# Patient Record
Sex: FEMALE | Race: BLACK OR AFRICAN AMERICAN | Employment: OTHER | URBAN - METROPOLITAN AREA
[De-identification: names, ages, dates, MRNs, and addresses within clinical notes are randomized per-mention and may not be internally consistent; named-entity substitution may affect disease eponyms.]

---

## 2016-05-20 LAB
CREATININE, EXTERNAL: 1.1
HBA1C MFR BLD HPLC: 6.3 %
LDL-C, EXTERNAL: 79

## 2016-09-26 LAB
CREATININE, EXTERNAL: 1
HBA1C MFR BLD HPLC: 6.4 %
LDL-C, EXTERNAL: 77

## 2017-01-26 LAB
CREATININE, EXTERNAL: 1
HBA1C MFR BLD HPLC: 6.5 %
LDL-C, EXTERNAL: 87
MICROALBUMIN UR TEST STR-MCNC: 10 MG/DL

## 2017-03-07 ENCOUNTER — HOSPITAL ENCOUNTER (EMERGENCY)
Age: 72
Discharge: HOME | End: 2017-03-07
Attending: EMERGENCY MEDICINE | Admitting: EMERGENCY MEDICINE
Payer: MEDICARE

## 2017-03-07 VITALS
SYSTOLIC BLOOD PRESSURE: 143 MMHG | BODY MASS INDEX: 45.35 KG/M2 | TEMPERATURE: 98.2 F | OXYGEN SATURATION: 97 % | RESPIRATION RATE: 20 BRPM | DIASTOLIC BLOOD PRESSURE: 75 MMHG | WEIGHT: 256 LBS | HEART RATE: 64 BPM

## 2017-03-07 DIAGNOSIS — I10 ASYMPTOMATIC HYPERTENSION: Primary | ICD-10-CM

## 2017-03-07 PROCEDURE — 99282 EMERGENCY DEPT VISIT SF MDM: CPT

## 2017-03-07 RX ORDER — TORSEMIDE 20 MG/1
10 TABLET ORAL DAILY
COMMUNITY
End: 2017-06-07 | Stop reason: ALTCHOICE

## 2017-03-07 NOTE — ED NOTES
Pt instructed to follow up with her PCP for BP recheck, and to return for worsening HTN or other concerns.

## 2017-03-07 NOTE — DISCHARGE INSTRUCTIONS

## 2017-03-07 NOTE — ED TRIAGE NOTES
Pt reports her bp has been elevated; has had some medication changes (takes cozaar and torsemide). /105 pta.   Denies chest pain/dizziness/chest pain

## 2017-03-07 NOTE — ED PROVIDER NOTES
HPI Comments: Took BP at home this morning and it was high, no symptoms, was concerend and came to ED. Asymptomatic now, recently had her meds changed by PCP. The history is provided by the patient. Past Medical History:   Diagnosis Date    Atrophic vaginitis     Cardiac cath 05/30/2012    Patent coronary arteries. LVEDP 20.  RA 11.  RV 42/10. PA 42/21. W 18.  CO 6.4 (TD), 6.4 (Abby Savant). PVR 1.7 Wood units. False-positive nuclear study.  Cardiac echocardiogram 05/11/2011    EF 65%. RVSP at least 35 mmHg. Mild IVCE. No significant valvular heart disease.  Cardiac nuclear imaging test 05/18/2012    Tech difficult. Apical ischemia. No infarction. EF 76%. No reg'l WMA. No TID.  Cardiovascular LLE venous duplex 06/17/2013    Left leg:  No DVT.     Diabetes     diet controlled, hypoglycemia from metformin previously     Dyslipidemia     Fatty liver     Fibrocystic breast disease     Fluid retention     GERD     H/O colonoscopy 4/12/13    polyp    Hypertension     Ill-defined condition     gout    Macular degeneration     Morbid obesity ([address]     Obstructive sleep apnea     Peripheral neuropathy ([address]     Rectocele        Past Surgical History:   Procedure Laterality Date    [facility] ARTERIAL ARTERIOTOMY 3M  5/25/2012    HX COLONOSCOPY  4/12/2013    HX HEART CATHETERIZATION  5/25/2012    HX HERNIA REPAIR      umbilical    HX HYSTERECTOMY      HX MOHS PROCEDURES      right         Family History:   Problem Relation Age of Onset   Rhoda Alfredo Cancer Mother      colon cancer    Colon Cancer Mother     Hypertension Mother     Diabetes Mother     Heart Disease Mother     Coronary Artery Disease Mother     Hypertension Father     Diabetes Father     Heart Disease Father     Coronary Artery Disease Father     Hypertension Sister     Diabetes Sister     Heart Disease Sister     Coronary Artery Disease Sister     Hypertension Brother     Diabetes Brother     Heart Disease Brother     Coronary Artery Disease Brother        Social History     Social History    Marital status:      Spouse name: N/A    Number of children: N/A    Years of education: N/A     Occupational History    Not on file. Social History Main Topics    Smoking status: Former Smoker    Smokeless tobacco: Never Used    Alcohol use No    Drug use: No    Sexual activity: No     Other Topics Concern    Not on file     Social History Narrative         ALLERGIES: Latex; Nexium [esomeprazole magnesium]; Crestor [rosuvastatin]; Lisinopril; Niaspan [niacin]; Norvasc [amlodipine]; Pcn [penicillins]; Pravachol [pravastatin]; Sulfa (sulfonamide antibiotics); and Zocor [simvastatin]    Review of Systems   Constitutional: Negative. HENT: Negative. Respiratory: Negative. Cardiovascular: Negative. Gastrointestinal: Negative. Neurological: Negative. Hematological: Negative. Vitals:    03/07/17 0512 03/07/17 0515   BP: 136/86 131/89   Pulse:  64   Resp:  20   Temp:  98.2 °F (36.8 °C)   SpO2:  98%   Weight:  116.1 kg (256 lb)            Physical Exam   Constitutional: She is oriented to person, place, and time. She appears well-developed. HENT:   Head: Normocephalic and atraumatic. Mouth/Throat: Oropharynx is clear and moist.   Eyes: Conjunctivae and EOM are normal. Pupils are equal, round, and reactive to light. Neck: Normal range of motion. Neck supple. Cardiovascular: Normal rate and regular rhythm. Pulmonary/Chest: Effort normal and breath sounds normal.   Abdominal: Soft. Musculoskeletal: Normal range of motion. Neurological: She is alert and oriented to person, place, and time. Skin: Skin is warm and dry. Psychiatric: She has a normal mood and affect. Nursing note and vitals reviewed. MDM  Number of Diagnoses or Management Options  Diagnosis management comments: BP results reviewed with pt, she will F/U with PCP today for re-check.   Douglas Clark MD  5:28 AM      ED Course       Procedures

## 2017-03-31 ENCOUNTER — OFFICE VISIT (OUTPATIENT)
Dept: CARDIOLOGY CLINIC | Age: 72
End: 2017-03-31

## 2017-03-31 VITALS
HEART RATE: 65 BPM | HEIGHT: 63 IN | DIASTOLIC BLOOD PRESSURE: 86 MMHG | OXYGEN SATURATION: 98 % | WEIGHT: 253 LBS | BODY MASS INDEX: 44.83 KG/M2 | SYSTOLIC BLOOD PRESSURE: 132 MMHG

## 2017-03-31 DIAGNOSIS — I11.9 BENIGN HYPERTENSIVE HEART DISEASE WITHOUT HEART FAILURE: ICD-10-CM

## 2017-03-31 DIAGNOSIS — G47.30 SLEEP APNEA, UNSPECIFIED TYPE: ICD-10-CM

## 2017-03-31 DIAGNOSIS — R00.2 PALPITATIONS: Primary | ICD-10-CM

## 2017-03-31 DIAGNOSIS — E78.5 DYSLIPIDEMIA: ICD-10-CM

## 2017-03-31 RX ORDER — LOSARTAN POTASSIUM 100 MG/1
TABLET ORAL
Refills: 0 | COMMUNITY
Start: 2017-03-07 | End: 2017-06-07 | Stop reason: DRUGHIGH

## 2017-03-31 NOTE — PROGRESS NOTES
HPI: I saw Myrna Rodriguez in my office today in cardiovascular evaluation regarding her problems with palpitations recently. Ms. Clinton Rodriguez is a pleasant, morbidly obese, 74year old  female with history of hypertension, dyslipidemia, type 2 diabetes mellitus, gastroesophageal reflux disease, obstructive sleep apnea, and nonobstructive coronary artery disease documented on cardiac catheterization by my former associate Dr. Anderson Dire back in May of 2012. She also had a right heart catheterization at that time, which demonstrated mild elevated pulmonary pressures and she did have some mild increase in her left ventricular end diastolic pressure at 20 mmHg at that time. She comes in today relates that she is doing reasonably well. She had some problem with her blood pressure which turned out to be according to her related to sulfur and her medications and all of her blood pressure medications were changed and she now seems to be doing reasonably well on a combination of Cozaar 100 mg daily and amlodipine 2.5 mg which was just started about a week ago. She denies any problems with chest pain, shortness of breath, or any other cardiovascular complaints. Encounter Diagnoses   Name Primary?  Palpitations Yes    Hypertension     Dyslipidemia     Sleep apnea, unspecified type        Discussion: This lady seems to be doing quite well at this juncture and I have no recommendations for change at this time. She is not having any symptoms to suggest the development of obstructive coronary disease or any heart failure issues. I am going to check with Dr. Padmini Mares get a copy of her latest lipid profile. She is quite concerned about developing obstructive coronary artery disease issues over time because her brother just had bypass surgery. She is currently on Lipitor 20 mg daily for treatment of this problem.     Her blood pressure today is reasonably well-controlled and her EKG is stable so I am simply going to plan to see her again in a year or as needed if any new cardiovascular symptoms should develop in the interim. PCP: Charis Ga MD      Past Medical History:   Diagnosis Date    Atrophic vaginitis     Cardiac cath 05/30/2012    Patent coronary arteries. LVEDP 20.  RA 11.  RV 42/10. PA 42/21. W 18.  CO 6.4 (TD), 6.4 (Terald Breann). PVR 1.7 Wood units. False-positive nuclear study.  Cardiac echocardiogram 05/11/2011    EF 65%. RVSP at least 35 mmHg. Mild IVCE. No significant valvular heart disease.  Cardiac nuclear imaging test 05/18/2012    Tech difficult. Apical ischemia. No infarction. EF 76%. No reg'l WMA. No TID.  Cardiovascular LLE venous duplex 06/17/2013    Left leg:  No DVT.  Diabetes     diet controlled, hypoglycemia from metformin previously     Dyslipidemia     Fatty liver     Fibrocystic breast disease     Fluid retention     GERD     H/O colonoscopy 4/12/13    polyp    Hypertension     Ill-defined condition     gout    Macular degeneration     Morbid obesity ([address]     Obstructive sleep apnea     Peripheral neuropathy (HCC)     Rectocele        Past Surgical History:   Procedure Laterality Date    [facility] ARTERIAL ARTERIOTOMY 3M  5/25/2012    HX COLONOSCOPY  4/12/2013    HX HEART CATHETERIZATION  5/25/2012    HX HERNIA REPAIR      umbilical    HX HYSTERECTOMY      HX MOHS PROCEDURES      right       Current Outpatient Rx   Name  Route  Sig  Dispense  Refill    allopurinol (ZYLOPRIM) 100 mg tablet    Oral    Take 100 mg by mouth daily. 0      losartan (COZAAR) 25 mg tablet    Oral    Take 25 mg by mouth daily.  CYCLOSPORINE (RESTASIS OP)    Ophthalmic    Apply 1 Drop to eye two (2) times a day.  atorvastatin (LIPITOR) 20 mg tablet    Oral    Take 1 tablet by mouth daily. 90 tablet    0      triamterene-hydrochlorothiazide (MAXZIDE) 75-50 mg per tablet    Oral    Take 1 tablet by mouth daily.     90 tablet    1      potassium chloride (KLOR-CON M10) 10 mEq tablet    Oral    Take 2 tablets by mouth two (2) times a day. 360 tablet    1      cholecalciferol (VITAMIN D3) 1,000 unit tablet    Oral    Take 1,000 Units by mouth daily.  Dexlansoprazole (DEXILANT) 60 mg CpDM    Oral    Take 60 mg by mouth daily as needed.  cpap machine kit    Inhalation    Take  by inhalation every evening. Used when sleeping              omega-3 fatty acids-vitamin e (FISH OIL) 1,000 mg Cap    Oral    Take 1 Cap by mouth daily.  aspirin 81 mg Tab    Oral    Take 81 mg by mouth daily.  MULTIVITAMINS (MULTI-VITAMIN PO)    Oral    Take 1 Tab by mouth daily.  amLODIPine (NORVASC) 10 mg tablet    Oral    Take 10 mg by mouth daily. 0         Allergies   Allergen Reactions    Latex Rash    Nexium [Esomeprazole Magnesium] Swelling and Other (comments)     Lips Swollen, and facial rash and swelling    Crestor [Rosuvastatin] Other (comments)     Upper respiratory illness    Lisinopril Unknown (comments)     Patient can't recall    Niaspan [Niacin] Other (comments)     Scratchy throat, \"asthma\" like symptoms    Norvasc [Amlodipine] Other (comments)     Complained of very dry skin and dark patches on her face    Pcn [Penicillins] Hives    Pravachol [Pravastatin] Myalgia    Sulfa (Sulfonamide Antibiotics) Rash    Zocor [Simvastatin] Myalgia and Other (comments)     Per patient chart \"(? Rash)\"       Social History:   Social History   Substance Use Topics    Smoking status: Former Smoker    Smokeless tobacco: Never Used    Alcohol use No         Family history: family history includes Cancer in her mother; Colon Cancer in her mother; Coronary Artery Disease in her brother, father, mother, and sister; Diabetes in her brother, father, mother, and sister; Heart Disease in her brother, father, mother, and sister; Hypertension in her brother, father, mother, and sister.       Review of Systems:   Constitutional: Negative. Respiratory: Negative. Cardiovascular: Negative. Gastrointestinal: Negative. Musculoskeletal: Negative. Physical Exam:   The patient is an alert, oriented, well developed, well nourished 74 y.o.  female who was in no acute distress at the time of my examination. Visit Vitals    Ht 5' 3\" (1.6 m)      BP Readings from Last 3 Encounters:   03/07/17 143/75   03/15/16 131/83   10/23/15 129/88      Wt Readings from Last 3 Encounters:   03/07/17 116.1 kg (256 lb)   03/15/16 119 kg (262 lb 5.6 oz)   02/19/16 122.5 kg (270 lb)     HEENT: Conjuctiva white, mucosa moist, no pallor or cyanosis. NECK: Supple without masses, tenderness or thyromegaly. There was no jugular venous distention. Carotid are full bilaterally without bruits. CHEST: Symmetrical with good excursion. LUNGS: Clear to auscultation in all fields. HEART: Regular rate and rhythm. The apex is not displaced. There were no lifts, thrills or heaves. There is a normal S1 and S2 without appreciable murmurs, rubs, clicks, or gallops auscultated. ABDOMEN: Soft without masses, tenderness or organomegaly. EXTREMITIES: Full peripheral pulses without peripheral edema. Review of Data: See PMH and Cardiology and Imaging sections for cardiac testing  Lab Results   Component Value Date/Time    Cholesterol, total 153 10/14/2014 12:00 AM    HDL Cholesterol 70 10/14/2014 12:00 AM    LDL, calculated 74 10/14/2014 12:00 AM    Triglyceride 45 10/14/2014 12:00 AM    CHOL/HDL Ratio 3.8 11/01/2010 12:38 PM       Results for orders placed or performed in visit on 02/19/16   AMB POC EKG ROUTINE W/ 12 LEADS, INTER & REP     Status: None    Narrative    Normal sinus rhythm rate 60. Low voltage in the precordial leads  with mild T wave inversions in V1 through V3 which is a normal  variant. This EKG is similar to the EKG of January 28, 2015. Vandana Pillai D.O., F.A.C.C.   [facility]  [facility] [place]  [address] [address]    PLEASE NOTE:  This document has been produced using voice recognition software. Unrecognized errors in transcription may be present.

## 2017-03-31 NOTE — PROGRESS NOTES
1. Have you been to the ER, urgent care clinic since your last visit? Hospitalized since your last visit? ER 3/7/17 elevated BP  2. Have you seen or consulted any other health care providers outside of the [address] [place] [address] since your last visit? Include any pap smears or colon screening.   no

## 2017-03-31 NOTE — MR AVS SNAPSHOT
Visit Information Date & Time Provider Department Dept. Phone Encounter #  
 3/31/2017  1:20 PM Vandana Pillai DO [facility] [id number] Upcoming Health Maintenance Date Due DTaP/Tdap/Td series (1 - Tdap) 2/10/1964 ZOSTER VACCINE AGE 60> 2/10/2003 GLAUCOMA SCREENING Q2Y 2/10/2008 MEDICARE YEARLY EXAM 2/10/2008 Pneumococcal 65+ Low/Medium Risk (2 of 2 - PPSV23) 1/1/2013 FOOT EXAM Q1 10/30/2014 HEMOGLOBIN A1C Q6M 4/14/2015 EYE EXAM RETINAL OR DILATED Q1 9/21/2015 MICROALBUMIN Q1 10/14/2015 LIPID PANEL Q1 10/14/2015 INFLUENZA AGE 9 TO ADULT 8/1/2016 COLONOSCOPY 4/12/2018 Allergies as of 3/31/2017  Review Complete On: 3/31/2017 By: Vandana Pillai DO Severity Noted Reaction Type Reactions Latex    Rash Nexium [Esomeprazole Magnesium] High 12/13/2010    Swelling, Other (comments) Lips Swollen, and facial rash and swelling Crestor [Rosuvastatin]    Other (comments) Upper respiratory illness Lisinopril  05/17/2010   Side Effect Unknown (comments) Patient can't recall Niaspan [Niacin]  09/19/2011    Other (comments) Scratchy throat, \"asthma\" like symptoms Norvasc [Amlodipine]  12/13/2010   Intolerance Other (comments) Complained of very dry skin and dark patches on her face Pcn [Penicillins]  12/18/2009    Hives Pravachol [Pravastatin]    Myalgia Sulfa (Sulfonamide Antibiotics)  12/18/2009    Rash Zocor [Simvastatin]    Myalgia, Other (comments) Per patient chart \"(? Rash)\" Current Immunizations  Reviewed on 10/21/2014 Name Date Influenza Vaccine 10/21/2014, 10/30/2013 Pneumococcal Vaccine (Unspecified Type) 1/1/2008 Not reviewed this visit You Were Diagnosed With   
  
 Codes Comments Palpitations    -  Primary ICD-10-CM: R00.2 ICD-9-CM: 785.1 Benign hypertensive heart disease without heart failure     ICD-10-CM: I11.9 ICD-9-CM: 402.10 Dyslipidemia     ICD-10-CM: E78.5 ICD-9-CM: 272.4 Sleep apnea, unspecified type     ICD-10-CM: G47.30 ICD-9-CM: 780.57 Vitals BP Pulse Height(growth percentile) Weight(growth percentile) SpO2 BMI  
 132/86 (BP 1 Location: Right arm, BP Patient Position: Sitting) 65 5' 3\" (1.6 m) 253 lb (114.8 kg) 98% 44.82 kg/m2 OB Status Smoking Status Hysterectomy Former Smoker Vitals History BMI and BSA Data Body Mass Index Body Surface Area 44.82 kg/m 2 2.26 m 2 Preferred Pharmacy Pharmacy Name [facility] [address] [phone] Your Updated Medication List  
  
   
This list is accurate as of: 3/31/17  2:29 PM.  Always use your most recent med list.  
  
  
  
  
 aspirin 81 mg tablet Take 81 mg by mouth daily. atorvastatin 20 mg tablet Commonly known as:  LIPITOR Take 1 tablet by mouth daily. cpap machine kit Take  by inhalation every evening. Used when sleeping DEXILANT 60 mg Cpdb Generic drug:  Dexlansoprazole Take 60 mg by mouth daily as needed. losartan 100 mg tablet Commonly known as:  COZAAR  
take 1 tablet by mouth once daily MULTI-VITAMIN PO Take 1 Tab by mouth daily. potassium chloride 10 mEq tablet Commonly known as:  KLOR-CON M10 Take 2 tablets by mouth two (2) times a day. torsemide 20 mg tablet Commonly known as:  DEMADEX Take 10 mg by mouth daily. VITAMIN D3 1,000 unit tablet Generic drug:  cholecalciferol Take 1,000 Units by mouth daily. We Performed the Following AMB POC EKG ROUTINE W/ 12 LEADS, INTER & REP [65360 CPT(R)] Introducing [facility]! Dear Imelda Pearce: Thank you for requesting a [employer] account. Our records indicate that you already have an active [employer] account. You can access your account anytime at [email or web address] Did you know that you can access your hospital and ER discharge instructions at any time in [employer]? You can also review all of your test results from your hospital stay or ER visit. Additional Information If you have questions, please visit the Frequently Asked Questions section of the [employer] website at [email or web address] Remember, [employer] is NOT to be used for urgent needs. For medical emergencies, dial 911. Now available from your iPhone and Android! Please provide this summary of care documentation to your next provider. Your primary care clinician is listed as Indu Harvey. If you have any questions after today's visit, please call [phone].

## 2017-04-13 LAB — LDL-C, EXTERNAL: 77

## 2017-04-19 LAB — CREATININE, EXTERNAL: 0.9

## 2017-06-07 ENCOUNTER — OFFICE VISIT (OUTPATIENT)
Dept: INTERNAL MEDICINE CLINIC | Age: 72
End: 2017-06-07

## 2017-06-07 VITALS
TEMPERATURE: 96.3 F | WEIGHT: 246.8 LBS | DIASTOLIC BLOOD PRESSURE: 70 MMHG | BODY MASS INDEX: 43.73 KG/M2 | OXYGEN SATURATION: 99 % | RESPIRATION RATE: 16 BRPM | SYSTOLIC BLOOD PRESSURE: 128 MMHG | HEIGHT: 63 IN | HEART RATE: 61 BPM

## 2017-06-07 DIAGNOSIS — I11.9 BENIGN HYPERTENSIVE HEART DISEASE WITHOUT HEART FAILURE: Primary | ICD-10-CM

## 2017-06-07 PROBLEM — Z90.710 ACQUIRED ABSENCE OF BOTH CERVIX AND UTERUS: Status: ACTIVE | Noted: 2017-06-07

## 2017-06-07 RX ORDER — SPIRONOLACTONE 25 MG/1
25 TABLET ORAL DAILY
Refills: 0 | COMMUNITY
Start: 2017-06-05 | End: 2017-07-14 | Stop reason: SDUPTHER

## 2017-06-07 RX ORDER — HYDRALAZINE HYDROCHLORIDE 25 MG/1
25 TABLET, FILM COATED ORAL 3 TIMES DAILY
Refills: 0 | COMMUNITY
Start: 2017-06-05 | End: 2017-06-23 | Stop reason: SINTOL

## 2017-06-07 RX ORDER — LOSARTAN POTASSIUM 50 MG/1
50 TABLET ORAL DAILY
Refills: 0 | COMMUNITY
Start: 2017-05-22 | End: 2017-06-14 | Stop reason: SDUPTHER

## 2017-06-07 NOTE — PATIENT INSTRUCTIONS

## 2017-06-07 NOTE — PROGRESS NOTES
HISTORY OF PRESENT ILLNESS  Dusty Pappas is a 74 y.o. female. HPI  Patient is new to me today, c/o HTN uncontrolled. Home 's over 90's she has brought in her machine to prove it. Today she took an extra dose of losartan, this she thinks has normalized her BP in the office today    She is anxious and frustrated about her elevated BP, she says she has been scared to eat so she has been eating fruits and veggies lately. This subsequently mad her dizzy and her previous PCP cut losartan dose to 50 mg. This frustrated her more and she said made BP elevate. This is why she says she has been took losartan 100 mg today. She denies any symptoms related to elevated BP    She had an elevated read at 1 am this am (she says she stays up late). She says her last \"real meal\" was Sunday and that made her BP high. She has hx of gout (has not taken her uloric out of fear of side effects), DM, dyslipidemia and sleep apnea n CPAP. Allergies   Allergen Reactions    Latex Rash    Nexium [Esomeprazole Magnesium] Swelling and Other (comments)     Lips Swollen, and facial rash and swelling    Crestor [Rosuvastatin] Other (comments)     Upper respiratory illness    Lisinopril Unknown (comments)     Patient can't recall    Niaspan [Niacin] Other (comments)     Scratchy throat, \"asthma\" like symptoms    Norvasc [Amlodipine] Other (comments)     Complained of very dry skin and dark patches on her face    Pcn [Penicillins] Hives    Pravachol [Pravastatin] Myalgia    Sulfa (Sulfonamide Antibiotics) Rash    Zocor [Simvastatin] Myalgia and Other (comments)     Per patient chart \"(? Rash)\"       Past Medical History:   Diagnosis Date    Atrophic vaginitis     Cardiac cath 05/30/2012    Patent coronary arteries. LVEDP 20.  RA 11.  RV 42/10. PA 42/21. W 18.  CO 6.4 (TD), 6.4 (Caretha Dilling). PVR 1.7 Wood units. False-positive nuclear study.  Cardiac echocardiogram 05/11/2011    EF 65%. RVSP at least 35 mmHg.   Mild IVCE.  No significant valvular heart disease.  Cardiac nuclear imaging test 05/18/2012    Tech difficult. Apical ischemia. No infarction. EF 76%. No reg'l WMA. No TID.  Cardiovascular LLE venous duplex 06/17/2013    Left leg:  No DVT.  Diabetes     diet controlled, hypoglycemia from metformin previously     Dyslipidemia     Fatty liver     Fibrocystic breast disease     Fluid retention     GERD     H/O colonoscopy 4/12/13    polyp    Hypertension     Ill-defined condition     gout    Macular degeneration     Morbid obesity ([address]     Obstructive sleep apnea     Peripheral neuropathy ([address]     Rectocele        Family History   Problem Relation Age of Onset    Cancer Mother      colon cancer    Colon Cancer Mother     Hypertension Mother     Diabetes Mother     Heart Disease Mother     Coronary Artery Disease Mother     Hypertension Father     Diabetes Father     Heart Disease Father     Coronary Artery Disease Father     Hypertension Sister     Diabetes Sister     Heart Disease Sister     Coronary Artery Disease Sister     Hypertension Brother     Diabetes Brother     Heart Disease Brother     Coronary Artery Disease Brother        Social History   Substance Use Topics    Smoking status: Former Smoker    Smokeless tobacco: Never Used    Alcohol use Yes      Comment: occassionally        Current Outpatient Prescriptions   Medication Sig    losartan (COZAAR) 50 mg tablet Take 50 mg by mouth daily.  spironolactone (ALDACTONE) 25 mg tablet Take 25 mg by mouth daily.  hydrALAZINE (APRESOLINE) 25 mg tablet Take 25 mg by mouth three (3) times daily.  atorvastatin (LIPITOR) 20 mg tablet Take 1 tablet by mouth daily.  potassium chloride (KLOR-CON M10) 10 mEq tablet Take 2 tablets by mouth two (2) times a day.  cholecalciferol (VITAMIN D3) 1,000 unit tablet Take 2,000 Units by mouth daily.     Dexlansoprazole (DEXILANT) 60 mg CpDM Take 60 mg by mouth daily as needed.  cpap machine kit Take  by inhalation every evening. Used when sleeping    aspirin 81 mg Tab Take 81 mg by mouth daily.  MULTIVITAMINS (MULTI-VITAMIN PO) Take 1 Tab by mouth daily. No current facility-administered medications for this visit. Past Surgical History:   Procedure Laterality Date    [facility]. CNNLA ARTERIAL ARTERIOTOMY 3M  5/25/2012    HX COLONOSCOPY  4/12/2013    HX HEART CATHETERIZATION  5/25/2012    HX HERNIA REPAIR      umbilical    HX HYSTERECTOMY      HX MOHS PROCEDURES      right     ROS  See HPI    Visit Vitals    /70 (BP 1 Location: Left arm)    Pulse 61    Temp 96.3 °F (35.7 °C) (Oral)    Resp 16    Ht 5' 3\" (1.6 m)    Wt 246 lb 12.8 oz (111.9 kg)    SpO2 99%    BMI 43.72 kg/m2     Physical Exam  Constitutional: Obese. Patient is oriented to person, place, and time and well-developed, well-nourished, and in no distress. Head: Normocephalic and atraumatic. Right Ear: ear normal.   Left Ear: ear normal.   Eyes: Pupils are equal, round, and reactive to light. Neck/thyroid: no thyromegaly or masses. Normal range of motion. Cardiovascular: Normal rate, regular rhythm, normal heart sounds and intact distal pulses. No murmur heard. Pulmonary/Chest: Effort normal and breath sounds normal. No respiratory distress. Musculoskeletal: Normal range of motion. No edema. Neurological: Reflexes intact and symetrical.  he is alert and oriented to person, place, and time. Gait normal.   Skin: Skin is warm and dry. Psychiatric: anxious affect. Mood, memory and judgment normal.     ASSESSMENT and PLAN    ICD-10-CM ICD-9-CM    1. Hypertension I11.9 402.10 AMB SUPPLY ORDER     -Script for new cuff given  -Proper amb BP technique reviewed today  -Reassurance provided.   Patient to focus on low salt diet, stress reduction.  -She is to check her BP once daily, she may take an extra dose of losartan if SBP >140 or DBP >90  -She has provided her recent HM and labs today  -RTC 1 week for BP follow up    Additional Instructions: The patient understands that they should contact the office at any time if any questions or concerns develop. They are also aware that they can call our main office number at [phone] at any time if they would like to address any concerns with the physician. They also understand that they should dial 911 if any acute emergency arises. The patient understands that they should give us a minimum of 48 hours to complete prescription refills once they are requested. The patient has also been instructed to contact us by calling the main office number if they have not received feedback within 2 weeks of having any tests completed. The patient is a aware that they should read all package insert information when picking up the medications and that they should consult the pharmacist of a physician if they have any questions or concerns regarding the prescribed medications. Discussed with the patient new medications given and patient instructed to read pharmacy literature regarding side effects and drug interactions. Instructions for taking the medications were provided to the patient and the consequences of not taking it. Follow-up Disposition:   Return if symptoms worsen or fail to improve. Risk and benefits of new medication discussed in detail when indicated, patient was given the opportunity to ask questions   AVS provided  reviewed diet, exercise and weight control when indicated  Alarm signals discussed. ER precautions reviewed when indicated  Plan of care reviewed with patient. Understanding verbalized and they are in agreement with plan of care.      Toñito Morales DO

## 2017-06-07 NOTE — MR AVS SNAPSHOT
Visit Information Date & Time Provider Department Dept. Phone Encounter #  
 6/7/2017  2:30 PM Carlos Molina DO [facility] [phone] Follow-up Instructions Return in about 1 week (around 6/14/2017) for follow up. Your Appointments 3/27/2018  1:20 PM  
Follow Up with Sara Pandya DO [facility] ([facility] Appt Note: 1 year follow up with an EKG  
 [place] [id number] [address] [phone] [facility] [place] [address] Upcoming Health Maintenance Date Due DTaP/Tdap/Td series (1 - Tdap) 2/10/1964 ZOSTER VACCINE AGE 60> 2/10/2003 GLAUCOMA SCREENING Q2Y 2/10/2008 MEDICARE YEARLY EXAM 2/10/2008 Pneumococcal 65+ Low/Medium Risk (2 of 2 - PPSV23) 1/1/2013 FOOT EXAM Q1 10/30/2014 HEMOGLOBIN A1C Q6M 4/14/2015 EYE EXAM RETINAL OR DILATED Q1 9/21/2015 INFLUENZA AGE 9 TO ADULT 8/1/2017 MICROALBUMIN Q1 1/26/2018 LIPID PANEL Q1 1/26/2018 COLONOSCOPY 4/12/2018 Allergies as of 6/7/2017  Review Complete On: 6/7/2017 By: Crista Aviles LPN Severity Noted Reaction Type Reactions Latex    Rash Nexium [Esomeprazole Magnesium] High 12/13/2010    Swelling, Other (comments) Lips Swollen, and facial rash and swelling Crestor [Rosuvastatin]    Other (comments) Upper respiratory illness Lisinopril  05/17/2010   Side Effect Unknown (comments) Patient can't recall Niaspan [Niacin]  09/19/2011    Other (comments) Scratchy throat, \"asthma\" like symptoms Norvasc [Amlodipine]  12/13/2010   Intolerance Other (comments) Complained of very dry skin and dark patches on her face Pcn [Penicillins]  12/18/2009    Hives Pravachol [Pravastatin]    Myalgia Sulfa (Sulfonamide Antibiotics)  12/18/2009    Rash Zocor [Simvastatin]    Myalgia, Other (comments) Per patient chart \"(? Rash)\" Current Immunizations  Reviewed on 10/21/2014 Name Date Influenza Vaccine 10/21/2014, 10/30/2013 Pneumococcal Vaccine (Unspecified Type) 1/1/2008 Not reviewed this visit You Were Diagnosed With   
  
 Codes Comments Benign hypertensive heart disease without heart failure    -  Primary ICD-10-CM: I11.9 ICD-9-CM: 402.10 Vitals BP Pulse Temp Resp Height(growth percentile) Weight(growth percentile) 128/70 (BP 1 Location: Left arm) 61 96.3 °F (35.7 °C) (Oral) 16 5' 3\" (1.6 m) 246 lb 12.8 oz (111.9 kg) SpO2 BMI OB Status Smoking Status 99% 43.72 kg/m2 Hysterectomy Former Smoker Vitals History BMI and BSA Data Body Mass Index Body Surface Area 43.72 kg/m 2 2.23 m 2 Preferred Pharmacy Pharmacy Name Phone [place] [id number] [phone] Your Updated Medication List  
  
   
This list is accurate as of: 6/7/17  3:30 PM.  Always use your most recent med list.  
  
  
  
  
 aspirin 81 mg tablet Take 81 mg by mouth daily. atorvastatin 20 mg tablet Commonly known as:  LIPITOR Take 1 tablet by mouth daily. cpap machine kit Take  by inhalation every evening. Used when sleeping DEXILANT 60 mg Cpdb Generic drug:  Dexlansoprazole Take 60 mg by mouth daily as needed. hydrALAZINE 25 mg tablet Commonly known as:  APRESOLINE Take 25 mg by mouth three (3) times daily. losartan 50 mg tablet Commonly known as:  COZAAR Take 50 mg by mouth daily. MULTI-VITAMIN PO Take 1 Tab by mouth daily. potassium chloride 10 mEq tablet Commonly known as:  KLOR-CON M10 Take 2 tablets by mouth two (2) times a day. spironolactone 25 mg tablet Commonly known as:  ALDACTONE Take 25 mg by mouth daily. VITAMIN D3 1,000 unit tablet Generic drug:  cholecalciferol Take 2,000 Units by mouth daily. We Performed the Following AMB SUPPLY ORDER [[id number] Custom] Comments:  
 Adult large blood pressure cuff #1, ICD 10 :I11.9 Follow-up Instructions Return in about 1 week (around 6/14/2017) for follow up. Patient Instructions A Healthy Lifestyle: Care Instructions Your Care Instructions A healthy lifestyle can help you feel good, stay at a healthy weight, and have plenty of energy for both work and play. A healthy lifestyle is something you can share with your whole family. A healthy lifestyle also can lower your risk for serious health problems, such as high blood pressure, heart disease, and diabetes. You can follow a few steps listed below to improve your health and the health of your family. Follow-up care is a key part of your treatment and safety. Be sure to make and go to all appointments, and call your doctor if you are having problems. Its also a good idea to know your test results and keep a list of the medicines you take. How can you care for yourself at home? · Do not eat too much sugar, fat, or fast foods. You can still have dessert and treats now and then. The goal is moderation. · Start small to improve your eating habits. Pay attention to portion sizes, drink less juice and soda pop, and eat more fruits and vegetables. ¨ Eat a healthy amount of food. A 3-ounce serving of meat, for example, is about the size of a deck of cards. Fill the rest of your plate with vegetables and whole grains. ¨ Limit the amount of soda and sports drinks you have every day. Drink more water when you are thirsty. ¨ Eat at least 5 servings of fruits and vegetables every day. It may seem like a lot, but it is not hard to reach this goal. A serving or helping is 1 piece of fruit, 1 cup of vegetables, or 2 cups of leafy, raw vegetables. Have an apple or some carrot sticks as an afternoon snack instead of a candy bar.  Try to have fruits and/or vegetables at every meal. 
 · Make exercise part of your daily routine. You may want to start with simple activities, such as walking, bicycling, or slow swimming. Try to be active 30 to 60 minutes every day. You do not need to do all 30 to 60 minutes all at once. For example, you can exercise 3 times a day for 10 or 20 minutes. Moderate exercise is safe for most people, but it is always a good idea to talk to your doctor before starting an exercise program. 
· Keep moving. Riley Crooked the lawn, work in the garden, or [employer]. Take the stairs instead of the elevator at work. · If you smoke, quit. People who smoke have an increased risk for heart attack, stroke, cancer, and other lung illnesses. Quitting is hard, but there are ways to boost your chance of quitting tobacco for good. ¨ Use nicotine gum, patches, or lozenges. ¨ Ask your doctor about stop-smoking programs and medicines. ¨ Keep trying. In addition to reducing your risk of diseases in the future, you will notice some benefits soon after you stop using tobacco. If you have shortness of breath or asthma symptoms, they will likely get better within a few weeks after you quit. · Limit how much alcohol you drink. Moderate amounts of alcohol (up to 2 drinks a day for men, 1 drink a day for women) are okay. But drinking too much can lead to liver problems, high blood pressure, and other health problems. Family health If you have a family, there are many things you can do together to improve your health. · Eat meals together as a family as often as possible. · Eat healthy foods. This includes fruits, vegetables, lean meats and dairy, and whole grains. · Include your family in your fitness plan. Most people think of activities such as jogging or tennis as the way to fitness, but there are many ways you and your family can be more active. Anything that makes you breathe hard and gets your heart pumping is exercise. Here are some tips: ¨ Walk to do errands or to take your child to school or the bus. ¨ Go for a family bike ride after dinner instead of watching TV. Where can you learn more? Go to http://sonia-moira.info/. Enter I732 in the search box to learn more about \"A Healthy Lifestyle: Care Instructions. \" Current as of: July 26, 2016 Content Version: 11.2 © 1853-0434 [employer]. Care instructions adapted under license by [employer] (which disclaims liability or warranty for this information). If you have questions about a medical condition or this instruction, always ask your healthcare professional. [address] any warranty or liability for your use of this information. You may take an extra dose of losartan if SBP >140 or DBP <90 Introducing \[facility] [place] [facility]\"" & [facility] SERVICES! Dear Elsa Goltz: Thank you for requesting a [employer] account. Our records indicate that you already have an active [employer] account. You can access your account anytime at [email or web address] Did you know that you can access your hospital and ER discharge instructions at any time in [employer]? You can also review all of your test results from your hospital stay or ER visit. Additional Information If you have questions, please visit the Frequently Asked Questions section of the [employer] website at [email or web address] Remember, [employer] is NOT to be used for urgent needs. For medical emergencies, dial 911. Now available from your iPhone and Android! Please provide this summary of care documentation to your next provider. Your primary care clinician is listed as Theta Spindle. If you have any questions after today's visit, please call [phone].

## 2017-06-07 NOTE — PROGRESS NOTES
Pt is here to establish care. Pt states she felt like her PCP was not listening to her. States her BP has been elevated. States she saw her PCP's wife lasr week & she changed some of her meds. 1. Have you been to the ER, urgent care clinic since your last visit? Hospitalized since your last visit? Yes [facility] ED 3/17 HTN    2. Have you seen or consulted any other health care providers outside of the [address] [place] [address] since your last visit? Include any pap smears or colon screening.  No

## 2017-06-14 ENCOUNTER — OFFICE VISIT (OUTPATIENT)
Dept: INTERNAL MEDICINE CLINIC | Age: 72
End: 2017-06-14

## 2017-06-14 VITALS
DIASTOLIC BLOOD PRESSURE: 66 MMHG | RESPIRATION RATE: 17 BRPM | HEIGHT: 63 IN | TEMPERATURE: 97.8 F | WEIGHT: 243 LBS | HEART RATE: 68 BPM | SYSTOLIC BLOOD PRESSURE: 123 MMHG | BODY MASS INDEX: 43.05 KG/M2

## 2017-06-14 DIAGNOSIS — I11.9 BENIGN HYPERTENSIVE HEART DISEASE WITHOUT HEART FAILURE: Primary | ICD-10-CM

## 2017-06-14 DIAGNOSIS — E87.6 HYPOKALEMIA: ICD-10-CM

## 2017-06-14 DIAGNOSIS — E66.01 MORBID OBESITY WITH BMI OF 40.0-44.9, ADULT (HCC): ICD-10-CM

## 2017-06-14 DIAGNOSIS — E78.5 DYSLIPIDEMIA: ICD-10-CM

## 2017-06-14 RX ORDER — POTASSIUM CHLORIDE 750 MG/1
20 TABLET, EXTENDED RELEASE ORAL 2 TIMES DAILY
Qty: 180 TAB | Refills: 1 | Status: SHIPPED | OUTPATIENT
Start: 2017-06-14 | End: 2017-07-14 | Stop reason: SDUPTHER

## 2017-06-14 RX ORDER — ATORVASTATIN CALCIUM 20 MG/1
20 TABLET, FILM COATED ORAL DAILY
Qty: 90 TAB | Refills: 1 | Status: SHIPPED | OUTPATIENT
Start: 2017-06-14 | End: 2017-07-14 | Stop reason: SDUPTHER

## 2017-06-14 RX ORDER — LOSARTAN POTASSIUM 50 MG/1
50 TABLET ORAL DAILY
Qty: 30 TAB | Refills: 2 | Status: SHIPPED | OUTPATIENT
Start: 2017-06-14 | End: 2017-07-12 | Stop reason: ALTCHOICE

## 2017-06-14 NOTE — MR AVS SNAPSHOT
Visit Information Date & Time Provider Department Dept. Phone Encounter #  
 6/14/2017  2:00 PM Kandy Mayfield DO [facility] [employer] [phone] Follow-up Instructions Return for 3 month follow up. Follow-up and Disposition History Your Appointments 3/27/2018  1:20 PM  
Follow Up with Joanne Miranda DO [facility] ([address]) Appt Note: 1 year follow up with an EKG  
 [place] [id number] [phone] [address] [place] [address] [address] Upcoming Health Maintenance Date Due DTaP/Tdap/Td series (1 - Tdap) 2/10/1964 ZOSTER VACCINE AGE 60> 2/10/2003 GLAUCOMA SCREENING Q2Y 2/10/2008 MEDICARE YEARLY EXAM 2/10/2008 Pneumococcal 65+ Low/Medium Risk (2 of 2 - PPSV23) 1/1/2013 FOOT EXAM Q1 10/30/2014 HEMOGLOBIN A1C Q6M 4/14/2015 EYE EXAM RETINAL OR DILATED Q1 9/21/2015 INFLUENZA AGE 9 TO ADULT 8/1/2017 MICROALBUMIN Q1 1/26/2018 LIPID PANEL Q1 1/26/2018 COLONOSCOPY 4/12/2018 Allergies as of 6/14/2017  Review Complete On: 6/14/2017 By: Kandy Mayfield DO Severity Noted Reaction Type Reactions Latex    Rash Nexium [Esomeprazole Magnesium] High 12/13/2010    Swelling, Other (comments) Lips Swollen, and facial rash and swelling Crestor [Rosuvastatin]    Other (comments) Upper respiratory illness Lisinopril  05/17/2010   Side Effect Unknown (comments) Patient can't recall Niaspan [Niacin]  09/19/2011    Other (comments) Scratchy throat, \"asthma\" like symptoms Norvasc [Amlodipine]  12/13/2010   Intolerance Other (comments) Complained of very dry skin and dark patches on her face Pcn [Penicillins]  12/18/2009    Hives Pravachol [Pravastatin]    Myalgia Sulfa (Sulfonamide Antibiotics)  12/18/2009    Rash Zocor [Simvastatin]    Myalgia, Other (comments) Per patient chart \"(? Rash)\" Current Immunizations  Reviewed on 10/21/2014 Name Date Influenza Vaccine 10/21/2014, 10/30/2013 Pneumococcal Vaccine (Unspecified Type) 1/1/2008 Not reviewed this visit You Were Diagnosed With   
  
 Codes Comments Benign hypertensive heart disease without heart failure    -  Primary ICD-10-CM: I11.9 ICD-9-CM: 402.10 Hypokalemia     ICD-10-CM: E87.6 ICD-9-CM: 276.8 Dyslipidemia     ICD-10-CM: E78.5 ICD-9-CM: 272.4 Vitals BP Pulse Temp Resp Height(growth percentile) Weight(growth percentile) 123/66 (BP 1 Location: Left arm, BP Patient Position: Sitting) 68 97.8 °F (36.6 °C) (Oral) 17 5' 3\" (1.6 m) 243 lb (110.2 kg) BMI OB Status Smoking Status 43.05 kg/m2 Hysterectomy Former Smoker Vitals History BMI and BSA Data Body Mass Index Body Surface Area 43.05 kg/m 2 2.21 m 2 Preferred Pharmacy Pharmacy Name Phone [address] [facility] [address] [place] [address] [phone] Your Updated Medication List  
  
   
This list is accurate as of: 6/14/17  2:58 PM.  Always use your most recent med list.  
  
  
  
  
 aspirin 81 mg tablet Take 81 mg by mouth daily. atorvastatin 20 mg tablet Commonly known as:  LIPITOR Take 1 Tab by mouth daily. cpap machine kit Take  by inhalation every evening. Used when sleeping DEXILANT 60 mg Cpdb Generic drug:  Dexlansoprazole Take 60 mg by mouth daily as needed. hydrALAZINE 25 mg tablet Commonly known as:  APRESOLINE Take 25 mg by mouth three (3) times daily. losartan 50 mg tablet Commonly known as:  COZAAR Take 1 Tab by mouth daily. MULTI-VITAMIN PO Take 1 Tab by mouth daily. potassium chloride 10 mEq tablet Commonly known as:  KLOR-CON M10 Take 2 Tabs by mouth two (2) times a day. spironolactone 25 mg tablet Commonly known as:  ALDACTONE  
 Take 25 mg by mouth daily. VITAMIN D3 1,000 unit tablet Generic drug:  cholecalciferol Take 2,000 Units by mouth daily. Prescriptions Sent to Pharmacy Refills  
 atorvastatin (LIPITOR) 20 mg tablet 1 Sig: Take 1 Tab by mouth daily. Class: Normal  
 Pharmacy: [address] [place] [address] Ph #: [phone] Route: Oral  
 potassium chloride (KLOR-CON M10) 10 mEq tablet 1 Sig: Take 2 Tabs by mouth two (2) times a day. Class: Normal  
 Pharmacy: [address] [place] [address] Ph #: [phone] Route: Oral  
 losartan (COZAAR) 50 mg tablet 2 Sig: Take 1 Tab by mouth daily. Class: Normal  
 Pharmacy: [address] [place] [address] Ph #: [phone] Route: Oral  
  
Follow-up Instructions Return for 3 month follow up. Introducing [facility] [place] & [facility] SERVICES! Dear Sven Aviles: Thank you for requesting a [employer] account. Our records indicate that you already have an active [employer] account. You can access your account anytime at [email or web address] Did you know that you can access your hospital and ER discharge instructions at any time in [employer]? You can also review all of your test results from your hospital stay or ER visit. Additional Information If you have questions, please visit the Frequently Asked Questions section of the [employer] website at [email or web address] Remember, [employer] is NOT to be used for urgent needs. For medical emergencies, dial 911. Now available from your iPhone and Android! Please provide this summary of care documentation to your next provider. Your primary care clinician is listed as Og Camarillo. If you have any questions after today's visit, please call [phone].

## 2017-06-14 NOTE — PROGRESS NOTES
Chief Complaint   Patient presents with    Hypertension    Headache     occipital    Medication Evaluation     hydralazine       1. Have you been to the ER, urgent care clinic since your last visit? Hospitalized since your last visit? No    2. Have you seen or consulted any other health care providers outside of the [address] [place] [address] since your last visit? Include any pap smears or colon screening.  No

## 2017-06-14 NOTE — PROGRESS NOTES
HISTORY OF PRESENT ILLNESS  Rusty Manzanares is a 74 y.o. female. HPI    Hypertension  Patient is here for follow-up of hypertension. She indicates that she is feeling well and denies any symptoms referable to her hypertension. She is not exercising and is adherent to low salt diet. Blood pressure is well controlled at home. Use of agents associated with hypertension: none. She does not want to take hydralazine TID as it causes her headaches. Recap from initial visit:  Home 's over 90's she has brought in her machine to prove it. Today she took an extra dose of losartan, this she thinks has normalized her BP in the office today    She is anxious and frustrated about her elevated BP, she says she has been scared to eat so she has been eating fruits and veggies lately. This subsequently mad her dizzy and her previous PCP cut losartan dose to 50 mg. This frustrated her more and she said made BP elevate. This is why she says she has been took losartan 100 mg today. She denies any symptoms related to elevated BP    She had an elevated read at 1 am this am (she says she stays up late). She says her last \"real meal\" was Sunday and that made her BP high. She has hx of gout (has not taken her uloric out of fear of side effects), DM, dyslipidemia and sleep apnea n CPAP.     Allergies   Allergen Reactions    Latex Rash    Nexium [Esomeprazole Magnesium] Swelling and Other (comments)     Lips Swollen, and facial rash and swelling    Crestor [Rosuvastatin] Other (comments)     Upper respiratory illness    Lisinopril Unknown (comments)     Patient can't recall    Niaspan [Niacin] Other (comments)     Scratchy throat, \"asthma\" like symptoms    Norvasc [Amlodipine] Other (comments)     Complained of very dry skin and dark patches on her face    Pcn [Penicillins] Hives    Pravachol [Pravastatin] Myalgia    Sulfa (Sulfonamide Antibiotics) Rash    Zocor [Simvastatin] Myalgia and Other (comments)     Per patient chart \"(? Rash)\"       Past Medical History:   Diagnosis Date    Atrophic vaginitis     Cardiac cath 05/30/2012    Patent coronary arteries. LVEDP 20.  RA 11.  RV 42/10. PA 42/21. W 18.  CO 6.4 (TD), 6.4 ([facility]). PVR 1.7 Wood units. False-positive nuclear study.  Cardiac echocardiogram 05/11/2011    EF 65%. RVSP at least 35 mmHg. Mild IVCE. No significant valvular heart disease.  Cardiac nuclear imaging test 05/18/2012    Tech difficult. Apical ischemia. No infarction. EF 76%. No reg'l WMA. No TID.  Cardiovascular LLE venous duplex 06/17/2013    Left leg:  No DVT.  Diabetes     diet controlled, hypoglycemia from metformin previously     Dyslipidemia     Fatty liver     Fibrocystic breast disease     Fluid retention     GERD     H/O colonoscopy 4/12/13    polyp    Hypertension     Ill-defined condition     gout    Macular degeneration     Morbid obesity ([address]     Obstructive sleep apnea     Peripheral neuropathy ([address]     Rectocele        Family History   Problem Relation Age of Onset    Cancer Mother      colon cancer    Colon Cancer Mother     Hypertension Mother     Diabetes Mother     Heart Disease Mother     Coronary Artery Disease Mother     Hypertension Father     Diabetes Father     Heart Disease Father     Coronary Artery Disease Father     Hypertension Sister     Diabetes Sister     Heart Disease Sister     Coronary Artery Disease Sister     Hypertension Brother     Diabetes Brother     Heart Disease Brother     Coronary Artery Disease Brother        Social History   Substance Use Topics    Smoking status: Former Smoker    Smokeless tobacco: Never Used    Alcohol use Yes      Comment: occassionally        Current Outpatient Prescriptions   Medication Sig    losartan (COZAAR) 50 mg tablet Take 50 mg by mouth daily.  spironolactone (ALDACTONE) 25 mg tablet Take 25 mg by mouth daily.     hydrALAZINE (APRESOLINE) 25 mg tablet Take 25 mg by mouth three (3) times daily.  atorvastatin (LIPITOR) 20 mg tablet Take 1 tablet by mouth daily.  potassium chloride (KLOR-CON M10) 10 mEq tablet Take 2 tablets by mouth two (2) times a day.  cholecalciferol (VITAMIN D3) 1,000 unit tablet Take 2,000 Units by mouth daily.  Dexlansoprazole (DEXILANT) 60 mg CpDM Take 60 mg by mouth daily as needed.  cpap machine kit Take  by inhalation every evening. Used when sleeping    aspirin 81 mg Tab Take 81 mg by mouth daily.  MULTIVITAMINS (MULTI-VITAMIN PO) Take 1 Tab by mouth daily. No current facility-administered medications for this visit. Past Surgical History:   Procedure Laterality Date    [facility] ARTERIAL ARTERIOTOMY 3M  5/25/2012    HX COLONOSCOPY  4/12/2013    HX HEART CATHETERIZATION  5/25/2012    HX HERNIA REPAIR      umbilical    HX HYSTERECTOMY      HX MOHS PROCEDURES      right     Review of Systems   See HPI    Visit Vitals    /66 (BP 1 Location: Left arm, BP Patient Position: Sitting)    Pulse 68    Temp 97.8 °F (36.6 °C) (Oral)    Resp 17    Ht 5' 3\" (1.6 m)    Wt 243 lb (110.2 kg)    BMI 43.05 kg/m2     Physical Exam    Constitutional: Obese. Patient is oriented to person, place, and time and well-developed, well-nourished, and in no distress. Cardiovascular: Normal rate, regular rhythm, normal heart sounds and intact distal pulses. No murmur heard. Pulmonary/Chest: Effort normal and breath sounds normal. No respiratory distress. Musculoskeletal: Normal range of motion. No edema. Neurological: Reflexes intact and symetrical.  he is alert and oriented to person, place, and time. Gait normal.   Skin: Skin is warm and dry. Psychiatric: anxious affect. Mood, memory and judgment normal.     ASSESSMENT and PLAN    ICD-10-CM ICD-9-CM    1. Hypertension I11.9 402.10 losartan (COZAAR) 50 mg tablet      CBC WITH AUTOMATED DIFF      METABOLIC PANEL, COMPREHENSIVE      URINALYSIS W/MICROSCOPIC   2. Hypokalemia E87.6 276.8 potassium chloride (KLOR-CON M10) 10 mEq tablet   3. Dyslipidemia E78.5 272.4 atorvastatin (LIPITOR) 20 mg tablet      LIPID PANEL   4. Morbid obesity with BMI of 40.0-44.9, adult ([facility]) E66.01 278.01 URINALYSIS W/MICROSCOPIC    Z68.41 V85.41      - Patient to focus on low salt diet, stress reduction.  -She is to check her BP once daily, she may take an extra dose of losartan if SBP >140 or DBP >90  -She has provided her recent HM and labs today  -RTC 3 months follow up    Additional Instructions: The patient understands that they should contact the office at any time if any questions or concerns develop. They are also aware that they can call our main office number at [phone] at any time if they would like to address any concerns with the physician. They also understand that they should dial 911 if any acute emergency arises. The patient understands that they should give us a minimum of 48 hours to complete prescription refills once they are requested. The patient has also been instructed to contact us by calling the main office number if they have not received feedback within 2 weeks of having any tests completed. The patient is a aware that they should read all package insert information when picking up the medications and that they should consult the pharmacist of a physician if they have any questions or concerns regarding the prescribed medications. Discussed with the patient new medications given and patient instructed to read pharmacy literature regarding side effects and drug interactions. Instructions for taking the medications were provided to the patient and the consequences of not taking it. Follow-up Disposition:   Return if symptoms worsen or fail to improve.    Risk and benefits of new medication discussed in detail when indicated, patient was given the opportunity to ask questions   AVS provided  reviewed diet, exercise and weight control when indicated  Alarm signals discussed. ER precautions reviewed when indicated  Plan of care reviewed with patient. Understanding verbalized and they are in agreement with plan of care.      Charles Godoy, DO

## 2017-06-16 ENCOUNTER — LAB ONLY (OUTPATIENT)
Dept: INTERNAL MEDICINE CLINIC | Age: 72
End: 2017-06-16

## 2017-06-16 ENCOUNTER — HOSPITAL ENCOUNTER (OUTPATIENT)
Dept: LAB | Age: 72
Discharge: HOME | End: 2017-06-16
Payer: MEDICARE

## 2017-06-16 DIAGNOSIS — I11.9 BENIGN HYPERTENSIVE HEART DISEASE WITHOUT HEART FAILURE: ICD-10-CM

## 2017-06-16 DIAGNOSIS — E78.5 DYSLIPIDEMIA: ICD-10-CM

## 2017-06-16 LAB
ALBUMIN SERPL BCP-MCNC: 3.8 G/DL (ref 3.4–5)
ALBUMIN/GLOB SERPL: 1.2 {RATIO} (ref 0.8–1.7)
ALP SERPL-CCNC: 80 U/L (ref 45–117)
ALT SERPL-CCNC: 15 U/L (ref 13–56)
ANION GAP BLD CALC-SCNC: 8 MMOL/L (ref 3–18)
AST SERPL W P-5'-P-CCNC: 12 U/L (ref 15–37)
BASOPHILS # BLD AUTO: 0 K/UL (ref 0–0.06)
BASOPHILS # BLD: 0 % (ref 0–2)
BILIRUB SERPL-MCNC: 0.5 MG/DL (ref 0.2–1)
BUN SERPL-MCNC: 15 MG/DL (ref 7–18)
BUN/CREAT SERPL: 17 (ref 12–20)
CALCIUM SERPL-MCNC: 8.9 MG/DL (ref 8.5–10.1)
CHLORIDE SERPL-SCNC: 105 MMOL/L (ref 100–108)
CHOLEST SERPL-MCNC: 160 MG/DL
CO2 SERPL-SCNC: 28 MMOL/L (ref 21–32)
CREAT SERPL-MCNC: 0.88 MG/DL (ref 0.6–1.3)
DIFFERENTIAL METHOD BLD: ABNORMAL
EOSINOPHIL # BLD: 0.3 K/UL (ref 0–0.4)
EOSINOPHIL NFR BLD: 5 % (ref 0–5)
ERYTHROCYTE [DISTWIDTH] IN BLOOD BY AUTOMATED COUNT: 15.3 % (ref 11.6–14.5)
GLOBULIN SER CALC-MCNC: 3.2 G/DL (ref 2–4)
GLUCOSE SERPL-MCNC: 98 MG/DL (ref 74–99)
HCT VFR BLD AUTO: 40.2 % (ref 35–45)
HDLC SERPL-MCNC: 70 MG/DL (ref 40–60)
HDLC SERPL: 2.3 {RATIO} (ref 0–5)
HGB BLD-MCNC: 12.5 G/DL (ref 12–16)
LDLC SERPL CALC-MCNC: 78.6 MG/DL (ref 0–100)
LIPID PROFILE,FLP: ABNORMAL
LYMPHOCYTES # BLD AUTO: 39 % (ref 21–52)
LYMPHOCYTES # BLD: 2.7 K/UL (ref 0.9–3.6)
MCH RBC QN AUTO: 28.9 PG (ref 24–34)
MCHC RBC AUTO-ENTMCNC: 31.1 G/DL (ref 31–37)
MCV RBC AUTO: 93.1 FL (ref 74–97)
MONOCYTES # BLD: 0.5 K/UL (ref 0.05–1.2)
MONOCYTES NFR BLD AUTO: 6 % (ref 3–10)
NEUTS SEG # BLD: 3.5 K/UL (ref 1.8–8)
NEUTS SEG NFR BLD AUTO: 50 % (ref 40–73)
PLATELET # BLD AUTO: 211 K/UL (ref 135–420)
PMV BLD AUTO: 11.8 FL (ref 9.2–11.8)
POTASSIUM SERPL-SCNC: 4 MMOL/L (ref 3.5–5.5)
PROT SERPL-MCNC: 7 G/DL (ref 6.4–8.2)
RBC # BLD AUTO: 4.32 M/UL (ref 4.2–5.3)
SODIUM SERPL-SCNC: 141 MMOL/L (ref 136–145)
TRIGL SERPL-MCNC: 57 MG/DL (ref ?–150)
VLDLC SERPL CALC-MCNC: 11.4 MG/DL
WBC # BLD AUTO: 7 K/UL (ref 4.6–13.2)

## 2017-06-16 PROCEDURE — 80061 LIPID PANEL: CPT | Performed by: FAMILY MEDICINE

## 2017-06-16 PROCEDURE — 36415 COLL VENOUS BLD VENIPUNCTURE: CPT | Performed by: FAMILY MEDICINE

## 2017-06-16 PROCEDURE — 80053 COMPREHEN METABOLIC PANEL: CPT | Performed by: FAMILY MEDICINE

## 2017-06-16 PROCEDURE — 85025 COMPLETE CBC W/AUTO DIFF WBC: CPT | Performed by: FAMILY MEDICINE

## 2017-06-23 ENCOUNTER — OFFICE VISIT (OUTPATIENT)
Dept: INTERNAL MEDICINE CLINIC | Age: 72
End: 2017-06-23

## 2017-06-23 VITALS
TEMPERATURE: 95.6 F | OXYGEN SATURATION: 97 % | BODY MASS INDEX: 43.05 KG/M2 | SYSTOLIC BLOOD PRESSURE: 120 MMHG | HEIGHT: 63 IN | HEART RATE: 62 BPM | DIASTOLIC BLOOD PRESSURE: 77 MMHG | WEIGHT: 243 LBS | RESPIRATION RATE: 16 BRPM

## 2017-06-23 DIAGNOSIS — I11.9 BENIGN HYPERTENSIVE HEART DISEASE WITHOUT HEART FAILURE: Primary | ICD-10-CM

## 2017-06-23 DIAGNOSIS — R42 DIZZINESS: ICD-10-CM

## 2017-06-23 DIAGNOSIS — E11.9 TYPE 2 DIABETES MELLITUS WITHOUT COMPLICATION, WITHOUT LONG-TERM CURRENT USE OF INSULIN (HCC): ICD-10-CM

## 2017-06-23 LAB — HBA1C MFR BLD HPLC: 6.1 %

## 2017-06-23 RX ORDER — AMLODIPINE BESYLATE 10 MG/1
10 TABLET ORAL DAILY
Qty: 30 TAB | Refills: 2 | Status: SHIPPED | OUTPATIENT
Start: 2017-06-23 | End: 2017-07-12 | Stop reason: SINTOL

## 2017-06-23 NOTE — MR AVS SNAPSHOT
Visit Information Date & Time Provider Department Dept. Phone Encounter #  
 6/23/2017 10:45 AM Andria Moya DO [facility] [id number] Follow-up Instructions Return in about 2 weeks (around 7/7/2017) for follow up HTN. Your Appointments 9/13/2017  2:00 PM  
ROUTINE CARE with Andria Moya DO [facility] (--) Appt Note: 3 mos fu per [address] [facility] [address] [address] [phone]  
  
   
 [address] [address]  
  
    
 3/27/2018  1:20 PM  
Follow Up with Vick Spann DO [facility] ([address]) Appt Note: 1 year follow up with an EKG  
 [place] [id number] [phone] [address] Upcoming Health Maintenance Date Due DTaP/Tdap/Td series (1 - Tdap) 2/10/1964 ZOSTER VACCINE AGE 60> 2/10/2003 GLAUCOMA SCREENING Q2Y 2/10/2008 MEDICARE YEARLY EXAM 2/10/2008 Pneumococcal 65+ Low/Medium Risk (2 of 2 - PPSV23) 1/1/2013 FOOT EXAM Q1 10/30/2014 EYE EXAM RETINAL OR DILATED Q1 9/21/2015 HEMOGLOBIN A1C Q6M 7/26/2017 INFLUENZA AGE 9 TO ADULT 8/1/2017 MICROALBUMIN Q1 1/26/2018 COLONOSCOPY 4/12/2018 LIPID PANEL Q1 6/16/2018 Allergies as of 6/23/2017  Review Complete On: 6/23/2017 By: Andria Moya DO Severity Noted Reaction Type Reactions Latex    Rash Nexium [Esomeprazole Magnesium] High 12/13/2010    Swelling, Other (comments) Lips Swollen, and facial rash and swelling Crestor [Rosuvastatin]    Other (comments) Upper respiratory illness Lisinopril  05/17/2010   Side Effect Unknown (comments) Patient can't recall Niaspan [Niacin]  09/19/2011    Other (comments) Scratchy throat, \"asthma\" like symptoms Pcn [Penicillins]  12/18/2009    Hives Pravachol [Pravastatin]    Myalgia Sulfa (Sulfonamide Antibiotics)  12/18/2009    Rash Zocor [Simvastatin]    Myalgia, Other (comments) Per patient chart \"(? Rash)\" Current Immunizations  Reviewed on 10/21/2014 Name Date Influenza Vaccine 10/21/2014, 10/30/2013 Pneumococcal Vaccine (Unspecified Type) 1/1/2008 Not reviewed this visit You Were Diagnosed With   
  
 Codes Comments Benign hypertensive heart disease without heart failure    -  Primary ICD-10-CM: I11.9 ICD-9-CM: 402.10 Dizziness     ICD-10-CM: E62 ICD-9-CM: 780.4 Type 2 diabetes mellitus without complication, without long-term current use of insulin (HCC)     ICD-10-CM: E11.9 ICD-9-CM: 250.00 Vitals BP Pulse Temp Resp Height(growth percentile) Weight(growth percentile) 120/77 62 95.6 °F (35.3 °C) (Oral) 16 5' 3\" (1.6 m) 243 lb (110.2 kg) SpO2 BMI OB Status Smoking Status 97% 43.05 kg/m2 Hysterectomy Former Smoker Vitals History BMI and BSA Data Body Mass Index Body Surface Area 43.05 kg/m 2 2.21 m 2 Preferred Pharmacy Pharmacy Name Phone [address] [place] [address] [phone] Your Updated Medication List  
  
   
This list is accurate as of: 6/23/17 11:34 AM.  Always use your most recent med list. amLODIPine 10 mg tablet Commonly known as:  Cyndi Million Take 1 Tab by mouth daily. aspirin 81 mg tablet Take 81 mg by mouth daily. atorvastatin 20 mg tablet Commonly known as:  LIPITOR Take 1 Tab by mouth daily. cpap machine kit Take  by inhalation every evening. Used when sleeping DEXILANT 60 mg Cpdb Generic drug:  Dexlansoprazole Take 60 mg by mouth daily as needed. losartan 50 mg tablet Commonly known as:  COZAAR Take 1 Tab by mouth daily. MULTI-VITAMIN PO Take 1 Tab by mouth daily. potassium chloride 10 mEq tablet Commonly known as:  KLOR-CON M10 Take 2 Tabs by mouth two (2) times a day. spironolactone 25 mg tablet Commonly known as:  ALDACTONE Take 25 mg by mouth daily. Pt states she takes the OOD. VITAMIN D3 1,000 unit tablet Generic drug:  cholecalciferol Take 2,000 Units by mouth daily. Prescriptions Sent to Pharmacy Refills  
 amLODIPine (NORVASC) 10 mg tablet 2 Sig: Take 1 Tab by mouth daily. Class: Normal  
 Pharmacy: [address] [place] [address], [address] [place] [address] #: [phone] Route: Oral  
  
We Performed the Following AMB POC HEMOGLOBIN A1C [01689 CPT(R)] Follow-up Instructions Return in about 2 weeks (around 7/7/2017) for follow up HTN. Patient Instructions Learning About Diuretics for High Blood Pressure Introduction Diuretics help to lower blood pressure. This reduces your risk of a heart attack and stroke. It also reduces your risk of kidney disease. Diuretics cause your kidneys to remove sodium and water. They also relax the blood vessel walls. These help lower your blood pressure. Examples · Chlorthalidone · Hydrochlorothiazide Possible side effects There are some common side effects. They are: · Too little potassium. · Feeling dizzy. · Rash. · Urinating a lot. · High blood sugar. (But this is not common.) You may have other side effects. Check the information that comes with your medicine. What to know about taking this medicine · You may take other medicines for blood pressure. Diuretics can help those work better. They can also prevent extra fluid in your body. · You may need to take potassium pills. Or you may have to watch how much potassium is in your food. Ask your doctor about this. · You may need blood tests to check your kidneys and your potassium level. · Take your medicines exactly as prescribed. Call your doctor if you think you are having a problem with your medicine. · Check with your doctor or pharmacist before you use any other medicines. This includes over-the-counter medicines. Make sure your doctor knows all of the medicines, vitamins, herbal products, and supplements you take. Taking some medicines together can cause problems. Where can you learn more? Go to http://sonia-moira.info/. Enter I760 in the search box to learn more about \"Learning About Diuretics for High Blood Pressure. \" Current as of: April 3, 2017 Content Version: 11.3 © 7322-4107 [employer]. Care instructions adapted under license by [employer] (which disclaims liability or warranty for this information). If you have questions about a medical condition or this instruction, always ask your healthcare professional. [address] any warranty or liability for your use of this information. Introducing [facility]! Dear Renee Alvarez: Thank you for requesting a [employer] account. Our records indicate that you already have an active [employer] account. You can access your account anytime at [email or web address] Did you know that you can access your hospital and ER discharge instructions at any time in [employer]? You can also review all of your test results from your hospital stay or ER visit. Additional Information If you have questions, please visit the Frequently Asked Questions section of the [employer] website at [email or web address] Remember, [employer] is NOT to be used for urgent needs. For medical emergencies, dial 911. Now available from your iPhone and Android! Please provide this summary of care documentation to your next provider. Your primary care clinician is listed as Percy Yun. If you have any questions after today's visit, please call [phone].

## 2017-06-23 NOTE — PROGRESS NOTES
Pt is here for feeling \"woozy\" intermittently x several weeks. Pt states she is unable to take all her morning medications at one time. States she feels her gait is off when trying to walk. Pt  Would like to have her medications adjusted. States her lood pressure is still running high. 1. Have you been to the ER, urgent care clinic since your last visit? Hospitalized since your last visit? No    2. Have you seen or consulted any other health care providers outside of the [address] [place] [address] since your last visit? Include any pap smears or colon screening.  No

## 2017-06-23 NOTE — PATIENT INSTRUCTIONS
Learning About Diuretics for High Blood Pressure  Introduction  Diuretics help to lower blood pressure. This reduces your risk of a heart attack and stroke. It also reduces your risk of kidney disease. Diuretics cause your kidneys to remove sodium and water. They also relax the blood vessel walls. These help lower your blood pressure. Examples  · Chlorthalidone  · Hydrochlorothiazide  Possible side effects  There are some common side effects. They are:  · Too little potassium. · Feeling dizzy. · Rash. · Urinating a lot. · High blood sugar. (But this is not common.)  You may have other side effects. Check the information that comes with your medicine. What to know about taking this medicine  · You may take other medicines for blood pressure. Diuretics can help those work better. They can also prevent extra fluid in your body. · You may need to take potassium pills. Or you may have to watch how much potassium is in your food. Ask your doctor about this. · You may need blood tests to check your kidneys and your potassium level. · Take your medicines exactly as prescribed. Call your doctor if you think you are having a problem with your medicine. · Check with your doctor or pharmacist before you use any other medicines. This includes over-the-counter medicines. Make sure your doctor knows all of the medicines, vitamins, herbal products, and supplements you take. Taking some medicines together can cause problems. Where can you learn more? Go to http://sonia-moira.info/. Enter H138 in the search box to learn more about \"Learning About Diuretics for High Blood Pressure. \"  Current as of: April 3, 2017  Content Version: 11.3  © 6443-7989 [employer]. Care instructions adapted under license by [employer] (which disclaims liability or warranty for this information).  If you have questions about a medical condition or this instruction, always ask your healthcare professional. [address] any warranty or liability for your use of this information.

## 2017-06-23 NOTE — PROGRESS NOTES
HISTORY OF PRESENT ILLNESS  Estefany Ibarra is a 74 y.o. female. HPI    Hypertension  Patient is here for follow-up of hypertension, she would like her meds revised as she says the hydralazine is making her \"woozy\". She also c/o of HA with the dosing. She says her symptoms make it difficult for her to do her ADL's. She would like to resume amlodipine which was previously d/c by another provider,  It was placed on her allergy list for causing skin changes and leg swellingper her request .  She also wants to adjust dosing on losartan and spironolactone but is unsure how to do so,. She is not exercising and is adherent to low salt diet. Blood pressure is  controlled at home occasionally per her. Use of agents associated with hypertension: none. She says she has had 1 pneumococcal vaccination last year. She had her eye exam done at [facility] eye send for records  Foot exam due and she is requesting referral to podiatry    Recap from initial visit:  Home 's over 90's she has brought in her machine to prove it. Today she took an extra dose of losartan, this she thinks has normalized her BP in the office today    She is anxious and frustrated about her elevated BP, she says she has been scared to eat so she has been eating fruits and veggies lately. This subsequently mad her dizzy and her previous PCP cut losartan dose to 50 mg. This frustrated her more and she said made BP elevate. This is why she says she has been took losartan 100 mg today. She denies any symptoms related to elevated BP    She had an elevated read at 1 am this am (she says she stays up late). She says her last \"real meal\" was Sunday and that made her BP high. She has hx of gout (has not taken her uloric out of fear of side effects), DM, dyslipidemia and sleep apnea n CPAP.     Allergies   Allergen Reactions    Latex Rash    Nexium [Esomeprazole Magnesium] Swelling and Other (comments)     Lips Swollen, and facial rash and swelling    Crestor [Rosuvastatin] Other (comments)     Upper respiratory illness    Lisinopril Unknown (comments)     Patient can't recall    Niaspan [Niacin] Other (comments)     Scratchy throat, \"asthma\" like symptoms    Pcn [Penicillins] Hives    Pravachol [Pravastatin] Myalgia    Sulfa (Sulfonamide Antibiotics) Rash    Zocor [Simvastatin] Myalgia and Other (comments)     Per patient chart \"(? Rash)\"       Past Medical History:   Diagnosis Date    Atrophic vaginitis     Cardiac cath 05/30/2012    Patent coronary arteries. LVEDP 20.  RA 11.  RV 42/10. PA 42/21. W 18.  CO 6.4 (TD), 6.4 (Rosalio Dickzoni). PVR 1.7 Wood units. False-positive nuclear study.  Cardiac echocardiogram 05/11/2011    EF 65%. RVSP at least 35 mmHg. Mild IVCE. No significant valvular heart disease.  Cardiac nuclear imaging test 05/18/2012    Tech difficult. Apical ischemia. No infarction. EF 76%. No reg'l WMA. No TID.  Cardiovascular LLE venous duplex 06/17/2013    Left leg:  No DVT.     Diabetes     diet controlled, hypoglycemia from metformin previously     Dyslipidemia     Fatty liver     Fibrocystic breast disease     Fluid retention     GERD     H/O colonoscopy 4/12/13    polyp    Hypertension     Ill-defined condition     gout    Macular degeneration     Morbid obesity ([address]     Obstructive sleep apnea     Peripheral neuropathy ([address]     Rectocele        Family History   Problem Relation Age of Onset    Cancer Mother      colon cancer    Colon Cancer Mother     Hypertension Mother     Diabetes Mother     Heart Disease Mother     Coronary Artery Disease Mother     Hypertension Father     Diabetes Father     Heart Disease Father     Coronary Artery Disease Father     Hypertension Sister     Diabetes Sister     Heart Disease Sister     Coronary Artery Disease Sister     Hypertension Brother     Diabetes Brother     Heart Disease Brother     Coronary Artery Disease Brother        Social History   Substance Use Topics    Smoking status: Former Smoker    Smokeless tobacco: Never Used    Alcohol use Yes      Comment: occassionally        Current Outpatient Prescriptions   Medication Sig    amLODIPine (NORVASC) 10 mg tablet Take 1 Tab by mouth daily.  atorvastatin (LIPITOR) 20 mg tablet Take 1 Tab by mouth daily.  potassium chloride (KLOR-CON M10) 10 mEq tablet Take 2 Tabs by mouth two (2) times a day.  losartan (COZAAR) 50 mg tablet Take 1 Tab by mouth daily.  cholecalciferol (VITAMIN D3) 1,000 unit tablet Take 2,000 Units by mouth daily.  Dexlansoprazole (DEXILANT) 60 mg CpDM Take 60 mg by mouth daily as needed.  cpap machine kit Take  by inhalation every evening. Used when sleeping    aspirin 81 mg Tab Take 81 mg by mouth daily.  MULTIVITAMINS (MULTI-VITAMIN PO) Take 1 Tab by mouth daily.  spironolactone (ALDACTONE) 25 mg tablet Take 25 mg by mouth daily. Pt states she takes the OOD. No current facility-administered medications for this visit. Past Surgical History:   Procedure Laterality Date    [facility]. CNNLA ARTERIAL ARTERIOTOMY 3M  5/25/2012    HX COLONOSCOPY  4/12/2013    HX HEART CATHETERIZATION  5/25/2012    HX HERNIA REPAIR      umbilical    HX HYSTERECTOMY      HX MOHS PROCEDURES      right     ROS   See HPI    Visit Vitals    /77    Pulse 62    Temp 95.6 °F (35.3 °C) (Oral)    Resp 16    Ht 5' 3\" (1.6 m)    Wt 243 lb (110.2 kg)    SpO2 97%    BMI 43.05 kg/m2     Physical Exam  Constitutional: Obese. Patient is oriented to person, place, and time and well-developed, well-nourished, and in no distress. Cardiovascular: Normal rate, regular rhythm, normal heart sounds and intact distal pulses. No murmur heard. Pulmonary/Chest: Effort normal and breath sounds normal. No respiratory distress. Musculoskeletal: Normal range of motion. No edema.    Neurological: Reflexes intact and symetrical.  he is alert and oriented to person, place, and time. Gait normal.   Skin: Skin is warm and dry. Psychiatric: anxious affect. Mood, memory and judgment normal.     ASSESSMENT and PLAN    ICD-10-CM ICD-9-CM    1. Hypertension I11.9 402.10 amLODIPine (NORVASC) 10 mg tablet   2. Dizziness R42 780.4    3. Type 2 diabetes mellitus without complication, without long-term current use of insulin (HCC) E11.9 250.00 AMB POC HEMOGLOBIN A1C     -Asked patient to d/c hydralazine, hold spironolactone. Ctn Losartan and norvasc added. - Patient to focus on low salt diet, stress reduction.  -She is to check her BP once daily, she may take an extra dose of losartan if SBP >140 or DBP >90  -She has provided her recent HM and labs today  -RTC 2 weeks follow up HTN    Additional Instructions: The patient understands that they should contact the office at any time if any questions or concerns develop. They are also aware that they can call our main office number at [phone] at any time if they would like to address any concerns with the physician. They also understand that they should dial 911 if any acute emergency arises. The patient understands that they should give us a minimum of 48 hours to complete prescription refills once they are requested. The patient has also been instructed to contact us by calling the main office number if they have not received feedback within 2 weeks of having any tests completed. The patient is a aware that they should read all package insert information when picking up the medications and that they should consult the pharmacist of a physician if they have any questions or concerns regarding the prescribed medications. Discussed with the patient new medications given and patient instructed to read pharmacy literature regarding side effects and drug interactions. Instructions for taking the medications were provided to the patient and the consequences of not taking it.     Follow-up Disposition:   Return if symptoms worsen or fail to improve. Risk and benefits of new medication discussed in detail when indicated, patient was given the opportunity to ask questions   AVS provided  reviewed diet, exercise and weight control when indicated  Alarm signals discussed. ER precautions reviewed when indicated  Plan of care reviewed with patient. Understanding verbalized and they are in agreement with plan of care.      Magalis Mccallum DO

## 2017-07-06 ENCOUNTER — TELEPHONE (OUTPATIENT)
Dept: INTERNAL MEDICINE CLINIC | Age: 72
End: 2017-07-06

## 2017-07-06 ENCOUNTER — OFFICE VISIT (OUTPATIENT)
Dept: INTERNAL MEDICINE CLINIC | Age: 72
End: 2017-07-06

## 2017-07-06 VITALS
DIASTOLIC BLOOD PRESSURE: 71 MMHG | BODY MASS INDEX: 43.27 KG/M2 | TEMPERATURE: 97.7 F | HEART RATE: 67 BPM | HEIGHT: 63 IN | RESPIRATION RATE: 16 BRPM | WEIGHT: 244.2 LBS | SYSTOLIC BLOOD PRESSURE: 116 MMHG | OXYGEN SATURATION: 97 %

## 2017-07-06 DIAGNOSIS — I11.9 BENIGN HYPERTENSIVE HEART DISEASE WITHOUT HEART FAILURE: Primary | ICD-10-CM

## 2017-07-06 DIAGNOSIS — E11.9 TYPE 2 DIABETES MELLITUS WITHOUT COMPLICATION, WITHOUT LONG-TERM CURRENT USE OF INSULIN (HCC): ICD-10-CM

## 2017-07-06 NOTE — PROGRESS NOTES
Pt is here for 2 week follow up HTN       1. Have you been to the ER, urgent care clinic since your last visit? Hospitalized since your last visit? No    2. Have you seen or consulted any other health care providers outside of the [address] [place] [address] since your last visit? Include any pap smears or colon screening. No       Pt states she had diabetic eye exam 1 month ago @ [facility].

## 2017-07-06 NOTE — PATIENT INSTRUCTIONS

## 2017-07-06 NOTE — MR AVS SNAPSHOT
Visit Information Date & Time Provider Department Dept. Phone Encounter #  
 7/6/2017 10:45 AM Jaydon Partida DO [facility] 8 [id number] Follow-up Instructions Return for 3 month follow up w/ labs. Your Appointments 9/13/2017  2:00 PM  
ROUTINE CARE with Jaydon Partida DO [facility] (--) Appt Note: 3 mos fu per [address] [facility] [address] [id number] [phone]  
  
   
 [address] [id number]  
  
    
 3/27/2018  1:20 PM  
Follow Up with Ivana Nathan DO [facility] ([address]) Appt Note: 1 year follow up with an EKG  
 [facility] [address] [address] [address] [phone] [address] [place] [address] [address] Upcoming Health Maintenance Date Due DTaP/Tdap/Td series (1 - Tdap) 2/10/1964 ZOSTER VACCINE AGE 60> 2/10/2003 GLAUCOMA SCREENING Q2Y 2/10/2008 MEDICARE YEARLY EXAM 2/10/2008 Pneumococcal 65+ Low/Medium Risk (2 of 2 - PPSV23) 1/1/2013 FOOT EXAM Q1 10/30/2014 EYE EXAM RETINAL OR DILATED Q1 9/21/2015 INFLUENZA AGE 9 TO ADULT 8/1/2017 HEMOGLOBIN A1C Q6M 12/23/2017 MICROALBUMIN Q1 1/26/2018 COLONOSCOPY 4/12/2018 LIPID PANEL Q1 6/16/2018 Allergies as of 7/6/2017  Review Complete On: 7/6/2017 By: Stef Levy LPN Severity Noted Reaction Type Reactions Latex    Rash Nexium [Esomeprazole Magnesium] High 12/13/2010    Swelling, Other (comments) Lips Swollen, and facial rash and swelling Crestor [Rosuvastatin]    Other (comments) Upper respiratory illness Lisinopril  05/17/2010   Side Effect Unknown (comments) Patient can't recall Niaspan [Niacin]  09/19/2011    Other (comments) Scratchy throat, \"asthma\" like symptoms Pcn [Penicillins]  12/18/2009    Hives Pravachol [Pravastatin]    Myalgia Sulfa (Sulfonamide Antibiotics)  12/18/2009    Rash Zocor [Simvastatin]    Myalgia, Other (comments) Per patient chart \"(? Rash)\" Current Immunizations  Reviewed on 10/21/2014 Name Date Influenza Vaccine 10/21/2014, 10/30/2013 Pneumococcal Vaccine (Unspecified Type) 1/1/2008 Not reviewed this visit You Were Diagnosed With   
  
 Codes Comments Benign hypertensive heart disease without heart failure    -  Primary ICD-10-CM: I11.9 ICD-9-CM: 402.10 Type 2 diabetes mellitus without complication, without long-term current use of insulin (HCC)     ICD-10-CM: E11.9 ICD-9-CM: 250.00 Vitals BP Pulse Temp Resp Height(growth percentile) Weight(growth percentile) 116/71 67 97.7 °F (36.5 °C) (Oral) 16 5' 3\" (1.6 m) 244 lb 3.2 oz (110.8 kg) SpO2 BMI OB Status Smoking Status 97% 43.26 kg/m2 Hysterectomy Former Smoker Vitals History BMI and BSA Data Body Mass Index Body Surface Area  
 43.26 kg/m 2 2.22 m 2 Preferred Pharmacy Pharmacy Name Phone [address] [phone] Your Updated Medication List  
  
   
This list is accurate as of: 7/6/17 11:35 AM.  Always use your most recent med list. amLODIPine 10 mg tablet Commonly known as:  Skip Newcomer Take 1 Tab by mouth daily. aspirin 81 mg tablet Take 81 mg by mouth daily. atorvastatin 20 mg tablet Commonly known as:  LIPITOR Take 1 Tab by mouth daily. cpap machine kit Take  by inhalation every evening. Used when sleeping DEXILANT 60 mg Cpdb Generic drug:  Dexlansoprazole Take 60 mg by mouth daily as needed. losartan 50 mg tablet Commonly known as:  COZAAR Take 1 Tab by mouth daily. MULTI-VITAMIN PO Take 1 Tab by mouth daily. potassium chloride 10 mEq tablet Commonly known as:  KLOR-CON M10 Take 2 Tabs by mouth two (2) times a day. spironolactone 25 mg tablet Commonly known as:  ALDACTONE Take 25 mg by mouth daily. Pt states she takes the OOD. VITAMIN D3 1,000 unit tablet Generic drug:  cholecalciferol Take 2,000 Units by mouth daily. We Performed the Following REFERRAL TO PODIATRY [REF90 Custom] Follow-up Instructions Return for 3 month follow up w/ labs. Referral Information Referral ID Referred By Referred To  
  
 [id number] Christopher BELL Not Available Visits Status Start Date End Date 1 New Request 7/6/17 7/6/18 If your referral has a status of pending review or denied, additional information will be sent to support the outcome of this decision. Patient Instructions DASH Diet: Care Instructions Your Care Instructions The DASH diet is an eating plan that can help lower your blood pressure. DASH stands for Dietary Approaches to Stop Hypertension. Hypertension is high blood pressure. The DASH diet focuses on eating foods that are high in calcium, potassium, and magnesium. These nutrients can lower blood pressure. The foods that are highest in these nutrients are fruits, vegetables, low-fat dairy products, nuts, seeds, and legumes. But taking calcium, potassium, and magnesium supplements instead of eating foods that are high in those nutrients does not have the same effect. The DASH diet also includes whole grains, fish, and poultry. The DASH diet is one of several lifestyle changes your doctor may recommend to lower your high blood pressure. Your doctor may also want you to decrease the amount of sodium in your diet. Lowering sodium while following the DASH diet can lower blood pressure even further than just the DASH diet alone. Follow-up care is a key part of your treatment and safety. Be sure to make and go to all appointments, and call your doctor if you are having problems.  It's also a good idea to know your test results and keep a list of the medicines you take. How can you care for yourself at home? Following the DASH diet · Eat 4 to 5 servings of fruit each day. A serving is 1 medium-sized piece of fruit, ½ cup chopped or canned fruit, 1/4 cup dried fruit, or 4 ounces (½ cup) of fruit juice. Choose fruit more often than fruit juice. · Eat 4 to 5 servings of vegetables each day. A serving is 1 cup of lettuce or raw leafy vegetables, ½ cup of chopped or cooked vegetables, or 4 ounces (½ cup) of vegetable juice. Choose vegetables more often than vegetable juice. · Get 2 to 3 servings of low-fat and fat-free dairy each day. A serving is 8 ounces of milk, 1 cup of yogurt, or 1 ½ ounces of cheese. · Eat 6 to 8 servings of grains each day. A serving is 1 slice of bread, 1 ounce of dry cereal, or ½ cup of cooked rice, pasta, or cooked cereal. Try to choose whole-grain products as much as possible. · Limit lean meat, poultry, and fish to 2 servings each day. A serving is 3 ounces, about the size of a deck of cards. · Eat 4 to 5 servings of nuts, seeds, and legumes (cooked dried beans, lentils, and split peas) each week. A serving is 1/3 cup of nuts, 2 tablespoons of seeds, or ½ cup of cooked beans or peas. · Limit fats and oils to 2 to 3 servings each day. A serving is 1 teaspoon of vegetable oil or 2 tablespoons of salad dressing. · Limit sweets and added sugars to 5 servings or less a week. A serving is 1 tablespoon jelly or jam, ½ cup sorbet, or 1 cup of lemonade. · Eat less than 2,300 milligrams (mg) of sodium a day. If you limit your sodium to 1,500 mg a day, you can lower your blood pressure even more. Tips for success · Start small. Do not try to make dramatic changes to your diet all at once. You might feel that you are missing out on your favorite foods and then be more likely to not follow the plan. Make small changes, and stick with them. Once those changes become habit, add a few more changes. · Try some of the following: ¨ Make it a goal to eat a fruit or vegetable at every meal and at snacks. This will make it easy to get the recommended amount of fruits and vegetables each day. ¨ Try yogurt topped with fruit and nuts for a snack or healthy dessert. ¨ Add lettuce, tomato, cucumber, and onion to sandwiches. ¨ Combine a ready-made pizza crust with low-fat mozzarella cheese and lots of vegetable toppings. Try using tomatoes, squash, spinach, broccoli, carrots, cauliflower, and onions. ¨ Have a variety of cut-up vegetables with a low-fat dip as an appetizer instead of chips and dip. ¨ Sprinkle sunflower seeds or chopped almonds over salads. Or try adding chopped walnuts or almonds to cooked vegetables. ¨ Try some vegetarian meals using beans and peas. Add garbanzo or kidney beans to salads. Make burritos and tacos with mashed porras beans or black beans. Where can you learn more? Go to http://soniaO2 Secure Wirelessmoira.info/. Enter K211 in the search box to learn more about \"DASH Diet: Care Instructions. \" Current as of: April 3, 2017 Content Version: 11.3 © 2812-2913 [employer]. Care instructions adapted under license by [employer] (which disclaims liability or warranty for this information). If you have questions about a medical condition or this instruction, always ask your healthcare professional. [place] [address] any warranty or liability for your use of this information. Introducing [facility]! Dear Ingrid Wolfe: Thank you for requesting a PayLease account. Our records indicate that you already have an active PayLease account. You can access your account anytime at [email or web address] Did you know that you can access your hospital and ER discharge instructions at any time in PayLease? You can also review all of your test results from your hospital stay or ER visit. Additional Information If you have questions, please visit the Frequently Asked Questions section of the [employer] website at [email or web address] Remember, [employer] is NOT to be used for urgent needs. For medical emergencies, dial 911. Now available from your iPhone and Android! Please provide this summary of care documentation to your next provider. Your primary care clinician is listed as Ariana Winn. If you have any questions after today's visit, please call [phone].

## 2017-07-06 NOTE — PROGRESS NOTES
HISTORY OF PRESENT ILLNESS  Richard Garrison is a 74 y.o. female. HPI    Hypertension  Patient is here for follow-up of hypertension. She indicates that she is feeling well and denies any symptoms referable to her hypertension. She is not exercising and is adherent to low salt diet. Blood pressure is well controlled at home. Use of agents associated with hypertension: none. She also take prn lasix for leg swelling    Recap from 6/23/17:  Patient is here for follow-up of hypertension, she would like her meds revised as she says the hydralazine is making her \"woozy\". She also c/o of HA with the dosing. She says her symptoms make it difficult for her to do her ADL's. She would like to resume amlodipine which was previously d/c by another provider,  It was placed on her allergy list for causing skin changes and leg swellingper her request .  She also wants to adjust dosing on losartan and spironolactone but is unsure how to do so,. She is not exercising and is adherent to low salt diet. Blood pressure is  controlled at home occasionally per her. Use of agents associated with hypertension: none. She says she has had 1 pneumococcal vaccination last year. She had her eye exam done at [facility] send for records  Foot exam due and she is requesting referral to podiatry    Recap from initial visit:  Home 's over 90's she has brought in her machine to prove it. Today she took an extra dose of losartan, this she thinks has normalized her BP in the office today    She is anxious and frustrated about her elevated BP, she says she has been scared to eat so she has been eating fruits and veggies lately. This subsequently mad her dizzy and her previous PCP cut losartan dose to 50 mg. This frustrated her more and she said made BP elevate. This is why she says she has been took losartan 100 mg today.   She denies any symptoms related to elevated BP    She had an elevated read at 1 am this am (she says she stays up late). She says her last \"real meal\" was Sunday and that made her BP high. She has hx of gout (has not taken her uloric out of fear of side effects), DM, dyslipidemia and sleep apnea n CPAP. Allergies   Allergen Reactions    Latex Rash    Nexium [Esomeprazole Magnesium] Swelling and Other (comments)     Lips Swollen, and facial rash and swelling    Crestor [Rosuvastatin] Other (comments)     Upper respiratory illness    Lisinopril Unknown (comments)     Patient can't recall    Niaspan [Niacin] Other (comments)     Scratchy throat, \"asthma\" like symptoms    Pcn [Penicillins] Hives    Pravachol [Pravastatin] Myalgia    Sulfa (Sulfonamide Antibiotics) Rash    Zocor [Simvastatin] Myalgia and Other (comments)     Per patient chart \"(? Rash)\"       Past Medical History:   Diagnosis Date    Atrophic vaginitis     Cardiac cath 05/30/2012    Patent coronary arteries. LVEDP 20.  RA 11.  RV 42/10. PA 42/21. W 18.  CO 6.4 (TD), 6.4 (Junius Marquez). PVR 1.7 Wood units. False-positive nuclear study.  Cardiac echocardiogram 05/11/2011    EF 65%. RVSP at least 35 mmHg. Mild IVCE. No significant valvular heart disease.  Cardiac nuclear imaging test 05/18/2012    Tech difficult. Apical ischemia. No infarction. EF 76%. No reg'l WMA. No TID.  Cardiovascular LLE venous duplex 06/17/2013    Left leg:  No DVT.     Diabetes     diet controlled, hypoglycemia from metformin previously     Dyslipidemia     Fatty liver     Fibrocystic breast disease     Fluid retention     GERD     H/O colonoscopy 4/12/13    polyp    Hypertension     Ill-defined condition     gout    Macular degeneration     Morbid obesity ([address]     Obstructive sleep apnea     Peripheral neuropathy ([address]     Rectocele        Family History   Problem Relation Age of Onset    Cancer Mother      colon cancer    Colon Cancer Mother     Hypertension Mother     Diabetes Mother     Heart Disease Mother     Coronary Artery Disease Mother     Hypertension Father     Diabetes Father     Heart Disease Father     Coronary Artery Disease Father     Hypertension Sister     Diabetes Sister     Heart Disease Sister     Coronary Artery Disease Sister     Hypertension Brother     Diabetes Brother     Heart Disease Brother     Coronary Artery Disease Brother        Social History   Substance Use Topics    Smoking status: Former Smoker    Smokeless tobacco: Never Used    Alcohol use Yes      Comment: occassionally        Current Outpatient Prescriptions   Medication Sig    amLODIPine (NORVASC) 10 mg tablet Take 1 Tab by mouth daily.  atorvastatin (LIPITOR) 20 mg tablet Take 1 Tab by mouth daily.  potassium chloride (KLOR-CON M10) 10 mEq tablet Take 2 Tabs by mouth two (2) times a day.  losartan (COZAAR) 50 mg tablet Take 1 Tab by mouth daily. (Patient taking differently: Take 50 mg by mouth daily. Taking 1/2 tab. Will take 2nd halk if BP is high.)    spironolactone (ALDACTONE) 25 mg tablet Take 25 mg by mouth daily. Pt states she takes the OOD.  cholecalciferol (VITAMIN D3) 1,000 unit tablet Take 2,000 Units by mouth daily.  Dexlansoprazole (DEXILANT) 60 mg CpDM Take 60 mg by mouth daily as needed.  cpap machine kit Take  by inhalation every evening. Used when sleeping    aspirin 81 mg Tab Take 81 mg by mouth daily.  MULTIVITAMINS (MULTI-VITAMIN PO) Take 1 Tab by mouth daily. No current facility-administered medications for this visit.          Past Surgical History:   Procedure Laterality Date    [facility]. CNNLA ARTERIAL ARTERIOTOMY 3M  5/25/2012    HX COLONOSCOPY  4/12/2013    HX HEART CATHETERIZATION  5/25/2012    HX HERNIA REPAIR      umbilical    HX HYSTERECTOMY      HX MOHS PROCEDURES      right     ROS   See HPI    Visit Vitals    /71    Pulse 67    Temp 97.7 °F (36.5 °C) (Oral)    Resp 16    Ht 5' 3\" (1.6 m)    Wt 244 lb 3.2 oz (110.8 kg)    SpO2 97%    BMI 43.26 kg/m2     Physical Exam   Constitutional: Obese. Patient is oriented to person, place, and time and well-developed, well-nourished, and in no distress. Cardiovascular: trace b/l edema of the legs b/l. Normal rate, regular rhythm, normal heart sounds and intact distal pulses. No murmur heard. Pulmonary/Chest: Effort normal and breath sounds normal. No respiratory distress. Musculoskeletal: Normal range of motion. Neurological: Reflexes intact and symetrical.  he is alert and oriented to person, place, and time. Gait normal.   Skin: Skin is warm and dry. Psychiatric: anxious affect. Mood, memory and judgment normal.     ASSESSMENT and PLAN    ICD-10-CM ICD-9-CM    1. Hypertension I11.9 402.10    2. Type 2 diabetes mellitus without complication, without long-term current use of insulin (HCC) E11.9 250.00 REFERRAL TO PODIATRY      CANCELED: AMB POC FUNDUS PHOTOGRAPHY WITH INTERP AND REPORT     -Asked patient to ctn current therapeutic treatment regimen.  - Patient to focus on low salt diet, stress reduction.  -She is to check her BP once daily, she may take an extra dose of losartan if SBP >140 or DBP >90  -She has provided her recent HM and labs today  -RTC 3 month follow up chronic medical conditions w/ labs    Additional Instructions: The patient understands that they should contact the office at any time if any questions or concerns develop. They are also aware that they can call our main office number at [phone] at any time if they would like to address any concerns with the physician. They also understand that they should dial 911 if any acute emergency arises. The patient understands that they should give us a minimum of 48 hours to complete prescription refills once they are requested. The patient has also been instructed to contact us by calling the main office number if they have not received feedback within 2 weeks of having any tests completed.   The patient is a aware that they should read all package insert information when picking up the medications and that they should consult the pharmacist of a physician if they have any questions or concerns regarding the prescribed medications. Discussed with the patient new medications given and patient instructed to read pharmacy literature regarding side effects and drug interactions. Instructions for taking the medications were provided to the patient and the consequences of not taking it. Follow-up Disposition:   Return if symptoms worsen or fail to improve. Risk and benefits of new medication discussed in detail when indicated, patient was given the opportunity to ask questions   AVS provided  reviewed diet, exercise and weight control when indicated  Alarm signals discussed. ER precautions reviewed when indicated  Plan of care reviewed with patient. Understanding verbalized and they are in agreement with plan of care.      Gena Boeck, DO

## 2017-07-06 NOTE — TELEPHONE ENCOUNTER
Pt returned call re Dr Kailash Shrestha, her eye doctor and his office having different doctor listed as pcp, so unable to send records with out sign auth.      Pt advise will come in next week and sign auth so we may obtain records

## 2017-07-12 ENCOUNTER — HOSPITAL ENCOUNTER (OUTPATIENT)
Dept: LAB | Age: 72
Discharge: HOME | End: 2017-07-12
Payer: MEDICARE

## 2017-07-12 ENCOUNTER — TELEPHONE (OUTPATIENT)
Dept: INTERNAL MEDICINE CLINIC | Age: 72
End: 2017-07-12

## 2017-07-12 ENCOUNTER — OFFICE VISIT (OUTPATIENT)
Dept: INTERNAL MEDICINE CLINIC | Age: 72
End: 2017-07-12

## 2017-07-12 VITALS
HEART RATE: 78 BPM | HEIGHT: 63 IN | RESPIRATION RATE: 20 BRPM | WEIGHT: 247.8 LBS | SYSTOLIC BLOOD PRESSURE: 130 MMHG | DIASTOLIC BLOOD PRESSURE: 70 MMHG | BODY MASS INDEX: 43.91 KG/M2 | OXYGEN SATURATION: 98 %

## 2017-07-12 DIAGNOSIS — R60.9 PERIPHERAL EDEMA: ICD-10-CM

## 2017-07-12 DIAGNOSIS — I11.9 BENIGN HYPERTENSIVE HEART DISEASE WITHOUT HEART FAILURE: ICD-10-CM

## 2017-07-12 DIAGNOSIS — H11.153 PINGUECULA OF BOTH EYES: ICD-10-CM

## 2017-07-12 DIAGNOSIS — H10.13 ALLERGIC CONJUNCTIVITIS, BILATERAL: Primary | ICD-10-CM

## 2017-07-12 DIAGNOSIS — R06.89 ADVENTITIOUS BREATH SOUNDS: ICD-10-CM

## 2017-07-12 LAB — BNP SERPL-MCNC: 70 PG/ML (ref 0–900)

## 2017-07-12 PROCEDURE — 36415 COLL VENOUS BLD VENIPUNCTURE: CPT | Performed by: PHYSICIAN ASSISTANT

## 2017-07-12 PROCEDURE — 83880 ASSAY OF NATRIURETIC PEPTIDE: CPT | Performed by: PHYSICIAN ASSISTANT

## 2017-07-12 RX ORDER — OLOPATADINE HYDROCHLORIDE 1 MG/ML
2 SOLUTION/ DROPS OPHTHALMIC 2 TIMES DAILY
Qty: 6 ML | Refills: 0 | Status: SHIPPED | OUTPATIENT
Start: 2017-07-12 | End: 2017-09-12 | Stop reason: SDUPTHER

## 2017-07-12 RX ORDER — FUROSEMIDE 20 MG/1
TABLET ORAL
Qty: 30 TAB | Refills: 1 | Status: SHIPPED | OUTPATIENT
Start: 2017-07-12 | End: 2017-09-07 | Stop reason: SDUPTHER

## 2017-07-12 RX ORDER — LOSARTAN POTASSIUM 50 MG/1
100 TABLET ORAL DAILY
Qty: 60 TAB | Refills: 2 | Status: SHIPPED | OUTPATIENT
Start: 2017-07-12 | End: 2017-07-12 | Stop reason: SDUPTHER

## 2017-07-12 NOTE — MR AVS SNAPSHOT
Visit Information Date & Time Provider Department Dept. Phone Encounter #  
 7/12/2017 10:00 AM Silviano El PA-C [facility] 8 [id number] Follow-up Instructions Return in about 1 week (around 7/19/2017) for follow up. Your Appointments 10/5/2017  8:30 AM  
LAB with Silviano El PA-C [facility] (--) Appt Note: 3 mo f/u lab [address] [address]  
[address] [place] [address] [address] [phone]  
  
    
 10/12/2017 10:45 AM  
Office Visit with Silviano El PA-C [facility] (--) Appt Note: 3 mo f/u  
 [address] [address]  
[phone]  
  
   
 [address],[facility] [address] [phone]  
  
    
 3/27/2018  1:20 PM  
Follow Up with Dino Felipe DO [facility] ([facility]) Appt Note: 1 year follow up with an EKG  
 [place] [phone] [address] [place] [address] [address] Upcoming Health Maintenance Date Due DTaP/Tdap/Td series (1 - Tdap) 2/10/1964 ZOSTER VACCINE AGE 60> 2/10/2003 MEDICARE YEARLY EXAM 2/10/2008 Pneumococcal 65+ Low/Medium Risk (2 of 2 - PPSV23) 1/1/2013 FOOT EXAM Q1 10/30/2014 EYE EXAM RETINAL OR DILATED Q1 9/21/2015 INFLUENZA AGE 9 TO ADULT 8/1/2017 HEMOGLOBIN A1C Q6M 12/23/2017 MICROALBUMIN Q1 1/26/2018 COLONOSCOPY 4/12/2018 LIPID PANEL Q1 6/16/2018 GLAUCOMA SCREENING Q2Y 6/7/2019 Allergies as of 7/12/2017  Review Complete On: 7/12/2017 By: Amauri Sheriff LPN Severity Noted Reaction Type Reactions Latex    Rash Nexium [Esomeprazole Magnesium] High 12/13/2010    Swelling, Other (comments) Lips Swollen, and facial rash and swelling Crestor [Rosuvastatin]    Other (comments) Upper respiratory illness Lisinopril  05/17/2010   Side Effect Unknown (comments) Patient can't recall Niaspan [Niacin]  09/19/2011    Other (comments) Scratchy throat, \"asthma\" like symptoms Pcn [Penicillins]  12/18/2009    Hives Pravachol [Pravastatin]    Myalgia Sulfa (Sulfonamide Antibiotics)  12/18/2009    Rash Zocor [Simvastatin]    Myalgia, Other (comments) Per patient chart \"(? Rash)\" Current Immunizations  Reviewed on 10/21/2014 Name Date Influenza Vaccine 10/21/2014, 10/30/2013 Pneumococcal Vaccine (Unspecified Type) 1/1/2008 Not reviewed this visit You Were Diagnosed With   
  
 Codes Comments Allergic conjunctivitis, bilateral    -  Primary ICD-10-CM: H10.13 ICD-9-CM: 372.14 Peripheral edema     ICD-10-CM: R60.9 ICD-9-CM: 079. 3 Pinguecula of both eyes     ICD-10-CM: H11.153 ICD-9-CM: 372.51 Benign hypertensive heart disease without heart failure     ICD-10-CM: I11.9 ICD-9-CM: 402.10 Adventitious breath sounds     ICD-10-CM: R06.89 
ICD-9-CM: 786.00 Vitals BP Pulse Resp Height(growth percentile) Weight(growth percentile) SpO2  
 130/70 (BP 1 Location: Left arm, BP Patient Position: Sitting) 78 20 5' 3\" (1.6 m) 247 lb 12.8 oz (112.4 kg) 98% BMI OB Status Smoking Status 43.9 kg/m2 Hysterectomy Former Smoker Vitals History BMI and BSA Data Body Mass Index Body Surface Area 43.9 kg/m 2 2.24 m 2 Preferred Pharmacy Pharmacy Name Phone [facility] [place] [id number] [address] [phone] Your Updated Medication List  
  
   
This list is accurate as of: 7/12/17 11:42 AM.  Always use your most recent med list.  
  
  
  
  
 aspirin 81 mg tablet Take 81 mg by mouth daily. atorvastatin 20 mg tablet Commonly known as:  LIPITOR Take 1 Tab by mouth daily. cpap machine kit Take  by inhalation every evening. Used when sleeping DEXILANT 60 mg Cpdb Generic drug:  Dexlansoprazole Take 60 mg by mouth daily as needed. furosemide 20 mg tablet Commonly known as:  LASIX Take one tablet daily. Indications: Edema  
  
 losartan 50 mg tablet Commonly known as:  COZAAR Take 2 Tabs by mouth daily. Indications: hypertension MULTI-VITAMIN PO Take 1 Tab by mouth daily. olopatadine 0.1 % ophthalmic solution Commonly known as:  PATANOL Administer 2 Drops to both eyes two (2) times a day. Indications: Allergic Conjunctivitis  
  
 potassium chloride 10 mEq tablet Commonly known as:  KLOR-CON M10 Take 2 Tabs by mouth two (2) times a day. spironolactone 25 mg tablet Commonly known as:  ALDACTONE Take 25 mg by mouth daily. Pt states she takes the OOD. VITAMIN D3 1,000 unit tablet Generic drug:  cholecalciferol Take 2,000 Units by mouth daily. Prescriptions Sent to Pharmacy Refills  
 olopatadine (PATANOL) 0.1 % ophthalmic solution 0 Sig: Administer 2 Drops to both eyes two (2) times a day. Indications: Allergic Conjunctivitis Class: Normal  
 Pharmacy: [address] [place] [address] Ph #: [phone] Route: Both Eyes  
 losartan (COZAAR) 50 mg tablet 2 Sig: Take 2 Tabs by mouth daily. Indications: hypertension Class: Normal  
 Pharmacy: [address] [place] [address] Ph #: [phone] Route: Oral  
 furosemide (LASIX) 20 mg tablet 1 Sig: Take one tablet daily. Indications: Edema Class: Normal  
 Pharmacy: [address] [place] [address] Ph #: [phone] Follow-up Instructions Return in about 1 week (around 7/19/2017) for follow up. To-Do List   
 07/12/2017 Lab:  NT-PRO BNP   
  
 07/12/2017 Imaging:  XR CHEST PA LAT Patient Instructions Cellulitis of the Eye: Care Instructions Your Care Instructions Cellulitis of the eye is an infection of the skin and tissues around the eye. It is also called periorbital cellulitis. It is usually caused by bacteria. This type of infection may happen after a sinus infection or a dental infection. It could also happen after an insect bite or an injury to the face. It most often occurs where there is a break in the skin. Cellulitis of the eye can be very serious. It's important to treat it right away. If you do, it usually goes away without lasting problems. Medicine and home treatment can help you get better. Follow-up care is a key part of your treatment and safety. Be sure to make and go to all appointments, and call your doctor if you are having problems. It's also a good idea to know your test results and keep a list of the medicines you take. How can you care for yourself at home? · Take your antibiotics as directed. Do not stop taking them just because you feel better. You need to take the full course of antibiotics. · Do not wear contact lenses unless your doctor tells you it is okay. · Put your head on pillows, and put a cool, damp cloth on your eye. This can reduce swelling and pain. · If your doctor recommends it, use a warm pack on your eye. · Keep the skin around your eye clean and dry. · Be safe with medicines. Read and follow all instructions on the label. ¨ If the doctor gave you a prescription medicine for pain, take it as prescribed. ¨ If you are not taking a prescription pain medicine, ask your doctor if you can take an over-the-counter medicine. To prevent cellulitis · Wash your hands well after you use the bathroom and before and after you eat. · Do not rub or pick at the skin around your eyes. Cellulitis occurs most often where there is a break in the skin. · If you get a cut, pimple, or insect bite near your eye, clean the area as soon as you can. This can help prevent an infection. ¨ Wash the area with cool water and a mild soap, such as [place]. ¨ Do not use rubbing alcohol, hydrogen peroxide, iodine, or Mercurochrome. These can harm the tissues and slow healing. · Call your doctor if you have a sinus infection with redness or swelling of your eyes. When should you call for help? Call your doctor now or seek immediate medical care if: 
· You have new or worse signs of an eye infection, such as: 
¨ Pus or thick discharge coming from the eye. ¨ Redness or swelling around the eye. ¨ A fever. · Your eye seems to be bulging out. · You seem to be getting sicker. · You have vision changes. Watch closely for changes in your health, and be sure to contact your doctor if: 
· You do not get better as expected. Where can you learn more? Go to http://sonia-moira.info/. Enter 677 [id number] in the search box to learn more about \"Cellulitis of the Eye: Care Instructions. \" Current as of: March 14, 2017 Content Version: 11.3 © 5848-1111 [employer]. Care instructions adapted under license by [employer] (which disclaims liability or warranty for this information). If you have questions about a medical condition or this instruction, always ask your healthcare professional. [address] any warranty or liability for your use of this information. Dry Eyes: Care Instructions Your Care Instructions Dry eyes can be uncomfortable. The dryness may make your eyes feel dry or hot. Your eyes may also water a lot. In some cases, dry eyes make it feel like there is sand or dirt in your eyes. From time to time, dry eyes may cause you to have blurry vision. But dry eyes don't usually cause lasting problems with vision. There are many causes of dry eyes. Sometimes dry weather, smoke, or pollution can bother the eyes. Other times, allergies or contact lenses irritate the eyes.  Older people often have dry eyes because our eyes do not make as many tears as we age. In some cases, diseases can cause dry eyes. These include rheumatoid arthritis, lupus, and Sjögren's syndrome. In other cases, medicines are to blame. Your doctor may want to do tests to help find the cause of your dry eyes. You can work with your doctor to find ways to help your eyes feel better. Home treatment often helps. Follow-up care is a key part of your treatment and safety. Be sure to make and go to all appointments, and call your doctor if you are having problems. It's also a good idea to know your test results and keep a list of the medicines you take. How can you care for yourself at home? · Take breaks often when you read, watch TV, or use a computer. Close your eyes. Do not rub your eyes. Artificial tears may help you when you do these activities. You can buy these without a prescription. · Avoid smoke and other things that irritate the eyes. · Wear sunglasses that wrap around the sides of the head. These can protect the eyes from sun, wind, dust, and dirt. · Use a vaporizer or humidifier to add moisture to your bedroom. Follow the directions for cleaning the machine. · Do not use fans while you sleep. · If you usually wear contact lenses, use rewetting drops or wear your glasses until your eyes feel better. · Be safe with medicines. Take your medicine exactly as prescribed. Call your doctor if you think you are having a problem with your medicine. · Try using artificial tears at least 4 times a day. · If you need drops more than 4 times a day, use artificial tears without preservatives. They may irritate the eyes less. · Use a lubricating eye ointment or eye gel at bedtime. These are thicker and last longer, so you may have less burning, dryness, and itching when you wake up. Be aware that they may blur your vision for a short time. · To put in eyedrops or ointment: ¨ Tilt your head back, and pull the lower eyelid down with one finger. ¨ Drop or squirt the medicine inside the lower lid. ¨ Close your eye for 30 to 60 seconds to let the drops or ointment move around. ¨ Do not touch the ointment or dropper tip to your eyelashes or any other surface. · Put a warm, moist cloth on your eyelids every morning for about 5 minutes. Then massage your eyelids lightly. This helps increase the natural wetness of your eyes. When should you call for help? Watch closely for changes in your health, and be sure to contact your doctor if: 
· Your eyes are still dry, irritated, or teary, and artificial tears do not help. · You do not get better as expected. Where can you learn more? Go to http://sonia-moira.info/. Enter D231 in the search box to learn more about \"Dry Eyes: Care Instructions. \" Current as of: March 3, 2017 Content Version: 11.3 © 7319-5813 [employer]. Care instructions adapted under license by [employer]) (which disclaims liability or warranty for this information). If you have questions about a medical condition or this instruction, always ask your healthcare professional. [place] [address] any warranty or liability for your use of this information. Pinkeye: Care Instructions Your Care Instructions Pinkeye is redness and swelling of the eye surface and the conjunctiva (the lining of the eyelid and the covering of the white part of the eye). Pinkeye is also called conjunctivitis. Pinkeye is often caused by infection with bacteria or a virus. Dry air, allergies, smoke, and chemicals are other common causes. Pinkeye often clears on its own in 7 to 10 days. Antibiotics only help if the pinkeye is caused by bacteria. Pinkeye caused by infection spreads easily. If an allergy or chemical is causing pinkeye, it will not go away unless you can avoid whatever is causing it. Follow-up care is a key part of your treatment and safety.  Be sure to make and go to all appointments, and call your doctor if you are having problems. It's also a good idea to know your test results and keep a list of the medicines you take. How can you care for yourself at home? · Wash your hands often. Always wash them before and after you treat pinkeye or touch your eyes or face. · Use moist cotton or a clean, wet cloth to remove crust. Wipe from the inside corner of the eye to the outside. Use a clean part of the cloth for each wipe. · Put cold or warm wet cloths on your eye a few times a day if the eye hurts. · Do not wear contact lenses or eye makeup until the pinkeye is gone. Throw away any eye makeup you were using when you got pinkeye. Clean your contacts and storage case. If you wear disposable contacts, use a new pair when your eye has cleared and it is safe to wear contacts again. · If the doctor gave you antibiotic ointment or eyedrops, use them as directed. Use the medicine for as long as instructed, even if your eye starts looking better soon. Keep the bottle tip clean, and do not let it touch the eye area. · To put in eyedrops or ointment: ¨ Tilt your head back, and pull your lower eyelid down with one finger. ¨ Drop or squirt the medicine inside the lower lid. ¨ Close your eye for 30 to 60 seconds to let the drops or ointment move around. ¨ Do not touch the ointment or dropper tip to your eyelashes or any other surface. · Do not share towels, pillows, or washcloths while you have pinkeye. When should you call for help? Call your doctor now or seek immediate medical care if: 
· You have pain in your eye, not just irritation on the surface. · You have a change in vision or loss of vision. · You have an increase in discharge from the eye. · Your eye has not started to improve or begins to get worse within 48 hours after you start using antibiotics. · Pinkeye lasts longer than 7 days.  
Watch closely for changes in your health, and be sure to contact your doctor if you have any problems. Where can you learn more? Go to http://sonia-moira.info/. Enter Y392 in the search box to learn more about \"Cori: Care Instructions. \" Current as of: March 20, 2017 Content Version: 11.3 © 7955-4854 [employer]. Care instructions adapted under license by [employer] (which disclaims liability or warranty for this information). If you have questions about a medical condition or this instruction, always ask your healthcare professional. [address] any warranty or liability for your use of this information. Pterygium and Pinguecula: Care Instructions Your Care Instructions Pterygium (say \"yda-KSN-lgm-um\") and pinguecula (say \"krvt-IJHW-dhh-maryanne\") are similar eye problems. They are both a growth on the conjunctiva. This is the lining of the eyelid and the covering of the white part of the eye. The growths may make your eyes dry and sore. · A pterygium grows on the cornea. This is the clear part of the eye that covers the colored part of the eye. It sometimes affects your vision. · A pinguecula may grow close to the cornea, but it does not grow over it. It is most common in older people. Too much exposure to the sun may play a role in these problems. They are most common in hot climates. Dry air, dust, and smoke can make them more likely to occur. Your doctor may prescribe steroid drops to reduce redness and irritation. You also may use over-the-counter drops (artificial tears) to keep your eyes wet. If a pterygium gets big enough to cover part of the cornea, you may need surgery to remove it. Follow-up care is a key part of your treatment and safety. Be sure to make and go to all appointments, and call your doctor if you are having problems. It's also a good idea to know your test results and keep a list of the medicines you take. How can you care for yourself at home? · Wear sunglasses and a hat when you are outdoors during the day. · Use over-the-counter artificial tears if your eyes feel dry. When should you call for help? Watch closely for changes in your health, and be sure to contact your doctor if: 
· You have vision changes. · You do not get better as expected. Where can you learn more? Go to http://sonia-moira.info/. Enter F805 in the search box to learn more about \"Pterygium and Pinguecula: Care Instructions. \" Current as of: March 14, 2017 Content Version: 11.3 © 1450-5439 Vital [employer]rix. Care instructions adapted under license by [employer] (which disclaims liability or warranty for this information). If you have questions about a medical condition or this instruction, always ask your healthcare professional. [address] any warranty or liability for your use of this information. Leg and Ankle Edema: Care Instructions Your Care Instructions Swelling in the legs, ankles, and feet is called edema. It is common after you sit or stand for a while. Long plane flights or car rides often cause swelling in the legs and feet. You may also have swelling if you have to stand for long periods of time at your job. Problems with the veins in the legs (varicose veins) and changes in hormones can also cause swelling. Sometimes the swelling in the ankles and feet is caused by a more serious problem, such as heart failure, infection, blood clots, or liver or kidney disease. Follow-up care is a key part of your treatment and safety. Be sure to make and go to all appointments, and call your doctor if you are having problems. Its also a good idea to know your test results and keep a list of the medicines you take. How can you care for yourself at home? · If your doctor gave you medicine, take it as prescribed. Call your doctor if you think you are having a problem with your medicine. · Whenever you are resting, raise your legs up. Try to keep the swollen area higher than the level of your heart. · Take breaks from standing or sitting in one position. ¨ Walk around to increase the blood flow in your lower legs. ¨ Move your feet and ankles often while you stand, or tighten and relax your leg muscles. · Wear support stockings. Put them on in the morning, before swelling gets worse. · Eat a balanced diet. Lose weight if you need to. · Limit the amount of salt (sodium) in your diet. Salt holds fluid in the body and may increase swelling. When should you call for help? Call 911 anytime you think you may need emergency care. For example, call if: 
· You have symptoms of a blood clot in your lung (called a pulmonary embolism). These may include: 
¨ Sudden chest pain. ¨ Trouble breathing. ¨ Coughing up blood. Call your doctor now or seek immediate medical care if: 
· You have signs of a blood clot, such as: 
¨ Pain in your calf, back of the knee, thigh, or groin. ¨ Redness and swelling in your leg or groin. · You have symptoms of infection, such as: 
¨ Increased pain, swelling, warmth, or redness. ¨ Red streaks or pus. ¨ A fever. Watch closely for changes in your health, and be sure to contact your doctor if: 
· Your swelling is getting worse. · You have new or worsening pain in your legs. · You do not get better as expected. Where can you learn more? Go to http://sonia-moira.info/. Enter O856 in the search box to learn more about \"Leg and Ankle Edema: Care Instructions. \" Current as of: March 20, 2017 Content Version: 11.3 © 4941-7676 [employer]. Care instructions adapted under license by [employer] (which disclaims liability or warranty for this information).  If you have questions about a medical condition or this instruction, always ask your healthcare professional. Dayna Harvey, Inc[employer] disclaims any warranty or liability for your use of this information. Introducing [address]! Dear Shey Jimenez: Thank you for requesting a [employer] account. Our records indicate that you already have an active [employer] account. You can access your account anytime at [email or web address] Did you know that you can access your hospital and ER discharge instructions at any time in [employer]? You can also review all of your test results from your hospital stay or ER visit. Additional Information If you have questions, please visit the Frequently Asked Questions section of the [employer] website at [email or web address] Remember, [employer] is NOT to be used for urgent needs. For medical emergencies, dial 911. Now available from your iPhone and Android! Please provide this summary of care documentation to your next provider. Your primary care clinician is listed as Edu Llamas. If you have any questions after today's visit, please call [phone].

## 2017-07-12 NOTE — PROGRESS NOTES
Subjective:   Tommy Hemphill is a 74 y.o. female who presents for evaluation of redness, foreign body sensation. She has noticed the above symptoms in the right eye for 2 days but felt both eyes were somewhat stuck together this morning. .   Symptoms have included tearing, blurred vision,, foreign body sensation, erythema, itching. Patient denies pain, visual field deficit, discharge, photophobia. There is a history of wearing glasses but no contacts. She uses OTC dry eye drops. She saw Ophthalmologist 1 month ago who diagnosed her with early stage macular degeneration, per patient. She also complains of peripheral edema since the increase of norvasc to 10 mg. She went to a family reunion this weekend and ate a lot of salty foods. She denies SOB, CP, increased fatigue,  no orthopnea, no PND. She admits to MAGDALENO with stair climbing. PMHx sig for DM and HTN    Review of Systems  As pertinent in HPI. Objective:     Visit Vitals    Resp 20    Ht 5' 3\" (1.6 m)    Wt 247 lb 12.8 oz (112.4 kg)    BMI 43.9 kg/m2                 Physical Exam   Constitutional: She is oriented to person, place, and time. She appears well-developed and well-nourished. HENT:   Head: Normocephalic and atraumatic. Eyes: EOM are normal. Pupils are equal, round, and reactive to light. Right eye exhibits no discharge. Left eye exhibits no discharge. No scleral icterus. Neck: Normal range of motion. Neck supple. Cardiovascular: Normal rate, regular rhythm, S1 normal, S2 normal, normal heart sounds and normal pulses. Exam reveals no gallop. No murmur heard. Pulmonary/Chest: Effort normal. She has rales (light at bases posteriorly L>R). Abdominal: Soft. Bowel sounds are normal.   Neurological: She is oriented to person, place, and time. Vitals reviewed.   Extremities: no focal tenderness, no erythema, +1 pitting edema bilaterally legs and feet  Assessment/Plan:     Heydi Ewing was seen today for red eye and leg swelling. Diagnoses and all orders for this visit:    Allergic conjunctivitis, bilateral  -     olopatadine (PATANOL) 0.1 % ophthalmic solution; Administer 2 Drops to both eyes two (2) times a day. Indications: Allergic Conjunctivitis        -     May continue with Refresh or generic dry eye drops. Peripheral edema - R/o CHF  -     NT-PRO BNP; Future  -     furosemide (LASIX) 20 mg tablet; Take one tablet daily. Indications: Edema  -     Taking Spironolacone        -     Begin low salt diet, elevate legs    Pinguecula of both eyes - Dry eye drops may help irritation. Hypertension - begin new therapy, discontinue old  -     losartan (COZAAR) 50 mg tablet; Take 2 Tabs by mouth daily. Indications: hypertension - increased from 50 mg to 100 mg today. -     Discontinue Norvasc 10 mg         Adventitious breath sounds - R/o CHF  -     XR CHEST PA LAT; Future    Medication and side effects, including risk and signs of allergy were discussed. Patient given printed information with diagnoses and medication information. Answered all questions and patient acknowledged understanding. Patient agrees to follow up in one week as suggested or sooner if symptoms worsen. Edmond Arndt MPA, PA-C  [facility]    I reviewed the patient's medical history, the physician assistant's findings on physical examination, the patient's diagnoses, and treatment plan as documented in the progress note. I concur with the treatment plan as documented. There are no additional recommendations at this time.     Xiomara Madrid MD

## 2017-07-12 NOTE — PATIENT INSTRUCTIONS
Cellulitis of the Eye: Care Instructions  Your Care Instructions    Cellulitis of the eye is an infection of the skin and tissues around the eye. It is also called periorbital cellulitis. It is usually caused by bacteria. This type of infection may happen after a sinus infection or a dental infection. It could also happen after an insect bite or an injury to the face. It most often occurs where there is a break in the skin. Cellulitis of the eye can be very serious. It's important to treat it right away. If you do, it usually goes away without lasting problems. Medicine and home treatment can help you get better. Follow-up care is a key part of your treatment and safety. Be sure to make and go to all appointments, and call your doctor if you are having problems. It's also a good idea to know your test results and keep a list of the medicines you take. How can you care for yourself at home? · Take your antibiotics as directed. Do not stop taking them just because you feel better. You need to take the full course of antibiotics. · Do not wear contact lenses unless your doctor tells you it is okay. · Put your head on pillows, and put a cool, damp cloth on your eye. This can reduce swelling and pain. · If your doctor recommends it, use a warm pack on your eye. · Keep the skin around your eye clean and dry. · Be safe with medicines. Read and follow all instructions on the label. ¨ If the doctor gave you a prescription medicine for pain, take it as prescribed. ¨ If you are not taking a prescription pain medicine, ask your doctor if you can take an over-the-counter medicine. To prevent cellulitis  · Wash your hands well after you use the bathroom and before and after you eat. · Do not rub or pick at the skin around your eyes. Cellulitis occurs most often where there is a break in the skin. · If you get a cut, pimple, or insect bite near your eye, clean the area as soon as you can.  This can help prevent an infection. ¨ Wash the area with cool water and a mild soap, such as [place]. ¨ Do not use rubbing alcohol, hydrogen peroxide, iodine, or Mercurochrome. These can harm the tissues and slow healing. · Call your doctor if you have a sinus infection with redness or swelling of your eyes. When should you call for help? Call your doctor now or seek immediate medical care if:  · You have new or worse signs of an eye infection, such as:  ¨ Pus or thick discharge coming from the eye. ¨ Redness or swelling around the eye. ¨ A fever. · Your eye seems to be bulging out. · You seem to be getting sicker. · You have vision changes. Watch closely for changes in your health, and be sure to contact your doctor if:  · You do not get better as expected. Where can you learn more? Go to http://soniaiHandlemoira.info/. Enter 480 [id number] in the search box to learn more about \"Cellulitis of the Eye: Care Instructions. \"  Current as of: March 14, 2017  Content Version: 11.3  © 8731-8736 [employer]. Care instructions adapted under license by [employer] (which disclaims liability or warranty for this information). If you have questions about a medical condition or this instruction, always ask your healthcare professional. [place] [address] any warranty or liability for your use of this information. Dry Eyes: Care Instructions  Your Care Instructions    Dry eyes can be uncomfortable. The dryness may make your eyes feel dry or hot. Your eyes may also water a lot. In some cases, dry eyes make it feel like there is sand or dirt in your eyes. From time to time, dry eyes may cause you to have blurry vision. But dry eyes don't usually cause lasting problems with vision. There are many causes of dry eyes. Sometimes dry weather, smoke, or pollution can bother the eyes. Other times, allergies or contact lenses irritate the eyes.  Older people often have dry eyes because our eyes do not make as many tears as we age. In some cases, diseases can cause dry eyes. These include rheumatoid arthritis, lupus, and Sjögren's syndrome. In other cases, medicines are to blame. Your doctor may want to do tests to help find the cause of your dry eyes. You can work with your doctor to find ways to help your eyes feel better. Home treatment often helps. Follow-up care is a key part of your treatment and safety. Be sure to make and go to all appointments, and call your doctor if you are having problems. It's also a good idea to know your test results and keep a list of the medicines you take. How can you care for yourself at home? · Take breaks often when you read, watch TV, or use a computer. Close your eyes. Do not rub your eyes. Artificial tears may help you when you do these activities. You can buy these without a prescription. · Avoid smoke and other things that irritate the eyes. · Wear sunglasses that wrap around the sides of the head. These can protect the eyes from sun, wind, dust, and dirt. · Use a vaporizer or humidifier to add moisture to your bedroom. Follow the directions for cleaning the machine. · Do not use fans while you sleep. · If you usually wear contact lenses, use rewetting drops or wear your glasses until your eyes feel better. · Be safe with medicines. Take your medicine exactly as prescribed. Call your doctor if you think you are having a problem with your medicine. · Try using artificial tears at least 4 times a day. · If you need drops more than 4 times a day, use artificial tears without preservatives. They may irritate the eyes less. · Use a lubricating eye ointment or eye gel at bedtime. These are thicker and last longer, so you may have less burning, dryness, and itching when you wake up. Be aware that they may blur your vision for a short time. · To put in eyedrops or ointment:  ¨ Tilt your head back, and pull the lower eyelid down with one finger.   ¨ Drop or squirt the medicine inside the lower lid. ¨ Close your eye for 30 to 60 seconds to let the drops or ointment move around. ¨ Do not touch the ointment or dropper tip to your eyelashes or any other surface. · Put a warm, moist cloth on your eyelids every morning for about 5 minutes. Then massage your eyelids lightly. This helps increase the natural wetness of your eyes. When should you call for help? Watch closely for changes in your health, and be sure to contact your doctor if:  · Your eyes are still dry, irritated, or teary, and artificial tears do not help. · You do not get better as expected. Where can you learn more? Go to http://sonia-moira.info/. Enter D231 in the search box to learn more about \"Dry Eyes: Care Instructions. \"  Current as of: March 3, 2017  Content Version: 11.3  © 4508-6740 [employer]. Care instructions adapted under license by [employer] (which disclaims liability or warranty for this information). If you have questions about a medical condition or this instruction, always ask your healthcare professional. [place] [address] any warranty or liability for your use of this information. Pinkeye: Care Instructions  Your Care Instructions    Pinkeye is redness and swelling of the eye surface and the conjunctiva (the lining of the eyelid and the covering of the white part of the eye). Pinkeye is also called conjunctivitis. Pinkeye is often caused by infection with bacteria or a virus. Dry air, allergies, smoke, and chemicals are other common causes. Pinkeye often clears on its own in 7 to 10 days. Antibiotics only help if the pinkeye is caused by bacteria. Pinkeye caused by infection spreads easily. If an allergy or chemical is causing pinkeye, it will not go away unless you can avoid whatever is causing it. Follow-up care is a key part of your treatment and safety.  Be sure to make and go to all appointments, and call your doctor if you are having problems. It's also a good idea to know your test results and keep a list of the medicines you take. How can you care for yourself at home? · Wash your hands often. Always wash them before and after you treat pinkeye or touch your eyes or face. · Use moist cotton or a clean, wet cloth to remove crust. Wipe from the inside corner of the eye to the outside. Use a clean part of the cloth for each wipe. · Put cold or warm wet cloths on your eye a few times a day if the eye hurts. · Do not wear contact lenses or eye makeup until the pinkeye is gone. Throw away any eye makeup you were using when you got pinkeye. Clean your contacts and storage case. If you wear disposable contacts, use a new pair when your eye has cleared and it is safe to wear contacts again. · If the doctor gave you antibiotic ointment or eyedrops, use them as directed. Use the medicine for as long as instructed, even if your eye starts looking better soon. Keep the bottle tip clean, and do not let it touch the eye area. · To put in eyedrops or ointment:  ¨ Tilt your head back, and pull your lower eyelid down with one finger. ¨ Drop or squirt the medicine inside the lower lid. ¨ Close your eye for 30 to 60 seconds to let the drops or ointment move around. ¨ Do not touch the ointment or dropper tip to your eyelashes or any other surface. · Do not share towels, pillows, or washcloths while you have pinkeye. When should you call for help? Call your doctor now or seek immediate medical care if:  · You have pain in your eye, not just irritation on the surface. · You have a change in vision or loss of vision. · You have an increase in discharge from the eye. · Your eye has not started to improve or begins to get worse within 48 hours after you start using antibiotics. · Pinkeye lasts longer than 7 days. Watch closely for changes in your health, and be sure to contact your doctor if you have any problems.   Where can you learn more? Go to http://sonia-moira.info/. Enter Y392 in the search box to learn more about \"Cori: Care Instructions. \"  Current as of: March 20, 2017  Content Version: 11.3  © 4611-4877 [employer]. Care instructions adapted under license by [employer] (which disclaims liability or warranty for this information). If you have questions about a medical condition or this instruction, always ask your healthcare professional. [address] any warranty or liability for your use of this information. Pterygium and Pinguecula: Care Instructions  Your Care Instructions    Pterygium (say \"xli-MLH-apm-um\") and pinguecula (say \"xbvi-ARKR-syn-maryanne\") are similar eye problems. They are both a growth on the conjunctiva. This is the lining of the eyelid and the covering of the white part of the eye. The growths may make your eyes dry and sore. · A pterygium grows on the cornea. This is the clear part of the eye that covers the colored part of the eye. It sometimes affects your vision. · A pinguecula may grow close to the cornea, but it does not grow over it. It is most common in older people. Too much exposure to the sun may play a role in these problems. They are most common in hot climates. Dry air, dust, and smoke can make them more likely to occur. Your doctor may prescribe steroid drops to reduce redness and irritation. You also may use over-the-counter drops (artificial tears) to keep your eyes wet. If a pterygium gets big enough to cover part of the cornea, you may need surgery to remove it. Follow-up care is a key part of your treatment and safety. Be sure to make and go to all appointments, and call your doctor if you are having problems. It's also a good idea to know your test results and keep a list of the medicines you take. How can you care for yourself at home? · Wear sunglasses and a hat when you are outdoors during the day.   · Use over-the-counter artificial tears if your eyes feel dry. When should you call for help? Watch closely for changes in your health, and be sure to contact your doctor if:  · You have vision changes. · You do not get better as expected. Where can you learn more? Go to http://sonia-moira.info/. Enter M894 in the search box to learn more about \"Pterygium and Pinguecula: Care Instructions. \"  Current as of: March 14, 2017  Content Version: 11.3  © 2905-7119 [employer]. Care instructions adapted under license by [employer] (which disclaims liability or warranty for this information). If you have questions about a medical condition or this instruction, always ask your healthcare professional. [address] any warranty or liability for your use of this information. Leg and Ankle Edema: Care Instructions  Your Care Instructions  Swelling in the legs, ankles, and feet is called edema. It is common after you sit or stand for a while. Long plane flights or car rides often cause swelling in the legs and feet. You may also have swelling if you have to stand for long periods of time at your job. Problems with the veins in the legs (varicose veins) and changes in hormones can also cause swelling. Sometimes the swelling in the ankles and feet is caused by a more serious problem, such as heart failure, infection, blood clots, or liver or kidney disease. Follow-up care is a key part of your treatment and safety. Be sure to make and go to all appointments, and call your doctor if you are having problems. Its also a good idea to know your test results and keep a list of the medicines you take. How can you care for yourself at home? · If your doctor gave you medicine, take it as prescribed. Call your doctor if you think you are having a problem with your medicine. · Whenever you are resting, raise your legs up.  Try to keep the swollen area higher than the level of your heart. · Take breaks from standing or sitting in one position. ¨ Walk around to increase the blood flow in your lower legs. ¨ Move your feet and ankles often while you stand, or tighten and relax your leg muscles. · Wear support stockings. Put them on in the morning, before swelling gets worse. · Eat a balanced diet. Lose weight if you need to. · Limit the amount of salt (sodium) in your diet. Salt holds fluid in the body and may increase swelling. When should you call for help? Call 911 anytime you think you may need emergency care. For example, call if:  · You have symptoms of a blood clot in your lung (called a pulmonary embolism). These may include:  ¨ Sudden chest pain. ¨ Trouble breathing. ¨ Coughing up blood. Call your doctor now or seek immediate medical care if:  · You have signs of a blood clot, such as:  ¨ Pain in your calf, back of the knee, thigh, or groin. ¨ Redness and swelling in your leg or groin. · You have symptoms of infection, such as:  ¨ Increased pain, swelling, warmth, or redness. ¨ Red streaks or pus. ¨ A fever. Watch closely for changes in your health, and be sure to contact your doctor if:  · Your swelling is getting worse. · You have new or worsening pain in your legs. · You do not get better as expected. Where can you learn more? Go to http://sonia-moira.info/. Enter N277 in the search box to learn more about \"Leg and Ankle Edema: Care Instructions. \"  Current as of: March 20, 2017  Content Version: 11.3  © 9119-4825 [employer]. Care instructions adapted under license by [employer] (which disclaims liability or warranty for this information). If you have questions about a medical condition or this instruction, always ask your healthcare professional. [address] any warranty or liability for your use of this information.

## 2017-07-13 RX ORDER — LOSARTAN POTASSIUM 50 MG/1
TABLET ORAL
Qty: 60 TAB | Refills: 2 | Status: SHIPPED | OUTPATIENT
Start: 2017-07-13 | End: 2017-07-17 | Stop reason: SDUPTHER

## 2017-07-13 NOTE — TELEPHONE ENCOUNTER
I called Pt in regards to Rx refills. Informed Pt that Rx was refilled and sent to the pharmacy. Pt sts she was unable to get them from the [facility] and they will have to be called into the [facility] at [phone]. Sts that she transferred the Lasix but couldn't have the Patanol processed. Called in Eye drops to the pharmacy.

## 2017-07-14 ENCOUNTER — HOSPITAL ENCOUNTER (OUTPATIENT)
Dept: GENERAL RADIOLOGY | Age: 72
Discharge: HOME | End: 2017-07-14
Payer: MEDICARE

## 2017-07-14 DIAGNOSIS — R06.89 ADVENTITIOUS BREATH SOUNDS: ICD-10-CM

## 2017-07-14 DIAGNOSIS — E87.6 HYPOKALEMIA: ICD-10-CM

## 2017-07-14 DIAGNOSIS — I11.9 BENIGN HYPERTENSIVE HEART DISEASE WITHOUT HEART FAILURE: ICD-10-CM

## 2017-07-14 DIAGNOSIS — E78.5 DYSLIPIDEMIA: ICD-10-CM

## 2017-07-14 PROCEDURE — 71020 XR CHEST PA LAT: CPT

## 2017-07-14 RX ORDER — SPIRONOLACTONE 25 MG/1
25 TABLET ORAL DAILY
Qty: 90 TAB | Refills: 1 | Status: SHIPPED | OUTPATIENT
Start: 2017-07-14 | End: 2018-05-15

## 2017-07-14 RX ORDER — POTASSIUM CHLORIDE 750 MG/1
20 TABLET, EXTENDED RELEASE ORAL 2 TIMES DAILY
Qty: 180 TAB | Refills: 1 | Status: SHIPPED | OUTPATIENT
Start: 2017-07-14 | End: 2017-07-17 | Stop reason: SDUPTHER

## 2017-07-14 RX ORDER — ATORVASTATIN CALCIUM 20 MG/1
20 TABLET, FILM COATED ORAL DAILY
Qty: 90 TAB | Refills: 1 | Status: SHIPPED | OUTPATIENT
Start: 2017-07-14 | End: 2017-07-17 | Stop reason: SDUPTHER

## 2017-07-17 ENCOUNTER — TELEPHONE (OUTPATIENT)
Dept: INTERNAL MEDICINE CLINIC | Age: 72
End: 2017-07-17

## 2017-07-17 DIAGNOSIS — I11.9 BENIGN HYPERTENSIVE HEART DISEASE WITHOUT HEART FAILURE: ICD-10-CM

## 2017-07-17 DIAGNOSIS — E87.6 HYPOKALEMIA: ICD-10-CM

## 2017-07-17 DIAGNOSIS — E78.5 DYSLIPIDEMIA: ICD-10-CM

## 2017-07-17 RX ORDER — LOSARTAN POTASSIUM 50 MG/1
TABLET ORAL
Qty: 180 TAB | Refills: 1 | Status: SHIPPED | OUTPATIENT
Start: 2017-07-17 | End: 2017-12-13 | Stop reason: SDUPTHER

## 2017-07-17 RX ORDER — POTASSIUM CHLORIDE 750 MG/1
20 TABLET, EXTENDED RELEASE ORAL 2 TIMES DAILY
Qty: 180 TAB | Refills: 1 | Status: SHIPPED | OUTPATIENT
Start: 2017-07-17 | End: 2017-07-25 | Stop reason: SDUPTHER

## 2017-07-17 RX ORDER — ATORVASTATIN CALCIUM 20 MG/1
20 TABLET, FILM COATED ORAL DAILY
Qty: 90 TAB | Refills: 1 | Status: SHIPPED | OUTPATIENT
Start: 2017-07-17 | End: 2017-11-28 | Stop reason: SDUPTHER

## 2017-07-17 NOTE — TELEPHONE ENCOUNTER
Pt calling re: the following 3 medications.  She can't afford them from the local pharmacy and is asking please send them to her mail pharmacy, request send 90 day supply     potassium chloride (KLOR-CON M10) 10 mEq tablet     atorvastatin (LIPITOR) 20 mg tablet     losartan (COZAAR) 50 mg tablet     Request please send to [facility]

## 2017-07-19 ENCOUNTER — HOSPITAL ENCOUNTER (OUTPATIENT)
Dept: LAB | Age: 72
Discharge: HOME | End: 2017-07-19
Payer: MEDICARE

## 2017-07-19 ENCOUNTER — OFFICE VISIT (OUTPATIENT)
Dept: INTERNAL MEDICINE CLINIC | Age: 72
End: 2017-07-19

## 2017-07-19 VITALS
RESPIRATION RATE: 20 BRPM | WEIGHT: 247 LBS | HEART RATE: 70 BPM | BODY MASS INDEX: 43.77 KG/M2 | HEIGHT: 63 IN | OXYGEN SATURATION: 95 % | TEMPERATURE: 98.2 F | SYSTOLIC BLOOD PRESSURE: 115 MMHG | DIASTOLIC BLOOD PRESSURE: 73 MMHG

## 2017-07-19 DIAGNOSIS — R60.9 PERIPHERAL EDEMA: Primary | ICD-10-CM

## 2017-07-19 DIAGNOSIS — Z13.820 OSTEOPOROSIS SCREENING: ICD-10-CM

## 2017-07-19 DIAGNOSIS — H10.13 ALLERGIC CONJUNCTIVITIS, BILATERAL: ICD-10-CM

## 2017-07-19 DIAGNOSIS — I11.9 BENIGN HYPERTENSIVE HEART DISEASE WITHOUT HEART FAILURE: ICD-10-CM

## 2017-07-19 DIAGNOSIS — E11.9 TYPE 2 DIABETES MELLITUS WITHOUT COMPLICATION, WITHOUT LONG-TERM CURRENT USE OF INSULIN (HCC): ICD-10-CM

## 2017-07-19 PROCEDURE — 82306 VITAMIN D 25 HYDROXY: CPT | Performed by: PHYSICIAN ASSISTANT

## 2017-07-19 PROCEDURE — 36415 COLL VENOUS BLD VENIPUNCTURE: CPT | Performed by: PHYSICIAN ASSISTANT

## 2017-07-19 NOTE — MR AVS SNAPSHOT
Visit Information Date & Time Provider Department Dept. Phone Encounter #  
 7/19/2017 10:45 AM Leslye Day PA-C [facility] [phone] Follow-up Instructions Return in about 1 month (around 8/19/2017) for HTN follow up. Get labs one week before. Iris Bello Your Appointments 10/5/2017  8:30 AM  
LAB with Leslye Dya PA-C [facility] (--) Appt Note: 3 mo f/u lab [address] [address]  
[address] [facility] [address] [address]  
  
    
 10/12/2017 10:45 AM  
Office Visit with Leslye Day PA-C [facility] (--) Appt Note: 3 mo f/u  
 [address] [address] [phone]  
  
   
 [address] [address]  
  
    
 3/27/2018  1:20 PM  
Follow Up with Diane Anderson DO [facility] Appt Note: 1 year follow up with an EKG  
 [place] [address] [phone] [facility] [address] [address] Upcoming Health Maintenance Date Due DTaP/Tdap/Td series (1 - Tdap) 2/10/1964 ZOSTER VACCINE AGE 60> 2/10/2003 MEDICARE YEARLY EXAM 2/10/2008 Pneumococcal 65+ Low/Medium Risk (2 of 2 - PPSV23) 1/1/2013 FOOT EXAM Q1 10/30/2014 EYE EXAM RETINAL OR DILATED Q1 9/21/2015 INFLUENZA AGE 9 TO ADULT 8/1/2017 HEMOGLOBIN A1C Q6M 12/23/2017 MICROALBUMIN Q1 1/26/2018 COLONOSCOPY 4/12/2018 LIPID PANEL Q1 6/16/2018 GLAUCOMA SCREENING Q2Y 6/7/2019 Allergies as of 7/19/2017  Review Complete On: 7/19/2017 By: Leslye Day PA-C Severity Noted Reaction Type Reactions Latex    Rash Nexium [Esomeprazole Magnesium] High 12/13/2010    Swelling, Other (comments) Lips Swollen, and facial rash and swelling Crestor [Rosuvastatin]    Other (comments) Upper respiratory illness Lisinopril  05/17/2010   Side Effect Unknown (comments) Patient can't recall Niaspan [Niacin]  09/19/2011    Other (comments) Scratchy throat, \"asthma\" like symptoms Pcn [Penicillins]  12/18/2009    Hives Pravachol [Pravastatin]    Myalgia Sulfa (Sulfonamide Antibiotics)  12/18/2009    Rash Zocor [Simvastatin]    Myalgia, Other (comments) Per patient chart \"(? Rash)\" Current Immunizations  Reviewed on 10/21/2014 Name Date Influenza Vaccine 10/21/2014, 10/30/2013 Pneumococcal Vaccine (Unspecified Type) 1/1/2008 Not reviewed this visit You Were Diagnosed With   
  
 Codes Comments Peripheral edema    -  Primary ICD-10-CM: R60.9 ICD-9-CM: 364. 3 Benign hypertensive heart disease without heart failure     ICD-10-CM: I11.9 ICD-9-CM: 402.10 Osteoporosis screening     ICD-10-CM: Z13.820 ICD-9-CM: V82.81 Type 2 diabetes mellitus without complication, without long-term current use of insulin (HCC)     ICD-10-CM: E11.9 ICD-9-CM: 250.00 Allergic conjunctivitis, bilateral     ICD-10-CM: H10.13 ICD-9-CM: 372.14 Vitals BP Pulse Temp Resp Height(growth percentile) Weight(growth percentile) 115/73 (BP 1 Location: Left arm, BP Patient Position: Sitting) 70 98.2 °F (36.8 °C) (Oral) 20 5' 3\" (1.6 m) 247 lb (112 kg) SpO2 BMI OB Status Smoking Status 95% 43.75 kg/m2 Hysterectomy Former Smoker BMI and BSA Data Body Mass Index Body Surface Area 43.75 kg/m 2 2.23 m 2 Preferred Pharmacy Pharmacy Name Phone [address] [phone] Your Updated Medication List  
  
   
This list is accurate as of: 7/19/17 11:47 AM.  Always use your most recent med list.  
  
  
  
  
 aspirin 81 mg tablet Take 81 mg by mouth daily. atorvastatin 20 mg tablet Commonly known as:  LIPITOR Take 1 Tab by mouth daily. cpap machine kit Take  by inhalation every evening. Used when sleeping DEXILANT 60 mg Cpdb Generic drug:  Dexlansoprazole Take 60 mg by mouth daily as needed. furosemide 20 mg tablet Commonly known as:  LASIX Take one tablet daily. Indications: Edema  
  
 losartan 50 mg tablet Commonly known as:  COZAAR Take 2 tablets by mouth once daily. Indications: hypertension MULTI-VITAMIN PO Take 1 Tab by mouth daily. olopatadine 0.1 % ophthalmic solution Commonly known as:  PATANOL Administer 2 Drops to both eyes two (2) times a day. Indications: Allergic Conjunctivitis  
  
 potassium chloride 10 mEq tablet Commonly known as:  KLOR-CON M10 Take 2 Tabs by mouth two (2) times a day. spironolactone 25 mg tablet Commonly known as:  ALDACTONE Take 1 Tab by mouth daily. Pt states she takes the OOD. VITAMIN D3 1,000 unit tablet Generic drug:  cholecalciferol Take 2,000 Units by mouth daily. We Performed the Following REFERRAL TO VASCULAR SURGERY [XQE393 Custom] Comments:  
 Please evaluate for peripheral edema. Follow-up Instructions Return in about 1 month (around 8/19/2017) for HTN follow up. Get labs one week before. Rambo Gan To-Do List   
 07/19/2017 Imaging:  DEXA BONE DENSITY STUDY AXIAL Referral Information Referral ID Referred By Referred To  
  
 [id number] JUN [facility] [facility] [place]   
   [address] [place] [address]   
   [facility], [address] [address] Phone: [phone] Fax: [phone] Visits Status Start Date End Date 1 New Request 7/19/17 7/19/18 If your referral has a status of pending review or denied, additional information will be sent to support the outcome of this decision. Patient Instructions How to Read a Food Label to Limit Sodium: Care Instructions Your Care Instructions Sodium causes your body to hold on to extra water. This can raise your blood pressure and force your heart and kidneys to work harder. In very serious cases, this could cause you to be put in the hospital. It might even be life-threatening. By limiting sodium, you will feel better and lower your risk of serious problems. Processed foods, fast food, and restaurant foods are the major sources of dietary sodium. The most common name for sodium is salt. Try to limit how much sodium you eat to less than 2,300 milligrams (mg) a day. If you limit your sodium to 1,500 mg a day, you can lower your blood pressure even more. This limit counts all the salt that you eat in foods you cook or in packaged foods. Keep a list of everything you eat and drink. Follow-up care is a key part of your treatment and safety. Be sure to make and go to all appointments, and call your doctor if you are having problems. It's also a good idea to know your test results and keep a list of the medicines you take. How can you care for yourself at home? Read ingredient lists on food labels · Read the list of ingredients on food labels to help you find how much sodium is in a food. The label lists the ingredients in a food in descending order (from the most to the least). If salt or sodium is high on the list, there may be a lot of sodium in the food. · Know that sodium has different names. Sodium is also called monosodium glutamate (MSG, common in [facility] food), sodium citrate, sodium alginate, sodium hydroxide, and sodium phosphate. Read Nutrition Facts labels · On most foods, there is a Nutrition Facts label. This will tell you how much sodium is in one serving of food. Look at both the serving size and the sodium amount. The serving size is located at the top of the label, usually right under the \"Nutrition Facts\" title. The amount of sodium is given in the list under the title. It is given in milligrams (mg). ¨ Check the serving size carefully. A single serving is often very small, and you may eat more than one serving. If this is the case, you will eat more sodium than listed on the label. For example, if the serving size for a canned soup is 1 cup and the sodium amount is 470 mg, if you have 2 cups you will eat 940 mg of sodium. · The nutrition facts for fresh fruits and vegetables are not listed on the food. They may be listed somewhere in the store. These foods usually have no sodium or low sodium. · The Nutrition Facts label also gives you the Percent Daily Value for sodium. This is how much of the recommended amount of sodium a serving contains. The daily value for sodium is less than 2,300 mg. So if the Percent Daily Value says 50%, this means one serving is giving you half of this, or 1,150 mg. Buy low-sodium foods · Look for foods that are made with less sodium. Watch for the following words on the label. ¨ \"Unsalted\" means there is no sodium added to the food. But there may be sodium already in the food naturally. ¨ \"Sodium-free\" means a serving has less than 5 mg of sodium. ¨ \"Very low sodium\" means a serving has 35 mg or less of sodium. ¨ \"Low-sodium\" means a serving has 140 mg or less of sodium. · \"Reduced-sodium\" means that there is 25% less sodium than what the food normally has. This is still usually too much sodium. Try not to buy foods with this on the label. · Buy fresh vegetables, or frozen vegetables without added sauces. Buy low-sodium versions of canned vegetables, soups, and other canned goods. Where can you learn more? Go to http://sonia-moira.info/. Enter 26 [id number] in the search box to learn more about \"How to Read a Food Label to Limit Sodium: Care Instructions. \" Current as of: July 26, 2016 Content Version: 11.3 © 0199-9935 [employer].  Care instructions adapted under license by [employer] (which disclaims liability or warranty for this information). If you have questions about a medical condition or this instruction, always ask your healthcare professional. [address] any warranty or liability for your use of this information. Hyperkalemia: Care Instructions Your Care Instructions Hyperkalemia is too much potassium in the blood. Potassium helps keep the right mix of fluids in your body. It also helps your nerves and muscles work as they should. And it keeps your heartbeat in a normal rhythm. Some things can raise potassium levels. These include some health problems, medicines, and kidney problems. (Normally, your kidneys remove extra potassium.) Too much potassium can cause nausea. It also can cause a heartbeat that isn't normal. But you may not have any symptoms. Too much potassium can be dangerous. That's why it's important to treat it. If you are taking any of the medicines that can raise your levels, your doctor will ask you to stop. You may get medicines to lower your levels. And you may have to limit or not eat foods that have a lot of potassium. Follow-up care is a key part of your treatment and safety. Be sure to make and go to all appointments, and call your doctor if you are having problems. It's also a good idea to know your test results and keep a list of the medicines you take. How can you care for yourself at home? · Take your medicines exactly as prescribed. Call your doctor if you think you are having a problem with your medicine. · Stop taking certain medicines if your doctor asks you to. They may be causing your high potassium levels. If you have concerns about stopping medicine, talk with your doctor. · If you have kidney, heart, or liver disease and have to limit fluids, talk with your doctor before you increase the amount of fluids you drink. If the doctor says it's okay, drink plenty of fluids. This means drinking enough so that your urine is light yellow or clear like water. · Avoid strenuous exercise until your doctor tells you it is okay. · Potassium is in many foods, including vegetables, fruits, and milk products. Foods high in potassium include bananas, cantaloupe, broccoli, milk, potatoes, and tomatoes. · Low potassium foods include blueberries, raspberries, cucumber, white or brown rice, spaghetti, and macaroni. · Do not use a salt substitute without talking to your doctor first. Most of these are very high in potassium. · Be sure to tell your doctor about any prescription, over-the-counter, or herbal medicines you take. Some of these can raise potassium. When should you call for help? Call 911 anytime you think you may need emergency care. For example, call if: 
· You passed out (lost consciousness). · You have trouble breathing. · You have an unusual heartbeat. Your heart may beat fast or skip beats. Call your doctor now or seek immediate medical care if: 
· You have trouble urinating or can urinate only very small amounts. · Your nausea does not get better. Watch closely for changes in your health, and be sure to contact your doctor if: 
· You do not get better as expected. · You want more help planning meals. Where can you learn more? Go to http://sonia-moira.info/. Enter U821 in the search box to learn more about \"Hyperkalemia: Care Instructions. \" Current as of: August 8, 2016 Content Version: 11.3 © 6276-7361 [employer]. Care instructions adapted under license by [employer] (which disclaims liability or warranty for this information). If you have questions about a medical condition or this instruction, always ask your healthcare professional. [place] [address] any warranty or liability for your use of this information. Introducing hospitals & HEALTH SERVICES! Dear Ingrid Wolfe: Thank you for requesting a fitaborate account.   Our records indicate that you already have an active [email or web address] account. You can access your account anytime at [email or web address] Did you know that you can access your hospital and ER discharge instructions at any time in [email or web address]? You can also review all of your test results from your hospital stay or ER visit. Additional Information If you have questions, please visit the Frequently Asked Questions section of the [email or web address] website at [email or web address] Remember, [email or web address] is NOT to be used for urgent needs. For medical emergencies, dial 911. Now available from your iPhone and Android! Please provide this summary of care documentation to your next provider. Your primary care clinician is listed as Debra Wright. If you have any questions after today's visit, please call [phone].

## 2017-07-19 NOTE — PROGRESS NOTES
HPI:    This is a 75 y/o female patient who comes in today for f/u after office visit for eye irritation and concerns for heart failure. Patient states that she is feeling much better, eyes have improved and swelling has decreased. Patient denies SOB, CP, dizziness, headache. States compliance with prescribed medications but admits to noncompliance with diet and activity. Patient requesting Podiatrist for painful foot swelling. ROS:  taking medications as instructed, no medication side effects noted, no TIAs, no chest pain on exertion, no dyspnea on exertion, noting swelling of ankles      Current Outpatient Prescriptions:     potassium chloride (KLOR-CON M10) 10 mEq tablet, Take 2 Tabs by mouth two (2) times a day., Disp: 180 Tab, Rfl: 1    losartan (COZAAR) 50 mg tablet, Take 2 tablets by mouth once daily. Indications: hypertension, Disp: 180 Tab, Rfl: 1    atorvastatin (LIPITOR) 20 mg tablet, Take 1 Tab by mouth daily. , Disp: 90 Tab, Rfl: 1    spironolactone (ALDACTONE) 25 mg tablet, Take 1 Tab by mouth daily. Pt states she takes the OOD., Disp: 90 Tab, Rfl: 1    olopatadine (PATANOL) 0.1 % ophthalmic solution, Administer 2 Drops to both eyes two (2) times a day. Indications: Allergic Conjunctivitis, Disp: 6 mL, Rfl: 0    furosemide (LASIX) 20 mg tablet, Take one tablet daily. Indications: Edema, Disp: 30 Tab, Rfl: 1    cholecalciferol (VITAMIN D3) 1,000 unit tablet, Take 2,000 Units by mouth daily. , Disp: , Rfl:     Dexlansoprazole (DEXILANT) 60 mg CpDM, Take 60 mg by mouth daily as needed. , Disp: , Rfl:     cpap machine kit, Take  by inhalation every evening. Used when sleeping, Disp: , Rfl:     aspirin 81 mg Tab, Take 81 mg by mouth daily. , Disp: , Rfl:     MULTIVITAMINS (MULTI-VITAMIN PO), Take 1 Tab by mouth daily. 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 LVEDP 20.  RA 11.  RV 42/10. PA 42/21. W 18.  CO 6.4 (TD), 6.4 (Jona Teetee). PVR 1.7 Wood units. False-positive nuclear study.  Cardiac echocardiogram 05/11/2011    EF 65%. RVSP at least 35 mmHg. Mild IVCE. No significant valvular heart disease.  Cardiac nuclear imaging test 05/18/2012    Tech difficult. Apical ischemia. No infarction. EF 76%. No reg'l WMA. No TID.  Cardiovascular LLE venous duplex 06/17/2013    Left leg:  No DVT.  Diabetes     diet controlled, hypoglycemia from metformin previously     Dyslipidemia     Fatty liver     Fibrocystic breast disease     Fluid retention     GERD     H/O colonoscopy 4/12/13    polyp    Hypertension     Ill-defined condition     gout    Macular degeneration     Morbid obesity ([address]     Obstructive sleep apnea     Peripheral neuropathy (HCC)     Rectocele          Physical Exam:  Visit Vitals    /73 (BP 1 Location: Left arm, BP Patient Position: Sitting)    Pulse 70    Temp 98.2 °F (36.8 °C) (Oral)    Resp 20    Ht 5' 3\" (1.6 m)    Wt 247 lb (112 kg)    SpO2 95%    BMI 43.75 kg/m2       General: a, a & o x 3, afebrile, well-nourished, interacting appropriately, in no acute distress  HENT: NC/AT, neck supple, EYES: conjunctiva clear, sclera clear with pinguecula as described in last note, unchanged.     Cardiac: BP noted to be well controlled today in office, S1, S2 normal, no gallop, no murmur, no JVD, no HSM, +1 peripheral edema bilaterally, non-pitting  Lungs: CTAB  Abdomen: non-distended, normoactive bowel sounds x 4 quadrants, soft, non-tender to palpation, no guarding or rigidity, no organomegaly, no palpable mass, no abdominal bruits, no CVA tenderness  Psychiatry: a, a & o x 3, appropriate mood and affect, no thought disorder      Assessment/Plan:  Heydi Ewing was seen today for follow-up. Diagnoses and all orders for this visit:    Peripheral edema - continue Lasix 20 mg. Taking Spironolactone and Losartan and likes to eat foods high in potassium. Will decrease potassium to once daily. Patient given information on signs of hyperkalemia. Osteoporosis screening  -     DEXA BONE DENSITY STUDY AXIAL; Future  -     VITAMIN D, 25 HYDROXY; Future    Allergic Conjunctivitis, bilateral- resolved using Pataday    Type 2 diabetes mellitus without complication, without long-term current use of insulin ([facility]   - patient reminded of HM, states that eye exam was completed by Dr. Buck Ulloa. Will call for office notes. Patient was seen by Podiatry x 2 years ago and told that she didn't need to to seen because her A1c was not in Diabetic range and she is not being treated for Diabetes. Her A1c is 6.1. We will continue to watch this and patient is checking blood sugars at home, which are within normal limits. Hypertension - continue regimen of Lasix 20mg, Spironolactone and Losartan. Lose weight, exercise, continue low sodium diet. Labwork and imaging were reviewed with patient and she has requested copies, which were given. Will see her back in two weeks to follow up on DEXA and Vitamin D testing as patient does not supplement with Vitamin D or calcium. Also, patient will have medicare wellness that day and has been encouraged to seek her immunization record as she declines vaccinations today because she states they have been completed at another office. Discussed family history of colon cancer, and medical history of polyps. Colonoscopy due next year, patient agrees. Weight Management:  Patient states that she is starting back at the [facility] next week. Encouraged to set a goal for 10 pound weight loss. DASH diet: patient states that she watches her sodium levels.     After Visit Summary: Medication and side effects, including risk and signs of allergy were discussed. Patient given printed information with diagnoses and medication information. Answered all questions and patient acknowledged understanding. Patient agrees to follow up with PCP as suggested or sooner if symptoms worsen. Skyler Bray PA-C     I reviewed the patient's medical history, the physician assistant's findings on physical examination, the patient's diagnoses, and treatment plan as documented in the progress note. I concur with the treatment plan as documented. There are no additional recommendations at this time.     Xiomara Madrid MD

## 2017-07-19 NOTE — PATIENT INSTRUCTIONS
How to Read a Food Label to Limit Sodium: Care Instructions  Your Care Instructions  Sodium causes your body to hold on to extra water. This can raise your blood pressure and force your heart and kidneys to work harder. In very serious cases, this could cause you to be put in the hospital. It might even be life-threatening. By limiting sodium, you will feel better and lower your risk of serious problems. Processed foods, fast food, and restaurant foods are the major sources of dietary sodium. The most common name for sodium is salt. Try to limit how much sodium you eat to less than 2,300 milligrams (mg) a day. If you limit your sodium to 1,500 mg a day, you can lower your blood pressure even more. This limit counts all the salt that you eat in foods you cook or in packaged foods. Keep a list of everything you eat and drink. Follow-up care is a key part of your treatment and safety. Be sure to make and go to all appointments, and call your doctor if you are having problems. It's also a good idea to know your test results and keep a list of the medicines you take. How can you care for yourself at home? Read ingredient lists on food labels  · Read the list of ingredients on food labels to help you find how much sodium is in a food. The label lists the ingredients in a food in descending order (from the most to the least). If salt or sodium is high on the list, there may be a lot of sodium in the food. · Know that sodium has different names. Sodium is also called monosodium glutamate (MSG, common in [facility] food), sodium citrate, sodium alginate, sodium hydroxide, and sodium phosphate. Read Nutrition Facts labels  · On most foods, there is a Nutrition Facts label. This will tell you how much sodium is in one serving of food. Look at both the serving size and the sodium amount. The serving size is located at the top of the label, usually right under the \"Nutrition Facts\" title.  The amount of sodium is given in the list under the title. It is given in milligrams (mg). ¨ Check the serving size carefully. A single serving is often very small, and you may eat more than one serving. If this is the case, you will eat more sodium than listed on the label. For example, if the serving size for a canned soup is 1 cup and the sodium amount is 470 mg, if you have 2 cups you will eat 940 mg of sodium. · The nutrition facts for fresh fruits and vegetables are not listed on the food. They may be listed somewhere in the store. These foods usually have no sodium or low sodium. · The Nutrition Facts label also gives you the Percent Daily Value for sodium. This is how much of the recommended amount of sodium a serving contains. The daily value for sodium is less than 2,300 mg. So if the Percent Daily Value says 50%, this means one serving is giving you half of this, or 1,150 mg. Buy low-sodium foods  · Look for foods that are made with less sodium. Watch for the following words on the label. ¨ \"Unsalted\" means there is no sodium added to the food. But there may be sodium already in the food naturally. ¨ \"Sodium-free\" means a serving has less than 5 mg of sodium. ¨ \"Very low sodium\" means a serving has 35 mg or less of sodium. ¨ \"Low-sodium\" means a serving has 140 mg or less of sodium. · \"Reduced-sodium\" means that there is 25% less sodium than what the food normally has. This is still usually too much sodium. Try not to buy foods with this on the label. · Buy fresh vegetables, or frozen vegetables without added sauces. Buy low-sodium versions of canned vegetables, soups, and other canned goods. Where can you learn more? Go to http://sonia-moira.info/. Enter 26 [id number] in the search box to learn more about \"How to Read a Food Label to Limit Sodium: Care Instructions. \"  Current as of: July 26, 2016  Content Version: 11.3  © 9615-9516 [employer].  Care instructions adapted under license by [facility] (which disclaims liability or warranty for this information). If you have questions about a medical condition or this instruction, always ask your healthcare professional. [address] any warranty or liability for your use of this information. Hyperkalemia: Care Instructions  Your Care Instructions  Hyperkalemia is too much potassium in the blood. Potassium helps keep the right mix of fluids in your body. It also helps your nerves and muscles work as they should. And it keeps your heartbeat in a normal rhythm. Some things can raise potassium levels. These include some health problems, medicines, and kidney problems. (Normally, your kidneys remove extra potassium.)  Too much potassium can cause nausea. It also can cause a heartbeat that isn't normal. But you may not have any symptoms. Too much potassium can be dangerous. That's why it's important to treat it. If you are taking any of the medicines that can raise your levels, your doctor will ask you to stop. You may get medicines to lower your levels. And you may have to limit or not eat foods that have a lot of potassium. Follow-up care is a key part of your treatment and safety. Be sure to make and go to all appointments, and call your doctor if you are having problems. It's also a good idea to know your test results and keep a list of the medicines you take. How can you care for yourself at home? · Take your medicines exactly as prescribed. Call your doctor if you think you are having a problem with your medicine. · Stop taking certain medicines if your doctor asks you to. They may be causing your high potassium levels. If you have concerns about stopping medicine, talk with your doctor. · If you have kidney, heart, or liver disease and have to limit fluids, talk with your doctor before you increase the amount of fluids you drink. If the doctor says it's okay, drink plenty of fluids.  This means drinking enough so that your urine is light yellow or clear like water. · Avoid strenuous exercise until your doctor tells you it is okay. · Potassium is in many foods, including vegetables, fruits, and milk products. Foods high in potassium include bananas, cantaloupe, broccoli, milk, potatoes, and tomatoes. · Low potassium foods include blueberries, raspberries, cucumber, white or brown rice, spaghetti, and macaroni. · Do not use a salt substitute without talking to your doctor first. Most of these are very high in potassium. · Be sure to tell your doctor about any prescription, over-the-counter, or herbal medicines you take. Some of these can raise potassium. When should you call for help? Call 911 anytime you think you may need emergency care. For example, call if:  · You passed out (lost consciousness). · You have trouble breathing. · You have an unusual heartbeat. Your heart may beat fast or skip beats. Call your doctor now or seek immediate medical care if:  · You have trouble urinating or can urinate only very small amounts. · Your nausea does not get better. Watch closely for changes in your health, and be sure to contact your doctor if:  · You do not get better as expected. · You want more help planning meals. Where can you learn more? Go to http://sonia-moira.info/. Enter D166 in the search box to learn more about \"Hyperkalemia: Care Instructions. \"  Current as of: August 8, 2016  Content Version: 11.3  © 9439-1403 [employer]. Care instructions adapted under license by [employer] (which disclaims liability or warranty for this information). If you have questions about a medical condition or this instruction, always ask your healthcare professional. [address] any warranty or liability for your use of this information.

## 2017-07-19 NOTE — PROGRESS NOTES
Patient is in the office today for a 1 week follow up. 1. Have you been to the ER, urgent care clinic since your last visit? Hospitalized since your last visit? No    2. Have you seen or consulted any other health care providers outside of the [address] [place] [address] since your last visit? Include any pap smears or colon screening.  No

## 2017-07-20 ENCOUNTER — TELEPHONE (OUTPATIENT)
Dept: INTERNAL MEDICINE CLINIC | Age: 72
End: 2017-07-20

## 2017-07-20 DIAGNOSIS — E78.5 DYSLIPIDEMIA: ICD-10-CM

## 2017-07-20 DIAGNOSIS — E87.6 HYPOKALEMIA: ICD-10-CM

## 2017-07-20 DIAGNOSIS — I11.9 BENIGN HYPERTENSIVE HEART DISEASE WITHOUT HEART FAILURE: ICD-10-CM

## 2017-07-20 LAB — 25(OH)D3 SERPL-MCNC: 32.8 NG/ML (ref 30–100)

## 2017-07-20 RX ORDER — SPIRONOLACTONE 25 MG/1
25 TABLET ORAL DAILY
Qty: 90 TAB | Refills: 1 | Status: CANCELLED | OUTPATIENT
Start: 2017-07-20

## 2017-07-20 RX ORDER — LOSARTAN POTASSIUM 50 MG/1
TABLET ORAL
Qty: 180 TAB | Refills: 1 | Status: CANCELLED | OUTPATIENT
Start: 2017-07-20

## 2017-07-20 RX ORDER — POTASSIUM CHLORIDE 750 MG/1
20 TABLET, EXTENDED RELEASE ORAL 2 TIMES DAILY
Qty: 180 TAB | Refills: 1 | Status: CANCELLED | OUTPATIENT
Start: 2017-07-20

## 2017-07-20 RX ORDER — ATORVASTATIN CALCIUM 20 MG/1
20 TABLET, FILM COATED ORAL DAILY
Qty: 90 TAB | Refills: 1 | Status: CANCELLED | OUTPATIENT
Start: 2017-07-20

## 2017-07-20 NOTE — PROGRESS NOTES
Patient aware she needs to take Calcium 5928-8506 mg and Vitamin D3 1000 units. Patient verbalizes understanding.

## 2017-07-20 NOTE — PROGRESS NOTES
Please call patient with results and have her take 1000 international units daily of Vitamin D3 and between 7065-8390 mg of calcium daily. Thank you!

## 2017-07-25 ENCOUNTER — TELEPHONE (OUTPATIENT)
Dept: INTERNAL MEDICINE CLINIC | Age: 72
End: 2017-07-25

## 2017-07-25 DIAGNOSIS — E87.6 HYPOKALEMIA: ICD-10-CM

## 2017-07-25 RX ORDER — POTASSIUM CHLORIDE 750 MG/1
20 TABLET, EXTENDED RELEASE ORAL 2 TIMES DAILY
Qty: 180 TAB | Refills: 1 | Status: SHIPPED | OUTPATIENT
Start: 2017-07-25 | End: 2017-11-28 | Stop reason: SDUPTHER

## 2017-07-25 NOTE — TELEPHONE ENCOUNTER
Pt calling said got an override from [facility] [employer] to approve a short supply of the following medication to local pharmacy    She is completely out of this medication/request process today     potassium chloride (KLOR-CON M10) 10 mEq tablet       Request send a 10 day supply to [facility]

## 2017-07-31 ENCOUNTER — HOSPITAL ENCOUNTER (OUTPATIENT)
Dept: BONE DENSITY | Age: 72
Discharge: HOME | End: 2017-07-31
Attending: PHYSICIAN ASSISTANT
Payer: MEDICARE

## 2017-07-31 DIAGNOSIS — Z13.820 OSTEOPOROSIS SCREENING: ICD-10-CM

## 2017-07-31 PROCEDURE — 77080 DXA BONE DENSITY AXIAL: CPT

## 2017-08-01 NOTE — PROGRESS NOTES
Kenyon Allred ~  [employer]!! Your bone density imaging to look for thinning of the bone is within normal limits. Current recommendations are 4543-5941 mg of calcium with Vitamin D between 800-1000 Internation units of Vitamin D plus weight bearing activity daily to prevent osteoporosis. If you have any questions, please feel free to call me.

## 2017-08-02 ENCOUNTER — TELEPHONE (OUTPATIENT)
Dept: INTERNAL MEDICINE CLINIC | Age: 72
End: 2017-08-02

## 2017-08-02 NOTE — TELEPHONE ENCOUNTER
Patient wanted to know if she is suppose to be taking Lasix and spirnolactone at the same time. Patient states she is getting \"Dried out\" . Patient states she did not take any yesterday and will not take any more until she gets a phone call.

## 2017-08-02 NOTE — TELEPHONE ENCOUNTER
Left detailed message for patient per SRINATH Venegas she can discontinue lasix if swelling is down and use PRN. Any questions call the office.

## 2017-08-03 ENCOUNTER — TELEPHONE (OUTPATIENT)
Dept: CARDIOLOGY CLINIC | Age: 72
End: 2017-08-03

## 2017-08-03 NOTE — TELEPHONE ENCOUNTER
Patient had a health screening done and had Dr. Nancy Harvey take a look at it to see what his thoughts were. Dr. Nancy Harvey said the LDL is a little elevated and he recommend that patient should eat less animal protein such as eggs, milk, cheese and meats. Patient should eat more fruit , vegetables, beans, nuts and whole grains. Patient verbalized understanding of instructions.

## 2017-08-16 ENCOUNTER — TELEPHONE (OUTPATIENT)
Dept: INTERNAL MEDICINE CLINIC | Age: 72
End: 2017-08-16

## 2017-08-16 NOTE — TELEPHONE ENCOUNTER
Raciel Maldonado with [facility] calling for ins referral    appt 08/17/2017    Seeing NP Claudio Chester [phone]   [phone]  Fax [phone]  DX leg swelling  [employer] [phone] (pcp showing with ins Dimitrios Murcia is Dr Negrito Redding

## 2017-09-05 ENCOUNTER — OFFICE VISIT (OUTPATIENT)
Dept: VASCULAR SURGERY | Age: 72
End: 2017-09-05

## 2017-09-05 VITALS
HEIGHT: 63 IN | SYSTOLIC BLOOD PRESSURE: 138 MMHG | WEIGHT: 247 LBS | RESPIRATION RATE: 9 BRPM | BODY MASS INDEX: 43.77 KG/M2 | DIASTOLIC BLOOD PRESSURE: 82 MMHG | HEART RATE: 79 BPM

## 2017-09-05 DIAGNOSIS — I83.893 VARICOSE VEINS OF LOWER EXTREMITIES WITH OTHER COMPLICATIONS: ICD-10-CM

## 2017-09-05 DIAGNOSIS — R60.0 BILATERAL LEG EDEMA: Primary | ICD-10-CM

## 2017-09-05 NOTE — PROGRESS NOTES
Cheryl Nash    Chief Complaint   Patient presents with    New Patient    Swelling       History and Physical    Ms Jon Pierre is here at the request of her PCP for evaluation of leg edema. She has had swelling for quite some time, and has some recent improvement with diuretics. She does not want to continue on medication she does not have to. She says they are often times where she can lay down at night and her legs are to normal size in the morning. She is up on her feet a lot during the day. 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 Is not taking care of him she is often working around the house or in the yard, does not stop much throughout the day. After busy day her legs will be quite swollen. She is also having pain as well. This includes in the lower legs and ankles, but even some particular areas of her left foot on the dorsal aspect, laterally. She states that she has had this evaluated by podiatry but they are unsure of what this could be. It is definitely a point of tenderness, and occasionally shoes and even stockings that she has tried to wear has aggravated pain in this area. She also knows she has a Baker's cyst in the left knee, from a prior ultrasound a few years ago. She does get pain within the popliteal fossa region. She also points to some varicose veins in the left calf as a source of some discomfort as well. As far as overall swelling and discomfort, she does describe that seems to be pretty equally in both legs, just has these few focal areas she can point to the pain    Past Medical History:   Diagnosis Date    Atrophic vaginitis     Cardiac cath 05/30/2012    Patent coronary arteries. LVEDP 20.  RA 11.  RV 42/10. PA 42/21. W 18.  CO 6.4 (TD), 6.4 (Payal Le). PVR 1.7 Wood units. False-positive nuclear study.  Cardiac echocardiogram 05/11/2011    EF 65%. RVSP at least 35 mmHg. Mild IVCE. No significant valvular heart disease.     Cardiac nuclear imaging test 05/18/2012    Tech difficult. Apical ischemia. No infarction. EF 76%. No reg'l WMA. No TID.  Cardiovascular LLE venous duplex 06/17/2013    Left leg:  No DVT. 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 180 Tab 1    losartan (COZAAR) 50 mg tablet Take 2 tablets by mouth once daily. Indications: hypertension 180 Tab 1    atorvastatin (LIPITOR) 20 mg tablet Take 1 Tab by mouth daily. 90 Tab 1    spironolactone (ALDACTONE) 25 mg tablet Take 1 Tab by mouth daily. Pt states she takes the OOD. 90 Tab 1    olopatadine (PATANOL) 0.1 % ophthalmic solution Administer 2 Drops to both eyes two (2) times a day. Indications: Allergic Conjunctivitis 6 mL 0    furosemide (LASIX) 20 mg tablet Take one tablet daily. Indications: Edema 30 Tab 1    cholecalciferol (VITAMIN D3) 1,000 unit tablet Take 2,000 Units by mouth daily.  Dexlansoprazole (DEXILANT) 60 mg CpDM Take 60 mg by mouth daily as needed.       cpap machine kit Take  by inhalation every evening. Used when sleeping      aspirin 81 mg Tab Take 81 mg by mouth daily.  MULTIVITAMINS (MULTI-VITAMIN PO) Take 1 Tab by mouth daily. Allergies   Allergen Reactions    Latex Rash    Nexium [Esomeprazole Magnesium] Swelling and Other (comments)     Lips Swollen, and facial rash and swelling    Crestor [Rosuvastatin] Other (comments)     Upper respiratory illness    Lisinopril Unknown (comments)     Patient can't recall    Niaspan [Niacin] Other (comments)     Scratchy throat, \"asthma\" like symptoms    Pcn [Penicillins] Hives    Pravachol [Pravastatin] Myalgia    Sulfa (Sulfonamide Antibiotics) Rash    Zocor [Simvastatin] Myalgia and Other (comments)     Per patient chart \"(? Rash)\"     Social History     Social History    Marital status:      Spouse name: N/A    Number of children: N/A    Years of education: N/A     Occupational History    Not on file.      Social History Main Topics    Smoking status: Former Smoker    Smokeless tobacco: Never Used    Alcohol use Yes      Comment: occassionally    Drug use: No    Sexual activity: No     Other Topics Concern    Not on file     Social History Narrative      Family History   Problem Relation Age of Onset   [place] Cancer Mother      colon cancer    Colon Cancer Mother     Hypertension Mother     Diabetes Mother     Heart Disease Mother     Coronary Artery Disease Mother     Hypertension Father     Diabetes Father     Heart Disease Father     Coronary Artery Disease Father     Hypertension Sister     Diabetes Sister     Heart Disease Sister     Coronary Artery Disease Sister     Hypertension Brother     Diabetes Brother     Heart Disease Brother     Coronary Artery Disease Brother        Review of Systems    Review of Systems - History obtained from the patient  General ROS: negative  Psychological ROS: negative  Ophthalmic ROS: positive for - uses glasses  Respiratory ROS: negative  Cardiovascular ROS: negative  Gastrointestinal ROS: negative  Musculoskeletal ROS: pain behind left knee and pain in feet  Neurological ROS: negative  Dermatological ROS: negative  Vascular ROS: swelling         Physical Exam:    Visit Vitals    /82 (BP 1 Location: Left arm, BP Patient Position: Sitting)    Pulse 79    Resp 9    Ht 5' 3\" (1.6 m)    Wt 247 lb (112 kg)    BMI 43.75 kg/m2      General:  Alert, cooperative, no distress. Head:  Normocephalic, without obvious abnormality, atraumatic. Eyes:    Conjunctivae/corneas clear. Pupils equal, round, reactive to light. Extraocular movements intact. Extremities: Extremities normal, atraumatic, no cyanosis or edema. Pulses: Difficult to palpate due to edema, but triphasic bilaterally with handheld doppler   Skin: Skin color, texture, turgor normal. No rashes or lesions. Impression and Plan:  1. Bilateral leg edema    2. Varicose veins of lower extremities with other complications      Orders Placed This Encounter    DUPLEX LOWER EXT VENOUS BILAT AMB (Reflux)     I discussed the pathology of venous disease and gave her literature to review as well. There may be several factors contributing to her symptoms, including medication side effects, activity, muscular skeletal issues. I would still encourage her to try to use stockings, but perhaps just needs to find a better fit. I gave her several options to consider new stockings, including certain over-the-counter brands, medical stores, and even online ordering, and mention to her some herbals that patients have used for relief of swelling, but to certainly look into those and confer with her pharmacist.  Then we can also do venous reflux testing to see if she has underlying reflux that would allow us to discuss additional treatment options such as ablation. We will get into more discussion about that pending review of the study.   Meanwhile I have encouraged her to continue with an effort of compression, elevation, and range of motion exercises throughout the day. SRINATH Jackson    Portions of this note have been created using voice recognition software.

## 2017-09-06 ENCOUNTER — OFFICE VISIT (OUTPATIENT)
Dept: INTERNAL MEDICINE CLINIC | Age: 72
End: 2017-09-06

## 2017-09-06 VITALS
SYSTOLIC BLOOD PRESSURE: 127 MMHG | WEIGHT: 246 LBS | TEMPERATURE: 97.6 F | BODY MASS INDEX: 43.59 KG/M2 | RESPIRATION RATE: 18 BRPM | HEART RATE: 57 BPM | OXYGEN SATURATION: 98 % | HEIGHT: 63 IN | DIASTOLIC BLOOD PRESSURE: 67 MMHG

## 2017-09-06 DIAGNOSIS — Z00.00 MEDICARE ANNUAL WELLNESS VISIT, SUBSEQUENT: Primary | ICD-10-CM

## 2017-09-06 DIAGNOSIS — E11.9 TYPE 2 DIABETES MELLITUS WITHOUT COMPLICATION, WITHOUT LONG-TERM CURRENT USE OF INSULIN (HCC): ICD-10-CM

## 2017-09-06 DIAGNOSIS — I11.9 BENIGN HYPERTENSIVE HEART DISEASE WITHOUT HEART FAILURE: ICD-10-CM

## 2017-09-06 DIAGNOSIS — E66.01 MORBID OBESITY WITH BMI OF 40.0-44.9, ADULT (HCC): ICD-10-CM

## 2017-09-06 DIAGNOSIS — K92.1 BLOOD IN STOOL: ICD-10-CM

## 2017-09-06 DIAGNOSIS — R60.9 PERIPHERAL EDEMA: ICD-10-CM

## 2017-09-06 DIAGNOSIS — R31.9 HEMATURIA: ICD-10-CM

## 2017-09-06 DIAGNOSIS — R31.9 BLOOD IN URINE: ICD-10-CM

## 2017-09-06 LAB
BILIRUB UR QL STRIP: NEGATIVE
GLUCOSE UR-MCNC: NEGATIVE MG/DL
KETONES P FAST UR STRIP-MCNC: NEGATIVE MG/DL
PH UR STRIP: 5.5 [PH] (ref 4.6–8)
PROT UR QL STRIP: NEGATIVE MG/DL
SP GR UR STRIP: 1 (ref 1–1.03)
UA UROBILINOGEN AMB POC: NORMAL (ref 0.2–1)
URINALYSIS CLARITY POC: CLEAR
URINALYSIS COLOR POC: YELLOW
URINE BLOOD POC: NEGATIVE
URINE LEUKOCYTES POC: NEGATIVE
URINE NITRITES POC: NEGATIVE

## 2017-09-06 NOTE — PROGRESS NOTES
HPI:    Laurie Avitia  is a 74 y.o. female  patient who comes in today with complaints of red in the stool after using the restroom. No blood on toilet paper when she wiped. She also complains of peripheral edema bilaterally after standing up a lot this weekend. She saw vascular yesterday and will get a doppler study. Patient denies SOB, CP, dizziness, headache. Current Outpatient Prescriptions   Medication Sig Dispense Refill    potassium chloride (KLOR-CON M10) 10 mEq tablet Take 2 Tabs by mouth two (2) times a day. 180 Tab 1    losartan (COZAAR) 50 mg tablet Take 2 tablets by mouth once daily. Indications: hypertension 180 Tab 1    atorvastatin (LIPITOR) 20 mg tablet Take 1 Tab by mouth daily. 90 Tab 1    spironolactone (ALDACTONE) 25 mg tablet Take 1 Tab by mouth daily. Pt states she takes the OOD. 90 Tab 1    olopatadine (PATANOL) 0.1 % ophthalmic solution Administer 2 Drops to both eyes two (2) times a day. Indications: Allergic Conjunctivitis 6 mL 0    furosemide (LASIX) 20 mg tablet Take one tablet daily. Indications: Edema 30 Tab 1    cholecalciferol (VITAMIN D3) 1,000 unit tablet Take 2,000 Units by mouth daily.  Dexlansoprazole (DEXILANT) 60 mg CpDM Take 60 mg by mouth daily as needed.  cpap machine kit Take  by inhalation every evening. Used when sleeping      aspirin 81 mg Tab Take 81 mg by mouth daily.  MULTIVITAMINS (MULTI-VITAMIN PO) Take 1 Tab by mouth daily.         Allergies   Allergen Reactions    Latex Rash    Nexium [Esomeprazole Magnesium] Swelling and Other (comments)     Lips Swollen, and facial rash and swelling    Crestor [Rosuvastatin] Other (comments)     Upper respiratory illness    Lisinopril Unknown (comments)     Patient can't recall    Niaspan [Niacin] Other (comments)     Scratchy throat, \"asthma\" like symptoms    Pcn [Penicillins] Hives    Pravachol [Pravastatin] Myalgia    Sulfa (Sulfonamide Antibiotics) Rash    Zocor [Simvastatin] Myalgia and Other (comments)     Per patient chart \"(? Rash)\"      Past Medical History:   Diagnosis Date    Atrophic vaginitis     Cardiac cath 05/30/2012    Patent coronary arteries. LVEDP 20.  RA 11.  RV 42/10. PA 42/21. W 18.  CO 6.4 (TD), 6.4 (Dimple Rubinstein). PVR 1.7 Wood units. False-positive nuclear study.  Cardiac echocardiogram 05/11/2011    EF 65%. RVSP at least 35 mmHg. Mild IVCE. No significant valvular heart disease.  Cardiac nuclear imaging test 05/18/2012    Tech difficult. Apical ischemia. No infarction. EF 76%. No reg'l WMA. No TID.  Cardiovascular LLE venous duplex 06/17/2013    Left leg:  No DVT.  Diabetes     diet controlled, hypoglycemia from metformin previously     Dyslipidemia     Fatty liver     Fibrocystic breast disease     Fluid retention     GERD     H/O colonoscopy 4/12/13    polyp    Hypertension     Ill-defined condition     gout    Macular degeneration     Morbid obesity ([address]     Obstructive sleep apnea     Peripheral neuropathy (HCC)     Rectocele       Past Surgical History:   Procedure Laterality Date    [facility] ARTERIAL ARTERIOTOMY 3M  5/25/2012    HX COLONOSCOPY  4/12/2013    HX HEART CATHETERIZATION  5/25/2012    HX HERNIA REPAIR      umbilical    HX HYSTERECTOMY      HX MOHS PROCEDURES      right      Social History     Social History    Marital status:      Spouse name: N/A    Number of children: N/A    Years of education: N/A     Occupational History    Not on file.      Social History Main Topics    Smoking status: Former Smoker    Smokeless tobacco: Never Used    Alcohol use Yes      Comment: occassionally    Drug use: No    Sexual activity: No     Other Topics Concern    Not on file     Social History Narrative      Family History   Problem Relation Age of Onset    Cancer Mother      colon cancer    Colon Cancer Mother     Hypertension Mother     Diabetes Mother     Heart Disease Mother     Coronary Artery Disease Mother     Hypertension Father     Diabetes Father     Heart Disease Father     Coronary Artery Disease Father     Hypertension Sister     Diabetes Sister     Heart Disease Sister     Coronary Artery Disease Sister     Hypertension Brother     Diabetes Brother     Heart Disease Brother     Coronary Artery Disease Brother       Patient Active Problem List   Diagnosis Code    Diabetes E11.9    Hypertension I11.9    Dyslipidemia E78.5    Sleep apnea/ on c-pap G47.30    Renal cyst N28.1    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 Cardiovascular: Positive for leg swelling (bilaterally). Negative for chest pain and palpitations. Gastrointestinal: Positive for blood in stool (not sure) and constipation (occasional). Negative for abdominal pain, diarrhea, nausea and vomiting. Genitourinary: Positive for hematuria (not sure). Negative for dysuria, flank pain, frequency and urgency. Neurological: Positive for loss of consciousness. Visit Vitals    /67 (BP 1 Location: Left arm, BP Patient Position: Sitting)    Pulse (!) 57    Temp 97.6 °F (36.4 °C) (Oral)    Resp 18    Ht 5' 3\" (1.6 m)    Wt 246 lb (111.6 kg)    SpO2 98%    BMI 43.58 kg/m2        Physical Exam   Constitutional: She is oriented to person, place, and time and well-developed, well-nourished, and in no distress. HENT:   Head: Normocephalic and atraumatic. Eyes: Conjunctivae are normal. Pupils are equal, round, and reactive to light. Neck: Normal range of motion. Neck supple. Cardiovascular: Regular rhythm, normal heart sounds and intact distal pulses. Bradycardia present. Exam reveals no gallop and no friction rub. No murmur heard. Pulmonary/Chest: Effort normal and breath sounds normal. No respiratory distress. She has no wheezes. She has no rales. Abdominal: Soft.  Bowel sounds are normal. She exhibits no distension and no mass. There is no tenderness. There is no CVA tenderness. Genitourinary: Rectal exam shows guaiac negative stool. Musculoskeletal: She exhibits edema (edema bilaterally non-pitting). She exhibits no deformity. Lymphadenopathy:     She has no cervical adenopathy. Neurological: She is alert and oriented to person, place, and time. Vitals reviewed. Assessment/Plan:  Diagnoses and all orders for this visit:       Type 2 diabetes mellitus without complication, without long-term current use of insulin ([facility] - discussed watching blood sugars at home, cutting back on sugar intake. Hematuria  -     AMB POC URINALYSIS DIP STICK AUTO W/O MICRO  Urine was negative. Blood in stool - hemoccult negative, rectal exam normal, patient has family history of mom with colon cancer. She has mhx sig for polyps. Patient advised to continue to watch for signs of blood, however, negative today. Peripheral edema - get legs up as much as possible, continue Lasix. Morbid obesity with BMI of 40.0-44.9, adult ([facility] [address] - discussed healthy eating and weight loss recommendation. Hypertension - discussed patient cutting back on losartan to once daily if BP in control. Patient will monitor at home. Patient to continue Spironolactone and Lasix. Additional Notes: Discussed today's diagnosis, treatment plans. Discussed medication indications and side effects. Preventive Medicine:   Weight Management: Weight loss, DASH diet, glycemic control. After Visit Summary: Provided and discussed printed patient instructions. Answered all questions and patient acknowledged understanding. Need medical records from previous provider.         Wolfgang Benitez PA-C

## 2017-09-06 NOTE — MR AVS SNAPSHOT
Visit Information Date & Time Provider Department Dept. Phone Encounter #  
 9/6/2017  2:30 PM Jeffery Ott PA-C [facility] at [facility] 8 [id number] Your Appointments 9/12/2017 12:45 PM  
PROCEDURE with [facility] IMAGING 2 [facility] ([facility]) Appt Note: reflux esteban knaak [address] [facility] [address] Farrell [address] [facility] [address]  
[phone] [address] [facility] [address] 47 [facility]  
  
    
 9/22/2017  9:45 AM  
Follow Up with SRINATH [facility]as [facility] ([facility]) Appt Note: fu after reflux [address] [facility] Jovon Farrell 507 [address] [facility] [address]  
[phone] [address] [facility] [address] 47 [facility]  
  
    
 10/5/2017  8:00 AM  
LAB with Jeffery Ott PA-C [facility] at [employer] (--) Appt Note: 3 mo f/u lab; 3 mo f/u lab [address] [address]  
[address] [facility] [address] [address]  
  
    
 10/12/2017 10:45 AM  
Office Visit with Jeffery Ott PA-C [facility] at [facility] (--) Appt Note: 3 mo f/u  
 [address] [address] [phone]  
  
   
 [address] [address]  
  
    
 3/27/2018  1:20 PM  
Follow Up with Power Leija DO [facility] ([facility]) Appt Note: 1 year follow up with an EKG  
 [place] [address] [address] [address] [phone] [facility] [address] Upcoming Health Maintenance Date Due DTaP/Tdap/Td series (1 - Tdap) 2/10/1966 ZOSTER VACCINE AGE 60> 12/10/2002 Pneumococcal 65+ Low/Medium Risk (2 of 2 - PPSV23) 9/13/2017* INFLUENZA AGE 9 TO ADULT 9/13/2017* EYE EXAM RETINAL OR DILATED Q1 9/20/2017* HEMOGLOBIN A1C Q6M 12/23/2017 MICROALBUMIN Q1 1/26/2018 COLONOSCOPY 4/12/2018 LIPID PANEL Q1 6/16/2018 FOOT EXAM Q1 9/6/2018 MEDICARE YEARLY EXAM 9/7/2018 GLAUCOMA SCREENING Q2Y 6/7/2019 *Topic was postponed. The date shown is not the original due date. Allergies as of 9/6/2017  Review Complete On: 9/6/2017 By: Nathan Cueva PA-C Severity Noted Reaction Type Reactions Latex    Rash Nexium [Esomeprazole Magnesium] High 12/13/2010    Swelling, Other (comments) Lips Swollen, and facial rash and swelling Crestor [Rosuvastatin]    Other (comments) Upper respiratory illness Lisinopril  05/17/2010   Side Effect Unknown (comments) Patient can't recall Niaspan [Niacin]  09/19/2011    Other (comments) Scratchy throat, \"asthma\" like symptoms Pcn [Penicillins]  12/18/2009    Hives Pravachol [Pravastatin]    Myalgia Sulfa (Sulfonamide Antibiotics)  12/18/2009    Rash Zocor [Simvastatin]    Myalgia, Other (comments) Per patient chart \"(? Rash)\" Current Immunizations  Reviewed on 10/21/2014 Name Date Influenza Vaccine 10/21/2014, 10/30/2013 Pneumococcal Vaccine (Unspecified Type) 1/1/2008 Tdap 7/31/2013 12:00 AM  
  
 Not reviewed this visit You Were Diagnosed With   
  
 Codes Comments Blood in urine    -  Primary ICD-10-CM: R31.9 ICD-9-CM: 599.70 Type 2 diabetes mellitus without complication, without long-term current use of insulin (HCC)     ICD-10-CM: E11.9 ICD-9-CM: 250.00 Hematuria     ICD-10-CM: R31.9 ICD-9-CM: 599.70 Blood in stool     ICD-10-CM: K92.1 ICD-9-CM: 578.1 Peripheral edema     ICD-10-CM: R60.9 ICD-9-CM: 730. 3 Vitals BP Pulse Temp Resp Height(growth percentile) Weight(growth percentile) 127/67 (BP 1 Location: Left arm, BP Patient Position: Sitting) (!) 57 97.6 °F (36.4 °C) (Oral) 18 5' 3\" (1.6 m) 246 lb (111.6 kg) SpO2 BMI OB Status Smoking Status 98% 43.58 kg/m2 Hysterectomy Former Smoker Vitals History BMI and BSA Data Body Mass Index Body Surface Area 43.58 kg/m 2 2.23 m 2 Preferred Pharmacy Pharmacy Name Phone [address], [address] [place] [address] [phone] Your Updated Medication List  
  
   
This list is accurate as of: 9/6/17  4:58 PM.  Always use your most recent med list.  
  
  
  
  
 aspirin 81 mg tablet Take 81 mg by mouth daily. atorvastatin 20 mg tablet Commonly known as:  LIPITOR Take 1 Tab by mouth daily. cpap machine kit Take  by inhalation every evening. Used when sleeping DEXILANT 60 mg Cpdb Generic drug:  Dexlansoprazole Take 60 mg by mouth daily as needed. furosemide 20 mg tablet Commonly known as:  LASIX Take one tablet daily. Indications: Edema  
  
 losartan 50 mg tablet Commonly known as:  COZAAR Take 2 tablets by mouth once daily. Indications: hypertension MULTI-VITAMIN PO Take 1 Tab by mouth daily. olopatadine 0.1 % ophthalmic solution Commonly known as:  PATANOL Administer 2 Drops to both eyes two (2) times a day. Indications: Allergic Conjunctivitis  
  
 potassium chloride 10 mEq tablet Commonly known as:  KLOR-CON M10 Take 2 Tabs by mouth two (2) times a day. spironolactone 25 mg tablet Commonly known as:  ALDACTONE Take 1 Tab by mouth daily. Pt states she takes the OOD. VITAMIN D3 1,000 unit tablet Generic drug:  cholecalciferol Take 2,000 Units by mouth daily. We Performed the Following AMB POC URINALYSIS DIP STICK AUTO W/O MICRO [84617 CPT(R)] Patient Instructions Medicare Wellness Visit, Female The best way to live healthy is to have a healthy lifestyle by eating a well-balanced diet, exercising regularly, limiting alcohol and stopping smoking.  
 
Regular physical exams and screening tests are another way to keep healthy. Preventive exams provided by your health care provider can find health problems before they become diseases or illnesses. Preventive services including immunizations, screening tests, monitoring and exams can help you take care of your own health. All people over age 72 should have a pneumovax  and and a prevnar shot to prevent pneumonia. These are once in a lifetime unless you and your provider decide differently. All people over 65 should have a yearly flu shot and a tetanus vaccine every 10 years. A bone mass density to screen for osteoporosis or thinning of the bones should be done every 2 years after 65. Screening for diabetes mellitus with a blood sugar test should be done every year. Glaucoma is a disease of the eye due to increased ocular pressure that can lead to blindness and it should be done every year by an eye professional. 
 
Cardiovascular screening tests that check for elevated lipids (fatty part of blood) which can lead to heart disease and strokes should be done every 5 years. Colorectal screening that evaluates for blood or polyps in your colon should be done yearly as a stool test or every five years as a flexible sigmoidoscope or every 10 years as a colonoscopy up to age 76. Breast cancer screening with a mammogram is recommended biennially  for women age 54-69. Screening for cervical cancer with a pap smear and pelvic exam is recommended for women after age 72 years every 2 years up to age 79 or when the provider and patient decide to stop. If there is a history of cervical abnormalities or other increased risk for cancer then the test is recommended yearly. Hepatitis C screening is also recommended for anyone born between 80 through [address]. A shingles vaccine is also recommended once in a lifetime after age 61. Your Medicare Wellness Exam is recommended annually. Here is a list of your current Health Maintenance items with a due date: Health Maintenance Due Topic Date Due  
 DTaP/Tdap/Td  (1 - Tdap) 02/10/1964  Shingles Vaccine  12/10/2002 24 [facility] Annual Well Visit  02/10/2008  Pneumococcal Vaccine (2 of 2 - PPSV23) 01/01/2013 [facility] Eye Exam  07/08/2014 [facility] Diabetic Foot Care  10/30/2014  Flu Vaccine  08/01/2017 Medicare Part B Preventive Services Limitations Recommendation Scheduled Bone Mass Measurement 
(age 72 & older, biennial) Requires diagnosis related to osteoporosis or estrogen deficiency. Biennial benefit unless patient has history of long-term glucocorticoid tx or baseline is needed because initial test was by other method  7- Cardiovascular Screening Blood Tests (every 5 years) Total cholesterol, HDL, Triglycerides Order as a panel if possible  6- Colorectal Cancer Screening 
-Fecal occult blood test (annual) -Flexible sigmoidoscopy (5y) 
-Screening colonoscopy (10y) -Barium Enema   4-12-13 Counseling to Prevent Tobacco Use (up to 8 sessions per year) - Counseling greater than 3 and up to 10 minutes - Counseling greater than 10 minutes Patients must be asymptomatic of tobacco-related conditions to receive as preventive service  n/a Diabetes Screening Tests (at least every 3 years, Medicare covers annually or at 6-month intervals for prediabetic patients) Fasting blood sugar (FBS) or glucose tolerance test (GTT) Patient must be diagnosed with one of the following: 
-Hypertension, Dyslipidemia, obesity, previous impaired FBS or GTT 
Or any two of the following: overweight, FH of diabetes, age ? 72, history of gestational diabetes, birth of baby weighing more than 9 pounds  n/a Diabetes Self-Management Training (DSMT) (no USPSTF recommendation) Requires referral by treating physician for patient with diabetes or renal disease. 10 hours of initial DSMT session of no less than 30 minutes each in a continuous 12-month period. 2 hours of follow-up DSMT in subsequent years.   n/a  
 Glaucoma Screening (no USPSTF recommendation) Diabetes mellitus, family history, , age 48 or over,  American, age 72 or over  7-8-2014 Human Immunodeficiency Virus (HIV) Screening (annually for increased risk patients) HIV-1 and HIV-2 by EIA, MARYSOL, rapid antibody test, or oral mucosa transudate Patient must be at increased risk for HIV infection per USPSTF guidelines or pregnant. Tests covered annually for patients at increased risk. Pregnant patients may receive up to 3 test during pregnancy. n/a Medical Nutrition Therapy (MNT) (for diabetes or renal disease not recommended schedule) Requires referral by treating physician for patient with diabetes or renal disease. Can be provided in same year as diabetes self-management training (DSMT), and CMS recommends medical nutrition therapy take place after DSMT. Up to 3 hours for initial year and 2 hours in subsequent years. n/a Shingles Vaccination A shingles vaccine is also recommended once in a lifetime after age 61 Seasonal Influenza Vaccination (annually) Pneumococcal Vaccination (once after 65) Hepatitis B Vaccinations (if medium/high risk) Medium/high risk factors:  End-stage renal disease, Hemophiliacs who received Factor VIII or IX concentrates, Clients of institutions for the mentally retarded, Persons who live in the same house as a HepB virus carrier, Homosexual men, Illicit injectable drug abusers. Screening Mammography (biennial age 54-69) Annually (age 36 or over)  n/a Screening Pap Tests and Pelvic Examination (up to age 79 and after 79 if unknown history or abnormal study last 10 years) Every 24 months except high risk  n/a Ultrasound Screening for Abdominal Aortic Aneurysm (AAA) (once) Patient must be referred through IPPE and not have had a screening for abdominal aortic aneurysm before under Medicare.   Limited to patients who meet one of the following criteria: 
 - Men who are 73-68 years old and have smoked more than 100 cigarettes in their lifetime. 
-Anyone with a FH of AAA 
-Anyone recommended for screening by USPSTF  n/a Nutrition Tips for Diabetes: After Your Visit Your Care Instructions A healthy diet is important to manage diabetes. It helps you lose weight (if you need to) and keep it off. It gives you the nutrition and energy your body needs and helps prevent heart disease. But a diet for diabetes does not mean that you have to eat special foods. You can eat what your family eats, including occasional sweets and other favorites. But you do have to pay attention to how often you eat and how much you eat of certain foods. The right plan for you will give you meals that help you keep your blood sugar at healthy levels. Try to eat a variety of foods and to spread carbohydrate throughout the day. Carbohydrate raises blood sugar higher and more quickly than any other nutrient does. Carbohydrate is found in sugar, breads and cereals, fruit, starchy vegetables such as potatoes and corn, and milk and yogurt. You may want to work with a dietitian or diabetes educator to help you plan meals and snacks. A dietitian or diabetes educator also can help you lose weight if that is one of your goals. The following tips can help you enjoy your meals and stay healthy. Follow-up care is a key part of your treatment and safety. Be sure to make and go to all appointments, and call your doctor if you are having problems. Its also a good idea to know your test results and keep a list of the medicines you take. How can you care for yourself at home? · Learn which foods have carbohydrate and how much carbohydrate to eat. A dietitian or diabetes educator can help you learn to keep track of how much carbohydrate you eat. · Spread carbohydrate throughout the day. Eat some carbohydrate at all meals, but do not eat too much at any one time. · Plan meals to include food from all the food groups. These are the food groups and some example portion sizes: ¨ Grains: 1 slice of bread (1 ounce), ½ cup of cooked cereal, and 1/3 cup of cooked pasta or rice. These have about 15 grams of carbohydrate in a serving. Choose whole grains such as whole wheat bread or crackers, oatmeal, and brown rice more often than refined grains. ¨ Fruit: 1 small fresh fruit, such as an apple or orange; ½ of a banana; ½ cup of chopped, cooked, or canned fruit; ½ cup of fruit juice; 1 cup of melon or raspberries; and 2 tablespoons of dried fruit. These have about 15 grams of carbohydrate in a serving. ¨ Dairy: 1 cup of nonfat or low-fat milk and 2/3 cup of plain yogurt. These have about 15 grams of carbohydrate in a serving. ¨ Protein foods: Beef, chicken, turkey, fish, eggs, tofu, cheese, cottage cheese, and peanut butter. A serving size of meat is 3 ounces, which is about the size of a deck of cards. Examples of meat substitute serving sizes (equal to 1 ounce of meat) are 1/4 cup of cottage cheese, 1 egg, 1 tablespoon of peanut butter, and ½ cup of tofu. These have very little or no carbohydrate per serving. ¨ Vegetables: Starchy vegetables such as ½ cup of cooked dried beans, peas, potatoes, or corn have about 15 grams of carbohydrate. Nonstarchy vegetables have very little carbohydrate, such as 1 cup of raw leafy vegetables (such as spinach), ½ cup of other vegetables (cooked or chopped), and 3/4 cup of vegetable juice. · Use the plate format to plan meals. It is a good, quick way to make sure that you have a balanced meal. It also helps you spread carbohydrate throughout the day. You divide your plate by types of foods. Put vegetables on half the plate, meat or meat substitutes on one-quarter of the plate, and a grain or starchy vegetable (such as brown rice or a potato) in the final quarter of the plate.  To this you can add a small piece of fruit and 1 cup of milk or yogurt, depending on how much carbohydrate you are supposed to eat at a meal. 
· Talk to your dietitian or diabetes educator about ways to add limited amounts of sweets into your meal plan. You can eat these foods now and then, as long as you include the amount of carbohydrate they have in your daily carbohydrate allowance. · If you drink alcohol, limit it to no more than 1 drink a day for women and 2 drinks a day for men. If you are pregnant, no amount of alcohol is known to be safe. · Protein, fat, and fiber do not raise blood sugar as much as carbohydrate does. If you eat a lot of these nutrients in a meal, your blood sugar will rise more slowly than it would otherwise. · Limit saturated fats, such as those from meat and dairy products. Try to replace it with monounsaturated fat, such as olive oil. This is a healthier choice because people who have diabetes are at higher-than-average risk of heart disease. But use a modest amount of olive oil. A tablespoon of olive oil has 14 grams of fat and 120 calories. · Exercise lowers blood sugar. If you take insulin by shots or pump, you can use less than you would if you were not exercising. Keep in mind that timing matters. If you exercise within 1 hour after a meal, your body may need less insulin for that meal than it would if you exercised 3 hours after the meal. Test your blood sugar to find out how exercise affects your need for insulin. · Exercise on most days of the week. Aim for at least 30 minutes. Exercise helps you stay at a healthy weight and helps your body use insulin. Walking is an easy way to get exercise. Gradually increase the amount you walk every day. You also may want to swim, bike, or do other activities. When you eat out · Learn to estimate the serving sizes of foods that have carbohydrate. If you measure food at home, it will be easier to estimate the amount in a serving of restaurant food. · If the meal you order has too much carbohydrate (such as potatoes, corn, or baked beans), ask to have a low-carbohydrate food instead. Ask for a salad or green vegetables. · If you use insulin, check your blood sugar before and after eating out to help you plan how much to eat in the future. · If you eat more carbohydrate at a meal than you had planned, take a walk or do other exercise. This will help lower your blood sugar. Where can you learn more? Go to [employer] Enter X522 in the search box to learn more about \"Nutrition Tips for Diabetes: After Your Visit. \"  
© 0736-0747 Healthwise, [employer]. Care instructions adapted under license by [employer] (which disclaims liability or warranty for this information). This care instruction is for use with your licensed healthcare professional. If you have questions about a medical condition or this instruction, always ask your healthcare professional. [address] any warranty or liability for your use of this information. Content Version: 02.0.208294; Current as of: June 4, 2014 DASH Diet: Care Instructions Your Care Instructions The DASH diet is an eating plan that can help lower your blood pressure. DASH stands for Dietary Approaches to Stop Hypertension. Hypertension is high blood pressure. The DASH diet focuses on eating foods that are high in calcium, potassium, and magnesium. These nutrients can lower blood pressure. The foods that are highest in these nutrients are fruits, vegetables, low-fat dairy products, nuts, seeds, and legumes. But taking calcium, potassium, and magnesium supplements instead of eating foods that are high in those nutrients does not have the same effect. The DASH diet also includes whole grains, fish, and poultry. The DASH diet is one of several lifestyle changes your doctor may recommend to lower your high blood pressure.  Your doctor may also want you to decrease the amount of sodium in your diet. Lowering sodium while following the DASH diet can lower blood pressure even further than just the DASH diet alone. Follow-up care is a key part of your treatment and safety. Be sure to make and go to all appointments, and call your doctor if you are having problems. It's also a good idea to know your test results and keep a list of the medicines you take. How can you care for yourself at home? Following the DASH diet · Eat 4 to 5 servings of fruit each day. A serving is 1 medium-sized piece of fruit, ½ cup chopped or canned fruit, 1/4 cup dried fruit, or 4 ounces (½ cup) of fruit juice. Choose fruit more often than fruit juice. · Eat 4 to 5 servings of vegetables each day. A serving is 1 cup of lettuce or raw leafy vegetables, ½ cup of chopped or cooked vegetables, or 4 ounces (½ cup) of vegetable juice. Choose vegetables more often than vegetable juice. · Get 2 to 3 servings of low-fat and fat-free dairy each day. A serving is 8 ounces of milk, 1 cup of yogurt, or 1 ½ ounces of cheese. · Eat 6 to 8 servings of grains each day. A serving is 1 slice of bread, 1 ounce of dry cereal, or ½ cup of cooked rice, pasta, or cooked cereal. Try to choose whole-grain products as much as possible. · Limit lean meat, poultry, and fish to 2 servings each day. A serving is 3 ounces, about the size of a deck of cards. · Eat 4 to 5 servings of nuts, seeds, and legumes (cooked dried beans, lentils, and split peas) each week. A serving is 1/3 cup of nuts, 2 tablespoons of seeds, or ½ cup of cooked beans or peas. · Limit fats and oils to 2 to 3 servings each day. A serving is 1 teaspoon of vegetable oil or 2 tablespoons of salad dressing. · Limit sweets and added sugars to 5 servings or less a week. A serving is 1 tablespoon jelly or jam, ½ cup sorbet, or 1 cup of lemonade. · Eat less than 2,300 milligrams (mg) of sodium a day.  If you limit your sodium to 1,500 mg a day, you can lower your blood pressure even more. Tips for success · Start small. Do not try to make dramatic changes to your diet all at once. You might feel that you are missing out on your favorite foods and then be more likely to not follow the plan. Make small changes, and stick with them. Once those changes become habit, add a few more changes. · Try some of the following: ¨ Make it a goal to eat a fruit or vegetable at every meal and at snacks. This will make it easy to get the recommended amount of fruits and vegetables each day. ¨ Try yogurt topped with fruit and nuts for a snack or healthy dessert. ¨ Add lettuce, tomato, cucumber, and onion to sandwiches. ¨ Combine a ready-made pizza crust with low-fat mozzarella cheese and lots of vegetable toppings. Try using tomatoes, squash, spinach, broccoli, carrots, cauliflower, and onions. ¨ Have a variety of cut-up vegetables with a low-fat dip as an appetizer instead of chips and dip. ¨ Sprinkle sunflower seeds or chopped almonds over salads. Or try adding chopped walnuts or almonds to cooked vegetables. ¨ Try some vegetarian meals using beans and peas. Add garbanzo or kidney beans to salads. Make burritos and tacos with mashed porras beans or black beans. Where can you learn more? Go to http://sonia-moira.info/. Enter G218 in the search box to learn more about \"DASH Diet: Care Instructions. \" Current as of: April 3, 2017 Content Version: 11.3 © 9910-2174 [employer]. Care instructions adapted under license by [employer] (which disclaims liability or warranty for this information). If you have questions about a medical condition or this instruction, always ask your healthcare professional. [address] any warranty or liability for your use of this information. Introducing [facility]!    
 Dear Heydi Ewing: 
 Thank you for requesting a [email or web address] account. Our records indicate that you already have an active [email or web address] account. You can access your account anytime at [email or web address] Did you know that you can access your hospital and ER discharge instructions at any time in [email or web address]? You can also review all of your test results from your hospital stay or ER visit. Additional Information If you have questions, please visit the Frequently Asked Questions section of the [email or web address] website at [email or web address] Remember, [email or web address] is NOT to be used for urgent needs. For medical emergencies, dial 911. Now available from your iPhone and Android! Please provide this summary of care documentation to your next provider. Your primary care clinician is listed as Edu Llamas. If you have any questions after today's visit, please call [phone].

## 2017-09-06 NOTE — PROGRESS NOTES
Silvano Henry is a  74 y.o. female presents today for office visit for routine follow up. 1. Have you been to the ER, urgent care clinic or hospitalized since your last visit? NO     2. Have you seen or consulted any other health care providers outside of the [address] [place] [address] since your last visit (Include any pap smears or colon screening)? YES Vascular     This is an Initial Medicare Annual Wellness Exam (AWV) (Performed 12 months after IPPE or effective date of Medicare Part B enrollment, Once in a lifetime)    I have reviewed the patient's medical history in detail and updated the computerized patient record. History     Past Medical History:   Diagnosis Date    Atrophic vaginitis     Cardiac cath 05/30/2012    Patent coronary arteries. LVEDP 20.  RA 11.  RV 42/10. PA 42/21. W 18.  CO 6.4 (TD), 6.4 (Simón Counts). PVR 1.7 Wood units. False-positive nuclear study.  Cardiac echocardiogram 05/11/2011    EF 65%. RVSP at least 35 mmHg. Mild IVCE. No significant valvular heart disease.  Cardiac nuclear imaging test 05/18/2012    Tech difficult. Apical ischemia. No infarction. EF 76%. No reg'l WMA. No TID.  Cardiovascular LLE venous duplex 06/17/2013    Left leg:  No DVT.     Diabetes     diet controlled, hypoglycemia from metformin previously     Dyslipidemia     Fatty liver     Fibrocystic breast disease     Fluid retention     GERD     H/O colonoscopy 4/12/13    polyp    Hypertension     Ill-defined condition     gout    Macular degeneration     Morbid obesity ([address]     Obstructive sleep apnea     Peripheral neuropathy ([address]     Rectocele       Past Surgical History:   Procedure Laterality Date    [facility]  [facility]2012    HX COLONOSCOPY  4/12/2013    HX HEART CATHETERIZATION  5/25/2012    HX HERNIA REPAIR      umbilical    HX HYSTERECTOMY      HX MOHS PROCEDURES      right     Current Outpatient Prescriptions   Medication Sig Dispense Refill    potassium chloride (KLOR-CON M10) 10 mEq tablet Take 2 Tabs by mouth two (2) times a day. 180 Tab 1    losartan (COZAAR) 50 mg tablet Take 2 tablets by mouth once daily. Indications: hypertension 180 Tab 1    atorvastatin (LIPITOR) 20 mg tablet Take 1 Tab by mouth daily. 90 Tab 1    spironolactone (ALDACTONE) 25 mg tablet Take 1 Tab by mouth daily. Pt states she takes the OOD. 90 Tab 1    olopatadine (PATANOL) 0.1 % ophthalmic solution Administer 2 Drops to both eyes two (2) times a day. Indications: Allergic Conjunctivitis 6 mL 0    furosemide (LASIX) 20 mg tablet Take one tablet daily. Indications: Edema 30 Tab 1    cholecalciferol (VITAMIN D3) 1,000 unit tablet Take 2,000 Units by mouth daily.  Dexlansoprazole (DEXILANT) 60 mg CpDM Take 60 mg by mouth daily as needed.  cpap machine kit Take  by inhalation every evening. Used when sleeping      aspirin 81 mg Tab Take 81 mg by mouth daily.  MULTIVITAMINS (MULTI-VITAMIN PO) Take 1 Tab by mouth daily.        Allergies   Allergen Reactions    Latex Rash    Nexium [Esomeprazole Magnesium] Swelling and Other (comments)     Lips Swollen, and facial rash and swelling    Crestor [Rosuvastatin] Other (comments)     Upper respiratory illness    Lisinopril Unknown (comments)     Patient can't recall    Niaspan [Niacin] Other (comments)     Scratchy throat, \"asthma\" like symptoms    Pcn [Penicillins] Hives    Pravachol [Pravastatin] Myalgia    Sulfa (Sulfonamide Antibiotics) Rash    Zocor [Simvastatin] Myalgia and Other (comments)     Per patient chart \"(? Rash)\"     Family History   Problem Relation Age of Onset    Cancer Mother      colon cancer    Colon Cancer Mother     Hypertension Mother     Diabetes Mother     Heart Disease Mother     Coronary Artery Disease Mother     Hypertension Father     Diabetes Father     Heart Disease Father     Coronary Artery Disease Father     Hypertension Sister     Diabetes Sister    [facility] Heart Disease Sister     Coronary Artery Disease Sister     Hypertension Brother     Diabetes Brother     Heart Disease Brother     Coronary Artery Disease Brother      Social History   Substance Use Topics    Smoking status: Former Smoker    Smokeless tobacco: Never Used    Alcohol use Yes      Comment: occassionally     Patient Active Problem List   Diagnosis Code    Diabetes E11.9    Hypertension I11.9    Dyslipidemia E78.5    Sleep apnea/ on c-pap G47.30    Renal cyst N28.1    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 Patient states that hearing is intact. Activities of Daily Living  The home contains: no safety equipment  Patient does total self care    Fall RiskFall Risk Assessment, last 12 mths 9/5/2017   Able to walk? Yes   Fall in past 12 months?  No   Fall with injury? -   Number of falls in past 12 months -       Abuse Screen  Patient is not abused    Cognitive Screening   Evaluation of Cognitive Function:  Has your family/caregiver stated any concerns about your memory: no  Normal    Patient Care Team   Patient Care Team:  Emmanuel Rivera PA-C as PCP - General (Physician Assistant)  Alvin Cintron MD (Neurology)  Fredy Campbell MD (Gastroenterology)  Enedina Collado DO (Cardiology)  Yaquelin Seals MD (Endocrinology)    Health Maintenance Due   Topic Date Due    DTaP/Tdap/Td series (1 - Tdap) 02/10/1964 will get next visit    ZOSTER VACCINE AGE 60>  12/10/2002 order given    MEDICARE YEARLY EXAM  02/10/2008 today    Pneumococcal 65+ Low/Medium Risk (2 of 2 - PPSV23) 01/01/2013 today    EYE EXAM RETINAL OR DILATED Q1  07/08/2014 pt states was done, need records    FOOT EXAM Q1  10/30/2014 today    INFLUENZA AGE 9 TO ADULT  08/01/2017 today          Diabetic foot exam performed by Ellis Roe PA-C     Measurement  Response Nurse Comment Physician Comment   Monofilament  R - normal sensation with micro filament  L - normal sensation with micro filament     Pulse DP R - 2+ (normal)  L - 2+ (normal)     Pulse TP R - 2+ (normal)  L - 2+ (normal)     Structural deformity R - None  L - None     Skin Integrity / Deformity R - None  L - None        Reviewed by:      Medicare Part B Preventive Services Limitations Recommendation Scheduled   Bone Mass Measurement  (age 72 & older, biennial) Requires diagnosis related to osteoporosis or estrogen deficiency. Biennial benefit unless patient has history of long-term glucocorticoid tx or baseline is needed because initial test was by other method  7/31/2017   Cardiovascular Screening Blood Tests (every 5 years)  Total cholesterol, HDL, Triglycerides Order as a panel if possible  6/16/2017   Colorectal Cancer Screening  -Fecal occult blood test (annual)  -Flexible sigmoidoscopy (5y)  -Screening colonoscopy (10y)  -Barium Enema   9/6/2017 hemoccult neg   Counseling to Prevent Tobacco Use (up to 8 sessions per year)  - Counseling greater than 3 and up to 10 minutes  - Counseling greater than 10 minutes Patients must be asymptomatic of tobacco-related conditions to receive as preventive service  N/A   Diabetes Screening Tests (at least every 3 years, Medicare covers annually or at 6-month intervals for prediabetic patients)    Fasting blood sugar (FBS) or glucose tolerance test (GTT) Patient must be diagnosed with one of the following:  -Hypertension, Dyslipidemia, obesity, previous impaired FBS or GTT  Or any two of the following: overweight, FH of diabetes, age ? 72, history of gestational diabetes, birth of baby weighing more than 9 pounds  N/a   Diabetes Self-Management Training (DSMT) (no USPSTF recommendation) Requires referral by treating physician for patient with diabetes or renal disease. 10 hours of initial DSMT session of no less than 30 minutes each in a continuous 12-month period. 2 hours of follow-up DSMT in subsequent years. Glaucoma Screening (no USPSTF recommendation) Diabetes mellitus, family history, , age 48 or over,  American, age 72 or over     Human Immunodeficiency Virus (HIV) Screening (annually for increased risk patients)  HIV-1 and HIV-2 by EIA, MARYSOL, rapid antibody test, or oral mucosa transudate Patient must be at increased risk for HIV infection per USPSTF guidelines or pregnant. Tests covered annually for patients at increased risk. Pregnant patients may receive up to 3 test during pregnancy. N/A   Medical Nutrition Therapy (MNT) (for diabetes or renal disease not recommended schedule) Requires referral by treating physician for patient with diabetes or renal disease. Can be provided in same year as diabetes self-management training (DSMT), and CMS recommends medical nutrition therapy take place after DSMT. Up to 3 hours for initial year and 2 hours in subsequent years. n/a   Prostate Cancer Screening (annually up to age 76)  - Digital rectal exam (CHRIS)  - Prostate specific antigen (PSA) Annually (age 48 or over), CHRIS not paid separately when covered E/M service is provided on same date  n/A   Seasonal Influenza Vaccination (annually)   9/6/2017   Pneumococcal Vaccination (once after 72)   23   9/6/2017   Hepatitis B Vaccinations (if medium/high risk) Medium/high risk factors:  End-stage renal disease,  Hemophiliacs who received Factor VIII or IX concentrates, Clients of institutions for the mentally retarded, Persons who live in the same house as a HepB virus carrier, Homosexual men, Illicit injectable drug abusers. n/a   Screening Mammography (biennial age 54-69)?  Annually (age 36 or over) Screening Pap Tests and Pelvic Examination (up to age 79 and after 79 if unknown history or abnormal study last 10 years) Every 25 months except high risk  N/a d/t age   Ultrasound Screening for Abdominal Aortic Aneurysm (AAA) (once) Patient must be referred through [facility] and not have had a screening for abdominal aortic aneurysm before under Medicare. Limited to patients who meet one of the following criteria:  - Men who are 73-68 years old and have smoked more than 100 cigarettes in their lifetime.  -Anyone with a FH of AAA  -Anyone recommended for screening by USPSTF  n/a       Breasts: breasts appear normal, no suspicious masses, no skin or nipple changes or axillary nodes. Education and counseling provided:  Are appropriate based on today's review and evaluation    Diagnoses and all orders for this visit:     Medicare annual wellness visit, subsequent    Patient will come back for vaccinations as she is worried about feeling poorly over the weekend for which she has an event planned. ACP packet given to patient for her to return to office next visit. Answered all of patient's questions.       Marilin Loera MPA, PA-C  [facility] [employer]

## 2017-09-06 NOTE — PATIENT INSTRUCTIONS
Medicare Wellness Visit, Female    The best way to live healthy is to have a healthy lifestyle by eating a well-balanced diet, exercising regularly, limiting alcohol and stopping smoking. Regular physical exams and screening tests are another way to keep healthy. Preventive exams provided by your health care provider can find health problems before they become diseases or illnesses. Preventive services including immunizations, screening tests, monitoring and exams can help you take care of your own health. All people over age 72 should have a pneumovax  and and a prevnar shot to prevent pneumonia. These are once in a lifetime unless you and your provider decide differently. All people over 65 should have a yearly flu shot and a tetanus vaccine every 10 years. A bone mass density to screen for osteoporosis or thinning of the bones should be done every 2 years after 65. Screening for diabetes mellitus with a blood sugar test should be done every year. Glaucoma is a disease of the eye due to increased ocular pressure that can lead to blindness and it should be done every year by an eye professional.    Cardiovascular screening tests that check for elevated lipids (fatty part of blood) which can lead to heart disease and strokes should be done every 5 years. Colorectal screening that evaluates for blood or polyps in your colon should be done yearly as a stool test or every five years as a flexible sigmoidoscope or every 10 years as a colonoscopy up to age 76. Breast cancer screening with a mammogram is recommended biennially  for women age 54-69. Screening for cervical cancer with a pap smear and pelvic exam is recommended for women after age 72 years every 2 years up to age 79 or when the provider and patient decide to stop. If there is a history of cervical abnormalities or other increased risk for cancer then the test is recommended yearly.     Hepatitis C screening is also recommended for anyone born between 80 through [facility]. A shingles vaccine is also recommended once in a lifetime after age 61. Your Medicare Wellness Exam is recommended annually. Here is a list of your current Health Maintenance items with a due date:  Health Maintenance Due   Topic Date Due    DTaP/Tdap/Td  (1 - Tdap) 02/10/1964    Shingles Vaccine  12/10/2002    Annual Well Visit  02/10/2008    Pneumococcal Vaccine (2 of 2 - PPSV23) 01/01/2013    Eye Exam  07/08/2014    Diabetic Foot Care  10/30/2014    Flu Vaccine  08/01/2017       Medicare Part B Preventive Services Limitations Recommendation Scheduled   Bone Mass Measurement  (age 72 & older, biennial) Requires diagnosis related to osteoporosis or estrogen deficiency. Biennial benefit unless patient has history of long-term glucocorticoid tx or baseline is needed because initial test was by other method  7-   Cardiovascular Screening Blood Tests (every 5 years)  Total cholesterol, HDL, Triglycerides Order as a panel if possible  6-   Colorectal Cancer Screening  -Fecal occult blood test (annual)  -Flexible sigmoidoscopy (5y)  -Screening colonoscopy (10y)  -Barium Enema   4-12-13   Counseling to Prevent Tobacco Use (up to 8 sessions per year)  - Counseling greater than 3 and up to 10 minutes  - Counseling greater than 10 minutes Patients must be asymptomatic of tobacco-related conditions to receive as preventive service  n/a   Diabetes Screening Tests (at least every 3 years, Medicare covers annually or at 6-month intervals for prediabetic patients)    Fasting blood sugar (FBS) or glucose tolerance test (GTT) Patient must be diagnosed with one of the following:  -Hypertension, Dyslipidemia, obesity, previous impaired FBS or GTT  Or any two of the following: overweight, FH of diabetes, age ? 72, history of gestational diabetes, birth of baby weighing more than 9 pounds  n/a   Diabetes Self-Management Training (DSMT) (no USPSTF recommendation) Requires referral by treating physician for patient with diabetes or renal disease. 10 hours of initial DSMT session of no less than 30 minutes each in a continuous 12-month period. 2 hours of follow-up DSMT in subsequent years. n/a   Glaucoma Screening (no USPSTF recommendation) Diabetes mellitus, family history, , age 48 or over,  American, age 72 or over  7-8-2014   Human Immunodeficiency Virus (HIV) Screening (annually for increased risk patients)  HIV-1 and HIV-2 by EIA, MARYSOL, rapid antibody test, or oral mucosa transudate Patient must be at increased risk for HIV infection per USPSTF guidelines or pregnant. Tests covered annually for patients at increased risk. Pregnant patients may receive up to 3 test during pregnancy. n/a   Medical Nutrition Therapy (MNT) (for diabetes or renal disease not recommended schedule) Requires referral by treating physician for patient with diabetes or renal disease. Can be provided in same year as diabetes self-management training (DSMT), and CMS recommends medical nutrition therapy take place after DSMT. Up to 3 hours for initial year and 2 hours in subsequent years. n/a   Shingles Vaccination A shingles vaccine is also recommended once in a lifetime after age 61     Seasonal Influenza Vaccination (annually)      Pneumococcal Vaccination (once after 72)      Hepatitis B Vaccinations (if medium/high risk) Medium/high risk factors:  End-stage renal disease,  Hemophiliacs who received Factor VIII or IX concentrates, Clients of institutions for the mentally retarded, Persons who live in the same house as a HepB virus carrier, Homosexual men, Illicit injectable drug abusers.      Screening Mammography (biennial age 54-69) Annually (age 36 or over)  n/a   Screening Pap Tests and Pelvic Examination (up to age 79 and after 79 if unknown history or abnormal study last 10 years) Every 24 months except high risk  n/a   Ultrasound Screening for Abdominal Aortic Aneurysm (AAA) (once) Patient must be referred through IPPE and not have had a screening for abdominal aortic aneurysm before under Medicare. Limited to patients who meet one of the following criteria:  - Men who are 73-68 years old and have smoked more than 100 cigarettes in their lifetime.  -Anyone with a FH of AAA  -Anyone recommended for screening by USPSTF  n/a       Nutrition Tips for Diabetes: After Your Visit  Your Care Instructions  A healthy diet is important to manage diabetes. It helps you lose weight (if you need to) and keep it off. It gives you the nutrition and energy your body needs and helps prevent heart disease. But a diet for diabetes does not mean that you have to eat special foods. You can eat what your family eats, including occasional sweets and other favorites. But you do have to pay attention to how often you eat and how much you eat of certain foods. The right plan for you will give you meals that help you keep your blood sugar at healthy levels. Try to eat a variety of foods and to spread carbohydrate throughout the day. Carbohydrate raises blood sugar higher and more quickly than any other nutrient does. Carbohydrate is found in sugar, breads and cereals, fruit, starchy vegetables such as potatoes and corn, and milk and yogurt. You may want to work with a dietitian or diabetes educator to help you plan meals and snacks. A dietitian or diabetes educator also can help you lose weight if that is one of your goals. The following tips can help you enjoy your meals and stay healthy. Follow-up care is a key part of your treatment and safety. Be sure to make and go to all appointments, and call your doctor if you are having problems. Its also a good idea to know your test results and keep a list of the medicines you take. How can you care for yourself at home? · Learn which foods have carbohydrate and how much carbohydrate to eat.  A dietitian or diabetes educator can help you learn to keep track of how much carbohydrate you eat. · Spread carbohydrate throughout the day. Eat some carbohydrate at all meals, but do not eat too much at any one time. · Plan meals to include food from all the food groups. These are the food groups and some example portion sizes:  ¨ Grains: 1 slice of bread (1 ounce), ½ cup of cooked cereal, and 1/3 cup of cooked pasta or rice. These have about 15 grams of carbohydrate in a serving. Choose whole grains such as whole wheat bread or crackers, oatmeal, and brown rice more often than refined grains. ¨ Fruit: 1 small fresh fruit, such as an apple or orange; ½ of a banana; ½ cup of chopped, cooked, or canned fruit; ½ cup of fruit juice; 1 cup of melon or raspberries; and 2 tablespoons of dried fruit. These have about 15 grams of carbohydrate in a serving. ¨ Dairy: 1 cup of nonfat or low-fat milk and 2/3 cup of plain yogurt. These have about 15 grams of carbohydrate in a serving. ¨ Protein foods: Beef, chicken, turkey, fish, eggs, tofu, cheese, cottage cheese, and peanut butter. A serving size of meat is 3 ounces, which is about the size of a deck of cards. Examples of meat substitute serving sizes (equal to 1 ounce of meat) are 1/4 cup of cottage cheese, 1 egg, 1 tablespoon of peanut butter, and ½ cup of tofu. These have very little or no carbohydrate per serving. ¨ Vegetables: Starchy vegetables such as ½ cup of cooked dried beans, peas, potatoes, or corn have about 15 grams of carbohydrate. Nonstarchy vegetables have very little carbohydrate, such as 1 cup of raw leafy vegetables (such as spinach), ½ cup of other vegetables (cooked or chopped), and 3/4 cup of vegetable juice. · Use the plate format to plan meals. It is a good, quick way to make sure that you have a balanced meal. It also helps you spread carbohydrate throughout the day. You divide your plate by types of foods.  Put vegetables on half the plate, meat or meat substitutes on one-quarter of the plate, and a grain or starchy vegetable (such as brown rice or a potato) in the final quarter of the plate. To this you can add a small piece of fruit and 1 cup of milk or yogurt, depending on how much carbohydrate you are supposed to eat at a meal.  · Talk to your dietitian or diabetes educator about ways to add limited amounts of sweets into your meal plan. You can eat these foods now and then, as long as you include the amount of carbohydrate they have in your daily carbohydrate allowance. · If you drink alcohol, limit it to no more than 1 drink a day for women and 2 drinks a day for men. If you are pregnant, no amount of alcohol is known to be safe. · Protein, fat, and fiber do not raise blood sugar as much as carbohydrate does. If you eat a lot of these nutrients in a meal, your blood sugar will rise more slowly than it would otherwise. · Limit saturated fats, such as those from meat and dairy products. Try to replace it with monounsaturated fat, such as olive oil. This is a healthier choice because people who have diabetes are at higher-than-average risk of heart disease. But use a modest amount of olive oil. A tablespoon of olive oil has 14 grams of fat and 120 calories. · Exercise lowers blood sugar. If you take insulin by shots or pump, you can use less than you would if you were not exercising. Keep in mind that timing matters. If you exercise within 1 hour after a meal, your body may need less insulin for that meal than it would if you exercised 3 hours after the meal. Test your blood sugar to find out how exercise affects your need for insulin. · Exercise on most days of the week. Aim for at least 30 minutes. Exercise helps you stay at a healthy weight and helps your body use insulin. Walking is an easy way to get exercise. Gradually increase the amount you walk every day. You also may want to swim, bike, or do other activities.   When you eat out  · Learn to estimate the serving sizes of foods that have carbohydrate. If you measure food at home, it will be easier to estimate the amount in a serving of restaurant food. · If the meal you order has too much carbohydrate (such as potatoes, corn, or baked beans), ask to have a low-carbohydrate food instead. Ask for a salad or green vegetables. · If you use insulin, check your blood sugar before and after eating out to help you plan how much to eat in the future. · If you eat more carbohydrate at a meal than you had planned, take a walk or do other exercise. This will help lower your blood sugar. Where can you learn more? Go to Lagiar.be  Enter D133 in the search box to learn more about \"Nutrition Tips for Diabetes: After Your Visit. \"   © 3033-7427 [employer]. Care instructions adapted under license by Alicia [employer] (which disclaims liability or warranty for this information). This care instruction is for use with your licensed healthcare professional. If you have questions about a medical condition or this instruction, always ask your healthcare professional. [place] [address] any warranty or liability for your use of this information. Content Version: 95.5.256085; Current as of: June 4, 2014                 DASH Diet: Care Instructions  Your Care Instructions  The DASH diet is an eating plan that can help lower your blood pressure. DASH stands for Dietary Approaches to Stop Hypertension. Hypertension is high blood pressure. The DASH diet focuses on eating foods that are high in calcium, potassium, and magnesium. These nutrients can lower blood pressure. The foods that are highest in these nutrients are fruits, vegetables, low-fat dairy products, nuts, seeds, and legumes. But taking calcium, potassium, and magnesium supplements instead of eating foods that are high in those nutrients does not have the same effect. The DASH diet also includes whole grains, fish, and poultry.   The DASH diet is one of several lifestyle changes your doctor may recommend to lower your high blood pressure. Your doctor may also want you to decrease the amount of sodium in your diet. Lowering sodium while following the DASH diet can lower blood pressure even further than just the DASH diet alone. Follow-up care is a key part of your treatment and safety. Be sure to make and go to all appointments, and call your doctor if you are having problems. It's also a good idea to know your test results and keep a list of the medicines you take. How can you care for yourself at home? Following the DASH diet  · Eat 4 to 5 servings of fruit each day. A serving is 1 medium-sized piece of fruit, ½ cup chopped or canned fruit, 1/4 cup dried fruit, or 4 ounces (½ cup) of fruit juice. Choose fruit more often than fruit juice. · Eat 4 to 5 servings of vegetables each day. A serving is 1 cup of lettuce or raw leafy vegetables, ½ cup of chopped or cooked vegetables, or 4 ounces (½ cup) of vegetable juice. Choose vegetables more often than vegetable juice. · Get 2 to 3 servings of low-fat and fat-free dairy each day. A serving is 8 ounces of milk, 1 cup of yogurt, or 1 ½ ounces of cheese. · Eat 6 to 8 servings of grains each day. A serving is 1 slice of bread, 1 ounce of dry cereal, or ½ cup of cooked rice, pasta, or cooked cereal. Try to choose whole-grain products as much as possible. · Limit lean meat, poultry, and fish to 2 servings each day. A serving is 3 ounces, about the size of a deck of cards. · Eat 4 to 5 servings of nuts, seeds, and legumes (cooked dried beans, lentils, and split peas) each week. A serving is 1/3 cup of nuts, 2 tablespoons of seeds, or ½ cup of cooked beans or peas. · Limit fats and oils to 2 to 3 servings each day. A serving is 1 teaspoon of vegetable oil or 2 tablespoons of salad dressing. · Limit sweets and added sugars to 5 servings or less a week.  A serving is 1 tablespoon jelly or jam, ½ cup sorbet, or 1 cup of lemonade. · Eat less than 2,300 milligrams (mg) of sodium a day. If you limit your sodium to 1,500 mg a day, you can lower your blood pressure even more. Tips for success  · Start small. Do not try to make dramatic changes to your diet all at once. You might feel that you are missing out on your favorite foods and then be more likely to not follow the plan. Make small changes, and stick with them. Once those changes become habit, add a few more changes. · Try some of the following:  ¨ Make it a goal to eat a fruit or vegetable at every meal and at snacks. This will make it easy to get the recommended amount of fruits and vegetables each day. ¨ Try yogurt topped with fruit and nuts for a snack or healthy dessert. ¨ Add lettuce, tomato, cucumber, and onion to sandwiches. ¨ Combine a ready-made pizza crust with low-fat mozzarella cheese and lots of vegetable toppings. Try using tomatoes, squash, spinach, broccoli, carrots, cauliflower, and onions. ¨ Have a variety of cut-up vegetables with a low-fat dip as an appetizer instead of chips and dip. ¨ Sprinkle sunflower seeds or chopped almonds over salads. Or try adding chopped walnuts or almonds to cooked vegetables. ¨ Try some vegetarian meals using beans and peas. Add garbanzo or kidney beans to salads. Make burritos and tacos with mashed porras beans or black beans. Where can you learn more? Go to http://sonia-moira.info/. Enter G692 in the search box to learn more about \"DASH Diet: Care Instructions. \"  Current as of: April 3, 2017  Content Version: 11.3  © 7906-6046 [employer]. Care instructions adapted under license by [employer] (which disclaims liability or warranty for this information). If you have questions about a medical condition or this instruction, always ask your healthcare professional. [place] [address] any warranty or liability for your use of this information.

## 2017-09-07 DIAGNOSIS — R60.9 PERIPHERAL EDEMA: ICD-10-CM

## 2017-09-07 RX ORDER — FUROSEMIDE 20 MG/1
TABLET ORAL
Qty: 60 TAB | Refills: 1 | Status: SHIPPED | OUTPATIENT
Start: 2017-09-07 | End: 2017-10-12 | Stop reason: SDUPTHER

## 2017-09-12 ENCOUNTER — OFFICE VISIT (OUTPATIENT)
Dept: INTERNAL MEDICINE CLINIC | Age: 72
End: 2017-09-12

## 2017-09-12 ENCOUNTER — OFFICE VISIT (OUTPATIENT)
Dept: VASCULAR SURGERY | Age: 72
End: 2017-09-12

## 2017-09-12 DIAGNOSIS — I83.893 VARICOSE VEINS OF LOWER EXTREMITIES WITH OTHER COMPLICATIONS: ICD-10-CM

## 2017-09-12 DIAGNOSIS — Z23 ENCOUNTER FOR IMMUNIZATION: Primary | ICD-10-CM

## 2017-09-12 DIAGNOSIS — H10.13 ALLERGIC CONJUNCTIVITIS, BILATERAL: ICD-10-CM

## 2017-09-12 DIAGNOSIS — R60.0 BILATERAL LEG EDEMA: ICD-10-CM

## 2017-09-12 NOTE — PROGRESS NOTES
VORB: Administer Flu high dose./Rubi Melgar PA-C  /Servando Kaur ,CTT    Pt received 0.5 Flu vaccine High Doce vaccine 0.5ml in left deltoid. Tolerated well. No signs or symptoms of distress noted. Most current VIS given and consent signed. toms of distress noted. Most current VIS given and consent signed. she was observed for 10 min post injection. There were no reactions observed. Patient received Vaccine information statement in Advance.

## 2017-09-13 ENCOUNTER — TELEPHONE (OUTPATIENT)
Dept: VASCULAR SURGERY | Age: 72
End: 2017-09-13

## 2017-09-13 DIAGNOSIS — M79.671 PAIN IN BOTH FEET: ICD-10-CM

## 2017-09-13 DIAGNOSIS — M79.672 PAIN IN BOTH FEET: ICD-10-CM

## 2017-09-13 DIAGNOSIS — M71.20 BAKER'S CYST, UNSPECIFIED LATERALITY: Primary | ICD-10-CM

## 2017-09-13 RX ORDER — OLOPATADINE HYDROCHLORIDE 1 MG/ML
2 SOLUTION/ DROPS OPHTHALMIC 2 TIMES DAILY
Qty: 6 ML | Refills: 0 | Status: SHIPPED | OUTPATIENT
Start: 2017-09-13 | End: 2018-01-26

## 2017-09-13 NOTE — PROCEDURES
[facility]   *** FINAL REPORT ***    Name: Abdoul Soto  MRN: [id number]       Outpatient  : 10 Feb 1945  HIS Order #: [id number]  [address] [facility] Visit #: [id number]  Date: 12 Sep 2017    TYPE OF TEST: Peripheral Venous Testing    REASON FOR TEST  Limb swelling    Right Leg:-  Deep venous thrombosis:           No  Superficial venous thrombosis:    No  Deep venous insufficiency:        No  Superficial venous insufficiency: No    Left Leg:-  Deep venous thrombosis:           No  Superficial venous thrombosis:    No  Deep venous insufficiency:        Yes  Superficial venous insufficiency: No    Abnormal Valve Closure Times (seconds):    Left Common Femoral:  1.7    Vein Mapping:    Diam.   Depth  (mm)    Right Great Saphenous Vein:    High Thigh:    Mid Thigh:    Low Thigh:    Knee:    High Calf:    Low Calf: Ankle:    Right Small Saphenous Vein:    SPJ:    Mid Calf: Ankle:    Giacomini:  Accessory saph.:  Hickman :  Best :    Left Great Saphenous Vein:    High Thigh:    Mid Thigh:    Low Thigh:    Knee:    High Calf:    Low Calf: Ankle:    Left Small Saphenous Vein:    SPJ:    Mid Calf: Ankle:    Giacomini:  Accessory saph.:  Hickman :  Best :      INTERPRETATION/FINDINGS  Duplex images were obtained using 2-D gray scale, color flow and  spectral doppler analysis. 1. No evidence of lower extremity deep venous thrombosis in the common   femoral, femoral, profunda, popliteal, posterior tibial or peroneal  veins bilaterally. 2. No evidence of superficial venous insufficiency identified in the  great or small saphenous veins at the junction in reverse  trendelenburg. No reflux identified in the remaining levels assessed  of the GSV or SSV's bilaterally. 3. Isolated deep venous reflux in the left common femoral vein. No  other deep venous reflux identified bilaterally. 4. Multiphasic  doppler signals noted in the tibial vessels  bilaterally.   **Incidental finding:  Non vascular cystic masses visualized in the  popliteal fossa bilaterally. The right measures approximately 2.9 cm  long and the left measures 2.8 x 1.1 cm trv. ADDITIONAL COMMENTS    I have personally reviewed the data relevant to the interpretation of  this  study. TECHNOLOGIST: Laura Gilmore RDMS  Signed: 09/12/2017 04:05 PM    PHYSICIAN: Gina Mariano.  Kimi Esteves MD  Signed: 09/13/2017 08:38 AM

## 2017-09-13 NOTE — TELEPHONE ENCOUNTER
----- Message from Arben Woodall sent at 9/12/2017  3:05 PM EDT -----  Regarding: Studies completed today  Pt had a fup with you on September 22, I cancelled this. Patient wants to be called for results.     Thanks

## 2017-09-13 NOTE — TELEPHONE ENCOUNTER
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

## 2017-09-20 PROBLEM — H10.13 ALLERGIC CONJUNCTIVITIS OF BOTH EYES: Status: ACTIVE | Noted: 2017-09-20

## 2017-09-28 ENCOUNTER — TELEPHONE (OUTPATIENT)
Dept: INTERNAL MEDICINE CLINIC | Age: 72
End: 2017-09-28

## 2017-09-28 NOTE — TELEPHONE ENCOUNTER
Patient called in and stated that she had dental work done and now she has an infection. Patient was given the medication clindamycin and wanted to know if it was safe to take. Please advise.

## 2017-10-04 ENCOUNTER — HOSPITAL ENCOUNTER (OUTPATIENT)
Dept: LAB | Age: 72
Discharge: HOME | End: 2017-10-04

## 2017-10-04 ENCOUNTER — LAB ONLY (OUTPATIENT)
Dept: INTERNAL MEDICINE CLINIC | Age: 72
End: 2017-10-04

## 2017-10-04 DIAGNOSIS — E66.01 MORBID OBESITY WITH BMI OF 40.0-44.9, ADULT (HCC): ICD-10-CM

## 2017-10-04 DIAGNOSIS — Z13.820 OSTEOPOROSIS SCREENING: ICD-10-CM

## 2017-10-04 DIAGNOSIS — I11.9 BENIGN HYPERTENSIVE HEART DISEASE WITHOUT HEART FAILURE: ICD-10-CM

## 2017-10-04 DIAGNOSIS — E11.9 TYPE 2 DIABETES MELLITUS WITHOUT COMPLICATION, WITHOUT LONG-TERM CURRENT USE OF INSULIN (HCC): ICD-10-CM

## 2017-10-04 PROBLEM — K44.9 SLIDING HIATAL HERNIA: Status: ACTIVE | Noted: 2017-10-04

## 2017-10-04 PROBLEM — M11.211: Status: ACTIVE | Noted: 2017-10-04

## 2017-10-04 PROBLEM — M15.9 PRIMARY OSTEOARTHRITIS INVOLVING MULTIPLE JOINTS: Status: ACTIVE | Noted: 2017-10-04

## 2017-10-04 PROBLEM — A04.8 H. PYLORI INFECTION: Status: ACTIVE | Noted: 2017-10-04

## 2017-10-04 PROBLEM — M10.031 ACUTE IDIOPATHIC GOUT OF RIGHT WRIST: Status: ACTIVE | Noted: 2017-10-04

## 2017-10-04 PROCEDURE — 99001 SPECIMEN HANDLING PT-LAB: CPT | Performed by: PHYSICIAN ASSISTANT

## 2017-10-05 LAB
25(OH)D3+25(OH)D2 SERPL-MCNC: 30.9 NG/ML (ref 30–100)
ALBUMIN SERPL-MCNC: 4.1 G/DL (ref 3.5–4.8)
ALBUMIN/CREAT UR: ABNORMAL MG/G CREAT (ref 0–30)
ALBUMIN/GLOB SERPL: 1.5 {RATIO} (ref 1.2–2.2)
ALP SERPL-CCNC: 92 IU/L (ref 39–117)
ALT SERPL-CCNC: 10 IU/L (ref 0–32)
AST SERPL-CCNC: 18 IU/L (ref 0–40)
BASOPHILS # BLD AUTO: 0 X10E3/UL (ref 0–0.2)
BASOPHILS NFR BLD AUTO: 0 %
BILIRUB SERPL-MCNC: 0.5 MG/DL (ref 0–1.2)
BUN SERPL-MCNC: 13 MG/DL (ref 8–27)
BUN/CREAT SERPL: 17 (ref 12–28)
CALCIUM SERPL-MCNC: 9.4 MG/DL (ref 8.7–10.3)
CHLORIDE SERPL-SCNC: 97 MMOL/L (ref 96–106)
CHOLEST SERPL-MCNC: 179 MG/DL (ref 100–199)
CO2 SERPL-SCNC: 30 MMOL/L (ref 18–29)
CREAT SERPL-MCNC: 0.76 MG/DL (ref 0.57–1)
CREAT UR-MCNC: 21.6 MG/DL
EOSINOPHIL # BLD AUTO: 0.3 X10E3/UL (ref 0–0.4)
EOSINOPHIL NFR BLD AUTO: 4 %
ERYTHROCYTE [DISTWIDTH] IN BLOOD BY AUTOMATED COUNT: 14.3 % (ref 12.3–15.4)
GLOBULIN SER CALC-MCNC: 2.8 G/DL (ref 1.5–4.5)
GLUCOSE SERPL-MCNC: 91 MG/DL (ref 65–99)
HBA1C MFR BLD: 5.8 % (ref 4.8–5.6)
HCT VFR BLD AUTO: 39.3 % (ref 34–46.6)
HDLC SERPL-MCNC: 71 MG/DL
HGB BLD-MCNC: 12.5 G/DL (ref 11.1–15.9)
IMM GRANULOCYTES # BLD: 0 X10E3/UL (ref 0–0.1)
IMM GRANULOCYTES NFR BLD: 0 %
INTERPRETATION, 910389: NORMAL
LDLC SERPL CALC-MCNC: 95 MG/DL (ref 0–99)
LYMPHOCYTES # BLD AUTO: 2.4 X10E3/UL (ref 0.7–3.1)
LYMPHOCYTES NFR BLD AUTO: 31 %
Lab: NORMAL
MCH RBC QN AUTO: 28.2 PG (ref 26.6–33)
MCHC RBC AUTO-ENTMCNC: 31.8 G/DL (ref 31.5–35.7)
MCV RBC AUTO: 89 FL (ref 79–97)
MICROALBUMIN UR-MCNC: <3 UG/ML
MONOCYTES # BLD AUTO: 0.3 X10E3/UL (ref 0.1–0.9)
MONOCYTES NFR BLD AUTO: 4 %
NEUTROPHILS # BLD AUTO: 4.6 X10E3/UL (ref 1.4–7)
NEUTROPHILS NFR BLD AUTO: 61 %
PLATELET # BLD AUTO: 246 X10E3/UL (ref 150–379)
POTASSIUM SERPL-SCNC: 3.8 MMOL/L (ref 3.5–5.2)
PROT SERPL-MCNC: 6.9 G/DL (ref 6–8.5)
RBC # BLD AUTO: 4.44 X10E6/UL (ref 3.77–5.28)
SODIUM SERPL-SCNC: 141 MMOL/L (ref 134–144)
TRIGL SERPL-MCNC: 67 MG/DL (ref 0–149)
VLDLC SERPL CALC-MCNC: 13 MG/DL (ref 5–40)
WBC # BLD AUTO: 7.6 X10E3/UL (ref 3.4–10.8)

## 2017-10-07 ENCOUNTER — HOSPITAL ENCOUNTER (EMERGENCY)
Age: 72
Discharge: HOME | End: 2017-10-07
Attending: EMERGENCY MEDICINE
Payer: MEDICARE

## 2017-10-07 VITALS
OXYGEN SATURATION: 99 % | TEMPERATURE: 98.6 F | DIASTOLIC BLOOD PRESSURE: 70 MMHG | HEIGHT: 63 IN | BODY MASS INDEX: 40.75 KG/M2 | RESPIRATION RATE: 20 BRPM | WEIGHT: 230 LBS | HEART RATE: 70 BPM | SYSTOLIC BLOOD PRESSURE: 141 MMHG

## 2017-10-07 DIAGNOSIS — J06.9 ACUTE UPPER RESPIRATORY INFECTION: Primary | ICD-10-CM

## 2017-10-07 PROCEDURE — 99282 EMERGENCY DEPT VISIT SF MDM: CPT

## 2017-10-07 NOTE — ED TRIAGE NOTES
Thursday began to have cold sym theraflu not helping. Unable to cough anything up.  Able to speak in full sentances and no SOB noted when ambulating [id number] [id number] on room air

## 2017-10-07 NOTE — DISCHARGE INSTRUCTIONS

## 2017-10-07 NOTE — ED PROVIDER NOTES
Patient is a 74 y.o. female presenting with cough. The history is provided by the patient. Cough     Pearl Bunch is a 74 y.o. female with history of diabetes, GERD presents with c/o cough congestion for the past two days. Denies fever or n/v.     Past Medical History:   Diagnosis Date    Acute idiopathic gout of right wrist 10/4/2017    Atrophic vaginitis     Cardiac cath 05/30/2012    Patent coronary arteries. LVEDP 20.  RA 11.  RV 42/10. PA 42/21. W 18.  CO 6.4 (TD), 6.4 (Gonzalez Kocher). PVR 1.7 Wood units. False-positive nuclear study.  Cardiac echocardiogram 05/11/2011    EF 65%. RVSP at least 35 mmHg. Mild IVCE. No significant valvular heart disease.  Cardiac nuclear imaging test 05/18/2012    Tech difficult. Apical ischemia. No infarction. EF 76%. No reg'l WMA. No TID.  Cardiovascular LLE venous duplex 06/17/2013    Left leg:  No DVT.     Diabetes     diet controlled, hypoglycemia from metformin previously     Dyslipidemia     Fatty liver     Fibrocystic breast disease     Fluid retention     GERD     H/O colonoscopy 4/12/13    polyp    Hypertension     Ill-defined condition     gout    Macular degeneration     Morbid obesity ([address]     Obstructive sleep apnea     Peripheral neuropathy     Rectocele        Past Surgical History:   Procedure Laterality Date    [facility] ARTERIAL ARTERIOTOMY [facility]M  5/25/2012    HX COLONOSCOPY  4/12/2013    HX HEART CATHETERIZATION  5/25/2012    HX HERNIA REPAIR      umbilical    HX HYSTERECTOMY      HX MOHS PROCEDURES      right         Family History:   Problem Relation Age of Onset   Dickey Shaker Cancer Mother      colon cancer    Colon Cancer Mother     Hypertension Mother     Diabetes Mother     Heart Disease Mother     Coronary Artery Disease Mother     Hypertension Father     Diabetes Father     Heart Disease Father     Coronary Artery Disease Father     Hypertension Sister     Diabetes Sister     Heart Disease Sister     Coronary Artery Disease Sister     Hypertension Brother     Diabetes Brother     Heart Disease Brother     Coronary Artery Disease Brother        Social History     Social History    Marital status:      Spouse name: N/A    Number of children: N/A    Years of education: N/A     Occupational History    Not on file. Social History Main Topics    Smoking status: Former Smoker    Smokeless tobacco: Never Used    Alcohol use No      Comment: occassionally    Drug use: No    Sexual activity: No     Other Topics Concern    Not on file     Social History Narrative         ALLERGIES: Latex; Nexium [esomeprazole magnesium]; Crestor [rosuvastatin]; Lisinopril; Niaspan [niacin]; Pcn [penicillins]; Pravachol [pravastatin]; Sulfa (sulfonamide antibiotics); and Zocor [simvastatin]    Review of Systems   Respiratory: Positive for cough. Constitutional:  Denies malaise, fever, chills. Head:  Denies injury. Face:  Denies injury or pain. ENMT: Sinus congestion  Denies sore throat. Neck:  Denies injury or pain. Chest:  Denies injury. Cardiac:  Denies chest pain or palpitations. Respiratory:  Denies wheezing, difficulty breathing, shortness of breath. GI/ABD:  Denies injury, pain, distention, nausea, vomiting, diarrhea. :  Denies injury, pain, dysuria or urgency. Back:  Denies injury or pain. Pelvis:  Denies injury or pain. Extremity/MS:  Denies injury or pain. Neuro:  Denies headache, LOC, dizziness, neurologic symptoms/deficits/paresthesias. Skin: Denies injury, rash, itching or skin changes. Vitals:    10/07/17 1211   BP: 141/70   Pulse: 70   Resp: 20   Temp: 98.6 °F (37 °C)   SpO2: 99%   Weight: 104.3 kg (230 lb)   Height: 5' 3\" (1.6 m)            Physical Exam   Nursing note and vitals reviewed. CONSTITUTIONAL: Alert, in no apparent distress; well-developed; well-nourished. HEAD:  Normocephalic, atraumatic. EYES: PERRL; EOM's intact. ENTM: Nose: No rhinorrhea;  Throat: mucous membranes moist. Posterior pharynx-normal.  Neck:  No JVD, supple without lymphadenopathy. RESP: Chest clear, equal breath sounds. CV: S1 and S2 WNL; No murmurs, gallops or rubs. GI: Abdomen soft and non-tender. No masses or organomegaly. UPPER EXT:  Normal inspection. LOWER EXT: Normal inspection. NEURO: strength 5/5 and sym, sensation intact. SKIN: No rashes; Normal for age and stage. PSYCH:  Alert and oriented, normal affect. MDM  Number of Diagnoses or Management Options  Acute upper respiratory infection:   Diagnosis management comments: DDx:  viral vs bacterial URI, asthma exacerbation, COPD, bronchitis, PNE, PE, allergies, pneumothorax, atypical CP, costochondritis/chest wall pain, HF, MI, TAA/AAA, pericarditis, trauma (cardiac contusion, rib Fx, etc), pleuritic chest pain, pleural effusion, intraabdominal process  IMPRESSION AND MEDICAL DECISION MAKING:  Based upon the patient's presentation with noted HPI and PE, along with the work up done in the emergency department, I believe that the patient has a URI. Will treat and have her follow up with her PCP. The patient will be discharged home. Warning signs of worsening condition were discussed and understood by the patient. Based on patient's age, coexisting illness, exam, and the results of this ED evaluation, the decision to treat as an outpatient was made. Based on the information available at time of discharge, acute pathology requiring immediate intervention was deemed relative unlikely. While it is impossible to completely exclude the possibility of underlying serious disease or worsening of condition, I feel the relative likelihood is extremely low. I discussed this uncertainty with the patient, who understood ED evaluation and treatment and felt comfortable with the outpatient treatment plan. All questions regarding care, test results, and follow up were answered. The patient is stable and appropriate to discharge.  They understand that they should return to the emergency department for any new or worsening symptoms. I stressed the importance of follow up for repeat assessment and possibly further evaluation/treatment. ED Course       Procedures      Vitals:  Patient Vitals for the past 12 hrs:   Temp Pulse Resp BP SpO2   10/07/17 1211 98.6 °F (37 °C) 70 20 141/70 99 %         Medications ordered:   Medications - No data to display      Lab findings:  No results found for this or any previous visit (from the past 12 hour(s)). EKG interpretation by ED Physician:      X-Ray, CT or other radiology findings or impressions:  No orders to display       Progress notes, Consult notes or additional Procedure notes:       Disposition:  Diagnosis:   1. Acute upper respiratory infection        Disposition:   12:56 PM  Pt reevaluated at this time and is resting comfortably in NAD. Discussed results and findings, as well as, diagnosis and plan for discharge. Pt verbalizes understanding and agreement with plan. All questions addressed at this time. Follow-up Information     Follow up With Details Comments Contact Info    Rubi Segovia PA-C Schedule an appointment as soon as possible for a visit in 3 days  [address] [facility] [address]      [id number] [facility] EMERGENCY DEPT  If symptoms worsen [address] [facility]  [phone]           Patient's Medications   Start Taking    No medications on file   Continue Taking    ASPIRIN 81 MG TAB    Take 81 mg by mouth daily. ATORVASTATIN (LIPITOR) 20 MG TABLET    Take 1 Tab by mouth daily. CHOLECALCIFEROL (VITAMIN D3) 1,000 UNIT TABLET    Take 2,000 Units by mouth daily. CPAP MACHINE KIT    Take  by inhalation every evening. Used when sleeping    DEXLANSOPRAZOLE (DEXILANT) 60 MG CPDM    Take 60 mg by mouth daily as needed. FUROSEMIDE (LASIX) 20 MG TABLET    TAKE ONE TABLET DAILY.   INDICATIONS: EDEMA    LOSARTAN (COZAAR) 50 MG TABLET    Take 2 tablets by mouth once daily. Indications: hypertension    MULTIVITAMINS (MULTI-VITAMIN PO)    Take 1 Tab by mouth daily. OLOPATADINE (PATANOL) 0.1 % OPHTHALMIC SOLUTION    Administer 2 Drops to both eyes two (2) times a day. Indications: Allergic Conjunctivitis    POTASSIUM CHLORIDE (KLOR-CON M10) 10 MEQ TABLET    Take 2 Tabs by mouth two (2) times a day. SPIRONOLACTONE (ALDACTONE) 25 MG TABLET    Take 1 Tab by mouth daily. Pt states she takes the OOD.    These Medications have changed    No medications on file   Stop Taking    No medications on file

## 2017-10-09 ENCOUNTER — PATIENT OUTREACH (OUTPATIENT)
Dept: FAMILY MEDICINE CLINIC | Age: 72
End: 2017-10-09

## 2017-10-09 NOTE — PROGRESS NOTES
Patient admitted to [facility] ED, 10/7/17 to 10/7/17 for acute upper respiratory infection. Presenting symptoms:   Cough and congestion for two days  New medications/changes to current medications:  None noted  ED utilization in past 6 months: 2    Contacted patient for ED follow up. Verified 2 patient identifiers. Introduced self, role and reason for call. Patient reports:  I'm doing jose juan much better since I got the Mucinex D that I was instructed to get post discharge from ED. Patient states she is coughing up sputum and is thick and clear  Patient denies:  Chest pain or shortness of breath  ADL's:  Performs independently  DME:   None noted  Support:   Family and friends    Educated patient to monitor and report the following Red flags: fever, shortness of breath, chest pain or any new or concerning symptoms. Patient verbalized understanding of information discussed and is aware of  when to seek medical attention from PCP, urgent care or ED. Instructed to bring all medications or list of medications with her to next appointment. Opportunity to ask questions was provided. Contact information was provided for future reference or further questions. Appointment(s):Joaquín YO on 10/12/17 at [phone]  Patient aware. family will provide transportation.   Will continue to follow

## 2017-10-12 ENCOUNTER — OFFICE VISIT (OUTPATIENT)
Dept: FAMILY MEDICINE CLINIC | Age: 72
End: 2017-10-12

## 2017-10-12 VITALS
RESPIRATION RATE: 18 BRPM | SYSTOLIC BLOOD PRESSURE: 133 MMHG | WEIGHT: 246.4 LBS | TEMPERATURE: 97 F | HEIGHT: 63 IN | HEART RATE: 80 BPM | DIASTOLIC BLOOD PRESSURE: 70 MMHG | OXYGEN SATURATION: 98 % | BODY MASS INDEX: 43.66 KG/M2

## 2017-10-12 DIAGNOSIS — E78.5 DYSLIPIDEMIA: ICD-10-CM

## 2017-10-12 DIAGNOSIS — E11.9 TYPE 2 DIABETES MELLITUS WITHOUT COMPLICATION, WITHOUT LONG-TERM CURRENT USE OF INSULIN (HCC): ICD-10-CM

## 2017-10-12 DIAGNOSIS — Z12.11 COLON CANCER SCREENING: ICD-10-CM

## 2017-10-12 DIAGNOSIS — J06.9 UPPER RESPIRATORY TRACT INFECTION, UNSPECIFIED TYPE: ICD-10-CM

## 2017-10-12 DIAGNOSIS — R06.2 WHEEZING: ICD-10-CM

## 2017-10-12 DIAGNOSIS — I11.9 BENIGN HYPERTENSIVE HEART DISEASE WITHOUT HEART FAILURE: Primary | ICD-10-CM

## 2017-10-12 DIAGNOSIS — R60.9 PERIPHERAL EDEMA: ICD-10-CM

## 2017-10-12 DIAGNOSIS — Z12.39 SCREENING FOR BREAST CANCER: ICD-10-CM

## 2017-10-12 RX ORDER — ALBUTEROL SULFATE 90 UG/1
2 AEROSOL, METERED RESPIRATORY (INHALATION)
Qty: 1 INHALER | Refills: 0 | Status: SHIPPED | OUTPATIENT
Start: 2017-10-12 | End: 2018-01-26

## 2017-10-12 RX ORDER — FUROSEMIDE 20 MG/1
40 TABLET ORAL 2 TIMES DAILY
Qty: 60 TAB | Refills: 1 | Status: SHIPPED | OUTPATIENT
Start: 2017-10-12 | End: 2017-10-16 | Stop reason: SDUPTHER

## 2017-10-12 RX ORDER — CHLORHEXIDINE GLUCONATE 1.2 MG/ML
RINSE ORAL
Refills: 0 | COMMUNITY
Start: 2017-10-05 | End: 2018-01-26

## 2017-10-12 NOTE — MR AVS SNAPSHOT
Visit Information Date & Time Provider Department Dept. Phone Encounter #  
 10/12/2017 10:45 AM Manoj Pena PA-C [address] [place] [address] Road [phone] [id number] Follow-up Instructions Return in about 3 months (around 1/12/2018), or if symptoms worsen or fail to improve, for one week prior for labs, 10/15/2017 for labs, weight. Your Appointments 10/16/2017  3:00 PM  
New Patient with Samia Fisher MD  
914 [facility], [address] and [facility] - [facility] 1 ([address]) Appt Note: / 27 Teresa Kelly, [facility] 100 200 [facility]  
[phone] 27 Nancy Rodriguez  
  
    
 3/27/2018  1:20 PM  
Follow Up with Mireille Hansen DO [facility] 1 ([address] [facility]) Appt Note: 1 year follow up with an EKG  
 [facility] [address] [address] [address] [phone] [address] [place] [address] [address]  [address] Upcoming Health Maintenance Date Due ZOSTER VACCINE AGE 60> 12/10/2002 Pneumococcal 65+ Low/Medium Risk (2 of 2 - PPSV23) 1/1/2013 BREAST CANCER SCRN MAMMOGRAM 8/16/2017 COLONOSCOPY 4/12/2018 HEMOGLOBIN A1C Q6M 4/4/2018 EYE EXAM RETINAL OR DILATED Q1 5/31/2018 FOOT EXAM Q1 9/6/2018 MEDICARE YEARLY EXAM 9/7/2018 MICROALBUMIN Q1 10/4/2018 LIPID PANEL Q1 10/4/2018 GLAUCOMA SCREENING Q2Y 6/7/2019 DTaP/Tdap/Td series (2 - Td) 7/31/2023 Allergies as of 10/12/2017  Review Complete On: 10/12/2017 By: Fadi Keen Severity Noted Reaction Type Reactions Latex    Rash Nexium [Esomeprazole Magnesium] High 12/13/2010    Swelling, Other (comments) Lips Swollen, and facial rash and swelling Crestor [Rosuvastatin]    Other (comments) Upper respiratory illness Lisinopril  05/17/2010   Side Effect Unknown (comments) Patient can't recall Niaspan [Niacin]  09/19/2011    Other (comments) Scratchy throat, \"asthma\" like symptoms Pcn [Penicillins]  12/18/2009    Hives Pravachol [Pravastatin]    Myalgia Sulfa (Sulfonamide Antibiotics)  12/18/2009    Rash Zocor [Simvastatin]    Myalgia, Other (comments) Per patient chart \"(? Rash)\" Current Immunizations  Reviewed on 10/21/2014 Name Date Influenza High Dose Vaccine PF 9/12/2017 Influenza Vaccine 10/21/2014, 10/30/2013 Pneumococcal Vaccine (Unspecified Type) 1/1/2008 Tdap 7/31/2013 12:00 AM  
  
 Not reviewed this visit You Were Diagnosed With   
  
 Codes Comments Benign hypertensive heart disease without heart failure    -  Primary ICD-10-CM: I11.9 ICD-9-CM: 402.10 Wheezing     ICD-10-CM: R06.2 ICD-9-CM: 786.07 Type 2 diabetes mellitus without complication, without long-term current use of insulin (HCC)     ICD-10-CM: E11.9 ICD-9-CM: 250.00 Dyslipidemia     ICD-10-CM: E78.5 ICD-9-CM: 272.4 Peripheral edema     ICD-10-CM: R60.9 ICD-9-CM: 827. 3 Upper respiratory tract infection, unspecified type     ICD-10-CM: J06.9 ICD-9-CM: 465.9 Vitals BP Pulse Temp Resp Height(growth percentile) Weight(growth percentile) 133/70 (BP 1 Location: Left arm, BP Patient Position: Sitting) 80 97 °F (36.1 °C) (Oral) 18 5' 3\" (1.6 m) 246 lb 6.4 oz (111.8 kg) SpO2 BMI OB Status Smoking Status 98% 43.65 kg/m2 Hysterectomy Former Smoker BMI and BSA Data Body Mass Index Body Surface Area  
 43.65 kg/m 2 2.23 m 2 Preferred Pharmacy Pharmacy Name Phone [address], [address] [place] [address] [phone] Your Updated Medication List  
  
   
This list is accurate as of: 10/12/17 12:18 PM.  Always use your most recent med list.  
  
  
  
  
 albuterol 90 mcg/actuation inhaler Commonly known as:  PROVENTIL HFA, VENTOLIN HFA, PROAIR HFA Take 2 Puffs by inhalation every four (4) hours as needed for Wheezing. aspirin 81 mg tablet Take 81 mg by mouth daily. atorvastatin 20 mg tablet Commonly known as:  LIPITOR Take 1 Tab by mouth daily. chlorhexidine 0.12 % solution Commonly known as:  PERIDEX  
USE AS A MOUTHWASH TWO TIMES A DAY  
  
 cpap machine kit Take  by inhalation every evening. Used when sleeping DEXILANT 60 mg Cpdb Generic drug:  Dexlansoprazole Take 60 mg by mouth daily as needed. furosemide 20 mg tablet Commonly known as:  LASIX Take 2 Tabs by mouth two (2) times a day. losartan 50 mg tablet Commonly known as:  COZAAR Take 2 tablets by mouth once daily. Indications: hypertension MULTI-VITAMIN PO Take 1 Tab by mouth daily. olopatadine 0.1 % ophthalmic solution Commonly known as:  PATANOL Administer 2 Drops to both eyes two (2) times a day. Indications: Allergic Conjunctivitis  
  
 potassium chloride 10 mEq tablet Commonly known as:  KLOR-CON M10 Take 2 Tabs by mouth two (2) times a day. spironolactone 25 mg tablet Commonly known as:  ALDACTONE Take 1 Tab by mouth daily. Pt states she takes the OOD. VITAMIN D3 1,000 unit tablet Generic drug:  cholecalciferol Take 2,000 Units by mouth daily. Prescriptions Sent to Pharmacy Refills  
 furosemide (LASIX) 20 mg tablet 1 Sig: Take 2 Tabs by mouth two (2) times a day. Class: Normal  
 [facility]: [facility] Ph #: [phone] Route: Oral  
 albuterol (PROVENTIL HFA, VENTOLIN HFA, PROAIR HFA) 90 mcg/actuation inhaler 0 Sig: Take 2 Puffs by inhalation every four (4) hours as needed for Wheezing. Class: Normal  
 [facility]: [facility] Ph #: [phone] Route: Inhalation Follow-up Instructions  Return in about 3 months (around 1/12/2018), or if symptoms worsen or fail to improve, for one week prior for labs, 10/15/2017 for labs, weight. To-Do List   
 04/11/2018 Lab:  METABOLIC PANEL, COMPREHENSIVE   
  
 04/12/2018 Lab:  CBC WITH AUTOMATED DIFF Patient Instructions Fluid Restriction: Care Instructions Your Care Instructions A buildup of fluid in the body can cause swelling and pain. Your doctor may suggest that you limit liquids, including foods that contain a lot of liquid. Limiting liquids is called fluid restriction. It will help your body get rid of the extra fluid. Your doctor may also recommend that you cut back on sodium in your diet. Keeping track of the amount of fluids you take in may help you feel better. It may also lower your risk of having to go to the hospital. Your doctor will tell you how much fluid you can have in a day. Follow-up care is a key part of your treatment and safety. Be sure to make and go to all appointments, and call your doctor if you are having problems. It's also a good idea to know your test results and keep a list of the medicines you take. How can you care for yourself at home? · Find a way of tracking the fluids you take in that works for you. Here are two methods you can try: ¨ Write down how much you drink throughout the day. ¨ Keep a container filled with the amount of liquid allowed for the day. As you drink liquids during the day, such as a 6-ounce cup of coffee, pour that same amount out of the container. When the container is empty, you've had your liquid for the day. · Count any foods that will melt (such as ice cream, gelatin, or flavored ice treats) or liquid foods (such as soup) as part of your fluids for the day. Also count the liquid in canned fruits and vegetables as part of your daily intake, or drain them well before serving. · Space your liquids throughout the day. Then you won't be tempted to drink more than the amount your doctor recommends. · To relieve thirst without taking in extra water, try chewing gum, sucking on hard candy (sugarless if you have diabetes), or rinsing your mouth with water and spitting it out. Where can you learn more? Go to http://sonia-moira.info/. Enter A592 in the search box to learn more about \"Fluid Restriction: Care Instructions. \" Current as of: August 16, 2016 Content Version: 11.3 © 4100-1930 [employer]. Care instructions adapted under license by [employer] (which disclaims liability or warranty for this information). If you have questions about a medical condition or this instruction, always ask your healthcare professional. [address] any warranty or liability for your use of this information. Fluid Restriction: Care Instructions Your Care Instructions A buildup of fluid in the body can cause swelling and pain. Your doctor may suggest that you limit liquids, including foods that contain a lot of liquid. Limiting liquids is called fluid restriction. It will help your body get rid of the extra fluid. Your doctor may also recommend that you cut back on sodium in your diet. Keeping track of the amount of fluids you take in may help you feel better. It may also lower your risk of having to go to the hospital. Your doctor will tell you how much fluid you can have in a day. Follow-up care is a key part of your treatment and safety. Be sure to make and go to all appointments, and call your doctor if you are having problems. It's also a good idea to know your test results and keep a list of the medicines you take. How can you care for yourself at home? · Find a way of tracking the fluids you take in that works for you. Here are two methods you can try: ¨ Write down how much you drink throughout the day. ¨ Keep a container filled with the amount of liquid allowed for the day. As you drink liquids during the day, such as a 6-ounce cup of coffee, pour that same amount out of the container. When the container is empty, you've had your liquid for the day. · Count any foods that will melt (such as ice cream, gelatin, or flavored ice treats) or liquid foods (such as soup) as part of your fluids for the day. Also count the liquid in canned fruits and vegetables as part of your daily intake, or drain them well before serving. · Space your liquids throughout the day. Then you won't be tempted to drink more than the amount your doctor recommends. · To relieve thirst without taking in extra water, try chewing gum, sucking on hard candy (sugarless if you have diabetes), or rinsing your mouth with water and spitting it out. Where can you learn more? Go to http://soniaZeusmoira.info/. Enter P546 in the search box to learn more about \"Fluid Restriction: Care Instructions. \" Current as of: August 16, 2016 Content Version: 11.3 © 6419-9843 [employer]. Care instructions adapted under license by [employer] (which disclaims liability or warranty for this information). If you have questions about a medical condition or this instruction, always ask your healthcare professional. [address] any warranty or liability for your use of this information. Limiting Sodium and Fluids With Heart Failure: Care Instructions Your Care Instructions Sodium causes your body to hold on to extra water. This may cause your heart failure symptoms to get worse. Limiting sodium may help you feel better and lower your risk of having to go to the hospital. 
People get most of their sodium from processed foods. Fast food and restaurant meals also tend to be very high in sodium. Your doctor may suggest that you limit sodium to 2,000 milligrams (mg) a day or less.  That is less than 1 teaspoon of salt a day, including all the salt you eat in cooked or packaged foods. Usually, you have to limit the amount of liquids you drink only if your heart failure is severe. Limiting sodium alone often is enough to help your body get rid of extra fluids. However, your doctor may tell you to limit your fluid intake to a set amount each day. Follow-up care is a key part of your treatment and safety. Be sure to make and go to all appointments, and call your doctor if you are having problems. It's also a good idea to know your test results and keep a list of the medicines you take. How can you care for yourself at home? Read food labels · Read food labels on cans and food packages. The labels tell you how much sodium is in each serving. Make sure that you look at the serving size. If you eat more than the serving size, you have eaten more sodium than is listed for one serving. · Food labels also tell you the Percent Daily Value. If the Percent Daily Value says 50%, it means that you will get at least 50% of all the sodium you need for the entire day in one serving. Choose products with low Percent Daily Values for sodium. · Be aware that sodium can come in forms other than salt, including monosodium glutamate (MSG), sodium citrate, and sodium bicarbonate (baking soda). MSG is often added to Asian food. You can sometimes ask for food without MSG or salt. Buy low-sodium foods · Buy foods that are labeled \"unsalted\" (no salt added), \"sodium-free\" (less than 5 mg of sodium per serving), or \"low-sodium\" (less than 140 mg of sodium per serving). A food labeled \"light sodium\" has less than half of the full-sodium version of that food. Foods labeled \"reduced-sodium\" may still have too much sodium. · Buy fresh vegetables or plain, frozen vegetables. Buy low-sodium versions of canned vegetables, soups, and other canned goods. Prepare low-sodium meals · Use less salt each day when cooking.  Reducing salt in this way will help you adjust to the taste. Do not add salt after cooking. Take the salt shaker off the table. · Flavor your food with garlic, lemon juice, onion, vinegar, herbs, and spices instead of salt. Do not use soy sauce, steak sauce, onion salt, garlic salt, mustard, or ketchup on your food. · Make your own salad dressings, sauces, and ketchup without adding salt. · Use less salt (or none) when recipes call for it. You can often use half the salt a recipe calls for without losing flavor. Other dishes like rice, pasta, and grains do not need added salt. · Rinse canned vegetables. This removes somebut not allof the salt. · Avoid water that has a naturally high sodium content or that has been treated with water softeners, which add sodium. Call your local water company to find out the sodium content of your water supply. If you buy bottled water, read the label and choose a sodium-free brand. Avoid high-sodium foods, such as: 
· Smoked, cured, salted, and canned meat, fish, and poultry. · Ham, galvan, hot dogs, and luncheon meats. · Regular, hard, and processed cheese and regular peanut butter. · Crackers with salted tops. · Frozen prepared meals. · Canned and dried soups, broths, and bouillon, unless labeled sodium-free or low-sodium. · Canned vegetables, unless labeled sodium-free or low-sodium. · Salted snack foods such as chips and pretzels. · Western Silva fries, pizza, tacos, and other fast foods. · Pickles, olives, ketchup, and other condiments, especially soy sauce, unless labeled sodium-free or low-sodium. If you cannot cook for yourself · Have family members or friends help you, or have someone cook low-sodium meals. · Check with your local senior nutrition program to find out where meals are served and whether they offer a low-sodium option. You can often find these programs through your local health department or hospital. 
· Have meals delivered to your home.  Most [facility] have a Meals on [employer] program. These programs provide one hot meal a day for older adults, delivered to their homes. Ask whether these meals are low-sodium. Let them know that you are on a low-sodium diet. Limiting fluid intake · Find a method that works for you. You might simply write down how much you drink every time you do. Some people keep a container filled with the amount of fluid allowed for that day. If they drink from a source other than the container, then they pour out that amount. · Measure your regular drinking glasses to find out how much fluid each one holds. Once you know this, you will not have to measure every time. · Besides water, milk, juices, and other drinks, some foods have a lot of fluid. Count any foods that will melt (such as ice cream or gelatin dessert) or liquid foods (such as soup) as part of your fluid intake for the day. Where can you learn more? Go to http://sonia-moira.info/. Enter A166 in the search box to learn more about \"Limiting Sodium and Fluids With Heart Failure: Care Instructions. \" Current as of: November 15, 2016 Content Version: 11.3 © 8381-4625 path intelligence. Care instructions adapted under license by [employer] (which disclaims liability or warranty for this information). If you have questions about a medical condition or this instruction, always ask your healthcare professional. [place] [address] any warranty or liability for your use of this information. Fluid Restriction: Care Instructions Your Care Instructions A buildup of fluid in the body can cause swelling and pain. Your doctor may suggest that you limit liquids, including foods that contain a lot of liquid. Limiting liquids is called fluid restriction. It will help your body get rid of the extra fluid. Your doctor may also recommend that you cut back on sodium in your diet.  
Keeping track of the amount of fluids you take in may help you feel better. It may also lower your risk of having to go to the hospital. Your doctor will tell you how much fluid you can have in a day. Follow-up care is a key part of your treatment and safety. Be sure to make and go to all appointments, and call your doctor if you are having problems. It's also a good idea to know your test results and keep a list of the medicines you take. How can you care for yourself at home? · Find a way of tracking the fluids you take in that works for you. Here are two methods you can try: ¨ Write down how much you drink throughout the day. ¨ Keep a container filled with the amount of liquid allowed for the day. As you drink liquids during the day, such as a 6-ounce cup of coffee, pour that same amount out of the container. When the container is empty, you've had your liquid for the day. · Count any foods that will melt (such as ice cream, gelatin, or flavored ice treats) or liquid foods (such as soup) as part of your fluids for the day. Also count the liquid in canned fruits and vegetables as part of your daily intake, or drain them well before serving. · Space your liquids throughout the day. Then you won't be tempted to drink more than the amount your doctor recommends. · To relieve thirst without taking in extra water, try chewing gum, sucking on hard candy (sugarless if you have diabetes), or rinsing your mouth with water and spitting it out. Where can you learn more? Go to http://sonia-moira.info/. Enter Y891 in the search box to learn more about \"Fluid Restriction: Care Instructions. \" Current as of: August 16, 2016 Content Version: 11.3 © 8747-7718 [employer]. Care instructions adapted under license by [employer] (which disclaims liability or warranty for this information).  If you have questions about a medical condition or this instruction, always ask your healthcare professional. [employer] disclaims any warranty or liability for your use of this information. Introducing [facility]! Dear Pablito Nguyen: Thank you for requesting a [employer] account. Our records indicate that you already have an active [employer] account. You can access your account anytime at [email or web address] Did you know that you can access your hospital and ER discharge instructions at any time in [employer]? You can also review all of your test results from your hospital stay or ER visit. Additional Information If you have questions, please visit the Frequently Asked Questions section of the [employer] website at [email or web address] Remember, [employer] is NOT to be used for urgent needs. For medical emergencies, dial 911. Now available from your iPhone and Android! Please provide this summary of care documentation to your next provider. Your primary care clinician is listed as Kenji Porter. If you have any questions after today's visit, please call [phone].

## 2017-10-12 NOTE — PROGRESS NOTES
Murray Parekh is a  74 y.o. female presents today for office visit for routine follow up. 1. Have you been to the ER, urgent care clinic or hospitalized since your last visit? YES [facility] (ED) DR Yordan Patel     2. Have you seen or consulted any other health care providers outside of the [address] [place] [address] since your last visit (Include any pap smears or colon screening)? YES for [facility]

## 2017-10-12 NOTE — PATIENT INSTRUCTIONS
Fluid Restriction: Care Instructions  Your Care Instructions  A buildup of fluid in the body can cause swelling and pain. Your doctor may suggest that you limit liquids, including foods that contain a lot of liquid. Limiting liquids is called fluid restriction. It will help your body get rid of the extra fluid. Your doctor may also recommend that you cut back on sodium in your diet. Keeping track of the amount of fluids you take in may help you feel better. It may also lower your risk of having to go to the hospital. Your doctor will tell you how much fluid you can have in a day. Follow-up care is a key part of your treatment and safety. Be sure to make and go to all appointments, and call your doctor if you are having problems. It's also a good idea to know your test results and keep a list of the medicines you take. How can you care for yourself at home? · Find a way of tracking the fluids you take in that works for you. Here are two methods you can try:  ¨ Write down how much you drink throughout the day. ¨ Keep a container filled with the amount of liquid allowed for the day. As you drink liquids during the day, such as a 6-ounce cup of coffee, pour that same amount out of the container. When the container is empty, you've had your liquid for the day. · Count any foods that will melt (such as ice cream, gelatin, or flavored ice treats) or liquid foods (such as soup) as part of your fluids for the day. Also count the liquid in canned fruits and vegetables as part of your daily intake, or drain them well before serving. · Space your liquids throughout the day. Then you won't be tempted to drink more than the amount your doctor recommends. · To relieve thirst without taking in extra water, try chewing gum, sucking on hard candy (sugarless if you have diabetes), or rinsing your mouth with water and spitting it out. Where can you learn more? Go to http://sonia-moira.info/.   Enter L402 in the search box to learn more about \"Fluid Restriction: Care Instructions. \"  Current as of: August 16, 2016  Content Version: 11.3  © 7502-9457 [employer]. Care instructions adapted under license by [employer] (which disclaims liability or warranty for this information). If you have questions about a medical condition or this instruction, always ask your healthcare professional. [address] any warranty or liability for your use of this information. Fluid Restriction: Care Instructions  Your Care Instructions  A buildup of fluid in the body can cause swelling and pain. Your doctor may suggest that you limit liquids, including foods that contain a lot of liquid. Limiting liquids is called fluid restriction. It will help your body get rid of the extra fluid. Your doctor may also recommend that you cut back on sodium in your diet. Keeping track of the amount of fluids you take in may help you feel better. It may also lower your risk of having to go to the hospital. Your doctor will tell you how much fluid you can have in a day. Follow-up care is a key part of your treatment and safety. Be sure to make and go to all appointments, and call your doctor if you are having problems. It's also a good idea to know your test results and keep a list of the medicines you take. How can you care for yourself at home? · Find a way of tracking the fluids you take in that works for you. Here are two methods you can try:  ¨ Write down how much you drink throughout the day. ¨ Keep a container filled with the amount of liquid allowed for the day. As you drink liquids during the day, such as a 6-ounce cup of coffee, pour that same amount out of the container. When the container is empty, you've had your liquid for the day. · Count any foods that will melt (such as ice cream, gelatin, or flavored ice treats) or liquid foods (such as soup) as part of your fluids for the day. Also count the liquid in canned fruits and vegetables as part of your daily intake, or drain them well before serving. · Space your liquids throughout the day. Then you won't be tempted to drink more than the amount your doctor recommends. · To relieve thirst without taking in extra water, try chewing gum, sucking on hard candy (sugarless if you have diabetes), or rinsing your mouth with water and spitting it out. Where can you learn more? Go to http://sonia-moira.info/. Enter N919 in the search box to learn more about \"Fluid Restriction: Care Instructions. \"  Current as of: August 16, 2016  Content Version: 11.3  © 2981-8340 [employer]. Care instructions adapted under license by [employer] (which disclaims liability or warranty for this information). If you have questions about a medical condition or this instruction, always ask your healthcare professional. [place] [address] any warranty or liability for your use of this information. Limiting Sodium and Fluids With Heart Failure: Care Instructions  Your Care Instructions  Sodium causes your body to hold on to extra water. This may cause your heart failure symptoms to get worse. Limiting sodium may help you feel better and lower your risk of having to go to the hospital.  People get most of their sodium from processed foods. Fast food and restaurant meals also tend to be very high in sodium. Your doctor may suggest that you limit sodium to 2,000 milligrams (mg) a day or less. That is less than 1 teaspoon of salt a day, including all the salt you eat in cooked or packaged foods. Usually, you have to limit the amount of liquids you drink only if your heart failure is severe. Limiting sodium alone often is enough to help your body get rid of extra fluids. However, your doctor may tell you to limit your fluid intake to a set amount each day.   Follow-up care is a key part of your treatment and safety. Be sure to make and go to all appointments, and call your doctor if you are having problems. It's also a good idea to know your test results and keep a list of the medicines you take. How can you care for yourself at home? Read food labels  · Read food labels on cans and food packages. The labels tell you how much sodium is in each serving. Make sure that you look at the serving size. If you eat more than the serving size, you have eaten more sodium than is listed for one serving. · Food labels also tell you the Percent Daily Value. If the Percent Daily Value says 50%, it means that you will get at least 50% of all the sodium you need for the entire day in one serving. Choose products with low Percent Daily Values for sodium. · Be aware that sodium can come in forms other than salt, including monosodium glutamate (MSG), sodium citrate, and sodium bicarbonate (baking soda). MSG is often added to Asian food. You can sometimes ask for food without MSG or salt. Buy low-sodium foods  · Buy foods that are labeled \"unsalted\" (no salt added), \"sodium-free\" (less than 5 mg of sodium per serving), or \"low-sodium\" (less than 140 mg of sodium per serving). A food labeled \"light sodium\" has less than half of the full-sodium version of that food. Foods labeled \"reduced-sodium\" may still have too much sodium. · Buy fresh vegetables or plain, frozen vegetables. Buy low-sodium versions of canned vegetables, soups, and other canned goods. Prepare low-sodium meals  · Use less salt each day when cooking. Reducing salt in this way will help you adjust to the taste. Do not add salt after cooking. Take the salt shaker off the table. · Flavor your food with garlic, lemon juice, onion, vinegar, herbs, and spices instead of salt. Do not use soy sauce, steak sauce, onion salt, garlic salt, mustard, or ketchup on your food. · Make your own salad dressings, sauces, and ketchup without adding salt.   · Use less salt (or none) when recipes call for it. You can often use half the salt a recipe calls for without losing flavor. Other dishes like rice, pasta, and grains do not need added salt. · Rinse canned vegetables. This removes some--but not all--of the salt. · Avoid water that has a naturally high sodium content or that has been treated with water softeners, which add sodium. Call your local water company to find out the sodium content of your water supply. If you buy bottled water, read the label and choose a sodium-free brand. Avoid high-sodium foods, such as:  · Smoked, cured, salted, and canned meat, fish, and poultry. · Ham, galvan, hot dogs, and luncheon meats. · Regular, hard, and processed cheese and regular peanut butter. · Crackers with salted tops. · Frozen prepared meals. · Canned and dried soups, broths, and bouillon, unless labeled sodium-free or low-sodium. · Canned vegetables, unless labeled sodium-free or low-sodium. · Salted snack foods such as chips and pretzels. · Western Silva fries, pizza, tacos, and other fast foods. · Pickles, olives, ketchup, and other condiments, especially soy sauce, unless labeled sodium-free or low-sodium. If you cannot cook for yourself  · Have family members or friends help you, or have someone cook low-sodium meals. · Check with your local senior nutrition program to find out where meals are served and whether they offer a low-sodium option. You can often find these programs through your local health department or hospital.  · Have meals delivered to your home. Most [facility] have a Meals on [employer]. These programs provide one hot meal a day for older adults, delivered to their homes. Ask whether these meals are low-sodium. Let them know that you are on a low-sodium diet. Limiting fluid intake  · Find a method that works for you. You might simply write down how much you drink every time you do. Some people keep a container filled with the amount of fluid allowed for that day. If they drink from a source other than the container, then they pour out that amount. · Measure your regular drinking glasses to find out how much fluid each one holds. Once you know this, you will not have to measure every time. · Besides water, milk, juices, and other drinks, some foods have a lot of fluid. Count any foods that will melt (such as ice cream or gelatin dessert) or liquid foods (such as soup) as part of your fluid intake for the day. Where can you learn more? Go to http://sonia-moira.info/. Enter A166 in the search box to learn more about \"Limiting Sodium and Fluids With Heart Failure: Care Instructions. \"  Current as of: November 15, 2016  Content Version: 11.3  © 2780-7786 [employer]. Care instructions adapted under license by [employer] (which disclaims liability or warranty for this information). If you have questions about a medical condition or this instruction, always ask your healthcare professional. [place] [address] any warranty or liability for your use of this information. Fluid Restriction: Care Instructions  Your Care Instructions  A buildup of fluid in the body can cause swelling and pain. Your doctor may suggest that you limit liquids, including foods that contain a lot of liquid. Limiting liquids is called fluid restriction. It will help your body get rid of the extra fluid. Your doctor may also recommend that you cut back on sodium in your diet. Keeping track of the amount of fluids you take in may help you feel better. It may also lower your risk of having to go to the hospital. Your doctor will tell you how much fluid you can have in a day. Follow-up care is a key part of your treatment and safety. Be sure to make and go to all appointments, and call your doctor if you are having problems. It's also a good idea to know your test results and keep a list of the medicines you take.   How can you care for yourself at home? · Find a way of tracking the fluids you take in that works for you. Here are two methods you can try:  ¨ Write down how much you drink throughout the day. ¨ Keep a container filled with the amount of liquid allowed for the day. As you drink liquids during the day, such as a 6-ounce cup of coffee, pour that same amount out of the container. When the container is empty, you've had your liquid for the day. · Count any foods that will melt (such as ice cream, gelatin, or flavored ice treats) or liquid foods (such as soup) as part of your fluids for the day. Also count the liquid in canned fruits and vegetables as part of your daily intake, or drain them well before serving. · Space your liquids throughout the day. Then you won't be tempted to drink more than the amount your doctor recommends. · To relieve thirst without taking in extra water, try chewing gum, sucking on hard candy (sugarless if you have diabetes), or rinsing your mouth with water and spitting it out. Where can you learn more? Go to http://sonia-moira.info/. Enter Y662 in the search box to learn more about \"Fluid Restriction: Care Instructions. \"  Current as of: August 16, 2016  Content Version: 11.3  © 6802-8044 [employer]. Care instructions adapted under license by [employer] (which disclaims liability or warranty for this information). If you have questions about a medical condition or this instruction, always ask your healthcare professional. [place] [address] any warranty or liability for your use of this information.

## 2017-10-12 NOTE — PROGRESS NOTES
HPI:    Alvaro Wise  is a 74 y.o.  y/o female  patient who comes in today for follow up and lab results. She was recently seen in ER and diagnosed with URI. She continues to have cough with sputum production, however, it is improving. She has had to use another pillow behind her recently to be able to sleep, stating that she couldn't breathe well. She also has had weight gain in the last five days with increased swelling. Patient denies SOB, CP, dizziness, headache, increased fatigue, MAGDALENO. Current Outpatient Prescriptions   Medication Sig Dispense Refill    chlorhexidine (PERIDEX) 0.12 % solution USE AS A MOUTHWASH TWO TIMES A DAY  0    furosemide (LASIX) 20 mg tablet Take 2 Tabs by mouth two (2) times a day. 60 Tab 1    albuterol (PROVENTIL HFA, VENTOLIN HFA, PROAIR HFA) 90 mcg/actuation inhaler Take 2 Puffs by inhalation every four (4) hours as needed for Wheezing. 1 Inhaler 0    olopatadine (PATANOL) 0.1 % ophthalmic solution Administer 2 Drops to both eyes two (2) times a day. Indications: Allergic Conjunctivitis 6 mL 0    potassium chloride (KLOR-CON M10) 10 mEq tablet Take 2 Tabs by mouth two (2) times a day. 180 Tab 1    losartan (COZAAR) 50 mg tablet Take 2 tablets by mouth once daily. Indications: hypertension 180 Tab 1    atorvastatin (LIPITOR) 20 mg tablet Take 1 Tab by mouth daily. 90 Tab 1    cholecalciferol (VITAMIN D3) 1,000 unit tablet Take 2,000 Units by mouth daily.  Dexlansoprazole (DEXILANT) 60 mg CpDM Take 60 mg by mouth daily as needed.  cpap machine kit Take  by inhalation every evening. Used when sleeping      aspirin 81 mg Tab Take 81 mg by mouth daily.  MULTIVITAMINS (MULTI-VITAMIN PO) Take 1 Tab by mouth daily.  spironolactone (ALDACTONE) 25 mg tablet Take 1 Tab by mouth daily. Pt states she takes the OOD.  90 Tab 1      Allergies   Allergen Reactions    Latex Rash    Nexium [Esomeprazole Magnesium] Swelling and Other (comments)     Lips Swollen, and facial rash and swelling    Crestor [Rosuvastatin] Other (comments)     Upper respiratory illness    Lisinopril Unknown (comments)     Patient can't recall    Niaspan [Niacin] Other (comments)     Scratchy throat, \"asthma\" like symptoms    Pcn [Penicillins] Hives    Pravachol [Pravastatin] Myalgia    Sulfa (Sulfonamide Antibiotics) Rash    Zocor [Simvastatin] Myalgia and Other (comments)     Per patient chart \"(? Rash)\"      Past Medical History:   Diagnosis Date    Acute idiopathic gout of right wrist 10/4/2017    Atrophic vaginitis     Cardiac cath 05/30/2012    Patent coronary arteries. LVEDP 20.  RA 11.  RV 42/10. PA 42/21. W 18.  CO 6.4 (TD), 6.4 (Donzell Herman). PVR 1.7 Wood units. False-positive nuclear study.  Cardiac echocardiogram 05/11/2011    EF 65%. RVSP at least 35 mmHg. Mild IVCE. No significant valvular heart disease.  Cardiac nuclear imaging test 05/18/2012    Tech difficult. Apical ischemia. No infarction. EF 76%. No reg'l WMA. No TID.  Cardiovascular LLE venous duplex 06/17/2013    Left leg:  No DVT.  Diabetes     diet controlled, hypoglycemia from metformin previously     Dyslipidemia     Fatty liver     Fibrocystic breast disease     Fluid retention     GERD     H/O colonoscopy 4/12/13    polyp    Hypertension     Ill-defined condition     gout    Macular degeneration     Morbid obesity ([address]     Obstructive sleep apnea     Peripheral neuropathy     Rectocele       Past Surgical History:   Procedure Laterality Date    [facility]. CNNLA ARTERIAL ARTERIOTOMY 3M  5/25/2012    HX COLONOSCOPY  4/12/2013    HX HEART CATHETERIZATION  5/25/2012    HX HERNIA REPAIR      umbilical    HX HYSTERECTOMY      HX MOHS PROCEDURES      right      Social History     Social History    Marital status:      Spouse name: N/A    Number of children: N/A    Years of education: N/A     Occupational History    Not on file.      Social History Main Topics    Smoking status: Former Smoker    Smokeless tobacco: Never Used    Alcohol use No      Comment: occassionally    Drug use: No    Sexual activity: No       Family History   Problem Relation Age of Onset   [facility] Cancer Mother      colon cancer    Colon Cancer Mother     Hypertension Mother     Diabetes Mother     Heart Disease Mother     Coronary Artery Disease Mother     Hypertension Father     Diabetes Father     Heart Disease Father     Coronary Artery Disease Father     Hypertension Sister     Diabetes Sister     Heart Disease Sister     Coronary Artery Disease Sister     Hypertension Brother     Diabetes Brother     Heart Disease Brother     Coronary Artery Disease Brother       Patient Active Problem List   Diagnosis Code    Diabetes E11.9    Hypertension I11.9    Dyslipidemia E78.5    Sleep apnea/ on c-pap G47.30    Renal cyst N28.1    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 pylori infection A04.8    Acute idiopathic gout of right wrist M10.031    Sliding hiatal hernia K44.9    Pseudogout of right shoulder M11.211    Primary osteoarthritis involving multiple joints M15.0            Review of Systems   Constitutional: Negative for chills, fever and malaise/fatigue. Respiratory: Positive for cough and sputum production. Negative for shortness of breath and wheezing. Cardiovascular: Positive for orthopnea and leg swelling. Negative for chest pain, palpitations, claudication and PND. Gastrointestinal: Negative for abdominal pain, constipation, diarrhea, heartburn, nausea and vomiting. Musculoskeletal: Negative for myalgias. Neurological: Negative for dizziness, tingling and headaches.         Visit Vitals    /70 (BP 1 Location: Left arm, BP Patient Position: Sitting)    Pulse 80    Temp 97 °F (36.1 °C) (Oral)    Resp 18    Ht 5' 3\" (1.6 m)    Wt 246 lb 6.4 oz (111.8 kg)    SpO2 98%    BMI 43.65 kg/m2 Physical Exam   Constitutional: She is oriented to person, place, and time and well-developed, well-nourished, and in no distress. No distress. Obese habitus   HENT:   Head: Normocephalic and atraumatic. Eyes: Conjunctivae are normal. Pupils are equal, round, and reactive to light. Neck: Normal range of motion. Neck supple. Cardiovascular: Normal rate, regular rhythm and intact distal pulses. Exam reveals no gallop and no friction rub. Murmur heard. Pulmonary/Chest: Effort normal. No respiratory distress. She has wheezes (anterior lung fields). She has no rales. Abdominal: Soft. Bowel sounds are normal. She exhibits no distension and no mass. There is no tenderness. Musculoskeletal: She exhibits edema (+1 peripheral edema bilaterally, non-pitting). Lymphadenopathy:     She has no cervical adenopathy. Neurological: She is alert and oriented to person, place, and time. Skin: She is not diaphoretic. Vitals reviewed. Lab Results   Component Value Date/Time    WBC 7.6 10/04/2017 08:53 AM    Hemoglobin, POC 12.6 04/12/2013 09:46 AM    HGB 12.5 10/04/2017 08:53 AM    Hematocrit, POC 37 04/12/2013 09:46 AM    HCT 39.3 10/04/2017 08:53 AM    PLATELET 036 61/47/7611 08:53 AM    MCV 89 10/04/2017 08:53 AM     Lab Results   Component Value Date/Time    Sodium 141 10/04/2017 08:53 AM    Potassium 3.8 10/04/2017 08:53 AM    Chloride 97 10/04/2017 08:53 AM    CO2 30 10/04/2017 08:53 AM    Anion gap 8 06/16/2017 10:11 AM    Glucose 91 10/04/2017 08:53 AM    BUN 13 10/04/2017 08:53 AM    Creatinine 0.76 10/04/2017 08:53 AM    BUN/Creatinine ratio 17 10/04/2017 08:53 AM    GFR est AA 89 10/04/2017 08:53 AM    GFR est non-AA 78 10/04/2017 08:53 AM    Calcium 9.4 10/04/2017 08:53 AM    Bilirubin, total 0.5 10/04/2017 08:53 AM    AST (SGOT) 18 10/04/2017 08:53 AM    Alk.  phosphatase 92 10/04/2017 08:53 AM    Protein, total 6.9 10/04/2017 08:53 AM    Albumin 4.1 10/04/2017 08:53 AM    Globulin 3.2 06/16/2017 10:11 AM    A-G Ratio 1.5 10/04/2017 08:53 AM    ALT (SGPT) 10 10/04/2017 08:53 AM     Lab Results   Component Value Date/Time    Hemoglobin A1c 5.8 10/04/2017 08:53 AM    Hemoglobin A1c (POC) 6.1 06/23/2017 11:53 AM    Hemoglobin A1c, External 6.5 01/26/2017     Lab Results   Component Value Date/Time    Cholesterol, total 179 10/04/2017 08:53 AM    HDL Cholesterol 71 10/04/2017 08:53 AM    LDL, calculated 95 10/04/2017 08:53 AM    VLDL, calculated 13 10/04/2017 08:53 AM    Triglyceride 67 10/04/2017 08:53 AM    CHOL/HDL Ratio 2.3 06/16/2017 10:11 AM     Lab Results   Component Value Date/Time    Vitamin D 25-Hydroxy 32.8 07/19/2017 12:35 PM    VITAMIN D, 25-HYDROXY 30.9 10/04/2017 08:53 AM       Lab Results   Component Value Date/Time    Microalb/Creat ratio (ug/mg creat.) Comment 10/04/2017 08:53 AM           Health Maintenance Due   Topic Date Due    ZOSTER VACCINE AGE 60>  12/10/2002     Pneumococcal 65+ Low/Medium Risk (2 of 2 - PPSV23) 01/01/2013    BREAST CANCER SCRN MAMMOGRAM  08/16/2017    COLONOSCOPY  04/12/2018       Assessment/Plan:    Diagnoses and all orders for this visit:    1. Hypertension  -     furosemide (LASIX) 20 mg tablet; Take 2 Tabs by mouth two (2) times a day. -     METABOLIC PANEL, COMPREHENSIVE; Future  -     CBC WITH AUTOMATED DIFF; Future  Increased to 40 mg today only for the weekend to try to get some fluid off but patient understands she has dependent edema and needs to elevate legs and reduce sodium. Also, she may take the spironolactone over the weekend, will get blood work in 3 days and see patient. 2. Wheezing  - Albuterol prn    3. Type 2 diabetes mellitus without complication, without long-term current use of insulin (HCC) - monitor sugars at home. 4. Dyslipidemia - diet discussed, increase fiber, decrease saturated fats, stable and controlled. 5. Peripheral edema  -     furosemide (LASIX) 20 mg tablet;  Take 2 Tabs by mouth two (2) times a day.  She admits to eating high sodium diet lately and not elevating legs, she started lasix yesterday but hasn't had any today. Has not been taking spironolactone. 6. Upper respiratory tract infection, unspecified type  -     albuterol (PROVENTIL HFA, VENTOLIN HFA, PROAIR HFA) 90 mcg/actuation inhaler; Take 2 Puffs by inhalation every four (4) hours as needed for Wheezing. Follow-up Disposition: 3 days  Return in about 3 months (around 1/12/2018), or if symptoms worsen or fail to improve, for one week prior for labs, 10/15/2017 for labs, weight. Additional Notes: Discussed today's diagnosis, treatment plans. Discussed medication indications and side effects. Preventive Medicine:   Weight Management: Discussed diet and exercise and tolerated. After Visit Summary: Provided and discussed printed patient instructions. Answered all questions and patient acknowledged understanding. Charleen Phillips PA-C     I reviewed the patient's medical history, the physician assistant's findings on physical examination, the patient's diagnoses, and treatment plan as documented in the progress note. I concur with the treatment plan as documented. There are no additional recommendations at this time.     Dayton Fulton MD

## 2017-10-16 ENCOUNTER — OFFICE VISIT (OUTPATIENT)
Dept: ORTHOPEDIC SURGERY | Age: 72
End: 2017-10-16

## 2017-10-16 ENCOUNTER — OFFICE VISIT (OUTPATIENT)
Dept: FAMILY MEDICINE CLINIC | Age: 72
End: 2017-10-16

## 2017-10-16 ENCOUNTER — TELEPHONE (OUTPATIENT)
Dept: FAMILY MEDICINE CLINIC | Age: 72
End: 2017-10-16

## 2017-10-16 ENCOUNTER — HOSPITAL ENCOUNTER (OUTPATIENT)
Dept: LAB | Age: 72
Discharge: HOME | End: 2017-10-16

## 2017-10-16 VITALS
HEIGHT: 63 IN | BODY MASS INDEX: 42.56 KG/M2 | TEMPERATURE: 97 F | WEIGHT: 240.2 LBS | OXYGEN SATURATION: 97 % | HEART RATE: 65 BPM | DIASTOLIC BLOOD PRESSURE: 58 MMHG | RESPIRATION RATE: 18 BRPM | SYSTOLIC BLOOD PRESSURE: 138 MMHG

## 2017-10-16 VITALS
HEIGHT: 64 IN | BODY MASS INDEX: 40.97 KG/M2 | WEIGHT: 240 LBS | OXYGEN SATURATION: 99 % | RESPIRATION RATE: 14 BRPM | HEART RATE: 54 BPM | SYSTOLIC BLOOD PRESSURE: 139 MMHG | TEMPERATURE: 97.6 F | DIASTOLIC BLOOD PRESSURE: 77 MMHG

## 2017-10-16 DIAGNOSIS — M79.671 BILATERAL FOOT PAIN: ICD-10-CM

## 2017-10-16 DIAGNOSIS — I11.9 BENIGN HYPERTENSIVE HEART DISEASE WITHOUT HEART FAILURE: ICD-10-CM

## 2017-10-16 DIAGNOSIS — M19.071 PRIMARY OSTEOARTHRITIS OF BOTH FEET: Primary | ICD-10-CM

## 2017-10-16 DIAGNOSIS — M79.672 BILATERAL FOOT PAIN: ICD-10-CM

## 2017-10-16 DIAGNOSIS — R60.9 PERIPHERAL EDEMA: ICD-10-CM

## 2017-10-16 DIAGNOSIS — E78.5 DYSLIPIDEMIA: ICD-10-CM

## 2017-10-16 DIAGNOSIS — E67.3 HYPERVITAMINOSIS D: ICD-10-CM

## 2017-10-16 DIAGNOSIS — M19.072 PRIMARY OSTEOARTHRITIS OF BOTH FEET: Primary | ICD-10-CM

## 2017-10-16 DIAGNOSIS — E11.9 TYPE 2 DIABETES MELLITUS WITHOUT COMPLICATION, WITHOUT LONG-TERM CURRENT USE OF INSULIN (HCC): Primary | ICD-10-CM

## 2017-10-16 PROCEDURE — 99001 SPECIMEN HANDLING PT-LAB: CPT | Performed by: PHYSICIAN ASSISTANT

## 2017-10-16 RX ORDER — LIDOCAINE 50 MG/G
PATCH TOPICAL
Qty: 1 EACH | Refills: 0 | Status: SHIPPED | OUTPATIENT
Start: 2017-10-16 | End: 2018-01-26

## 2017-10-16 RX ORDER — FUROSEMIDE 20 MG/1
20 TABLET ORAL 2 TIMES DAILY
Qty: 60 TAB | Refills: 1 | Status: SHIPPED | OUTPATIENT
Start: 2017-10-16 | End: 2017-11-13 | Stop reason: SDUPTHER

## 2017-10-16 RX ORDER — DICLOFENAC SODIUM 10 MG/G
2 GEL TOPICAL 2 TIMES DAILY
Qty: 100 G | Refills: 1 | Status: SHIPPED | OUTPATIENT
Start: 2017-10-16 | End: 2018-01-26

## 2017-10-16 NOTE — PROCEDURES
FOOT X RAYS 3 VIEWS Bilateral   10/16/2017    NON WEIGHT BEARING    X RAYS AT [address] [place] [address]  10/16/2017    {Bilateral FOOT:     Bones: No fractures or dislocations. No focal osteolytic or osteoblastic process  Bone Spurs No significant bone spurs   Alignment: Deformity Location: TMTs Midfoot, Varus Hindfoot and Valgus Hindfoot  Joint Condition: Mild OA changes present   JOINT SPACE NARROWING AND OA AT 2 and 3 TMT JOINT SPACES   Soft Tissues small scant anterior foot/distal tinbial region soft tissue densities   No ankle joint effusion in lateral projection. Mineralization: suggests Osteopenia    I have personally reviewed the results of the above study.  The interpretation of this study is my professional opinion

## 2017-10-16 NOTE — PATIENT INSTRUCTIONS
Please follow up in 4 weeks. You are advised to contact us if your condition worsens. Foot Pain: Care Instructions  Your Care Instructions  Foot injuries that cause pain and swelling are fairly common. Almost all sports or home repair projects can cause a misstep that ends up as foot pain. Normal wear and tear, especially as you get older, also can cause foot pain. Most minor foot injuries will heal on their own, and home treatment is usually all you need to do. If you have a severe injury, you may need tests and treatment. Follow-up care is a key part of your treatment and safety. Be sure to make and go to all appointments, and call your doctor if you are having problems. Its also a good idea to know your test results and keep a list of the medicines you take. How can you care for yourself at home? · Take pain medicines exactly as directed. ¨ If the doctor gave you a prescription medicine for pain, take it as prescribed. ¨ If you are not taking a prescription pain medicine, ask your doctor if you can take an over-the-counter medicine. · Rest and protect your foot. Take a break from any activity that may cause pain. · Put ice or a cold pack on your foot for 10 to 20 minutes at a time. Put a thin cloth between the ice and your skin. · Prop up the sore foot on a pillow when you ice it or anytime you sit or lie down during the next 3 days. Try to keep it above the level of your heart. This will help reduce swelling. · Your doctor may recommend that you wrap your foot with an elastic bandage. Keep your foot wrapped for as long as your doctor advises. · If your doctor recommends crutches, use them as directed. · Wear roomy footwear. · As soon as pain and swelling end, begin gentle exercises of your foot. Your doctor can tell you which exercises will help. When should you call for help? Call 911 anytime you think you may need emergency care.  For example, call if:  · Your foot turns pale, white, blue, or cold. Call your doctor now or seek immediate medical care if:  · You cannot move or stand on your foot. · Your foot looks twisted or out of its normal position. · Your foot is not stable when you step down. · You have signs of infection, such as:  ¨ Increased pain, swelling, warmth, or redness. ¨ Red streaks leading from the sore area. ¨ Pus draining from a place on your foot. ¨ A fever. · Your foot is numb or tingly. Watch closely for changes in your health, and be sure to contact your doctor if:  · You do not get better as expected. · You have bruises from an injury that last longer than 2 weeks. Where can you learn more? Go to http://sonia-moira.info/. Enter W599 in the search box to learn more about \"Foot Pain: Care Instructions. \"  Current as of: March 21, 2017  Content Version: 11.3  © 6917-9616 [employer]. Care instructions adapted under license by [employer] (which disclaims liability or warranty for this information). If you have questions about a medical condition or this instruction, always ask your healthcare professional. [place] [address] any warranty or liability for your use of this information.

## 2017-10-16 NOTE — PROGRESS NOTES
HPI:    This is a very pleasant 73 y/o Female patient who comes in today for BP and peripheral edema  f/u. She took lasix 40 mg bid and the spironolactone 25 mg once daily over the weekend. She watched the sodium in her diet. She noticed the swelling in her legs had gone down last evening. She has lost 6 pounds. Patient denies cough, MAGDALENO, SOB, CP, dizziness, headache. States compliance with prescribed medications but admits to noncompliance with diet and activity. Is feeling better with some of the fluid off. She is working really hard on her diet. ROS:  taking medications as instructed, no medication side effects noted, no TIAs, no chest pain on exertion, no dyspnea on exertion      Current Outpatient Prescriptions:     chlorhexidine (PERIDEX) 0.12 % solution, USE AS A MOUTHWASH TWO TIMES A DAY, Disp: , Rfl: 0    furosemide (LASIX) 20 mg tablet, Take 2 Tabs by mouth two (2) times a day., Disp: 60 Tab, Rfl: 1    albuterol (PROVENTIL HFA, VENTOLIN HFA, PROAIR HFA) 90 mcg/actuation inhaler, Take 2 Puffs by inhalation every four (4) hours as needed for Wheezing., Disp: 1 Inhaler, Rfl: 0    olopatadine (PATANOL) 0.1 % ophthalmic solution, Administer 2 Drops to both eyes two (2) times a day. Indications: Allergic Conjunctivitis, Disp: 6 mL, Rfl: 0    potassium chloride (KLOR-CON M10) 10 mEq tablet, Take 2 Tabs by mouth two (2) times a day., Disp: 180 Tab, Rfl: 1    losartan (COZAAR) 50 mg tablet, Take 2 tablets by mouth once daily. Indications: hypertension, Disp: 180 Tab, Rfl: 1    atorvastatin (LIPITOR) 20 mg tablet, Take 1 Tab by mouth daily. , Disp: 90 Tab, Rfl: 1    spironolactone (ALDACTONE) 25 mg tablet, Take 1 Tab by mouth daily. Pt states she takes the OOD., Disp: 90 Tab, Rfl: 1    cholecalciferol (VITAMIN D3) 1,000 unit tablet, Take 2,000 Units by mouth daily. , Disp: , Rfl:     Dexlansoprazole (DEXILANT) 60 mg CpDM, Take 60 mg by mouth daily as needed. , Disp: , Rfl:     cpap machine kit, Take  by inhalation every evening. Used when sleeping, Disp: , Rfl:     aspirin 81 mg Tab, Take 81 mg by mouth daily. , Disp: , Rfl:     MULTIVITAMINS (MULTI-VITAMIN PO), Take 1 Tab by mouth daily. , Disp: , Rfl:   Patient Active Problem List   Diagnosis Code    Diabetes E11.9    Hypertension I11.9    Dyslipidemia E78.5    Sleep apnea/ on c-pap G47.30    Renal cyst N28.1    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 pylori infection A04.8    Acute idiopathic gout of right wrist M10.031    Sliding hiatal hernia K44.9    Pseudogout of right shoulder M11.211    Primary osteoarthritis involving multiple joints M15.0     Past Medical History:   Diagnosis Date    Acute idiopathic gout of right wrist 10/4/2017    Atrophic vaginitis     Cardiac cath 05/30/2012    Patent coronary arteries. LVEDP 20.  RA 11.  RV 42/10. PA 42/21. W 18.  CO 6.4 (TD), 6.4 (Viola ). PVR 1.7 Wood units. False-positive nuclear study.  Cardiac echocardiogram 05/11/2011    EF 65%. RVSP at least 35 mmHg. Mild IVCE. No significant valvular heart disease.  Cardiac nuclear imaging test 05/18/2012    Tech difficult. Apical ischemia. No infarction. EF 76%. No reg'l WMA. No TID.  Cardiovascular LLE venous duplex 06/17/2013    Left leg:  No DVT.     Diabetes     diet controlled, hypoglycemia from metformin previously     Dyslipidemia     Fatty liver     Fibrocystic breast disease     Fluid retention     GERD     H/O colonoscopy 4/12/13    polyp    Hypertension     Ill-defined condition     gout    Macular degeneration     Morbid obesity (HCC)     Obstructive sleep apnea     Peripheral neuropathy     Rectocele          Physical Exam:  Visit Vitals    /58 (BP 1 Location: Left arm, BP Patient Position: Sitting)    Pulse 65    Temp 97 °F (36.1 °C) (Oral)    Resp 18    Ht 5' 3\" (1.6 m)    Wt 240 lb 3.2 oz (109 kg)    SpO2 97%    BMI 42.55 kg/m2       General: a, a & o x 3, afebrile, well-nourished, interacting appropriately, in no acute distress  BP noted to be well controlled today in office, S1, S2 normal, no gallop, no murmur, chest clear, no JVD, no HSM, +1 peripheral edema that is non-pitting. Distal pulses intact. Abdomen: non-distended, normoactive bowel sounds x 4 quadrants, soft, non-tender to palpation, no guarding or rigidity, no organomegaly, no palpable mass, no abdominal bruits  Psychiatry: a, a & o x 3, appropriate mood and affect, no thought disorder      Assessment/Plan:  Diagnoses and all orders for this visit:    1. Peripheral edema  May stop spironolactone and patient agrees. REcommended compression stockings during the day, low sodium diet and weight loss. 2. Hypertension  Go back to lasix 20 mg bid, continue low sodium diet, No side effects from medication. Will see patient back for routine appointment in 4 months. Goal is for 5-6 lb weight loss every month. Will follow up with lab work ordered today. Patient is scheduling for MMG and colonoscopy is scheduled for January. Additional Notes: Discussed today's diagnosis, treatment plans. Discussed medication indications and side effects. Preventive Medicine:   Weight Management:   DASH diet:  2,000 mg sodium max daily. After Visit Summary: Medication and side effects, including risk and signs of allergy were discussed. Patient given printed information with diagnoses and medication information. Answered all questions and patient acknowledged understanding. Patient agrees to follow up with PCP as suggested or sooner if symptoms worsen. Johnnie Gaona PA-C     I reviewed the patient's medical history, the physician assistant's findings on physical examination, the patient's diagnoses, and treatment plan as documented in the progress note. I concur with the treatment plan as documented.  There are no additional recommendations at this time.    Petr Orosco MD

## 2017-10-16 NOTE — MR AVS SNAPSHOT
Visit Information Date & Time Provider Department Dept. Phone Encounter #  
 10/16/2017  3:00 PM Samia Fisher MD [facility] [phone] [id number] Your Appointments 2/13/2018  8:15 AM  
LAB with [facility] NURSE [facility] (SHERMAN Galaviz) Appt Note: lab; lab  
 [address], [place] [address] [address]  
[phone]  
  
   
 [address], [place]  
  
    
 2/20/2018 11:00 AM  
Office Visit with Manoj Pena PA-C [facility] (SHERMAN [facility]aviz) Appt Note: 4 m fu  
 [address] [facility], [place] [address] [address]  
[phone]  
  
   
 [address], [place]  
  
    
 3/27/2018  1:20 PM  
Follow Up with Mireille Hansen DO [facility] ([facility]) Appt Note: 1 year follow up with an EKG  
 [place] [address]  
[phone] [place] [address] Upcoming Health Maintenance Date Due COLONOSCOPY 4/12/2018 BREAST CANCER SCRN MAMMOGRAM 10/30/2017* Pneumococcal 65+ Low/Medium Risk (2 of 2 - PPSV23) 11/8/2017* HEMOGLOBIN A1C Q6M 4/4/2018 EYE EXAM RETINAL OR DILATED Q1 5/31/2018 FOOT EXAM Q1 9/6/2018 MEDICARE YEARLY EXAM 9/7/2018 MICROALBUMIN Q1 10/4/2018 LIPID PANEL Q1 10/4/2018 GLAUCOMA SCREENING Q2Y 6/7/2019 DTaP/Tdap/Td series (2 - Td) 7/31/2023 *Topic was postponed. The date shown is not the original due date. Allergies as of 10/16/2017  Review Complete On: 10/16/2017 By: Estefanía Olmos Severity Noted Reaction Type Reactions Latex    Rash Nexium [Esomeprazole Magnesium] High 12/13/2010    Swelling, Other (comments) Lips Swollen, and facial rash and swelling Crestor [Rosuvastatin]    Other (comments) Upper respiratory illness Lisinopril  05/17/2010   Side Effect Unknown (comments) Patient can't recall Niaspan [Niacin]  09/19/2011    Other (comments) Scratchy throat, \"asthma\" like symptoms Pcn [Penicillins]  12/18/2009    Hives Pravachol [Pravastatin]    Myalgia Sulfa (Sulfonamide Antibiotics)  12/18/2009    Rash Zocor [Simvastatin]    Myalgia, Other (comments) Per patient chart \"(? Rash)\" Current Immunizations  Reviewed on 10/21/2014 Name Date Influenza High Dose Vaccine PF 9/12/2017 Influenza Vaccine 10/21/2014, 10/30/2013 Pneumococcal Vaccine (Unspecified Type) 1/1/2008 Tdap 7/31/2013 12:00 AM  
  
 Not reviewed this visit You Were Diagnosed With   
  
 Codes Comments Bilateral foot pain    -  Primary ICD-10-CM: M79.671, U38.656 ICD-9-CM: 729.5 Vitals BP Pulse Temp Resp Height(growth percentile) Weight(growth percentile) 139/77 (!) 54 97.6 °F (36.4 °C) (Oral) 14 5' 3.5\" (1.613 m) 240 lb (108.9 kg) SpO2 BMI OB Status Smoking Status 99% 41.85 kg/m2 Hysterectomy Former Smoker Vitals History BMI and BSA Data Body Mass Index Body Surface Area  
 41.85 kg/m 2 2.21 m 2 Preferred Pharmacy Pharmacy Name Phone [address], [address] [place] [address] [phone] Your Updated Medication List  
  
   
This list is accurate as of: 10/16/17  4:20 PM.  Always use your most recent med list.  
  
  
  
  
 albuterol 90 mcg/actuation inhaler Commonly known as:  PROVENTIL HFA, VENTOLIN HFA, PROAIR HFA Take 2 Puffs by inhalation every four (4) hours as needed for Wheezing. aspirin 81 mg tablet Take 81 mg by mouth daily. atorvastatin 20 mg tablet Commonly known as:  LIPITOR Take 1 Tab by mouth daily. chlorhexidine 0.12 % solution Commonly known as:  PERIDEX  
USE AS A MOUTHWASH TWO TIMES A DAY  
  
 cpap machine kit Take  by inhalation every evening. Used when sleeping DEXILANT 60 mg Cpdb Generic drug:  Dexlansoprazole Take 60 mg by mouth daily as needed. furosemide 20 mg tablet Commonly known as:  LASIX Take 1 Tab by mouth two (2) times a day. losartan 50 mg tablet Commonly known as:  COZAAR Take 2 tablets by mouth once daily. Indications: hypertension MULTI-VITAMIN PO Take 1 Tab by mouth daily. olopatadine 0.1 % ophthalmic solution Commonly known as:  PATANOL Administer 2 Drops to both eyes two (2) times a day. Indications: Allergic Conjunctivitis  
  
 potassium chloride 10 mEq tablet Commonly known as:  KLOR-CON M10 Take 2 Tabs by mouth two (2) times a day. spironolactone 25 mg tablet Commonly known as:  ALDACTONE Take 1 Tab by mouth daily. Pt states she takes the OOD. VITAMIN D3 1,000 unit tablet Generic drug:  cholecalciferol Take 2,000 Units by mouth daily. We Performed the Following AMB POC XRAY, FOOT; COMPLETE, 3+ VIEW [16664 CPT(R)] AMB POC XRAY, FOOT; COMPLETE, 3+ VIEW [09503 CPT(R)] To-Do List   
 10/27/2017 3:30 PM  
  Appointment with JASPREET MCCALL RM 1 at [address] [place] [address] ([phone]) OUTSIDE FILMS  - Any outside films related to the study being scheduled should be brought with you on the day of the exam.  If this cannot be done there may be a delay in the reading of the study. MEDICATIONS  - Patient must bring a complete list of all medications currently taking to include prescriptions, over-the-counter meds, herbals, vitamins & any dietary supplements  GENERAL INSTRUCTIONS  - On the day of your exam do not use any bath powder, deodorant or lotions on the armpit area. -Tenderness of breasts may cause an increase of discomfort during procedure. If you are experiencing breast tenderness on the day of your appointment and would like to reschedule, please call [phone]. Patient Instructions Please follow up in 4 weeks. You are advised to contact us if your condition worsens. Foot Pain: Care Instructions Your Care Instructions Foot injuries that cause pain and swelling are fairly common. Almost all sports or home repair projects can cause a misstep that ends up as foot pain. Normal wear and tear, especially as you get older, also can cause foot pain. Most minor foot injuries will heal on their own, and home treatment is usually all you need to do. If you have a severe injury, you may need tests and treatment. Follow-up care is a key part of your treatment and safety. Be sure to make and go to all appointments, and call your doctor if you are having problems. Its also a good idea to know your test results and keep a list of the medicines you take. How can you care for yourself at home? · Take pain medicines exactly as directed. ¨ If the doctor gave you a prescription medicine for pain, take it as prescribed. ¨ If you are not taking a prescription pain medicine, ask your doctor if you can take an over-the-counter medicine. · Rest and protect your foot. Take a break from any activity that may cause pain. · Put ice or a cold pack on your foot for 10 to 20 minutes at a time. Put a thin cloth between the ice and your skin. · Prop up the sore foot on a pillow when you ice it or anytime you sit or lie down during the next 3 days. Try to keep it above the level of your heart. This will help reduce swelling. · Your doctor may recommend that you wrap your foot with an elastic bandage. Keep your foot wrapped for as long as your doctor advises. · If your doctor recommends crutches, use them as directed. · Wear roomy footwear. · As soon as pain and swelling end, begin gentle exercises of your foot. Your doctor can tell you which exercises will help. When should you call for help? Call 911 anytime you think you may need emergency care. For example, call if: · Your foot turns pale, white, blue, or cold. Call your doctor now or seek immediate medical care if: 
· You cannot move or stand on your foot. · Your foot looks twisted or out of its normal position. · Your foot is not stable when you step down. · You have signs of infection, such as: 
¨ Increased pain, swelling, warmth, or redness. ¨ Red streaks leading from the sore area. ¨ Pus draining from a place on your foot. ¨ A fever. · Your foot is numb or tingly. Watch closely for changes in your health, and be sure to contact your doctor if: 
· You do not get better as expected. · You have bruises from an injury that last longer than 2 weeks. Where can you learn more? Go to http://sonia-moira.info/. Enter L428 in the search box to learn more about \"Foot Pain: Care Instructions. \" Current as of: March 21, 2017 Content Version: 11.3 © 6977-1862 [employer]. Care instructions adapted under license by [employer] (which disclaims liability or warranty for this information). If you have questions about a medical condition or this instruction, always ask your healthcare professional. [place] [address] any warranty or liability for your use of this information. Introducing [facility]! Dear Callie Zhou: Thank you for requesting a Freebase account. Our records indicate that you already have an active Freebase account. You can access your account anytime at [email or web address] Did you know that you can access your hospital and ER discharge instructions at any time in Freebase? You can also review all of your test results from your hospital stay or ER visit. Additional Information If you have questions, please visit the Frequently Asked Questions section of the Freebase website at [email or web address] Remember, Freebase is NOT to be used for urgent needs. For medical emergencies, dial 911. Now available from your iPhone and Android! Please provide this summary of care documentation to your next provider. Your primary care clinician is listed as [employer]. If you have any questions after today's visit, please call [phone].

## 2017-10-16 NOTE — TELEPHONE ENCOUNTER
Please call patient and have her come in for nurse visit for Pneumonia vaccination. I believe she needs a prevnar 13. TY!

## 2017-10-16 NOTE — MR AVS SNAPSHOT
Visit Information Date & Time Provider Department Dept. Phone Encounter #  
 10/16/2017 10:45 AM Bunny Selby PA-C [facility] [phone] [id number] Follow-up Instructions Return in about 4 weeks (around 11/13/2017), or if symptoms worsen or fail to improve, for routine visit, labs one week prior. Your Appointments 10/16/2017  3:00 PM  
New Patient with Becky Keller MD  
[address] [facility], [address] [facility] ([facility]) Appt Note: / [address], [address] [facility]  
[phone] [address], [address]  
  
    
 3/27/2018  1:20 PM  
Follow Up with Erick Flanagan DO [facility] ([facility]) Appt Note: 1 year follow up with an EKG  
 [place]o [id number] [phone] [address] Upcoming Health Maintenance Date Due  
 BREAST CANCER SCRN MAMMOGRAM 8/16/2017 COLONOSCOPY 4/12/2018 Pneumococcal 65+ Low/Medium Risk (2 of 2 - PPSV23) 11/8/2017* HEMOGLOBIN A1C Q6M 4/4/2018 EYE EXAM RETINAL OR DILATED Q1 5/31/2018 FOOT EXAM Q1 9/6/2018 MEDICARE YEARLY EXAM 9/7/2018 MICROALBUMIN Q1 10/4/2018 LIPID PANEL Q1 10/4/2018 GLAUCOMA SCREENING Q2Y 6/7/2019 DTaP/Tdap/Td series (2 - Td) 7/31/2023 *Topic was postponed. The date shown is not the original due date. Allergies as of 10/16/2017  Review Complete On: 10/16/2017 By: Bunny Selby PA-C Severity Noted Reaction Type Reactions Latex    Rash Nexium [Esomeprazole Magnesium] High 12/13/2010    Swelling, Other (comments) Lips Swollen, and facial rash and swelling Crestor [Rosuvastatin]    Other (comments) Upper respiratory illness Lisinopril  05/17/2010   Side Effect Unknown (comments) Patient can't recall Niaspan [Niacin]  09/19/2011    Other (comments) Scratchy throat, \"asthma\" like symptoms Pcn [Penicillins]  12/18/2009    Hives Pravachol [Pravastatin]    Myalgia Sulfa (Sulfonamide Antibiotics)  12/18/2009    Rash Zocor [Simvastatin]    Myalgia, Other (comments) Per patient chart \"(? Rash)\" Current Immunizations  Reviewed on 10/21/2014 Name Date Influenza High Dose Vaccine PF 9/12/2017 Influenza Vaccine 10/21/2014, 10/30/2013 Pneumococcal Vaccine (Unspecified Type) 1/1/2008 Tdap 7/31/2013 12:00 AM  
  
 Not reviewed this visit You Were Diagnosed With   
  
 Codes Comments Peripheral edema     ICD-10-CM: R60.9 ICD-9-CM: 839. 3 Benign hypertensive heart disease without heart failure     ICD-10-CM: I11.9 ICD-9-CM: 402.10 Vitals OB Status Smoking Status Hysterectomy Former Smoker Preferred Pharmacy Pharmacy Name Phone [address] [place] [address], [address] [place] [address] [phone] Your Updated Medication List  
  
   
This list is accurate as of: 10/16/17 11:37 AM.  Always use your most recent med list.  
  
  
  
  
 albuterol 90 mcg/actuation inhaler Commonly known as:  PROVENTIL HFA, VENTOLIN HFA, PROAIR HFA Take 2 Puffs by inhalation every four (4) hours as needed for Wheezing. aspirin 81 mg tablet Take 81 mg by mouth daily. atorvastatin 20 mg tablet Commonly known as:  LIPITOR Take 1 Tab by mouth daily. chlorhexidine 0.12 % solution Commonly known as:  PERIDEX  
USE AS A MOUTHWASH TWO TIMES A DAY  
  
 cpap machine kit Take  by inhalation every evening. Used when sleeping DEXILANT 60 mg Cpdb Generic drug:  Dexlansoprazole Take 60 mg by mouth daily as needed. furosemide 20 mg tablet Commonly known as:  LASIX Take 1 Tab by mouth two (2) times a day. losartan 50 mg tablet Commonly known as:  COZAAR  
 Take 2 tablets by mouth once daily. Indications: hypertension MULTI-VITAMIN PO Take 1 Tab by mouth daily. olopatadine 0.1 % ophthalmic solution Commonly known as:  PATANOL Administer 2 Drops to both eyes two (2) times a day. Indications: Allergic Conjunctivitis  
  
 potassium chloride 10 mEq tablet Commonly known as:  KLOR-CON M10 Take 2 Tabs by mouth two (2) times a day. spironolactone 25 mg tablet Commonly known as:  ALDACTONE Take 1 Tab by mouth daily. Pt states she takes the OOD. VITAMIN D3 1,000 unit tablet Generic drug:  cholecalciferol Take 2,000 Units by mouth daily. Prescriptions Sent to Pharmacy Refills  
 furosemide (LASIX) 20 mg tablet 1 Sig: Take 1 Tab by mouth two (2) times a day. Class: Normal  
 Pharmacy: [facility], [address] [place] [address] #: [phone] Route: Oral  
  
Follow-up Instructions Return in about 4 weeks (around 11/13/2017), or if symptoms worsen or fail to improve, for routine visit, labs one week prior. To-Do List   
 10/27/2017 3:30 PM  
  Appointment with JASPREET MCCALL  1 at [address] [place] [address] ([phone]) OUTSIDE FILMS  - Any outside films related to the study being scheduled should be brought with you on the day of the exam.  If this cannot be done there may be a delay in the reading of the study. MEDICATIONS  - Patient must bring a complete list of all medications currently taking to include prescriptions, over-the-counter meds, herbals, vitamins & any dietary supplements  GENERAL INSTRUCTIONS  - On the day of your exam do not use any bath powder, deodorant or lotions on the armpit area. -Tenderness of breasts may cause an increase of discomfort during procedure. If you are experiencing breast tenderness on the day of your appointment and would like to reschedule, please call [phone]. Patient Instructions Leg and Ankle Edema: Care Instructions Your Care Instructions Swelling in the legs, ankles, and feet is called edema. It is common after you sit or stand for a while. Long plane flights or car rides often cause swelling in the legs and feet. You may also have swelling if you have to stand for long periods of time at your job. Problems with the veins in the legs (varicose veins) and changes in hormones can also cause swelling. Sometimes the swelling in the ankles and feet is caused by a more serious problem, such as heart failure, infection, blood clots, or liver or kidney disease. Follow-up care is a key part of your treatment and safety. Be sure to make and go to all appointments, and call your doctor if you are having problems. Its also a good idea to know your test results and keep a list of the medicines you take. How can you care for yourself at home? · If your doctor gave you medicine, take it as prescribed. Call your doctor if you think you are having a problem with your medicine. · Whenever you are resting, raise your legs up. Try to keep the swollen area higher than the level of your heart. · Take breaks from standing or sitting in one position. ¨ Walk around to increase the blood flow in your lower legs. ¨ Move your feet and ankles often while you stand, or tighten and relax your leg muscles. · Wear support stockings. Put them on in the morning, before swelling gets worse. · Eat a balanced diet. Lose weight if you need to. · Limit the amount of salt (sodium) in your diet. Salt holds fluid in the body and may increase swelling. When should you call for help? Call 911 anytime you think you may need emergency care. For example, call if: 
· You have symptoms of a blood clot in your lung (called a pulmonary embolism). These may include: 
¨ Sudden chest pain. ¨ Trouble breathing. ¨ Coughing up blood. Call your doctor now or seek immediate medical care if: · You have signs of a blood clot, such as: 
¨ Pain in your calf, back of the knee, thigh, or groin. ¨ Redness and swelling in your leg or groin. · You have symptoms of infection, such as: 
¨ Increased pain, swelling, warmth, or redness. ¨ Red streaks or pus. ¨ A fever. Watch closely for changes in your health, and be sure to contact your doctor if: 
· Your swelling is getting worse. · You have new or worsening pain in your legs. · You do not get better as expected. Where can you learn more? Go to http://sonia-moira.info/. Enter F360 in the search box to learn more about \"Leg and Ankle Edema: Care Instructions. \" Current as of: March 20, 2017 Content Version: 11.3 © 5717-2731 [employer]. Care instructions adapted under license by [employer] (which disclaims liability or warranty for this information). If you have questions about a medical condition or this instruction, always ask your healthcare professional. [place] [address] any warranty or liability for your use of this information. Learning About the [address] [facility] [employer] Diet What is the Mediterranean diet? The Mediterranean diet is a style of eating rather than a diet plan. It features foods eaten in [place], [place], [place] and [place], and other countries along the [facility]. It emphasizes eating foods like fish, fruits, vegetables, beans, high-fiber breads and whole grains, nuts, and olive oil. This style of eating includes limited red meat, cheese, and sweets. Why choose the Mediterranean diet? A Mediterranean-style diet may improve heart health. It contains more fat than other heart-healthy diets. But the fats are mainly from nuts, unsaturated oils (such as fish oils and olive oil), and certain nut or seed oils (such as canola, soybean, or flaxseed oil). These fats may help protect the heart and blood vessels. How can you get started on the Mediterranean diet? Here are some things you can do to switch to a more Mediterranean way of eating. What to eat · Eat a variety of fruits and vegetables each day, such as grapes, blueberries, tomatoes, broccoli, peppers, figs, olives, spinach, eggplant, beans, lentils, and chickpeas. · Eat a variety of whole-grain foods each day, such as oats, brown rice, and whole wheat bread, pasta, and couscous. · Eat fish at least 2 times a week. Try tuna, salmon, mackerel, lake trout, herring, or sardines. · Eat moderate amounts of low-fat dairy products, such as milk, cheese, or yogurt. · Eat moderate amounts of poultry and eggs. · Choose healthy (unsaturated) fats, such as nuts, olive oil, and certain nut or seed oils like canola, soybean, and flaxseed. · Limit unhealthy (saturated) fats, such as butter, palm oil, and coconut oil. And limit fats found in animal products, such as meat and dairy products made with whole milk. Try to eat red meat only a few times a month in very small amounts. · Limit sweets and desserts to only a few times a week. This includes sugar-sweetened drinks like soda. The Mediterranean diet may also include red wine with your meal1 glass each day for women and up to 2 glasses a day for men. Tips for eating at home · Use herbs, spices, garlic, lemon zest, and citrus juice instead of salt to add flavor to foods. · Add avocado slices to your sandwich instead of galvan. · Have fish for lunch or dinner instead of red meat. Brush the fish with olive oil, and broil or grill it. · Sprinkle your salad with seeds or nuts instead of cheese. · Cook with olive or canola oil instead of butter or oils that are high in saturated fat. · Switch from 2% milk or whole milk to 1% or fat-free milk. · Dip raw vegetables in a vinaigrette dressing or hummus instead of dips made from mayonnaise or sour cream. 
· Have a piece of fruit for dessert instead of a piece of cake. Try baked apples, or have some dried fruit. Tips for eating out · Try broiled, grilled, baked, or poached fish instead of having it fried or breaded. · Ask your  to have your meals prepared with olive oil instead of butter. · Order dishes made with marinara sauce or sauces made from olive oil. Avoid sauces made from cream or mayonnaise. · Choose whole-grain breads, whole wheat pasta and pizza crust, brown rice, beans, and lentils. · Cut back on butter or margarine on bread. Instead, you can dip your bread in a small amount of olive oil. · Ask for a side salad or grilled vegetables instead of french fries or chips. Where can you learn more? Go to http://sonia-moira.info/. Enter 101-959-5359 in the search box to learn more about \"Learning About the Mediterranean Diet. \" Current as of: December 29, 2016 Content Version: 11.3 © 9781-9983 CC video. Care instructions adapted under license by [employer] (which disclaims liability or warranty for this information). If you have questions about a medical condition or this instruction, always ask your healthcare professional. [place] [address] any warranty or liability for your use of this information. DASH Diet: Care Instructions Your Care Instructions The DASH diet is an eating plan that can help lower your blood pressure. DASH stands for Dietary Approaches to Stop Hypertension. Hypertension is high blood pressure. The DASH diet focuses on eating foods that are high in calcium, potassium, and magnesium. These nutrients can lower blood pressure. The foods that are highest in these nutrients are fruits, vegetables, low-fat dairy products, nuts, seeds, and legumes. But taking calcium, potassium, and magnesium supplements instead of eating foods that are high in those nutrients does not have the same effect. The DASH diet also includes whole grains, fish, and poultry.  
The DASH diet is one of several lifestyle changes your doctor may recommend to lower your high blood pressure. Your doctor may also want you to decrease the amount of sodium in your diet. Lowering sodium while following the DASH diet can lower blood pressure even further than just the DASH diet alone. Follow-up care is a key part of your treatment and safety. Be sure to make and go to all appointments, and call your doctor if you are having problems. It's also a good idea to know your test results and keep a list of the medicines you take. How can you care for yourself at home? Following the DASH diet · Eat 4 to 5 servings of fruit each day. A serving is 1 medium-sized piece of fruit, ½ cup chopped or canned fruit, 1/4 cup dried fruit, or 4 ounces (½ cup) of fruit juice. Choose fruit more often than fruit juice. · Eat 4 to 5 servings of vegetables each day. A serving is 1 cup of lettuce or raw leafy vegetables, ½ cup of chopped or cooked vegetables, or 4 ounces (½ cup) of vegetable juice. Choose vegetables more often than vegetable juice. · Get 2 to 3 servings of low-fat and fat-free dairy each day. A serving is 8 ounces of milk, 1 cup of yogurt, or 1 ½ ounces of cheese. · Eat 6 to 8 servings of grains each day. A serving is 1 slice of bread, 1 ounce of dry cereal, or ½ cup of cooked rice, pasta, or cooked cereal. Try to choose whole-grain products as much as possible. · Limit lean meat, poultry, and fish to 2 servings each day. A serving is 3 ounces, about the size of a deck of cards. · Eat 4 to 5 servings of nuts, seeds, and legumes (cooked dried beans, lentils, and split peas) each week. A serving is 1/3 cup of nuts, 2 tablespoons of seeds, or ½ cup of cooked beans or peas. · Limit fats and oils to 2 to 3 servings each day. A serving is 1 teaspoon of vegetable oil or 2 tablespoons of salad dressing. · Limit sweets and added sugars to 5 servings or less a week. A serving is 1 tablespoon jelly or jam, ½ cup sorbet, or 1 cup of lemonade. · Eat less than 2,300 milligrams (mg) of sodium a day. If you limit your sodium to 1,500 mg a day, you can lower your blood pressure even more. Tips for success · Start small. Do not try to make dramatic changes to your diet all at once. You might feel that you are missing out on your favorite foods and then be more likely to not follow the plan. Make small changes, and stick with them. Once those changes become habit, add a few more changes. · Try some of the following: ¨ Make it a goal to eat a fruit or vegetable at every meal and at snacks. This will make it easy to get the recommended amount of fruits and vegetables each day. ¨ Try yogurt topped with fruit and nuts for a snack or healthy dessert. ¨ Add lettuce, tomato, cucumber, and onion to sandwiches. ¨ Combine a ready-made pizza crust with low-fat mozzarella cheese and lots of vegetable toppings. Try using tomatoes, squash, spinach, broccoli, carrots, cauliflower, and onions. ¨ Have a variety of cut-up vegetables with a low-fat dip as an appetizer instead of chips and dip. ¨ Sprinkle sunflower seeds or chopped almonds over salads. Or try adding chopped walnuts or almonds to cooked vegetables. ¨ Try some vegetarian meals using beans and peas. Add garbanzo or kidney beans to salads. Make burritos and tacos with mashed porras beans or black beans. Where can you learn more? Go to http://sonia-moira.info/. Enter O435 in the search box to learn more about \"DASH Diet: Care Instructions. \" Current as of: April 3, 2017 Content Version: 11.3 © 9540-1957 [employer]. Care instructions adapted under license by [employer] (which disclaims liability or warranty for this information). If you have questions about a medical condition or this instruction, always ask your healthcare professional. [address] any warranty or liability for your use of this information. Introducing [facility]! Dear Pablito Nguyen: Thank you for requesting a Tipjoy account. Our records indicate that you already have an active Tipjoy account. You can access your account anytime at [email or web address] Did you know that you can access your hospital and ER discharge instructions at any time in Tipjoy? You can also review all of your test results from your hospital stay or ER visit. Additional Information If you have questions, please visit the Frequently Asked Questions section of the Tipjoy website at [email or web address] Remember, Tipjoy is NOT to be used for urgent needs. For medical emergencies, dial 911. Now available from your iPhone and Android! Please provide this summary of care documentation to your next provider. Your primary care clinician is listed as Kenji Porter. If you have any questions after today's visit, please call [phone].

## 2017-10-16 NOTE — PROGRESS NOTES
AMBULATORY PROGRESS NOTE      Patient: Manisha Whitney             MRN: [id number]     SSN: [id number] Body mass index is 41.85 kg/(m^2). YOB: 1945     AGE: 74 y.o. EX: female    PCP: Kayley Wynne PA-C    IMPRESSION/DIAGNOSIS AND TREATMENT PLAN     DIAGNOSES  1. Primary osteoarthritis of both feet    2. Bilateral foot pain        Orders Placed This Encounter    [23346] Foot Min 3V    [18106] Foot Min 3V    lidocaine (LIDODERM) 5 %    diclofenac (VOLTAREN) 1 % gel      Manisha Whitney understands her diagnoses and the proposed plan. Plan:    1) Voltaren 1% gel: 2 g BID; 100 g, 1 refill. 2) Lidoderm 5% Patches    RTO - 4 weeks - If no improvement, MRI of the Left Foot    HPI AND EXAMINATION     Manisha Whitney IS A 74 y.o. female who presents to my outpatient office complaining of left foot pain. She reports swelling and discomfort along the left midfoot. She notes that the left foot pain does not allow her to wear heels without discomfort. Ms. Aashish Pulido states that it has caused her to twist her left foot. XR imaging findings have been discussed with the patient. The patient mentioned having a supportive tennis shoe, but she is unable to wear them without discomfort due to the swelling along the left midfoot. She notes discomfort on the left foot more than on the right. The patient takes care of her brothers, and  at home. She reports being on her feet throughout most of the day. Manisha Whitney is alert/oriented (name, location, time) and follows commands well. she  is in no acute distress and her affect and mood are appropriate. Left ANKLE and FOOT       Gait: slow  Tenderness: mild tenderness to the dorsal midfoot. Cutaneous: No rashes, skin patches, wounds, or abrasions to the lower legs           Warm and Normal color. No regions of expressible drainage.            Medial Border of Tibia Region: absent           Skin color, texture, turgor normal. Normal. Prominence along the midfoot           Swelling to the dorsal midfoot  Joint Motion: ROM Ankle:Normal , Hindfoot: (ST,TN,CC Normal}, Forefoot toes:Normal  Neurologic Exam: Neuro: Motor: normal 5/5 strength in all tested muscle groups and Sensory : no sensory deficits noted. Contractures: Gastrocnemius or Achilles Contractures absent  Joint / Tendon Stability: No Ankle or Subtalar instability or joint laxity. No peroneal sublux ability or dislocation  Alignment:  Normal Foot Alignment and  Flexible  Vascular: Normal Pulses/ NL Capillary refill, No evidence of DVT seen on physical exam.   No calf swelling, no tenderness to calf muscles. Lymphatic:  No Evidence of Lymphedema. CHART REVIEW     Past Medical History:   Diagnosis Date    Acute idiopathic gout of right wrist 10/4/2017    Atrophic vaginitis     Cardiac cath 05/30/2012    Patent coronary arteries. LVEDP 20.  RA 11.  RV 42/10. PA 42/21. W 18.  CO 6.4 (TD), 6.4 (Oneda Dole). PVR 1.7 Wood units. False-positive nuclear study.  Cardiac echocardiogram 05/11/2011    EF 65%. RVSP at least 35 mmHg. Mild IVCE. No significant valvular heart disease.  Cardiac nuclear imaging test 05/18/2012    Tech difficult. Apical ischemia. No infarction. EF 76%. No reg'l WMA. No TID.  Cardiovascular LLE venous duplex 06/17/2013    Left leg:  No DVT.  Diabetes     diet controlled, hypoglycemia from metformin previously     Dyslipidemia     Fatty liver     Fibrocystic breast disease     Fluid retention     GERD     H/O colonoscopy 4/12/13    polyp    Hypertension     Ill-defined condition     gout    Macular degeneration     Morbid obesity (HCC)     Obstructive sleep apnea     Peripheral neuropathy     Rectocele      Current Outpatient Prescriptions   Medication Sig    furosemide (LASIX) 20 mg tablet Take 1 Tab by mouth two (2) times a day.  lidocaine (LIDODERM) 5 % Cut to fit the affected area.  Apply patch for 12 hours, then remove for another 12 hours.  diclofenac (VOLTAREN) 1 % gel Apply 2 g to affected area two (2) times a day.  chlorhexidine (PERIDEX) 0.12 % solution USE AS A MOUTHWASH TWO TIMES A DAY    albuterol (PROVENTIL HFA, VENTOLIN HFA, PROAIR HFA) 90 mcg/actuation inhaler Take 2 Puffs by inhalation every four (4) hours as needed for Wheezing.  olopatadine (PATANOL) 0.1 % ophthalmic solution Administer 2 Drops to both eyes two (2) times a day. Indications: Allergic Conjunctivitis    potassium chloride (KLOR-CON M10) 10 mEq tablet Take 2 Tabs by mouth two (2) times a day.  losartan (COZAAR) 50 mg tablet Take 2 tablets by mouth once daily. Indications: hypertension    atorvastatin (LIPITOR) 20 mg tablet Take 1 Tab by mouth daily.  spironolactone (ALDACTONE) 25 mg tablet Take 1 Tab by mouth daily. Pt states she takes the OOD.  cholecalciferol (VITAMIN D3) 1,000 unit tablet Take 2,000 Units by mouth daily.  Dexlansoprazole (DEXILANT) 60 mg CpDM Take 60 mg by mouth daily as needed.  cpap machine kit Take  by inhalation every evening. Used when sleeping    aspirin 81 mg Tab Take 81 mg by mouth daily.  MULTIVITAMINS (MULTI-VITAMIN PO) Take 1 Tab by mouth daily. No current facility-administered medications for this visit.       Allergies   Allergen Reactions    Latex Rash    Nexium [Esomeprazole Magnesium] Swelling and Other (comments)     Lips Swollen, and facial rash and swelling    Crestor [Rosuvastatin] Other (comments)     Upper respiratory illness    Lisinopril Unknown (comments)     Patient can't recall    Niaspan [Niacin] Other (comments)     Scratchy throat, \"asthma\" like symptoms    Pcn [Penicillins] Hives    Pravachol [Pravastatin] Myalgia    Sulfa (Sulfonamide Antibiotics) Rash    Zocor [Simvastatin] Myalgia and Other (comments)     Per patient chart \"(? Rash)\"     Past Surgical History:   Procedure Laterality Date    [facility]. CNNLA ARTERIAL ARTERIOTOMY 3M  5/25/2012  HX COLONOSCOPY  4/12/2013    HX HEART CATHETERIZATION  5/25/2012    HX HERNIA REPAIR      umbilical    HX HYSTERECTOMY      HX MOHS PROCEDURES      right    LA ANESTH,SURGERY OF SHOULDER       Social History     Occupational History    Not on file. Social History Main Topics    Smoking status: Former Smoker    Smokeless tobacco: Never Used    Alcohol use No      Comment: occassionally    Drug use: No    Sexual activity: No     Family History   Problem Relation Age of Onset    Cancer Mother      colon cancer    Colon Cancer Mother     Hypertension Mother     Diabetes Mother     Heart Disease Mother     Coronary Artery Disease Mother     Hypertension Father     Diabetes Father     Heart Disease Father     Coronary Artery Disease Father     Hypertension Sister     Diabetes Sister     Heart Disease Sister     Coronary Artery Disease Sister     Hypertension Brother     Diabetes Brother     Heart Disease Brother     Coronary Artery Disease Brother        REVIEW OF SYSTEMS : 10/16/2017  ALL BELOW ARE Negative except : SEE HPI       Constitutional: Negative for fever, chills and weight loss. Neg Weigh Loss  Cardiovascular: Negative for chest pain, claudication and leg swelling. SOB, MAGDALENO   Gastrointestinal: Negative for  pain, N/V/D/C, Blood in stool or urine,dysuria, hematuria,        Incontinence, pelvic pain  Musculoskeletal: see HPI. Neurological: Negative for dizziness and weakness. Negative for headaches,Visual Changes, Confusion, Seizures,   Psychiatric/Behavioral: Negative for depression, memory loss and substance abuse. Extremities:  Negative for  hair changes, rash or skin lesion changes. Hematologic: Negative for Bleeding problems, bruising, pallor or swollen lymph nodes.   Peripheral Vascular: No calf pain, vascular vein tenderness to calf pain              No calf throbbing, posterior knee throbbing pain    DIAGNOSTIC IMAGING     FOOT X RAYS 3 VIEWS Bilateral   10/16/2017    NON WEIGHT BEARING    [facility] RAYS AT [facility] [address] [place] [address]  10/16/2017    { Bilateral FOOT:     Bones: No fractures or dislocations. No focal osteolytic or osteoblastic process  Bone Spurs No significant bone spurs   Alignment: Deformity Location: TMTs Midfoot, Varus Hindfoot and Valgus Hindfoot  Joint Condition: Mild OA changes present   JOINT SPACE NARROWING AND OA AT 2 and 3 TMT JOINT SPACES   Soft Tissues small scant anterior foot/distal tinbial region soft tissue densities   No ankle joint effusion in lateral projection. Mineralization: suggests Osteopenia    I have personally reviewed the results of the above study. The interpretation of this study is my professional opinion      Written by Alexandre Zamarripa, as dictated by Anne Marie Hall MD. I, , Anne Marie Hall MD, confirm that all documentation is accurate.

## 2017-10-16 NOTE — PATIENT INSTRUCTIONS
Leg and Ankle Edema: Care Instructions  Your Care Instructions  Swelling in the legs, ankles, and feet is called edema. It is common after you sit or stand for a while. Long plane flights or car rides often cause swelling in the legs and feet. You may also have swelling if you have to stand for long periods of time at your job. Problems with the veins in the legs (varicose veins) and changes in hormones can also cause swelling. Sometimes the swelling in the ankles and feet is caused by a more serious problem, such as heart failure, infection, blood clots, or liver or kidney disease. Follow-up care is a key part of your treatment and safety. Be sure to make and go to all appointments, and call your doctor if you are having problems. Its also a good idea to know your test results and keep a list of the medicines you take. How can you care for yourself at home? · If your doctor gave you medicine, take it as prescribed. Call your doctor if you think you are having a problem with your medicine. · Whenever you are resting, raise your legs up. Try to keep the swollen area higher than the level of your heart. · Take breaks from standing or sitting in one position. ¨ Walk around to increase the blood flow in your lower legs. ¨ Move your feet and ankles often while you stand, or tighten and relax your leg muscles. · Wear support stockings. Put them on in the morning, before swelling gets worse. · Eat a balanced diet. Lose weight if you need to. · Limit the amount of salt (sodium) in your diet. Salt holds fluid in the body and may increase swelling. When should you call for help? Call 911 anytime you think you may need emergency care. For example, call if:  · You have symptoms of a blood clot in your lung (called a pulmonary embolism). These may include:  ¨ Sudden chest pain. ¨ Trouble breathing. ¨ Coughing up blood.   Call your doctor now or seek immediate medical care if:  · You have signs of a blood clot, such as:  ¨ Pain in your calf, back of the knee, thigh, or groin. ¨ Redness and swelling in your leg or groin. · You have symptoms of infection, such as:  ¨ Increased pain, swelling, warmth, or redness. ¨ Red streaks or pus. ¨ A fever. Watch closely for changes in your health, and be sure to contact your doctor if:  · Your swelling is getting worse. · You have new or worsening pain in your legs. · You do not get better as expected. Where can you learn more? Go to http://sonia-moira.info/. Enter T007 in the search box to learn more about \"Leg and Ankle Edema: Care Instructions. \"  Current as of: March 20, 2017  Content Version: 11.3  © 0407-9502 [employer]. Care instructions adapted under license by [employer] (which disclaims liability or warranty for this information). If you have questions about a medical condition or this instruction, always ask your healthcare professional. [place] [address] any warranty or liability for your use of this information. Learning About the [address] Diet  What is the Mediterranean diet? The Mediterranean diet is a style of eating rather than a diet plan. It features foods eaten in [place], [place], [place] and [place], and other countries along the [facility]. It emphasizes eating foods like fish, fruits, vegetables, beans, high-fiber breads and whole grains, nuts, and olive oil. This style of eating includes limited red meat, cheese, and sweets. Why choose the Mediterranean diet? A Mediterranean-style diet may improve heart health. It contains more fat than other heart-healthy diets. But the fats are mainly from nuts, unsaturated oils (such as fish oils and olive oil), and certain nut or seed oils (such as canola, soybean, or flaxseed oil). These fats may help protect the heart and blood vessels. How can you get started on the Mediterranean diet?   Here are some things you can do to switch to a more Mediterranean way of eating. What to eat  · Eat a variety of fruits and vegetables each day, such as grapes, blueberries, tomatoes, broccoli, peppers, figs, olives, spinach, eggplant, beans, lentils, and chickpeas. · Eat a variety of whole-grain foods each day, such as oats, brown rice, and whole wheat bread, pasta, and couscous. · Eat fish at least 2 times a week. Try tuna, salmon, mackerel, lake trout, herring, or sardines. · Eat moderate amounts of low-fat dairy products, such as milk, cheese, or yogurt. · Eat moderate amounts of poultry and eggs. · Choose healthy (unsaturated) fats, such as nuts, olive oil, and certain nut or seed oils like canola, soybean, and flaxseed. · Limit unhealthy (saturated) fats, such as butter, palm oil, and coconut oil. And limit fats found in animal products, such as meat and dairy products made with whole milk. Try to eat red meat only a few times a month in very small amounts. · Limit sweets and desserts to only a few times a week. This includes sugar-sweetened drinks like soda. The Mediterranean diet may also include red wine with your meal--1 glass each day for women and up to 2 glasses a day for men. Tips for eating at home  · Use herbs, spices, garlic, lemon zest, and citrus juice instead of salt to add flavor to foods. · Add avocado slices to your sandwich instead of galvan. · Have fish for lunch or dinner instead of red meat. Brush the fish with olive oil, and broil or grill it. · Sprinkle your salad with seeds or nuts instead of cheese. · Cook with olive or canola oil instead of butter or oils that are high in saturated fat. · Switch from 2% milk or whole milk to 1% or fat-free milk. · Dip raw vegetables in a vinaigrette dressing or hummus instead of dips made from mayonnaise or sour cream.  · Have a piece of fruit for dessert instead of a piece of cake. Try baked apples, or have some dried fruit.   Tips for eating out  · Try broiled, grilled, baked, or poached fish instead of having it fried or breaded. · Ask your  to have your meals prepared with olive oil instead of butter. · Order dishes made with marinara sauce or sauces made from olive oil. Avoid sauces made from cream or mayonnaise. · Choose whole-grain breads, whole wheat pasta and pizza crust, brown rice, beans, and lentils. · Cut back on butter or margarine on bread. Instead, you can dip your bread in a small amount of olive oil. · Ask for a side salad or grilled vegetables instead of french fries or chips. Where can you learn more? Go to http://soniaSynchronizedmoira.info/. Enter [phone] in the search box to learn more about \"Learning About the Mediterranean Diet. \"  Current as of: December 29, 2016  Content Version: 11.3  © 9940-2825 [employer]. Care instructions adapted under license by [employer] (which disclaims liability or warranty for this information). If you have questions about a medical condition or this instruction, always ask your healthcare professional. [place] [address] any warranty or liability for your use of this information. DASH Diet: Care Instructions  Your Care Instructions  The DASH diet is an eating plan that can help lower your blood pressure. DASH stands for Dietary Approaches to Stop Hypertension. Hypertension is high blood pressure. The DASH diet focuses on eating foods that are high in calcium, potassium, and magnesium. These nutrients can lower blood pressure. The foods that are highest in these nutrients are fruits, vegetables, low-fat dairy products, nuts, seeds, and legumes. But taking calcium, potassium, and magnesium supplements instead of eating foods that are high in those nutrients does not have the same effect. The DASH diet also includes whole grains, fish, and poultry. The DASH diet is one of several lifestyle changes your doctor may recommend to lower your high blood pressure.  Your doctor may also want you to decrease the amount of sodium in your diet. Lowering sodium while following the DASH diet can lower blood pressure even further than just the DASH diet alone. Follow-up care is a key part of your treatment and safety. Be sure to make and go to all appointments, and call your doctor if you are having problems. It's also a good idea to know your test results and keep a list of the medicines you take. How can you care for yourself at home? Following the DASH diet  · Eat 4 to 5 servings of fruit each day. A serving is 1 medium-sized piece of fruit, ½ cup chopped or canned fruit, 1/4 cup dried fruit, or 4 ounces (½ cup) of fruit juice. Choose fruit more often than fruit juice. · Eat 4 to 5 servings of vegetables each day. A serving is 1 cup of lettuce or raw leafy vegetables, ½ cup of chopped or cooked vegetables, or 4 ounces (½ cup) of vegetable juice. Choose vegetables more often than vegetable juice. · Get 2 to 3 servings of low-fat and fat-free dairy each day. A serving is 8 ounces of milk, 1 cup of yogurt, or 1 ½ ounces of cheese. · Eat 6 to 8 servings of grains each day. A serving is 1 slice of bread, 1 ounce of dry cereal, or ½ cup of cooked rice, pasta, or cooked cereal. Try to choose whole-grain products as much as possible. · Limit lean meat, poultry, and fish to 2 servings each day. A serving is 3 ounces, about the size of a deck of cards. · Eat 4 to 5 servings of nuts, seeds, and legumes (cooked dried beans, lentils, and split peas) each week. A serving is 1/3 cup of nuts, 2 tablespoons of seeds, or ½ cup of cooked beans or peas. · Limit fats and oils to 2 to 3 servings each day. A serving is 1 teaspoon of vegetable oil or 2 tablespoons of salad dressing. · Limit sweets and added sugars to 5 servings or less a week. A serving is 1 tablespoon jelly or jam, ½ cup sorbet, or 1 cup of lemonade. · Eat less than 2,300 milligrams (mg) of sodium a day.  If you limit your sodium to 1,500 mg a day, you can lower your blood pressure even more. Tips for success  · Start small. Do not try to make dramatic changes to your diet all at once. You might feel that you are missing out on your favorite foods and then be more likely to not follow the plan. Make small changes, and stick with them. Once those changes become habit, add a few more changes. · Try some of the following:  ¨ Make it a goal to eat a fruit or vegetable at every meal and at snacks. This will make it easy to get the recommended amount of fruits and vegetables each day. ¨ Try yogurt topped with fruit and nuts for a snack or healthy dessert. ¨ Add lettuce, tomato, cucumber, and onion to sandwiches. ¨ Combine a ready-made pizza crust with low-fat mozzarella cheese and lots of vegetable toppings. Try using tomatoes, squash, spinach, broccoli, carrots, cauliflower, and onions. ¨ Have a variety of cut-up vegetables with a low-fat dip as an appetizer instead of chips and dip. ¨ Sprinkle sunflower seeds or chopped almonds over salads. Or try adding chopped walnuts or almonds to cooked vegetables. ¨ Try some vegetarian meals using beans and peas. Add garbanzo or kidney beans to salads. Make burritos and tacos with mashed porras beans or black beans. Where can you learn more? Go to http://sonia-moira.info/. Enter N265 in the search box to learn more about \"DASH Diet: Care Instructions. \"  Current as of: April 3, 2017  Content Version: 11.3  © 3968-4581 [employer]. Care instructions adapted under license by [employer] (which disclaims liability or warranty for this information). If you have questions about a medical condition or this instruction, always ask your healthcare professional. [place] [address] any warranty or liability for your use of this information.

## 2017-10-16 NOTE — PROGRESS NOTES
Chief Complaint   Patient presents with    Swelling     feet   Patient is here today due to swelling in both ankles and feet. Pt is also do for a Mammogram.  1. Have you been to the ER, urgent care clinic since your last visit? Hospitalized since your last visit? No    2. Have you seen or consulted any other health care providers outside of the [address] [place] [address] since your last visit? Include any pap smears or colon screening.  No

## 2017-10-17 LAB
ALBUMIN SERPL-MCNC: 4.1 G/DL (ref 3.5–4.8)
ALBUMIN/GLOB SERPL: 1.4 {RATIO} (ref 1.2–2.2)
ALP SERPL-CCNC: 80 IU/L (ref 39–117)
ALT SERPL-CCNC: 11 IU/L (ref 0–32)
AST SERPL-CCNC: 18 IU/L (ref 0–40)
BASOPHILS # BLD AUTO: 0 X10E3/UL (ref 0–0.2)
BASOPHILS NFR BLD AUTO: 0 %
BILIRUB SERPL-MCNC: 0.6 MG/DL (ref 0–1.2)
BUN SERPL-MCNC: 13 MG/DL (ref 8–27)
BUN/CREAT SERPL: 16 (ref 12–28)
CALCIUM SERPL-MCNC: 9.1 MG/DL (ref 8.7–10.3)
CHLORIDE SERPL-SCNC: 101 MMOL/L (ref 96–106)
CO2 SERPL-SCNC: 28 MMOL/L (ref 18–29)
CREAT SERPL-MCNC: 0.79 MG/DL (ref 0.57–1)
EOSINOPHIL # BLD AUTO: 0.3 X10E3/UL (ref 0–0.4)
EOSINOPHIL NFR BLD AUTO: 4 %
ERYTHROCYTE [DISTWIDTH] IN BLOOD BY AUTOMATED COUNT: 14.4 % (ref 12.3–15.4)
GLOBULIN SER CALC-MCNC: 3 G/DL (ref 1.5–4.5)
GLUCOSE SERPL-MCNC: 96 MG/DL (ref 65–99)
HCT VFR BLD AUTO: 39.1 % (ref 34–46.6)
HGB BLD-MCNC: 12.8 G/DL (ref 11.1–15.9)
IMM GRANULOCYTES # BLD: 0 X10E3/UL (ref 0–0.1)
IMM GRANULOCYTES NFR BLD: 0 %
LYMPHOCYTES # BLD AUTO: 2.6 X10E3/UL (ref 0.7–3.1)
LYMPHOCYTES NFR BLD AUTO: 35 %
MCH RBC QN AUTO: 29 PG (ref 26.6–33)
MCHC RBC AUTO-ENTMCNC: 32.7 G/DL (ref 31.5–35.7)
MCV RBC AUTO: 89 FL (ref 79–97)
MONOCYTES # BLD AUTO: 0.5 X10E3/UL (ref 0.1–0.9)
MONOCYTES NFR BLD AUTO: 7 %
NEUTROPHILS # BLD AUTO: 4 X10E3/UL (ref 1.4–7)
NEUTROPHILS NFR BLD AUTO: 54 %
PLATELET # BLD AUTO: 235 X10E3/UL (ref 150–379)
POTASSIUM SERPL-SCNC: 3.8 MMOL/L (ref 3.5–5.2)
PROT SERPL-MCNC: 7.1 G/DL (ref 6–8.5)
RBC # BLD AUTO: 4.41 X10E6/UL (ref 3.77–5.28)
SODIUM SERPL-SCNC: 142 MMOL/L (ref 134–144)
WBC # BLD AUTO: 7.4 X10E3/UL (ref 3.4–10.8)

## 2017-10-18 ENCOUNTER — TELEPHONE (OUTPATIENT)
Dept: ORTHOPEDIC SURGERY | Age: 72
End: 2017-10-18

## 2017-10-18 NOTE — TELEPHONE ENCOUNTER
Patient notified that authorization has been requested from the insurance company and to check with her pharmacy in thenext 24 to 48 hours to see if the   RX will go through.

## 2017-10-18 NOTE — TELEPHONE ENCOUNTER
[address] called stating the prescription lidocaine (LIDODERM) 5 % is requiring a prior authorization. They were attempting to do a verbal authorization. Please advise.

## 2017-10-18 NOTE — TELEPHONE ENCOUNTER
Pharmacist said the insurance company wanted to confirm a diagnosis. I call them at [phone] and gave them her diagnosis codes. They said it should take 24 to 48 hours for authorization decision.   Reference # for authorization request is [id number]

## 2017-10-19 ENCOUNTER — TELEPHONE (OUTPATIENT)
Dept: ORTHOPEDIC SURGERY | Age: 72
End: 2017-10-19

## 2017-10-19 NOTE — TELEPHONE ENCOUNTER
[facility] is requesting we contact them when prior Liam Door is received for Lidoderm - their number is [phone]

## 2017-10-20 NOTE — TELEPHONE ENCOUNTER
Contacted pt at her home number. Informed pt that her Lidoderm isnt covered by her insurance and the PA is recommending Aspercream with Lidocaine or Biofreeze. Pt states that she knew her insurance didn't cover it and told the pharmacy not to fill it. Pt appreciated the phone call with the other recommendations.

## 2017-10-27 ENCOUNTER — HOSPITAL ENCOUNTER (OUTPATIENT)
Dept: MAMMOGRAPHY | Age: 72
Discharge: HOME | End: 2017-10-27
Attending: PHYSICIAN ASSISTANT
Payer: MEDICARE

## 2017-10-27 DIAGNOSIS — Z12.39 SCREENING FOR BREAST CANCER: ICD-10-CM

## 2017-10-27 PROCEDURE — 77067 SCR MAMMO BI INCL CAD: CPT

## 2017-10-27 PROCEDURE — 77063 BREAST TOMOSYNTHESIS BI: CPT

## 2017-11-13 DIAGNOSIS — R60.9 PERIPHERAL EDEMA: ICD-10-CM

## 2017-11-13 DIAGNOSIS — I11.9 BENIGN HYPERTENSIVE HEART DISEASE WITHOUT HEART FAILURE: ICD-10-CM

## 2017-11-14 RX ORDER — FUROSEMIDE 20 MG/1
TABLET ORAL
Qty: 90 TAB | Refills: 1 | Status: SHIPPED | OUTPATIENT
Start: 2017-11-14 | End: 2018-02-28 | Stop reason: SDUPTHER

## 2017-11-28 DIAGNOSIS — E87.6 HYPOKALEMIA: ICD-10-CM

## 2017-11-28 DIAGNOSIS — E78.5 DYSLIPIDEMIA: ICD-10-CM

## 2017-11-28 RX ORDER — ATORVASTATIN CALCIUM 20 MG/1
TABLET, FILM COATED ORAL
Qty: 90 TAB | Refills: 1 | Status: SHIPPED | OUTPATIENT
Start: 2017-11-28 | End: 2018-04-17 | Stop reason: SDUPTHER

## 2017-11-28 RX ORDER — POTASSIUM CHLORIDE 750 MG/1
TABLET, EXTENDED RELEASE ORAL
Qty: 360 TAB | Refills: 1 | Status: SHIPPED | OUTPATIENT
Start: 2017-11-28 | End: 2018-04-17 | Stop reason: SDUPTHER

## 2017-12-05 ENCOUNTER — TELEPHONE (OUTPATIENT)
Dept: FAMILY MEDICINE CLINIC | Age: 72
End: 2017-12-05

## 2017-12-05 NOTE — TELEPHONE ENCOUNTER
Patient called today asking if she could have a EKG, I asked her why did she think she needed one. Her reply was because she just wanted to know if we could see something wrong. I asked her her if she had chest palpations, sweating or shortness of breath, she said no and started to laugh. I told her that if she felt like she could not breath or was having shortness of breath to call the ER immediately.

## 2017-12-13 DIAGNOSIS — I11.9 BENIGN HYPERTENSIVE HEART DISEASE WITHOUT HEART FAILURE: ICD-10-CM

## 2017-12-13 RX ORDER — LOSARTAN POTASSIUM 50 MG/1
TABLET ORAL
Qty: 180 TAB | Refills: 1 | Status: SHIPPED | OUTPATIENT
Start: 2017-12-13 | End: 2018-05-04 | Stop reason: SDUPTHER

## 2017-12-22 ENCOUNTER — TELEPHONE (OUTPATIENT)
Dept: FAMILY MEDICINE CLINIC | Age: 72
End: 2017-12-22

## 2017-12-22 NOTE — TELEPHONE ENCOUNTER
Pt c/o losartan making her feel as though she has gas pockets under her chest.    Pt would like to know if someone could give her a call back to discuss. Please call and advise.

## 2017-12-26 ENCOUNTER — TELEPHONE (OUTPATIENT)
Dept: FAMILY MEDICINE CLINIC | Age: 72
End: 2017-12-26

## 2017-12-26 NOTE — TELEPHONE ENCOUNTER
Lorelei with Dr. Reema Velez office called and stated that the patient need a referral placed.  Patient walked into the office today and will be seen at 1:30 pm. Please fax referral    Dr. Reema Velez  Gastroenterology   [place] [facility]  DX: Abdominal pain  Fax: [phone]  Phone: [phone]  Appointment today at 1:30 pm

## 2017-12-27 ENCOUNTER — OFFICE VISIT (OUTPATIENT)
Dept: FAMILY MEDICINE CLINIC | Age: 72
End: 2017-12-27

## 2017-12-27 VITALS
RESPIRATION RATE: 18 BRPM | HEART RATE: 65 BPM | DIASTOLIC BLOOD PRESSURE: 77 MMHG | WEIGHT: 251.4 LBS | SYSTOLIC BLOOD PRESSURE: 125 MMHG | TEMPERATURE: 97.7 F | OXYGEN SATURATION: 98 % | HEIGHT: 63 IN | BODY MASS INDEX: 44.54 KG/M2

## 2017-12-27 DIAGNOSIS — R60.9 PERIPHERAL EDEMA: ICD-10-CM

## 2017-12-27 DIAGNOSIS — I87.2 CHRONIC VENOUS INSUFFICIENCY: Primary | ICD-10-CM

## 2017-12-27 DIAGNOSIS — I11.9 BENIGN HYPERTENSIVE HEART DISEASE WITHOUT HEART FAILURE: ICD-10-CM

## 2017-12-27 PROBLEM — E66.01 OBESITY, MORBID (HCC): Status: ACTIVE | Noted: 2017-12-27

## 2017-12-27 NOTE — MR AVS SNAPSHOT
Visit Information Date & Time Provider Department Dept. Phone Encounter #  
 12/27/2017  1:00 PM [facility] [phone] [id number] Follow-up Instructions Return if symptoms worsen or fail to improve, for keep scheduled appoinment 1/2. Your Appointments 1/2/2018 10:00 AM  
Office Visit with Christopher Christie PA-C [facility] ([facility]illaume) Appt Note: hernia/gas pain x 4 days [address] [facility] [address] [address]  
[phone]  
  
   
 [address], [facility]  
  
    
 2/13/2018  8:15 AM  
LAB with [facility] (H[facility] Guillaume) Appt Note: lab; lab  
 [address], [place] [address] [address]  
[phone]  
  
   
 [address], [facility]  
  
    
 2/20/2018 11:00 AM  
Office Visit with Christopher Christie PA-C [facility] ([facility] Guillaume) Appt Note: 4 m fu  
 [address] [facility],[address], [place] [address] [address]  
[phone]  
  
    
 3/27/2018  1:20 PM  
Follow Up with Jasvir Beard DO [facility] ([facility]) Appt Note: 1 year follow up with an EKG  
 [facility] [address] [address] [address] [phone] [address] [place] [address] [address] Upcoming Health Maintenance Date Due Pneumococcal 65+ Low/Medium Risk (2 of 2 - PPSV23) 1/1/2013 COLONOSCOPY 4/12/2018 HEMOGLOBIN A1C Q6M 4/4/2018 EYE EXAM RETINAL OR DILATED Q1 5/31/2018 FOOT EXAM Q1 9/6/2018 MEDICARE YEARLY EXAM 9/7/2018 MICROALBUMIN Q1 10/4/2018 LIPID PANEL Q1 10/4/2018 BREAST CANCER SCRN MAMMOGRAM 10/27/2018 GLAUCOMA SCREENING Q2Y 6/7/2019 DTaP/Tdap/Td series (2 - Td) 7/31/2023 Allergies as of 12/27/2017  Review Complete On: 12/27/2017 By: Christopher Christie PA-C Severity Noted Reaction Type Reactions Latex    Rash Nexium [Esomeprazole Magnesium] High 12/13/2010    Swelling, Other (comments) Lips Swollen, and facial rash and swelling Crestor [Rosuvastatin]    Other (comments) Upper respiratory illness Lisinopril  05/17/2010   Side Effect Unknown (comments) Patient can't recall Niaspan [Niacin]  09/19/2011    Other (comments) Scratchy throat, \"asthma\" like symptoms Pcn [Penicillins]  12/18/2009    Hives Pravachol [Pravastatin]    Myalgia Sulfa (Sulfonamide Antibiotics)  12/18/2009    Rash Zocor [Simvastatin]    Myalgia, Other (comments) Per patient chart \"(? Rash)\" Current Immunizations  Reviewed on 10/21/2014 Name Date Influenza High Dose Vaccine PF 9/12/2017 Influenza Vaccine 10/21/2014, 10/30/2013 Pneumococcal Vaccine (Unspecified Type) 1/1/2008 Tdap 7/31/2013 12:00 AM  
  
 Not reviewed this visit You Were Diagnosed With   
  
 Codes Comments Chronic venous insufficiency    -  Primary ICD-10-CM: M57.3 ICD-9-CM: 459.81 Peripheral edema     ICD-10-CM: R60.9 ICD-9-CM: 054. 3 Benign hypertensive heart disease without heart failure     ICD-10-CM: I11.9 ICD-9-CM: 402.10 Vitals BP Pulse Temp Resp Height(growth percentile) Weight(growth percentile) 125/77 (BP 1 Location: Left arm, BP Patient Position: Sitting) 65 97.7 °F (36.5 °C) (Oral) 18 5' 3\" (1.6 m) 251 lb 6.4 oz (114 kg) SpO2 BMI OB Status Smoking Status 98% 44.53 kg/m2 Hysterectomy Former Smoker BMI and BSA Data Body Mass Index Body Surface Area 44.53 kg/m 2 2.25 m 2 Preferred Pharmacy Pharmacy Name Phone [address], [address] [place] [address] [phone] Your Updated Medication List  
  
   
This list is accurate as of: 12/27/17  1:49 PM.  Always use your most recent med list.  
  
  
  
  
 albuterol 90 mcg/actuation inhaler Commonly known as:  PROVENTIL HFA, VENTOLIN HFA, PROAIR HFA  
 Take 2 Puffs by inhalation every four (4) hours as needed for Wheezing. aspirin 81 mg tablet Take 81 mg by mouth daily. atorvastatin 20 mg tablet Commonly known as:  LIPITOR  
TAKE 1 TABLET EVERY DAY  
  
 chlorhexidine 0.12 % solution Commonly known as:  PERIDEX  
USE AS A MOUTHWASH TWO TIMES A DAY  
  
 cpap machine kit Take  by inhalation every evening. Used when sleeping DEXILANT 60 mg Cpdb Generic drug:  Dexlansoprazole Take 60 mg by mouth daily as needed. diclofenac 1 % Gel Commonly known as:  VOLTAREN Apply 2 g to affected area two (2) times a day. furosemide 20 mg tablet Commonly known as:  LASIX TAKE 1 TABLET EVERY DAY FOR EDEMA  
  
 lidocaine 5 % Commonly known as:  Brian [place] Cut to fit the affected area. Apply patch for 12 hours, then remove for another 12 hours. losartan 50 mg tablet Commonly known as:  COZAAR  
TAKE 2 TABLETS ONE TIME DAILY FOR HYPERTENSION  
  
 MULTI-VITAMIN PO Take 1 Tab by mouth daily. olopatadine 0.1 % ophthalmic solution Commonly known as:  PATANOL Administer 2 Drops to both eyes two (2) times a day. Indications: Allergic Conjunctivitis  
  
 potassium chloride 10 mEq tablet Commonly known as:  KLOR-CON  
TAKE 2 TABLETS TWICE DAILY  
  
 spironolactone 25 mg tablet Commonly known as:  ALDACTONE Take 1 Tab by mouth daily. Pt states she takes the OOD. VITAMIN D3 1,000 unit tablet Generic drug:  cholecalciferol Take 2,000 Units by mouth daily. Follow-up Instructions Return if symptoms worsen or fail to improve, for keep scheduled appoinment 1/2. Patient Instructions High Blood Pressure: Care Instructions Your Care Instructions If your blood pressure is usually above 140/90, you have high blood pressure, or hypertension. That means the top number is 140 or higher or the bottom number is 90 or higher, or both. Despite what a lot of people think, high blood pressure usually doesn't cause headaches or make you feel dizzy or lightheaded. It usually has no symptoms. But it does increase your risk for heart attack, stroke, and kidney or eye damage. The higher your blood pressure, the more your risk increases. Your doctor will give you a goal for your blood pressure. Your goal will be based on your health and your age. An example of a goal is to keep your blood pressure below 140/90. Lifestyle changes, such as eating healthy and being active, are always important to help lower blood pressure. You might also take medicine to reach your blood pressure goal. 
Follow-up care is a key part of your treatment and safety. Be sure to make and go to all appointments, and call your doctor if you are having problems. It's also a good idea to know your test results and keep a list of the medicines you take. How can you care for yourself at home? Medical treatment · If you stop taking your medicine, your blood pressure will go back up. You may take one or more types of medicine to lower your blood pressure. Be safe with medicines. Take your medicine exactly as prescribed. Call your doctor if you think you are having a problem with your medicine. · Talk to your doctor before you start taking aspirin every day. Aspirin can help certain people lower their risk of a heart attack or stroke. But taking aspirin isn't right for everyone, because it can cause serious bleeding. · See your doctor regularly. You may need to see the doctor more often at first or until your blood pressure comes down. · If you are taking blood pressure medicine, talk to your doctor before you take decongestants or anti-inflammatory medicine, such as ibuprofen. Some of these medicines can raise blood pressure. · Learn how to check your blood pressure at home. Lifestyle changes · Stay at a healthy weight.  This is especially important if you put on weight around the waist. Losing even 10 pounds can help you lower your blood pressure. · If your doctor recommends it, get more exercise. Walking is a good choice. Bit by bit, increase the amount you walk every day. Try for at least 30 minutes on most days of the week. You also may want to swim, bike, or do other activities. · Avoid or limit alcohol. Talk to your doctor about whether you can drink any alcohol. · Try to limit how much sodium you eat to less than 2,300 milligrams (mg) a day. Your doctor may ask you to try to eat less than 1,500 mg a day. · Eat plenty of fruits (such as bananas and oranges), vegetables, legumes, whole grains, and low-fat dairy products. · Lower the amount of saturated fat in your diet. Saturated fat is found in animal products such as milk, cheese, and meat. Limiting these foods may help you lose weight and also lower your risk for heart disease. · Do not smoke. Smoking increases your risk for heart attack and stroke. If you need help quitting, talk to your doctor about stop-smoking programs and medicines. These can increase your chances of quitting for good. When should you call for help? Call 911 anytime you think you may need emergency care. This may mean having symptoms that suggest that your blood pressure is causing a serious heart or blood vessel problem. Your blood pressure may be over 180/110. ? For example, call 911 if: 
? · You have symptoms of a heart attack. These may include: ¨ Chest pain or pressure, or a strange feeling in the chest. 
¨ Sweating. ¨ Shortness of breath. ¨ Nausea or vomiting. ¨ Pain, pressure, or a strange feeling in the back, neck, jaw, or upper belly or in one or both shoulders or arms. ¨ Lightheadedness or sudden weakness. ¨ A fast or irregular heartbeat. ? · You have symptoms of a stroke. These may include: 
¨ Sudden numbness, tingling, weakness, or loss of movement in your face, arm, or leg, especially on only one side of your body. ¨ Sudden vision changes. ¨ Sudden trouble speaking. ¨ Sudden confusion or trouble understanding simple statements. ¨ Sudden problems with walking or balance. ¨ A sudden, severe headache that is different from past headaches. ? · You have severe back or belly pain. ?Do not wait until your blood pressure comes down on its own. Get help right away. ?Call your doctor now or seek immediate care if: 
? · Your blood pressure is much higher than normal (such as 180/110 or higher), but you don't have symptoms. ? · You think high blood pressure is causing symptoms, such as: ¨ Severe headache. ¨ Blurry vision. ? Watch closely for changes in your health, and be sure to contact your doctor if: 
? · Your blood pressure measures 140/90 or higher at least 2 times. That means the top number is 140 or higher or the bottom number is 90 or higher, or both. ? · You think you may be having side effects from your blood pressure medicine. ? · Your blood pressure is usually normal, but it goes above normal at least 2 times. Where can you learn more? Go to http://sonia-moira.info/. Enter K609 in the search box to learn more about \"High Blood Pressure: Care Instructions. \" Current as of: September 21, 2016 Content Version: 11.4 © 4603-7770 [employer]. Care instructions adapted under license by [employer] (which disclaims liability or warranty for this information). If you have questions about a medical condition or this instruction, always ask your healthcare professional. [place] [address] any warranty or liability for your use of this information. DASH Diet: Care Instructions Your Care Instructions The DASH diet is an eating plan that can help lower your blood pressure. DASH stands for Dietary Approaches to Stop Hypertension. Hypertension is high blood pressure.  
The DASH diet focuses on eating foods that are high in calcium, potassium, and magnesium. These nutrients can lower blood pressure. The foods that are highest in these nutrients are fruits, vegetables, low-fat dairy products, nuts, seeds, and legumes. But taking calcium, potassium, and magnesium supplements instead of eating foods that are high in those nutrients does not have the same effect. The DASH diet also includes whole grains, fish, and poultry. The DASH diet is one of several lifestyle changes your doctor may recommend to lower your high blood pressure. Your doctor may also want you to decrease the amount of sodium in your diet. Lowering sodium while following the DASH diet can lower blood pressure even further than just the DASH diet alone. Follow-up care is a key part of your treatment and safety. Be sure to make and go to all appointments, and call your doctor if you are having problems. It's also a good idea to know your test results and keep a list of the medicines you take. How can you care for yourself at home? Following the DASH diet · Eat 4 to 5 servings of fruit each day. A serving is 1 medium-sized piece of fruit, ½ cup chopped or canned fruit, 1/4 cup dried fruit, or 4 ounces (½ cup) of fruit juice. Choose fruit more often than fruit juice. · Eat 4 to 5 servings of vegetables each day. A serving is 1 cup of lettuce or raw leafy vegetables, ½ cup of chopped or cooked vegetables, or 4 ounces (½ cup) of vegetable juice. Choose vegetables more often than vegetable juice. · Get 2 to 3 servings of low-fat and fat-free dairy each day. A serving is 8 ounces of milk, 1 cup of yogurt, or 1 ½ ounces of cheese. · Eat 6 to 8 servings of grains each day. A serving is 1 slice of bread, 1 ounce of dry cereal, or ½ cup of cooked rice, pasta, or cooked cereal. Try to choose whole-grain products as much as possible. · Limit lean meat, poultry, and fish to 2 servings each day. A serving is 3 ounces, about the size of a deck of cards. · Eat 4 to 5 servings of nuts, seeds, and legumes (cooked dried beans, lentils, and split peas) each week. A serving is 1/3 cup of nuts, 2 tablespoons of seeds, or ½ cup of cooked beans or peas. · Limit fats and oils to 2 to 3 servings each day. A serving is 1 teaspoon of vegetable oil or 2 tablespoons of salad dressing. · Limit sweets and added sugars to 5 servings or less a week. A serving is 1 tablespoon jelly or jam, ½ cup sorbet, or 1 cup of lemonade. · Eat less than 2,300 milligrams (mg) of sodium a day. If you limit your sodium to 1,500 mg a day, you can lower your blood pressure even more. Tips for success · Start small. Do not try to make dramatic changes to your diet all at once. You might feel that you are missing out on your favorite foods and then be more likely to not follow the plan. Make small changes, and stick with them. Once those changes become habit, add a few more changes. · Try some of the following: ¨ Make it a goal to eat a fruit or vegetable at every meal and at snacks. This will make it easy to get the recommended amount of fruits and vegetables each day. ¨ Try yogurt topped with fruit and nuts for a snack or healthy dessert. ¨ Add lettuce, tomato, cucumber, and onion to sandwiches. ¨ Combine a ready-made pizza crust with low-fat mozzarella cheese and lots of vegetable toppings. Try using tomatoes, squash, spinach, broccoli, carrots, cauliflower, and onions. ¨ Have a variety of cut-up vegetables with a low-fat dip as an appetizer instead of chips and dip. ¨ Sprinkle sunflower seeds or chopped almonds over salads. Or try adding chopped walnuts or almonds to cooked vegetables. ¨ Try some vegetarian meals using beans and peas. Add garbanzo or kidney beans to salads. Make burritos and tacos with mashed porras beans or black beans. Where can you learn more? Go to http://sonia-moira.info/.  
Enter B346 in the search box to learn more about \"DASH Diet: Care Instructions. \" Current as of: September 21, 2016 Content Version: 11.4 © 8264-9559 [employer]. Care instructions adapted under license by [employer] (which disclaims liability or warranty for this information). If you have questions about a medical condition or this instruction, always ask your healthcare professional. [address] any warranty or liability for your use of this information. Learning About Using Compression Stockings What are compression stockings? Compression stockings may help ease symptoms and prevent problems caused by things like varicose veins, skin ulcers, and deep vein thrombosis. But there are different types of stockings, and they need to fit right. So your doctor will recommend what you need. These stockings are the most common treatment for varicose veins. They help the blood circulate in your legs. This can prevent skin ulcers and keep blood from building up in the legs. Prescription stockings are tightest at the foot. They get less and less tight farther up on your legs. The kind you buy without a prescription have lighter elastic. So the pressure is even all the way up the leg. These don't cost as much. But they don't provide the compression you need to treat serious symptoms or prevent skin ulcers. How do you use compression stockings? · If your stockings are new, you may want to wash them before you put them on. This can make them more flexible and easier to put on. It's a good idea to wash them by hand. · Most of the time it's best to put on your stockings early in the morning. This is when you have the least swelling in your legs. · Put silicone lotion (such as ALPS) or talcum powder on your legs to help the stockings slide on.  If your stockings contain latex, or you aren't sure if they contain latex, do not use other types of lotions or creams on your legs when you wear the stockings. You may use other lotions or creams when you are not wearing the stockings. · Sit in a chair with a back while you put on the stockings. This gives you something to lean against as you pull them up. · How to put on stockings: ¨ Turn your stocking inside out. Then put your toe in as far as it will go. ¨ Readjust the stocking by folding it back onto itself at the ankle. Then hold both sides of the folded stocking. ¨ Pull toward your body as far as you can. ¨ Fold back the stocking again farther up on your leg. Then pull the stocking up to that point. ¨ Repeat folding back and pulling until the stocking is in the right place. · You may want to wear rubber gloves. They can help you hold the stockings better. · If your stockings don't have toes, use a silk \"slip sock. \" (You can get one from your medical supplier.) This will help the stocking slide over your foot better. When you're done, pull off the sock through the open toe. · If you still can't get your stockings on, you may want to use a \"stocking gomez. \" This is a metal device that holds the stocking open while you step into it. It is often recommended for people who have problems grasping, leaning, or pulling. Before you buy one, try it first. Some people find them hard to use. What else should you know about compression stockings? · You will probably need to buy a new pair every 4 to 6 months. · They can be uncomfortable if you wear them all day. They are hot and may be hard to put on. · It is important to think about the pros and cons of stockings. You may not like them. But they may help relieve symptoms and prevent future problems. Where can you learn more? Go to http://sonia-moira.info/. Enter J166 in the search box to learn more about \"Learning About Using Compression Stockings. \" Current as of: March 20, 2017 Content Version: 11.4 © 5096-1965 [employer]. Care instructions adapted under license by [employer] (which disclaims liability or warranty for this information). If you have questions about a medical condition or this instruction, always ask your healthcare professional. [address] any warranty or liability for your use of this information. Introducing [facility]! Dear Lalito Kumari: Thank you for requesting a [employer] account. Our records indicate that you already have an active [employer] account. You can access your account anytime at [email or web address] Did you know that you can access your hospital and ER discharge instructions at any time in [employer]? You can also review all of your test results from your hospital stay or ER visit. Additional Information If you have questions, please visit the Frequently Asked Questions section of the [employer] website at [email or web address] Remember, [employer] is NOT to be used for urgent needs. For medical emergencies, dial 911. Now available from your iPhone and Android! Please provide this summary of care documentation to your next provider. Your primary care clinician is listed as Kayley Wynne. If you have any questions after today's visit, please call [phone].

## 2017-12-27 NOTE — TELEPHONE ENCOUNTER
Please check with patient to see if these symptoms have resolved and if she is still taking the medication. This is not a typical side effect of this medication.

## 2017-12-27 NOTE — TELEPHONE ENCOUNTER
Patient states she is still having the symptoms. patinet states she took a laxative and was  Seen by Dr. Payne Morning, and she will have a EGD done. Patient states she is feeling a little better this morning. Patient states she would like to be seen for the reactions losartan is causing. Patient was given an appointment for 12/27/2017 at 1:00 pm. Patient verbalizes understanding.

## 2017-12-27 NOTE — PATIENT INSTRUCTIONS
High Blood Pressure: Care Instructions  Your Care Instructions    If your blood pressure is usually above 140/90, you have high blood pressure, or hypertension. That means the top number is 140 or higher or the bottom number is 90 or higher, or both. Despite what a lot of people think, high blood pressure usually doesn't cause headaches or make you feel dizzy or lightheaded. It usually has no symptoms. But it does increase your risk for heart attack, stroke, and kidney or eye damage. The higher your blood pressure, the more your risk increases. Your doctor will give you a goal for your blood pressure. Your goal will be based on your health and your age. An example of a goal is to keep your blood pressure below 140/90. Lifestyle changes, such as eating healthy and being active, are always important to help lower blood pressure. You might also take medicine to reach your blood pressure goal.  Follow-up care is a key part of your treatment and safety. Be sure to make and go to all appointments, and call your doctor if you are having problems. It's also a good idea to know your test results and keep a list of the medicines you take. How can you care for yourself at home? Medical treatment  · If you stop taking your medicine, your blood pressure will go back up. You may take one or more types of medicine to lower your blood pressure. Be safe with medicines. Take your medicine exactly as prescribed. Call your doctor if you think you are having a problem with your medicine. · Talk to your doctor before you start taking aspirin every day. Aspirin can help certain people lower their risk of a heart attack or stroke. But taking aspirin isn't right for everyone, because it can cause serious bleeding. · See your doctor regularly. You may need to see the doctor more often at first or until your blood pressure comes down.   · If you are taking blood pressure medicine, talk to your doctor before you take decongestants or anti-inflammatory medicine, such as ibuprofen. Some of these medicines can raise blood pressure. · Learn how to check your blood pressure at home. Lifestyle changes  · Stay at a healthy weight. This is especially important if you put on weight around the waist. Losing even 10 pounds can help you lower your blood pressure. · If your doctor recommends it, get more exercise. Walking is a good choice. Bit by bit, increase the amount you walk every day. Try for at least 30 minutes on most days of the week. You also may want to swim, bike, or do other activities. · Avoid or limit alcohol. Talk to your doctor about whether you can drink any alcohol. · Try to limit how much sodium you eat to less than 2,300 milligrams (mg) a day. Your doctor may ask you to try to eat less than 1,500 mg a day. · Eat plenty of fruits (such as bananas and oranges), vegetables, legumes, whole grains, and low-fat dairy products. · Lower the amount of saturated fat in your diet. Saturated fat is found in animal products such as milk, cheese, and meat. Limiting these foods may help you lose weight and also lower your risk for heart disease. · Do not smoke. Smoking increases your risk for heart attack and stroke. If you need help quitting, talk to your doctor about stop-smoking programs and medicines. These can increase your chances of quitting for good. When should you call for help? Call 911 anytime you think you may need emergency care. This may mean having symptoms that suggest that your blood pressure is causing a serious heart or blood vessel problem. Your blood pressure may be over 180/110. ? For example, call 911 if:  ? · You have symptoms of a heart attack. These may include:  ¨ Chest pain or pressure, or a strange feeling in the chest.  ¨ Sweating. ¨ Shortness of breath. ¨ Nausea or vomiting.   ¨ Pain, pressure, or a strange feeling in the back, neck, jaw, or upper belly or in one or both shoulders or arms.  ¨ Lightheadedness or sudden weakness. ¨ A fast or irregular heartbeat. ? · You have symptoms of a stroke. These may include:  ¨ Sudden numbness, tingling, weakness, or loss of movement in your face, arm, or leg, especially on only one side of your body. ¨ Sudden vision changes. ¨ Sudden trouble speaking. ¨ Sudden confusion or trouble understanding simple statements. ¨ Sudden problems with walking or balance. ¨ A sudden, severe headache that is different from past headaches. ? · You have severe back or belly pain. ?Do not wait until your blood pressure comes down on its own. Get help right away. ?Call your doctor now or seek immediate care if:  ? · Your blood pressure is much higher than normal (such as 180/110 or higher), but you don't have symptoms. ? · You think high blood pressure is causing symptoms, such as:  ¨ Severe headache. ¨ Blurry vision. ? Watch closely for changes in your health, and be sure to contact your doctor if:  ? · Your blood pressure measures 140/90 or higher at least 2 times. That means the top number is 140 or higher or the bottom number is 90 or higher, or both. ? · You think you may be having side effects from your blood pressure medicine. ? · Your blood pressure is usually normal, but it goes above normal at least 2 times. Where can you learn more? Go to http://sonia-moira.info/. Enter S200 in the search box to learn more about \"High Blood Pressure: Care Instructions. \"  Current as of: September 21, 2016  Content Version: 11.4  © 2384-2248 [employer]. Care instructions adapted under license by [employer] (which disclaims liability or warranty for this information). If you have questions about a medical condition or this instruction, always ask your healthcare professional. [place] [address] any warranty or liability for your use of this information.        DASH Diet: Care Instructions  Your Care Instructions    The DASH diet is an eating plan that can help lower your blood pressure. DASH stands for Dietary Approaches to Stop Hypertension. Hypertension is high blood pressure. The DASH diet focuses on eating foods that are high in calcium, potassium, and magnesium. These nutrients can lower blood pressure. The foods that are highest in these nutrients are fruits, vegetables, low-fat dairy products, nuts, seeds, and legumes. But taking calcium, potassium, and magnesium supplements instead of eating foods that are high in those nutrients does not have the same effect. The DASH diet also includes whole grains, fish, and poultry. The DASH diet is one of several lifestyle changes your doctor may recommend to lower your high blood pressure. Your doctor may also want you to decrease the amount of sodium in your diet. Lowering sodium while following the DASH diet can lower blood pressure even further than just the DASH diet alone. Follow-up care is a key part of your treatment and safety. Be sure to make and go to all appointments, and call your doctor if you are having problems. It's also a good idea to know your test results and keep a list of the medicines you take. How can you care for yourself at home? Following the DASH diet  · Eat 4 to 5 servings of fruit each day. A serving is 1 medium-sized piece of fruit, ½ cup chopped or canned fruit, 1/4 cup dried fruit, or 4 ounces (½ cup) of fruit juice. Choose fruit more often than fruit juice. · Eat 4 to 5 servings of vegetables each day. A serving is 1 cup of lettuce or raw leafy vegetables, ½ cup of chopped or cooked vegetables, or 4 ounces (½ cup) of vegetable juice. Choose vegetables more often than vegetable juice. · Get 2 to 3 servings of low-fat and fat-free dairy each day. A serving is 8 ounces of milk, 1 cup of yogurt, or 1 ½ ounces of cheese. · Eat 6 to 8 servings of grains each day.  A serving is 1 slice of bread, 1 ounce of dry cereal, or ½ cup of cooked rice, pasta, or cooked cereal. Try to choose whole-grain products as much as possible. · Limit lean meat, poultry, and fish to 2 servings each day. A serving is 3 ounces, about the size of a deck of cards. · Eat 4 to 5 servings of nuts, seeds, and legumes (cooked dried beans, lentils, and split peas) each week. A serving is 1/3 cup of nuts, 2 tablespoons of seeds, or ½ cup of cooked beans or peas. · Limit fats and oils to 2 to 3 servings each day. A serving is 1 teaspoon of vegetable oil or 2 tablespoons of salad dressing. · Limit sweets and added sugars to 5 servings or less a week. A serving is 1 tablespoon jelly or jam, ½ cup sorbet, or 1 cup of lemonade. · Eat less than 2,300 milligrams (mg) of sodium a day. If you limit your sodium to 1,500 mg a day, you can lower your blood pressure even more. Tips for success  · Start small. Do not try to make dramatic changes to your diet all at once. You might feel that you are missing out on your favorite foods and then be more likely to not follow the plan. Make small changes, and stick with them. Once those changes become habit, add a few more changes. · Try some of the following:  ¨ Make it a goal to eat a fruit or vegetable at every meal and at snacks. This will make it easy to get the recommended amount of fruits and vegetables each day. ¨ Try yogurt topped with fruit and nuts for a snack or healthy dessert. ¨ Add lettuce, tomato, cucumber, and onion to sandwiches. ¨ Combine a ready-made pizza crust with low-fat mozzarella cheese and lots of vegetable toppings. Try using tomatoes, squash, spinach, broccoli, carrots, cauliflower, and onions. ¨ Have a variety of cut-up vegetables with a low-fat dip as an appetizer instead of chips and dip. ¨ Sprinkle sunflower seeds or chopped almonds over salads. Or try adding chopped walnuts or almonds to cooked vegetables. ¨ Try some vegetarian meals using beans and peas. Add garbanzo or kidney beans to salads. Make burritos and tacos with mashed porras beans or black beans. Where can you learn more? Go to http://sonia-moira.info/. Enter F623 in the search box to learn more about \"DASH Diet: Care Instructions. \"  Current as of: September 21, 2016  Content Version: 11.4  © 5116-4823 [employer]. Care instructions adapted under license by [employer] (which disclaims liability or warranty for this information). If you have questions about a medical condition or this instruction, always ask your healthcare professional. [place] [address] any warranty or liability for your use of this information. Learning About Using Compression Stockings  What are compression stockings? Compression stockings may help ease symptoms and prevent problems caused by things like varicose veins, skin ulcers, and deep vein thrombosis. But there are different types of stockings, and they need to fit right. So your doctor will recommend what you need. These stockings are the most common treatment for varicose veins. They help the blood circulate in your legs. This can prevent skin ulcers and keep blood from building up in the legs. Prescription stockings are tightest at the foot. They get less and less tight farther up on your legs. The kind you buy without a prescription have lighter elastic. So the pressure is even all the way up the leg. These don't cost as much. But they don't provide the compression you need to treat serious symptoms or prevent skin ulcers. How do you use compression stockings? · If your stockings are new, you may want to wash them before you put them on. This can make them more flexible and easier to put on. It's a good idea to wash them by hand. · Most of the time it's best to put on your stockings early in the morning. This is when you have the least swelling in your legs.   · Put silicone lotion (such as ALPS) or talcum powder on your legs to help the stockings slide on. If your stockings contain latex, or you aren't sure if they contain latex, do not use other types of lotions or creams on your legs when you wear the stockings. You may use other lotions or creams when you are not wearing the stockings. · Sit in a chair with a back while you put on the stockings. This gives you something to lean against as you pull them up. · How to put on stockings:  ¨ Turn your stocking inside out. Then put your toe in as far as it will go. ¨ Readjust the stocking by folding it back onto itself at the ankle. Then hold both sides of the folded stocking. ¨ Pull toward your body as far as you can. ¨ Fold back the stocking again farther up on your leg. Then pull the stocking up to that point. ¨ Repeat folding back and pulling until the stocking is in the right place. · You may want to wear rubber gloves. They can help you hold the stockings better. · If your stockings don't have toes, use a silk \"slip sock. \" (You can get one from your medical supplier.) This will help the stocking slide over your foot better. When you're done, pull off the sock through the open toe. · If you still can't get your stockings on, you may want to use a \"stocking gomez. \" This is a metal device that holds the stocking open while you step into it. It is often recommended for people who have problems grasping, leaning, or pulling. Before you buy one, try it first. Some people find them hard to use. What else should you know about compression stockings? · You will probably need to buy a new pair every 4 to 6 months. · They can be uncomfortable if you wear them all day. They are hot and may be hard to put on. · It is important to think about the pros and cons of stockings. You may not like them. But they may help relieve symptoms and prevent future problems. Where can you learn more? Go to http://sonia-moira.info/.   Enter J166 in the search box to learn more about \"Learning About Using Compression Stockings. \"  Current as of: March 20, 2017  Content Version: 11.4  © 4969-0585 [employer]. Care instructions adapted under license by [employer] (which disclaims liability or warranty for this information). If you have questions about a medical condition or this instruction, always ask your healthcare professional. [place] [address] any warranty or liability for your use of this information.

## 2017-12-27 NOTE — PROGRESS NOTES
Claudene Cordoba is a  74 y.o. female presents today for office visit for reaction to medication patient state that her legs and feet has been swollen x 1wk. 1. Have you been to the ER, urgent care clinic or hospitalized since your last visit? NO     2. Have you seen or consulted any other health care providers outside of the [address] [place] [address] since your last visit (Include any pap smears or colon screening)?   YES Sumanth Little

## 2017-12-27 NOTE — PROGRESS NOTES
HPI:    Aparna Fox  is a 74 y.o.  female  patient who comes in today for lower extremity swelling and venous pain of left lower leg x 2 weeks. She states that the pain is going up her leg and causing \"knots\" in her veins. She feels like the losartan is causing this pain. She is not using compression stockings, she states that they hurt her legs. She has had an 11 pound weight gain since October. 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 She states compliance of medication and has been taking Lasix 20 mg twice daily. She is due to undergo endoscopy for upper GI pain. Patient denies SOB, CP, dizziness, headache. Current Outpatient Prescriptions   Medication Sig Dispense Refill    losartan (COZAAR) 50 mg tablet TAKE 2 TABLETS ONE TIME DAILY FOR HYPERTENSION 180 Tab 1    atorvastatin (LIPITOR) 20 mg tablet TAKE 1 TABLET EVERY DAY 90 Tab 1    potassium chloride (KLOR-CON) 10 mEq tablet TAKE 2 TABLETS TWICE DAILY 360 Tab 1    furosemide (LASIX) 20 mg tablet TAKE 1 TABLET EVERY DAY FOR EDEMA 90 Tab 1    lidocaine (LIDODERM) 5 % Cut to fit the affected area. Apply patch for 12 hours, then remove for another 12 hours. 1 Each 0    diclofenac (VOLTAREN) 1 % gel Apply 2 g to affected area two (2) times a day. 100 g 1    chlorhexidine (PERIDEX) 0.12 % solution USE AS A MOUTHWASH TWO TIMES A DAY  0    albuterol (PROVENTIL HFA, VENTOLIN HFA, PROAIR HFA) 90 mcg/actuation inhaler Take 2 Puffs by inhalation every four (4) hours as needed for Wheezing. 1 Inhaler 0    olopatadine (PATANOL) 0.1 % ophthalmic solution Administer 2 Drops to both eyes two (2) times a day. Indications: Allergic Conjunctivitis 6 mL 0    cholecalciferol (VITAMIN D3) 1,000 unit tablet Take 2,000 Units by mouth daily.  Dexlansoprazole (DEXILANT) 60 mg CpDM Take 60 mg by mouth daily as needed.       cpap machine kit Take by inhalation every evening. Used when sleeping      aspirin 81 mg Tab Take 81 mg by mouth daily.  MULTIVITAMINS (MULTI-VITAMIN PO) Take 1 Tab by mouth daily.  spironolactone (ALDACTONE) 25 mg tablet Take 1 Tab by mouth daily. Pt states she takes the OOD. (Patient taking differently: Take 25 mg by mouth daily. Indications: hypertension) 90 Tab 1      Allergies   Allergen Reactions    Latex Rash    Nexium [Esomeprazole Magnesium] Swelling and Other (comments)     Lips Swollen, and facial rash and swelling    Crestor [Rosuvastatin] Other (comments)     Upper respiratory illness    Lisinopril Unknown (comments)     Patient can't recall    Niaspan [Niacin] Other (comments)     Scratchy throat, \"asthma\" like symptoms    Pcn [Penicillins] Hives    Pravachol [Pravastatin] Myalgia    Sulfa (Sulfonamide Antibiotics) Rash    Zocor [Simvastatin] Myalgia and Other (comments)     Per patient chart \"(? Rash)\"      Past Medical History:   Diagnosis Date    Acute idiopathic gout of right wrist 10/4/2017    Atrophic vaginitis     Cardiac cath 05/30/2012    Patent coronary arteries. LVEDP 20.  RA 11.  RV 42/10. PA 42/21. W 18.  CO 6.4 (TD), 6.4 (Delberta Hopper). PVR 1.7 Wood units. False-positive nuclear study.  Cardiac echocardiogram 05/11/2011    EF 65%. RVSP at least 35 mmHg. Mild IVCE. No significant valvular heart disease.  Cardiac nuclear imaging test 05/18/2012    Tech difficult. Apical ischemia. No infarction. EF 76%. No reg'l WMA. No TID.  Cardiovascular LLE venous duplex 06/17/2013    Left leg:  No DVT.     Diabetes     diet controlled, hypoglycemia from metformin previously     Dyslipidemia     Fatty liver     Fibrocystic breast disease     Fluid retention     GERD     H/O colonoscopy 4/12/13    polyp    Hypertension     Ill-defined condition     gout    Macular degeneration     Morbid obesity (HCC)     Obstructive sleep apnea     Peripheral neuropathy     Rectocele Family History   Problem Relation Age of Onset   Raphael Nayak Cancer Mother      colon cancer    Colon Cancer Mother     Hypertension Mother     Diabetes Mother     Heart Disease Mother     Coronary Artery Disease Mother     Hypertension Father     Diabetes Father     Heart Disease Father     Coronary Artery Disease Father     Hypertension Sister     Diabetes Sister     Heart Disease Sister     Coronary Artery Disease Sister     Hypertension Brother     Diabetes Brother     Heart Disease Brother     Coronary Artery Disease Brother       Patient Active Problem List   Diagnosis Code    Diabetes E11.9    Hypertension I11.9    Dyslipidemia E78.5    Sleep apnea/ on c-pap G47.30    Renal cyst N28.1    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 pylori infection A04.8    Acute idiopathic gout of right wrist M10.031    Sliding hiatal hernia K44.9    Pseudogout of right shoulder M11.211    Primary osteoarthritis involving multiple joints M15.0    Obesity, morbid ([address] E66.01            Review of Systems   Constitutional: Negative for malaise/fatigue. Cardiovascular: Positive for leg swelling. Negative for chest pain and palpitations. Neurological: Negative for dizziness, tingling, sensory change and headaches. Visit Vitals    /77 (BP 1 Location: Left arm, BP Patient Position: Sitting)    Pulse 65    Temp 97.7 °F (36.5 °C) (Oral)    Resp 18    Ht 5' 3\" (1.6 m)    Wt 251 lb 6.4 oz (114 kg)    SpO2 98%    BMI 44.53 kg/m2        Physical Exam   Constitutional: She is oriented to person, place, and time and well-developed, well-nourished, and in no distress. HENT:   Head: Normocephalic and atraumatic. Eyes: Conjunctivae are normal.   Neck: Normal range of motion. Neck supple. No thyromegaly present. Cardiovascular: Normal rate, regular rhythm, normal heart sounds and intact distal pulses.     Pulmonary/Chest: Effort normal and breath sounds normal.   Abdominal: Soft. Bowel sounds are normal. She exhibits no distension and no mass. There is no tenderness. Musculoskeletal: She exhibits edema (+1 peripheral edema bilaterally). Left ankle: She exhibits swelling. She exhibits no ecchymosis, no deformity and normal pulse. Tenderness. Feet:    Lymphadenopathy:     She has no cervical adenopathy. Neurological: She is alert and oriented to person, place, and time. Skin: No erythema. Vitals reviewed. Assessment/Plan:    Diagnoses and all orders for this visit:    1. Chronic venous insufficiency - compressions stocking 20-30, will consult with vascular for their recommendation from a previous visit. 2. Peripheral edema - continue lasix, she declines recommendation for doppler on the left anterior foot. 3.  Hypertension - decrease the losartan to 50 mg from 100 mg and re-start spironolactone  To aid in diuresis. Also, have reviewed importance of low sodium diet. Follow-up Disposition:  Return if symptoms worsen or fail to improve, for keep scheduled appoinment 1/2. Additional Notes: Discussed today's diagnosis, treatment plans. Discussed medication indications and side effects. Preventive Medicine:   After Visit Summary: Provided and discussed printed patient instructions. Answered all questions and patient acknowledged understanding. Casi Griffin PA-C     I reviewed the patient's medical history, the physician assistant's findings on physical examination, the patient's diagnoses, and treatment plan as documented in the progress note. I concur with the treatment plan as documented. There are no additional recommendations at this time.     Luis F Tom MD

## 2018-01-02 ENCOUNTER — HOSPITAL ENCOUNTER (OUTPATIENT)
Dept: ULTRASOUND IMAGING | Age: 73
Discharge: HOME | End: 2018-01-02
Attending: INTERNAL MEDICINE
Payer: MEDICARE

## 2018-01-02 DIAGNOSIS — R10.13 EPIGASTRIC PAIN: ICD-10-CM

## 2018-01-02 PROCEDURE — 76705 ECHO EXAM OF ABDOMEN: CPT

## 2018-01-03 ENCOUNTER — OFFICE VISIT (OUTPATIENT)
Dept: FAMILY MEDICINE CLINIC | Age: 73
End: 2018-01-03

## 2018-01-03 VITALS
SYSTOLIC BLOOD PRESSURE: 123 MMHG | OXYGEN SATURATION: 97 % | DIASTOLIC BLOOD PRESSURE: 74 MMHG | WEIGHT: 247.2 LBS | RESPIRATION RATE: 18 BRPM | HEART RATE: 70 BPM | TEMPERATURE: 98.3 F | BODY MASS INDEX: 43.8 KG/M2 | HEIGHT: 63 IN

## 2018-01-03 DIAGNOSIS — R60.0 MILD PERIPHERAL EDEMA: Primary | ICD-10-CM

## 2018-01-03 DIAGNOSIS — E66.01 OBESITY, MORBID (HCC): ICD-10-CM

## 2018-01-03 DIAGNOSIS — I11.9 BENIGN HYPERTENSIVE HEART DISEASE WITHOUT HEART FAILURE: ICD-10-CM

## 2018-01-03 DIAGNOSIS — K44.9 SLIDING HIATAL HERNIA: ICD-10-CM

## 2018-01-03 NOTE — PATIENT INSTRUCTIONS
Learning About High Blood Pressure  What is high blood pressure? Blood pressure is a measure of how hard the blood pushes against the walls of your arteries. It's normal for blood pressure to go up and down throughout the day, but if it stays up, you have high blood pressure. Another name for high blood pressure is hypertension. Two numbers tell you your blood pressure. The first number is the systolic pressure. It shows how hard the blood pushes when your heart is pumping. The second number is the diastolic pressure. It shows how hard the blood pushes between heartbeats, when your heart is relaxed and filling with blood. A blood pressure of less than 120/80 (say \"120 over 80\") is ideal for an adult. High blood pressure is 140/90 or higher. You have high blood pressure if your top number is 140 or higher or your bottom number is 90 or higher, or both. Many people fall into the category in between, called prehypertension. People with prehypertension need to make lifestyle changes to bring their blood pressure down and help prevent or delay high blood pressure. What happens when you have high blood pressure? · Blood flows through your arteries with too much force. Over time, this damages the walls of your arteries. But you can't feel it. High blood pressure usually doesn't cause symptoms. · Fat and calcium start to build up in your arteries. This buildup is called plaque. Plaque makes your arteries narrower and stiffer. Blood can't flow through them as easily. · This lack of good blood flow starts to damage some of the organs in your body. This can lead to problems such as coronary artery disease and heart attack, heart failure, stroke, kidney failure, and eye damage. How can you prevent high blood pressure? · Stay at a healthy weight. · Try to limit how much sodium you eat to less than 2,300 milligrams (mg) a day.  If you limit your sodium to 1,500 mg a day, you can lower your blood pressure even more.  ¨ Buy foods that are labeled \"unsalted,\" \"sodium-free,\" or \"low-sodium. \" Foods labeled \"reduced-sodium\" and \"light sodium\" may still have too much sodium. ¨ Flavor your food with garlic, lemon juice, onion, vinegar, herbs, and spices instead of salt. Do not use soy sauce, steak sauce, onion salt, garlic salt, mustard, or ketchup on your food. ¨ Use less salt (or none) when recipes call for it. You can often use half the salt a recipe calls for without losing flavor. · Be physically active. Get at least 30 minutes of exercise on most days of the week. Walking is a good choice. You also may want to do other activities, such as running, swimming, cycling, or playing tennis or team sports. · Limit alcohol to 2 drinks a day for men and 1 drink a day for women. · Eat plenty of fruits, vegetables, and low-fat dairy products. Eat less saturated and total fats. How is high blood pressure treated? · Your doctor will suggest making lifestyle changes. For example, your doctor may ask you to eat healthy foods, quit smoking, lose extra weight, and be more active. · If lifestyle changes don't help enough or your blood pressure is very high, you will have to take medicine every day. Follow-up care is a key part of your treatment and safety. Be sure to make and go to all appointments, and call your doctor if you are having problems. It's also a good idea to know your test results and keep a list of the medicines you take. Where can you learn more? Go to http://sonia-moira.info/. Enter P501 in the search box to learn more about \"Learning About High Blood Pressure. \"  Current as of: September 21, 2016  Content Version: 11.4  © 9501-7912 [employer]. Care instructions adapted under license by [employer] (which disclaims liability or warranty for this information).  If you have questions about a medical condition or this instruction, always ask your healthcare professional. [place], [facility] disclaims any warranty or liability for your use of this information. Learning About High Blood Pressure  What is high blood pressure? Blood pressure is a measure of how hard the blood pushes against the walls of your arteries. It's normal for blood pressure to go up and down throughout the day, but if it stays up, you have high blood pressure. Another name for high blood pressure is hypertension. Two numbers tell you your blood pressure. The first number is the systolic pressure. It shows how hard the blood pushes when your heart is pumping. The second number is the diastolic pressure. It shows how hard the blood pushes between heartbeats, when your heart is relaxed and filling with blood. A blood pressure of less than 120/80 (say \"120 over 80\") is ideal for an adult. High blood pressure is 140/90 or higher. You have high blood pressure if your top number is 140 or higher or your bottom number is 90 or higher, or both. Many people fall into the category in between, called prehypertension. People with prehypertension need to make lifestyle changes to bring their blood pressure down and help prevent or delay high blood pressure. What happens when you have high blood pressure? · Blood flows through your arteries with too much force. Over time, this damages the walls of your arteries. But you can't feel it. High blood pressure usually doesn't cause symptoms. · Fat and calcium start to build up in your arteries. This buildup is called plaque. Plaque makes your arteries narrower and stiffer. Blood can't flow through them as easily. · This lack of good blood flow starts to damage some of the organs in your body. This can lead to problems such as coronary artery disease and heart attack, heart failure, stroke, kidney failure, and eye damage. How can you prevent high blood pressure? · Stay at a healthy weight.   · Try to limit how much sodium you eat to less than 2,300 milligrams (mg) a day. If you limit your sodium to 1,500 mg a day, you can lower your blood pressure even more. ¨ Buy foods that are labeled \"unsalted,\" \"sodium-free,\" or \"low-sodium. \" Foods labeled \"reduced-sodium\" and \"light sodium\" may still have too much sodium. ¨ Flavor your food with garlic, lemon juice, onion, vinegar, herbs, and spices instead of salt. Do not use soy sauce, steak sauce, onion salt, garlic salt, mustard, or ketchup on your food. ¨ Use less salt (or none) when recipes call for it. You can often use half the salt a recipe calls for without losing flavor. · Be physically active. Get at least 30 minutes of exercise on most days of the week. Walking is a good choice. You also may want to do other activities, such as running, swimming, cycling, or playing tennis or team sports. · Limit alcohol to 2 drinks a day for men and 1 drink a day for women. · Eat plenty of fruits, vegetables, and low-fat dairy products. Eat less saturated and total fats. How is high blood pressure treated? · Your doctor will suggest making lifestyle changes. For example, your doctor may ask you to eat healthy foods, quit smoking, lose extra weight, and be more active. · If lifestyle changes don't help enough or your blood pressure is very high, you will have to take medicine every day. Follow-up care is a key part of your treatment and safety. Be sure to make and go to all appointments, and call your doctor if you are having problems. It's also a good idea to know your test results and keep a list of the medicines you take. Where can you learn more? Go to http://sonia-moira.info/. Enter P501 in the search box to learn more about \"Learning About High Blood Pressure. \"  Current as of: September 21, 2016  Content Version: 11.4  © 7224-4080 [email or web address]. Care instructions adapted under license by [employer] (which disclaims liability or warranty for this information).  If you have questions about a medical condition or this instruction, always ask your healthcare professional. [address] any warranty or liability for your use of this information. Learning About Diabetes Food Guidelines  Your Care Instructions    Meal planning is important to manage diabetes. It helps keep your blood sugar at a target level (which you set with your doctor). You don't have to eat special foods. You can eat what your family eats, including sweets once in a while. But you do have to pay attention to how often you eat and how much you eat of certain foods. You may want to work with a dietitian or a certified diabetes educator (CDE) to help you plan meals and snacks. A dietitian or CDE can also help you lose weight if that is one of your goals. What should you know about eating carbs? Managing the amount of carbohydrate (carbs) you eat is an important part of healthy meals when you have diabetes. Carbohydrate is found in many foods. · Learn which foods have carbs. And learn the amounts of carbs in different foods. ¨ Bread, cereal, pasta, and rice have about 15 grams of carbs in a serving. A serving is 1 slice of bread (1 ounce), ½ cup of cooked cereal, or 1/3 cup of cooked pasta or rice. ¨ Fruits have 15 grams of carbs in a serving. A serving is 1 small fresh fruit, such as an apple or orange; ½ of a banana; ½ cup of cooked or canned fruit; ½ cup of fruit juice; 1 cup of melon or raspberries; or 2 tablespoons of dried fruit. ¨ Milk and no-sugar-added yogurt have 15 grams of carbs in a serving. A serving is 1 cup of milk or 2/3 cup of no-sugar-added yogurt. ¨ Starchy vegetables have 15 grams of carbs in a serving. A serving is ½ cup of mashed potatoes or sweet potato; 1 cup winter squash; ½ of a small baked potato; ½ cup of cooked beans; or ½ cup cooked corn or green peas.   · Learn how much carbs to eat each day and at each meal. A dietitian or CDE can teach you how to keep track of the amount of carbs you eat. This is called carbohydrate counting. · If you are not sure how to count carbohydrate grams, use the Plate Method to plan meals. It is a good, quick way to make sure that you have a balanced meal. It also helps you spread carbs throughout the day. ¨ Divide your plate by types of foods. Put non-starchy vegetables on half the plate, meat or other protein food on one-quarter of the plate, and a grain or starchy vegetable in the final quarter of the plate. To this you can add a small piece of fruit and 1 cup of milk or yogurt, depending on how many carbs you are supposed to eat at a meal.  · Try to eat about the same amount of carbs at each meal. Do not \"save up\" your daily allowance of carbs to eat at one meal.  · Proteins have very little or no carbs per serving. Examples of proteins are beef, chicken, turkey, fish, eggs, tofu, cheese, cottage cheese, and peanut butter. A serving size of meat is 3 ounces, which is about the size of a deck of cards. Examples of meat substitute serving sizes (equal to 1 ounce of meat) are 1/4 cup of cottage cheese, 1 egg, 1 tablespoon of peanut butter, and ½ cup of tofu. How can you eat out and still eat healthy? · Learn to estimate the serving sizes of foods that have carbohydrate. If you measure food at home, it will be easier to estimate the amount in a serving of restaurant food. · If the meal you order has too much carbohydrate (such as potatoes, corn, or baked beans), ask to have a low-carbohydrate food instead. Ask for a salad or green vegetables. · If you use insulin, check your blood sugar before and after eating out to help you plan how much to eat in the future. · If you eat more carbohydrate at a meal than you had planned, take a walk or do other exercise. This will help lower your blood sugar. What else should you know? · Limit saturated fat, such as the fat from meat and dairy products.  This is a healthy choice because people who have diabetes are at higher risk of heart disease. So choose lean cuts of meat and nonfat or low-fat dairy products. Use olive or canola oil instead of butter or shortening when cooking. · Don't skip meals. Your blood sugar may drop too low if you skip meals and take insulin or certain medicines for diabetes. · Check with your doctor before you drink alcohol. Alcohol can cause your blood sugar to drop too low. Alcohol can also cause a bad reaction if you take certain diabetes medicines. Follow-up care is a key part of your treatment and safety. Be sure to make and go to all appointments, and call your doctor if you are having problems. It's also a good idea to know your test results and keep a list of the medicines you take. Where can you learn more? Go to http://sonia-moira.info/. Enter G455 in the search box to learn more about \"Learning About Diabetes Food Guidelines. \"  Current as of: March 13, 2017  Content Version: 11.4  © 4356-5498 [employer]. Care instructions adapted under license by [employer] (which disclaims liability or warranty for this information). If you have questions about a medical condition or this instruction, always ask your healthcare professional. [place] [address] any warranty or liability for your use of this information. Learning About the [address] Diet  What is the Mediterranean diet? The Mediterranean diet is a style of eating rather than a diet plan. It features foods eaten in [place], [place], [place] and [place], and other countries along the [facility]. It emphasizes eating foods like fish, fruits, vegetables, beans, high-fiber breads and whole grains, nuts, and olive oil. This style of eating includes limited red meat, cheese, and sweets. Why choose the Mediterranean diet? A Mediterranean-style diet may improve heart health. It contains more fat than other heart-healthy diets.  But the fats are mainly from nuts, unsaturated oils (such as fish oils and olive oil), and certain nut or seed oils (such as canola, soybean, or flaxseed oil). These fats may help protect the heart and blood vessels. How can you get started on the Mediterranean diet? Here are some things you can do to switch to a more Mediterranean way of eating. What to eat  · Eat a variety of fruits and vegetables each day, such as grapes, blueberries, tomatoes, broccoli, peppers, figs, olives, spinach, eggplant, beans, lentils, and chickpeas. · Eat a variety of whole-grain foods each day, such as oats, brown rice, and whole wheat bread, pasta, and couscous. · Eat fish at least 2 times a week. Try tuna, salmon, mackerel, lake trout, herring, or sardines. · Eat moderate amounts of low-fat dairy products, such as milk, cheese, or yogurt. · Eat moderate amounts of poultry and eggs. · Choose healthy (unsaturated) fats, such as nuts, olive oil, and certain nut or seed oils like canola, soybean, and flaxseed. · Limit unhealthy (saturated) fats, such as butter, palm oil, and coconut oil. And limit fats found in animal products, such as meat and dairy products made with whole milk. Try to eat red meat only a few times a month in very small amounts. · Limit sweets and desserts to only a few times a week. This includes sugar-sweetened drinks like soda. The Mediterranean diet may also include red wine with your meal-1 glass each day for women and up to 2 glasses a day for men. Tips for eating at home  · Use herbs, spices, garlic, lemon zest, and citrus juice instead of salt to add flavor to foods. · Add avocado slices to your sandwich instead of galvan. · Have fish for lunch or dinner instead of red meat. Brush the fish with olive oil, and broil or grill it. · Sprinkle your salad with seeds or nuts instead of cheese. · Cook with olive or canola oil instead of butter or oils that are high in saturated fat.   · Switch from 2% milk or whole milk to 1% or fat-free milk. · Dip raw vegetables in a vinaigrette dressing or hummus instead of dips made from mayonnaise or sour cream.  · Have a piece of fruit for dessert instead of a piece of cake. Try baked apples, or have some dried fruit. Tips for eating out  · Try broiled, grilled, baked, or poached fish instead of having it fried or breaded. · Ask your  to have your meals prepared with olive oil instead of butter. · Order dishes made with marinara sauce or sauces made from olive oil. Avoid sauces made from cream or mayonnaise. · Choose whole-grain breads, whole wheat pasta and pizza crust, brown rice, beans, and lentils. · Cut back on butter or margarine on bread. Instead, you can dip your bread in a small amount of olive oil. · Ask for a side salad or grilled vegetables instead of french fries or chips. Where can you learn more? Go to http://sonia-moira.info/. Enter [phone] in the search box to learn more about \"Learning About the Mediterranean Diet. \"  Current as of: May 12, 2017  Content Version: 11.4  © 1154-3876 [email or web address]. Care instructions adapted under license by [employer] (which disclaims liability or warranty for this information). If you have questions about a medical condition or this instruction, always ask your healthcare professional. [place] [address] any warranty or liability for your use of this information. Chest Pain: Care Instructions  Your Care Instructions    There are many things that can cause chest pain. Some are not serious and will get better on their own in a few days. But some kinds of chest pain need more testing and treatment. Your doctor may have recommended a follow-up visit in the next 8 to 12 hours. If you are not getting better, you may need more tests or treatment. Even though your doctor has released you, you still need to watch for any problems.  The doctor carefully checked you, but sometimes problems can develop later. If you have new symptoms or if your symptoms do not get better, get medical care right away. If you have worse or different chest pain or pressure that lasts more than 5 minutes or you passed out (lost consciousness), call 911 or seek other emergency help right away. A medical visit is only one step in your treatment. Even if you feel better, you still need to do what your doctor recommends, such as going to all suggested follow-up appointments and taking medicines exactly as directed. This will help you recover and help prevent future problems. How can you care for yourself at home? · Rest until you feel better. · Take your medicine exactly as prescribed. Call your doctor if you think you are having a problem with your medicine. · Do not drive after taking a prescription pain medicine. When should you call for help? Call 911 if:  ? · You passed out (lost consciousness). ? · You have severe difficulty breathing. ? · You have symptoms of a heart attack. These may include:  ¨ Chest pain or pressure, or a strange feeling in your chest.  ¨ Sweating. ¨ Shortness of breath. ¨ Nausea or vomiting. ¨ Pain, pressure, or a strange feeling in your back, neck, jaw, or upper belly or in one or both shoulders or arms. ¨ Lightheadedness or sudden weakness. ¨ A fast or irregular heartbeat. After you call 911, the  may tell you to chew 1 adult-strength or 2 to 4 low-dose aspirin. Wait for an ambulance. Do not try to drive yourself. ?Call your doctor today if:  ? · You have any trouble breathing. ? · Your chest pain gets worse. ? · You are dizzy or lightheaded, or you feel like you may faint. ? · You are not getting better as expected. ? · You are having new or different chest pain. Where can you learn more? Go to http://sonia-moira.info/. Enter A120 in the search box to learn more about \"Chest Pain: Care Instructions. \"  Current as of: March 20, 2017  Content Version: 11.4  © 9552-0307 [employer]. Care instructions adapted under license by [employer] (which disclaims liability or warranty for this information). If you have questions about a medical condition or this instruction, always ask your healthcare professional. [address] any warranty or liability for your use of this information.

## 2018-01-03 NOTE — MR AVS SNAPSHOT
Visit Information Date & Time Provider Department Dept. Phone Encounter #  
 1/3/2018  3:00 PM Hung Tesfaye PA-C [facility] [phone] [id number] Follow-up Instructions Return if symptoms worsen or fail to improve, for as scheduled. Your Appointments 2/13/2018  8:15 AM  
LAB with [facility] NURSE [facility] ([facility]) Appt Note: lab; lab  
 [address], [place] [address] [address]  
[phone]  
  
   
 [address], [place] [facility]  
  
    
 2/20/2018 11:00 AM  
Office Visit with Hung Tesfaye PA-C [facility] ([facility]) Appt Note: 4 m fu  
 [address] [facility], [place] [address] [address]  
[phone]  
  
   
 [address], [place] [facility]  
  
    
 3/27/2018  1:20 PM  
Follow Up with Natalio Armando DO [facility] ([address]) Appt Note: 1 year follow up with an EKG  
 [address] [facility] [place] [id number] [phone] [address] Upcoming Health Maintenance Date Due COLONOSCOPY 4/12/2018 HEMOGLOBIN A1C Q6M 4/4/2018 EYE EXAM RETINAL OR DILATED Q1 5/31/2018 FOOT EXAM Q1 9/6/2018 MEDICARE YEARLY EXAM 9/7/2018 MICROALBUMIN Q1 10/4/2018 LIPID PANEL Q1 10/4/2018 BREAST CANCER SCRN MAMMOGRAM 10/27/2018 GLAUCOMA SCREENING Q2Y 6/7/2019 DTaP/Tdap/Td series (2 - Td) 7/31/2023 Allergies as of 1/3/2018  Review Complete On: 1/3/2018 By: Hung Tesfaye PA-C Severity Noted Reaction Type Reactions Latex    Rash Nexium [Esomeprazole Magnesium] High 12/13/2010    Swelling, Other (comments) Lips Swollen, and facial rash and swelling Crestor [Rosuvastatin]    Other (comments) Upper respiratory illness Lisinopril  05/17/2010   Side Effect Unknown (comments) Patient can't recall Niaspan [Niacin]  09/19/2011    Other (comments) Scratchy throat, \"asthma\" like symptoms Pcn [Penicillins]  12/18/2009    Hives Pravachol [Pravastatin]    Myalgia Sulfa (Sulfonamide Antibiotics)  12/18/2009    Rash Zocor [Simvastatin]    Myalgia, Other (comments) Per patient chart \"(? Rash)\" Current Immunizations  Reviewed on 10/21/2014 Name Date Influenza High Dose Vaccine PF 9/12/2017 Influenza Vaccine 10/21/2014, 10/30/2013 Pneumococcal Vaccine (Unspecified Type) 1/1/2008 Tdap 7/31/2013 12:00 AM  
  
 Not reviewed this visit You Were Diagnosed With   
  
 Codes Comments Mild peripheral edema    -  Primary ICD-10-CM: R60.9 ICD-9-CM: 782.3 Sliding hiatal hernia     ICD-10-CM: K44.9 ICD-9-CM: 553.3 Benign hypertensive heart disease without heart failure     ICD-10-CM: I11.9 ICD-9-CM: 402.10 Obesity, morbid ([facility] [address]     ICD-10-CM: E66.01 
ICD-9-CM: 278.01 Vitals BP Pulse Temp Resp Height(growth percentile) Weight(growth percentile) 123/74 (BP 1 Location: Left arm, BP Patient Position: Sitting) 70 98.3 °F (36.8 °C) (Oral) 18 5' 3\" (1.6 m) 247 lb 3.2 oz (112.1 kg) SpO2 BMI OB Status Smoking Status 97% 43.79 kg/m2 Hysterectomy Former Smoker BMI and BSA Data Body Mass Index Body Surface Area 43.79 kg/m 2 2.23 m 2 Preferred Pharmacy Pharmacy Name Phone [address], [address] [place] [address] [phone] Your Updated Medication List  
  
   
This list is accurate as of: 1/3/18  4:17 PM.  Always use your most recent med list.  
  
  
  
  
 albuterol 90 mcg/actuation inhaler Commonly known as:  PROVENTIL HFA, VENTOLIN HFA, PROAIR HFA Take 2 Puffs by inhalation every four (4) hours as needed for Wheezing. aspirin 81 mg tablet Take 81 mg by mouth daily. atorvastatin 20 mg tablet Commonly known as:  LIPITOR  
TAKE 1 TABLET EVERY DAY  
  
 chlorhexidine 0.12 % solution Commonly known as:  PERIDEX  
USE AS A MOUTHWASH TWO TIMES A DAY  
  
 cpap machine kit Take  by inhalation every evening. Used when sleeping DEXILANT 60 mg Cpdb Generic drug:  Dexlansoprazole Take 60 mg by mouth daily as needed. diclofenac 1 % Gel Commonly known as:  VOLTAREN Apply 2 g to affected area two (2) times a day. furosemide 20 mg tablet Commonly known as:  LASIX TAKE 1 TABLET EVERY DAY FOR EDEMA  
  
 lidocaine 5 % Commonly known as:  Ishaan Man Cut to fit the affected area. Apply patch for 12 hours, then remove for another 12 hours. losartan 50 mg tablet Commonly known as:  COZAAR  
TAKE 2 TABLETS ONE TIME DAILY FOR HYPERTENSION  
  
 MULTI-VITAMIN PO Take 1 Tab by mouth daily. olopatadine 0.1 % ophthalmic solution Commonly known as:  PATANOL Administer 2 Drops to both eyes two (2) times a day. Indications: Allergic Conjunctivitis  
  
 potassium chloride 10 mEq tablet Commonly known as:  KLOR-CON  
TAKE 2 TABLETS TWICE DAILY  
  
 spironolactone 25 mg tablet Commonly known as:  ALDACTONE Take 1 Tab by mouth daily. Pt states she takes the OOD. VITAMIN D3 1,000 unit tablet Generic drug:  cholecalciferol Take 2,000 Units by mouth daily. Follow-up Instructions Return if symptoms worsen or fail to improve, for as scheduled. Patient Instructions Learning About High Blood Pressure What is high blood pressure? Blood pressure is a measure of how hard the blood pushes against the walls of your arteries. It's normal for blood pressure to go up and down throughout the day, but if it stays up, you have high blood pressure. Another name for high blood pressure is hypertension. Two numbers tell you your blood pressure. The first number is the systolic pressure. It shows how hard the blood pushes when your heart is pumping. The second number is the diastolic pressure. It shows how hard the blood pushes between heartbeats, when your heart is relaxed and filling with blood. A blood pressure of less than 120/80 (say \"120 over 80\") is ideal for an adult. High blood pressure is 140/90 or higher. You have high blood pressure if your top number is 140 or higher or your bottom number is 90 or higher, or both. Many people fall into the category in between, called prehypertension. People with prehypertension need to make lifestyle changes to bring their blood pressure down and help prevent or delay high blood pressure. What happens when you have high blood pressure? · Blood flows through your arteries with too much force. Over time, this damages the walls of your arteries. But you can't feel it. High blood pressure usually doesn't cause symptoms. · Fat and calcium start to build up in your arteries. This buildup is called plaque. Plaque makes your arteries narrower and stiffer. Blood can't flow through them as easily. · This lack of good blood flow starts to damage some of the organs in your body. This can lead to problems such as coronary artery disease and heart attack, heart failure, stroke, kidney failure, and eye damage. How can you prevent high blood pressure? · Stay at a healthy weight. · Try to limit how much sodium you eat to less than 2,300 milligrams (mg) a day. If you limit your sodium to 1,500 mg a day, you can lower your blood pressure even more. ¨ Buy foods that are labeled \"unsalted,\" \"sodium-free,\" or \"low-sodium. \" Foods labeled \"reduced-sodium\" and \"light sodium\" may still have too much sodium. ¨ Flavor your food with garlic, lemon juice, onion, vinegar, herbs, and spices instead of salt. Do not use soy sauce, steak sauce, onion salt, garlic salt, mustard, or ketchup on your food. ¨ Use less salt (or none) when recipes call for it. You can often use half the salt a recipe calls for without losing flavor. · Be physically active. Get at least 30 minutes of exercise on most days of the week. Walking is a good choice. You also may want to do other activities, such as running, swimming, cycling, or playing tennis or team sports. · Limit alcohol to 2 drinks a day for men and 1 drink a day for women. · Eat plenty of fruits, vegetables, and low-fat dairy products. Eat less saturated and total fats. How is high blood pressure treated? · Your doctor will suggest making lifestyle changes. For example, your doctor may ask you to eat healthy foods, quit smoking, lose extra weight, and be more active. · If lifestyle changes don't help enough or your blood pressure is very high, you will have to take medicine every day. Follow-up care is a key part of your treatment and safety. Be sure to make and go to all appointments, and call your doctor if you are having problems. It's also a good idea to know your test results and keep a list of the medicines you take. Where can you learn more? Go to http://sonia-moira.info/. Enter P501 in the search box to learn more about \"Learning About High Blood Pressure. \" Current as of: September 21, 2016 Content Version: 11.4 © 5993-9971 Healthwise, Incorporated. Care instructions adapted under license by App[employer]eem (which disclaims liability or warranty for this information). If you have questions about a medical condition or this instruction, always ask your healthcare professional. [place] [address] any warranty or liability for your use of this information. Learning About High Blood Pressure What is high blood pressure? Blood pressure is a measure of how hard the blood pushes against the walls of your arteries. It's normal for blood pressure to go up and down throughout the day, but if it stays up, you have high blood pressure. Another name for high blood pressure is hypertension. Two numbers tell you your blood pressure. The first number is the systolic pressure. It shows how hard the blood pushes when your heart is pumping. The second number is the diastolic pressure. It shows how hard the blood pushes between heartbeats, when your heart is relaxed and filling with blood. A blood pressure of less than 120/80 (say \"120 over 80\") is ideal for an adult. High blood pressure is 140/90 or higher. You have high blood pressure if your top number is 140 or higher or your bottom number is 90 or higher, or both. Many people fall into the category in between, called prehypertension. People with prehypertension need to make lifestyle changes to bring their blood pressure down and help prevent or delay high blood pressure. What happens when you have high blood pressure? · Blood flows through your arteries with too much force. Over time, this damages the walls of your arteries. But you can't feel it. High blood pressure usually doesn't cause symptoms. · Fat and calcium start to build up in your arteries. This buildup is called plaque. Plaque makes your arteries narrower and stiffer. Blood can't flow through them as easily. · This lack of good blood flow starts to damage some of the organs in your body. This can lead to problems such as coronary artery disease and heart attack, heart failure, stroke, kidney failure, and eye damage. How can you prevent high blood pressure? · Stay at a healthy weight. · Try to limit how much sodium you eat to less than 2,300 milligrams (mg) a day. If you limit your sodium to 1,500 mg a day, you can lower your blood pressure even more. ¨ Buy foods that are labeled \"unsalted,\" \"sodium-free,\" or \"low-sodium. \" Foods labeled \"reduced-sodium\" and \"light sodium\" may still have too much sodium. ¨ Flavor your food with garlic, lemon juice, onion, vinegar, herbs, and spices instead of salt.  Do not use soy sauce, steak sauce, onion salt, garlic salt, mustard, or ketchup on your food. ¨ Use less salt (or none) when recipes call for it. You can often use half the salt a recipe calls for without losing flavor. · Be physically active. Get at least 30 minutes of exercise on most days of the week. Walking is a good choice. You also may want to do other activities, such as running, swimming, cycling, or playing tennis or team sports. · Limit alcohol to 2 drinks a day for men and 1 drink a day for women. · Eat plenty of fruits, vegetables, and low-fat dairy products. Eat less saturated and total fats. How is high blood pressure treated? · Your doctor will suggest making lifestyle changes. For example, your doctor may ask you to eat healthy foods, quit smoking, lose extra weight, and be more active. · If lifestyle changes don't help enough or your blood pressure is very high, you will have to take medicine every day. Follow-up care is a key part of your treatment and safety. Be sure to make and go to all appointments, and call your doctor if you are having problems. It's also a good idea to know your test results and keep a list of the medicines you take. Where can you learn more? Go to http://sonia-moira.info/. Enter P501 in the search box to learn more about \"Learning About High Blood Pressure. \" Current as of: September 21, 2016 Content Version: 11.4 © 7661-7710 Dympol. Care instructions adapted under license by Carticipate (which disclaims liability or warranty for this information). If you have questions about a medical condition or this instruction, always ask your healthcare professional. [address] any warranty or liability for your use of this information. Learning About Diabetes Food Guidelines Your Care Instructions Meal planning is important to manage diabetes.  It helps keep your blood sugar at a target level (which you set with your doctor). You don't have to eat special foods. You can eat what your family eats, including sweets once in a while. But you do have to pay attention to how often you eat and how much you eat of certain foods. You may want to work with a dietitian or a certified diabetes educator (CDE) to help you plan meals and snacks. A dietitian or CDE can also help you lose weight if that is one of your goals. What should you know about eating carbs? Managing the amount of carbohydrate (carbs) you eat is an important part of healthy meals when you have diabetes. Carbohydrate is found in many foods. · Learn which foods have carbs. And learn the amounts of carbs in different foods. ¨ Bread, cereal, pasta, and rice have about 15 grams of carbs in a serving. A serving is 1 slice of bread (1 ounce), ½ cup of cooked cereal, or 1/3 cup of cooked pasta or rice. ¨ Fruits have 15 grams of carbs in a serving. A serving is 1 small fresh fruit, such as an apple or orange; ½ of a banana; ½ cup of cooked or canned fruit; ½ cup of fruit juice; 1 cup of melon or raspberries; or 2 tablespoons of dried fruit. ¨ Milk and no-sugar-added yogurt have 15 grams of carbs in a serving. A serving is 1 cup of milk or 2/3 cup of no-sugar-added yogurt. ¨ Starchy vegetables have 15 grams of carbs in a serving. A serving is ½ cup of mashed potatoes or sweet potato; 1 cup winter squash; ½ of a small baked potato; ½ cup of cooked beans; or ½ cup cooked corn or green peas. · Learn how much carbs to eat each day and at each meal. A dietitian or CDE can teach you how to keep track of the amount of carbs you eat. This is called carbohydrate counting. · If you are not sure how to count carbohydrate grams, use the Plate Method to plan meals. It is a good, quick way to make sure that you have a balanced meal. It also helps you spread carbs throughout the day. ¨ Divide your plate by types of foods. Put non-starchy vegetables on half the plate, meat or other protein food on one-quarter of the plate, and a grain or starchy vegetable in the final quarter of the plate. To this you can add a small piece of fruit and 1 cup of milk or yogurt, depending on how many carbs you are supposed to eat at a meal. 
· Try to eat about the same amount of carbs at each meal. Do not \"save up\" your daily allowance of carbs to eat at one meal. 
· Proteins have very little or no carbs per serving. Examples of proteins are beef, chicken, turkey, fish, eggs, tofu, cheese, cottage cheese, and peanut butter. A serving size of meat is 3 ounces, which is about the size of a deck of cards. Examples of meat substitute serving sizes (equal to 1 ounce of meat) are 1/4 cup of cottage cheese, 1 egg, 1 tablespoon of peanut butter, and ½ cup of tofu. How can you eat out and still eat healthy? · Learn to estimate the serving sizes of foods that have carbohydrate. If you measure food at home, it will be easier to estimate the amount in a serving of restaurant food. · If the meal you order has too much carbohydrate (such as potatoes, corn, or baked beans), ask to have a low-carbohydrate food instead. Ask for a salad or green vegetables. · If you use insulin, check your blood sugar before and after eating out to help you plan how much to eat in the future. · If you eat more carbohydrate at a meal than you had planned, take a walk or do other exercise. This will help lower your blood sugar. What else should you know? · Limit saturated fat, such as the fat from meat and dairy products. This is a healthy choice because people who have diabetes are at higher risk of heart disease. So choose lean cuts of meat and nonfat or low-fat dairy products. Use olive or canola oil instead of butter or shortening when cooking. · Don't skip meals.  Your blood sugar may drop too low if you skip meals and take insulin or certain medicines for diabetes. · Check with your doctor before you drink alcohol. Alcohol can cause your blood sugar to drop too low. Alcohol can also cause a bad reaction if you take certain diabetes medicines. Follow-up care is a key part of your treatment and safety. Be sure to make and go to all appointments, and call your doctor if you are having problems. It's also a good idea to know your test results and keep a list of the medicines you take. Where can you learn more? Go to http://sonia-moira.info/. Enter T210 in the search box to learn more about \"Learning About Diabetes Food Guidelines. \" Current as of: March 13, 2017 Content Version: 11.4 © 5241-1211 Syntervention. Care instructions adapted under license by viavoo (which disclaims liability or warranty for this information). If you have questions about a medical condition or this instruction, always ask your healthcare professional. [place] [address] any warranty or liability for your use of this information. Learning About the [address] Diet What is the Mediterranean diet? The Mediterranean diet is a style of eating rather than a diet plan. It features foods eaten in [place], [place], [place] and [place], and other countries along the [facility]. It emphasizes eating foods like fish, fruits, vegetables, beans, high-fiber breads and whole grains, nuts, and olive oil. This style of eating includes limited red meat, cheese, and sweets. Why choose the Mediterranean diet? A Mediterranean-style diet may improve heart health. It contains more fat than other heart-healthy diets. But the fats are mainly from nuts, unsaturated oils (such as fish oils and olive oil), and certain nut or seed oils (such as canola, soybean, or flaxseed oil). These fats may help protect the heart and blood vessels. How can you get started on the Mediterranean diet? Here are some things you can do to switch to a more Mediterranean way of eating. What to eat · Eat a variety of fruits and vegetables each day, such as grapes, blueberries, tomatoes, broccoli, peppers, figs, olives, spinach, eggplant, beans, lentils, and chickpeas. · Eat a variety of whole-grain foods each day, such as oats, brown rice, and whole wheat bread, pasta, and couscous. · Eat fish at least 2 times a week. Try tuna, salmon, mackerel, lake trout, herring, or sardines. · Eat moderate amounts of low-fat dairy products, such as milk, cheese, or yogurt. · Eat moderate amounts of poultry and eggs. · Choose healthy (unsaturated) fats, such as nuts, olive oil, and certain nut or seed oils like canola, soybean, and flaxseed. · Limit unhealthy (saturated) fats, such as butter, palm oil, and coconut oil. And limit fats found in animal products, such as meat and dairy products made with whole milk. Try to eat red meat only a few times a month in very small amounts. · Limit sweets and desserts to only a few times a week. This includes sugar-sweetened drinks like soda. The Mediterranean diet may also include red wine with your meal-1 glass each day for women and up to 2 glasses a day for men. Tips for eating at home · Use herbs, spices, garlic, lemon zest, and citrus juice instead of salt to add flavor to foods. · Add avocado slices to your sandwich instead of galvan. · Have fish for lunch or dinner instead of red meat. Brush the fish with olive oil, and broil or grill it. · Sprinkle your salad with seeds or nuts instead of cheese. · Cook with olive or canola oil instead of butter or oils that are high in saturated fat. · Switch from 2% milk or whole milk to 1% or fat-free milk. · Dip raw vegetables in a vinaigrette dressing or hummus instead of dips made from mayonnaise or sour cream. 
· Have a piece of fruit for dessert instead of a piece of cake. Try baked apples, or have some dried fruit. Tips for eating out · Try broiled, grilled, baked, or poached fish instead of having it fried or breaded. · Ask your  to have your meals prepared with olive oil instead of butter. · Order dishes made with marinara sauce or sauces made from olive oil. Avoid sauces made from cream or mayonnaise. · Choose whole-grain breads, whole wheat pasta and pizza crust, brown rice, beans, and lentils. · Cut back on butter or margarine on bread. Instead, you can dip your bread in a small amount of olive oil. · Ask for a side salad or grilled vegetables instead of french fries or chips. Where can you learn more? Go to http://sonia-moira.info/. Enter [phone] in the search box to learn more about \"Learning About the Mediterranean Diet. \" Current as of: May 12, 2017 Content Version: 11.4 © 8263-1049 [employer]. Care instructions adapted under license by Ayondo (which disclaims liability or warranty for this information). If you have questions about a medical condition or this instruction, always ask your healthcare professional. [place] [address] any warranty or liability for your use of this information. Chest Pain: Care Instructions Your Care Instructions There are many things that can cause chest pain. Some are not serious and will get better on their own in a few days. But some kinds of chest pain need more testing and treatment. Your doctor may have recommended a follow-up visit in the next 8 to 12 hours. If you are not getting better, you may need more tests or treatment. Even though your doctor has released you, you still need to watch for any problems. The doctor carefully checked you, but sometimes problems can develop later. If you have new symptoms or if your symptoms do not get better, get medical care right away.  
If you have worse or different chest pain or pressure that lasts more than 5 minutes or you passed out (lost consciousness), call 911 or seek other emergency help right away. A medical visit is only one step in your treatment. Even if you feel better, you still need to do what your doctor recommends, such as going to all suggested follow-up appointments and taking medicines exactly as directed. This will help you recover and help prevent future problems. How can you care for yourself at home? · Rest until you feel better. · Take your medicine exactly as prescribed. Call your doctor if you think you are having a problem with your medicine. · Do not drive after taking a prescription pain medicine. When should you call for help? Call 911 if: 
? · You passed out (lost consciousness). ? · You have severe difficulty breathing. ? · You have symptoms of a heart attack. These may include: ¨ Chest pain or pressure, or a strange feeling in your chest. 
¨ Sweating. ¨ Shortness of breath. ¨ Nausea or vomiting. ¨ Pain, pressure, or a strange feeling in your back, neck, jaw, or upper belly or in one or both shoulders or arms. ¨ Lightheadedness or sudden weakness. ¨ A fast or irregular heartbeat. After you call 911, the  may tell you to chew 1 adult-strength or 2 to 4 low-dose aspirin. Wait for an ambulance. Do not try to drive yourself. ?Call your doctor today if: 
? · You have any trouble breathing. ? · Your chest pain gets worse. ? · You are dizzy or lightheaded, or you feel like you may faint. ? · You are not getting better as expected. ? · You are having new or different chest pain. Where can you learn more? Go to http://sonia-moira.info/. Enter A120 in the search box to learn more about \"Chest Pain: Care Instructions. \" Current as of: March 20, 2017 Content Version: 11.4 © 3178-9343 [employer].  Care instructions adapted under license by [employer] (which disclaims liability or warranty for this information). If you have questions about a medical condition or this instruction, always ask your healthcare professional. [address] any warranty or liability for your use of this information. Introducing [facility]! Dear Reggie Pfeiffer: Thank you for requesting a EyeSpot account. Our records indicate that you already have an active EyeSpot account. You can access your account anytime at [email or web address] Did you know that you can access your hospital and ER discharge instructions at any time in EyeSpot? You can also review all of your test results from your hospital stay or ER visit. Additional Information If you have questions, please visit the Frequently Asked Questions section of the EyeSpot website at [email or web address] Remember, EyeSpot is NOT to be used for urgent needs. For medical emergencies, dial 911. Now available from your iPhone and Android! Please provide this summary of care documentation to your next provider. Your primary care clinician is listed as Emelyn Viera. If you have any questions after today's visit, please call [phone].

## 2018-01-03 NOTE — PROGRESS NOTES
Piyush Brito is a  74 y.o. female presents today for office visit for abdominal pain f/u    1. Have you been to the ER, urgent care clinic or hospitalized since your last visit? NO        2. Have you seen or consulted any other health care providers outside of the [address] [place] [address] since your last visit (Include any pap smears or colon screening)? YES Yung Bonilla

## 2018-01-25 ENCOUNTER — TELEPHONE (OUTPATIENT)
Dept: FAMILY MEDICINE CLINIC | Age: 73
End: 2018-01-25

## 2018-01-30 ENCOUNTER — TELEPHONE (OUTPATIENT)
Dept: FAMILY MEDICINE CLINIC | Age: 73
End: 2018-01-30

## 2018-01-30 NOTE — TELEPHONE ENCOUNTER
Dr. Fani Garay 121   1/30/2018 at 10:30 am.  [facility] [place]

## 2018-01-30 NOTE — TELEPHONE ENCOUNTER
Pt called to request a referral for her pppt tomorrow 1/31/18 with Dr Giovanny Vega [phone] for Sleep Apnea please advise.

## 2018-01-30 NOTE — TELEPHONE ENCOUNTER
Patient is aware of Rubi's message and verbalies understanding states she had and EGD yesterday and started her on Prilosec. Patient states she will hold lasix for a few days and see if her symptoms resolve.
Please let the patient know that the medication is very low dose. If she would like, she can hold it a few days to see if it resolves but it's unlikely to affect her kidneys. Her renal function was good with last lab.
Pt is requesting a call about medication Lasix she believes it is bothering her kidneys  She has back pain and abdominal pain
[employer]

## 2018-02-11 ENCOUNTER — HOSPITAL ENCOUNTER (EMERGENCY)
Age: 73
Discharge: HOME | End: 2018-02-12
Attending: EMERGENCY MEDICINE
Payer: MEDICARE

## 2018-02-11 DIAGNOSIS — R10.13 ABDOMINAL PAIN, EPIGASTRIC: ICD-10-CM

## 2018-02-11 DIAGNOSIS — K21.9 GASTROESOPHAGEAL REFLUX DISEASE, ESOPHAGITIS PRESENCE NOT SPECIFIED: ICD-10-CM

## 2018-02-11 DIAGNOSIS — R42 VERTIGO: Primary | ICD-10-CM

## 2018-02-11 DIAGNOSIS — R07.9 CHEST PAIN, UNSPECIFIED TYPE: ICD-10-CM

## 2018-02-11 PROCEDURE — 94762 N-INVAS EAR/PLS OXIMTRY CONT: CPT

## 2018-02-11 PROCEDURE — 96361 HYDRATE IV INFUSION ADD-ON: CPT

## 2018-02-11 PROCEDURE — 96374 THER/PROPH/DIAG INJ IV PUSH: CPT

## 2018-02-11 PROCEDURE — 99285 EMERGENCY DEPT VISIT HI MDM: CPT

## 2018-02-11 NOTE — LETTER
[address] [place] [address] Dr [facility] 
[address] [facility] [id number] [phone] Work/School Note Date: 2/11/2018 To Whom It May concern: 
 
Please excuse Branda Merlin from work 2/12/2018. Sincerely, Reji Soto RN BSN RAMU NRP

## 2018-02-12 ENCOUNTER — APPOINTMENT (OUTPATIENT)
Dept: GENERAL RADIOLOGY | Age: 73
End: 2018-02-12
Attending: EMERGENCY MEDICINE
Payer: MEDICARE

## 2018-02-12 ENCOUNTER — APPOINTMENT (OUTPATIENT)
Dept: CT IMAGING | Age: 73
End: 2018-02-12
Attending: EMERGENCY MEDICINE
Payer: MEDICARE

## 2018-02-12 ENCOUNTER — PATIENT OUTREACH (OUTPATIENT)
Dept: FAMILY MEDICINE CLINIC | Age: 73
End: 2018-02-12

## 2018-02-12 VITALS
HEIGHT: 64 IN | HEART RATE: 57 BPM | OXYGEN SATURATION: 100 % | RESPIRATION RATE: 24 BRPM | DIASTOLIC BLOOD PRESSURE: 71 MMHG | WEIGHT: 248 LBS | BODY MASS INDEX: 42.34 KG/M2 | TEMPERATURE: 97.9 F | SYSTOLIC BLOOD PRESSURE: 152 MMHG

## 2018-02-12 LAB
ANION GAP SERPL CALC-SCNC: 7 MMOL/L (ref 3–18)
APPEARANCE UR: CLEAR
ATRIAL RATE: 59 BPM
BASOPHILS # BLD: 0 K/UL (ref 0–0.06)
BASOPHILS NFR BLD: 0 % (ref 0–2)
BILIRUB UR QL: NEGATIVE
BNP SERPL-MCNC: 35 PG/ML (ref 0–1800)
BUN SERPL-MCNC: 11 MG/DL (ref 7–18)
BUN/CREAT SERPL: 12 (ref 12–20)
CALCIUM SERPL-MCNC: 8.7 MG/DL (ref 8.5–10.1)
CALCULATED P AXIS, ECG09: 39 DEGREES
CALCULATED R AXIS, ECG10: 48 DEGREES
CALCULATED T AXIS, ECG11: 48 DEGREES
CHLORIDE SERPL-SCNC: 104 MMOL/L (ref 100–108)
CK MB CFR SERPL CALC: NORMAL % (ref 0–4)
CK MB CFR SERPL CALC: NORMAL % (ref 0–4)
CK MB SERPL-MCNC: <1 NG/ML (ref 5–25)
CK MB SERPL-MCNC: <1 NG/ML (ref 5–25)
CK SERPL-CCNC: 107 U/L (ref 26–192)
CK SERPL-CCNC: 111 U/L (ref 26–192)
CO2 SERPL-SCNC: 31 MMOL/L (ref 21–32)
COLOR UR: YELLOW
CREAT SERPL-MCNC: 0.93 MG/DL (ref 0.6–1.3)
D DIMER PPP FEU-MCNC: 0.77 UG/ML(FEU)
DIAGNOSIS, 93000: NORMAL
DIFFERENTIAL METHOD BLD: ABNORMAL
EOSINOPHIL # BLD: 0.4 K/UL (ref 0–0.4)
EOSINOPHIL NFR BLD: 4 % (ref 0–5)
ERYTHROCYTE [DISTWIDTH] IN BLOOD BY AUTOMATED COUNT: 14 % (ref 11.6–14.5)
GLUCOSE SERPL-MCNC: 113 MG/DL (ref 74–99)
GLUCOSE UR STRIP.AUTO-MCNC: NEGATIVE MG/DL
HCT VFR BLD AUTO: 38.9 % (ref 35–45)
HGB BLD-MCNC: 12.1 G/DL (ref 12–16)
HGB UR QL STRIP: NEGATIVE
KETONES UR QL STRIP.AUTO: NEGATIVE MG/DL
LEUKOCYTE ESTERASE UR QL STRIP.AUTO: NEGATIVE
LIPASE SERPL-CCNC: 63 U/L (ref 73–393)
LYMPHOCYTES # BLD: 3.7 K/UL (ref 0.9–3.6)
LYMPHOCYTES NFR BLD: 43 % (ref 21–52)
MCH RBC QN AUTO: 27.7 PG (ref 24–34)
MCHC RBC AUTO-ENTMCNC: 31.1 G/DL (ref 31–37)
MCV RBC AUTO: 89 FL (ref 74–97)
MONOCYTES # BLD: 0.8 K/UL (ref 0.05–1.2)
MONOCYTES NFR BLD: 10 % (ref 3–10)
NEUTS SEG # BLD: 3.7 K/UL (ref 1.8–8)
NEUTS SEG NFR BLD: 43 % (ref 40–73)
NITRITE UR QL STRIP.AUTO: NEGATIVE
P-R INTERVAL, ECG05: 190 MS
PH UR STRIP: 6 [PH] (ref 5–8)
PLATELET # BLD AUTO: 206 K/UL (ref 135–420)
PMV BLD AUTO: 11 FL (ref 9.2–11.8)
POTASSIUM SERPL-SCNC: 3.2 MMOL/L (ref 3.5–5.5)
PROT UR STRIP-MCNC: NEGATIVE MG/DL
Q-T INTERVAL, ECG07: 424 MS
QRS DURATION, ECG06: 74 MS
QTC CALCULATION (BEZET), ECG08: 419 MS
RBC # BLD AUTO: 4.37 M/UL (ref 4.2–5.3)
SODIUM SERPL-SCNC: 142 MMOL/L (ref 136–145)
SP GR UR REFRACTOMETRY: <1.005 (ref 1–1.03)
TROPONIN I SERPL-MCNC: <0.02 NG/ML (ref 0–0.06)
TROPONIN I SERPL-MCNC: <0.02 NG/ML (ref 0–0.06)
UROBILINOGEN UR QL STRIP.AUTO: 0.2 EU/DL (ref 0.2–1)
VENTRICULAR RATE, ECG03: 59 BPM
WBC # BLD AUTO: 8.6 K/UL (ref 4.6–13.2)

## 2018-02-12 PROCEDURE — 74011250637 HC RX REV CODE- 250/637: Performed by: EMERGENCY MEDICINE

## 2018-02-12 PROCEDURE — 74011000250 HC RX REV CODE- 250: Performed by: EMERGENCY MEDICINE

## 2018-02-12 PROCEDURE — 70450 CT HEAD/BRAIN W/O DYE: CPT

## 2018-02-12 PROCEDURE — 71275 CT ANGIOGRAPHY CHEST: CPT

## 2018-02-12 PROCEDURE — 93005 ELECTROCARDIOGRAM TRACING: CPT

## 2018-02-12 PROCEDURE — 81003 URINALYSIS AUTO W/O SCOPE: CPT | Performed by: EMERGENCY MEDICINE

## 2018-02-12 PROCEDURE — 71045 X-RAY EXAM CHEST 1 VIEW: CPT

## 2018-02-12 PROCEDURE — 82550 ASSAY OF CK (CPK): CPT | Performed by: EMERGENCY MEDICINE

## 2018-02-12 PROCEDURE — 83880 ASSAY OF NATRIURETIC PEPTIDE: CPT | Performed by: EMERGENCY MEDICINE

## 2018-02-12 PROCEDURE — 83690 ASSAY OF LIPASE: CPT | Performed by: EMERGENCY MEDICINE

## 2018-02-12 PROCEDURE — 74011250636 HC RX REV CODE- 250/636: Performed by: EMERGENCY MEDICINE

## 2018-02-12 PROCEDURE — 74011636320 HC RX REV CODE- 636/320: Performed by: EMERGENCY MEDICINE

## 2018-02-12 PROCEDURE — 85025 COMPLETE CBC W/AUTO DIFF WBC: CPT | Performed by: EMERGENCY MEDICINE

## 2018-02-12 PROCEDURE — 80048 BASIC METABOLIC PNL TOTAL CA: CPT | Performed by: EMERGENCY MEDICINE

## 2018-02-12 PROCEDURE — 85379 FIBRIN DEGRADATION QUANT: CPT | Performed by: EMERGENCY MEDICINE

## 2018-02-12 RX ORDER — MECLIZINE HCL 12.5 MG 12.5 MG/1
25 TABLET ORAL
Status: COMPLETED | OUTPATIENT
Start: 2018-02-12 | End: 2018-02-12

## 2018-02-12 RX ORDER — POTASSIUM CHLORIDE 20 MEQ/1
20 TABLET, EXTENDED RELEASE ORAL
Status: COMPLETED | OUTPATIENT
Start: 2018-02-12 | End: 2018-02-12

## 2018-02-12 RX ORDER — MECLIZINE HYDROCHLORIDE 25 MG/1
25 TABLET ORAL
Qty: 20 TAB | Refills: 0 | Status: SHIPPED | OUTPATIENT
Start: 2018-02-12 | End: 2018-02-22

## 2018-02-12 RX ORDER — POTASSIUM CHLORIDE 20 MEQ/1
TABLET, EXTENDED RELEASE ORAL
Status: DISCONTINUED
Start: 2018-02-12 | End: 2018-02-12 | Stop reason: HOSPADM

## 2018-02-12 RX ORDER — FAMOTIDINE 10 MG/ML
20 INJECTION INTRAVENOUS
Status: COMPLETED | OUTPATIENT
Start: 2018-02-12 | End: 2018-02-12

## 2018-02-12 RX ORDER — ONDANSETRON 4 MG/1
4 TABLET, ORALLY DISINTEGRATING ORAL
Qty: 14 TAB | Refills: 0 | Status: SHIPPED | OUTPATIENT
Start: 2018-02-12 | End: 2018-06-12

## 2018-02-12 RX ORDER — SODIUM CHLORIDE 9 MG/ML
500 INJECTION, SOLUTION INTRAVENOUS ONCE
Status: COMPLETED | OUTPATIENT
Start: 2018-02-12 | End: 2018-02-12

## 2018-02-12 RX ADMIN — SODIUM CHLORIDE 500 ML: 900 INJECTION, SOLUTION INTRAVENOUS at 00:36

## 2018-02-12 RX ADMIN — MECLIZINE 25 MG: 12.5 TABLET ORAL at 02:26

## 2018-02-12 RX ADMIN — FAMOTIDINE 20 MG: 10 INJECTION, SOLUTION INTRAVENOUS at 00:39

## 2018-02-12 RX ADMIN — IOPAMIDOL 75 ML: 755 INJECTION, SOLUTION INTRAVENOUS at 01:27

## 2018-02-12 RX ADMIN — POTASSIUM CHLORIDE 20 MEQ: 20 TABLET, EXTENDED RELEASE ORAL at 01:11

## 2018-02-12 NOTE — ED PROVIDER NOTES
EMERGENCY DEPARTMENT HISTORY AND PHYSICAL EXAM    12:04 AM      Date: 2/11/2018  Patient Name: Alina Prince    History of Presenting Illness     Chief Complaint   Patient presents with    Chest Pain         History Provided By: Patient    Chief Complaint: chest pain  Duration:  Hours  Timing:  Intermittent  Location: sternal  Quality: Pressure  Severity: Mild  Modifying Factors: No worsening or alleviating factors. Pt tried nothing for this PTA. Associated Symptoms: dizziness, leg tingling      Additional History (Context): Alina Prince is a 74 y.o. female with diabetes, hypertension and GERD, dyslipidemia, hiatal hernia who presents with chest pain. Pt reports mild intermittent pressure like sternal chest pain onset just PTA. No worsening or alleviating factors. Pt tried nothing for this PTA. Pt notes chest pressure is a 1/10 now, but it was 4/10. She also notes a constant mild epigastric abd pain. Pt states that her PCP recently switched her acid reflux medication to a generic and since she has had reflux problems. Pt notes that this pain feels like her prior reflux. Pt celebrated her birthday yesterday with very salty foods. Pt notes bilateral lower leg swelling, tingling to the bilateral lower extremity, dizziness ( non room spinning when standing) and feeling as though she will pass out but denies back pain, fevers, chills, blood in stool, HA, Pt had a baby ASA 2 hrs ago. pt last had a stress test years ago. PCP: Calvert Spurling, PA-C    Current Outpatient Prescriptions   Medication Sig Dispense Refill    ondansetron (ZOFRAN ODT) 4 mg disintegrating tablet Take 1 Tab by mouth every eight (8) hours as needed for Nausea. 14 Tab 0    meclizine (ANTIVERT) 25 mg tablet Take 1 Tab by mouth three (3) times daily as needed for up to 10 days.  20 Tab 0    losartan (COZAAR) 50 mg tablet TAKE 2 TABLETS ONE TIME DAILY FOR HYPERTENSION 180 Tab 1    atorvastatin (LIPITOR) 20 mg tablet TAKE 1 TABLET EVERY DAY 90 Tab 1    potassium chloride (KLOR-CON) 10 mEq tablet TAKE 2 TABLETS TWICE DAILY 360 Tab 1    furosemide (LASIX) 20 mg tablet TAKE 1 TABLET EVERY DAY FOR EDEMA 90 Tab 1    spironolactone (ALDACTONE) 25 mg tablet Take 1 Tab by mouth daily. Pt states she takes the OOD. (Patient taking differently: Take 25 mg by mouth daily. Indications: hypertension) 90 Tab 1    aspirin 81 mg Tab Take 81 mg by mouth daily.  cholecalciferol (VITAMIN D3) 1,000 unit tablet Take 1,000 Units by mouth daily.  Dexlansoprazole (DEXILANT) 60 mg CpDM Take 60 mg by mouth daily as needed.  MULTIVITAMINS (MULTI-VITAMIN PO) Take 1 Tab by mouth daily. Past History     Past Medical History:  Past Medical History:   Diagnosis Date    Acute idiopathic gout of right wrist 10/4/2017    Atrophic vaginitis     Cardiac cath 05/30/2012    Patent coronary arteries. LVEDP 20.  RA 11.  RV 42/10. PA 42/21. W 18.  CO 6.4 (TD), 6.4 (Rigo Hove). PVR 1.7 Wood units. False-positive nuclear study.  Cardiac echocardiogram 05/11/2011    EF 65%. RVSP at least 35 mmHg. Mild IVCE. No significant valvular heart disease.  Cardiac nuclear imaging test 05/18/2012    Tech difficult. Apical ischemia. No infarction. EF 76%. No reg'l WMA. No TID.  Cardiovascular LLE venous duplex 06/17/2013    Left leg:  No DVT.     Diabetes     diet controlled, hypoglycemia from metformin previously     Dyslipidemia     Fatty liver     Fibrocystic breast disease     Fluid retention     GERD     H/O colonoscopy 4/12/13    polyp    Hypertension     Ill-defined condition     gout    Macular degeneration     Morbid obesity ([address]     Obstructive sleep apnea     Peripheral neuropathy     Rectocele        Past Surgical History:  Past Surgical History:   Procedure Laterality Date    [facility]. CNNLA ARTERIAL ARTERIOTOMY 3M  5/25/2012    HX COLONOSCOPY  4/12/2013    HX HEART CATHETERIZATION  5/25/2012    HX HERNIA REPAIR      umbilical    HX HYSTERECTOMY      HX MOHS PROCEDURES      right    MO ANESTH,SURGERY OF SHOULDER         Family History:  Family History   Problem Relation Age of Onset   [facility] Cancer Mother      colon cancer    Colon Cancer Mother     Hypertension Mother     Diabetes Mother     Heart Disease Mother     Coronary Artery Disease Mother     Hypertension Father     Diabetes Father     Heart Disease Father     Coronary Artery Disease Father     Hypertension Sister     Diabetes Sister     Heart Disease Sister     Coronary Artery Disease Sister     Hypertension Brother     Diabetes Brother     Heart Disease Brother     Coronary Artery Disease Brother        Social History:  Social History   Substance Use Topics    Smoking status: Former Smoker    Smokeless tobacco: Never Used    Alcohol use No      Comment: occassionally       Allergies: Allergies   Allergen Reactions    Latex Rash    Nexium [Esomeprazole Magnesium] Swelling and Other (comments)     Lips Swollen, and facial rash and swelling    Crestor [Rosuvastatin] Other (comments)     Upper respiratory illness    Lisinopril Unknown (comments)     Patient can't recall    Niaspan [Niacin] Other (comments)     Scratchy throat, \"asthma\" like symptoms    Pcn [Penicillins] Hives    Pravachol [Pravastatin] Myalgia    Sulfa (Sulfonamide Antibiotics) Rash    Zocor [Simvastatin] Myalgia and Other (comments)     Per patient chart \"(? Rash)\"         Review of Systems       Review of Systems   Constitutional: Negative for chills and fever. HENT: Negative for congestion. Respiratory: Negative for cough and shortness of breath. Cardiovascular: Positive for chest pain and leg swelling. Gastrointestinal: Positive for abdominal pain. Negative for diarrhea, nausea and vomiting. Musculoskeletal: Negative for back pain. Skin: Negative for rash. Neurological: Positive for dizziness and numbness (tingling). Negative for light-headedness.    All other systems reviewed and are negative. Physical Exam     Visit Vitals    /71 (BP Patient Position: At rest)    Pulse (!) 57    Temp 97.9 °F (36.6 °C)    Resp 24    Ht 5' 4\" (1.626 m)    Wt 112.5 kg (248 lb)    SpO2 100%    BMI 42.57 kg/m2         Physical Exam   Constitutional: She is oriented to person, place, and time. She appears well-developed. HENT:   Head: Normocephalic. Mouth/Throat: Oropharynx is clear and moist.   Eyes: Pupils are equal, round, and reactive to light. Neck: Normal range of motion. Cardiovascular: Normal rate and normal heart sounds. No murmur heard. Pulmonary/Chest: Effort normal. She has no wheezes. She has no rales. Abdominal: Soft. There is no tenderness. Musculoskeletal: Normal range of motion. She exhibits edema (trace pedal edema). Neurological: She is alert and oriented to person, place, and time. Skin: Skin is warm and dry. Nursing note and vitals reviewed. Vitals:  No data found. Medications ordered:   Medications   0.9% sodium chloride infusion 500 mL (0 mL IntraVENous IV Completed 2/12/18 0128)   famotidine (PF) (PEPCID) injection 20 mg (20 mg IntraVENous Given 2/12/18 0039)   potassium chloride (K-DUR, KLOR-CON) SR tablet 20 mEq (20 mEq Oral Given 2/12/18 0111)   iopamidol (ISOVUE-370) 76 % injection  mL (75 mL IntraVENous Given 2/12/18 0127)   meclizine (ANTIVERT) tablet 25 mg (25 mg Oral Given 2/12/18 0226)         Lab findings:  No results found for this or any previous visit (from the past 12 hour(s)). X-Ray, CT or other radiology findings or impressions:  CTA CHEST W OR W WO CONT   Final Result   IMPRESSION:     No evidence of PE. Right middle lobe 3.4 mm pulmonary nodule. This region of lung not included on  the previous CT. Stable 4 mm left lower lobe pulmonary nodule consistent with benign etiology  unchanged from 2014.   [employer] RECOMMENDATIONS:  A. LESS THAN 6 MM:  Solitary Solid:       Low Risk: No follow-up; If suspicious morphology or upper lobe location  consider 12 month follow-up. High Risk: Optional CT at 12 months.     Lung Cancer Risk Factors: Tobacco use. Family history of lung cancer. Upper lobe location of nodule. Presence of emphysema. Pulmonary fibrosis. Older age. Female gender.      Report provided to the emergency department at 0202 hrs. CT HEAD WO CONT   Final Result   IMPRESSION:     Mild periventricular hypodensity most consistent with small vessel ischemia. There is no hemorrhage, mass, nor acute infarct.        Report provided to the emergency department at 0154 hrs. XR CHEST PORT      Prelim read by Dr Shy Roth: negative        Progress notes, Consult notes or additional Procedure notes:   12:58 AM Pt rechecked. Pt feels better. Mild tingling to the BLE.  2:15 AM Pt is asymptomatic. Pt told CT findings and is aware that she needs to follow up with Pulmonary regarding the Pulmonary Nodule found. 3:42 AM Pt feels better. Denies any dizziness  3:54 AM I have assessed the patient and discussed her results and diagnosis. Pt feels better. Pt declines admission or further monitoring in ed with repeat cardiac testing as offered. Pt is discharged is in stable condition. Detailed return inst given. No emc. Patient will f/u with discharge. Patient is to return to emergency department if any new or worsening condition. Patient understands and verbalizes agreement with plan. Disposition:  Diagnosis:   1. Vertigo    2. Chest pain, unspecified type    3. Abdominal pain, epigastric    4.  Gastroesophageal reflux disease, esophagitis presence not specified        Disposition: discharge     Follow-up Information     Follow up With Details Comments [address] Schedule an appointment as soon as possible for a visit in 2 days  [address] [place] [address]  [address]  [address] Montserrat Tiwari MD Schedule an appointment as soon as possible for a visit in 2 days or your gastroenterology [address] [place] [address] Drive  [address]  [address] [facility]  [phone]             Discharge Medication List as of 2/12/2018  4:16 AM      START taking these medications    Details   ondansetron (ZOFRAN ODT) 4 mg disintegrating tablet Take 1 Tab by mouth every eight (8) hours as needed for Nausea. , Print, Disp-14 Tab, R-0      meclizine (ANTIVERT) 25 mg tablet Take 1 Tab by mouth three (3) times daily as needed for up to 10 days. , Print, Disp-20 Tab, R-0         CONTINUE these medications which have NOT CHANGED    Details   losartan (COZAAR) 50 mg tablet TAKE 2 TABLETS ONE TIME DAILY FOR HYPERTENSION, Normal, Disp-180 Tab, R-1      atorvastatin (LIPITOR) 20 mg tablet TAKE 1 TABLET EVERY DAY, Normal, Disp-90 Tab, R-1      potassium chloride (KLOR-CON) 10 mEq tablet TAKE 2 TABLETS TWICE DAILY, Normal, Disp-360 Tab, R-1      furosemide (LASIX) 20 mg tablet TAKE 1 TABLET EVERY DAY FOR EDEMA, Normal, Disp-90 Tab, R-1      spironolactone (ALDACTONE) 25 mg tablet Take 1 Tab by mouth daily. Pt states she takes the OOD., Normal, Disp-90 Tab, R-1      aspirin 81 mg Tab Take 81 mg by mouth daily. , Historical Med      cholecalciferol (VITAMIN D3) 1,000 unit tablet Take 1,000 Units by mouth daily. , Historical Med      Dexlansoprazole (DEXILANT) 60 mg CpDM Take 60 mg by mouth daily as needed., Historical Med      MULTIVITAMINS (MULTI-VITAMIN PO) Take 1 Tab by mouth daily. , Historical Med               Scribe Attestation     Juju Gilmore acting as a scribe for and in the presence of Stef Amador MD      February 12, 2018 at 12:04 AM       Provider Attestation:      I personally performed the services described in the documentation, reviewed the documentation, as recorded by the scribe in my presence, and it accurately and completely records my words and actions.  February 12, 2018 at 12:04 AM - Stef Amador MD

## 2018-02-12 NOTE — ED NOTES
Mild dyspnea with exertion but without distress; Dr Davis Dk notified. Pt resting comfortably in bed; respirations regular/non labored/skin warm/dry. Pt remains on monitor with no ectopy. No distress noted.

## 2018-02-12 NOTE — PROGRESS NOTES
Patient admitted to [facility] ED, 2/11/18-2/12/18  for chest pain. Presenting symptoms:   Chest pain/abdominal pain  New medications/changes to current medications:  Zofran  Meclizine    Contacted patient for ED follow up. Verified 2 patient identifiers. Introduced self, role and reason for call. Patient reports:  I feel real good right now. I slept late because I was in the ED til 0530  Patient denies:  Chest pain, abdominal pain, dizziness  ADL's:  Feeds self: independently  Ambulates: independently    Self grooming: independently  Toileting: independently    DME:   None noted  Support:   family    Educated patient to monitor and report the following Red flags: chest pain, shortness of breath or any new or concerning symptoms. Patient verbalized understanding of information discussed and is aware of  when to seek medical attention from PCP, urgent care or ED. Reviewed new medications or changes to previous medications and allergies reviewed. Instructed to bring all medications or list of medications with her to next appointment. Opportunity to ask questions was provided. Contact information was provided for future reference or further questions. Appointment(s):  Dominga Cuellar 2/2/018  Patient aware. Brother will provide transportation.   Will continue to follow

## 2018-02-12 NOTE — DISCHARGE INSTRUCTIONS
Return for any new or worsening pain, any feever, shortness of breath, vomiting, decreased fluid intake, weakness, numbness, dizziness, or any change or concerns. Abdominal Pain: Care Instructions  Your Care Instructions    Abdominal pain has many possible causes. Some aren't serious and get better on their own in a few days. Others need more testing and treatment. If your pain continues or gets worse, you need to be rechecked and may need more tests to find out what is wrong. You may need surgery to correct the problem. Don't ignore new symptoms, such as fever, nausea and vomiting, urination problems, pain that gets worse, and dizziness. These may be signs of a more serious problem. Your doctor may have recommended a follow-up visit in the next 8 to 12 hours. If you are not getting better, you may need more tests or treatment. The doctor has checked you carefully, but problems can develop later. If you notice any problems or new symptoms, get medical treatment right away. Follow-up care is a key part of your treatment and safety. Be sure to make and go to all appointments, and call your doctor if you are having problems. It's also a good idea to know your test results and keep a list of the medicines you take. How can you care for yourself at home? · Rest until you feel better. · To prevent dehydration, drink plenty of fluids, enough so that your urine is light yellow or clear like water. Choose water and other caffeine-free clear liquids until you feel better. If you have kidney, heart, or liver disease and have to limit fluids, talk with your doctor before you increase the amount of fluids you drink. · If your stomach is upset, eat mild foods, such as rice, dry toast or crackers, bananas, and applesauce. Try eating several small meals instead of two or three large ones.   · Wait until 48 hours after all symptoms have gone away before you have spicy foods, alcohol, and drinks that contain caffeine. · Do not eat foods that are high in fat. · Avoid anti-inflammatory medicines such as aspirin, ibuprofen (Advil, Motrin), and naproxen (Aleve). These can cause stomach upset. Talk to your doctor if you take daily aspirin for another health problem. When should you call for help? Call 911 anytime you think you may need emergency care. For example, call if:  ? · You passed out (lost consciousness). ? · You pass maroon or very bloody stools. ? · You vomit blood or what looks like coffee grounds. ? · You have new, severe belly pain. ?Call your doctor now or seek immediate medical care if:  ? · Your pain gets worse, especially if it becomes focused in one area of your belly. ? · You have a new or higher fever. ? · Your stools are black and look like tar, or they have streaks of blood. ? · You have unexpected vaginal bleeding. ? · You have symptoms of a urinary tract infection. These may include:  ¨ Pain when you urinate. ¨ Urinating more often than usual.  ¨ Blood in your urine. ? · You are dizzy or lightheaded, or you feel like you may faint. ? Watch closely for changes in your health, and be sure to contact your doctor if:  ? · You are not getting better after 1 day (24 hours). Where can you learn more? Go to http://sonia-moira.info/. Enter D863 in the search box to learn more about \"Abdominal Pain: Care Instructions. \"  Current as of: March 20, 2017  Content Version: 11.4  © 4686-8390 [employer]. Care instructions adapted under license by [employer] (which disclaims liability or warranty for this information). If you have questions about a medical condition or this instruction, always ask your healthcare professional. [address] any warranty or liability for your use of this information. Chest Pain: Care Instructions  Your Care Instructions    There are many things that can cause chest pain.  Some are not serious and will get better on their own in a few days. But some kinds of chest pain need more testing and treatment. Your doctor may have recommended a follow-up visit in the next 8 to 12 hours. If you are not getting better, you may need more tests or treatment. Even though your doctor has released you, you still need to watch for any problems. The doctor carefully checked you, but sometimes problems can develop later. If you have new symptoms or if your symptoms do not get better, get medical care right away. If you have worse or different chest pain or pressure that lasts more than 5 minutes or you passed out (lost consciousness), call 911 or seek other emergency help right away. A medical visit is only one step in your treatment. Even if you feel better, you still need to do what your doctor recommends, such as going to all suggested follow-up appointments and taking medicines exactly as directed. This will help you recover and help prevent future problems. How can you care for yourself at home? · Rest until you feel better. · Take your medicine exactly as prescribed. Call your doctor if you think you are having a problem with your medicine. · Do not drive after taking a prescription pain medicine. When should you call for help? Call 911 if:  ? · You passed out (lost consciousness). ? · You have severe difficulty breathing. ? · You have symptoms of a heart attack. These may include:  ¨ Chest pain or pressure, or a strange feeling in your chest.  ¨ Sweating. ¨ Shortness of breath. ¨ Nausea or vomiting. ¨ Pain, pressure, or a strange feeling in your back, neck, jaw, or upper belly or in one or both shoulders or arms. ¨ Lightheadedness or sudden weakness. ¨ A fast or irregular heartbeat. After you call 911, the  may tell you to chew 1 adult-strength or 2 to 4 low-dose aspirin. Wait for an ambulance. Do not try to drive yourself.    ?Call your doctor today if:  ? · You have any trouble breathing. ? · Your chest pain gets worse. ? · You are dizzy or lightheaded, or you feel like you may faint. ? · You are not getting better as expected. ? · You are having new or different chest pain. Where can you learn more? Go to http://sonia-moira.info/. Enter A120 in the search box to learn more about \"Chest Pain: Care Instructions. \"  Current as of: March 20, 2017  Content Version: 11.4  © 2251-1581 [employer]. Care instructions adapted under license by [employer] (which disclaims liability or warranty for this information). If you have questions about a medical condition or this instruction, always ask your healthcare professional. [address] any warranty or liability for your use of this information. Gastroesophageal Reflux Disease (GERD): Care Instructions  Your Care Instructions    Gastroesophageal reflux disease (GERD) is the backward flow of stomach acid into the esophagus. The esophagus is the tube that leads from your throat to your stomach. A one-way valve prevents the stomach acid from moving up into this tube. When you have GERD, this valve does not close tightly enough. If you have mild GERD symptoms including heartburn, you may be able to control the problem with antacids or over-the-counter medicine. Changing your diet, losing weight, and making other lifestyle changes can also help reduce symptoms. Follow-up care is a key part of your treatment and safety. Be sure to make and go to all appointments, and call your doctor if you are having problems. It's also a good idea to know your test results and keep a list of the medicines you take. How can you care for yourself at home? · Take your medicines exactly as prescribed. Call your doctor if you think you are having a problem with your medicine. · Your doctor may recommend over-the-counter medicine.  For mild or occasional indigestion, antacids, such as Tums, Gaviscon, Mylanta, or Maalox, may help. Your doctor also may recommend over-the-counter acid reducers, such as Pepcid AC, Tagamet HB, Zantac 75, or Prilosec. Read and follow all instructions on the label. If you use these medicines often, talk with your doctor. · Change your eating habits. ¨ It's best to eat several small meals instead of two or three large meals. ¨ After you eat, wait 2 to 3 hours before you lie down. ¨ Chocolate, mint, and alcohol can make GERD worse. ¨ Spicy foods, foods that have a lot of acid (like tomatoes and oranges), and coffee can make GERD symptoms worse in some people. If your symptoms are worse after you eat a certain food, you may want to stop eating that food to see if your symptoms get better. · Do not smoke or chew tobacco. Smoking can make GERD worse. If you need help quitting, talk to your doctor about stop-smoking programs and medicines. These can increase your chances of quitting for good. · If you have GERD symptoms at night, raise the head of your bed 6 to 8 inches by putting the frame on blocks or placing a foam wedge under the head of your mattress. (Adding extra pillows does not work.)  · Do not wear tight clothing around your middle. · Lose weight if you need to. Losing just 5 to 10 pounds can help. When should you call for help? Call your doctor now or seek immediate medical care if:  ? · You have new or different belly pain. ? · Your stools are black and tarlike or have streaks of blood. ? Watch closely for changes in your health, and be sure to contact your doctor if:  ? · Your symptoms have not improved after 2 days. ? · Food seems to catch in your throat or chest.   Where can you learn more? Go to http://sonia-moira.info/. Enter J883 in the search box to learn more about \"Gastroesophageal Reflux Disease (GERD): Care Instructions. \"  Current as of: May 12, 2017  Content Version: 11.4  © 2328-6430 [employer]. Care instructions adapted under license by [employer] (which disclaims liability or warranty for this information). If you have questions about a medical condition or this instruction, always ask your healthcare professional. [address] any warranty or liability for your use of this information.

## 2018-02-12 NOTE — ED TRIAGE NOTES
Patient presents to ER [facility] C/O chest/abdominal pain/burning. C/O Reflux, C/O dizziness with standing tingling to feet.  Patient states she celebrated her birthday with food (high sodium food)

## 2018-02-12 NOTE — ED NOTES
Labs sent/pt resting comfortably in bed. Affect calm/conversant; frequently laughing/smiling without distress. Plan of care explained/understood. No distress noted.

## 2018-02-12 NOTE — ED NOTES
Pt resting comfortably in bed with no distress noted; reports dizziness remains intermittently present but has improved. Affect calm, respirations regular/non labored/skin warm/dry. No distress noted. Rails up x 2 with call light in reach. Has voided once more on bedside commode and once in bathroom. No distress noted.

## 2018-02-12 NOTE — ED NOTES
Pt able to void on bedside commode with minimal assistance; experiences transient dizziness transferring. No distress noted at this time.

## 2018-02-12 NOTE — ED NOTES
Pt resting comfortably in bed with multiple blankets; affect calm, respirations remain regular/non labored/skin warm/dry. No distress noted. Meclizine given per order; purpose of medication explained. Pt informed blood draw/cardiac lab repeat to be performed at 0300. Rails up x 2, call light in reach, daughter present at bedside.

## 2018-02-12 NOTE — ED NOTES
Pt ambulatory to BR; able to stand independently without dizziness. Affect remains calm, respirations regular/non labored/skin warm/dry. States the chest pressure she experienced remains slightly present but much improved. Non skid footies in place. Dr Luis Pelayo speaking with pt at bedside. If medically cleared for discharge, pt instructed to contact her PCP this am to inform her of ED visit, and of CTs and lab work completed. Instructed to return immediately for confusion, weakness, hemiparesis, chest pain, incontinence, slurred speech, or other concerns. No distress noted. Pt informed meclizine can cause dizziness; instructed to use caution with sitting up and ambulation.

## 2018-02-12 NOTE — ED NOTES
Pt is awake/alert/oriented; affect is calm/conversant. Reports intermittent dizziness which has resolved, along with intermittent bilateral lower \"tingling\" which has resolved at this time. Plan of care including EKG/lab work/possibly radiological studies discussed. Dr [facility] is at bedside.

## 2018-02-12 NOTE — ED NOTES
Pt assisted onto bedside commode; daughter present/assisting. Pt reports some dizziness; assisted with transition. Affect calm/conversant/skin warm/dry. Voiding at this time.

## 2018-02-13 ENCOUNTER — HOSPITAL ENCOUNTER (OUTPATIENT)
Dept: LAB | Age: 73
Discharge: HOME | End: 2018-02-13

## 2018-02-13 ENCOUNTER — TELEPHONE (OUTPATIENT)
Dept: FAMILY MEDICINE CLINIC | Age: 73
End: 2018-02-13

## 2018-02-13 PROCEDURE — 99001 SPECIMEN HANDLING PT-LAB: CPT | Performed by: PHYSICIAN ASSISTANT

## 2018-02-13 NOTE — TELEPHONE ENCOUNTER
Patient stopped by the office and stated that someone called her about her blood pressure. Patient stated that she will take her pressure at [facility] and call with the reading.

## 2018-02-14 LAB
25(OH)D3+25(OH)D2 SERPL-MCNC: 34 NG/ML (ref 30–100)
ALBUMIN SERPL-MCNC: 3.9 G/DL (ref 3.5–4.8)
ALBUMIN/CREAT UR: <8 MG/G CREAT (ref 0–30)
ALBUMIN/GLOB SERPL: 1.3 {RATIO} (ref 1.2–2.2)
ALP SERPL-CCNC: 86 IU/L (ref 39–117)
ALT SERPL-CCNC: 8 IU/L (ref 0–32)
AST SERPL-CCNC: 17 IU/L (ref 0–40)
BASOPHILS # BLD AUTO: 0 X10E3/UL (ref 0–0.2)
BASOPHILS NFR BLD AUTO: 0 %
BILIRUB SERPL-MCNC: 0.5 MG/DL (ref 0–1.2)
BUN SERPL-MCNC: 9 MG/DL (ref 8–27)
BUN/CREAT SERPL: 13 (ref 12–28)
CALCIUM SERPL-MCNC: 8.7 MG/DL (ref 8.7–10.3)
CHLORIDE SERPL-SCNC: 100 MMOL/L (ref 96–106)
CHOLEST SERPL-MCNC: 182 MG/DL (ref 100–199)
CO2 SERPL-SCNC: 30 MMOL/L (ref 18–29)
CREAT SERPL-MCNC: 0.72 MG/DL (ref 0.57–1)
CREAT UR-MCNC: 37.7 MG/DL
EOSINOPHIL # BLD AUTO: 0.4 X10E3/UL (ref 0–0.4)
EOSINOPHIL NFR BLD AUTO: 6 %
ERYTHROCYTE [DISTWIDTH] IN BLOOD BY AUTOMATED COUNT: 14.5 % (ref 12.3–15.4)
GFR SERPLBLD CREATININE-BSD FMLA CKD-EPI: 82 ML/MIN/1.73
GFR SERPLBLD CREATININE-BSD FMLA CKD-EPI: 95 ML/MIN/1.73
GLOBULIN SER CALC-MCNC: 3 G/DL (ref 1.5–4.5)
GLUCOSE SERPL-MCNC: 89 MG/DL (ref 65–99)
HBA1C MFR BLD: 5.7 % (ref 4.8–5.6)
HCT VFR BLD AUTO: 37.9 % (ref 34–46.6)
HDLC SERPL-MCNC: 63 MG/DL
HGB BLD-MCNC: 12.1 G/DL (ref 11.1–15.9)
IMM GRANULOCYTES # BLD: 0 X10E3/UL (ref 0–0.1)
IMM GRANULOCYTES NFR BLD: 0 %
LDLC SERPL CALC-MCNC: 106 MG/DL (ref 0–99)
LYMPHOCYTES # BLD AUTO: 2.3 X10E3/UL (ref 0.7–3.1)
LYMPHOCYTES NFR BLD AUTO: 32 %
MCH RBC QN AUTO: 28.7 PG (ref 26.6–33)
MCHC RBC AUTO-ENTMCNC: 31.9 G/DL (ref 31.5–35.7)
MCV RBC AUTO: 90 FL (ref 79–97)
MICROALBUMIN UR-MCNC: <3 UG/ML
MONOCYTES # BLD AUTO: 0.5 X10E3/UL (ref 0.1–0.9)
MONOCYTES NFR BLD AUTO: 7 %
NEUTROPHILS # BLD AUTO: 3.9 X10E3/UL (ref 1.4–7)
NEUTROPHILS NFR BLD AUTO: 55 %
PLATELET # BLD AUTO: 231 X10E3/UL (ref 150–379)
POTASSIUM SERPL-SCNC: 3.6 MMOL/L (ref 3.5–5.2)
PROT SERPL-MCNC: 6.9 G/DL (ref 6–8.5)
RBC # BLD AUTO: 4.21 X10E6/UL (ref 3.77–5.28)
SODIUM SERPL-SCNC: 142 MMOL/L (ref 134–144)
TRIGL SERPL-MCNC: 64 MG/DL (ref 0–149)
TSH SERPL DL<=0.005 MIU/L-ACNC: 2.97 UIU/ML (ref 0.45–4.5)
VIT B12 SERPL-MCNC: 487 PG/ML (ref 232–1245)
VLDLC SERPL CALC-MCNC: 13 MG/DL (ref 5–40)
WBC # BLD AUTO: 7.1 X10E3/UL (ref 3.4–10.8)

## 2018-02-20 ENCOUNTER — OFFICE VISIT (OUTPATIENT)
Dept: FAMILY MEDICINE CLINIC | Age: 73
End: 2018-02-20

## 2018-02-20 VITALS
SYSTOLIC BLOOD PRESSURE: 145 MMHG | WEIGHT: 250.4 LBS | HEART RATE: 61 BPM | HEIGHT: 64 IN | OXYGEN SATURATION: 97 % | TEMPERATURE: 98.4 F | RESPIRATION RATE: 18 BRPM | DIASTOLIC BLOOD PRESSURE: 80 MMHG | BODY MASS INDEX: 42.75 KG/M2

## 2018-02-20 DIAGNOSIS — I11.9 BENIGN HYPERTENSIVE HEART DISEASE WITHOUT HEART FAILURE: ICD-10-CM

## 2018-02-20 DIAGNOSIS — E66.01 OBESITY, MORBID (HCC): ICD-10-CM

## 2018-02-20 DIAGNOSIS — R73.9 ELEVATED BLOOD SUGAR: ICD-10-CM

## 2018-02-20 DIAGNOSIS — Z79.899 LONG TERM USE OF DRUG: ICD-10-CM

## 2018-02-20 DIAGNOSIS — R91.1 INCIDENTAL PULMONARY NODULE, > 3MM AND < 8MM: ICD-10-CM

## 2018-02-20 DIAGNOSIS — R73.03 PREDIABETES: ICD-10-CM

## 2018-02-20 DIAGNOSIS — R10.32 LEFT LOWER QUADRANT PAIN: ICD-10-CM

## 2018-02-20 DIAGNOSIS — E78.5 DYSLIPIDEMIA: Primary | ICD-10-CM

## 2018-02-20 NOTE — PROGRESS NOTES
Alina Prince is a  74 y.o. female presents today for office visit for routine follow up. 1. Have you been to the ER, urgent care clinic or hospitalized since your last visit? YES Jan 29th [facility] and FEB 11, 2018 [facility] (chest pains)    2. Have you seen or consulted any other health care providers outside of the [address] [place] [address] since your last visit (Include any pap smears or colon screening)? YES G[facility], Vascular, Cardio and podiatry.

## 2018-02-20 NOTE — MR AVS SNAPSHOT
[address] [facility] [address] 
 
 
 [address] [address] [address] [address] 
[phone] Patient: Manny Galeano MRN: [id number] KTI:[id number][id number] Visit Information Date & Time Provider Department Dept. Phone Encounter #  
 2/20/2018 11:00 AM Brooks Brush [address] [phone] [id number] Follow-up Instructions Return in about 4 months (around 6/20/2018). Your Appointments 2/22/2018  8:00 AM  
Follow Up with Chrissy Walls DO [facility] ([facility] Luna) Appt Note: 1 year follow up with an EKG; 1 yr f/up Manny Nayak [id number] [phone] [address] [place] [address] [address] Upcoming Health Maintenance Date Due COLONOSCOPY 4/12/2018 EYE EXAM RETINAL OR DILATED Q1 5/31/2018 HEMOGLOBIN A1C Q6M 8/13/2018 FOOT EXAM Q1 9/6/2018 MEDICARE YEARLY EXAM 9/7/2018 BREAST CANCER SCRN MAMMOGRAM 10/27/2018 MICROALBUMIN Q1 2/13/2019 LIPID PANEL Q1 2/13/2019 GLAUCOMA SCREENING Q2Y 6/7/2019 DTaP/Tdap/Td series (2 - Td) 7/31/2023 Allergies as of 2/20/2018  Review Complete On: 2/20/2018 By: Gary Daly PA-C Severity Noted Reaction Type Reactions Latex    Rash Nexium [Esomeprazole Magnesium] High 12/13/2010    Swelling, Other (comments) Lips Swollen, and facial rash and swelling Crestor [Rosuvastatin]    Other (comments) Upper respiratory illness Lisinopril  05/17/2010   Side Effect Unknown (comments) Patient can't recall Niaspan [Niacin]  09/19/2011    Other (comments) Scratchy throat, \"asthma\" like symptoms Pcn [Penicillins]  12/18/2009    Hives Pravachol [Pravastatin]    Myalgia Sulfa (Sulfonamide Antibiotics)  12/18/2009    Rash Zocor [Simvastatin]    Myalgia, Other (comments) Per patient chart \"(? Rash)\" Current Immunizations  Reviewed on 10/21/2014 Name Date Influenza High Dose Vaccine PF 9/12/2017 Influenza Vaccine 10/21/2014, 10/30/2013 Pneumococcal Vaccine (Unspecified Type) 1/1/2008 Tdap 7/31/2013 12:00 AM  
  
 Not reviewed this visit You Were Diagnosed With   
  
 Codes Comments Dyslipidemia    -  Primary ICD-10-CM: E78.5 ICD-9-CM: 272.4 Benign hypertensive heart disease without heart failure     ICD-10-CM: I11.9 ICD-9-CM: 402.10 Incidental pulmonary nodule, > 3mm and < 8mm     ICD-10-CM: R91.1 ICD-9-CM: 793.11 Obesity, morbid ([facility] [address]     ICD-10-CM: E66.01 
ICD-9-CM: 278.01 Prediabetes     ICD-10-CM: R73.03 
ICD-9-CM: 790.29 Left lower quadrant pain     ICD-10-CM: R10.32 
ICD-9-CM: 789.04 Long term use of drug     ICD-10-CM: Z79.899 ICD-9-CM: V58.69 Elevated blood sugar     ICD-10-CM: R73.9 ICD-9-CM: 790.29 Vitals BP Pulse Temp Resp Height(growth percentile) Weight(growth percentile) 145/80 (BP 1 Location: Left arm, BP Patient Position: Sitting) 61 98.4 °F (36.9 °C) (Oral) 18 5' 4\" (1.626 m) 250 lb 6.4 oz (113.6 kg) SpO2 BMI OB Status Smoking Status 97% 42.98 kg/m2 Hysterectomy Former Smoker BMI and BSA Data Body Mass Index Body Surface Area 42.98 kg/m 2 2.26 m 2 Preferred Pharmacy Pharmacy Name Phone [address], [address] [place] [address] [phone] Your Updated Medication List  
  
   
This list is accurate as of: 2/20/18 12:31 PM.  Always use your most recent med list.  
  
  
  
  
 aspirin 81 mg tablet Take 81 mg by mouth daily. atorvastatin 20 mg tablet Commonly known as:  LIPITOR  
TAKE 1 TABLET EVERY DAY  
  
 furosemide 20 mg tablet Commonly known as:  LASIX TAKE 1 TABLET EVERY DAY FOR EDEMA  
  
 losartan 50 mg tablet Commonly known as:  COZAAR  
TAKE 2 TABLETS ONE TIME DAILY FOR HYPERTENSION  
  
 meclizine 25 mg tablet Commonly known as:  ANTIVERT Take 1 Tab by mouth three (3) times daily as needed for up to 10 days. MULTI-VITAMIN PO Take 1 Tab by mouth daily. ondansetron 4 mg disintegrating tablet Commonly known as:  ZOFRAN ODT Take 1 Tab by mouth every eight (8) hours as needed for Nausea. potassium chloride 10 mEq tablet Commonly known as:  KLOR-CON  
TAKE 2 TABLETS TWICE DAILY  
  
 spironolactone 25 mg tablet Commonly known as:  ALDACTONE Take 1 Tab by mouth daily. Pt states she takes the OOD. VITAMIN D3 1,000 unit tablet Generic drug:  cholecalciferol Take 1,000 Units by mouth daily. We Performed the Following REFERRAL TO GYNECOLOGY [REF30 Custom] Comments:  
 Please evaluate and treat for left lower quadrant pain, bloating. GI on board. Follow-up Instructions Return in about 4 months (around 6/20/2018). To-Do List   
 02/20/2018 Lab:  VITAMIN B12   
  
 08/20/2018 Lab:  LIPID PANEL   
  
 08/20/2018 Lab:  METABOLIC PANEL, COMPREHENSIVE   
  
 08/21/2018 Lab:  CBC WITH AUTOMATED DIFF   
  
 08/21/2018 Lab:  MICROALBUMIN, UR, RAND W/ MICROALBUMIN/CREA RATIO   
  
 08/21/2018 Lab:  TSH 3RD GENERATION   
  
 08/22/2018 Lab:  HEMOGLOBIN A1C W/O EAG Referral Information Referral ID Referred By Referred To  
  
 [id number] JUN, [address] [facility] Marquita Sullivan MD   
   [address] [place] [address] Street Phone: [phone] Fax: [phone] Visits Status Start Date End Date 1 New Request 2/20/18 2/20/19 If your referral has a status of pending review or denied, additional information will be sent to support the outcome of this decision. Patient Instructions Learning About Diabetes Food Guidelines Your Care Instructions Meal planning is important to manage diabetes.  It helps keep your blood sugar at a target level (which you set with your doctor). You don't have to eat special foods. You can eat what your family eats, including sweets once in a while. But you do have to pay attention to how often you eat and how much you eat of certain foods. You may want to work with a dietitian or a certified diabetes educator (CDE) to help you plan meals and snacks. A dietitian or CDE can also help you lose weight if that is one of your goals. What should you know about eating carbs? Managing the amount of carbohydrate (carbs) you eat is an important part of healthy meals when you have diabetes. Carbohydrate is found in many foods. · Learn which foods have carbs. And learn the amounts of carbs in different foods. ¨ Bread, cereal, pasta, and rice have about 15 grams of carbs in a serving. A serving is 1 slice of bread (1 ounce), ½ cup of cooked cereal, or 1/3 cup of cooked pasta or rice. ¨ Fruits have 15 grams of carbs in a serving. A serving is 1 small fresh fruit, such as an apple or orange; ½ of a banana; ½ cup of cooked or canned fruit; ½ cup of fruit juice; 1 cup of melon or raspberries; or 2 tablespoons of dried fruit. ¨ Milk and no-sugar-added yogurt have 15 grams of carbs in a serving. A serving is 1 cup of milk or 2/3 cup of no-sugar-added yogurt. ¨ Starchy vegetables have 15 grams of carbs in a serving. A serving is ½ cup of mashed potatoes or sweet potato; 1 cup winter squash; ½ of a small baked potato; ½ cup of cooked beans; or ½ cup cooked corn or green peas. · Learn how much carbs to eat each day and at each meal. A dietitian or CDE can teach you how to keep track of the amount of carbs you eat. This is called carbohydrate counting. · If you are not sure how to count carbohydrate grams, use the Plate Method to plan meals. It is a good, quick way to make sure that you have a balanced meal. It also helps you spread carbs throughout the day. ¨ Divide your plate by types of foods. Put non-starchy vegetables on half the plate, meat or other protein food on one-quarter of the plate, and a grain or starchy vegetable in the final quarter of the plate. To this you can add a small piece of fruit and 1 cup of milk or yogurt, depending on how many carbs you are supposed to eat at a meal. 
· Try to eat about the same amount of carbs at each meal. Do not \"save up\" your daily allowance of carbs to eat at one meal. 
· Proteins have very little or no carbs per serving. Examples of proteins are beef, chicken, turkey, fish, eggs, tofu, cheese, cottage cheese, and peanut butter. A serving size of meat is 3 ounces, which is about the size of a deck of cards. Examples of meat substitute serving sizes (equal to 1 ounce of meat) are 1/4 cup of cottage cheese, 1 egg, 1 tablespoon of peanut butter, and ½ cup of tofu. How can you eat out and still eat healthy? · Learn to estimate the serving sizes of foods that have carbohydrate. If you measure food at home, it will be easier to estimate the amount in a serving of restaurant food. · If the meal you order has too much carbohydrate (such as potatoes, corn, or baked beans), ask to have a low-carbohydrate food instead. Ask for a salad or green vegetables. · If you use insulin, check your blood sugar before and after eating out to help you plan how much to eat in the future. · If you eat more carbohydrate at a meal than you had planned, take a walk or do other exercise. This will help lower your blood sugar. What else should you know? · Limit saturated fat, such as the fat from meat and dairy products. This is a healthy choice because people who have diabetes are at higher risk of heart disease. So choose lean cuts of meat and nonfat or low-fat dairy products. Use olive or canola oil instead of butter or shortening when cooking. · Don't skip meals.  Your blood sugar may drop too low if you skip meals and take insulin or certain medicines for diabetes. · Check with your doctor before you drink alcohol. Alcohol can cause your blood sugar to drop too low. Alcohol can also cause a bad reaction if you take certain diabetes medicines. Follow-up care is a key part of your treatment and safety. Be sure to make and go to all appointments, and call your doctor if you are having problems. It's also a good idea to know your test results and keep a list of the medicines you take. Where can you learn more? Go to http://sonia-moira.info/. Enter L270 in the search box to learn more about \"Learning About Diabetes Food Guidelines. \" Current as of: March 13, 2017 Content Version: 11.4 © 1348-9113 Takepin. Care instructions adapted under license by [employer] (which disclaims liability or warranty for this information). If you have questions about a medical condition or this instruction, always ask your healthcare professional. [address] [address] any warranty or liability for your use of this information. High Blood Pressure: Care Instructions Your Care Instructions If your blood pressure is usually above 140/90, you have high blood pressure, or hypertension. That means the top number is 140 or higher or the bottom number is 90 or higher, or both. Despite what a lot of people think, high blood pressure usually doesn't cause headaches or make you feel dizzy or lightheaded. It usually has no symptoms. But it does increase your risk for heart attack, stroke, and kidney or eye damage. The higher your blood pressure, the more your risk increases. Your doctor will give you a goal for your blood pressure. Your goal will be based on your health and your age. An example of a goal is to keep your blood pressure below 140/90.  
Lifestyle changes, such as eating healthy and being active, are always important to help lower blood pressure. You might also take medicine to reach your blood pressure goal. 
Follow-up care is a key part of your treatment and safety. Be sure to make and go to all appointments, and call your doctor if you are having problems. It's also a good idea to know your test results and keep a list of the medicines you take. How can you care for yourself at home? Medical treatment · If you stop taking your medicine, your blood pressure will go back up. You may take one or more types of medicine to lower your blood pressure. Be safe with medicines. Take your medicine exactly as prescribed. Call your doctor if you think you are having a problem with your medicine. · Talk to your doctor before you start taking aspirin every day. Aspirin can help certain people lower their risk of a heart attack or stroke. But taking aspirin isn't right for everyone, because it can cause serious bleeding. · See your doctor regularly. You may need to see the doctor more often at first or until your blood pressure comes down. · If you are taking blood pressure medicine, talk to your doctor before you take decongestants or anti-inflammatory medicine, such as ibuprofen. Some of these medicines can raise blood pressure. · Learn how to check your blood pressure at home. Lifestyle changes · Stay at a healthy weight. This is especially important if you put on weight around the waist. Losing even 10 pounds can help you lower your blood pressure. · If your doctor recommends it, get more exercise. Walking is a good choice. Bit by bit, increase the amount you walk every day. Try for at least 30 minutes on most days of the week. You also may want to swim, bike, or do other activities. · Avoid or limit alcohol. Talk to your doctor about whether you can drink any alcohol. · Try to limit how much sodium you eat to less than 2,300 milligrams (mg) a day. Your doctor may ask you to try to eat less than 1,500 mg a day. · Eat plenty of fruits (such as bananas and oranges), vegetables, legumes, whole grains, and low-fat dairy products. · Lower the amount of saturated fat in your diet. Saturated fat is found in animal products such as milk, cheese, and meat. Limiting these foods may help you lose weight and also lower your risk for heart disease. · Do not smoke. Smoking increases your risk for heart attack and stroke. If you need help quitting, talk to your doctor about stop-smoking programs and medicines. These can increase your chances of quitting for good. When should you call for help? Call 911 anytime you think you may need emergency care. This may mean having symptoms that suggest that your blood pressure is causing a serious heart or blood vessel problem. Your blood pressure may be over 180/110. ? For example, call 911 if: 
? · You have symptoms of a heart attack. These may include: ¨ Chest pain or pressure, or a strange feeling in the chest. 
¨ Sweating. ¨ Shortness of breath. ¨ Nausea or vomiting. ¨ Pain, pressure, or a strange feeling in the back, neck, jaw, or upper belly or in one or both shoulders or arms. ¨ Lightheadedness or sudden weakness. ¨ A fast or irregular heartbeat. ? · You have symptoms of a stroke. These may include: 
¨ Sudden numbness, tingling, weakness, or loss of movement in your face, arm, or leg, especially on only one side of your body. ¨ Sudden vision changes. ¨ Sudden trouble speaking. ¨ Sudden confusion or trouble understanding simple statements. ¨ Sudden problems with walking or balance. ¨ A sudden, severe headache that is different from past headaches. ? · You have severe back or belly pain. ?Do not wait until your blood pressure comes down on its own. Get help right away. ?Call your doctor now or seek immediate care if: 
? · Your blood pressure is much higher than normal (such as 180/110 or higher), but you don't have symptoms. ? · You think high blood pressure is causing symptoms, such as: ¨ Severe headache. ¨ Blurry vision. ? Watch closely for changes in your health, and be sure to contact your doctor if: 
? · Your blood pressure measures 140/90 or higher at least 2 times. That means the top number is 140 or higher or the bottom number is 90 or higher, or both. ? · You think you may be having side effects from your blood pressure medicine. ? · Your blood pressure is usually normal, but it goes above normal at least 2 times. Where can you learn more? Go to http://sonia-moira.info/. Enter V613 in the search box to learn more about \"High Blood Pressure: Care Instructions. \" Current as of: September 21, 2016 Content Version: 11.4 © 6581-8286 [employer]. Care instructions adapted under license by [employer] (which disclaims liability or warranty for this information). If you have questions about a medical condition or this instruction, always ask your healthcare professional. [place] [address] any warranty or liability for your use of this information. Learning About the [address] [facility] [address] Diet What is the Mediterranean diet? The Mediterranean diet is a style of eating rather than a diet plan. It features foods eaten in [place], [place], [place] and [place], and other countries along the [facility]. It emphasizes eating foods like fish, fruits, vegetables, beans, high-fiber breads and whole grains, nuts, and olive oil. This style of eating includes limited red meat, cheese, and sweets. Why choose the Mediterranean diet? A Mediterranean-style diet may improve heart health. It contains more fat than other heart-healthy diets. But the fats are mainly from nuts, unsaturated oils (such as fish oils and olive oil), and certain nut or seed oils (such as canola, soybean, or flaxseed oil). These fats may help protect the heart and blood vessels. How can you get started on the Mediterranean diet? Here are some things you can do to switch to a more Mediterranean way of eating. What to eat · Eat a variety of fruits and vegetables each day, such as grapes, blueberries, tomatoes, broccoli, peppers, figs, olives, spinach, eggplant, beans, lentils, and chickpeas. · Eat a variety of whole-grain foods each day, such as oats, brown rice, and whole wheat bread, pasta, and couscous. · Eat fish at least 2 times a week. Try tuna, salmon, mackerel, lake trout, herring, or sardines. · Eat moderate amounts of low-fat dairy products, such as milk, cheese, or yogurt. · Eat moderate amounts of poultry and eggs. · Choose healthy (unsaturated) fats, such as nuts, olive oil, and certain nut or seed oils like canola, soybean, and flaxseed. · Limit unhealthy (saturated) fats, such as butter, palm oil, and coconut oil. And limit fats found in animal products, such as meat and dairy products made with whole milk. Try to eat red meat only a few times a month in very small amounts. · Limit sweets and desserts to only a few times a week. This includes sugar-sweetened drinks like soda. The Mediterranean diet may also include red wine with your meal-1 glass each day for women and up to 2 glasses a day for men. Tips for eating at home · Use herbs, spices, garlic, lemon zest, and citrus juice instead of salt to add flavor to foods. · Add avocado slices to your sandwich instead of galvan. · Have fish for lunch or dinner instead of red meat. Brush the fish with olive oil, and broil or grill it. · Sprinkle your salad with seeds or nuts instead of cheese. · Cook with olive or canola oil instead of butter or oils that are high in saturated fat. · Switch from 2% milk or whole milk to 1% or fat-free milk.  
· Dip raw vegetables in a vinaigrette dressing or hummus instead of dips made from mayonnaise or sour cream. 
 · Have a piece of fruit for dessert instead of a piece of cake. Try baked apples, or have some dried fruit. Tips for eating out · Try broiled, grilled, baked, or poached fish instead of having it fried or breaded. · Ask your  to have your meals prepared with olive oil instead of butter. · Order dishes made with marinara sauce or sauces made from olive oil. Avoid sauces made from cream or mayonnaise. · Choose whole-grain breads, whole wheat pasta and pizza crust, brown rice, beans, and lentils. · Cut back on butter or margarine on bread. Instead, you can dip your bread in a small amount of olive oil. · Ask for a side salad or grilled vegetables instead of french fries or chips. Where can you learn more? Go to http://soniaKiromicmoira.info/. Enter [phone] in the search box to learn more about \"Learning About the Mediterranean Diet. \" Current as of: May 12, 2017 Content Version: 11.4 © 6268-2866 YourPlace. Care instructions adapted under license by [employer] (which disclaims liability or warranty for this information). If you have questions about a medical condition or this instruction, always ask your healthcare professional. [address] any warranty or liability for your use of this information. Introducing [facility]! Dear Arnulfo Hunter: Thank you for requesting a Front Fl[employer] account. Our records indicate that you already have an active Front Flip account. You can access your account anytime at [email or web address] Did you know that you can access your hospital and ER discharge instructions at any time in Front Flip? You can also review all of your test results from your hospital stay or ER visit. Additional Information If you have questions, please visit the Frequently Asked Questions section of the Front Flip website at [email or web address] Remember, [employer] is NOT to be used for urgent needs. For medical emergencies, dial 911. Now available from your iPhone and Android! Please provide this summary of care documentation to your next provider. Your primary care clinician is listed as Ainsley Del Angel. If you have any questions after today's visit, please call [phone].

## 2018-02-20 NOTE — PROGRESS NOTES
HPI:    Alina Prince  is a 74 y.o.  female  patient who comes in today for routine care and results of lab work. She presents with complaints of high blood pressure on different occasions. She has had no changes in medical health since last seen in the office. She has had no family health changes. Patient states [place] not exercise regularly. Patient states that  She does not follow a particular diet plan. Patient states that She is not compliant with medications. She hasn't taken medication today. She continues to be followed by GI for bloating and GERD. She checks blood sugars at home and range is 87-91 mg/dl. She denies hypoglycemia. Patient denies SOB, CP, dizziness, headache. Current Outpatient Prescriptions   Medication Sig Dispense Refill    losartan (COZAAR) 50 mg tablet TAKE 2 TABLETS ONE TIME DAILY FOR HYPERTENSION 180 Tab 1    atorvastatin (LIPITOR) 20 mg tablet TAKE 1 TABLET EVERY DAY 90 Tab 1    potassium chloride (KLOR-CON) 10 mEq tablet TAKE 2 TABLETS TWICE DAILY 360 Tab 1    furosemide (LASIX) 20 mg tablet TAKE 1 TABLET EVERY DAY FOR EDEMA 90 Tab 1    spironolactone (ALDACTONE) 25 mg tablet Take 1 Tab by mouth daily. Pt states she takes the OOD. (Patient taking differently: Take 25 mg by mouth daily. Indications: hypertension) 90 Tab 1    cholecalciferol (VITAMIN D3) 1,000 unit tablet Take 1,000 Units by mouth daily.  aspirin 81 mg Tab Take 81 mg by mouth daily.  MULTIVITAMINS (MULTI-VITAMIN PO) Take 1 Tab by mouth daily.  ondansetron (ZOFRAN ODT) 4 mg disintegrating tablet Take 1 Tab by mouth every eight (8) hours as needed for Nausea. 14 Tab 0    meclizine (ANTIVERT) 25 mg tablet Take 1 Tab by mouth three (3) times daily as needed for up to 10 days.  20 Tab 0      Allergies   Allergen Reactions    Latex Rash    Nexium [Esomeprazole Magnesium] Swelling and Other (comments)     Lips Swollen, and facial rash and swelling    Crestor [Rosuvastatin] Other (comments)     Upper respiratory illness    Lisinopril Unknown (comments)     Patient can't recall    Niaspan [Niacin] Other (comments)     Scratchy throat, \"asthma\" like symptoms    Pcn [Penicillins] Hives    Pravachol [Pravastatin] Myalgia    Sulfa (Sulfonamide Antibiotics) Rash    Zocor [Simvastatin] Myalgia and Other (comments)     Per patient chart \"(? Rash)\"      Past Medical History:   Diagnosis Date    Acute idiopathic gout of right wrist 10/4/2017    Atrophic vaginitis     Cardiac cath 05/30/2012    Patent coronary arteries. LVEDP 20.  RA 11.  RV 42/10. PA 42/21. W 18.  CO 6.4 (TD), 6.4 (April Haddad). PVR 1.7 Wood units. False-positive nuclear study.  Cardiac echocardiogram 05/11/2011    EF 65%. RVSP at least 35 mmHg. Mild IVCE. No significant valvular heart disease.  Cardiac nuclear imaging test 05/18/2012    Tech difficult. Apical ischemia. No infarction. EF 76%. No reg'l WMA. No TID.  Cardiovascular LLE venous duplex 06/17/2013    Left leg:  No DVT.     Diabetes     diet controlled, hypoglycemia from metformin previously     Dyslipidemia     Fatty liver     Fibrocystic breast disease     Fluid retention     GERD     H/O colonoscopy 4/12/13    polyp    Hypertension     Ill-defined condition     gout    Macular degeneration     Morbid obesity ([address]     Obstructive sleep apnea     Peripheral neuropathy     Rectocele       Family History   Problem Relation Age of Onset    Cancer Mother      colon cancer    Colon Cancer Mother     Hypertension Mother     Diabetes Mother     Heart Disease Mother     Coronary Artery Disease Mother     Hypertension Father     Diabetes Father     Heart Disease Father     Coronary Artery Disease Father     Hypertension Sister     Diabetes Sister     Heart Disease Sister     Coronary Artery Disease Sister     Hypertension Brother     Diabetes Brother     Heart Disease Brother     Coronary Artery Disease Brother       Patient Active Problem List   Diagnosis Code    Diabetes E11.9    Hypertension I11.9    Dyslipidemia E78.5    Sleep apnea/ on c-pap G47.30    Renal cyst N28.1    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 pylori infection A04.8    Acute idiopathic gout of right wrist M10.031    Sliding hiatal hernia K44.9    Pseudogout of right shoulder M11.211    Primary osteoarthritis involving multiple joints M15.0    Obesity, morbid (HCC) E66.01    Mild peripheral edema R60.9    Incidental pulmonary nodule, > 3mm and < 8mm R91.1    Prediabetes R73.03            Review of Systems   Constitutional: Negative for chills and fever. HENT: Negative for hearing loss. Eyes: Negative for blurred vision and double vision. Cardiovascular: Negative for chest pain, palpitations and leg swelling. Gastrointestinal: Positive for abdominal pain (left lower quadrant) and heartburn. Negative for blood in stool, constipation, diarrhea, nausea and vomiting. Genitourinary: Negative for dysuria, flank pain, frequency and urgency. Musculoskeletal: Negative for falls. Neurological: Negative for dizziness, tingling and headaches. Psychiatric/Behavioral: Negative for depression and memory loss. Visit Vitals    /80 (BP 1 Location: Left arm, BP Patient Position: Sitting)    Pulse 61    Temp 98.4 °F (36.9 °C) (Oral)    Resp 18    Ht 5' 4\" (1.626 m)    Wt 250 lb 6.4 oz (113.6 kg)    SpO2 97%    BMI 42.98 kg/m2        Physical Exam   Constitutional: She is oriented to person, place, and time and well-developed, well-nourished, and in no distress. HENT:   Head: Normocephalic and atraumatic. Eyes: Conjunctivae are normal.   Neck: Normal range of motion. Neck supple. No thyromegaly present. Cardiovascular: Normal rate, regular rhythm, normal heart sounds and intact distal pulses.     Pulmonary/Chest: Effort normal and breath sounds normal.   Abdominal: Soft. Bowel sounds are normal. She exhibits no distension and no mass. There is tenderness (right lower quadrant tenderness). There is no rebound and no guarding. Musculoskeletal: She exhibits no edema. Lymphadenopathy:     She has no cervical adenopathy. Neurological: She is alert and oriented to person, place, and time. Vitals reviewed. Health Maintenance Due   Topic Date Due    COLONOSCOPY  04/12/2018       Assessment/Plan:    Diagnoses and all orders for this visit:    1. Dyslipidemia - discussed decreasing animal fats, fried foods    2. Hypertension - patient to do BP diary. She did not take meds today but wants to add medication, she doesn't have a baseline. 3. Type 2 diabetes mellitus without complication, without long-term current use of insulin (HCC) - stable at prediabetic range    4. Incidental pulmonary nodule, > 3mm and < 8mm - watching, will revisit next routine exam    5. Obesity, morbid ([address] - mediterranean diet given and patient needs to start exercise    6. Prediabetes - stable, cont current regimen    7. Left lower quadrant pain  -     REFERRAL TO GYNECOLOGY         Follow-up Disposition: Not on File       Additional Notes: Discussed today's diagnosis, treatment plans. Discussed medication indications and side effects. Preventive Medicine:   Weight Management:  Mediterranean diet recommended as well as exercise for 30 minutes daily most days of the week. DASH diet info given. After Visit Summary: Provided and discussed printed patient instructions. Answered all questions and patient acknowledged understanding.            Gary Daly PA-C

## 2018-02-20 NOTE — PATIENT INSTRUCTIONS
Learning About Diabetes Food Guidelines  Your Care Instructions    Meal planning is important to manage diabetes. It helps keep your blood sugar at a target level (which you set with your doctor). You don't have to eat special foods. You can eat what your family eats, including sweets once in a while. But you do have to pay attention to how often you eat and how much you eat of certain foods. You may want to work with a dietitian or a certified diabetes educator (CDE) to help you plan meals and snacks. A dietitian or CDE can also help you lose weight if that is one of your goals. What should you know about eating carbs? Managing the amount of carbohydrate (carbs) you eat is an important part of healthy meals when you have diabetes. Carbohydrate is found in many foods. · Learn which foods have carbs. And learn the amounts of carbs in different foods. ¨ Bread, cereal, pasta, and rice have about 15 grams of carbs in a serving. A serving is 1 slice of bread (1 ounce), ½ cup of cooked cereal, or 1/3 cup of cooked pasta or rice. ¨ Fruits have 15 grams of carbs in a serving. A serving is 1 small fresh fruit, such as an apple or orange; ½ of a banana; ½ cup of cooked or canned fruit; ½ cup of fruit juice; 1 cup of melon or raspberries; or 2 tablespoons of dried fruit. ¨ Milk and no-sugar-added yogurt have 15 grams of carbs in a serving. A serving is 1 cup of milk or 2/3 cup of no-sugar-added yogurt. ¨ Starchy vegetables have 15 grams of carbs in a serving. A serving is ½ cup of mashed potatoes or sweet potato; 1 cup winter squash; ½ of a small baked potato; ½ cup of cooked beans; or ½ cup cooked corn or green peas. · Learn how much carbs to eat each day and at each meal. A dietitian or CDE can teach you how to keep track of the amount of carbs you eat. This is called carbohydrate counting. · If you are not sure how to count carbohydrate grams, use the Plate Method to plan meals.  It is a good, quick way to make sure that you have a balanced meal. It also helps you spread carbs throughout the day. ¨ Divide your plate by types of foods. Put non-starchy vegetables on half the plate, meat or other protein food on one-quarter of the plate, and a grain or starchy vegetable in the final quarter of the plate. To this you can add a small piece of fruit and 1 cup of milk or yogurt, depending on how many carbs you are supposed to eat at a meal.  · Try to eat about the same amount of carbs at each meal. Do not \"save up\" your daily allowance of carbs to eat at one meal.  · Proteins have very little or no carbs per serving. Examples of proteins are beef, chicken, turkey, fish, eggs, tofu, cheese, cottage cheese, and peanut butter. A serving size of meat is 3 ounces, which is about the size of a deck of cards. Examples of meat substitute serving sizes (equal to 1 ounce of meat) are 1/4 cup of cottage cheese, 1 egg, 1 tablespoon of peanut butter, and ½ cup of tofu. How can you eat out and still eat healthy? · Learn to estimate the serving sizes of foods that have carbohydrate. If you measure food at home, it will be easier to estimate the amount in a serving of restaurant food. · If the meal you order has too much carbohydrate (such as potatoes, corn, or baked beans), ask to have a low-carbohydrate food instead. Ask for a salad or green vegetables. · If you use insulin, check your blood sugar before and after eating out to help you plan how much to eat in the future. · If you eat more carbohydrate at a meal than you had planned, take a walk or do other exercise. This will help lower your blood sugar. What else should you know? · Limit saturated fat, such as the fat from meat and dairy products. This is a healthy choice because people who have diabetes are at higher risk of heart disease. So choose lean cuts of meat and nonfat or low-fat dairy products.  Use olive or canola oil instead of butter or shortening when cooking. · Don't skip meals. Your blood sugar may drop too low if you skip meals and take insulin or certain medicines for diabetes. · Check with your doctor before you drink alcohol. Alcohol can cause your blood sugar to drop too low. Alcohol can also cause a bad reaction if you take certain diabetes medicines. Follow-up care is a key part of your treatment and safety. Be sure to make and go to all appointments, and call your doctor if you are having problems. It's also a good idea to know your test results and keep a list of the medicines you take. Where can you learn more? Go to http://sonia-moira.info/. Enter N661 in the search box to learn more about \"Learning About Diabetes Food Guidelines. \"  Current as of: March 13, 2017  Content Version: 11.4  © 8684-7812 [employer]. Care instructions adapted under license by [employer] (which disclaims liability or warranty for this information). If you have questions about a medical condition or this instruction, always ask your healthcare professional. [place] [address] any warranty or liability for your use of this information. High Blood Pressure: Care Instructions  Your Care Instructions    If your blood pressure is usually above 140/90, you have high blood pressure, or hypertension. That means the top number is 140 or higher or the bottom number is 90 or higher, or both. Despite what a lot of people think, high blood pressure usually doesn't cause headaches or make you feel dizzy or lightheaded. It usually has no symptoms. But it does increase your risk for heart attack, stroke, and kidney or eye damage. The higher your blood pressure, the more your risk increases. Your doctor will give you a goal for your blood pressure. Your goal will be based on your health and your age. An example of a goal is to keep your blood pressure below 140/90.   Lifestyle changes, such as eating healthy and being active, are always important to help lower blood pressure. You might also take medicine to reach your blood pressure goal.  Follow-up care is a key part of your treatment and safety. Be sure to make and go to all appointments, and call your doctor if you are having problems. It's also a good idea to know your test results and keep a list of the medicines you take. How can you care for yourself at home? Medical treatment  · If you stop taking your medicine, your blood pressure will go back up. You may take one or more types of medicine to lower your blood pressure. Be safe with medicines. Take your medicine exactly as prescribed. Call your doctor if you think you are having a problem with your medicine. · Talk to your doctor before you start taking aspirin every day. Aspirin can help certain people lower their risk of a heart attack or stroke. But taking aspirin isn't right for everyone, because it can cause serious bleeding. · See your doctor regularly. You may need to see the doctor more often at first or until your blood pressure comes down. · If you are taking blood pressure medicine, talk to your doctor before you take decongestants or anti-inflammatory medicine, such as ibuprofen. Some of these medicines can raise blood pressure. · Learn how to check your blood pressure at home. Lifestyle changes  · Stay at a healthy weight. This is especially important if you put on weight around the waist. Losing even 10 pounds can help you lower your blood pressure. · If your doctor recommends it, get more exercise. Walking is a good choice. Bit by bit, increase the amount you walk every day. Try for at least 30 minutes on most days of the week. You also may want to swim, bike, or do other activities. · Avoid or limit alcohol. Talk to your doctor about whether you can drink any alcohol. · Try to limit how much sodium you eat to less than 2,300 milligrams (mg) a day.  Your doctor may ask you to try to eat less than 1,500 mg a day. · Eat plenty of fruits (such as bananas and oranges), vegetables, legumes, whole grains, and low-fat dairy products. · Lower the amount of saturated fat in your diet. Saturated fat is found in animal products such as milk, cheese, and meat. Limiting these foods may help you lose weight and also lower your risk for heart disease. · Do not smoke. Smoking increases your risk for heart attack and stroke. If you need help quitting, talk to your doctor about stop-smoking programs and medicines. These can increase your chances of quitting for good. When should you call for help? Call 911 anytime you think you may need emergency care. This may mean having symptoms that suggest that your blood pressure is causing a serious heart or blood vessel problem. Your blood pressure may be over 180/110. ? For example, call 911 if:  ? · You have symptoms of a heart attack. These may include:  ¨ Chest pain or pressure, or a strange feeling in the chest.  ¨ Sweating. ¨ Shortness of breath. ¨ Nausea or vomiting. ¨ Pain, pressure, or a strange feeling in the back, neck, jaw, or upper belly or in one or both shoulders or arms. ¨ Lightheadedness or sudden weakness. ¨ A fast or irregular heartbeat. ? · You have symptoms of a stroke. These may include:  ¨ Sudden numbness, tingling, weakness, or loss of movement in your face, arm, or leg, especially on only one side of your body. ¨ Sudden vision changes. ¨ Sudden trouble speaking. ¨ Sudden confusion or trouble understanding simple statements. ¨ Sudden problems with walking or balance. ¨ A sudden, severe headache that is different from past headaches. ? · You have severe back or belly pain. ?Do not wait until your blood pressure comes down on its own. Get help right away. ?Call your doctor now or seek immediate care if:  ? · Your blood pressure is much higher than normal (such as 180/110 or higher), but you don't have symptoms.    ? · You think high blood pressure is causing symptoms, such as:  ¨ Severe headache. ¨ Blurry vision. ? Watch closely for changes in your health, and be sure to contact your doctor if:  ? · Your blood pressure measures 140/90 or higher at least 2 times. That means the top number is 140 or higher or the bottom number is 90 or higher, or both. ? · You think you may be having side effects from your blood pressure medicine. ? · Your blood pressure is usually normal, but it goes above normal at least 2 times. Where can you learn more? Go to http://sonia-moira.info/. Enter G128 in the search box to learn more about \"High Blood Pressure: Care Instructions. \"  Current as of: September 21, 2016  Content Version: 11.4  © 0695-7845 [employer]. Care instructions adapted under license by [employer] (which disclaims liability or warranty for this information). If you have questions about a medical condition or this instruction, always ask your healthcare professional. [place] [address] any warranty or liability for your use of this information. Learning About the [address] Diet  What is the Mediterranean diet? The Mediterranean diet is a style of eating rather than a diet plan. It features foods eaten in [place], [place], [place] and [place], and other countries along the [place]. It emphasizes eating foods like fish, fruits, vegetables, beans, high-fiber breads and whole grains, nuts, and olive oil. This style of eating includes limited red meat, cheese, and sweets. Why choose the Mediterranean diet? A Mediterranean-style diet may improve heart health. It contains more fat than other heart-healthy diets. But the fats are mainly from nuts, unsaturated oils (such as fish oils and olive oil), and certain nut or seed oils (such as canola, soybean, or flaxseed oil). These fats may help protect the heart and blood vessels.   How can you get started on the Mediterranean diet? Here are some things you can do to switch to a more Mediterranean way of eating. What to eat  · Eat a variety of fruits and vegetables each day, such as grapes, blueberries, tomatoes, broccoli, peppers, figs, olives, spinach, eggplant, beans, lentils, and chickpeas. · Eat a variety of whole-grain foods each day, such as oats, brown rice, and whole wheat bread, pasta, and couscous. · Eat fish at least 2 times a week. Try tuna, salmon, mackerel, lake trout, herring, or sardines. · Eat moderate amounts of low-fat dairy products, such as milk, cheese, or yogurt. · Eat moderate amounts of poultry and eggs. · Choose healthy (unsaturated) fats, such as nuts, olive oil, and certain nut or seed oils like canola, soybean, and flaxseed. · Limit unhealthy (saturated) fats, such as butter, palm oil, and coconut oil. And limit fats found in animal products, such as meat and dairy products made with whole milk. Try to eat red meat only a few times a month in very small amounts. · Limit sweets and desserts to only a few times a week. This includes sugar-sweetened drinks like soda. The Mediterranean diet may also include red wine with your meal-1 glass each day for women and up to 2 glasses a day for men. Tips for eating at home  · Use herbs, spices, garlic, lemon zest, and citrus juice instead of salt to add flavor to foods. · Add avocado slices to your sandwich instead of galvan. · Have fish for lunch or dinner instead of red meat. Brush the fish with olive oil, and broil or grill it. · Sprinkle your salad with seeds or nuts instead of cheese. · Cook with olive or canola oil instead of butter or oils that are high in saturated fat. · Switch from 2% milk or whole milk to 1% or fat-free milk. · Dip raw vegetables in a vinaigrette dressing or hummus instead of dips made from mayonnaise or sour cream.  · Have a piece of fruit for dessert instead of a piece of cake.  Try baked apples, or have some dried fruit. Tips for eating out  · Try broiled, grilled, baked, or poached fish instead of having it fried or breaded. · Ask your  to have your meals prepared with olive oil instead of butter. · Order dishes made with marinara sauce or sauces made from olive oil. Avoid sauces made from cream or mayonnaise. · Choose whole-grain breads, whole wheat pasta and pizza crust, brown rice, beans, and lentils. · Cut back on butter or margarine on bread. Instead, you can dip your bread in a small amount of olive oil. · Ask for a side salad or grilled vegetables instead of french fries or chips. Where can you learn more? Go to http://sonia-moira.info/. Enter [phone] in the search box to learn more about \"Learning About the Mediterranean Diet. \"  Current as of: May 12, 2017  Content Version: 11.4  © 3138-6982 [employer], [employer]. Care instructions adapted under license by [employer] (which disclaims liability or warranty for this information). If you have questions about a medical condition or this instruction, always ask your healthcare professional. [address] any warranty or liability for your use of this information.

## 2018-02-27 ENCOUNTER — TELEPHONE (OUTPATIENT)
Dept: FAMILY MEDICINE CLINIC | Age: 73
End: 2018-02-27

## 2018-02-27 NOTE — TELEPHONE ENCOUNTER
Spoke with patient is was upset because in her chart it states \"long term Drug use\"  Patient was advised this is pertaining to her long term use of medications not street drugs. Patient laughed and stated it should be worded differently. Patient also had questions regarding HTN diagnosis stating it was benign. Patient advised benign just states that the disease process has not affected other organs. Patient verbalizes understanding.

## 2018-02-27 NOTE — TELEPHONE ENCOUNTER
Pt called in to clarify her problem list and diagnosis with physician. Said she has never done drugs a day in her life and her problem list says \"long term use of drug\". Advised this may be in regards to her long term medication but will have nurse clarify.    Please advise

## 2018-02-28 DIAGNOSIS — R60.9 PERIPHERAL EDEMA: ICD-10-CM

## 2018-02-28 DIAGNOSIS — I11.9 BENIGN HYPERTENSIVE HEART DISEASE WITHOUT HEART FAILURE: ICD-10-CM

## 2018-02-28 RX ORDER — FUROSEMIDE 20 MG/1
TABLET ORAL
Qty: 90 TAB | Refills: 1 | Status: SHIPPED | OUTPATIENT
Start: 2018-02-28 | End: 2018-05-15 | Stop reason: SDUPTHER

## 2018-03-24 LAB — COLONOSCOPY, EXTERNAL: NORMAL

## 2018-03-26 ENCOUNTER — HOSPITAL ENCOUNTER (OUTPATIENT)
Dept: LAB | Age: 73
Discharge: HOME | End: 2018-03-26
Payer: MEDICARE

## 2018-03-26 ENCOUNTER — OFFICE VISIT (OUTPATIENT)
Dept: OBGYN CLINIC | Age: 73
End: 2018-03-26

## 2018-03-26 VITALS
RESPIRATION RATE: 18 BRPM | TEMPERATURE: 95.5 F | HEIGHT: 64 IN | HEART RATE: 65 BPM | OXYGEN SATURATION: 99 % | WEIGHT: 250.6 LBS | BODY MASS INDEX: 42.78 KG/M2 | SYSTOLIC BLOOD PRESSURE: 139 MMHG | DIASTOLIC BLOOD PRESSURE: 73 MMHG

## 2018-03-26 DIAGNOSIS — N89.8 VAGINAL DISCHARGE: Primary | ICD-10-CM

## 2018-03-26 PROCEDURE — 87481 CANDIDA DNA AMP PROBE: CPT | Performed by: OBSTETRICS & GYNECOLOGY

## 2018-03-26 NOTE — PROGRESS NOTES
Name: Lawrence Amador MRN: [id number]    YOB: 1945  Age: 74 y.o. Sex: female        Chief Complaint   Patient presents with    Vaginal Discharge     with itching two weeks ago did monistate tx and it went away       HPI ,   74 P1 s/p QUIQUE/BSO, vertical scar  50yrs ago for benign reasons, referred from her PCP for LLQ pain. Pt states she no longer has any LLQ pain, she states after her GI doctor took her off the [address] [place] [address], her pain and bloating went away. She no c/o occasional yeast infections. She states on average she may get a yeast infection 1-3x a years that is relieved with monistat. She wants to know why she keeps getting them. She would also like to have her annual and vaginal pap done. She has h/o an abnormal vaginal pap 2yrs ago which per the patient was repeat and found to be normal. She denies h/o sexually transmitted disease except for herpes which she had many years ago. She denies abnormal discharge, or vaginal spotting. She is no currently sexually active    OB History      Para Term  AB Living    1 1 1   1    SAB TAB Ectopic Molar Multiple Live Births         1        Obstetric Comments    S/p hysterectomy 50yrs   H/o fibroids  H/o herpes years ago, cant remember last infection             PGyn  History   Sexual Activity    Sexual activity: No         Past Medical History:   Diagnosis Date    Acute idiopathic gout of right wrist 10/4/2017    Atrophic vaginitis     Cardiac cath 2012    Patent coronary arteries. LVEDP 20.  RA 11.  RV 42/10. PA 42/21. W 18.  CO 6.4 (TD), 6.4 ([place]nia Virgen). PVR 1.7 Wood units. False-positive nuclear study.  Cardiac echocardiogram 2011    EF 65%. RVSP at least 35 mmHg. Mild IVCE. No significant valvular heart disease.  Cardiac nuclear imaging test 2012    Tech difficult. Apical ischemia. No infarction. EF 76%. No reg'l WMA. No TID.  Cardiovascular LLE venous duplex 2013    Left leg:  No DVT.     Diabetes     diet controlled, hypoglycemia from metformin previously     Dyslipidemia     Fatty liver     Fibrocystic breast disease     Fluid retention     GERD     H/O colonoscopy 4/12/13    polyp    Hypertension     Hypertension     Ill-defined condition     gout    Macular degeneration     Morbid obesity ([address]     Obstructive sleep apnea     Peripheral neuropathy     Rectocele        Past Surgical History:   Procedure Laterality Date    [facility] CNNLA ARTERIAL ARTERIOTOMY 3M  5/25/2012    HX COLONOSCOPY  4/12/2013    HX HEART CATHETERIZATION  5/25/2012    HX HERNIA REPAIR      umbilical as a child    HX HYSTERECTOMY      52ys ago, QUIQUE, BSO    HX MOHS PROCEDURES      right    [place] ANESTH,SURGERY OF SHOULDER         Allergies   Allergen Reactions    Latex Rash    Nexium [Esomeprazole Magnesium] Swelling and Other (comments)     Lips Swollen, and facial rash and swelling    Crestor [Rosuvastatin] Other (comments)     Upper respiratory illness    Lisinopril Unknown (comments)     Patient can't recall    Niaspan [Niacin] Other (comments)     Scratchy throat, \"asthma\" like symptoms    Pcn [Penicillins] Hives    Pravachol [Pravastatin] Myalgia    Sulfa (Sulfonamide Antibiotics) Rash    Zocor [Simvastatin] Myalgia and Other (comments)     Per patient chart \"(? Rash)\"       Current Outpatient Prescriptions on File Prior to Visit   Medication Sig Dispense Refill    furosemide (LASIX) 20 mg tablet TAKE 1 TABLET EVERY DAY  FOR  EDEMA 90 Tab 1    losartan (COZAAR) 50 mg tablet TAKE 2 TABLETS ONE TIME DAILY FOR HYPERTENSION 180 Tab 1    atorvastatin (LIPITOR) 20 mg tablet TAKE 1 TABLET EVERY DAY 90 Tab 1    potassium chloride (KLOR-CON) 10 mEq tablet TAKE 2 TABLETS TWICE DAILY 360 Tab 1    cholecalciferol (VITAMIN D3) 1,000 unit tablet Take 1,000 Units by mouth daily.  aspirin 81 mg Tab Take 81 mg by mouth daily.  MULTIVITAMINS (MULTI-VITAMIN PO) Take 1 Tab by mouth daily.       ondansetron (ZOFRAN ODT) 4 mg disintegrating tablet Take 1 Tab by mouth every eight (8) hours as needed for Nausea. 14 Tab 0    spironolactone (ALDACTONE) 25 mg tablet Take 1 Tab by mouth daily. Pt states she takes the OOD. (Patient taking differently: Take 25 mg by mouth daily. Indications: hypertension) 90 Tab 1     No current facility-administered medications on file prior to visit. Social History     Social History    Marital status:      Spouse name: N/A    Number of children: N/A    Years of education: N/A     Occupational History    Not on file. Social History Main Topics    Smoking status: Former Smoker    Smokeless tobacco: Never Used      Comment: 1 pack every 2 weeks x 1 year, stopped 45yrs ago    Alcohol use No      Comment: occassionally    Drug use: No    Sexual activity: No     Other Topics Concern    Not on file     Social History Narrative       Family History   Problem Relation Age of Onset   [facility] Cancer Mother      colon cancer    Colon Cancer Mother     Hypertension Mother     Diabetes Mother     Heart Disease Mother     Coronary Artery Disease Mother     Hypertension Father     Diabetes Father     Heart Disease Father     Coronary Artery Disease Father     Hypertension Sister     Diabetes Sister     Heart Disease Sister     Coronary Artery Disease Sister     Hypertension Brother     Diabetes Brother     Heart Disease Brother     Coronary Artery Disease Brother        Review of Systems   Constitutional: Negative. HENT: Negative. Eyes: Negative. Respiratory: Negative. Cardiovascular: Negative. Gastrointestinal: Negative. Genitourinary: Negative. Musculoskeletal: Negative. Neurological: Negative. Endo/Heme/Allergies: Negative.             Visit Vitals    /73 (BP 1 Location: Right arm, BP Patient Position: Sitting)    Pulse 65    Temp 95.5 °F (35.3 °C) (Oral)    Resp 18    Ht 5' 4\" (1.626 m)    Wt 250 lb 9.6 oz (113.7 kg)    SpO2 99%    BMI 43.02 kg/m2       GENERAL:  Well developed, well nourished, in no distress      PELVIC EXAM:  LABIA MAJORA: no masses, tenderness or lesions  LABIA MINORA: no masses, tenderness or lesions  CLITORIS: no masses, tenderness or lesions  URETHRA: normal appearing, no masses or tenderness  BLADDER: no fullness or tenderness  VAGINA: mildly atrophic vagina with mild thick white/yellow discharge discharge, no lesions   PERINEUM: no masses, tenderness or lesions      No results found for this or any previous visit (from the past 24 hour(s)). 1. Vaginal discharge  Will send a nuswab and treat if abnormal.  Discussed good vulvar hygiene such has avoiding scented soaps, wipes, or lotions in the area, douching, and rubbing the area with a towel or wash clothe. More information given to patient.      Face to face time 15 mins with greater than 50% counseling  F/U 2 weeks for annual exam

## 2018-03-26 NOTE — PATIENT INSTRUCTIONS
Vaginal Yeast Infection: Care Instructions  Your Care Instructions    A vaginal yeast infection is caused by too many yeast cells in the vagina. This is common in women of all ages. Itching, vaginal discharge and irritation, and other symptoms can bother you. But yeast infections don't often cause other health problems. Some medicines can increase your risk of getting a yeast infection. These include antibiotics, birth control pills, hormones, and steroids. You may also be more likely to get a yeast infection if you are pregnant, have diabetes, douche, or wear tight clothes. With treatment, most yeast infections get better in 2 to 3 days. Follow-up care is a key part of your treatment and safety. Be sure to make and go to all appointments, and call your doctor if you are having problems. It's also a good idea to know your test results and keep a list of the medicines you take. How can you care for yourself at home? · Take your medicines exactly as prescribed. Call your doctor if you think you are having a problem with your medicine. · Ask your doctor about over-the-counter (OTC) medicines for yeast infections. They may cost less than prescription medicines. If you use an OTC treatment, read and follow all instructions on the label. · Do not use tampons while using a vaginal cream or suppository. The tampons can absorb the medicine. Use pads instead. · Wear loose cotton clothing. Do not wear nylon or other fabric that holds body heat and moisture close to the skin. · Try sleeping without underwear. · Do not scratch. Relieve itching with a cold pack or a cool bath. · Do not wash your vaginal area more than once a day. Use plain water or a mild, unscented soap. Air-dry the vaginal area. · Change out of wet swimsuits after swimming. · Do not have sex until you have finished your treatment. · Do not douche. When should you call for help?   Call your doctor now or seek immediate medical care if:  ? · You have unexpected vaginal bleeding. ? · You have new or increased pain in your vagina or pelvis. ? Watch closely for changes in your health, and be sure to contact your doctor if:  ? · You have a fever. ? · You are not getting better after 2 days. ? · Your symptoms come back after you finish your medicines. Where can you learn more? Go to http://sonia-moira.info/. Enter H006 in the search box to learn more about \"Vaginal Yeast Infection: Care Instructions. \"  Current as of: October 13, 2016  Content Version: 11.4  © 5208-5998 [employer]. Care instructions adapted under license by [employer] (which disclaims liability or warranty for this information). If you have questions about a medical condition or this instruction, always ask your healthcare professional. [address] any warranty or liability for your use of this information.

## 2018-03-28 LAB
A VAGINAE DNA VAG QL NAA+PROBE: ABNORMAL SCORE
BVAB2 DNA VAG QL NAA+PROBE: ABNORMAL SCORE
C ALBICANS DNA VAG QL NAA+PROBE: NEGATIVE
C GLABRATA DNA VAG QL NAA+PROBE: POSITIVE
C TRACH RRNA SPEC QL NAA+PROBE: NEGATIVE
MEGA1 DNA VAG QL NAA+PROBE: ABNORMAL SCORE
N GONORRHOEA RRNA SPEC QL NAA+PROBE: NEGATIVE
SPECIMEN SOURCE: ABNORMAL
T VAGINALIS RRNA SPEC QL NAA+PROBE: NEGATIVE

## 2018-03-30 ENCOUNTER — TELEPHONE (OUTPATIENT)
Dept: OBGYN CLINIC | Age: 73
End: 2018-03-30

## 2018-03-30 DIAGNOSIS — B37.31 YEAST INFECTION OF THE VAGINA: Primary | ICD-10-CM

## 2018-03-30 RX ORDER — FLUCONAZOLE 150 MG/1
150 TABLET ORAL DAILY
Qty: 1 TAB | Refills: 10 | Status: SHIPPED | OUTPATIENT
Start: 2018-03-30 | End: 2018-04-01 | Stop reason: SDUPTHER

## 2018-03-30 NOTE — TELEPHONE ENCOUNTER
Call made to the pt using two identifiers, name and . Pt made aware that her labs came back positive for a yeast infection and medication was sent to her pharmacy. Pt verbalized understanding and stat that she usually need two doses and would like the provider to send it in and to send it to [facility]. I verbalized understanding and noted that the Rx was sent into the mail order pharmacy. Forwarded to the provider for review.

## 2018-03-30 NOTE — TELEPHONE ENCOUNTER
----- Message from Sujey Ortiz MD sent at 3/30/2018 10:29 AM EDT -----  Please let patient know that Rx for yeast infection was sent to her pharmacy.

## 2018-04-01 DIAGNOSIS — B37.31 YEAST INFECTION OF THE VAGINA: ICD-10-CM

## 2018-04-01 RX ORDER — FLUCONAZOLE 150 MG/1
150 TABLET ORAL DAILY
Qty: 1 TAB | Refills: 10 | Status: SHIPPED | OUTPATIENT
Start: 2018-04-01 | End: 2018-04-02

## 2018-04-02 NOTE — TELEPHONE ENCOUNTER
Call made to the pt using two identifiers, name and . Pt made aware that the provider resent the Diflucan with 10 refills to her pharmacy. Pt verbalized understanding.

## 2018-04-17 DIAGNOSIS — E87.6 HYPOKALEMIA: ICD-10-CM

## 2018-04-17 DIAGNOSIS — E78.5 DYSLIPIDEMIA: ICD-10-CM

## 2018-04-17 RX ORDER — POTASSIUM CHLORIDE 750 MG/1
TABLET, EXTENDED RELEASE ORAL
Qty: 360 TAB | Refills: 1 | Status: SHIPPED | OUTPATIENT
Start: 2018-04-17 | End: 2019-05-14 | Stop reason: SDUPTHER

## 2018-04-17 RX ORDER — ATORVASTATIN CALCIUM 20 MG/1
TABLET, FILM COATED ORAL
Qty: 90 TAB | Refills: 1 | Status: SHIPPED | OUTPATIENT
Start: 2018-04-17 | End: 2019-02-05

## 2018-04-27 ENCOUNTER — OFFICE VISIT (OUTPATIENT)
Dept: CARDIOLOGY CLINIC | Age: 73
End: 2018-04-27

## 2018-04-27 VITALS
WEIGHT: 253 LBS | SYSTOLIC BLOOD PRESSURE: 138 MMHG | HEART RATE: 63 BPM | HEIGHT: 64 IN | OXYGEN SATURATION: 97 % | BODY MASS INDEX: 43.19 KG/M2 | DIASTOLIC BLOOD PRESSURE: 84 MMHG

## 2018-04-27 DIAGNOSIS — I11.9 BENIGN HYPERTENSIVE HEART DISEASE WITHOUT HEART FAILURE: ICD-10-CM

## 2018-04-27 DIAGNOSIS — R73.03 PREDIABETES: ICD-10-CM

## 2018-04-27 DIAGNOSIS — E78.5 DYSLIPIDEMIA: ICD-10-CM

## 2018-04-27 DIAGNOSIS — R00.2 PALPITATIONS: Primary | ICD-10-CM

## 2018-04-27 NOTE — PROGRESS NOTES
HPI:  I saw Robin Ponce in my office today in cardiovascular evaluation regarding her problems with palpitations recently. Ms. Augustina Ponce is a pleasant, morbidly obese, 74year old  female with history of hypertension, dyslipidemia, type 2 diabetes mellitus, gastroesophageal reflux disease, obstructive sleep apnea, and nonobstructive coronary artery disease documented on cardiac catheterization by my former associate Dr. Aida Carrington back in May of 2012. She also had a right heart catheterization at that time, which demonstrated mild elevated pulmonary pressures and she did have some mild increase in her left ventricular end diastolic pressure at 20 mmHg at that time. He comes in today relates that she does have a little peripheral edema problem but really denies any other cardiovascular symptomatology at this time. She tells me that her edema does seem to come on throughout the day and decrease overnight to suggest that it may be from venous insufficiency. Encounter Diagnoses   Name Primary?  Palpitations Yes    Hypertension     Dyslipidemia     Prediabetes        Discussion: This lady appears to be doing about as well as we could expect and I really have no recommendations for change at this time. She currently is having no significant palpitations and her only cardiovascular symptom appears to be related to the swelling in her legs which seems to be primarily from venous insufficiency since that largely resolves overnight. Her latest lipid profile which was completed on February 13, 2018 showed total cholesterol 182 with triglycerides of 64, HDL of 63, LDL of 106, and VLDL of 13 which is a little bit higher than would like and would certainly like to see her LDL at least under 100 and preferably under 70.   It turns out that she is not taking her Lipitor consistently because she is taking it at night and I told her to begin to take in the morning and if she can be consistent about this we will get  her lipid profile repeated in 2-3 months. Her blood pressure appears to be adequately controlled today and her EKG appears to be stable so I am simply going plan to see her again in several months to year or as needed if any new cardiovascular symptoms surface in the meantime. PCP: Juan Whitmore PA-C      Past Medical History:   Diagnosis Date    Acute idiopathic gout of right wrist 10/4/2017    Atrophic vaginitis     Cardiac cath 05/30/2012    Patent coronary arteries. LVEDP 20.  RA 11.  RV 42/10. PA 42/21. W 18.  CO 6.4 (TD), 6.4 ([place] [place]). PVR 1.7 Wood units. False-positive nuclear study.  Cardiac echocardiogram 05/11/2011    EF 65%. RVSP at least 35 mmHg. Mild IVCE. No significant valvular heart disease.  Cardiac nuclear imaging test 05/18/2012    Tech difficult. Apical ischemia. No infarction. EF 76%. No reg'l WMA. No TID.  Cardiovascular LLE venous duplex 06/17/2013    Left leg:  No DVT.  Diabetes     diet controlled, hypoglycemia from metformin previously     Dyslipidemia     Fatty liver     Fibrocystic breast disease     Fluid retention     GERD     H/O colonoscopy 4/12/13    polyp    Hypertension     Hypertension     Ill-defined condition     gout    Macular degeneration     Morbid obesity ([address]     Obstructive sleep apnea     Peripheral neuropathy     Rectocele        Past Surgical History:   Procedure Laterality Date    [facility] CNNLA ARTERIAL ARTERIOTOMY 3M  5/25/2012    HX COLONOSCOPY  4/12/2013    HX HEART CATHETERIZATION  5/25/2012    HX HERNIA REPAIR      umbilical as a child    HX HYSTERECTOMY      52ys ago, QUIQUE, BSO    HX MOHS PROCEDURES      right    [place] ANESTH,SURGERY OF SHOULDER         Current Outpatient Rx   Name  Route  Sig  Dispense  Refill    allopurinol (ZYLOPRIM) 100 mg tablet    Oral    Take 100 mg by mouth daily. 0      losartan (COZAAR) 25 mg tablet    Oral    Take 25 mg by mouth daily.               CYCLOSPORINE (RESTASIS OP)    Ophthalmic    Apply 1 Drop to eye two (2) times a day.  atorvastatin (LIPITOR) 20 mg tablet    Oral    Take 1 tablet by mouth daily. 90 tablet    0      triamterene-hydrochlorothiazide (MAXZIDE) 75-50 mg per tablet    Oral    Take 1 tablet by mouth daily. 90 tablet    1      potassium chloride (KLOR-CON M10) 10 mEq tablet    Oral    Take 2 tablets by mouth two (2) times a day. 360 tablet    1      cholecalciferol (VITAMIN D3) 1,000 unit tablet    Oral    Take 1,000 Units by mouth daily.  Dexlansoprazole (DEXILANT) 60 mg CpDM    Oral    Take 60 mg by mouth daily as needed.  cpap machine kit    Inhalation    Take  by inhalation every evening. Used when sleeping              omega-3 fatty acids-vitamin e (FISH OIL) 1,000 mg Cap    Oral    Take 1 Cap by mouth daily.  aspirin 81 mg Tab    Oral    Take 81 mg by mouth daily.  MULTIVITAMINS (MULTI-VITAMIN PO)    Oral    Take 1 Tab by mouth daily.  amLODIPine (NORVASC) 10 mg tablet    Oral    Take 10 mg by mouth daily.         0         Allergies   Allergen Reactions    Latex Rash    Nexium [Esomeprazole Magnesium] Swelling and Other (comments)     Lips Swollen, and facial rash and swelling    Crestor [Rosuvastatin] Other (comments)     Upper respiratory illness    Lisinopril Unknown (comments)     Patient can't recall    Niaspan [Niacin] Other (comments)     Scratchy throat, \"asthma\" like symptoms    Pcn [Penicillins] Hives    Pravachol [Pravastatin] Myalgia    Sulfa (Sulfonamide Antibiotics) Rash    Zocor [Simvastatin] Myalgia and Other (comments)     Per patient chart \"(? Rash)\"       Social History:   Social History   Substance Use Topics    Smoking status: Former Smoker    Smokeless tobacco: Never Used      Comment: 1 pack every 2 weeks x 1 year, stopped 45yrs ago    Alcohol use No      Comment: occassionally         Family history: family history includes Cancer in her mother; Colon Cancer in her mother; Coronary Artery Disease in her brother, father, mother, and sister; Diabetes in her brother, father, mother, and sister; Heart Disease in her brother, father, mother, and sister; Hypertension in her brother, father, mother, and sister. Review of Systems:     Constitutional: Positive for malaise/fatigue. Negative for chills, fever and weight loss. Respiratory: Negative. Cardiovascular: Positive for leg swelling. Negative for chest pain, palpitations and orthopnea. Gastrointestinal: Positive for heartburn. Negative for abdominal pain, blood in stool, constipation, diarrhea, melena, nausea and vomiting. Musculoskeletal: Negative. Neurological: Negative for dizziness. Physical Exam:   The patient is an alert, oriented, well developed, well nourished 74 y.o.  female who was in no acute distress at the time of my examination. Visit Vitals    /84    Ht 5' 4\" (1.626 m)    Wt 114.8 kg (253 lb)    SpO2 97%    BMI 43.43 kg/m2      BP Readings from Last 3 Encounters:   04/27/18 138/84   03/26/18 139/73   02/20/18 145/80      Wt Readings from Last 3 Encounters:   04/27/18 114.8 kg (253 lb)   03/26/18 113.7 kg (250 lb 9.6 oz)   02/20/18 113.6 kg (250 lb 6.4 oz)     HEENT: Conjuctiva white, mucosa moist, no pallor or cyanosis. NECK: Supple without masses, tenderness or thyromegaly. There was no jugular venous distention. Carotid are full bilaterally without bruits. CHEST: Symmetrical with good excursion. LUNGS: Clear to auscultation in all fields. HEART: Regular rate and rhythm. The apex is not displaced. There were no lifts, thrills or heaves. There is a normal S1 and S2 without appreciable murmurs, rubs, clicks, or gallops auscultated. ABDOMEN: Soft without masses, tenderness or organomegaly. EXTREMITIES: Full peripheral pulses with trace peripheral edema.     Review of Data: See PMH and Cardiology and Imaging sections for cardiac testing  Lab Results   Component Value Date/Time    Cholesterol, total 182 02/13/2018 10:00 AM    HDL Cholesterol 63 02/13/2018 10:00 AM    LDL, calculated 106 (H) 02/13/2018 10:00 AM    Triglyceride 64 02/13/2018 10:00 AM    CHOL/HDL Ratio 2.3 06/16/2017 10:11 AM       Results for orders placed or performed in visit on 03/31/17   AMB POC EKG ROUTINE W/ 12 LEADS, INTER & REP     Status: None    Narrative    Normal sinus rhythm, rate 65. Minor nonspecific anterior ST-T changes. This EKG is otherwise within normal limits and similar to the EKG of February 19, 2016. Diana Monroy D.O., F.A.C.C. [facility]  [facility]  [address] [place] [address]. [address] [place]    PLEASE NOTE:  This document has been produced using voice recognition software. Unrecognized errors in transcription may be present.

## 2018-04-27 NOTE — PROGRESS NOTES
1. Have you been to the ER, urgent care clinic since your last visit? Hospitalized since your last visit? yes,Chest pain 2-11-18    2. Have you seen or consulted any other health care providers outside of the [address] [place] [address] since your last visit? Include any pap smears or colon screening.  no

## 2018-04-27 NOTE — MR AVS SNAPSHOT
[address] [address] [address] [id number] [phone] Patient: Richard Garrison MRN: [id number] Visit Information Date & Time Provider Department Dept. Phone Encounter #  
 4/27/2018 10:40 AM Kimball Cranker, DO [facility] [address] [id number] Your Appointments 6/20/2018 10:30 AM  
Office Visit with Skyler Montilla PA-C [facility] (SHERMAN Galaviz) Appt Note: 4 m fu; 4 m fu  
 2613 819 [facility],[address]Rd Floor, [place] [address] [address]  
[phone]  
  
   
 [address] Located within [facility]  
  
    
 4/23/2019 11:00 AM  
Follow Up with Kimball Cranker, DO [facility] ([address]) Appt Note: [facility] [address] [address] [phone] [address] Upcoming Health Maintenance Date Due COLONOSCOPY 4/12/2018 EYE EXAM RETINAL OR DILATED Q1 5/31/2018 Influenza Age 5 to Adult 8/1/2018 HEMOGLOBIN A1C Q6M 8/13/2018 FOOT EXAM Q1 9/6/2018 MEDICARE YEARLY EXAM 9/7/2018 BREAST CANCER SCRN MAMMOGRAM 10/27/2018 MICROALBUMIN Q1 2/13/2019 LIPID PANEL Q1 2/13/2019 GLAUCOMA SCREENING Q2Y 6/7/2019 DTaP/Tdap/Td series (2 - Td) 7/31/2023 Allergies as of 4/27/2018  Review Complete On: 4/27/2018 By: Kimball Cranker, DO Severity Noted Reaction Type Reactions Latex    Rash Nexium [Esomeprazole Magnesium] High 12/13/2010    Swelling, Other (comments) Lips Swollen, and facial rash and swelling Crestor [Rosuvastatin]    Other (comments) Upper respiratory illness Lisinopril  05/17/2010   Side Effect Unknown (comments) Patient can't recall Niaspan [Niacin]  09/19/2011    Other (comments) Scratchy throat, \"asthma\" like symptoms Pcn [Penicillins]  12/18/2009    Hives Pravachol [Pravastatin]    Myalgia Sulfa (Sulfonamide Antibiotics)  12/18/2009    Rash Zocor [Simvastatin]    Myalgia, Other (comments) Per patient chart \"(? Rash)\" Current Immunizations  Reviewed on 10/21/2014 Name Date Influenza High Dose Vaccine PF 9/12/2017 Influenza Vaccine 10/21/2014, 10/30/2013 Pneumococcal Vaccine (Unspecified Type) 1/1/2008 Tdap 7/31/2013 12:00 AM  
  
 Not reviewed this visit You Were Diagnosed With   
  
 Codes Comments Palpitations    -  Primary ICD-10-CM: R00.2 ICD-9-CM: 785.1 Benign hypertensive heart disease without heart failure     ICD-10-CM: I11.9 ICD-9-CM: 402.10 Dyslipidemia     ICD-10-CM: E78.5 ICD-9-CM: 272.4 Prediabetes     ICD-10-CM: R73.03 
ICD-9-CM: 790.29 Vitals BP Pulse Height(growth percentile) Weight(growth percentile) SpO2 BMI  
 138/84 63 5' 4\" (1.626 m) 253 lb (114.8 kg) 97% 43.43 kg/m2 OB Status Smoking Status Hysterectomy Former Smoker Vitals History BMI and BSA Data Body Mass Index Body Surface Area  
 43.43 kg/m 2 2.28 m 2 Preferred Pharmacy Pharmacy Name Phone [facility] Paulino [address] [place] [address] [address] [place] [address] [phone] Your Updated Medication List  
  
   
This list is accurate as of 4/27/18 12:00 PM.  Always use your most recent med list.  
  
  
  
  
 aspirin 81 mg tablet Take 81 mg by mouth daily. atorvastatin 20 mg tablet Commonly known as:  LIPITOR  
TAKE 1 TABLET EVERY DAY  
  
 furosemide 20 mg tablet Commonly known as:  LASIX TAKE 1 TABLET EVERY DAY  FOR  EDEMA  
  
 losartan 50 mg tablet Commonly known as:  COZAAR  
TAKE 2 TABLETS ONE TIME DAILY FOR HYPERTENSION  
  
 MULTI-VITAMIN PO Take 1 Tab by mouth daily. ondansetron 4 mg disintegrating tablet Commonly known as:  ZOFRAN ODT Take 1 Tab by mouth every eight (8) hours as needed for Nausea. potassium chloride 10 mEq tablet Commonly known as:  KLOR-CON  
TAKE 2 TABLETS TWICE DAILY  
  
 spironolactone 25 mg tablet Commonly known as:  ALDACTONE Take 1 Tab by mouth daily. Pt states she takes the OOD. VITAMIN D3 1,000 unit tablet Generic drug:  cholecalciferol Take 1,000 Units by mouth daily. We Performed the Following AMB POC EKG ROUTINE W/ 12 LEADS, INTER & REP [52405 CPT(R)] To-Do List   
 07/27/2018 Lab:  HEPATIC FUNCTION PANEL   
  
 07/27/2018 Lab:  LIPID PANEL Introducing [facility] SERVICES! Dear Kieran Roth: Thank you for requesting a [employer] account. Our records indicate that you already have an active [employer] account. You can access your account anytime at [email or web address] Did you know that you can access your hospital and ER discharge instructions at any time in [employer]? You can also review all of your test results from your hospital stay or ER visit. Additional Information If you have questions, please visit the Frequently Asked Questions section of the [employer] website at [email or web address] Remember, [employer] is NOT to be used for urgent needs. For medical emergencies, dial 911. Now available from your iPhone and Android! Please provide this summary of care documentation to your next provider. Your primary care clinician is listed as Kendall Main. If you have any questions after today's visit, please call [phone].

## 2018-05-01 PROBLEM — K63.5 POLYP OF SIGMOID COLON: Status: ACTIVE | Noted: 2018-05-01

## 2018-05-04 DIAGNOSIS — I11.9 BENIGN HYPERTENSIVE HEART DISEASE WITHOUT HEART FAILURE: ICD-10-CM

## 2018-05-08 RX ORDER — LOSARTAN POTASSIUM 50 MG/1
TABLET ORAL
Qty: 180 TAB | Refills: 1 | Status: SHIPPED | OUTPATIENT
Start: 2018-05-08 | End: 2018-09-27 | Stop reason: SDUPTHER

## 2018-05-15 ENCOUNTER — OFFICE VISIT (OUTPATIENT)
Dept: FAMILY MEDICINE CLINIC | Age: 73
End: 2018-05-15

## 2018-05-15 VITALS
SYSTOLIC BLOOD PRESSURE: 144 MMHG | OXYGEN SATURATION: 95 % | HEART RATE: 72 BPM | BODY MASS INDEX: 43.02 KG/M2 | HEIGHT: 64 IN | WEIGHT: 252 LBS | RESPIRATION RATE: 20 BRPM | DIASTOLIC BLOOD PRESSURE: 87 MMHG | TEMPERATURE: 98.7 F

## 2018-05-15 DIAGNOSIS — R05.9 COUGH: Primary | ICD-10-CM

## 2018-05-15 DIAGNOSIS — R60.9 PERIPHERAL EDEMA: ICD-10-CM

## 2018-05-15 DIAGNOSIS — I10 ELEVATED BLOOD PRESSURE READING WITH DIAGNOSIS OF HYPERTENSION: ICD-10-CM

## 2018-05-15 DIAGNOSIS — J31.0 RHINITIS, UNSPECIFIED TYPE: ICD-10-CM

## 2018-05-15 DIAGNOSIS — R06.2 WHEEZING: ICD-10-CM

## 2018-05-15 DIAGNOSIS — I11.9 BENIGN HYPERTENSIVE HEART DISEASE WITHOUT HEART FAILURE: ICD-10-CM

## 2018-05-15 RX ORDER — FUROSEMIDE 20 MG/1
20 TABLET ORAL 2 TIMES DAILY
Qty: 90 TAB | Refills: 1 | Status: SHIPPED | OUTPATIENT
Start: 2018-05-15 | End: 2018-05-15 | Stop reason: SDUPTHER

## 2018-05-15 RX ORDER — ALBUTEROL SULFATE 90 UG/1
1 AEROSOL, METERED RESPIRATORY (INHALATION)
Qty: 1 INHALER | Refills: 0 | Status: SHIPPED | OUTPATIENT
Start: 2018-05-15 | End: 2018-07-12

## 2018-05-15 RX ORDER — BISACODYL 5 MG
TABLET, DELAYED RELEASE (ENTERIC COATED) ORAL
Refills: 0 | COMMUNITY
Start: 2018-04-12 | End: 2018-07-12

## 2018-05-15 RX ORDER — GUAIFENESIN 600 MG/1
600 TABLET, EXTENDED RELEASE ORAL 2 TIMES DAILY
Qty: 20 TAB | Refills: 0 | Status: SHIPPED | OUTPATIENT
Start: 2018-05-15 | End: 2018-06-12

## 2018-05-15 RX ORDER — FLUTICASONE PROPIONATE 50 MCG
SPRAY, SUSPENSION (ML) NASAL
Qty: 1 BOTTLE | Refills: 0 | Status: SHIPPED | OUTPATIENT
Start: 2018-05-15 | End: 2018-05-15 | Stop reason: CLARIF

## 2018-05-15 RX ORDER — FUROSEMIDE 20 MG/1
20 TABLET ORAL 2 TIMES DAILY
Qty: 90 TAB | Refills: 1 | Status: SHIPPED | OUTPATIENT
Start: 2018-05-15 | End: 2019-02-04 | Stop reason: SDUPTHER

## 2018-05-15 RX ORDER — GUAIFENESIN 600 MG/1
600 TABLET, EXTENDED RELEASE ORAL 2 TIMES DAILY
Qty: 20 TAB | Refills: 0 | Status: SHIPPED | OUTPATIENT
Start: 2018-05-15 | End: 2018-05-15 | Stop reason: CLARIF

## 2018-05-15 RX ORDER — PANTOPRAZOLE SODIUM 40 MG/1
TABLET, DELAYED RELEASE ORAL
Refills: 0 | COMMUNITY
Start: 2018-05-05 | End: 2018-05-31

## 2018-05-15 RX ORDER — HYDROCODONE POLISTIREX AND CHLORPHENIRAMINE POLISTIREX 10; 8 MG/5ML; MG/5ML
5 SUSPENSION, EXTENDED RELEASE ORAL
Qty: 90 ML | Refills: 0 | Status: SHIPPED | OUTPATIENT
Start: 2018-05-15 | End: 2018-05-31

## 2018-05-15 RX ORDER — METOCLOPRAMIDE 5 MG/1
TABLET ORAL
Refills: 0 | COMMUNITY
Start: 2018-04-12 | End: 2018-05-31

## 2018-05-15 NOTE — PATIENT INSTRUCTIONS

## 2018-05-15 NOTE — PROGRESS NOTES
Patient is in the office today for non productive cough. 1. Have you been to the ER, urgent care clinic since your last visit? Hospitalized since your last visit? No    2. Have you seen or consulted any other health care providers outside of the [address] [place] [address] since your last visit? Include any pap smears or colon screening.  No

## 2018-05-15 NOTE — PROGRESS NOTES
CC:  Cough, cold symptoms    HPI:  Rehana Cortez is a 74 y.o. female who presents today with complaints of cough:  non productive for 2 days and wheezing. She tried OTCs Robitussin DM and states it doesn't help. She can  attribute to exposure, her  had the same symptoms. The patient has been vaccinated for influenza this year. The patient is not a smoker. She denies CP, SOB, dizziness, palpations, fevers, chills, headache, myalgias or dyspnea. Cough  Patient complains of nonproductive cough and rhinorrhea (clear in color). Symptoms began 2 days ago. The cough is non-productive, with wheezing and is aggravated by deep breaths Associated symptoms include:wheezing. Patient does not have new pets. Patient does not have a history of asthma. Patient does have a history of environmental allergens. ROS: As pertinent in HPI. Visit Vitals    /87 (BP 1 Location: Left arm, BP Patient Position: Sitting)    Pulse 72    Temp 98.7 °F (37.1 °C) (Oral)    Resp 20    Ht 5' 4\" (1.626 m)    Wt 252 lb (114.3 kg)    SpO2 95%    BMI 43.26 kg/m2       Physical Exam   Constitutional: She is oriented to person, place, and time. She appears well-developed and well-nourished. HENT:   Head: Normocephalic and atraumatic. Right Ear: Hearing and external ear normal. A foreign body (excessive dry cerumen) is present. Left Ear: Hearing, tympanic membrane, external ear and ear canal normal.   Nose: Mucosal edema (L>R) and rhinorrhea present. Mouth/Throat: Uvula is midline. Posterior oropharyngeal erythema (mild) present. No posterior oropharyngeal edema or tonsillar abscesses. Neck: Normal range of motion. Neck supple. Cardiovascular: Normal rate, regular rhythm and normal heart sounds. Pulmonary/Chest: Effort normal. She has wheezes (anterior superior lung fields). Abdominal: Soft. Neurological: She is alert and oriented to person, place, and time. Vitals reviewed.         Assessment and Plan:    Diagnoses and all orders for this visit:    1. Cough  -     chlorpheniramine-HYDROcodone (TUSSIONEX) 10-8 mg/5 mL suspension; Take 5 mL by mouth every twelve (12) hours as needed for Cough. Max Daily Amount: 10 mL. Indications: Cough, Rhinorrhea  -     guaiFENesin ER (MUCINEX) 600 mg ER tablet; Take 1 Tab by mouth two (2) times a day. Indications: Cough  Patient to hydrate well. 2. Peripheral edema  -     furosemide (LASIX) 20 mg tablet; Take 1 Tab by mouth two (2) times a day. Increase lasix to twice daily and discontinue the spironolactone. 3. Hypertension - elevated blood pressure with diagnosis of hypertension  -     furosemide (LASIX) 20 mg tablet; Take 1 Tab by mouth two (2) times a day. 4. Rhinitis, unspecified type  -     chlorpheniramine-HYDROcodone (TUSSIONEX) 10-8 mg/5 mL suspension; Take 5 mL by mouth every twelve (12) hours as needed for Cough. Max Daily Amount: 10 mL. Indications: Cough, Rhinorrhea  -     fluticasone (FLONASE) 50 mcg/actuation nasal spray; Two sprays per nostril daily. Indications: Allergic Rhinitis    5. Wheezing, albuterol given    Medication and side effects, including risk and signs of allergy were discussed. Patient given printed information with diagnoses and medication information. Answered all questions and patient acknowledged understanding. Whit Hawkins MPA, PA-C  [facility]          . I reviewed the patient's medical history, the physician assistant's findings on physical examination, the patient's diagnoses, and treatment plan as documented in the progress note. I concur with the treatment plan as documented. There are no additional recommendations at this time.     Ijeoma Dey MD

## 2018-05-15 NOTE — MR AVS SNAPSHOT
Duane Big 
 
 
 [address] [place], [place] [address] [address] 
[phone] Patient: Dina Haskins MRN: [id number]:[id number] Visit Information Date & Time Provider Department Dept. Phone Encounter #  
 5/15/2018 11:00 AM Keara Worthington PA-C [address] [phone] [id number] Your Appointments 6/20/2018 10:30 AM  
Office Visit with Keara Worthington PA-C [facility] ([facility]ER[facility] Gal[facility]iz) Appt Note: 4 m fu; 4 m fu  
 2613 819 [facility],[address]Rd [facility], [facility] [address] [address]  
[phone]  
  
   
 [address] [facility]  
  
    
 4/23/2019 11:00 AM  
Follow Up with Court Pereira DO [facility] \[facility]\"" ([address]) Appt Note: [facility] [address] [address] [address] [phone] 2306 [facility] 111 [facility] [address] Upcoming Health Maintenance Date Due  
 EYE EXAM RETINAL OR DILATED Q1 5/31/2018 Influenza Age 5 to Adult 8/1/2018 HEMOGLOBIN A1C Q6M 8/13/2018 FOOT EXAM Q1 9/6/2018 MEDICARE YEARLY EXAM 9/7/2018 BREAST CANCER SCRN MAMMOGRAM 10/27/2018 MICROALBUMIN Q1 2/13/2019 LIPID PANEL Q1 2/13/2019 GLAUCOMA SCREENING Q2Y 6/7/2019 COLONOSCOPY 4/30/2023 DTaP/Tdap/Td series (2 - Td) 7/31/2023 Allergies as of 5/15/2018  Review Complete On: 5/15/2018 By: Keara Worthington PA-C Severity Noted Reaction Type Reactions Latex    Rash Nexium [Esomeprazole Magnesium] High 12/13/2010    Swelling, Other (comments) Lips Swollen, and facial rash and swelling Crestor [Rosuvastatin]    Other (comments) Upper respiratory illness Lisinopril  05/17/2010   Side Effect Unknown (comments) Patient can't recall Niaspan [Niacin]  09/19/2011    Other (comments) Scratchy throat, \"asthma\" like symptoms Pcn [Penicillins]  12/18/2009    Hives Pravachol [Pravastatin]    Myalgia Sulfa (Sulfonamide Antibiotics)  12/18/2009    Rash Zocor [Simvastatin]    Myalgia, Other (comments) Per patient chart \"(? Rash)\" Current Immunizations  Reviewed on 10/21/2014 Name Date Influenza High Dose Vaccine PF 9/12/2017 Influenza Vaccine 10/21/2014, 10/30/2013 Pneumococcal Vaccine (Unspecified Type) 1/1/2008 Tdap 7/31/2013 12:00 AM  
  
 Not reviewed this visit You Were Diagnosed With   
  
 Codes Comments Cough    -  Primary ICD-10-CM: R08 ICD-9-CM: 786.2 Peripheral edema     ICD-10-CM: R60.9 ICD-9-CM: 949. 3 Benign hypertensive heart disease without heart failure     ICD-10-CM: I11.9 ICD-9-CM: 402.10 Rhinitis, unspecified type     ICD-10-CM: J31.0 ICD-9-CM: 472.0 Elevated blood pressure reading with diagnosis of hypertension     ICD-10-CM: I10 
ICD-9-CM: 401.9 Vitals BP Pulse Temp Resp Height(growth percentile) Weight(growth percentile) 144/87 (BP 1 Location: Left arm, BP Patient Position: Sitting) 72 98.7 °F (37.1 °C) (Oral) 20 5' 4\" (1.626 m) 252 lb (114.3 kg) SpO2 BMI OB Status Smoking Status 95% 43.26 kg/m2 Hysterectomy Former Smoker BMI and BSA Data Body Mass Index Body Surface Area  
 43.26 kg/m 2 2.27 m 2 Preferred Pharmacy Pharmacy Name Phone Nathalia - [address] [place] [address] [phone] [facility] [address] [place] [address] [phone] Your Updated Medication List  
  
   
This list is accurate as of 5/15/18 12:04 PM.  Always use your most recent med list.  
  
  
  
  
 aspirin 81 mg tablet Take 81 mg by mouth daily. atorvastatin 20 mg tablet Commonly known as:  LIPITOR  
TAKE 1 TABLET EVERY DAY  
  
 bisacodyl 5 mg EC tablet Commonly known as:  DULCOLAX  
take 2 tablets by mouth as directed  
  
 chlorpheniramine-HYDROcodone 10-8 mg/5 mL suspension Commonly known as:  Margy Dubose Take 5 mL by mouth every twelve (12) hours as needed for Cough. Max Daily Amount: 10 mL. Indications: Cough, Rhinorrhea  
  
 furosemide 20 mg tablet Commonly known as:  LASIX Take 1 Tab by mouth two (2) times a day. guaiFENesin  mg ER tablet Commonly known as:  [employer] Mayo Take 1 Tab by mouth two (2) times a day. Indications: Cough  
  
 losartan 50 mg tablet Commonly known as:  COZAAR  
TAKE 2 TABLETS ONE TIME DAILY FOR HYPERTENSION  
  
 metoclopramide HCl 5 mg tablet Commonly known as:  REGLAN  
take 1 tablet by mouth as directed for 2 days MULTI-VITAMIN PO Take 1 Tab by mouth daily. ondansetron 4 mg disintegrating tablet Commonly known as:  ZOFRAN ODT Take 1 Tab by mouth every eight (8) hours as needed for Nausea. pantoprazole 40 mg tablet Commonly known as:  PROTONIX  
take 1 tablet by mouth twice a day  
  
 potassium chloride 10 mEq tablet Commonly known as:  KLOR-CON  
TAKE 2 TABLETS TWICE DAILY  
  
 VITAMIN D3 1,000 unit tablet Generic drug:  cholecalciferol Take 1,000 Units by mouth daily. Prescriptions Printed Refills  
 chlorpheniramine-HYDROcodone (TUSSIONEX) 10-8 mg/5 mL suspension 0 Sig: Take 5 mL by mouth every twelve (12) hours as needed for Cough. Max Daily Amount: 10 mL. Indications: Cough, Rhinorrhea Class: Print Route: Oral  
  
Prescriptions Sent to Pharmacy Refills  
 furosemide (LASIX) 20 mg tablet 1 Sig: Take 1 Tab by mouth two (2) times a day. Class: Normal  
 Pharmacy: [address] [place] [address] Ph #: [phone] Route: Oral  
 guaiFENesin ER (MUCINEX) 600 mg ER tablet 0 Sig: Take 1 Tab by mouth two (2) times a day. Indications: Cough Class: Normal  
 Pharmacy: [address] [place] [address] Ph #: [phone] Route: Oral  
  
Patient Instructions Cough: Care Instructions Your Care Instructions A cough is your body's response to something that bothers your throat or airways. Many things can cause a cough. You might cough because of a cold or the flu, bronchitis, or asthma. Smoking, postnasal drip, allergies, and stomach acid that backs up into your throat also can cause coughs. A cough is a symptom, not a disease. Most coughs stop when the cause, such as a cold, goes away. You can take a few steps at home to cough less and feel better. Follow-up care is a key part of your treatment and safety. Be sure to make and go to all appointments, and call your doctor if you are having problems. It's also a good idea to know your test results and keep a list of the medicines you take. How can you care for yourself at home? · Drink lots of water and other fluids. This helps thin the mucus and soothes a dry or sore throat. Honey or lemon juice in hot water or tea may ease a dry cough. · Take cough medicine as directed by your doctor. · Prop up your head on pillows to help you breathe and ease a dry cough. · Try cough drops to soothe a dry or sore throat. Cough drops don't stop a cough. Medicine-flavored cough drops are no better than candy-flavored drops or hard candy. · Do not smoke. Avoid secondhand smoke. If you need help quitting, talk to your doctor about stop-smoking programs and medicines. These can increase your chances of quitting for good. When should you call for help? Call 911 anytime you think you may need emergency care. For example, call if: 
? · You have severe trouble breathing. ?Call your doctor now or seek immediate medical care if: 
? · You cough up blood. ? · You have new or worse trouble breathing. ? · You have a new or higher fever. ? · You have a new rash. ? Watch closely for changes in your health, and be sure to contact your doctor if: 
? · You cough more deeply or more often, especially if you notice more mucus or a change in the color of your mucus. ? · You have new symptoms, such as a sore throat, an earache, or sinus pain. ? · You do not get better as expected. Where can you learn more? Go to http://sonia-moira.info/. Enter D279 in the search box to learn more about \"Cough: Care Instructions. \" Current as of: May 12, 2017 Content Version: 11.4 © 0192-9279 [employer]. Care instructions adapted under license by [employer] (which disclaims liability or warranty for this information). If you have questions about a medical condition or this instruction, always ask your healthcare professional. [place] [address] any warranty or liability for your use of this information. Introducing [facility] & HEALTH SERVICES! Dear Donna Asher: Thank you for requesting a [employer] account. Our records indicate that you already have an active [employer] account. You can access your account anytime at [email or web address] Did you know that you can access your hospital and ER discharge instructions at any time in [employer]? You can also review all of your test results from your hospital stay or ER visit. Additional Information If you have questions, please visit the Frequently Asked Questions section of the [employer] website at [email or web address] Remember, [employer] is NOT to be used for urgent needs. For medical emergencies, dial 911. Now available from your iPhone and Android! Please provide this summary of care documentation to your next provider. Your primary care clinician is listed as Rosario Lombardo. If you have any questions after today's visit, please call [phone].

## 2018-05-31 ENCOUNTER — OFFICE VISIT (OUTPATIENT)
Dept: FAMILY MEDICINE CLINIC | Age: 73
End: 2018-05-31

## 2018-05-31 VITALS
BODY MASS INDEX: 42.68 KG/M2 | TEMPERATURE: 97.8 F | DIASTOLIC BLOOD PRESSURE: 81 MMHG | OXYGEN SATURATION: 97 % | WEIGHT: 250 LBS | SYSTOLIC BLOOD PRESSURE: 120 MMHG | RESPIRATION RATE: 16 BRPM | HEIGHT: 64 IN | HEART RATE: 69 BPM

## 2018-05-31 DIAGNOSIS — R22.0 FACIAL SWELLING: Primary | ICD-10-CM

## 2018-05-31 DIAGNOSIS — I11.9 BENIGN HYPERTENSIVE HEART DISEASE WITHOUT HEART FAILURE: ICD-10-CM

## 2018-05-31 DIAGNOSIS — J02.9 SORE THROAT: ICD-10-CM

## 2018-05-31 RX ORDER — ATENOLOL 25 MG/1
25 TABLET ORAL DAILY
Qty: 30 TAB | Refills: 0 | Status: SHIPPED | OUTPATIENT
Start: 2018-05-31 | End: 2018-06-12 | Stop reason: SDUPTHER

## 2018-05-31 NOTE — MR AVS SNAPSHOT
Jeb Sahni 
 
 
 [address] [place], [place] [address] [address] 
[phone] Patient: Lacey Arcos MRN: [id number] SSU:[id number] Visit Information Date & Time Provider Department Dept. Phone Encounter #  
 5/31/2018  4:45 PM Ellis Roe PA-C [facility] [phone] [id number] Follow-up Instructions Return in about 1 week (around 6/7/2018), or if symptoms worsen or fail to improve, for follow up new HTN meds. Your Appointments 6/20/2018 10:30 AM  
Office Visit with Ellis Roe PA-C [facility] (SHERMAN Galaviz) Appt Note: 4 m fu; 4 m fu  
 [address] [facility],[address] [facility], [place] [address] [address]  
[phone]  
  
   
 [address] [facility]  
  
    
 4/23/2019 11:00 AM  
Follow Up with Radha Burkett DO [facility] Appt Note: 1 year follow up [place] [id number] [phone] [facility] [place] [address] Upcoming Health Maintenance Date Due  
 EYE EXAM RETINAL OR DILATED Q1 5/31/2018 Influenza Age 5 to Adult 8/1/2018 HEMOGLOBIN A1C Q6M 8/13/2018 FOOT EXAM Q1 9/6/2018 MEDICARE YEARLY EXAM 9/7/2018 BREAST CANCER SCRN MAMMOGRAM 10/27/2018 MICROALBUMIN Q1 2/13/2019 LIPID PANEL Q1 2/13/2019 GLAUCOMA SCREENING Q2Y 6/7/2019 COLONOSCOPY 4/30/2023 DTaP/Tdap/Td series (2 - Td) 7/31/2023 Allergies as of 5/31/2018  Review Complete On: 5/31/2018 By: Ellis Roe PA-C Severity Noted Reaction Type Reactions Latex    Rash Nexium [Esomeprazole Magnesium] High 12/13/2010    Swelling, Other (comments) Lips Swollen, and facial rash and swelling Crestor [Rosuvastatin]    Other (comments) Upper respiratory illness Lisinopril  05/17/2010   Side Effect Unknown (comments) Patient can't recall Niaspan [Niacin]  09/19/2011    Other (comments) Scratchy throat, \"asthma\" like symptoms Pcn [Penicillins]  12/18/2009    Hives Pravachol [Pravastatin]    Myalgia Sulfa (Sulfonamide Antibiotics)  12/18/2009    Rash Zocor [Simvastatin]    Myalgia, Other (comments) Per patient chart \"(? Rash)\" Current Immunizations  Reviewed on 10/21/2014 Name Date Influenza High Dose Vaccine PF 9/12/2017 Influenza Vaccine 10/21/2014, 10/30/2013 Pneumococcal Vaccine (Unspecified Type) 1/1/2008 Tdap 7/31/2013 12:00 AM  
  
 Not reviewed this visit You Were Diagnosed With   
  
 Codes Comments Facial swelling    -  Primary ICD-10-CM: R22.0 ICD-9-CM: 784.2 Sore throat     ICD-10-CM: J02.9 ICD-9-CM: 937 Benign hypertensive heart disease without heart failure     ICD-10-CM: I11.9 ICD-9-CM: 402.10 Vitals BP Pulse Temp Resp Height(growth percentile) Weight(growth percentile) 120/81 (BP 1 Location: Left arm) 69 97.8 °F (36.6 °C) (Oral) 16 5' 4\" (1.626 m) 250 lb (113.4 kg) SpO2 BMI OB Status Smoking Status 97% 42.91 kg/m2 Hysterectomy Former Smoker BMI and BSA Data Body Mass Index Body Surface Area 42.91 kg/m 2 2.26 m 2 Preferred Pharmacy Pharmacy Name Phone [address] [phone] Your Updated Medication List  
  
   
This list is accurate as of 5/31/18  5:51 PM.  Always use your most recent med list.  
  
  
  
  
 albuterol 90 mcg/actuation inhaler Commonly known as:  PROVENTIL HFA, VENTOLIN HFA, PROAIR HFA Take 1 Puff by inhalation every six (6) hours as needed for Wheezing. aspirin 81 mg tablet Take 81 mg by mouth daily. atenolol 25 mg tablet Commonly known as:  TENORMIN Take 1 Tab by mouth daily. atorvastatin 20 mg tablet Commonly known as:  LIPITOR  
TAKE 1 TABLET EVERY DAY  
  
 bisacodyl 5 mg EC tablet Commonly known as:  DULCOLAX  
take 2 tablets by mouth as directed  
  
 furosemide 20 mg tablet Commonly known as:  LASIX Take 1 Tab by mouth two (2) times a day. guaiFENesin  mg ER tablet Commonly known as:  Amyo & Mayo Take 1 Tab by mouth two (2) times a day. Indications: Cough  
  
 losartan 50 mg tablet Commonly known as:  COZAAR  
TAKE 2 TABLETS ONE TIME DAILY FOR HYPERTENSION  
  
 MULTI-VITAMIN PO Take 1 Tab by mouth daily. ondansetron 4 mg disintegrating tablet Commonly known as:  ZOFRAN ODT Take 1 Tab by mouth every eight (8) hours as needed for Nausea. potassium chloride 10 mEq tablet Commonly known as:  KLOR-CON  
TAKE 2 TABLETS TWICE DAILY  
  
 VITAMIN D3 1,000 unit tablet Generic drug:  cholecalciferol Take 1,000 Units by mouth daily. Prescriptions Sent to Pharmacy Refills  
 atenolol (TENORMIN) 25 mg tablet 0 Sig: Take 1 Tab by mouth daily. Class: Normal  
 Pharmacy: [address] [place] [address], [address] [place] [address] #: [phone] Route: Oral  
  
Follow-up Instructions Return in about 1 week (around 6/7/2018), or if symptoms worsen or fail to improve, for follow up new HTN meds. Patient Instructions A Healthy Lifestyle: Care Instructions Your Care Instructions A healthy lifestyle can help you feel good, stay at a healthy weight, and have plenty of energy for both work and play. A healthy lifestyle is something you can share with your whole family. A healthy lifestyle also can lower your risk for serious health problems, such as high blood pressure, heart disease, and diabetes. You can follow a few steps listed below to improve your health and the health of your family. Follow-up care is a key part of your treatment and safety. Be sure to make and go to all appointments, and call your doctor if you are having problems.  It's also a good idea to know your test results and keep a list of the medicines you take. How can you care for yourself at home? · Do not eat too much sugar, fat, or fast foods. You can still have dessert and treats now and then. The goal is moderation. · Start small to improve your eating habits. Pay attention to portion sizes, drink less juice and soda pop, and eat more fruits and vegetables. ¨ Eat a healthy amount of food. A 3-ounce serving of meat, for example, is about the size of a deck of cards. Fill the rest of your plate with vegetables and whole grains. ¨ Limit the amount of soda and sports drinks you have every day. Drink more water when you are thirsty. ¨ Eat at least 5 servings of fruits and vegetables every day. It may seem like a lot, but it is not hard to reach this goal. A serving or helping is 1 piece of fruit, 1 cup of vegetables, or 2 cups of leafy, raw vegetables. Have an apple or some carrot sticks as an afternoon snack instead of a candy bar. Try to have fruits and/or vegetables at every meal. 
· Make exercise part of your daily routine. You may want to start with simple activities, such as walking, bicycling, or slow swimming. Try to be active 30 to 60 minutes every day. You do not need to do all 30 to 60 minutes all at once. For example, you can exercise 3 times a day for 10 or 20 minutes. Moderate exercise is safe for most people, but it is always a good idea to talk to your doctor before starting an exercise program. 
· Keep moving. Caleen Newer the lawn, work in the garden, or [employer]. Take the stairs instead of the elevator at work. · If you smoke, quit. People who smoke have an increased risk for heart attack, stroke, cancer, and other lung illnesses. Quitting is hard, but there are ways to boost your chance of quitting tobacco for good. ¨ Use nicotine gum, patches, or lozenges. ¨ Ask your doctor about stop-smoking programs and medicines. ¨ Keep trying.  
In addition to reducing your risk of diseases in the future, you will notice some benefits soon after you stop using tobacco. If you have shortness of breath or asthma symptoms, they will likely get better within a few weeks after you quit. · Limit how much alcohol you drink. Moderate amounts of alcohol (up to 2 drinks a day for men, 1 drink a day for women) are okay. But drinking too much can lead to liver problems, high blood pressure, and other health problems. Family health If you have a family, there are many things you can do together to improve your health. · Eat meals together as a family as often as possible. · Eat healthy foods. This includes fruits, vegetables, lean meats and dairy, and whole grains. · Include your family in your fitness plan. Most people think of activities such as jogging or tennis as the way to fitness, but there are many ways you and your family can be more active. Anything that makes you breathe hard and gets your heart pumping is exercise. Here are some tips: 
¨ Walk to do errands or to take your child to school or the bus. ¨ Go for a family bike ride after dinner instead of watching TV. Where can you learn more? Go to http://soniaGlobalMotionmoira.info/. Enter K037 in the search box to learn more about \"A Healthy Lifestyle: Care Instructions. \" Current as of: May 12, 2017 Content Version: 11.4 © 7278-6900 [employer]. Care instructions adapted under license by [employer] (which disclaims liability or warranty for this information). If you have questions about a medical condition or this instruction, always ask your healthcare professional. [place] [address] any warranty or liability for your use of this information. Allergies: Care Instructions Your Care Instructions Allergies occur when your body's defense system (immune system) overreacts to certain substances.  The immune system treats a harmless substance as if it were a harmful germ or virus. Many things can cause this overreaction, including pollens, medicine, food, dust, animal dander, and mold. Allergies can be mild or severe. Mild allergies can be managed with home treatment. But medicine may be needed to prevent problems. Managing your allergies is an important part of staying healthy. Your doctor may suggest that you have allergy testing to help find out what is causing your allergies. When you know what things trigger your symptoms, you can avoid them. This can prevent allergy symptoms and other health problems. For severe allergies that cause reactions that affect your whole body (anaphylactic reactions), your doctor may prescribe a shot of epinephrine to carry with you in case you have a severe reaction. Learn how to give yourself the shot and keep it with you at all times. Make sure it is not . Follow-up care is a key part of your treatment and safety. Be sure to make and go to all appointments, and call your doctor if you are having problems. It's also a good idea to know your test results and keep a list of the medicines you take. How can you care for yourself at home? · If you have been told by your doctor that dust or dust mites are causing your allergy, decrease the dust around your bed: 
[facility] [employer] sheets, pillowcases, and other bedding in hot water every week. ¨ Use dust-proof covers for pillows, duvets, and mattresses. Avoid plastic covers because they tear easily and do not \"breathe. \" Wash as instructed on the label. ¨ Do not use any blankets and pillows that you do not need. ¨ Use blankets that you can wash in your washing machine. ¨ Consider removing drapes and carpets, which attract and hold dust, from your bedroom. · If you are allergic to house dust and mites, do not use home humidifiers. Your doctor can suggest ways you can control dust and mites. · Look for signs of cockroaches. Cockroaches cause allergic reactions.  Use cockroach baits to get rid of them. Then, clean your home well. Cockroaches like areas where grocery bags, newspapers, empty bottles, or cardboard boxes are stored. Do not keep these inside your home, and keep trash and food containers sealed. Seal off any spots where cockroaches might enter your home. · If you are allergic to mold, get rid of furniture, rugs, and drapes that smell musty. Check for mold in the bathroom. · If you are allergic to outdoor pollen or mold spores, use air-conditioning. Change or clean all filters every month. Keep windows closed. · If you are allergic to pollen, stay inside when pollen counts are high. Use a vacuum  with a HEPA filter or a double-thickness filter at least two times each week. · Stay inside when air pollution is bad. Avoid paint fumes, perfumes, and other strong odors. · Avoid conditions that make your allergies worse. Stay away from smoke. Do not smoke or let anyone else smoke in your house. Do not use fireplaces or wood-burning stoves. · If you are allergic to your pets, change the air filter in your furnace every month. Use high-efficiency filters. · If you are allergic to pet dander, keep pets outside or out of your bedroom. Old carpet and cloth furniture can hold a lot of animal dander. You may need to replace them. When should you call for help? Give an epinephrine shot if: 
? · You think you are having a severe allergic reaction. ? · You have symptoms in more than one body area, such as mild nausea and an itchy mouth. ? After giving an epinephrine shot call 911, even if you feel better. ?Call 911 if: 
? · You have symptoms of a severe allergic reaction. These may include: 
¨ Sudden raised, red areas (hives) all over your body. ¨ Swelling of the throat, mouth, lips, or tongue. ¨ Trouble breathing. ¨ Passing out (losing consciousness). Or you may feel very lightheaded or suddenly feel weak, confused, or restless. ? · You have been given an epinephrine shot, even if you feel better. ?Call your doctor now or seek immediate medical care if: 
? · You have symptoms of an allergic reaction, such as: ¨ A rash or hives (raised, red areas on the skin). ¨ Itching. ¨ Swelling. ¨ Belly pain, nausea, or vomiting. ? Watch closely for changes in your health, and be sure to contact your doctor if: 
? · You do not get better as expected. Where can you learn more? Go to http://sonia-moira.info/. Enter N280 in the search box to learn more about \"Allergies: Care Instructions. \" Current as of: September 29, 2016 Content Version: 11.4 © 5113-3957 [employer]. Care instructions adapted under license by [employer] (which disclaims liability or warranty for this information). If you have questions about a medical condition or this instruction, always ask your healthcare professional. [place] [address] any warranty or liability for your use of this information. Introducing [facility]! Dear Justus Ndiaye: Thank you for requesting a [employer] account. Our records indicate that you already have an active [employer] account. You can access your account anytime at [email or web address] Did you know that you can access your hospital and ER discharge instructions at any time in Coreworx? You can also review all of your test results from your hospital stay or ER visit. Additional Information If you have questions, please visit the Frequently Asked Questions section of the [employer] website at [email or web address] Remember, Core[employer]x is NOT to be used for urgent needs. For medical emergencies, dial 911. Now available from your iPhone and Android! Please provide this summary of care documentation to your next provider. Your primary care clinician is listed as Skyler Montilla.  If you have any questions after today's visit, please call [phone].

## 2018-05-31 NOTE — PROGRESS NOTES
Pt is here \"something\" in throat on right side, hoarseness x 2 weeks. 1. Have you been to the ER, urgent care clinic since your last visit? Hospitalized since your last visit? No    2. Have you seen or consulted any other health care providers outside of the [address] [place] [address] since your last visit? Include any pap smears or colon screening.  No

## 2018-05-31 NOTE — PROGRESS NOTES
HPI:    Randa Biggs  is a 74 y.o.  female  patient who comes in today with complaints of pain in right side of throat, she is complaining of facial swelling x 6 months. She recently had dental work done. She also has continued cough but is not taking mucinex. The cough is occasional.  She states that she has seasonal allergies. She thinks the lipitor is causing the swelling so she quit taking it 2 days ago but the swelling has not gone down. She has many allergies to different medications and is concerned that her medications are causing the cough, the sore throat and the facial swelling. The patient has gained weight over the last six months. She has also started using a new lotion. Patient denies lip swelling. Patient denies SOB, CP, dizziness, headache. Current Outpatient Prescriptions   Medication Sig Dispense Refill    atenolol (TENORMIN) 25 mg tablet Take 1 Tab by mouth daily. 30 Tab 0    furosemide (LASIX) 20 mg tablet Take 1 Tab by mouth two (2) times a day. 90 Tab 1    losartan (COZAAR) 50 mg tablet TAKE 2 TABLETS ONE TIME DAILY FOR HYPERTENSION 180 Tab 1    potassium chloride (KLOR-CON) 10 mEq tablet TAKE 2 TABLETS TWICE DAILY 360 Tab 1    atorvastatin (LIPITOR) 20 mg tablet TAKE 1 TABLET EVERY DAY 90 Tab 1    cholecalciferol (VITAMIN D3) 1,000 unit tablet Take 1,000 Units by mouth daily.  aspirin 81 mg Tab Take 81 mg by mouth daily.  MULTIVITAMINS (MULTI-VITAMIN PO) Take 1 Tab by mouth daily.  bisacodyl (DULCOLAX) 5 mg EC tablet take 2 tablets by mouth as directed  0    guaiFENesin ER (MUCINEX) 600 mg ER tablet Take 1 Tab by mouth two (2) times a day. Indications: Cough 20 Tab 0    albuterol (PROVENTIL HFA, VENTOLIN HFA, PROAIR HFA) 90 mcg/actuation inhaler Take 1 Puff by inhalation every six (6) hours as needed for Wheezing. 1 Inhaler 0    ondansetron (ZOFRAN ODT) 4 mg disintegrating tablet Take 1 Tab by mouth every eight (8) hours as needed for Nausea.  14 Tab 0      Allergies   Allergen Reactions    Latex Rash    Nexium [Esomeprazole Magnesium] Swelling and Other (comments)     Lips Swollen, and facial rash and swelling    Crestor [Rosuvastatin] Other (comments)     Upper respiratory illness    Lisinopril Unknown (comments)     Patient can't recall    Niaspan [Niacin] Other (comments)     Scratchy throat, \"asthma\" like symptoms    Pcn [Penicillins] Hives    Pravachol [Pravastatin] Myalgia    Sulfa (Sulfonamide Antibiotics) Rash    Zocor [Simvastatin] Myalgia and Other (comments)     Per patient chart \"(? Rash)\"      Past Medical History:   Diagnosis Date    Acute idiopathic gout of right wrist 10/4/2017    Atrophic vaginitis     Cardiac cath 05/30/2012    Patent coronary arteries. LVEDP 20.  RA 11.  RV 42/10. PA 42/21. W 18.  CO 6.4 (TD), 6.4 (Harpreet Rue). PVR 1.7 Wood units. False-positive nuclear study.  Cardiac echocardiogram 05/11/2011    EF 65%. RVSP at least 35 mmHg. Mild IVCE. No significant valvular heart disease.  Cardiac nuclear imaging test 05/18/2012    Tech difficult. Apical ischemia. No infarction. EF 76%. No reg'l WMA. No TID.  Cardiovascular LLE venous duplex 06/17/2013    Left leg:  No DVT.     Diabetes     diet controlled, hypoglycemia from metformin previously     Dyslipidemia     Fatty liver     Fibrocystic breast disease     Fluid retention     GERD     H/O colonoscopy 4/12/13    polyp    Hypertension     Hypertension     Ill-defined condition     gout    Macular degeneration     Morbid obesity ([address]     Obstructive sleep apnea     Peripheral neuropathy     Rectocele       Family History   Problem Relation Age of Onset    Cancer Mother      colon cancer    Colon Cancer Mother     Hypertension Mother     Diabetes Mother     Heart Disease Mother     Coronary Artery Disease Mother     Hypertension Father     Diabetes Father     Heart Disease Father     Coronary Artery Disease Father     Hypertension Sister     Diabetes Sister     Heart Disease Sister     Coronary Artery Disease Sister     Hypertension Brother     Diabetes Brother     Heart Disease Brother     Coronary Artery Disease Brother       Patient Active Problem List   Diagnosis Code    Hypertension I11.9    Dyslipidemia E78.5    Sleep apnea/ on c-pap G47.30    Renal cyst N28.1    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 pylori infection A04.8    Acute idiopathic gout of right wrist M10.031    Sliding hiatal hernia K44.9    Pseudogout of right shoulder M11.211    Primary osteoarthritis involving multiple joints M15.0    Obesity, morbid ([address] E66.01    Mild peripheral edema R60.9    Incidental pulmonary nodule, > 3mm and < 8mm R91.1    Prediabetes R73.03    Polyp of sigmoid colon D12.5            ROS as pertinent in HPI    Visit Vitals    /81 (BP 1 Location: Left arm)    Pulse 69    Temp 97.8 °F (36.6 °C) (Oral)    Resp 16    Ht 5' 4\" (1.626 m)    Wt 250 lb (113.4 kg)    SpO2 97%    BMI 42.91 kg/m2        Physical Exam   Constitutional: She is oriented to person, place, and time and well-developed, well-nourished, and in no distress. HENT:   Head: Normocephalic and atraumatic. Right Ear: Hearing and external ear normal. A foreign body (++ cerumen) is present. Left Ear: Hearing, tympanic membrane, external ear and ear canal normal.   Nose: Nose normal.   Mouth/Throat: Oropharynx is clear and moist. No oropharyngeal exudate. No visible swelling of face or lips   Neck: Normal range of motion. Neck supple. Cardiovascular: Normal rate and regular rhythm. Pulmonary/Chest: Effort normal and breath sounds normal.   Musculoskeletal: She exhibits edema (+1 peripheral edema). Lymphadenopathy:     She has no cervical adenopathy. Neurological: She is alert and oriented to person, place, and time. Vitals reviewed.       Assessment/Plan:    Diagnoses and all orders for this visit:    1. Facial swelling - advised to stop using facial lotion for about a week. 2. Sore throat - most likely related to allergies or recent dental work    3. Hypertension  -     atenolol (TENORMIN) 25 mg tablet; Take 1 Tab by mouth daily. Follow-up Disposition:  Return in about 1 week (around 6/7/2018), or if symptoms worsen or fail to improve, for follow up new HTN meds. May stay off of lipitor x 1 week to rule out face swelling cause  May take benadryl    Will switch ARB to BB due to history of reaction to lisinopril. Additional Notes: Discussed today's diagnosis, treatment plans. Discussed medication indications and side effects. Preventive Medicine:   Weight Management:  Mediterranean diet recommended as well as exercise for 30 minutes daily most days of the week. DASH diet info given. After Visit Summary: Provided and discussed printed patient instructions. Answered all questions and patient acknowledged understanding. Debra Wright PA-C     I reviewed the patient's medical history, the physician assistant's findings on physical examination, the patient's diagnoses, and treatment plan as documented in the progress note. I concur with the treatment plan as documented. There are no additional recommendations at this time.     Shayy Samaniego MD

## 2018-05-31 NOTE — PATIENT INSTRUCTIONS
A Healthy Lifestyle: Care Instructions  Your Care Instructions    A healthy lifestyle can help you feel good, stay at a healthy weight, and have plenty of energy for both work and play. A healthy lifestyle is something you can share with your whole family. A healthy lifestyle also can lower your risk for serious health problems, such as high blood pressure, heart disease, and diabetes. You can follow a few steps listed below to improve your health and the health of your family. Follow-up care is a key part of your treatment and safety. Be sure to make and go to all appointments, and call your doctor if you are having problems. It's also a good idea to know your test results and keep a list of the medicines you take. How can you care for yourself at home? · Do not eat too much sugar, fat, or fast foods. You can still have dessert and treats now and then. The goal is moderation. · Start small to improve your eating habits. Pay attention to portion sizes, drink less juice and soda pop, and eat more fruits and vegetables. ¨ Eat a healthy amount of food. A 3-ounce serving of meat, for example, is about the size of a deck of cards. Fill the rest of your plate with vegetables and whole grains. ¨ Limit the amount of soda and sports drinks you have every day. Drink more water when you are thirsty. ¨ Eat at least 5 servings of fruits and vegetables every day. It may seem like a lot, but it is not hard to reach this goal. A serving or helping is 1 piece of fruit, 1 cup of vegetables, or 2 cups of leafy, raw vegetables. Have an apple or some carrot sticks as an afternoon snack instead of a candy bar. Try to have fruits and/or vegetables at every meal.  · Make exercise part of your daily routine. You may want to start with simple activities, such as walking, bicycling, or slow swimming. Try to be active 30 to 60 minutes every day. You do not need to do all 30 to 60 minutes all at once.  For example, you can exercise 3 times a day for 10 or 20 minutes. Moderate exercise is safe for most people, but it is always a good idea to talk to your doctor before starting an exercise program.  · Keep moving. Ysabel [employer] the lawn, work in the garden, or [employer]. Take the stairs instead of the elevator at work. · If you smoke, quit. People who smoke have an increased risk for heart attack, stroke, cancer, and other lung illnesses. Quitting is hard, but there are ways to boost your chance of quitting tobacco for good. ¨ Use nicotine gum, patches, or lozenges. ¨ Ask your doctor about stop-smoking programs and medicines. ¨ Keep trying. In addition to reducing your risk of diseases in the future, you will notice some benefits soon after you stop using tobacco. If you have shortness of breath or asthma symptoms, they will likely get better within a few weeks after you quit. · Limit how much alcohol you drink. Moderate amounts of alcohol (up to 2 drinks a day for men, 1 drink a day for women) are okay. But drinking too much can lead to liver problems, high blood pressure, and other health problems. Family health  If you have a family, there are many things you can do together to improve your health. · Eat meals together as a family as often as possible. · Eat healthy foods. This includes fruits, vegetables, lean meats and dairy, and whole grains. · Include your family in your fitness plan. Most people think of activities such as jogging or tennis as the way to fitness, but there are many ways you and your family can be more active. Anything that makes you breathe hard and gets your heart pumping is exercise. Here are some tips:  ¨ Walk to do errands or to take your child to school or the bus. ¨ Go for a family bike ride after dinner instead of watching TV. Where can you learn more? Go to http://sonia-miora.info/. Enter U416 in the search box to learn more about \"A Healthy Lifestyle: Care Instructions. \"  Current as of: May 12, 2017  Content Version: 11.4  © 2289-9407 [employer]. Care instructions adapted under license by [employer] (which disclaims liability or warranty for this information). If you have questions about a medical condition or this instruction, always ask your healthcare professional. [address] any warranty or liability for your use of this information. Allergies: Care Instructions  Your Care Instructions    Allergies occur when your body's defense system (immune system) overreacts to certain substances. The immune system treats a harmless substance as if it were a harmful germ or virus. Many things can cause this overreaction, including pollens, medicine, food, dust, animal dander, and mold. Allergies can be mild or severe. Mild allergies can be managed with home treatment. But medicine may be needed to prevent problems. Managing your allergies is an important part of staying healthy. Your doctor may suggest that you have allergy testing to help find out what is causing your allergies. When you know what things trigger your symptoms, you can avoid them. This can prevent allergy symptoms and other health problems. For severe allergies that cause reactions that affect your whole body (anaphylactic reactions), your doctor may prescribe a shot of epinephrine to carry with you in case you have a severe reaction. Learn how to give yourself the shot and keep it with you at all times. Make sure it is not . Follow-up care is a key part of your treatment and safety. Be sure to make and go to all appointments, and call your doctor if you are having problems. It's also a good idea to know your test results and keep a list of the medicines you take. How can you care for yourself at home?   · If you have been told by your doctor that dust or dust mites are causing your allergy, decrease the dust around your bed:  [facility] sheets, pillowcases, and other bedding in hot water every week. ¨ Use dust-proof covers for pillows, duvets, and mattresses. Avoid plastic covers because they tear easily and do not \"breathe. \" Wash as instructed on the label. ¨ Do not use any blankets and pillows that you do not need. ¨ Use blankets that you can wash in your washing machine. ¨ Consider removing drapes and carpets, which attract and hold dust, from your bedroom. · If you are allergic to house dust and mites, do not use home humidifiers. Your doctor can suggest ways you can control dust and mites. · Look for signs of cockroaches. Cockroaches cause allergic reactions. Use cockroach baits to get rid of them. Then, clean your home well. Cockroaches like areas where grocery bags, newspapers, empty bottles, or cardboard boxes are stored. Do not keep these inside your home, and keep trash and food containers sealed. Seal off any spots where cockroaches might enter your home. · If you are allergic to mold, get rid of furniture, rugs, and drapes that smell musty. Check for mold in the bathroom. · If you are allergic to outdoor pollen or mold spores, use air-conditioning. Change or clean all filters every month. Keep windows closed. · If you are allergic to pollen, stay inside when pollen counts are high. Use a vacuum  with a HEPA filter or a double-thickness filter at least two times each week. · Stay inside when air pollution is bad. Avoid paint fumes, perfumes, and other strong odors. · Avoid conditions that make your allergies worse. Stay away from smoke. Do not smoke or let anyone else smoke in your house. Do not use fireplaces or wood-burning stoves. · If you are allergic to your pets, change the air filter in your furnace every month. Use high-efficiency filters. · If you are allergic to pet dander, keep pets outside or out of your bedroom. Old carpet and cloth furniture can hold a lot of animal dander. You may need to replace them.   When should you call for help?  Give an epinephrine shot if:  ? · You think you are having a severe allergic reaction. ? · You have symptoms in more than one body area, such as mild nausea and an itchy mouth. ? After giving an epinephrine shot call 911, even if you feel better. ?Call 911 if:  ? · You have symptoms of a severe allergic reaction. These may include:  ¨ Sudden raised, red areas (hives) all over your body. ¨ Swelling of the throat, mouth, lips, or tongue. ¨ Trouble breathing. ¨ Passing out (losing consciousness). Or you may feel very lightheaded or suddenly feel weak, confused, or restless. ? · You have been given an epinephrine shot, even if you feel better. ?Call your doctor now or seek immediate medical care if:  ? · You have symptoms of an allergic reaction, such as:  ¨ A rash or hives (raised, red areas on the skin). ¨ Itching. ¨ Swelling. ¨ Belly pain, nausea, or vomiting. ? Watch closely for changes in your health, and be sure to contact your doctor if:  ? · You do not get better as expected. Where can you learn more? Go to http://sonia-moira.info/. Enter E337 in the search box to learn more about \"Allergies: Care Instructions. \"  Current as of: September 29, 2016  Content Version: 11.4  © 9522-1474 [employer]. Care instructions adapted under license by [employer] (which disclaims liability or warranty for this information). If you have questions about a medical condition or this instruction, always ask your healthcare professional. [place] [address] any warranty or liability for your use of this information.

## 2018-06-12 ENCOUNTER — OFFICE VISIT (OUTPATIENT)
Dept: FAMILY MEDICINE CLINIC | Age: 73
End: 2018-06-12

## 2018-06-12 ENCOUNTER — HOSPITAL ENCOUNTER (OUTPATIENT)
Dept: ULTRASOUND IMAGING | Age: 73
Discharge: HOME | End: 2018-06-12
Attending: PHYSICIAN ASSISTANT
Payer: MEDICARE

## 2018-06-12 VITALS
SYSTOLIC BLOOD PRESSURE: 108 MMHG | OXYGEN SATURATION: 96 % | DIASTOLIC BLOOD PRESSURE: 73 MMHG | BODY MASS INDEX: 42.34 KG/M2 | HEIGHT: 64 IN | TEMPERATURE: 97.6 F | RESPIRATION RATE: 16 BRPM | HEART RATE: 70 BPM | WEIGHT: 248 LBS

## 2018-06-12 DIAGNOSIS — E01.0 THYROMEGALY: ICD-10-CM

## 2018-06-12 DIAGNOSIS — R73.09 ELEVATED GLUCOSE: ICD-10-CM

## 2018-06-12 DIAGNOSIS — R91.1 INCIDENTAL PULMONARY NODULE, > 3MM AND < 8MM: ICD-10-CM

## 2018-06-12 DIAGNOSIS — R05.9 COUGH: ICD-10-CM

## 2018-06-12 DIAGNOSIS — E78.5 DYSLIPIDEMIA: Primary | ICD-10-CM

## 2018-06-12 DIAGNOSIS — I11.9 BENIGN HYPERTENSIVE HEART DISEASE WITHOUT HEART FAILURE: ICD-10-CM

## 2018-06-12 PROCEDURE — 76536 US EXAM OF HEAD AND NECK: CPT

## 2018-06-12 RX ORDER — ATENOLOL 25 MG/1
25 TABLET ORAL DAILY
Qty: 30 TAB | Refills: 0 | Status: SHIPPED | OUTPATIENT
Start: 2018-06-12 | End: 2018-06-12

## 2018-06-12 NOTE — MR AVS SNAPSHOT
[address] [facility] 
 
 
 [address] [place] [address] [address] [place] [address] 
[phone] Patient: Danay Bonilla MRN: [id number] LPD:[birth year] Visit Information Date & Time Provider Department Dept. Phone Encounter #  
 6/12/2018 10:30 AM Richy Garcia PA-C [address] [phone] [id number] Follow-up Instructions Return in about 6 weeks (around 7/24/2018) for one week prior for labs. Your Appointments 6/20/2018 10:30 AM  
Office Visit with Richy Garcia PA-C [facility] ([place]) Appt Note: 4 m fu; 4 m fu  
 [address] [facility], [facility] [address] [place] [address]  
[phone]  
  
   
 [address] [facility]  
  
    
 4/23/2019 11:00 AM  
Follow Up with Val Fulton DO [facility] ([facility]) Appt Note: 1 year follow up [place] [phone]  
[phone] [address] Upcoming Health Maintenance Date Due  
 EYE EXAM RETINAL OR DILATED Q1 5/31/2018 Influenza Age 5 to Adult 8/1/2018 HEMOGLOBIN A1C Q6M 8/13/2018 FOOT EXAM Q1 9/6/2018 BREAST CANCER SCRN MAMMOGRAM 10/27/2018 MICROALBUMIN Q1 2/13/2019 LIPID PANEL Q1 2/13/2019 GLAUCOMA SCREENING Q2Y 6/7/2019 COLONOSCOPY 4/30/2023 DTaP/Tdap/Td series (2 - Td) 7/31/2023 Allergies as of 6/12/2018  Review Complete On: 6/12/2018 By: Teddy Howell LPN Severity Noted Reaction Type Reactions Latex    Rash Nexium [Esomeprazole Magnesium] High 12/13/2010    Swelling, Other (comments) Lips Swollen, and facial rash and swelling Crestor [Rosuvastatin]    Other (comments) Upper respiratory illness Lisinopril  05/17/2010   Side Effect Unknown (comments) Patient can't recall Niaspan [Niacin]  09/19/2011    Other (comments) Scratchy throat, \"asthma\" like symptoms Pcn [Penicillins]  12/18/2009    Hives Pravachol [Pravastatin]    Myalgia Sulfa (Sulfonamide Antibiotics)  12/18/2009    Rash Zocor [Simvastatin]    Myalgia, Other (comments) Per patient chart \"(? Rash)\" Current Immunizations  Reviewed on 10/21/2014 Name Date Influenza High Dose Vaccine PF 9/12/2017 Influenza Vaccine 10/21/2014, 10/30/2013 Pneumococcal Vaccine (Unspecified Type) 1/1/2008 Tdap 7/31/2013 12:00 AM  
  
 Not reviewed this visit You Were Diagnosed With   
  
 Codes Comments Thyromegaly    -  Primary ICD-10-CM: E01.0 ICD-9-CM: 240.9 Benign hypertensive heart disease without heart failure     ICD-10-CM: I11.9 ICD-9-CM: 402.10 Cough     ICD-10-CM: R05 ICD-9-CM: 928. 2 Dyslipidemia     ICD-10-CM: E78.5 ICD-9-CM: 272.4 Vitals BP Pulse Temp Resp Height(growth percentile) Weight(growth percentile) 108/73 (BP 1 Location: Left arm) 70 97.6 °F (36.4 °C) (Oral) 16 5' 4\" (1.626 m) 248 lb (112.5 kg) SpO2 BMI OB Status Smoking Status 96% 42.57 kg/m2 Hysterectomy Former Smoker BMI and BSA Data Body Mass Index Body Surface Area 42.57 kg/m 2 2.25 m 2 Preferred Pharmacy Pharmacy Name Phone [address], [address] [place] [address] [phone] Your Updated Medication List  
  
   
This list is accurate as of 6/12/18 11:24 AM.  Always use your most recent med list.  
  
  
  
  
 albuterol 90 mcg/actuation inhaler Commonly known as:  PROVENTIL HFA, VENTOLIN HFA, PROAIR HFA Take 1 Puff by inhalation every six (6) hours as needed for Wheezing. aspirin 81 mg tablet Take 81 mg by mouth daily. atorvastatin 20 mg tablet Commonly known as:  LIPITOR  
TAKE 1 TABLET EVERY DAY  
  
 bisacodyl 5 mg EC tablet Commonly known as:  DULCOLAX  
take 2 tablets by mouth as directed  
  
 furosemide 20 mg tablet Commonly known as:  LASIX Take 1 Tab by mouth two (2) times a day. losartan 50 mg tablet Commonly known as:  COZAAR  
TAKE 2 TABLETS ONE TIME DAILY FOR HYPERTENSION  
  
 MULTI-VITAMIN PO Take 1 Tab by mouth daily. potassium chloride 10 mEq tablet Commonly known as:  KLOR-CON  
TAKE 2 TABLETS TWICE DAILY  
  
 VITAMIN D3 1,000 unit tablet Generic drug:  cholecalciferol Take 1,000 Units by mouth daily. Follow-up Instructions Return in about 6 weeks (around 7/24/2018) for one week prior for labs. To-Do List   
 06/12/2018 Imaging:  US THYROID/PARATHYROID/SOFT TISS Patient Instructions DASH Diet: Care Instructions Your Care Instructions The DASH diet is an eating plan that can help lower your blood pressure. DASH stands for Dietary Approaches to Stop Hypertension. Hypertension is high blood pressure. The DASH diet focuses on eating foods that are high in calcium, potassium, and magnesium. These nutrients can lower blood pressure. The foods that are highest in these nutrients are fruits, vegetables, low-fat dairy products, nuts, seeds, and legumes. But taking calcium, potassium, and magnesium supplements instead of eating foods that are high in those nutrients does not have the same effect. The DASH diet also includes whole grains, fish, and poultry. The DASH diet is one of several lifestyle changes your doctor may recommend to lower your high blood pressure. Your doctor may also want you to decrease the amount of sodium in your diet. Lowering sodium while following the DASH diet can lower blood pressure even further than just the DASH diet alone. Follow-up care is a key part of your treatment and safety. Be sure to make and go to all appointments, and call your doctor if you are having problems. It's also a good idea to know your test results and keep a list of the medicines you take. How can you care for yourself at home? Following the DASH diet · Eat 4 to 5 servings of fruit each day. A serving is 1 medium-sized piece of fruit, ½ cup chopped or canned fruit, 1/4 cup dried fruit, or 4 ounces (½ cup) of fruit juice. Choose fruit more often than fruit juice. · Eat 4 to 5 servings of vegetables each day. A serving is 1 cup of lettuce or raw leafy vegetables, ½ cup of chopped or cooked vegetables, or 4 ounces (½ cup) of vegetable juice. Choose vegetables more often than vegetable juice. · Get 2 to 3 servings of low-fat and fat-free dairy each day. A serving is 8 ounces of milk, 1 cup of yogurt, or 1 ½ ounces of cheese. · Eat 6 to 8 servings of grains each day. A serving is 1 slice of bread, 1 ounce of dry cereal, or ½ cup of cooked rice, pasta, or cooked cereal. Try to choose whole-grain products as much as possible. · Limit lean meat, poultry, and fish to 2 servings each day. A serving is 3 ounces, about the size of a deck of cards. · Eat 4 to 5 servings of nuts, seeds, and legumes (cooked dried beans, lentils, and split peas) each week. A serving is 1/3 cup of nuts, 2 tablespoons of seeds, or ½ cup of cooked beans or peas. · Limit fats and oils to 2 to 3 servings each day. A serving is 1 teaspoon of vegetable oil or 2 tablespoons of salad dressing. · Limit sweets and added sugars to 5 servings or less a week. A serving is 1 tablespoon jelly or jam, ½ cup sorbet, or 1 cup of lemonade. · Eat less than 2,300 milligrams (mg) of sodium a day. If you limit your sodium to 1,500 mg a day, you can lower your blood pressure even more. Tips for success · Start small. Do not try to make dramatic changes to your diet all at once. You might feel that you are missing out on your favorite foods and then be more likely to not follow the plan. Make small changes, and stick with them. Once those changes become habit, add a few more changes. · Try some of the following: ¨ Make it a goal to eat a fruit or vegetable at every meal and at snacks. This will make it easy to get the recommended amount of fruits and vegetables each day. ¨ Try yogurt topped with fruit and nuts for a snack or healthy dessert. ¨ Add lettuce, tomato, cucumber, and onion to sandwiches. ¨ Combine a ready-made pizza crust with low-fat mozzarella cheese and lots of vegetable toppings. Try using tomatoes, squash, spinach, broccoli, carrots, cauliflower, and onions. ¨ Have a variety of cut-up vegetables with a low-fat dip as an appetizer instead of chips and dip. ¨ Sprinkle sunflower seeds or chopped almonds over salads. Or try adding chopped walnuts or almonds to cooked vegetables. ¨ Try some vegetarian meals using beans and peas. Add garbanzo or kidney beans to salads. Make burritos and tacos with mashed porras beans or black beans. Where can you learn more? Go to http://sonia-moira.info/. Enter B854 in the search box to learn more about \"DASH Diet: Care Instructions. \" Current as of: September 21, 2016 Content Version: 11.4 © 7652-7052 [employer]. Care instructions adapted under license by [employer] (which disclaims liability or warranty for this information). If you have questions about a medical condition or this instruction, always ask your healthcare professional. [place] [address] any warranty or liability for your use of this information. Introducing [facility]! Dear Casandra Navarrete: Thank you for requesting a [employer] account. Our records indicate that you already have an active [employer] account. You can access your account anytime at [email or web address] Did you know that you can access your hospital and ER discharge instructions at any time in [employer]? You can also review all of your test results from your hospital stay or ER visit. Additional Information If you have questions, please visit the Frequently Asked Questions section of the [employer] website at [email or web address] Remember, [employer] is NOT to be used for urgent needs. For medical emergencies, dial 911. Now available from your iPhone and Android! Please provide this summary of care documentation to your next provider. Your primary care clinician is listed as Phuc Jameson. If you have any questions after today's visit, please call [phone].

## 2018-06-12 NOTE — PROGRESS NOTES
HPI:    Miguel Pedraza  is a 74 y.o.  female  patient who comes in today for follow up on possible reaction to lipitor with facial swelling, cough and sore throat. She states the sore throat has resolved and mostly the facial swelling is gone. She has not gotten atenolol and states that she wants to stay on the blood pressure medication that she is on. Patient denies SOB, CP, dizziness, headache. Current Outpatient Prescriptions   Medication Sig Dispense Refill    bisacodyl (DULCOLAX) 5 mg EC tablet take 2 tablets by mouth as directed  0    furosemide (LASIX) 20 mg tablet Take 1 Tab by mouth two (2) times a day. 90 Tab 1    losartan (COZAAR) 50 mg tablet TAKE 2 TABLETS ONE TIME DAILY FOR HYPERTENSION 180 Tab 1    potassium chloride (KLOR-CON) 10 mEq tablet TAKE 2 TABLETS TWICE DAILY 360 Tab 1    cholecalciferol (VITAMIN D3) 1,000 unit tablet Take 1,000 Units by mouth daily.  aspirin 81 mg Tab Take 81 mg by mouth daily.  MULTIVITAMINS (MULTI-VITAMIN PO) Take 1 Tab by mouth daily.  albuterol (PROVENTIL HFA, VENTOLIN HFA, PROAIR HFA) 90 mcg/actuation inhaler Take 1 Puff by inhalation every six (6) hours as needed for Wheezing.  1 Inhaler 0    atorvastatin (LIPITOR) 20 mg tablet TAKE 1 TABLET EVERY DAY 90 Tab 1      Allergies   Allergen Reactions    Latex Rash    Nexium [Esomeprazole Magnesium] Swelling and Other (comments)     Lips Swollen, and facial rash and swelling    Crestor [Rosuvastatin] Other (comments)     Upper respiratory illness    Lisinopril Unknown (comments)     Patient can't recall    Niaspan [Niacin] Other (comments)     Scratchy throat, \"asthma\" like symptoms    Pcn [Penicillins] Hives    Pravachol [Pravastatin] Myalgia    Sulfa (Sulfonamide Antibiotics) Rash    Zocor [Simvastatin] Myalgia and Other (comments)     Per patient chart \"(? Rash)\"      Past Medical History:   Diagnosis Date    Acute idiopathic gout of right wrist 10/4/2017    Atrophic vaginitis     Cardiac cath 05/30/2012    Patent coronary arteries. LVEDP 20.  RA 11.  RV 42/10. PA 42/21. W 18.  CO 6.4 (TD), 6.4 ([place]). PVR 1.7 Wood units. False-positive nuclear study.  Cardiac echocardiogram 05/11/2011    EF 65%. RVSP at least 35 mmHg. Mild IVCE. No significant valvular heart disease.  Cardiac nuclear imaging test 05/18/2012    Tech difficult. Apical ischemia. No infarction. EF 76%. No reg'l WMA. No TID.  Cardiovascular LLE venous duplex 06/17/2013    Left leg:  No DVT.     Diabetes     diet controlled, hypoglycemia from metformin previously     Dyslipidemia     Fatty liver     Fibrocystic breast disease     Fluid retention     GERD     H/O colonoscopy 4/12/13    polyp    Hypertension     Hypertension     Ill-defined condition     gout    Macular degeneration     Morbid obesity ([address]     Obstructive sleep apnea     Peripheral neuropathy     Rectocele       Family History   Problem Relation Age of Onset    Cancer Mother      colon cancer    Colon Cancer Mother     Hypertension Mother     Diabetes Mother     Heart Disease Mother     Coronary Artery Disease Mother     Hypertension Father     Diabetes Father     Heart Disease Father     Coronary Artery Disease Father     Hypertension Sister     Diabetes Sister     Heart Disease Sister     Coronary Artery Disease Sister     Hypertension Brother     Diabetes Brother     Heart Disease Brother     Coronary Artery Disease Brother       Patient Active Problem List   Diagnosis Code    Hypertension I11.9    Dyslipidemia E78.5    Sleep apnea/ on c-pap G47.30    Renal cyst N28.1    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 pylori infection A04.8    Acute idiopathic gout of right wrist M10.031    Sliding hiatal hernia K44.9    Pseudogout of right shoulder M11.211    Primary osteoarthritis involving multiple joints M15.0    Obesity, morbid ([address] E66.01    Mild peripheral edema R60.9    Incidental pulmonary nodule, > 3mm and < 8mm R91.1    Prediabetes R73.03    Polyp of sigmoid colon D12.5            ROS as pertinent in HPI    Visit Vitals    /73 (BP 1 Location: Left arm)    Pulse 70    Temp 97.6 °F (36.4 °C) (Oral)    Resp 16    Ht 5' 4\" (1.626 m)    Wt 248 lb (112.5 kg)    SpO2 96%    BMI 42.57 kg/m2        Physical Exam   Constitutional: She is oriented to person, place, and time and well-developed, well-nourished, and in no distress. HENT:   Head: Normocephalic and atraumatic. Neck: Normal range of motion. Neck supple. Thyromegaly present. Cardiovascular: Normal rate and regular rhythm. Pulmonary/Chest: Effort normal and breath sounds normal.   Musculoskeletal: She exhibits edema (mild peripheral edema bilaterally). Lymphadenopathy:     She has no cervical adenopathy. Neurological: She is alert and oriented to person, place, and time. Vitals reviewed. Assessment/Plan:    Diagnoses and all orders for this visit:    1. Thyromegaly  -     US THYROID/PARATHYROID/SOFT TISS; Future    2. Hypertension  Continue current regimen, d/c atenolol    3. Cough - antihistamine okay or cough medication, if needed. 4. Dyslipidemia   start back on lipitor. 5.  Incidental pulmonary nodule - will fu with repeat CT in 12 months due to patient's history of smoking and age. Patient very concerned. Reassured. Follow-up Disposition:  Return in about 6 weeks (around 7/24/2018) for one week prior for labs. Additional Notes: Discussed today's diagnosis, treatment plans. Discussed medication indications and side effects. Preventive Medicine:   Weight Management:  Mediterranean diet recommended as well as exercise for 30 minutes daily most days of the week. DASH diet info given. After Visit Summary: Provided and discussed printed patient instructions. Answered all questions and patient acknowledged understanding.      Eye exam scheduled 6/26/2018. Gray Silverman, PAAnikaC     I reviewed the patient's medical history, the physician assistant's findings on physical examination, the patient's diagnoses, and treatment plan as documented in the progress note. I concur with the treatment plan as documented. There are no additional recommendations at this time.     Paulina Manriquez MD

## 2018-06-12 NOTE — PATIENT INSTRUCTIONS

## 2018-06-12 NOTE — PROGRESS NOTES
Pt is here for 1 week  follow up on facial swelling after stopping lipitor. Atenolol was sent to [facility] instead of local pharmacy. Pt has not received it. 1. Have you been to the ER, urgent care clinic since your last visit? Hospitalized since your last visit? No    2. Have you seen or consulted any other health care providers outside of the [address] [place] [address] since your last visit? Include any pap smears or colon screening.  No

## 2018-06-21 ENCOUNTER — TELEPHONE (OUTPATIENT)
Dept: FAMILY MEDICINE CLINIC | Age: 73
End: 2018-06-21

## 2018-06-21 DIAGNOSIS — E04.1 THYROID CYST: Primary | ICD-10-CM

## 2018-06-21 NOTE — TELEPHONE ENCOUNTER
Please let . Alisson Cuadra know that I have referred her to Dr. Alberto Chow to have him review her thyroid ultrasound. She has a few very, very small cysts that appear benign. I would like a second opinion. These have not affected overall thyroid function.

## 2018-07-02 ENCOUNTER — OFFICE VISIT (OUTPATIENT)
Dept: SURGERY | Age: 73
End: 2018-07-02

## 2018-07-02 VITALS
RESPIRATION RATE: 18 BRPM | TEMPERATURE: 98 F | HEIGHT: 64 IN | OXYGEN SATURATION: 98 % | DIASTOLIC BLOOD PRESSURE: 88 MMHG | WEIGHT: 248 LBS | BODY MASS INDEX: 42.34 KG/M2 | SYSTOLIC BLOOD PRESSURE: 136 MMHG | HEART RATE: 77 BPM

## 2018-07-02 DIAGNOSIS — E04.2 MULTIPLE THYROID NODULES: Primary | ICD-10-CM

## 2018-07-06 ENCOUNTER — HOSPITAL ENCOUNTER (OUTPATIENT)
Dept: CT IMAGING | Age: 73
Discharge: HOME | End: 2018-07-06
Attending: PHYSICIAN ASSISTANT
Payer: MEDICARE

## 2018-07-06 DIAGNOSIS — R10.13 EPIGASTRIC ABDOMINAL PAIN: ICD-10-CM

## 2018-07-06 PROCEDURE — 74177 CT ABD & PELVIS W/CONTRAST: CPT

## 2018-07-06 PROCEDURE — 82565 ASSAY OF CREATININE: CPT

## 2018-07-06 PROCEDURE — 74011636320 HC RX REV CODE- 636/320: Performed by: PHYSICIAN ASSISTANT

## 2018-07-06 RX ADMIN — IOPAMIDOL 100 ML: 612 INJECTION, SOLUTION INTRAVENOUS at 13:00

## 2018-07-08 LAB — CREAT UR-MCNC: 0.8 MG/DL (ref 0.6–1.3)

## 2018-07-12 ENCOUNTER — OFFICE VISIT (OUTPATIENT)
Dept: OBGYN CLINIC | Age: 73
End: 2018-07-12

## 2018-07-12 ENCOUNTER — HOSPITAL ENCOUNTER (OUTPATIENT)
Dept: LAB | Age: 73
Discharge: HOME | End: 2018-07-12
Payer: MEDICARE

## 2018-07-12 VITALS
HEIGHT: 64 IN | WEIGHT: 253.8 LBS | OXYGEN SATURATION: 97 % | BODY MASS INDEX: 43.33 KG/M2 | TEMPERATURE: 98.2 F | HEART RATE: 65 BPM | DIASTOLIC BLOOD PRESSURE: 83 MMHG | SYSTOLIC BLOOD PRESSURE: 137 MMHG

## 2018-07-12 DIAGNOSIS — I10 ESSENTIAL HYPERTENSION: ICD-10-CM

## 2018-07-12 DIAGNOSIS — N39.41 URGE INCONTINENCE: ICD-10-CM

## 2018-07-12 DIAGNOSIS — E11.9 DIABETES MELLITUS TYPE 2, DIET-CONTROLLED (HCC): ICD-10-CM

## 2018-07-12 DIAGNOSIS — R35.0 FREQUENCY OF URINATION: Primary | ICD-10-CM

## 2018-07-12 DIAGNOSIS — R39.15 URGENCY OF URINATION: ICD-10-CM

## 2018-07-12 DIAGNOSIS — R35.0 FREQUENCY OF URINATION: ICD-10-CM

## 2018-07-12 LAB
BILIRUB UR QL STRIP: NEGATIVE
GLUCOSE UR-MCNC: NEGATIVE MG/DL
KETONES P FAST UR STRIP-MCNC: NEGATIVE MG/DL
PH UR STRIP: 6.5 [PH] (ref 4.6–8)
PROT UR QL STRIP: NEGATIVE
SP GR UR STRIP: 1.01 (ref 1–1.03)
UA UROBILINOGEN AMB POC: NORMAL (ref 0.2–1)
URINALYSIS CLARITY POC: CLEAR
URINALYSIS COLOR POC: YELLOW
URINE BLOOD POC: NEGATIVE
URINE LEUKOCYTES POC: NEGATIVE
URINE NITRITES POC: NEGATIVE

## 2018-07-12 PROCEDURE — 87086 URINE CULTURE/COLONY COUNT: CPT | Performed by: OBSTETRICS & GYNECOLOGY

## 2018-07-12 RX ORDER — RANITIDINE 300 MG/1
TABLET ORAL
Refills: 0 | COMMUNITY
Start: 2018-06-26 | End: 2019-11-20 | Stop reason: ALTCHOICE

## 2018-07-12 RX ORDER — NITROFURANTOIN 25; 75 MG/1; MG/1
100 CAPSULE ORAL 2 TIMES DAILY
Qty: 14 CAP | Refills: 0 | Status: SHIPPED | OUTPATIENT
Start: 2018-07-12 | End: 2018-07-19

## 2018-07-12 NOTE — PROGRESS NOTES
Name: Vannesa Astorga MRN: [id number]    YOB: 1945  Age: 74 y.o. Sex: female        Chief Complaint   Patient presents with    Vaginal Bleeding     pink discharge for a week and bleeding light red from vagina (pouring)       HPI     1. Vaginal bleeding  Notes that 2 days ago, she had vaginal bleeding, small amount, bright red, stopped, was mostly on the toilet tissue, now it is pink, was not associated with a BM, noticed it first after she voided, nothing was on her panties, was wondering if she maybe had a bladder infection, no dysuria, + urgency, notes episode of incontinence yesterday, + frequency    OB History      Para Term  AB Living    1 1 1   1    SAB TAB Ectopic Molar Multiple Live Births         1        Obstetric Comments    S/p hysterectomy 48yrs   H/o fibroids  H/o herpes years ago, cant remember last infection  Per pt she had 1 abnormal vaginal pap 2 years ago             PGyn    History   Sexual Activity    Sexual activity: Yes    Partners: Male         Past Medical History:   Diagnosis Date    Acute idiopathic gout of right wrist 10/4/2017    Atrophic vaginitis     Cardiac cath 2012    Patent coronary arteries. LVEDP 20.  RA 11.  RV 42/10. PA 42/21. W 18.  CO 6.4 (TD), 6.4 ([place]). PVR 1.7 Wood units. False-positive nuclear study.  Cardiac echocardiogram 2011    EF 65%. RVSP at least 35 mmHg. Mild IVCE. No significant valvular heart disease.  Cardiac nuclear imaging test 2012    Tech difficult. Apical ischemia. No infarction. EF 76%. No reg'l WMA. No TID.  Cardiovascular LLE venous duplex 2013    Left leg:  No DVT.     Diabetes     diet controlled, hypoglycemia from metformin previously     Dyslipidemia     Fatty liver     Fibrocystic breast disease     Fluid retention     GERD     H/O colonoscopy 13    polyp    Hypertension     Ill-defined condition     gout    Macular degeneration     Morbid obesity ([address]     Obstructive sleep apnea     Peripheral neuropathy     Rectocele     Thyroid cyst     bilat       Past Surgical History:   Procedure Laterality Date    [facility]. CNN[facility] ARTERIAL ARTERIOTOMY 3M  5/25/2012    HX COLONOSCOPY  4/12/2013    HX HEART CATHETERIZATION  5/25/2012    HX HERNIA REPAIR      umbilical as a child    HX HYSTERECTOMY      50ys ago, QUIQUE, BSO    HX MOHS PROCEDURES      right    [facility] AN[facility],SURGERY OF SHOULDER         Allergies   Allergen Reactions    Latex Rash    Nexium [Esomeprazole Magnesium] Swelling and Other (comments)     Lips Swollen, and facial rash and swelling    Crestor [Rosuvastatin] Other (comments)     Upper respiratory illness    Lisinopril Unknown (comments)     Patient can't recall    Niaspan [Niacin] Other (comments)     Scratchy throat, \"asthma\" like symptoms    Pcn [Penicillins] Hives    Pravachol [Pravastatin] Myalgia    Sulfa (Sulfonamide Antibiotics) Rash    Zocor [Simvastatin] Myalgia and Other (comments)     Per patient chart \"(? Rash)\"       Current Outpatient Prescriptions on File Prior to Visit   Medication Sig Dispense Refill    furosemide (LASIX) 20 mg tablet Take 1 Tab by mouth two (2) times a day. 90 Tab 1    losartan (COZAAR) 50 mg tablet TAKE 2 TABLETS ONE TIME DAILY FOR HYPERTENSION 180 Tab 1    potassium chloride (KLOR-CON) 10 mEq tablet TAKE 2 TABLETS TWICE DAILY 360 Tab 1    atorvastatin (LIPITOR) 20 mg tablet TAKE 1 TABLET EVERY DAY 90 Tab 1    cholecalciferol (VITAMIN D3) 1,000 unit tablet Take 1,000 Units by mouth daily.  aspirin 81 mg Tab Take 81 mg by mouth daily.  MULTIVITAMINS (MULTI-VITAMIN PO) Take 1 Tab by mouth daily. No current facility-administered medications on file prior to visit. Review of Systems   Constitutional: Negative. Gastrointestinal: Negative. Genitourinary: Positive for dysuria, frequency and urgency.            Visit Vitals    /83    Pulse 65    Temp 98.2 °F (36.8 °C) (Oral)    Ht 5' 4\" (1.626 m)    Wt 253 lb 12.8 oz (115.1 kg)    SpO2 97%    BMI 43.56 kg/m2       GENERAL:  Well developed, well nourished, in no distress  PELVIC EXAM:  LABIA MAJORA: no masses, tenderness or lesions  LABIA MINORA: no masses, tenderness or lesions  CLITORIS: no masses, tenderness or lesions  URETHRA: normal appearing, no masses or tenderness  BLADDER: no fullness or tenderness  VAGINA: Pale pink appearing vagina with physiologic discharge, no lesions, no blood noted  PERINEUM: no masses, tenderness or lesions  CERVIX: absent  UTERUS: absent  ADNEXA: nontender and no masses  RECTUM: No blood noted    No results found for this or any previous visit (from the past 24 hour(s)). ICD-10-CM ICD-9-CM   1. Frequency of urination R35.0 788.41   2. Urge incontinence N39.41 788.31   3. Urgency of urination R39.15 788.63   4. Diabetes mellitus type 2, diet-controlled (HCC) E11.9 250.00   5. Essential hypertension I10 401.9       1. Frequency of urination  Based on patient's symptoms, I am going to treat for urinary tract infection  - AMB POC URINALYSIS DIP STICK AUTO W/ MICRO  - CULTURE, URINE; Future  - nitrofurantoin, macrocrystal-monohydrate, (MACROBID) 100 mg capsule; Take 1 Cap by mouth two (2) times a day for 7 days. Dispense: 14 Cap; Refill: 0    2. Urge incontinence    - AMB POC URINALYSIS DIP STICK AUTO W/ MICRO  - CULTURE, URINE; Future  - nitrofurantoin, macrocrystal-monohydrate, (MACROBID) 100 mg capsule; Take 1 Cap by mouth two (2) times a day for 7 days. Dispense: 14 Cap; Refill: 0    3. Urgency of urination    - AMB POC URINALYSIS DIP STICK AUTO W/ MICRO  - CULTURE, URINE; Future  - nitrofurantoin, macrocrystal-monohydrate, (MACROBID) 100 mg capsule; Take 1 Cap by mouth two (2) times a day for 7 days. Dispense: 14 Cap; Refill: 0    4. Diabetes mellitus type 2, diet-controlled ([facility]  Risk factor for UTI    5.  Essential hypertension        F/U 4-8 weeks for well woman

## 2018-07-12 NOTE — PATIENT INSTRUCTIONS
Urinary Tract Infection in Women: Care Instructions Your Care Instructions A urinary tract infection, or UTI, is a general term for an infection anywhere between the kidneys and the urethra (where urine comes out). Most UTIs are bladder infections. They often cause pain or burning when you urinate. UTIs are caused by bacteria and can be cured with antibiotics. Be sure to complete your treatment so that the infection goes away. Follow-up care is a key part of your treatment and safety. Be sure to make and go to all appointments, and call your doctor if you are having problems. It's also a good idea to know your test results and keep a list of the medicines you take. How can you care for yourself at home? · Take your antibiotics as directed. Do not stop taking them just because you feel better. You need to take the full course of antibiotics. · Drink extra water and other fluids for the next day or two. This may help wash out the bacteria that are causing the infection. (If you have kidney, heart, or liver disease and have to limit fluids, talk with your doctor before you increase your fluid intake.) · Avoid drinks that are carbonated or have caffeine. They can irritate the bladder. · Urinate often. Try to empty your bladder each time. · To relieve pain, take a hot bath or lay a heating pad set on low over your lower belly or genital area. Never go to sleep with a heating pad in place. To prevent UTIs · Drink plenty of water each day. This helps you urinate often, which clears bacteria from your system. (If you have kidney, heart, or liver disease and have to limit fluids, talk with your doctor before you increase your fluid intake.) · Urinate when you need to. · Urinate right after you have sex. · Change sanitary pads often. · Avoid douches, bubble baths, feminine hygiene sprays, and other feminine hygiene products that have deodorants.  
· After going to the bathroom, wipe from front to back. 
When should you call for help? Call your doctor now or seek immediate medical care if: 
  · Symptoms such as fever, chills, nausea, or vomiting get worse or appear for the first time.  
  · You have new pain in your back just below your rib cage. This is called flank pain.  
  · There is new blood or pus in your urine.  
  · You have any problems with your antibiotic medicine.  
 Watch closely for changes in your health, and be sure to contact your doctor if: 
  · You are not getting better after taking an antibiotic for 2 days.  
  · Your symptoms go away but then come back. Where can you learn more? Go to http://sonia-moira.info/. Enter U112 in the search box to learn more about \"Urinary Tract Infection in Women: Care Instructions. \" Current as of: May 12, 2017 Content Version: 11.7 © 5460-0448 [employer]. Care instructions adapted under license by [employer] (which disclaims liability or warranty for this information). If you have questions about a medical condition or this instruction, always ask your healthcare professional. [address] any warranty or liability for your use of this information.

## 2018-07-14 LAB
BACTERIA SPEC CULT: NORMAL
SERVICE CMNT-IMP: NORMAL

## 2018-08-07 ENCOUNTER — HOSPITAL ENCOUNTER (OUTPATIENT)
Dept: LAB | Age: 73
Discharge: HOME | End: 2018-08-07
Payer: MEDICARE

## 2018-08-07 DIAGNOSIS — E01.0 THYROMEGALY: ICD-10-CM

## 2018-08-07 DIAGNOSIS — E78.5 DYSLIPIDEMIA: ICD-10-CM

## 2018-08-07 DIAGNOSIS — R73.09 ELEVATED GLUCOSE: ICD-10-CM

## 2018-08-07 DIAGNOSIS — I11.9 BENIGN HYPERTENSIVE HEART DISEASE WITHOUT HEART FAILURE: ICD-10-CM

## 2018-08-07 LAB
BASOPHILS # BLD: 0 K/UL (ref 0–0.1)
BASOPHILS NFR BLD: 0 % (ref 0–2)
DIFFERENTIAL METHOD BLD: ABNORMAL
EOSINOPHIL # BLD: 0.3 K/UL (ref 0–0.4)
EOSINOPHIL NFR BLD: 5 % (ref 0–5)
ERYTHROCYTE [DISTWIDTH] IN BLOOD BY AUTOMATED COUNT: 15 % (ref 11.6–14.5)
HCT VFR BLD AUTO: 38.1 % (ref 35–45)
HGB BLD-MCNC: 12.3 G/DL (ref 12–16)
LYMPHOCYTES # BLD: 2.1 K/UL (ref 0.9–3.6)
LYMPHOCYTES NFR BLD: 32 % (ref 21–52)
MCH RBC QN AUTO: 29.4 PG (ref 24–34)
MCHC RBC AUTO-ENTMCNC: 32.3 G/DL (ref 31–37)
MCV RBC AUTO: 90.9 FL (ref 74–97)
MONOCYTES # BLD: 0.6 K/UL (ref 0.05–1.2)
MONOCYTES NFR BLD: 9 % (ref 3–10)
NEUTS SEG # BLD: 3.6 K/UL (ref 1.8–8)
NEUTS SEG NFR BLD: 54 % (ref 40–73)
PLATELET # BLD AUTO: 196 K/UL (ref 135–420)
PMV BLD AUTO: 11.5 FL (ref 9.2–11.8)
RBC # BLD AUTO: 4.19 M/UL (ref 4.2–5.3)
WBC # BLD AUTO: 6.7 K/UL (ref 4.6–13.2)

## 2018-08-07 PROCEDURE — 36415 COLL VENOUS BLD VENIPUNCTURE: CPT | Performed by: PHYSICIAN ASSISTANT

## 2018-08-07 PROCEDURE — 83036 HEMOGLOBIN GLYCOSYLATED A1C: CPT | Performed by: PHYSICIAN ASSISTANT

## 2018-08-07 PROCEDURE — 80061 LIPID PANEL: CPT | Performed by: PHYSICIAN ASSISTANT

## 2018-08-07 PROCEDURE — 85025 COMPLETE CBC W/AUTO DIFF WBC: CPT | Performed by: PHYSICIAN ASSISTANT

## 2018-08-07 PROCEDURE — 80053 COMPREHEN METABOLIC PANEL: CPT | Performed by: PHYSICIAN ASSISTANT

## 2018-08-07 PROCEDURE — 84443 ASSAY THYROID STIM HORMONE: CPT | Performed by: PHYSICIAN ASSISTANT

## 2018-08-08 ENCOUNTER — TELEPHONE (OUTPATIENT)
Dept: FAMILY MEDICINE CLINIC | Age: 73
End: 2018-08-08

## 2018-08-08 LAB
ALBUMIN SERPL-MCNC: 3.4 G/DL (ref 3.4–5)
ALBUMIN/GLOB SERPL: 1 {RATIO} (ref 0.8–1.7)
ALP SERPL-CCNC: 79 U/L (ref 45–117)
ALT SERPL-CCNC: 16 U/L (ref 13–56)
ANION GAP SERPL CALC-SCNC: 8 MMOL/L (ref 3–18)
AST SERPL-CCNC: 12 U/L (ref 15–37)
BILIRUB SERPL-MCNC: 0.4 MG/DL (ref 0.2–1)
BUN SERPL-MCNC: 15 MG/DL (ref 7–18)
BUN/CREAT SERPL: 18 (ref 12–20)
CALCIUM SERPL-MCNC: 8.7 MG/DL (ref 8.5–10.1)
CHLORIDE SERPL-SCNC: 105 MMOL/L (ref 100–108)
CHOLEST SERPL-MCNC: 169 MG/DL
CO2 SERPL-SCNC: 30 MMOL/L (ref 21–32)
CREAT SERPL-MCNC: 0.82 MG/DL (ref 0.6–1.3)
GLOBULIN SER CALC-MCNC: 3.4 G/DL (ref 2–4)
GLUCOSE SERPL-MCNC: 85 MG/DL (ref 74–99)
HBA1C MFR BLD: 6.2 % (ref 4.2–5.6)
HDLC SERPL-MCNC: 62 MG/DL (ref 40–60)
HDLC SERPL: 2.7 {RATIO} (ref 0–5)
LDLC SERPL CALC-MCNC: 85.6 MG/DL (ref 0–100)
LIPID PROFILE,FLP: ABNORMAL
POTASSIUM SERPL-SCNC: 3.8 MMOL/L (ref 3.5–5.5)
PROT SERPL-MCNC: 6.8 G/DL (ref 6.4–8.2)
SODIUM SERPL-SCNC: 143 MMOL/L (ref 136–145)
TRIGL SERPL-MCNC: 107 MG/DL (ref ?–150)
TSH SERPL DL<=0.05 MIU/L-ACNC: 1.7 UIU/ML (ref 0.36–3.74)
VLDLC SERPL CALC-MCNC: 21.4 MG/DL

## 2018-08-08 NOTE — TELEPHONE ENCOUNTER
Patient calling says she is on losartan 50mg takes twice a day 1 at night 1 in the morning. Says it has been giving her lower back, hip and leg pain. Wanted to know if she could go back to taking her losartan green pill instead. Not exactly sure what losartan is green and which isnt.  Patient would like a call back from the nurse

## 2018-08-28 ENCOUNTER — OFFICE VISIT (OUTPATIENT)
Dept: FAMILY MEDICINE CLINIC | Age: 73
End: 2018-08-28

## 2018-08-28 VITALS
DIASTOLIC BLOOD PRESSURE: 81 MMHG | SYSTOLIC BLOOD PRESSURE: 123 MMHG | OXYGEN SATURATION: 95 % | HEIGHT: 64 IN | BODY MASS INDEX: 43.43 KG/M2 | HEART RATE: 67 BPM | WEIGHT: 254.4 LBS | RESPIRATION RATE: 18 BRPM | TEMPERATURE: 98.6 F

## 2018-08-28 DIAGNOSIS — E66.01 MORBID OBESITY WITH BMI OF 40.0-44.9, ADULT (HCC): ICD-10-CM

## 2018-08-28 DIAGNOSIS — Z79.899 ENCOUNTER FOR LONG-TERM (CURRENT) USE OF MEDICATIONS: ICD-10-CM

## 2018-08-28 DIAGNOSIS — R73.03 PREDIABETES: Primary | ICD-10-CM

## 2018-08-28 DIAGNOSIS — Z23 ENCOUNTER FOR IMMUNIZATION: ICD-10-CM

## 2018-08-28 DIAGNOSIS — Z23 NEED FOR INFLUENZA VACCINATION: ICD-10-CM

## 2018-08-28 DIAGNOSIS — E78.5 DYSLIPIDEMIA: ICD-10-CM

## 2018-08-28 DIAGNOSIS — I11.9 BENIGN HYPERTENSIVE HEART DISEASE WITHOUT HEART FAILURE: ICD-10-CM

## 2018-08-28 RX ORDER — FLUCONAZOLE 150 MG/1
TABLET ORAL
Refills: 1 | COMMUNITY
Start: 2018-08-01 | End: 2019-01-02 | Stop reason: ALTCHOICE

## 2018-08-28 NOTE — PROGRESS NOTES
Claudene Cordoba is a  74 y.o. female presents today for office visit for routine follow up. 1. Have you been to the ER, urgent care clinic or hospitalized since your last visit? NO      2. Have you seen or consulted any other health care providers outside of the [address] [place] [address] since your last visit (Include any pap smears or colon screening)? NO

## 2018-08-28 NOTE — MR AVS SNAPSHOT
[address] [facility] [address] 
 
 
 [address] [address] [address] [place] [address] 
[phone] Patient: Nella Diggs MRN: [id number] OW:[id number] Visit Information Date & Time Provider Department Dept. Phone Encounter #  
 8/28/2018  1:00  [address] [facility] [phone] [id number] Follow-up Instructions Return in about 3 months (around 11/28/2018) for Labs 1 week before. Your Appointments 4/23/2019 11:00 AM  
Follow Up with Lexis Marsh DO [facility] ([address]) Appt Note: 1 year follow up [place] [id number] [phone] [facility] Upcoming Health Maintenance Date Due Influenza Age 5 to Adult 8/1/2018 FOOT EXAM Q1 9/6/2018 BREAST CANCER SCRN MAMMOGRAM 10/27/2018 MEDICARE YEARLY EXAM 9/7/2018 HEMOGLOBIN A1C Q6M 2/7/2019 MICROALBUMIN Q1 2/13/2019 EYE EXAM RETINAL OR DILATED Q1 8/1/2019 LIPID PANEL Q1 8/7/2019 GLAUCOMA SCREENING Q2Y 8/1/2020 COLONOSCOPY 4/30/2023 DTaP/Tdap/Td series (2 - Td) 7/31/2023 Allergies as of 8/28/2018  Review Complete On: 8/28/2018 By: Zenon Romero Severity Noted Reaction Type Reactions Latex    Rash Nexium [Esomeprazole Magnesium] High 12/13/2010    Swelling, Other (comments) Lips Swollen, and facial rash and swelling Crestor [Rosuvastatin]    Other (comments) Upper respiratory illness Lisinopril  05/17/2010   Side Effect Unknown (comments) Patient can't recall Niaspan [Niacin]  09/19/2011    Other (comments) Scratchy throat, \"asthma\" like symptoms Pcn [Penicillins]  12/18/2009    Hives Pravachol [Pravastatin]    Myalgia Sulfa (Sulfonamide Antibiotics)  12/18/2009    Rash Zocor [Simvastatin]    Myalgia, Other (comments) Per patient chart \"(? Rash)\" Current Immunizations  Reviewed on 10/21/2014 Name Date Influenza High Dose Vaccine PF 9/12/2017 Influenza Vaccine 10/21/2014, 10/30/2013 Pneumococcal Vaccine (Unspecified Type) 1/1/2008 Tdap 7/31/2013 12:00 AM  
  
 Not reviewed this visit You Were Diagnosed With   
  
 Codes Comments Prediabetes    -  Primary ICD-10-CM: R73.03 
ICD-9-CM: 790.29 Benign hypertensive heart disease without heart failure     ICD-10-CM: I11.9 ICD-9-CM: 402.10 Dyslipidemia     ICD-10-CM: E78.5 ICD-9-CM: 272.4 Morbid obesity with BMI of 40.0-44.9, adult (HCC)     ICD-10-CM: E66.01, Z68.41 
ICD-9-CM: 278.01, V85.41 Need for influenza vaccination     ICD-10-CM: P86 ICD-9-CM: V04.81 Encounter for long-term (current) use of medications     ICD-10-CM: Z79.899 ICD-9-CM: V58.69 Vitals BP Pulse Temp Resp Height(growth percentile) Weight(growth percentile) 123/81 67 98.6 °F (37 °C) (Oral) 18 5' 4\" (1.626 m) 254 lb 6.4 oz (115.4 kg) SpO2 BMI OB Status Smoking Status 95% 43.67 kg/m2 Hysterectomy Former Smoker BMI and BSA Data Body Mass Index Body Surface Area  
 43.67 kg/m 2 2.28 m 2 Preferred Pharmacy Pharmacy Name Phone [address] [phone] Your Updated Medication List  
  
   
This list is accurate as of 8/28/18  1:55 PM.  Always use your most recent med list.  
  
  
  
  
 aspirin 81 mg tablet Take 81 mg by mouth daily. atorvastatin 20 mg tablet Commonly known as:  LIPITOR  
TAKE 1 TABLET EVERY DAY  
  
 fluconazole 150 mg tablet Commonly known as:  DIFLUCAN  
  
 furosemide 20 mg tablet Commonly known as:  LASIX Take 1 Tab by mouth two (2) times a day. losartan 50 mg tablet Commonly known as:  COZAAR  
TAKE 2 TABLETS ONE TIME DAILY FOR HYPERTENSION  
  
 MULTI-VITAMIN PO Take 1 Tab by mouth daily. potassium chloride 10 mEq tablet Commonly known as:  KLOR-CON  
TAKE 2 TABLETS TWICE DAILY  
  
 raNITIdine 300 mg tablet Commonly known as:  ZANTAC  
take 1 tablet by mouth at bedtime VITAMIN D3 1,000 unit tablet Generic drug:  cholecalciferol Take 1,000 Units by mouth daily. Follow-up Instructions Return in about 3 months (around 11/28/2018) for Labs 1 week before. To-Do List   
 11/26/2018 Lab:  HEMOGLOBIN A1C W/O EAG Patient Instructions DASH Diet: Care Instructions Your Care Instructions The DASH diet is an eating plan that can help lower your blood pressure. DASH stands for Dietary Approaches to Stop Hypertension. Hypertension is high blood pressure. The DASH diet focuses on eating foods that are high in calcium, potassium, and magnesium. These nutrients can lower blood pressure. The foods that are highest in these nutrients are fruits, vegetables, low-fat dairy products, nuts, seeds, and legumes. But taking calcium, potassium, and magnesium supplements instead of eating foods that are high in those nutrients does not have the same effect. The DASH diet also includes whole grains, fish, and poultry. The DASH diet is one of several lifestyle changes your doctor may recommend to lower your high blood pressure. Your doctor may also want you to decrease the amount of sodium in your diet. Lowering sodium while following the DASH diet can lower blood pressure even further than just the DASH diet alone. Follow-up care is a key part of your treatment and safety. Be sure to make and go to all appointments, and call your doctor if you are having problems. It's also a good idea to know your test results and keep a list of the medicines you take. How can you care for yourself at home? Following the DASH diet · Eat 4 to 5 servings of fruit each day.  A serving is 1 medium-sized piece of fruit, ½ cup chopped or canned fruit, 1/4 cup dried fruit, or 4 ounces (½ cup) of fruit juice. Choose fruit more often than fruit juice. · Eat 4 to 5 servings of vegetables each day. A serving is 1 cup of lettuce or raw leafy vegetables, ½ cup of chopped or cooked vegetables, or 4 ounces (½ cup) of vegetable juice. Choose vegetables more often than vegetable juice. · Get 2 to 3 servings of low-fat and fat-free dairy each day. A serving is 8 ounces of milk, 1 cup of yogurt, or 1 ½ ounces of cheese. · Eat 6 to 8 servings of grains each day. A serving is 1 slice of bread, 1 ounce of dry cereal, or ½ cup of cooked rice, pasta, or cooked cereal. Try to choose whole-grain products as much as possible. · Limit lean meat, poultry, and fish to 2 servings each day. A serving is 3 ounces, about the size of a deck of cards. · Eat 4 to 5 servings of nuts, seeds, and legumes (cooked dried beans, lentils, and split peas) each week. A serving is 1/3 cup of nuts, 2 tablespoons of seeds, or ½ cup of cooked beans or peas. · Limit fats and oils to 2 to 3 servings each day. A serving is 1 teaspoon of vegetable oil or 2 tablespoons of salad dressing. · Limit sweets and added sugars to 5 servings or less a week. A serving is 1 tablespoon jelly or jam, ½ cup sorbet, or 1 cup of lemonade. · Eat less than 2,300 milligrams (mg) of sodium a day. If you limit your sodium to 1,500 mg a day, you can lower your blood pressure even more. Tips for success · Start small. Do not try to make dramatic changes to your diet all at once. You might feel that you are missing out on your favorite foods and then be more likely to not follow the plan. Make small changes, and stick with them. Once those changes become habit, add a few more changes. · Try some of the following: ¨ Make it a goal to eat a fruit or vegetable at every meal and at snacks. This will make it easy to get the recommended amount of fruits and vegetables each day. ¨ Try yogurt topped with fruit and nuts for a snack or healthy dessert. ¨ Add lettuce, tomato, cucumber, and onion to sandwiches. ¨ Combine a ready-made pizza crust with low-fat mozzarella cheese and lots of vegetable toppings. Try using tomatoes, squash, spinach, broccoli, carrots, cauliflower, and onions. ¨ Have a variety of cut-up vegetables with a low-fat dip as an appetizer instead of chips and dip. ¨ Sprinkle sunflower seeds or chopped almonds over salads. Or try adding chopped walnuts or almonds to cooked vegetables. ¨ Try some vegetarian meals using beans and peas. Add garbanzo or kidney beans to salads. Make burritos and tacos with mashed porras beans or black beans. Where can you learn more? Go to http://soniaTV Compassmoira.info/. Enter R013 in the search box to learn more about \"DASH Diet: Care Instructions. \" Current as of: December 6, 2017 Content Version: 11.7 © 3148-8682 [employer]. Care instructions adapted under license by fintonic (which disclaims liability or warranty for this information). If you have questions about a medical condition or this instruction, always ask your healthcare professional. [address] any warranty or liability for your use of this information. Prediabetes: Care Instructions Your Care Instructions Prediabetes is a warning sign that you are at risk for getting type 2 diabetes. It means that your blood sugar is higher than it should be. The food you eat turns into sugar, which your body uses for energy. Normally, an organ called the pancreas makes insulin, which allows the sugar in your blood to get into your body's cells. But when your body can't use insulin the right way, the sugar doesn't move into cells. It stays in your blood instead. This is called insulin resistance. The buildup of sugar in the blood causes prediabetes. The good news is that lifestyle changes may help you get your blood sugar back to normal and help you avoid or delay diabetes. Follow-up care is a key part of your treatment and safety. Be sure to make and go to all appointments, and call your doctor if you are having problems. It's also a good idea to know your test results and keep a list of the medicines you take. How can you care for yourself at home? · Watch your weight. A healthy weight helps your body use insulin properly. · Limit the amount of calories, sweets, and unhealthy fat you eat. Ask your doctor if you should see a dietitian. A registered dietitian can help you create meal plans that fit your lifestyle. · Get at least 30 minutes of exercise on most days of the week. Exercise helps control your blood sugar. It also helps you maintain a healthy weight. Walking is a good choice. You also may want to do other activities, such as running, swimming, cycling, or playing tennis or team sports. · Do not smoke. Smoking can make prediabetes worse. If you need help quitting, talk to your doctor about stop-smoking programs and medicines. These can increase your chances of quitting for good. · If your doctor prescribed medicines, take them exactly as prescribed. Call your doctor if you think you are having a problem with your medicine. You will get more details on the specific medicines your doctor prescribes. When should you call for help? Watch closely for changes in your health, and be sure to contact your doctor if: 
  · You have any symptoms of diabetes. These may include: ¨ Being thirsty more often. ¨ Urinating more. ¨ Being hungrier. ¨ Losing weight. ¨ Being very tired. ¨ Having blurry vision.  
  · You have a wound that will not heal.  
  · You have an infection that will not go away.  
  · You have problems with your blood pressure.  
  · You want more information about diabetes and how you can keep from getting it. Where can you learn more? Go to http://sonia-moira.info/.  
Enter I222 in the search box to learn more about \"Prediabetes: Care Instructions. \" Current as of: December 7, 2017 Content Version: 11.7 © 1380-4439 igobubble. Care instructions adapted under license by sezmi (which disclaims liability or warranty for this information). If you have questions about a medical condition or this instruction, always ask your healthcare professional. [address] any warranty or liability for your use of this information. Learning About the [address] Diet What is the Mediterranean diet? The Mediterranean diet is a style of eating rather than a diet plan. It features foods eaten in [place], [place], [place] and [place], and other countries along the [facility]. It emphasizes eating foods like fish, fruits, vegetables, beans, high-fiber breads and whole grains, nuts, and olive oil. This style of eating includes limited red meat, cheese, and sweets. Why choose the Mediterranean diet? A Mediterranean-style diet may improve heart health. It contains more fat than other heart-healthy diets. But the fats are mainly from nuts, unsaturated oils (such as fish oils and olive oil), and certain nut or seed oils (such as canola, soybean, or flaxseed oil). These fats may help protect the heart and blood vessels. How can you get started on the Mediterranean diet? Here are some things you can do to switch to a more Mediterranean way of eating. What to eat · Eat a variety of fruits and vegetables each day, such as grapes, blueberries, tomatoes, broccoli, peppers, figs, olives, spinach, eggplant, beans, lentils, and chickpeas. · Eat a variety of whole-grain foods each day, such as oats, brown rice, and whole wheat bread, pasta, and couscous. · Eat fish at least 2 times a week. Try tuna, salmon, mackerel, lake trout, herring, or sardines. · Eat moderate amounts of low-fat dairy products, such as milk, cheese, or yogurt. · Eat moderate amounts of poultry and eggs. · Choose healthy (unsaturated) fats, such as nuts, olive oil, and certain nut or seed oils like canola, soybean, and flaxseed. · Limit unhealthy (saturated) fats, such as butter, palm oil, and coconut oil. And limit fats found in animal products, such as meat and dairy products made with whole milk. Try to eat red meat only a few times a month in very small amounts. · Limit sweets and desserts to only a few times a week. This includes sugar-sweetened drinks like soda. The Mediterranean diet may also include red wine with your meal-1 glass each day for women and up to 2 glasses a day for men. Tips for eating at home · Use herbs, spices, garlic, lemon zest, and citrus juice instead of salt to add flavor to foods. · Add avocado slices to your sandwich instead of galvan. · Have fish for lunch or dinner instead of red meat. Brush the fish with olive oil, and broil or grill it. · Sprinkle your salad with seeds or nuts instead of cheese. · Cook with olive or canola oil instead of butter or oils that are high in saturated fat. · Switch from 2% milk or whole milk to 1% or fat-free milk. · Dip raw vegetables in a vinaigrette dressing or hummus instead of dips made from mayonnaise or sour cream. 
· Have a piece of fruit for dessert instead of a piece of cake. Try baked apples, or have some dried fruit. Tips for eating out · Try broiled, grilled, baked, or poached fish instead of having it fried or breaded. · Ask your  to have your meals prepared with olive oil instead of butter. · Order dishes made with marinara sauce or sauces made from olive oil. Avoid sauces made from cream or mayonnaise. · Choose whole-grain breads, whole wheat pasta and pizza crust, brown rice, beans, and lentils. · Cut back on butter or margarine on bread. Instead, you can dip your bread in a small amount of olive oil. · Ask for a side salad or grilled vegetables instead of french fries or chips. Where can you learn more? Go to http://sonia-moira.info/. Enter [phone] in the search box to learn more about \"Learning About the Mediterranean Diet. \" Current as of: May 12, 2017 Content Version: 11.7 © 6182-7268 Healthwise[employer] Incorporated. Care instructions adapted under license by TiqIQ (which disclaims liability or warranty for this information). If you have questions about a medical condition or this instruction, always ask your healthcare professional. [address] any warranty or liability for your use of this information. Introducing [facility]! Dear Ronaldo Sayer: Thank you for requesting a [employer] account. Our records indicate that you already have an active [employer] account. You can access your account anytime at [email or web address] Did you know that you can access your hospital and ER discharge instructions at any time in [employer]? You can also review all of your test results from your hospital stay or ER visit. Additional Information If you have questions, please visit the Frequently Asked Questions section of the [employer] website at [email or web address] Remember, [employer] is NOT to be used for urgent needs. For medical emergencies, dial 911. Now available from your iPhone and Android! Please provide this summary of care documentation to your next provider. Your primary care clinician is listed as Selvin Captain. If you have any questions after today's visit, please call [phone].

## 2018-08-28 NOTE — PROGRESS NOTES
Chief Complaint   Patient presents with    Labs     results    Immunization/Injection       HPI:  Christa Cannon is a 74 y.o. female who comes in today for f/u and review of lab results. States daily BP check which runs between   Gained wt  Flu shot today    Past Medical History:   Diagnosis Date    Acute idiopathic gout of right wrist 10/4/2017    Atrophic vaginitis     Cardiac cath 05/30/2012    Patent coronary arteries. LVEDP 20.  RA 11.  RV 42/10. PA 42/21. W 18.  CO 6.4 (TD), 6.4 ([place]ola ). PVR 1.7 Wood units. False-positive nuclear study.  Cardiac echocardiogram 05/11/2011    EF 65%. RVSP at least 35 mmHg. Mild IVCE. No significant valvular heart disease.  Cardiac nuclear imaging test 05/18/2012    Tech difficult. Apical ischemia. No infarction. EF 76%. No reg'l WMA. No TID.  Cardiovascular LLE venous duplex 06/17/2013    Left leg:  No DVT.     Diabetes     diet controlled, hypoglycemia from metformin previously     Dyslipidemia     Fatty liver     Fibrocystic breast disease     Fluid retention     GERD     H/O colonoscopy 4/12/13    polyp    Hypertension     Ill-defined condition     gout    Macular degeneration     Morbid obesity (HCC)     Obstructive sleep apnea     Peripheral neuropathy     Rectocele     Thyroid cyst     bilat     Allergies   Allergen Reactions    Latex Rash    Nexium [Esomeprazole Magnesium] Swelling and Other (comments)     Lips Swollen, and facial rash and swelling    Crestor [Rosuvastatin] Other (comments)     Upper respiratory illness    Lisinopril Unknown (comments)     Patient can't recall    Niaspan [Niacin] Other (comments)     Scratchy throat, \"asthma\" like symptoms    Pcn [Penicillins] Hives    Pravachol [Pravastatin] Myalgia    Sulfa (Sulfonamide Antibiotics) Rash    Zocor [Simvastatin] Myalgia and Other (comments)     Per patient chart \"(? Rash)\"     Current Outpatient Prescriptions   Medication Sig Dispense Refill    fluconazole (DIFLUCAN) 150 mg tablet   1    raNITIdine (ZANTAC) 300 mg tablet take 1 tablet by mouth at bedtime  0    furosemide (LASIX) 20 mg tablet Take 1 Tab by mouth two (2) times a day. 90 Tab 1    losartan (COZAAR) 50 mg tablet TAKE 2 TABLETS ONE TIME DAILY FOR HYPERTENSION 180 Tab 1    potassium chloride (KLOR-CON) 10 mEq tablet TAKE 2 TABLETS TWICE DAILY 360 Tab 1    atorvastatin (LIPITOR) 20 mg tablet TAKE 1 TABLET EVERY DAY 90 Tab 1    cholecalciferol (VITAMIN D3) 1,000 unit tablet Take 1,000 Units by mouth daily.  aspirin 81 mg Tab Take 81 mg by mouth daily.  MULTIVITAMINS (MULTI-VITAMIN PO) Take 1 Tab by mouth daily.          ROS:    Constitutional: Negative for fever, chills, or fatigue  Neurological: Negative for headache, dizziness, visual disturbance, or loss of conciousness  Respiratory: Negative for SOB, hemoptysis, or wheezing  Cardiovascular: Negative for chest pain, palpitation, or leg swelling  Gastrointestinal: Negative for abdominal pain, nausea, vomiting, diarrhea, blood in stool, melena, or heartburn  Musculoskeletal: Negative for falls    Physical Exam:    Visit Vitals    /81    Pulse 67    Temp 98.6 °F (37 °C) (Oral)    Resp 18    Ht 5' 4\" (1.626 m)    Wt 254 lb 6.4 oz (115.4 kg)    SpO2 95%    BMI 43.67 kg/m2       BP Readings from Last 3 Encounters:   08/28/18 123/81   07/12/18 137/83   07/02/18 136/88         General: a, a & o x 3, afebrile, well-nourished, interacting appropriately, in no acute distress  HEENT: head normocephalic and atraumatic, conjuctiva clear, PERRLA, EOMI, nose clear, ear canals clear, no erythema or swelling, pharynx and tonsils with no erythema or exudates, no sinus tenderness  Skin: warm and dry, no rashes , no bruises  Neck: supple, symmetrical, no thyromegaly  Respiratory: symmetrical chest expansion, lung sounds clear bilaterally, good air entry, good respiratory effort, no wheezes or crackles  Cardiovascular: normal S1S2, regular rate and rhythm, no murmurs, capillary refills < 2 sec, pulses palpable, no thrill, no carotid or abdominal bruits, no peripheral edema, no JVD  Abdomen: non-distended, normoactive bowel sounds x 4 quadrants, soft, non-tender to palpation, no guarding or rigidity, no palpable mass, no hepatomegaly or splenomegaly, no CVA tenderness  Musculoskeletal: normal ROM on all joints, no swelling or deformity, no perilumbar tenderness, steady gait  Lymphatic: no cervical lymphadenopathy  Neurological: DTRs intact symmetrically and bilaterally, sensation and motor function intact  Diabetic foot exam: pedal pulses palpable, no calluses, passed monofilament test   Psychiatry: appropriate mood and affect,     Lab Results   Component Value Date/Time    WBC 6.7 08/07/2018 10:05 AM    Hemoglobin, POC 12.6 04/12/2013 09:46 AM    HGB 12.3 08/07/2018 10:05 AM    Hematocrit, POC 37 04/12/2013 09:46 AM    HCT 38.1 08/07/2018 10:05 AM    PLATELET 189 23/40/3010 10:05 AM    MCV 90.9 08/07/2018 10:05 AM       Lab Results   Component Value Date/Time    Sodium 143 08/07/2018 10:05 AM    Potassium 3.8 08/07/2018 10:05 AM    Chloride 105 08/07/2018 10:05 AM    CO2 30 08/07/2018 10:05 AM    Anion gap 8 08/07/2018 10:05 AM    Glucose 85 08/07/2018 10:05 AM    BUN 15 08/07/2018 10:05 AM    Creatinine 0.82 08/07/2018 10:05 AM    BUN/Creatinine ratio 18 08/07/2018 10:05 AM    GFR est AA >60 08/07/2018 10:05 AM    GFR est non-AA >60 08/07/2018 10:05 AM    Calcium 8.7 08/07/2018 10:05 AM    Bilirubin, total 0.4 08/07/2018 10:05 AM    AST (SGOT) 12 (L) 08/07/2018 10:05 AM    Alk.  phosphatase 79 08/07/2018 10:05 AM    Protein, total 6.8 08/07/2018 10:05 AM    Albumin 3.4 08/07/2018 10:05 AM    Globulin 3.4 08/07/2018 10:05 AM    A-G Ratio 1.0 08/07/2018 10:05 AM    ALT (SGPT) 16 08/07/2018 10:05 AM       Lab Results   Component Value Date/Time    Cholesterol, total 169 08/07/2018 10:05 AM    HDL Cholesterol 62 (H) 08/07/2018 10:05 AM    LDL, calculated 85.6 08/07/2018 10:05 AM    VLDL, calculated 21.4 08/07/2018 10:05 AM    Triglyceride 107 08/07/2018 10:05 AM    CHOL/HDL Ratio 2.7 08/07/2018 10:05 AM       Hemoglobin A1c (POC)   Date Value Ref Range Status   06/23/2017 6.1 % Final        Lab Results   Component Value Date/Time    Hemoglobin A1c 6.2 (H) 08/07/2018 10:05 AM    Hemoglobin A1c (POC) 6.1 06/23/2017 11:53 AM    Hemoglobin A1c, External 6.5 01/26/2017       Lab Results   Component Value Date/Time    TSH 1.70 08/07/2018 10:05 AM    TSH 2.20 01/11/2010 06:50 PM    Triiodothyronine (T3), free 3.1 12/21/2009 11:24 AM    T4, Free 0.9 01/11/2010 06:50 PM       Assessment/Plan:  Diagnoses and all orders for this visit:    1. Prediabetes  -     HEMOGLOBIN A1C W/O EAG; Future    2. Hypertension    3. Dyslipidemia    4. Morbid obesity with BMI of 40.0-44.9, adult ([facility] [address]    5. Need for influenza vaccination    6. Encounter for long-term (current) use of medications  -     HEMOGLOBIN A1C W/O EAG; Future        Additional Notes: Discussed today's diagnosis, treatment plans. Discussed medication indications and side effects. Preventive Medicine: Smoking Cessation:  Weight Management:  After Visit Summary: Discussed provided printed patient instructions. Answered questions accordingly.   Follow-up Disposition:      Miguel Lopez DO

## 2018-08-28 NOTE — PROGRESS NOTES
Verbal order read back per Dr. Bertha Justice. Patient received vaccine in left deltoid. Patient tolerated well and left without complaints. Patient received VIS.

## 2018-08-28 NOTE — PATIENT INSTRUCTIONS
DASH Diet: Care Instructions  Your Care Instructions    The DASH diet is an eating plan that can help lower your blood pressure. DASH stands for Dietary Approaches to Stop Hypertension. Hypertension is high blood pressure. The DASH diet focuses on eating foods that are high in calcium, potassium, and magnesium. These nutrients can lower blood pressure. The foods that are highest in these nutrients are fruits, vegetables, low-fat dairy products, nuts, seeds, and legumes. But taking calcium, potassium, and magnesium supplements instead of eating foods that are high in those nutrients does not have the same effect. The DASH diet also includes whole grains, fish, and poultry. The DASH diet is one of several lifestyle changes your doctor may recommend to lower your high blood pressure. Your doctor may also want you to decrease the amount of sodium in your diet. Lowering sodium while following the DASH diet can lower blood pressure even further than just the DASH diet alone. Follow-up care is a key part of your treatment and safety. Be sure to make and go to all appointments, and call your doctor if you are having problems. It's also a good idea to know your test results and keep a list of the medicines you take. How can you care for yourself at home? Following the DASH diet  · Eat 4 to 5 servings of fruit each day. A serving is 1 medium-sized piece of fruit, ½ cup chopped or canned fruit, 1/4 cup dried fruit, or 4 ounces (½ cup) of fruit juice. Choose fruit more often than fruit juice. · Eat 4 to 5 servings of vegetables each day. A serving is 1 cup of lettuce or raw leafy vegetables, ½ cup of chopped or cooked vegetables, or 4 ounces (½ cup) of vegetable juice. Choose vegetables more often than vegetable juice. · Get 2 to 3 servings of low-fat and fat-free dairy each day. A serving is 8 ounces of milk, 1 cup of yogurt, or 1 ½ ounces of cheese. · Eat 6 to 8 servings of grains each day.  A serving is 1 slice of bread, 1 ounce of dry cereal, or ½ cup of cooked rice, pasta, or cooked cereal. Try to choose whole-grain products as much as possible. · Limit lean meat, poultry, and fish to 2 servings each day. A serving is 3 ounces, about the size of a deck of cards. · Eat 4 to 5 servings of nuts, seeds, and legumes (cooked dried beans, lentils, and split peas) each week. A serving is 1/3 cup of nuts, 2 tablespoons of seeds, or ½ cup of cooked beans or peas. · Limit fats and oils to 2 to 3 servings each day. A serving is 1 teaspoon of vegetable oil or 2 tablespoons of salad dressing. · Limit sweets and added sugars to 5 servings or less a week. A serving is 1 tablespoon jelly or jam, ½ cup sorbet, or 1 cup of lemonade. · Eat less than 2,300 milligrams (mg) of sodium a day. If you limit your sodium to 1,500 mg a day, you can lower your blood pressure even more. Tips for success  · Start small. Do not try to make dramatic changes to your diet all at once. You might feel that you are missing out on your favorite foods and then be more likely to not follow the plan. Make small changes, and stick with them. Once those changes become habit, add a few more changes. · Try some of the following:  ¨ Make it a goal to eat a fruit or vegetable at every meal and at snacks. This will make it easy to get the recommended amount of fruits and vegetables each day. ¨ Try yogurt topped with fruit and nuts for a snack or healthy dessert. ¨ Add lettuce, tomato, cucumber, and onion to sandwiches. ¨ Combine a ready-made pizza crust with low-fat mozzarella cheese and lots of vegetable toppings. Try using tomatoes, squash, spinach, broccoli, carrots, cauliflower, and onions. ¨ Have a variety of cut-up vegetables with a low-fat dip as an appetizer instead of chips and dip. ¨ Sprinkle sunflower seeds or chopped almonds over salads. Or try adding chopped walnuts or almonds to cooked vegetables.   ¨ Try some vegetarian meals using beans and peas. Add garbanzo or kidney beans to salads. Make burritos and tacos with mashed porras beans or black beans. Where can you learn more? Go to http://sonia-moira.info/. Enter I028 in the search box to learn more about \"DASH Diet: Care Instructions. \"  Current as of: December 6, 2017  Content Version: 11.7  © 3188-7038 [employer]. Care instructions adapted under license by [employer] (which disclaims liability or warranty for this information). If you have questions about a medical condition or this instruction, always ask your healthcare professional. [address] any warranty or liability for your use of this information. Prediabetes: Care Instructions  Your Care Instructions    Prediabetes is a warning sign that you are at risk for getting type 2 diabetes. It means that your blood sugar is higher than it should be. The food you eat turns into sugar, which your body uses for energy. Normally, an organ called the pancreas makes insulin, which allows the sugar in your blood to get into your body's cells. But when your body can't use insulin the right way, the sugar doesn't move into cells. It stays in your blood instead. This is called insulin resistance. The buildup of sugar in the blood causes prediabetes. The good news is that lifestyle changes may help you get your blood sugar back to normal and help you avoid or delay diabetes. Follow-up care is a key part of your treatment and safety. Be sure to make and go to all appointments, and call your doctor if you are having problems. It's also a good idea to know your test results and keep a list of the medicines you take. How can you care for yourself at home? · Watch your weight. A healthy weight helps your body use insulin properly. · Limit the amount of calories, sweets, and unhealthy fat you eat. Ask your doctor if you should see a dietitian.  A registered dietitian can help you create meal plans that fit your lifestyle. · Get at least 30 minutes of exercise on most days of the week. Exercise helps control your blood sugar. It also helps you maintain a healthy weight. Walking is a good choice. You also may want to do other activities, such as running, swimming, cycling, or playing tennis or team sports. · Do not smoke. Smoking can make prediabetes worse. If you need help quitting, talk to your doctor about stop-smoking programs and medicines. These can increase your chances of quitting for good. · If your doctor prescribed medicines, take them exactly as prescribed. Call your doctor if you think you are having a problem with your medicine. You will get more details on the specific medicines your doctor prescribes. When should you call for help? Watch closely for changes in your health, and be sure to contact your doctor if:    · You have any symptoms of diabetes. These may include:  ¨ Being thirsty more often. ¨ Urinating more. ¨ Being hungrier. ¨ Losing weight. ¨ Being very tired. ¨ Having blurry vision.     · You have a wound that will not heal.     · You have an infection that will not go away.     · You have problems with your blood pressure.     · You want more information about diabetes and how you can keep from getting it. Where can you learn more? Go to http://sonia-moira.info/. Enter I222 in the search box to learn more about \"Prediabetes: Care Instructions. \"  Current as of: December 7, 2017  Content Version: 11.7  © 9609-6629 [employer]. Care instructions adapted under license by [employer] (which disclaims liability or warranty for this information). If you have questions about a medical condition or this instruction, always ask your healthcare professional. [place] [address] any warranty or liability for your use of this information.        Learning About the [address] Diet  What is the [address] diet?    The Mediterranean diet is a style of eating rather than a diet plan. It features foods eaten in [place], [place], [place] and [place], and other countries along the [facility]. It emphasizes eating foods like fish, fruits, vegetables, beans, high-fiber breads and whole grains, nuts, and olive oil. This style of eating includes limited red meat, cheese, and sweets. Why choose the Mediterranean diet? A Mediterranean-style diet may improve heart health. It contains more fat than other heart-healthy diets. But the fats are mainly from nuts, unsaturated oils (such as fish oils and olive oil), and certain nut or seed oils (such as canola, soybean, or flaxseed oil). These fats may help protect the heart and blood vessels. How can you get started on the Mediterranean diet? Here are some things you can do to switch to a more Mediterranean way of eating. What to eat  · Eat a variety of fruits and vegetables each day, such as grapes, blueberries, tomatoes, broccoli, peppers, figs, olives, spinach, eggplant, beans, lentils, and chickpeas. · Eat a variety of whole-grain foods each day, such as oats, brown rice, and whole wheat bread, pasta, and couscous. · Eat fish at least 2 times a week. Try tuna, salmon, mackerel, lake trout, herring, or sardines. · Eat moderate amounts of low-fat dairy products, such as milk, cheese, or yogurt. · Eat moderate amounts of poultry and eggs. · Choose healthy (unsaturated) fats, such as nuts, olive oil, and certain nut or seed oils like canola, soybean, and flaxseed. · Limit unhealthy (saturated) fats, such as butter, palm oil, and coconut oil. And limit fats found in animal products, such as meat and dairy products made with whole milk. Try to eat red meat only a few times a month in very small amounts. · Limit sweets and desserts to only a few times a week. This includes sugar-sweetened drinks like soda.   The Mediterranean diet may also include red wine with your meal-1 glass each day for women and up to 2 glasses a day for men. Tips for eating at home  · Use herbs, spices, garlic, lemon zest, and citrus juice instead of salt to add flavor to foods. · Add avocado slices to your sandwich instead of galvan. · Have fish for lunch or dinner instead of red meat. Brush the fish with olive oil, and broil or grill it. · Sprinkle your salad with seeds or nuts instead of cheese. · Cook with olive or canola oil instead of butter or oils that are high in saturated fat. · Switch from 2% milk or whole milk to 1% or fat-free milk. · Dip raw vegetables in a vinaigrette dressing or hummus instead of dips made from mayonnaise or sour cream.  · Have a piece of fruit for dessert instead of a piece of cake. Try baked apples, or have some dried fruit. Tips for eating out  · Try broiled, grilled, baked, or poached fish instead of having it fried or breaded. · Ask your  to have your meals prepared with olive oil instead of butter. · Order dishes made with marinara sauce or sauces made from olive oil. Avoid sauces made from cream or mayonnaise. · Choose whole-grain breads, whole wheat pasta and pizza crust, brown rice, beans, and lentils. · Cut back on butter or margarine on bread. Instead, you can dip your bread in a small amount of olive oil. · Ask for a side salad or grilled vegetables instead of french fries or chips. Where can you learn more? Go to http://sonia-moira.info/. Enter [phone] in the search box to learn more about \"Learning About the Mediterranean Diet. \"  Current as of: May 12, 2017  Content Version: 11.7  © 5778-3124 [employer]. Care instructions adapted under license by [email or web address] (which disclaims liability or warranty for this information).  If you have questions about a medical condition or this instruction, always ask your healthcare professional. [address] any warranty or liability for your use of this information.

## 2018-09-27 DIAGNOSIS — I11.9 BENIGN HYPERTENSIVE HEART DISEASE WITHOUT HEART FAILURE: ICD-10-CM

## 2018-09-27 RX ORDER — LOSARTAN POTASSIUM 50 MG/1
TABLET ORAL
Qty: 60 TAB | Refills: 5 | Status: SHIPPED | OUTPATIENT
Start: 2018-09-27 | End: 2019-01-31 | Stop reason: SDUPTHER

## 2018-09-27 NOTE — TELEPHONE ENCOUNTER
Last OV 8/28/2018  Next OV 11/26/2018  Last refill 5/8/2018    Patient states she is not [place] [facility] [place] and [place] to go to [facility].

## 2018-09-27 NOTE — TELEPHONE ENCOUNTER
Pt called in requesting refill of her   Requested Prescriptions     Pending Prescriptions Disp Refills    losartan (COZAAR) 50 mg tablet 180 Tab 1   . Pt is out of the medication can a different provider fill it for her.

## 2018-10-03 ENCOUNTER — OFFICE VISIT (OUTPATIENT)
Dept: FAMILY MEDICINE CLINIC | Age: 73
End: 2018-10-03

## 2018-10-03 VITALS
HEIGHT: 64 IN | DIASTOLIC BLOOD PRESSURE: 84 MMHG | SYSTOLIC BLOOD PRESSURE: 131 MMHG | BODY MASS INDEX: 43.64 KG/M2 | WEIGHT: 255.6 LBS | TEMPERATURE: 98.3 F | RESPIRATION RATE: 18 BRPM | OXYGEN SATURATION: 95 % | HEART RATE: 67 BPM

## 2018-10-03 DIAGNOSIS — J01.00 ACUTE MAXILLARY SINUSITIS, RECURRENCE NOT SPECIFIED: Primary | ICD-10-CM

## 2018-10-03 DIAGNOSIS — R09.81 SINUS CONGESTION: ICD-10-CM

## 2018-10-03 RX ORDER — DOXYCYCLINE 100 MG/1
100 TABLET ORAL 2 TIMES DAILY
Qty: 20 TAB | Refills: 0 | Status: SHIPPED | OUTPATIENT
Start: 2018-10-03 | End: 2018-10-13

## 2018-10-03 RX ORDER — FLUTICASONE PROPIONATE 50 MCG
SPRAY, SUSPENSION (ML) NASAL
Qty: 1 BOTTLE | Refills: 1 | Status: SHIPPED | OUTPATIENT
Start: 2018-10-03

## 2018-10-03 NOTE — PROGRESS NOTES
Chief Complaint   Patient presents with    Sinus Pain     acute visit       HPI:  Bettina Pelletier is a 74 y.o. female who comes in today for an acute visit with sinus complaints. She recently started having sinus congestion and associated is sinus pressure, sneezing, and sinus headache. She denies fever or chills and took OTC nasal spray with no improvement and thus she came in today for evaluation. Past Medical History:   Diagnosis Date    Acute idiopathic gout of right wrist 10/4/2017    Atrophic vaginitis     Cardiac cath 05/30/2012    Patent coronary arteries. LVEDP 20.  RA 11.  RV 42/10. PA 42/21. W 18.  CO 6.4 (TD), 6.4 (Gala Avinash). PVR 1.7 Wood units. False-positive nuclear study.  Cardiac echocardiogram 05/11/2011    EF 65%. RVSP at least 35 mmHg. Mild IVCE. No significant valvular heart disease.  Cardiac nuclear imaging test 05/18/2012    Tech difficult. Apical ischemia. No infarction. EF 76%. No reg'l WMA. No TID.  Cardiovascular LLE venous duplex 06/17/2013    Left leg:  No DVT.     Diabetes     diet controlled, hypoglycemia from metformin previously     Dyslipidemia     Fatty liver     Fibrocystic breast disease     Fluid retention     GERD     H/O colonoscopy 4/12/13    polyp    Hypertension     Ill-defined condition     gout    Macular degeneration     Morbid obesity (HCC)     Obstructive sleep apnea     Peripheral neuropathy     Rectocele     Thyroid cyst     bilat     Allergies   Allergen Reactions    Latex Rash    Nexium [Esomeprazole Magnesium] Swelling and Other (comments)     Lips Swollen, and facial rash and swelling    Crestor [Rosuvastatin] Other (comments)     Upper respiratory illness    Lisinopril Unknown (comments)     Patient can't recall    Niaspan [Niacin] Other (comments)     Scratchy throat, \"asthma\" like symptoms    Pcn [Penicillins] Hives    Pravachol [Pravastatin] Myalgia    Sulfa (Sulfonamide Antibiotics) Rash    Zocor [Simvastatin] Myalgia and Other (comments)     Per patient chart \"(? Rash)\"     Current Outpatient Prescriptions   Medication Sig Dispense Refill    losartan (COZAAR) 50 mg tablet TAKE 2 TABLETS ONE TIME DAILY FOR HYPERTENSION 60 Tab 5    fluconazole (DIFLUCAN) 150 mg tablet   1    raNITIdine (ZANTAC) 300 mg tablet take 1 tablet by mouth at bedtime  0    furosemide (LASIX) 20 mg tablet Take 1 Tab by mouth two (2) times a day. 90 Tab 1    potassium chloride (KLOR-CON) 10 mEq tablet TAKE 2 TABLETS TWICE DAILY 360 Tab 1    atorvastatin (LIPITOR) 20 mg tablet TAKE 1 TABLET EVERY DAY 90 Tab 1    cholecalciferol (VITAMIN D3) 1,000 unit tablet Take 1,000 Units by mouth daily.  aspirin 81 mg Tab Take 81 mg by mouth daily.  MULTIVITAMINS (MULTI-VITAMIN PO) Take 1 Tab by mouth daily. ROS:  Pertinent as in HPI. Rest of ROS reviewed was negative. Physical Exam:  Visit Vitals    /84    Pulse 67    Temp 98.3 °F (36.8 °C) (Oral)    Resp 18    Ht 5' 4\" (1.626 m)    Wt 255 lb 9.6 oz (115.9 kg)    SpO2 95%    BMI 43.87 kg/m2       BP Readings from Last 3 Encounters:   10/03/18 131/84   18 123/81   18 137/83       General: a & o x 3, afebrile, well-nourished, interacting appropriately, in no acute distress  HEENT: Mucosal edema and sinus erythema noted  Respiratory: symmetrical chest expansion, lung sounds clear bilaterally  Cardiovascular: normal S1S2, regular rate and rhythm            Assessment/Plan:    ICD-10-CM ICD-9-CM    1. Acute maxillary sinusitis, recurrence not specified J01.00 461.0 doxycycline (ADOXA) 100 mg tablet   2. Sinus congestion R09.81 478.19 fluticasone (FLONASE) 50 mcg/actuation nasal spray         Orders Placed This Encounter    doxycycline (ADOXA) 100 mg tablet     Sig: Take 1 Tab by mouth two (2) times a day for 10 days.      Dispense:  20 Tab     Refill:  0    fluticasone (FLONASE) 50 mcg/actuation nasal spray     Sig: Si spray in both nostril twice daily     Dispense:  1 Bottle     Refill:  1         Additional Notes: Discussed today's diagnosis, treatment plans. Discussed medication indications and side effects. After Visit Summary: Discussed provided printed patient instructions. Answered questions accordingly.   Follow-up Disposition: As previously scheduled        Tami Kelly DO, MPH  Internal Medicine

## 2018-10-03 NOTE — MR AVS SNAPSHOT
Janee Siegel 
 
 
 [address] [address] 
[phone] Patient: Wily Luna MRN: [id number] Visit Information Date & Time Provider Department Dept. Phone Encounter #  
 10/3/2018  3:45  [address] [phone] [id number] Your Appointments 11/19/2018  8:50 AM  
LAB with [facility] ([facility]) Appt Note: 3 mo f/u lab [address] [facility] [address] [address]  
[phone]  
  
   
 [address] [facility]  
  
    
 11/26/2018  1:00 PM  
Office Visit with [address]  [facility] ([facility]) Appt Note: 3 mo f/u  
 [address], [facility] [address] [address]  
[phone]  
  
   
 [address], [address]  
  
    
 4/23/2019 11:00 AM  
Follow Up with Omar Garcia DO [facility] \[facility]\"" ([facility]) Appt Note: 1 year follow up Manny Perez [phone] [address] Upcoming Health Maintenance Date Due Shingrix Vaccine Age 50> (1 of 2) 2/10/1993 FOOT EXAM Q1 9/6/2018 MEDICARE YEARLY EXAM 9/27/2018 BREAST CANCER SCRN MAMMOGRAM 10/27/2018 HEMOGLOBIN A1C Q6M 2/7/2019 MICROALBUMIN Q1 2/13/2019 EYE EXAM RETINAL OR DILATED Q1 8/1/2019 LIPID PANEL Q1 8/7/2019 GLAUCOMA SCREENING Q2Y 8/1/2020 COLONOSCOPY 4/30/2023 DTaP/Tdap/Td series (2 - Td) 7/31/2023 Allergies as of 10/3/2018  Review Complete On: 10/3/2018 By: Randell Pruett Severity Noted Reaction Type Reactions Latex    Rash Nexium [Esomeprazole Magnesium] High 12/13/2010    Swelling, Other (comments) Lips Swollen, and facial rash and swelling Crestor [Rosuvastatin]    Other (comments) Upper respiratory illness Lisinopril  05/17/2010   Side Effect Unknown (comments) Patient can't recall Niaspan [Niacin]  09/19/2011    Other (comments) Scratchy throat, \"asthma\" like symptoms Pcn [Penicillins]  12/18/2009    Hives Pravachol [Pravastatin]    Myalgia Sulfa (Sulfonamide Antibiotics)  12/18/2009    Rash Zocor [Simvastatin]    Myalgia, Other (comments) Per patient chart \"(? Rash)\" Current Immunizations  Reviewed on 10/21/2014 Name Date Influenza High Dose Vaccine PF 9/12/2017 Influenza Vaccine 10/21/2014, 10/30/2013 Influenza Vaccine Sanjana Alfaro) 8/28/2018 Pneumococcal Vaccine (Unspecified Type) 1/1/2008 Tdap 7/31/2013 12:00 AM  
  
 Not reviewed this visit You Were Diagnosed With   
  
 Codes Comments Acute maxillary sinusitis, recurrence not specified    -  Primary ICD-10-CM: J01.00 ICD-9-CM: 461.0 Sinus congestion     ICD-10-CM: R09.81 ICD-9-CM: 478.19 Vitals BP Pulse Temp Resp Height(growth percentile) Weight(growth percentile) 131/84 67 98.3 °F (36.8 °C) (Oral) 18 5' 4\" (1.626 m) 255 lb 9.6 oz (115.9 kg) SpO2 BMI OB Status Smoking Status 95% 43.87 kg/m2 Hysterectomy Former Smoker BMI and BSA Data Body Mass Index Body Surface Area  
 43.87 kg/m 2 2.29 m 2 Preferred Pharmacy Pharmacy Name Phone [address], [address] [place] [address] [phone] Your Updated Medication List  
  
   
This list is accurate as of 10/3/18  4:33 PM.  Always use your most recent med list.  
  
  
  
  
 aspirin 81 mg tablet Take 81 mg by mouth daily. atorvastatin 20 mg tablet Commonly known as:  LIPITOR  
TAKE 1 TABLET EVERY DAY  
  
 doxycycline 100 mg tablet Commonly known as:  ADOXA Take 1 Tab by mouth two (2) times a day for 10 days. fluconazole 150 mg tablet Commonly known as:  DIFLUCAN  
  
 fluticasone 50 mcg/actuation nasal spray Commonly known as:  Vernel Ada Si spray in both nostril twice daily  
  
 furosemide 20 mg tablet Commonly known as:  LASIX Take 1 Tab by mouth two (2) times a day. losartan 50 mg tablet Commonly known as:  COZAAR  
TAKE 2 TABLETS ONE TIME DAILY FOR HYPERTENSION  
  
 MULTI-VITAMIN PO Take 1 Tab by mouth daily. potassium chloride 10 mEq tablet Commonly known as:  KLOR-CON  
TAKE 2 TABLETS TWICE DAILY  
  
 raNITIdine 300 mg Tab Commonly known as:  ZANTAC  
take 1 tablet by mouth at bedtime VITAMIN D3 1,000 unit tablet Generic drug:  cholecalciferol Take 1,000 Units by mouth daily. Prescriptions Sent to Pharmacy Refills  
 doxycycline (ADOXA) 100 mg tablet 0 Sig: Take 1 Tab by mouth two (2) times a day for 10 days. Class: Normal  
 [facility] Ph #: [phone] Route: Oral  
 fluticasone (FLONASE) 50 mcg/actuation nasal spray 1 Sig: Si spray in both nostril twice daily Class: Normal  
 [facility] Ph #: [phone] Patient Instructions DASH Diet: Care Instructions Your Care Instructions The DASH diet is an eating plan that can help lower your blood pressure. DASH stands for Dietary Approaches to Stop Hypertension. Hypertension is high blood pressure. The DASH diet focuses on eating foods that are high in calcium, potassium, and magnesium. These nutrients can lower blood pressure. The foods that are highest in these nutrients are fruits, vegetables, low-fat dairy products, nuts, seeds, and legumes. But taking calcium, potassium, and magnesium supplements instead of eating foods that are high in those nutrients does not have the same effect. The DASH diet also includes whole grains, fish, and poultry.  
The DASH diet is one of several lifestyle changes your doctor may recommend to lower your high blood pressure. Your doctor may also want you to decrease the amount of sodium in your diet. Lowering sodium while following the DASH diet can lower blood pressure even further than just the DASH diet alone. Follow-up care is a key part of your treatment and safety. Be sure to make and go to all appointments, and call your doctor if you are having problems. It's also a good idea to know your test results and keep a list of the medicines you take. How can you care for yourself at home? Following the DASH diet · Eat 4 to 5 servings of fruit each day. A serving is 1 medium-sized piece of fruit, ½ cup chopped or canned fruit, 1/4 cup dried fruit, or 4 ounces (½ cup) of fruit juice. Choose fruit more often than fruit juice. · Eat 4 to 5 servings of vegetables each day. A serving is 1 cup of lettuce or raw leafy vegetables, ½ cup of chopped or cooked vegetables, or 4 ounces (½ cup) of vegetable juice. Choose vegetables more often than vegetable juice. · Get 2 to 3 servings of low-fat and fat-free dairy each day. A serving is 8 ounces of milk, 1 cup of yogurt, or 1 ½ ounces of cheese. · Eat 6 to 8 servings of grains each day. A serving is 1 slice of bread, 1 ounce of dry cereal, or ½ cup of cooked rice, pasta, or cooked cereal. Try to choose whole-grain products as much as possible. · Limit lean meat, poultry, and fish to 2 servings each day. A serving is 3 ounces, about the size of a deck of cards. · Eat 4 to 5 servings of nuts, seeds, and legumes (cooked dried beans, lentils, and split peas) each week. A serving is 1/3 cup of nuts, 2 tablespoons of seeds, or ½ cup of cooked beans or peas. · Limit fats and oils to 2 to 3 servings each day. A serving is 1 teaspoon of vegetable oil or 2 tablespoons of salad dressing. · Limit sweets and added sugars to 5 servings or less a week. A serving is 1 tablespoon jelly or jam, ½ cup sorbet, or 1 cup of lemonade. · Eat less than 2,300 milligrams (mg) of sodium a day. If you limit your sodium to 1,500 mg a day, you can lower your blood pressure even more. Tips for success · Start small. Do not try to make dramatic changes to your diet all at once. You might feel that you are missing out on your favorite foods and then be more likely to not follow the plan. Make small changes, and stick with them. Once those changes become habit, add a few more changes. · Try some of the following: ¨ Make it a goal to eat a fruit or vegetable at every meal and at snacks. This will make it easy to get the recommended amount of fruits and vegetables each day. ¨ Try yogurt topped with fruit and nuts for a snack or healthy dessert. ¨ Add lettuce, tomato, cucumber, and onion to sandwiches. ¨ Combine a ready-made pizza crust with low-fat mozzarella cheese and lots of vegetable toppings. Try using tomatoes, squash, spinach, broccoli, carrots, cauliflower, and onions. ¨ Have a variety of cut-up vegetables with a low-fat dip as an appetizer instead of chips and dip. ¨ Sprinkle sunflower seeds or chopped almonds over salads. Or try adding chopped walnuts or almonds to cooked vegetables. ¨ Try some vegetarian meals using beans and peas. Add garbanzo or kidney beans to salads. Make burritos and tacos with mashed porras beans or black beans. Where can you learn more? Go to http://sonia-moira.info/. Enter E841 in the search box to learn more about \"DASH Diet: Care Instructions. \" Current as of: December 6, 2017 Content Version: 11.8 © 4380-6246 Hand Talk. Care instructions adapted under license by [employer] (which disclaims liability or warranty for this information). If you have questions about a medical condition or this instruction, always ask your healthcare professional. [address] any warranty or liability for your use of this information. Sinusitis: Care Instructions Your Care Instructions Sinusitis is an infection of the lining of the sinus cavities in your head. Sinusitis often follows a cold. It causes pain and pressure in your head and face. In most cases, sinusitis gets better on its own in 1 to 2 weeks. But some mild symptoms may last for several weeks. Sometimes antibiotics are needed. Follow-up care is a key part of your treatment and safety. Be sure to make and go to all appointments, and call your doctor if you are having problems. It's also a good idea to know your test results and keep a list of the medicines you take. How can you care for yourself at home? · Take an over-the-counter pain medicine, such as acetaminophen (Tylenol), ibuprofen (Advil, Motrin), or naproxen (Aleve). Read and follow all instructions on the label. · If the doctor prescribed antibiotics, take them as directed. Do not stop taking them just because you feel better. You need to take the full course of antibiotics. · Be careful when taking over-the-counter cold or flu medicines and Tylenol at the same time. Many of these medicines have acetaminophen, which is Tylenol. Read the labels to make sure that you are not taking more than the recommended dose. Too much acetaminophen (Tylenol) can be harmful. · Breathe warm, moist air from a steamy shower, a hot bath, or a sink filled with hot water. Avoid cold, dry air. Using a humidifier in your home may help. Follow the directions for cleaning the machine. · Use saline (saltwater) nasal washes to help keep your nasal passages open and wash out mucus and bacteria. You can buy saline nose drops at a grocery store or drugstore. Or you can make your own at home by adding 1 teaspoon of salt and 1 teaspoon of baking soda to 2 cups of distilled water. If you make your own, fill a bulb syringe with the solution, insert the tip into your nostril, and squeeze gently. [address] your nose. · Put a hot, wet towel or a warm gel pack on your face 3 or 4 times a day for 5 to 10 minutes each time. · Try a decongestant nasal spray like oxymetazoline (Afrin). Do not use it for more than 3 days in a row. Using it for more than 3 days can make your congestion worse. When should you call for help? Call your doctor now or seek immediate medical care if: 
  · You have new or worse swelling or redness in your face or around your eyes.  
  · You have a new or higher fever.  
 Watch closely for changes in your health, and be sure to contact your doctor if: 
  · You have new or worse facial pain.  
  · The mucus from your nose becomes thicker (like pus) or has new blood in it.  
  · You are not getting better as expected. Where can you learn more? Go to http://sonia-moira.info/. Enter F318 in the search box to learn more about \"Sinusitis: Care Instructions. \" Current as of: May 12, 2017 Content Version: 11.7 © 0450-1901 Mobio. Care instructions adapted under license by E[employer]TH[employer]ORY (which disclaims liability or warranty for this information). If you have questions about a medical condition or this instruction, always ask your healthcare professional. [address] any warranty or liability for your use of this information. Introducing [facility]! Dear Liang Ybarra: Thank you for requesting a Pixel Qi account. Our records indicate that you already have an active Pixel Qi account. You can access your account anytime at [email or web address] Did you know that you can access your hospital and ER discharge instructions at any time in Pixel Qi? You can also review all of your test results from your hospital stay or ER visit. Additional Information If you have questions, please visit the Frequently Asked Questions section of the Pixel Qi website at [email or web address] Remember, MyChart is NOT to be used for urgent needs. For medical emergencies, dial 911. Now available from your iPhone and Android! Please provide this summary of care documentation to your next provider. Your primary care clinician is listed as Jacqueline Dennis. If you have any questions after today's visit, please call [phone].

## 2018-10-03 NOTE — PROGRESS NOTES
Deondre Oliver is a  74 y.o. female presents today for office visit for sinus pain. 1. Have you been to the ER, urgent care clinic or hospitalized since your last visit? NO        2. Have you seen or consulted any other health care providers outside of the [address] [place] [address] since your last visit (Include any pap smears or colon screening)? NO

## 2018-10-03 NOTE — PATIENT INSTRUCTIONS
DASH Diet: Care Instructions  Your Care Instructions    The DASH diet is an eating plan that can help lower your blood pressure. DASH stands for Dietary Approaches to Stop Hypertension. Hypertension is high blood pressure. The DASH diet focuses on eating foods that are high in calcium, potassium, and magnesium. These nutrients can lower blood pressure. The foods that are highest in these nutrients are fruits, vegetables, low-fat dairy products, nuts, seeds, and legumes. But taking calcium, potassium, and magnesium supplements instead of eating foods that are high in those nutrients does not have the same effect. The DASH diet also includes whole grains, fish, and poultry. The DASH diet is one of several lifestyle changes your doctor may recommend to lower your high blood pressure. Your doctor may also want you to decrease the amount of sodium in your diet. Lowering sodium while following the DASH diet can lower blood pressure even further than just the DASH diet alone. Follow-up care is a key part of your treatment and safety. Be sure to make and go to all appointments, and call your doctor if you are having problems. It's also a good idea to know your test results and keep a list of the medicines you take. How can you care for yourself at home? Following the DASH diet  · Eat 4 to 5 servings of fruit each day. A serving is 1 medium-sized piece of fruit, ½ cup chopped or canned fruit, 1/4 cup dried fruit, or 4 ounces (½ cup) of fruit juice. Choose fruit more often than fruit juice. · Eat 4 to 5 servings of vegetables each day. A serving is 1 cup of lettuce or raw leafy vegetables, ½ cup of chopped or cooked vegetables, or 4 ounces (½ cup) of vegetable juice. Choose vegetables more often than vegetable juice. · Get 2 to 3 servings of low-fat and fat-free dairy each day. A serving is 8 ounces of milk, 1 cup of yogurt, or 1 ½ ounces of cheese. · Eat 6 to 8 servings of grains each day.  A serving is 1 slice of bread, 1 ounce of dry cereal, or ½ cup of cooked rice, pasta, or cooked cereal. Try to choose whole-grain products as much as possible. · Limit lean meat, poultry, and fish to 2 servings each day. A serving is 3 ounces, about the size of a deck of cards. · Eat 4 to 5 servings of nuts, seeds, and legumes (cooked dried beans, lentils, and split peas) each week. A serving is 1/3 cup of nuts, 2 tablespoons of seeds, or ½ cup of cooked beans or peas. · Limit fats and oils to 2 to 3 servings each day. A serving is 1 teaspoon of vegetable oil or 2 tablespoons of salad dressing. · Limit sweets and added sugars to 5 servings or less a week. A serving is 1 tablespoon jelly or jam, ½ cup sorbet, or 1 cup of lemonade. · Eat less than 2,300 milligrams (mg) of sodium a day. If you limit your sodium to 1,500 mg a day, you can lower your blood pressure even more. Tips for success  · Start small. Do not try to make dramatic changes to your diet all at once. You might feel that you are missing out on your favorite foods and then be more likely to not follow the plan. Make small changes, and stick with them. Once those changes become habit, add a few more changes. · Try some of the following:  ¨ Make it a goal to eat a fruit or vegetable at every meal and at snacks. This will make it easy to get the recommended amount of fruits and vegetables each day. ¨ Try yogurt topped with fruit and nuts for a snack or healthy dessert. ¨ Add lettuce, tomato, cucumber, and onion to sandwiches. ¨ Combine a ready-made pizza crust with low-fat mozzarella cheese and lots of vegetable toppings. Try using tomatoes, squash, spinach, broccoli, carrots, cauliflower, and onions. ¨ Have a variety of cut-up vegetables with a low-fat dip as an appetizer instead of chips and dip. ¨ Sprinkle sunflower seeds or chopped almonds over salads. Or try adding chopped walnuts or almonds to cooked vegetables.   ¨ Try some vegetarian meals using beans and peas. Add garbanzo or kidney beans to salads. Make burritos and tacos with mashed porras beans or black beans. Where can you learn more? Go to http://sonia-moira.info/. Enter N882 in the search box to learn more about \"DASH Diet: Care Instructions. \"  Current as of: December 6, 2017  Content Version: 11.8  © 7183-5552 Navera. Care instructions adapted under license by "[employer] (which disclaims liability or warranty for this information). If you have questions about a medical condition or this instruction, always ask your healthcare professional. [address] any warranty or liability for your use of this information. Sinusitis: Care Instructions  Your Care Instructions    Sinusitis is an infection of the lining of the sinus cavities in your head. Sinusitis often follows a cold. It causes pain and pressure in your head and face. In most cases, sinusitis gets better on its own in 1 to 2 weeks. But some mild symptoms may last for several weeks. Sometimes antibiotics are needed. Follow-up care is a key part of your treatment and safety. Be sure to make and go to all appointments, and call your doctor if you are having problems. It's also a good idea to know your test results and keep a list of the medicines you take. How can you care for yourself at home? · Take an over-the-counter pain medicine, such as acetaminophen (Tylenol), ibuprofen (Advil, Motrin), or naproxen (Aleve). Read and follow all instructions on the label. · If the doctor prescribed antibiotics, take them as directed. Do not stop taking them just because you feel better. You need to take the full course of antibiotics. · Be careful when taking over-the-counter cold or flu medicines and Tylenol at the same time. Many of these medicines have acetaminophen, which is Tylenol. Read the labels to make sure that you are not taking more than the recommended dose.  Too much acetaminophen (Tylenol) can be harmful. · Breathe warm, moist air from a steamy shower, a hot bath, or a sink filled with hot water. Avoid cold, dry air. Using a humidifier in your home may help. Follow the directions for cleaning the machine. · Use saline (saltwater) nasal washes to help keep your nasal passages open and wash out mucus and bacteria. You can buy saline nose drops at a grocery store or drugstore. Or you can make your own at home by adding 1 teaspoon of salt and 1 teaspoon of baking soda to 2 cups of distilled water. If you make your own, fill a bulb syringe with the solution, insert the tip into your nostril, and squeeze gently. [place] your nose. · Put a hot, wet towel or a warm gel pack on your face 3 or 4 times a day for 5 to 10 minutes each time. · Try a decongestant nasal spray like oxymetazoline (Afrin). Do not use it for more than 3 days in a row. Using it for more than 3 days can make your congestion worse. When should you call for help? Call your doctor now or seek immediate medical care if:    · You have new or worse swelling or redness in your face or around your eyes.     · You have a new or higher fever.    Watch closely for changes in your health, and be sure to contact your doctor if:    · You have new or worse facial pain.     · The mucus from your nose becomes thicker (like pus) or has new blood in it.     · You are not getting better as expected. Where can you learn more? Go to http://sonia-moira.info/. Enter K612 in the search box to learn more about \"Sinusitis: Care Instructions. \"  Current as of: May 12, 2017  Content Version: 11.7  © 2798-2171 [employer]ogeneration Marketing. Care instructions adapted under license by Pump! (which disclaims liability or warranty for this information).  If you have questions about a medical condition or this instruction, always ask your healthcare professional. Ema Whipple disclaims any warranty or liability for your use of this information.

## 2018-11-01 ENCOUNTER — DOCUMENTATION ONLY (OUTPATIENT)
Dept: INTERNAL MEDICINE CLINIC | Age: 73
End: 2018-11-01

## 2018-11-14 DIAGNOSIS — R05.9 COUGH: Primary | ICD-10-CM

## 2018-11-14 RX ORDER — HYDROCODONE POLISTIREX AND CHLORPHENIRAMINE POLISTIREX 10; 8 MG/5ML; MG/5ML
SUSPENSION, EXTENDED RELEASE ORAL
Qty: 90 ML | Refills: 0 | Status: SHIPPED | OUTPATIENT
Start: 2018-11-14 | End: 2019-01-02 | Stop reason: ALTCHOICE

## 2018-11-14 NOTE — TELEPHONE ENCOUNTER
Called Patient left name and callback number. See Below    Tussionex refilled and script printed. Please call and notify pt.

## 2018-11-17 ENCOUNTER — OFFICE VISIT (OUTPATIENT)
Dept: FAMILY MEDICINE CLINIC | Age: 73
End: 2018-11-17

## 2018-11-17 VITALS
RESPIRATION RATE: 20 BRPM | HEIGHT: 64 IN | SYSTOLIC BLOOD PRESSURE: 161 MMHG | DIASTOLIC BLOOD PRESSURE: 89 MMHG | WEIGHT: 255 LBS | BODY MASS INDEX: 43.54 KG/M2 | HEART RATE: 88 BPM | TEMPERATURE: 98.7 F | OXYGEN SATURATION: 96 %

## 2018-11-17 DIAGNOSIS — R05.9 COUGH: ICD-10-CM

## 2018-11-17 DIAGNOSIS — R09.89 CHEST CONGESTION: ICD-10-CM

## 2018-11-17 DIAGNOSIS — R06.02 SOB (SHORTNESS OF BREATH): ICD-10-CM

## 2018-11-17 DIAGNOSIS — J00 COMMON COLD: Primary | ICD-10-CM

## 2018-11-17 RX ORDER — GUAIFENESIN 600 MG/1
600 TABLET, EXTENDED RELEASE ORAL 2 TIMES DAILY
Qty: 30 TAB | Refills: 0 | Status: SHIPPED | OUTPATIENT
Start: 2018-11-17 | End: 2018-11-24

## 2018-11-17 RX ORDER — ALBUTEROL SULFATE 90 UG/1
1-2 AEROSOL, METERED RESPIRATORY (INHALATION)
Qty: 1 INHALER | Refills: 1 | Status: SHIPPED | OUTPATIENT
Start: 2018-11-17 | End: 2019-06-12

## 2018-11-17 NOTE — PROGRESS NOTES
Chief Complaint   Patient presents with    Cold Symptoms    Wheezing     1. Have you been to the ER, urgent care clinic since your last visit? Hospitalized since your last visit? No    2. Have you seen or consulted any other health care providers outside of the [address] [place] [address] since your last visit? Include any pap smears or colon screening.  No

## 2018-11-17 NOTE — PROGRESS NOTES
Subjective:   Miguel Angel Duran is a 74 y.o. female who complains of Chest congestion and cough for 4 days, unchanged since that time. She denies a history of anorexia, chills, dizziness, fevers, nausea, shortness of breath, vomiting and wheezing. Evaluation to date: none. Treatment to date: Tussionex, OTC products (Nyquil). Patient does not smoke cigarettes. Relevant PMH: DM and HTN. Patient Active Problem List    Diagnosis Date Noted    Acute maxillary sinusitis 10/03/2018    Sinus congestion 10/03/2018    Morbid obesity with BMI of 40.0-44.9, adult ([facility] [address] 2018    Need for influenza vaccination 2018    Encounter for long-term (current) use of medications 2018    Multiple thyroid nodules 2018    Polyp of sigmoid colon 2018    Incidental pulmonary nodule, > 3mm and < 8mm 2018    Prediabetes 2018    Mild peripheral edema 2018    Obesity, morbid ([facility] [address] 2017    H. pylori infection 10/04/2017    Acute idiopathic gout of right wrist 10/04/2017    Sliding hiatal hernia 10/04/2017    Pseudogout of right shoulder 10/04/2017    Primary osteoarthritis involving multiple joints 10/04/2017    Allergic conjunctivitis of both eyes 2017    Acquired absence of both cervix and uterus 2017    Renal cyst 2014    Sleep apnea/ on c-pap 10/30/2013    Hypertension     Dyslipidemia      Current Outpatient Medications   Medication Sig Dispense Refill    albuterol (PROVENTIL HFA, VENTOLIN HFA, PROAIR HFA) 90 mcg/actuation inhaler Take 1-2 Puffs by inhalation every four (4) hours as needed for Wheezing or Shortness of Breath. 1 Inhaler 1    guaiFENesin ER (MUCINEX) 600 mg ER tablet Take 1 Tab by mouth two (2) times a day for 7 days.  30 Tab 0    chlorpheniramine-HYDROcodone (TUSSIONEX) 10-8 mg/5 mL suspension take 5 milliliters by mouth every 12 hours if needed for cough 90 mL 0    fluticasone (FLONASE) 50 mcg/actuation nasal spray Si spray in both nostril twice daily 1 Bottle 1    losartan (COZAAR) 50 mg tablet TAKE 2 TABLETS ONE TIME DAILY FOR HYPERTENSION 60 Tab 5    fluconazole (DIFLUCAN) 150 mg tablet   1    raNITIdine (ZANTAC) 300 mg tablet take 1 tablet by mouth at bedtime  0    furosemide (LASIX) 20 mg tablet Take 1 Tab by mouth two (2) times a day. 90 Tab 1    potassium chloride (KLOR-CON) 10 mEq tablet TAKE 2 TABLETS TWICE DAILY 360 Tab 1    atorvastatin (LIPITOR) 20 mg tablet TAKE 1 TABLET EVERY DAY 90 Tab 1    cholecalciferol (VITAMIN D3) 1,000 unit tablet Take 1,000 Units by mouth daily.  aspirin 81 mg Tab Take 81 mg by mouth daily.  MULTIVITAMINS (MULTI-VITAMIN PO) Take 1 Tab by mouth daily. Allergies   Allergen Reactions    Latex Rash    Nexium [Esomeprazole Magnesium] Swelling and Other (comments)     Lips Swollen, and facial rash and swelling    Crestor [Rosuvastatin] Other (comments)     Upper respiratory illness    Lisinopril Unknown (comments)     Patient can't recall    Niaspan [Niacin] Other (comments)     Scratchy throat, \"asthma\" like symptoms    Pcn [Penicillins] Hives    Pravachol [Pravastatin] Myalgia    Sulfa (Sulfonamide Antibiotics) Rash    Zocor [Simvastatin] Myalgia and Other (comments)     Per patient chart \"(? Rash)\"     Past Medical History:   Diagnosis Date    Acute idiopathic gout of right wrist 10/4/2017    Atrophic vaginitis     Cardiac cath 05/30/2012    Patent coronary arteries. LVEDP 20.  RA 11.  RV 42/10. PA 42/21. W 18.  CO 6.4 (TD), 6.4 (Debra Mention). PVR 1.7 Wood units. False-positive nuclear study.  Cardiac echocardiogram 05/11/2011    EF 65%. RVSP at least 35 mmHg. Mild IVCE. No significant valvular heart disease.  Cardiac nuclear imaging test 05/18/2012    Tech difficult. Apical ischemia. No infarction. EF 76%. No reg'l WMA. No TID.  Cardiovascular LLE venous duplex 06/17/2013    Left leg:  No DVT.     Diabetes     diet controlled, hypoglycemia from metformin previously     Dyslipidemia     Fatty liver     Fibrocystic breast disease     Fluid retention     GERD     H/O colonoscopy 13    polyp    Hypertension     Ill-defined condition     gout    Macular degeneration     Morbid obesity ([address]     Obstructive sleep apnea     Peripheral neuropathy     Rectocele     Thyroid cyst     bilat     Past Surgical History:   Procedure Laterality Date    [facility] CNNLA ARTERIAL ARTERIOTOMY 3M  2012    HX COLONOSCOPY  2013    HX HEART CATHETERIZATION  2012    HX HERNIA REPAIR      umbilical as a child    HX HYSTERECTOMY      52ys ago, QUIQUE, BSO    HX MOHS PROCEDURES      right    [place] ANESTH,SURGERY OF SHOULDER       Family History   Problem Relation Age of Onset    Cancer Mother         colon cancer    Colon Cancer Mother     Hypertension Mother     Diabetes Mother     Heart Disease Mother     Coronary Artery Disease Mother     Hypertension Father     Diabetes Father     Heart Disease Father     Coronary Artery Disease Father     Hypertension Sister     Diabetes Sister     Heart Disease Sister     Coronary Artery Disease Sister     Hypertension Brother     Diabetes Brother     Heart Disease Brother     Coronary Artery Disease Brother      Social History     Tobacco Use    Smoking status: Former Smoker     Last attempt to quit: 1974     Years since quittin.3    Smokeless tobacco: Never Used    Tobacco comment: 1 pack every 2 weeks x 1 year, stopped 45yrs ago   Substance Use Topics    Alcohol use: Yes        Review of Systems  Constitutional: negative  Eyes: negative  Ears, nose, mouth, throat, and face: negative  Respiratory: positive for cough, wheezing or SOB with chest congestion  Cardiovascular: negative  Neurological: negative    Objective:     Visit Vitals  /89 (BP 1 Location: Left arm, BP Patient Position: Sitting)   Pulse 88   Temp 98.7 °F (37.1 °C) (Oral)   Resp 20   Ht 5' 4\" (1.626 m)   Wt 255 lb (115.7 kg)   SpO2 96%   BMI 43.77 kg/m²     General:  alert, cooperative, no distress   Eyes: negative   Ears: normal TM's and external ear canals AU   Sinuses: Normal paranasal sinuses without tenderness   Mouth:  Lips, mucosa, and tongue normal. Teeth and gums normal   Neck: supple, symmetrical, trachea midline and no adenopathy. Heart: S1 and S2 normal, no murmurs noted. Lungs: clear to auscultation bilaterally   Abdomen: soft, non-tender. Bowel sounds normal. No masses,  no organomegaly        Assessment/Plan:   viral upper respiratory illness  Suggested symptomatic OTC remedies. RTC prn. Discussed diagnosis and treatment of viral URIs. Discussed the importance of avoiding unnecessary antibiotic therapy. Albuterol as needed for SOB and wheezing in addition to guafenisin  Pt advised to use Coricidin HBP when she runs out of Tussionex but not to use them together. ICD-10-CM ICD-9-CM    1. Common cold J00 460 albuterol (PROVENTIL HFA, VENTOLIN HFA, PROAIR HFA) 90 mcg/actuation inhaler      guaiFENesin ER (MUCINEX) 600 mg ER tablet   2. Chest congestion R09.89 786.9 guaiFENesin ER (MUCINEX) 600 mg ER tablet   3. SOB (shortness of breath) R06.02 786.05 albuterol (PROVENTIL HFA, VENTOLIN HFA, PROAIR HFA) 90 mcg/actuation inhaler   4. Cough R05 786.2 albuterol (PROVENTIL HFA, VENTOLIN HFA, PROAIR HFA) 90 mcg/actuation inhaler      guaiFENesin ER (MUCINEX) 600 mg ER tablet   . I have discussed the diagnosis with the patient and the intended plan as seen in the above orders. The patient has received an after-visit summary and questions were answered concerning future plans. I have discussed medication side effects and warnings with the patient as well. Patient agreeable with above plan and verbalizes understanding.

## 2018-11-17 NOTE — PATIENT INSTRUCTIONS

## 2018-11-18 ENCOUNTER — HOSPITAL ENCOUNTER (EMERGENCY)
Age: 73
Discharge: HOME | End: 2018-11-18
Attending: EMERGENCY MEDICINE
Payer: MEDICARE

## 2018-11-18 ENCOUNTER — APPOINTMENT (OUTPATIENT)
Dept: GENERAL RADIOLOGY | Age: 73
End: 2018-11-18
Attending: EMERGENCY MEDICINE
Payer: MEDICARE

## 2018-11-18 VITALS
OXYGEN SATURATION: 95 % | BODY MASS INDEX: 42.85 KG/M2 | WEIGHT: 251 LBS | TEMPERATURE: 97.9 F | HEART RATE: 67 BPM | HEIGHT: 64 IN | SYSTOLIC BLOOD PRESSURE: 192 MMHG | DIASTOLIC BLOOD PRESSURE: 88 MMHG | RESPIRATION RATE: 18 BRPM

## 2018-11-18 DIAGNOSIS — J06.9 VIRAL URI WITH COUGH: Primary | ICD-10-CM

## 2018-11-18 PROCEDURE — 99283 EMERGENCY DEPT VISIT LOW MDM: CPT

## 2018-11-18 PROCEDURE — 71046 X-RAY EXAM CHEST 2 VIEWS: CPT

## 2018-11-18 NOTE — DISCHARGE INSTRUCTIONS
1) Plenty of fluids  2) Continue Tussionex and Mucinex  3) Cool mist vaporizer in bedroom at night  4) Honey 1 tsp as needed for cough  5) Vapo Rub to chest wall at night  6) Mentholated cough drops as needed  7) Recheck with PCP 1 week if not improved. Chest x-ray ok. No pneumonia. Upper Respiratory Infection (Cold): Care Instructions  Your Care Instructions    An upper respiratory infection, or URI, is an infection of the nose, sinuses, or throat. URIs are spread by coughs, sneezes, and direct contact. The common cold is the most frequent kind of URI. The flu and sinus infections are other kinds of URIs. Almost all URIs are caused by viruses. Antibiotics won't cure them. But you can treat most infections with home care. This may include drinking lots of fluids and taking over-the-counter pain medicine. You will probably feel better in 4 to 10 days. The doctor has checked you carefully, but problems can develop later. If you notice any problems or new symptoms, get medical treatment right away. Follow-up care is a key part of your treatment and safety. Be sure to make and go to all appointments, and call your doctor if you are having problems. It's also a good idea to know your test results and keep a list of the medicines you take. How can you care for yourself at home? · To prevent dehydration, drink plenty of fluids, enough so that your urine is light yellow or clear like water. Choose water and other caffeine-free clear liquids until you feel better. If you have kidney, heart, or liver disease and have to limit fluids, talk with your doctor before you increase the amount of fluids you drink. · Take an over-the-counter pain medicine, such as acetaminophen (Tylenol), ibuprofen (Advil, Motrin), or naproxen (Aleve). Read and follow all instructions on the label. · Before you use cough and cold medicines, check the label.  These medicines may not be safe for young children or for people with certain health problems. · Be careful when taking over-the-counter cold or flu medicines and Tylenol at the same time. Many of these medicines have acetaminophen, which is Tylenol. Read the labels to make sure that you are not taking more than the recommended dose. Too much acetaminophen (Tylenol) can be harmful. · Get plenty of rest.  · Do not smoke or allow others to smoke around you. If you need help quitting, talk to your doctor about stop-smoking programs and medicines. These can increase your chances of quitting for good. When should you call for help? Call 911 anytime you think you may need emergency care. For example, call if:    · You have severe trouble breathing.    Call your doctor now or seek immediate medical care if:    · You seem to be getting much sicker.     · You have new or worse trouble breathing.     · You have a new or higher fever.     · You have a new rash.    Watch closely for changes in your health, and be sure to contact your doctor if:    · You have a new symptom, such as a sore throat, an earache, or sinus pain.     · You cough more deeply or more often, especially if you notice more mucus or a change in the color of your mucus.     · You do not get better as expected. Where can you learn more? Go to http://sonia-moira.info/. Enter E666 in the search box to learn more about \"Upper Respiratory Infection (Cold): Care Instructions. \"  Current as of: December 6, 2017  Content Version: 11.8  © 4908-9588 [employer]. Care instructions adapted under license by [employer] (which disclaims liability or warranty for this information). If you have questions about a medical condition or this instruction, always ask your healthcare professional. [place] [address] any warranty or liability for your use of this information.

## 2018-11-18 NOTE — ED PROVIDER NOTES
EMERGENCY DEPARTMENT HISTORY AND PHYSICAL EXAM 
 
10:50 AM 
 
 
Date: 2018 Patient Name: Miguel Angel Duran History of Presenting Illness Chief Complaint Patient presents with  Cough  Chest Congestion History Provided By: Patient Chief Complaint: Congestion; Cough Duration:  Days Timing:  Constant Location: N/A Quality: N/A Severity: N/A Modifying Factors: No relief with Tussionex or Mucinex Associated Symptoms: denies any other associated signs or symptoms Additional History (Context): Miguel Angel Duran is a 74 y.o. female with PMHx of HTN and DM presenting to the ED c/o constant cough and congestion for the past 5 days. States she saw her PCP for her symptoms and was prescribed Tussionex and Mucinex, which she has been taking with no improvement in her symptoms. Denies fever and any other symptoms or complaints. PCP: Rosalia Garces, DO 
 
Current Outpatient Medications Medication Sig Dispense Refill  albuterol (PROVENTIL HFA, VENTOLIN HFA, PROAIR HFA) 90 mcg/actuation inhaler Take 1-2 Puffs by inhalation every four (4) hours as needed for Wheezing or Shortness of Breath. 1 Inhaler 1  
 guaiFENesin ER (MUCINEX) 600 mg ER tablet Take 1 Tab by mouth two (2) times a day for 7 days. 30 Tab 0  chlorpheniramine-HYDROcodone (TUSSIONEX) 10-8 mg/5 mL suspension take 5 milliliters by mouth every 12 hours if needed for cough 90 mL 0  
 fluticasone (FLONASE) 50 mcg/actuation nasal spray Si spray in both nostril twice daily 1 Bottle 1  
 losartan (COZAAR) 50 mg tablet TAKE 2 TABLETS ONE TIME DAILY FOR HYPERTENSION 60 Tab 5  
 fluconazole (DIFLUCAN) 150 mg tablet   1  
 raNITIdine (ZANTAC) 300 mg tablet take 1 tablet by mouth at bedtime  0  
 furosemide (LASIX) 20 mg tablet Take 1 Tab by mouth two (2) times a day. 90 Tab 1  potassium chloride (KLOR-CON) 10 mEq tablet TAKE 2 TABLETS TWICE DAILY 360 Tab 1  
  atorvastatin (LIPITOR) 20 mg tablet TAKE 1 TABLET EVERY DAY 90 Tab 1  cholecalciferol (VITAMIN D3) 1,000 unit tablet Take 1,000 Units by mouth daily.  aspirin 81 mg Tab Take 81 mg by mouth daily.  MULTIVITAMINS (MULTI-VITAMIN PO) Take 1 Tab by mouth daily. Past History Past Medical History: 
Past Medical History:  
Diagnosis Date  Acute idiopathic gout of right wrist 10/4/2017  Atrophic vaginitis  Cardiac cath 05/30/2012 Patent coronary arteries. LVEDP 20.  RA 11.  RV 42/10. PA 42/21. W 18.  CO 6.4 (TD), 6.4 (Mercedes Ghee). PVR 1.7 Wood units. False-positive nuclear study.  Cardiac echocardiogram 05/11/2011 EF 65%. RVSP at least 35 mmHg. Mild IVCE. No significant valvular heart disease.  Cardiac nuclear imaging test 05/18/2012 Tech difficult. Apical ischemia. No infarction. EF 76%. No reg'l WMA. No TID.  Cardiovascular LLE venous duplex 06/17/2013 Left leg:  No DVT.  Diabetes   
 diet controlled, hypoglycemia from metformin previously  Dyslipidemia  Fatty liver  Fibrocystic breast disease  Fluid retention  GERD   
 H/O colonoscopy 4/12/13  
 polyp  Hypertension  Ill-defined condition   
 gout  Macular degeneration  Morbid obesity ([address]  Obstructive sleep apnea  Peripheral neuropathy  Rectocele  Thyroid cyst   
 bilat Past Surgical History: 
Past Surgical History:  
Procedure Laterality Date  HC CNNLA ARTERIAL ARTERIOTOMY 3M  5/25/2012  HX COLONOSCOPY  4/12/2013  HX HEART CATHETERIZATION  5/25/2012  HX HERNIA REPAIR    
 umbilical as a child  HX HYSTERECTOMY 50ys ago, QUIQUE, BSO  HX MOHS PROCEDURES    
 right  [place] ANESTH,SURGERY OF SHOULDER Family History: 
Family History Problem Relation Age of Onset  Cancer Mother   
     colon cancer  Colon Cancer Mother  Hypertension Mother  Diabetes Mother  Heart Disease Mother  Coronary Artery Disease Mother  Hypertension Father  Diabetes Father  Heart Disease Father  Coronary Artery Disease Father  Hypertension Sister  Diabetes Sister  Heart Disease Sister  Coronary Artery Disease Sister  Hypertension Brother  Diabetes Brother  Heart Disease Brother  Coronary Artery Disease Brother Social History: 
Social History Tobacco Use  Smoking status: Former Smoker Last attempt to quit: 1974 Years since quittin.3  Smokeless tobacco: Never Used  Tobacco comment: 1 pack every 2 weeks x 1 year, stopped 45yrs ago Substance Use Topics  Alcohol use: Yes  Drug use: No  
 
 
Allergies: Allergies Allergen Reactions  Latex Rash  Nexium [Esomeprazole Magnesium] Swelling and Other (comments) Lips Swollen, and facial rash and swelling  Crestor [Rosuvastatin] Other (comments) Upper respiratory illness  Lisinopril Unknown (comments) Patient can't recall  Niaspan [Niacin] Other (comments) Scratchy throat, \"asthma\" like symptoms  Pcn [Penicillins] Hives  Pravachol [Pravastatin] Myalgia  Sulfa (Sulfonamide Antibiotics) Rash  Zocor [Simvastatin] Myalgia and Other (comments) Per patient chart \"(? Rash)\" Review of Systems Review of Systems HENT: Positive for rhinorrhea. Respiratory: Positive for apnea and cough. All other systems reviewed and are negative. Physical Exam  
 
Visit Vitals /88 (BP 1 Location: Left arm, BP Patient Position: At rest;Sitting) Pulse 67 Temp 97.9 °F (36.6 °C) Resp 18 Ht 5' 4\" (1.626 m) Wt 113.9 kg (251 lb) SpO2 95% BMI 43.08 kg/m² Physical Exam  
Constitutional: She is oriented to person, place, and time. She appears well-developed and well-nourished. No distress. HENT:  
Head: Normocephalic and atraumatic. Mouth/Throat: No oropharyngeal exudate.   
TMs normal. OP normal.  
 Eyes: No scleral icterus. Cardiovascular: Normal rate. Pulmonary/Chest: Effort normal.  
Basilar crackles. No wheezingg. Neurological: She is alert and oriented to person, place, and time. Skin: Skin is warm and dry. Psychiatric: She has a normal mood and affect. Nursing note and vitals reviewed. Diagnostic Study Results Labs - No results found for this or any previous visit (from the past 12 hour(s)). Radiologic Studies -  
XR CHEST PA LAT Final Result IMPRESSION: Stable chest, no acute findings. Medical Decision Making I am the first provider for this patient. I reviewed the vital signs, available nursing notes, past medical history, past surgical history, family history and social history. Vital Signs-Reviewed the patient's vital signs. Records Reviewed: Nursing Notes and Old Medical Records (Time of Review: 10:50 AM) ED Course: Progress Notes, Reevaluation, and Consults: 
Imp: URI bronchitis c/w viral. On appropriate meds for same. Will withhold antibiotics per CDC guidelines: \"Routine treatment of uncomplicated acute bronchitis with antibiotics is not recommended, regardless of cough duration. \" CXR ok. Diagnosis Clinical Impression: 1. Viral URI with cough 1) Plenty of fluids 2) Continue Tussionex and Mucinex 3) Cool mist vaporizer in bedroom at night 4) Honey 1 tsp as needed for cough 5) Vapo Rub to chest wall at night 6) Mentholated cough drops as needed 7) Recheck with PCP 1 week if not improved. Chest x-ray ok. No pneumonia. Disposition: home Follow-up Information Follow up With Specialties Details Why Contact Kamron Gillis DO Internal Medicine Call in 1 week  [address] [facility] [address] [place] [address] 
[phone] [id number] [facility] EMERGENCY DEPT Emergency Medicine  As needed, If symptoms worsen [facility] [place] [id number] [phone] Medication List  
  
 ASK your doctor about these medications   
albuterol 90 mcg/actuation inhaler Commonly known as:  PROVENTIL HFA, VENTOLIN HFA, PROAIR HFA Take 1-2 Puffs by inhalation every four (4) hours as needed for Wheezing or Shortness of Breath. aspirin 81 mg tablet 
  
atorvastatin 20 mg tablet Commonly known as:  LIPITOR 
TAKE 1 TABLET EVERY DAY 
  
chlorpheniramine-HYDROcodone 10-8 mg/5 mL suspension Commonly known as:  TUSSIONEX 
take 5 milliliters by mouth every 12 hours if needed for cough 
  
fluconazole 150 mg tablet Commonly known as:  DIFLUCAN 
  
fluticasone 50 mcg/actuation nasal spray Commonly known as:  Josefa Laurens Si spray in both nostril twice daily 
  
furosemide 20 mg tablet Commonly known as:  LASIX Take 1 Tab by mouth two (2) times a day. guaiFENesin  mg ER tablet Commonly known as:  [employer] Take 1 Tab by mouth two (2) times a day for 7 days. losartan 50 mg tablet Commonly known as:  COZAAR 
TAKE 2 TABLETS ONE TIME DAILY FOR HYPERTENSION 
  
MULTI-VITAMIN PO 
  
potassium chloride 10 mEq tablet Commonly known as:  KLOR-CON 
TAKE 2 TABLETS TWICE DAILY 
  
raNITIdine 300 mg Tab Commonly known as:  ZANTAC 
  
VITAMIN D3 1,000 unit tablet Generic drug:  cholecalciferol 
  
  
 
_______________________________ Scribe Attestation [facility] acting as a scribe for and in the presence of Vane Holbrook MD     
2018 at 10:50 AM 
    
Provider Attestation:     
I personally performed the services described in the documentation, reviewed the documentation, as recorded by the scribe in my presence, and it accurately and completely records my words and actions. 2018 at 10:50 AM - Vane Holbrook MD   
 
_______________________________

## 2018-11-18 NOTE — ED TRIAGE NOTES
Patient c/o chest congestion and cough x one week. She was seen by her PCP yesterday and was given cough medication but she states it's not helping.

## 2018-11-30 ENCOUNTER — HOSPITAL ENCOUNTER (OUTPATIENT)
Dept: LAB | Age: 73
Discharge: HOME | End: 2018-11-30

## 2018-11-30 PROCEDURE — 99001 SPECIMEN HANDLING PT-LAB: CPT

## 2018-12-01 LAB — HBA1C MFR BLD: 6.3 % (ref 4.8–5.6)

## 2018-12-07 ENCOUNTER — OFFICE VISIT (OUTPATIENT)
Dept: FAMILY MEDICINE CLINIC | Age: 73
End: 2018-12-07

## 2018-12-07 VITALS
BODY MASS INDEX: 44.08 KG/M2 | RESPIRATION RATE: 18 BRPM | DIASTOLIC BLOOD PRESSURE: 87 MMHG | SYSTOLIC BLOOD PRESSURE: 128 MMHG | OXYGEN SATURATION: 95 % | HEART RATE: 74 BPM | HEIGHT: 64 IN | TEMPERATURE: 97.8 F | WEIGHT: 258.2 LBS

## 2018-12-07 DIAGNOSIS — E78.5 DYSLIPIDEMIA: ICD-10-CM

## 2018-12-07 DIAGNOSIS — Z79.899 ENCOUNTER FOR LONG-TERM (CURRENT) USE OF MEDICATIONS: ICD-10-CM

## 2018-12-07 DIAGNOSIS — R73.03 PREDIABETES: ICD-10-CM

## 2018-12-07 DIAGNOSIS — I11.9 BENIGN HYPERTENSIVE HEART DISEASE WITHOUT HEART FAILURE: Primary | ICD-10-CM

## 2018-12-07 DIAGNOSIS — E66.01 MORBID OBESITY WITH BMI OF 40.0-44.9, ADULT (HCC): ICD-10-CM

## 2018-12-07 DIAGNOSIS — E55.9 HYPOVITAMINOSIS D: ICD-10-CM

## 2018-12-07 DIAGNOSIS — Z13.39 SCREENING FOR ALCOHOLISM: ICD-10-CM

## 2018-12-07 DIAGNOSIS — Z13.31 SCREENING FOR DEPRESSION: ICD-10-CM

## 2018-12-07 DIAGNOSIS — Z71.89 ADVANCE CARE PLANNING: ICD-10-CM

## 2018-12-07 DIAGNOSIS — Z00.00 MEDICARE ANNUAL WELLNESS VISIT, SUBSEQUENT: ICD-10-CM

## 2018-12-07 NOTE — PROGRESS NOTES
Leodan Monte is a  74 y.o. female presents today for office visit for routine follow up. 1. Have you been to the ER, urgent care clinic or hospitalized since your last visit? NO     2. Have you seen or consulted any other health care providers outside of the [address] [place] [address] since your last visit (Include any pap smears or colon screening)?  NO

## 2018-12-07 NOTE — PATIENT INSTRUCTIONS
DASH Diet: Care Instructions  Your Care Instructions    The DASH diet is an eating plan that can help lower your blood pressure. DASH stands for Dietary Approaches to Stop Hypertension. Hypertension is high blood pressure. The DASH diet focuses on eating foods that are high in calcium, potassium, and magnesium. These nutrients can lower blood pressure. The foods that are highest in these nutrients are fruits, vegetables, low-fat dairy products, nuts, seeds, and legumes. But taking calcium, potassium, and magnesium supplements instead of eating foods that are high in those nutrients does not have the same effect. The DASH diet also includes whole grains, fish, and poultry. The DASH diet is one of several lifestyle changes your doctor may recommend to lower your high blood pressure. Your doctor may also want you to decrease the amount of sodium in your diet. Lowering sodium while following the DASH diet can lower blood pressure even further than just the DASH diet alone. Follow-up care is a key part of your treatment and safety. Be sure to make and go to all appointments, and call your doctor if you are having problems. It's also a good idea to know your test results and keep a list of the medicines you take. How can you care for yourself at home? Following the DASH diet  · Eat 4 to 5 servings of fruit each day. A serving is 1 medium-sized piece of fruit, ½ cup chopped or canned fruit, 1/4 cup dried fruit, or 4 ounces (½ cup) of fruit juice. Choose fruit more often than fruit juice. · Eat 4 to 5 servings of vegetables each day. A serving is 1 cup of lettuce or raw leafy vegetables, ½ cup of chopped or cooked vegetables, or 4 ounces (½ cup) of vegetable juice. Choose vegetables more often than vegetable juice. · Get 2 to 3 servings of low-fat and fat-free dairy each day. A serving is 8 ounces of milk, 1 cup of yogurt, or 1 ½ ounces of cheese. · Eat 6 to 8 servings of grains each day.  A serving is 1 slice of bread, 1 ounce of dry cereal, or ½ cup of cooked rice, pasta, or cooked cereal. Try to choose whole-grain products as much as possible. · Limit lean meat, poultry, and fish to 2 servings each day. A serving is 3 ounces, about the size of a deck of cards. · Eat 4 to 5 servings of nuts, seeds, and legumes (cooked dried beans, lentils, and split peas) each week. A serving is 1/3 cup of nuts, 2 tablespoons of seeds, or ½ cup of cooked beans or peas. · Limit fats and oils to 2 to 3 servings each day. A serving is 1 teaspoon of vegetable oil or 2 tablespoons of salad dressing. · Limit sweets and added sugars to 5 servings or less a week. A serving is 1 tablespoon jelly or jam, ½ cup sorbet, or 1 cup of lemonade. · Eat less than 2,300 milligrams (mg) of sodium a day. If you limit your sodium to 1,500 mg a day, you can lower your blood pressure even more. Tips for success  · Start small. Do not try to make dramatic changes to your diet all at once. You might feel that you are missing out on your favorite foods and then be more likely to not follow the plan. Make small changes, and stick with them. Once those changes become habit, add a few more changes. · Try some of the following:  ? Make it a goal to eat a fruit or vegetable at every meal and at snacks. This will make it easy to get the recommended amount of fruits and vegetables each day. ? Try yogurt topped with fruit and nuts for a snack or healthy dessert. ? Add lettuce, tomato, cucumber, and onion to sandwiches. ? Combine a ready-made pizza crust with low-fat mozzarella cheese and lots of vegetable toppings. Try using tomatoes, squash, spinach, broccoli, carrots, cauliflower, and onions. ? Have a variety of cut-up vegetables with a low-fat dip as an appetizer instead of chips and dip. ? Sprinkle sunflower seeds or chopped almonds over salads. Or try adding chopped walnuts or almonds to cooked vegetables.   ? Try some vegetarian meals using beans and peas. Add garbanzo or kidney beans to salads. Make burritos and tacos with mashed porras beans or black beans. Where can you learn more? Go to http://sonia-moira.info/. Enter D495 in the search box to learn more about \"DASH Diet: Care Instructions. \"  Current as of: December 6, 2017  Content Version: 11.8  © 9450-0470 [employer]. Care instructions adapted under license by [employer] (which disclaims liability or warranty for this information). If you have questions about a medical condition or this instruction, always ask your healthcare professional. [address] any warranty or liability for your use of this information. Learning About the [address] Diet  What is the Mediterranean diet? The Mediterranean diet is a style of eating rather than a diet plan. It features foods eaten in [place], [place], [place] and [place], and other countries along the [facility]. It emphasizes eating foods like fish, fruits, vegetables, beans, high-fiber breads and whole grains, nuts, and olive oil. This style of eating includes limited red meat, cheese, and sweets. Why choose the Mediterranean diet? A Mediterranean-style diet may improve heart health. It contains more fat than other heart-healthy diets. But the fats are mainly from nuts, unsaturated oils (such as fish oils and olive oil), and certain nut or seed oils (such as canola, soybean, or flaxseed oil). These fats may help protect the heart and blood vessels. How can you get started on the Mediterranean diet? Here are some things you can do to switch to a more Mediterranean way of eating. What to eat  · Eat a variety of fruits and vegetables each day, such as grapes, blueberries, tomatoes, broccoli, peppers, figs, olives, spinach, eggplant, beans, lentils, and chickpeas.   · Eat a variety of whole-grain foods each day, such as oats, brown rice, and whole wheat bread, pasta, and couscous. · Eat fish at least 2 times a week. Try tuna, salmon, mackerel, lake trout, herring, or sardines. · Eat moderate amounts of low-fat dairy products, such as milk, cheese, or yogurt. · Eat moderate amounts of poultry and eggs. · Choose healthy (unsaturated) fats, such as nuts, olive oil, and certain nut or seed oils like canola, soybean, and flaxseed. · Limit unhealthy (saturated) fats, such as butter, palm oil, and coconut oil. And limit fats found in animal products, such as meat and dairy products made with whole milk. Try to eat red meat only a few times a month in very small amounts. · Limit sweets and desserts to only a few times a week. This includes sugar-sweetened drinks like soda. The Mediterranean diet may also include red wine with your meal--1 glass each day for women and up to 2 glasses a day for men. Tips for eating at home  · Use herbs, spices, garlic, lemon zest, and citrus juice instead of salt to add flavor to foods. · Add avocado slices to your sandwich instead of galvan. · Have fish for lunch or dinner instead of red meat. Brush the fish with olive oil, and broil or grill it. · Sprinkle your salad with seeds or nuts instead of cheese. · Cook with olive or canola oil instead of butter or oils that are high in saturated fat. · Switch from 2% milk or whole milk to 1% or fat-free milk. · Dip raw vegetables in a vinaigrette dressing or hummus instead of dips made from mayonnaise or sour cream.  · Have a piece of fruit for dessert instead of a piece of cake. Try baked apples, or have some dried fruit. Tips for eating out  · Try broiled, grilled, baked, or poached fish instead of having it fried or breaded. · Ask your  to have your meals prepared with olive oil instead of butter. · Order dishes made with marinara sauce or sauces made from olive oil. Avoid sauces made from cream or mayonnaise.   · Choose whole-grain breads, whole wheat pasta and pizza crust, brown rice, beans, and lentils. · Cut back on butter or margarine on bread. Instead, you can dip your bread in a small amount of olive oil. · Ask for a side salad or grilled vegetables instead of french fries or chips. Where can you learn more? Go to http://sonia-moira.info/. Enter [id number] in the search box to learn more about \"Learning About the Mediterranean Diet. \"  Current as of: March 29, 2018  Content Version: 11.8  © 1803-7515 [employer]. Care instructions adapted under license by [employer] (which disclaims liability or warranty for this information). If you have questions about a medical condition or this instruction, always ask your healthcare professional. [address] any warranty or liability for your use of this information. Prediabetes: Care Instructions  Your Care Instructions    Prediabetes is a warning sign that you are at risk for getting type 2 diabetes. It means that your blood sugar is higher than it should be. The food you eat turns into sugar, which your body uses for energy. Normally, an organ called the pancreas makes insulin, which allows the sugar in your blood to get into your body's cells. But when your body can't use insulin the right way, the sugar doesn't move into cells. It stays in your blood instead. This is called insulin resistance. The buildup of sugar in the blood causes prediabetes. The good news is that lifestyle changes may help you get your blood sugar back to normal and help you avoid or delay diabetes. Follow-up care is a key part of your treatment and safety. Be sure to make and go to all appointments, and call your doctor if you are having problems. It's also a good idea to know your test results and keep a list of the medicines you take. How can you care for yourself at home? · Watch your weight. A healthy weight helps your body use insulin properly.   · Limit the amount of calories, sweets, and unhealthy fat you eat. Ask your doctor if you should see a dietitian. A registered dietitian can help you create meal plans that fit your lifestyle. · Get at least 30 minutes of exercise on most days of the week. Exercise helps control your blood sugar. It also helps you maintain a healthy weight. Walking is a good choice. You also may want to do other activities, such as running, swimming, cycling, or playing tennis or team sports. · Do not smoke. Smoking can make prediabetes worse. If you need help quitting, talk to your doctor about stop-smoking programs and medicines. These can increase your chances of quitting for good. · If your doctor prescribed medicines, take them exactly as prescribed. Call your doctor if you think you are having a problem with your medicine. You will get more details on the specific medicines your doctor prescribes. When should you call for help? Watch closely for changes in your health, and be sure to contact your doctor if:    · You have any symptoms of diabetes. These may include:  ? Being thirsty more often. ? Urinating more. ? Being hungrier. ? Losing weight. ? Being very tired. ? Having blurry vision.     · You have a wound that will not heal.     · You have an infection that will not go away.     · You have problems with your blood pressure.     · You want more information about diabetes and how you can keep from getting it. Where can you learn more? Go to http://sonia-moira.info/. Enter I222 in the search box to learn more about \"Prediabetes: Care Instructions. \"  Current as of: December 7, 2017  Content Version: 11.8  © 5289-1992 [employer]. Care instructions adapted under license by [employer] (which disclaims liability or warranty for this information).  If you have questions about a medical condition or this instruction, always ask your healthcare professional. Devante Whitaker disclaims any warranty or liability for your use of this information.

## 2018-12-07 NOTE — PROGRESS NOTES
Chief Complaint   Patient presents with    Labs     results       HPI:  Migeul Angel Duran is a 74 y.o. female who presents today for follow-up of hypertension, hyperlipidemia, prediabetes, etc. and for Medicare wellness visit. She has been feeling well and voices no complaints today. She is compliant with her medications with no adverse effect reported. Past Medical History:   Diagnosis Date    Acute idiopathic gout of right wrist 10/4/2017    Atrophic vaginitis     Cardiac cath 05/30/2012    Patent coronary arteries. LVEDP 20.  RA 11.  RV 42/10. PA 42/21. W 18.  CO 6.4 (TD), 6.4 ([facility]). PVR 1.7 Wood units. False-positive nuclear study.  Cardiac echocardiogram 05/11/2011    EF 65%. RVSP at least 35 mmHg. Mild IVCE. No significant valvular heart disease.  Cardiac nuclear imaging test 05/18/2012    Tech difficult. Apical ischemia. No infarction. EF 76%. No reg'l WMA. No TID.  Cardiovascular LLE venous duplex 06/17/2013    Left leg:  No DVT.     Diabetes     diet controlled, hypoglycemia from metformin previously     Dyslipidemia     Fatty liver     Fibrocystic breast disease     Fluid retention     GERD     H/O colonoscopy 4/12/13    polyp    Hypertension     Ill-defined condition     gout    Macular degeneration     Morbid obesity (HCC)     Obstructive sleep apnea     Peripheral neuropathy     Rectocele     Thyroid cyst     bilat     Allergies   Allergen Reactions    Latex Rash    Nexium [Esomeprazole Magnesium] Swelling and Other (comments)     Lips Swollen, and facial rash and swelling    Crestor [Rosuvastatin] Other (comments)     Upper respiratory illness    Lisinopril Unknown (comments)     Patient can't recall    Niaspan [Niacin] Other (comments)     Scratchy throat, \"asthma\" like symptoms    Pcn [Penicillins] Hives    Pravachol [Pravastatin] Myalgia    Sulfa (Sulfonamide Antibiotics) Rash    Zocor [Simvastatin] Myalgia and Other (comments)     Per patient chart \"(? Rash)\"     Current Outpatient Medications   Medication Sig Dispense Refill    chlorpheniramine-HYDROcodone (TUSSIONEX) 10-8 mg/5 mL suspension take 5 milliliters by mouth every 12 hours if needed for cough 90 mL 0    fluticasone (FLONASE) 50 mcg/actuation nasal spray Si spray in both nostril twice daily 1 Bottle 1    losartan (COZAAR) 50 mg tablet TAKE 2 TABLETS ONE TIME DAILY FOR HYPERTENSION 60 Tab 5    fluconazole (DIFLUCAN) 150 mg tablet   1    raNITIdine (ZANTAC) 300 mg tablet take 1 tablet by mouth at bedtime  0    furosemide (LASIX) 20 mg tablet Take 1 Tab by mouth two (2) times a day. 90 Tab 1    potassium chloride (KLOR-CON) 10 mEq tablet TAKE 2 TABLETS TWICE DAILY 360 Tab 1    atorvastatin (LIPITOR) 20 mg tablet TAKE 1 TABLET EVERY DAY 90 Tab 1    cholecalciferol (VITAMIN D3) 1,000 unit tablet Take 1,000 Units by mouth daily.  aspirin 81 mg Tab Take 81 mg by mouth daily.  MULTIVITAMINS (MULTI-VITAMIN PO) Take 1 Tab by mouth daily.  albuterol (PROVENTIL HFA, VENTOLIN HFA, PROAIR HFA) 90 mcg/actuation inhaler Take 1-2 Puffs by inhalation every four (4) hours as needed for Wheezing or Shortness of Breath.  1 Inhaler 1       ROS:    Constitutional: Negative for fever, chills, or fatigue  Neurological: Negative for headache, dizziness, visual disturbance, or loss of conciousness  Respiratory: Negative for SOB, hemoptysis, or wheezing  Cardiovascular: Negative for chest pain, palpitation, or leg swelling  Gastrointestinal: Negative for abdominal pain, nausea, vomiting, diarrhea, blood in stool, melena, or heartburn  Musculoskeletal: Negative for falls    Physical Exam:    Visit Vitals  /87   Pulse 74   Temp 97.8 °F (36.6 °C) (Oral)   Resp 18   Ht 5' 4\" (1.626 m)   Wt 258 lb 3.2 oz (117.1 kg)   SpO2 95%   BMI 44.32 kg/m²       BP Readings from Last 3 Encounters:   18 128/87   18 192/88   18 161/89       General: Morbidly obese female, a & o x 3, afebrile, well-nourished, interacting appropriately, in no acute distress  Skin: warm and dry, no rashes , no bruises  Neck: supple, symmetrical, no thyromegaly  Respiratory: symmetrical chest expansion, lung sounds clear bilaterally, good air entry  Cardiovascular: normal S1S2, regular rate and rhythm, no murmurs  Abdomen: non-distended, normoactive bowel sounds x 4 quadrants, soft, non-tender to palpation  Musculoskeletal: normal ROM on all joints, no swelling or deformity, no perilumbar tenderness, steady gait  Diabetic foot exam: pedal pulses palpable  Psychiatry: appropriate mood and affect  Extremity: No edema noted    Lab Results   Component Value Date/Time    WBC 6.7 08/07/2018 10:05 AM    Hemoglobin, POC 12.6 04/12/2013 09:46 AM    HGB 12.3 08/07/2018 10:05 AM    Hematocrit, POC 37 04/12/2013 09:46 AM    HCT 38.1 08/07/2018 10:05 AM    PLATELET 186 87/14/5378 10:05 AM    MCV 90.9 08/07/2018 10:05 AM       Lab Results   Component Value Date/Time    Sodium 143 08/07/2018 10:05 AM    Potassium 3.8 08/07/2018 10:05 AM    Chloride 105 08/07/2018 10:05 AM    CO2 30 08/07/2018 10:05 AM    Anion gap 8 08/07/2018 10:05 AM    Glucose 85 08/07/2018 10:05 AM    BUN 15 08/07/2018 10:05 AM    Creatinine 0.82 08/07/2018 10:05 AM    BUN/Creatinine ratio 18 08/07/2018 10:05 AM    GFR est AA >60 08/07/2018 10:05 AM    GFR est non-AA >60 08/07/2018 10:05 AM    Calcium 8.7 08/07/2018 10:05 AM    Bilirubin, total 0.4 08/07/2018 10:05 AM    AST (SGOT) 12 (L) 08/07/2018 10:05 AM    Alk.  phosphatase 79 08/07/2018 10:05 AM    Protein, total 6.8 08/07/2018 10:05 AM    Albumin 3.4 08/07/2018 10:05 AM    Globulin 3.4 08/07/2018 10:05 AM    A-G Ratio 1.0 08/07/2018 10:05 AM    ALT (SGPT) 16 08/07/2018 10:05 AM       Lab Results   Component Value Date/Time    Cholesterol, total 169 08/07/2018 10:05 AM    HDL Cholesterol 62 (H) 08/07/2018 10:05 AM    LDL, calculated 85.6 08/07/2018 10:05 AM    VLDL, calculated 21.4 08/07/2018 10:05 AM Triglyceride 107 08/07/2018 10:05 AM    CHOL/HDL Ratio 2.7 08/07/2018 10:05 AM       Hemoglobin A1c (POC)   Date Value Ref Range Status   06/23/2017 6.1 % Final        Lab Results   Component Value Date/Time    Hemoglobin A1c 6.3 (H) 11/30/2018 12:00 AM    Hemoglobin A1c (POC) 6.1 06/23/2017 11:53 AM    Hemoglobin A1c, External 6.5 01/26/2017       Lab Results   Component Value Date/Time    TSH 1.70 08/07/2018 10:05 AM    TSH 2.20 01/11/2010 06:50 PM    Triiodothyronine (T3), free 3.1 12/21/2009 11:24 AM    T4, Free 0.9 01/11/2010 06:50 PM       Assessment/Plan:    ICD-10-CM ICD-9-CM    1. Hypertension I11.9 402.10 CBC WITH AUTOMATED DIFF      METABOLIC PANEL, COMPREHENSIVE      TSH 3RD GENERATION   2. Dyslipidemia E78.5 272.4 LIPID PANEL   3. Morbid obesity with BMI of 40.0-44.9, [facility] E66.01 278.01 I have reviewed/discussed the above normal BMI with the patient. I have recommended the following interventions: dietary management education, guidance, and counseling, encourage exercise and monitor weight . .      Z68.41 V85.41    4. Prediabetes R73.03 790.29 HEMOGLOBIN A1C W/O EAG   5. Encounter for long-term (current) use of medications Z79.899 V58.69 TSH 3RD GENERATION   6. Medicare annual wellness visit, subsequent Z00.00 V70.0 Medicare Annual Wellness Visit was completed at the office today. 7. Screening for depression Z13.31 V79.0    8. Screening for alcoholism Z13.39 V79.1    9. Advance care planning Z71.89 V65.49    10.  Hypovitaminosis D E55.9 268.9 VITAMIN D, 25 HYDROXY         Orders Placed This Encounter    CBC WITH AUTOMATED DIFF     Standing Status:   Future     Standing Expiration Date:   6/5/2019    HEMOGLOBIN A1C W/O EAG     Standing Status:   Future     Standing Expiration Date:   12/8/2019    LIPID PANEL     Standing Status:   Future     Standing Expiration Date:   4/8/8170    METABOLIC PANEL, COMPREHENSIVE     Standing Status:   Future     Standing Expiration Date:   6/5/2019    TSH 3RD GENERATION     Standing Status:   Future     Standing Expiration Date:   4/6/2019    VITAMIN D, 25 HYDROXY     Standing Status:   Future     Standing Expiration Date:   6/5/2019         Discussion: She was counseled on appropriate lifestyle modification to include consumption of healthy meals and exercise as tolerated geared towards weight loss and she verbalized understanding. She was also counseled on low-salt diet and she verbalized understanding. Recent labs reviewed with patient. Additional Notes: Discussed today's diagnosis, treatment plans. Discussed medication indications and side effects. Weight Management: Counseled  After Visit Summary: Discussed provided printed patient instructions. Answered questions accordingly. Follow-up Disposition: In 3 months with lab 1 week prior. Tami Kelly DO, MPH  Internal medicine        This is the Subsequent Medicare Annual Wellness Exam, performed 12 months or more after the Initial AWV or the last Subsequent AWV    I have reviewed the patient's medical history in detail and updated the computerized patient record. History     Past Medical History:   Diagnosis Date    Acute idiopathic gout of right wrist 10/4/2017    Atrophic vaginitis     Cardiac cath 05/30/2012    Patent coronary arteries. LVEDP 20.  RA 11.  RV 42/10. PA 42/21. W 18.  CO 6.4 (TD), 6.4 ([facility]il[facility]). PVR 1.7 Wood units. False-positive nuclear study.  Cardiac echocardiogram 05/11/2011    EF 65%. RVSP at least 35 mmHg. Mild IVCE. No significant valvular heart disease.  Cardiac nuclear imaging test 05/18/2012    Tech difficult. Apical ischemia. No infarction. EF 76%. No reg'l WMA. No TID.  Cardiovascular LLE venous duplex 06/17/2013    Left leg:  No DVT.     Diabetes     diet controlled, hypoglycemia from metformin previously     Dyslipidemia     Fatty liver     Fibrocystic breast disease     Fluid retention     GERD     H/O colonoscopy 4/12/13 polyp    Hypertension     Ill-defined condition     gout    Macular degeneration     Morbid obesity (HCC)     Obstructive sleep apnea     Peripheral neuropathy     Rectocele     Thyroid cyst     bilat      Past Surgical History:   Procedure Laterality Date    [facility]    HX COLONOSCOPY  2013    HX HEART CATHETERIZATION  2012    HX HERNIA REPAIR      umbilical as a child    HX HYSTERECTOMY      52ys ago, QUIQUE, BSO    HX MOHS PROCEDURES      right    MD ANESTH,SURGERY OF SHOULDER       Current Outpatient Medications   Medication Sig Dispense Refill    chlorpheniramine-HYDROcodone (TUSSIONEX) 10-8 mg/5 mL suspension take 5 milliliters by mouth every 12 hours if needed for cough 90 mL 0    fluticasone (FLONASE) 50 mcg/actuation nasal spray Si spray in both nostril twice daily 1 Bottle 1    losartan (COZAAR) 50 mg tablet TAKE 2 TABLETS ONE TIME DAILY FOR HYPERTENSION 60 Tab 5    fluconazole (DIFLUCAN) 150 mg tablet   1    raNITIdine (ZANTAC) 300 mg tablet take 1 tablet by mouth at bedtime  0    furosemide (LASIX) 20 mg tablet Take 1 Tab by mouth two (2) times a day. 90 Tab 1    potassium chloride (KLOR-CON) 10 mEq tablet TAKE 2 TABLETS TWICE DAILY 360 Tab 1    atorvastatin (LIPITOR) 20 mg tablet TAKE 1 TABLET EVERY DAY 90 Tab 1    cholecalciferol (VITAMIN D3) 1,000 unit tablet Take 1,000 Units by mouth daily.  aspirin 81 mg Tab Take 81 mg by mouth daily.  MULTIVITAMINS (MULTI-VITAMIN PO) Take 1 Tab by mouth daily.  albuterol (PROVENTIL HFA, VENTOLIN HFA, PROAIR HFA) 90 mcg/actuation inhaler Take 1-2 Puffs by inhalation every four (4) hours as needed for Wheezing or Shortness of Breath.  1 Inhaler 1     Allergies   Allergen Reactions    Latex Rash    Nexium [Esomeprazole Magnesium] Swelling and Other (comments)     Lips Swollen, and facial rash and swelling    Crestor [Rosuvastatin] Other (comments)     Upper respiratory illness  Lisinopril Unknown (comments)     Patient can't recall    Niaspan [Niacin] Other (comments)     Scratchy throat, \"asthma\" like symptoms    Pcn [Penicillins] Hives    Pravachol [Pravastatin] Myalgia    Sulfa (Sulfonamide Antibiotics) Rash    Zocor [Simvastatin] Myalgia and Other (comments)     Per patient chart \"(? Rash)\"     Family History   Problem Relation Age of Onset   Claire.Grapes Cancer Mother         colon cancer    Colon Cancer Mother     Hypertension Mother     Diabetes Mother     Heart Disease Mother     Coronary Artery Disease Mother     Hypertension Father     Diabetes Father     Heart Disease Father     Coronary Artery Disease Father     Hypertension Sister     Diabetes Sister     Heart Disease Sister     Coronary Artery Disease Sister     Hypertension Brother     Diabetes Brother     Heart Disease Brother     Coronary Artery Disease Brother      Social History     Tobacco Use    Smoking status: Former Smoker     Last attempt to quit: 1974     Years since quittin.4    Smokeless tobacco: Never Used    Tobacco comment: 1 pack every 2 weeks x 1 year, stopped 45yrs ago   Substance Use Topics    Alcohol use: Yes     Patient Active Problem List   Diagnosis Code    Hypertension I11.9    Dyslipidemia E78.5    Sleep apnea/ on c-pap G47.30    Renal cyst N28.1    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 pylori infection A04.8    Acute idiopathic gout of right wrist M10.031    Sliding hiatal hernia K44.9    Pseudogout of right shoulder M11.211    Primary osteoarthritis involving multiple joints M15.0    Obesity, morbid ([address] E66.01    Mild peripheral edema R60.9    Incidental pulmonary nodule, > 3mm and < 8mm R91.1    Prediabetes R73.03    Polyp of sigmoid colon D12.5    Multiple thyroid nodules E04.2    Morbid obesity with BMI of 40.0-44.9, adult (HCC) E66.01, Z68.41    Need for influenza vaccination Z23  Encounter for long-term (current) use of medications Z79.899    Acute maxillary sinusitis J01.00    Sinus congestion R09.81       Depression Risk Factor Screening:     PHQ over the last two weeks 7/12/2018   Little interest or pleasure in doing things Not at all   Feeling down, depressed, irritable, or hopeless Not at all   Total Score PHQ 2 0     Alcohol Risk Factor Screening: You do not drink alcohol or very rarely. Functional Ability and Level of Safety:   Hearing Loss  Hearing is good. Activities of Daily Living  The home contains: no safety equipment. Patient does total self care    Fall Risk  Fall Risk Assessment, last 12 mths 7/12/2018   Able to walk? Yes   Fall in past 12 months? No   Fall with injury? -   Number of falls in past 12 months -       Abuse Screen  Patient is not abused    Cognitive Screening   Evaluation of Cognitive Function:  Has your family/caregiver stated any concerns about your memory: no  Normal    Patient Care Team   Patient Care Team:  Yamil Mon DO as PCP - General (Internal Medicine)  Molly Doyle MD (Neurology)  Georgina Valdez MD (Gastroenterology)  Holland Ignacio DO (Cardiology)  Ethan Ma MD (Endocrinology)  [place], [place] (Physician Assistant)  Ellard Dakins, RN as Ambulatory Care Navigator    Assessment/Plan   Education and counseling provided:  Are appropriate based on today's review and evaluation  End-of-Life planning (with patient's consent)  Pneumococcal Vaccine  Influenza Vaccine  Screening Mammography  Bone mass measurement (DEXA)  Screening for glaucoma  Diabetes screening test  Medical nutrition therapy for individuals with diabetes or renal disease  Diabetes outpatient self-management training services    Diagnoses and all orders for this visit:    1. Hypertension    2. Dyslipidemia    3. Morbid obesity with BMI of 40.0-44.9, adult ([address]    4. Prediabetes    5.  Encounter for long-term (current) use of medications    6. Medicare annual wellness visit, subsequent    7. Screening for depression    8. Screening for alcoholism    9.  Advance care planning        Health Maintenance Due   Topic Date Due    Shingrix Vaccine Age 50> (1 of 2) 02/10/1993    FOOT EXAM Q1  09/06/2018    MEDICARE YEARLY EXAM  10/12/2018    BREAST CANCER SCRN MAMMOGRAM  10/27/2018

## 2018-12-18 ENCOUNTER — HOSPITAL ENCOUNTER (OUTPATIENT)
Dept: MAMMOGRAPHY | Age: 73
Discharge: HOME | End: 2018-12-18
Attending: HOSPITALIST
Payer: MEDICARE

## 2018-12-18 DIAGNOSIS — Z12.31 VISIT FOR SCREENING MAMMOGRAM: ICD-10-CM

## 2018-12-18 PROCEDURE — 77067 SCR MAMMO BI INCL CAD: CPT

## 2018-12-21 ENCOUNTER — HOSPITAL ENCOUNTER (EMERGENCY)
Age: 73
Discharge: HOME | End: 2018-12-22
Attending: EMERGENCY MEDICINE | Admitting: EMERGENCY MEDICINE
Payer: MEDICARE

## 2018-12-21 ENCOUNTER — APPOINTMENT (OUTPATIENT)
Dept: GENERAL RADIOLOGY | Age: 73
End: 2018-12-21
Attending: EMERGENCY MEDICINE
Payer: MEDICARE

## 2018-12-21 VITALS
OXYGEN SATURATION: 98 % | DIASTOLIC BLOOD PRESSURE: 82 MMHG | HEART RATE: 83 BPM | TEMPERATURE: 97.8 F | WEIGHT: 255 LBS | BODY MASS INDEX: 43.77 KG/M2 | RESPIRATION RATE: 18 BRPM | SYSTOLIC BLOOD PRESSURE: 138 MMHG

## 2018-12-21 DIAGNOSIS — S80.12XA CONTUSION OF LEFT LOWER EXTREMITY, INITIAL ENCOUNTER: Primary | ICD-10-CM

## 2018-12-21 DIAGNOSIS — S40.022A CONTUSION OF LEFT UPPER ARM, INITIAL ENCOUNTER: ICD-10-CM

## 2018-12-21 PROCEDURE — 99282 EMERGENCY DEPT VISIT SF MDM: CPT

## 2018-12-21 PROCEDURE — 73502 X-RAY EXAM HIP UNI 2-3 VIEWS: CPT

## 2018-12-21 PROCEDURE — 73552 X-RAY EXAM OF FEMUR 2/>: CPT

## 2018-12-22 ENCOUNTER — APPOINTMENT (OUTPATIENT)
Dept: CT IMAGING | Age: 73
End: 2018-12-22
Attending: EMERGENCY MEDICINE
Payer: MEDICARE

## 2018-12-22 PROCEDURE — 72125 CT NECK SPINE W/O DYE: CPT

## 2018-12-22 PROCEDURE — 70450 CT HEAD/BRAIN W/O DYE: CPT

## 2018-12-22 NOTE — ED TRIAGE NOTES
Slid on a hardwood floor and fell to the ground. C/o left hip/thigh pain after fall and head pain from hitting head. Denies LOC before or after fall.

## 2018-12-22 NOTE — ED PROVIDER NOTES
EMERGENCY DEPARTMENT HISTORY AND PHYSICAL EXAM    12:20 AM      Date: 2018  Patient Name: Bettina Pelletier    History of Presenting Illness     Chief Complaint   Patient presents with   Jenkins Flattery Fall    Headache    Leg Pain         History Provided By: Patient    Chief Complaint: Fall  Duration:  1 Hour  Timing:  Sudden  Location: Left hip/thigh and headache  Quality: Ache  Severity: Moderate  Modifying Factors: Pain is exacerbated by movements  Associated Symptoms: Patient denies LOC before or after the fall, and any other associated symptoms or complaints. Additional History (Context): Bettina Pelletier is a 74 y.o. female with a past medical history of DM, dyslipidemia, and HTN who presents with c/o fall onset 1 hour ago. Patient states that she slid and fell on a hardwood floor, hitting her head and left hip and thigh. Patient describes the pain as moderate and is exacerbated by movements. Patient denies LOC before or after the fall, and any other associated symptoms or complaints. PCP: Gonzales Dickens, DO    Current Outpatient Medications   Medication Sig Dispense Refill    albuterol (PROVENTIL HFA, VENTOLIN HFA, PROAIR HFA) 90 mcg/actuation inhaler Take 1-2 Puffs by inhalation every four (4) hours as needed for Wheezing or Shortness of Breath. 1 Inhaler 1    chlorpheniramine-HYDROcodone (TUSSIONEX) 10-8 mg/5 mL suspension take 5 milliliters by mouth every 12 hours if needed for cough 90 mL 0    fluticasone (FLONASE) 50 mcg/actuation nasal spray Si spray in both nostril twice daily 1 Bottle 1    losartan (COZAAR) 50 mg tablet TAKE 2 TABLETS ONE TIME DAILY FOR HYPERTENSION 60 Tab 5    fluconazole (DIFLUCAN) 150 mg tablet   1    raNITIdine (ZANTAC) 300 mg tablet take 1 tablet by mouth at bedtime  0    furosemide (LASIX) 20 mg tablet Take 1 Tab by mouth two (2) times a day.  90 Tab 1    potassium chloride (KLOR-CON) 10 mEq tablet TAKE 2 TABLETS TWICE DAILY 360 Tab 1    atorvastatin (LIPITOR) 20 mg tablet TAKE 1 TABLET EVERY DAY 90 Tab 1    cholecalciferol (VITAMIN D3) 1,000 unit tablet Take 1,000 Units by mouth daily.  aspirin 81 mg Tab Take 81 mg by mouth daily.  MULTIVITAMINS (MULTI-VITAMIN PO) Take 1 Tab by mouth daily. Past History     Past Medical History:  Past Medical History:   Diagnosis Date    Acute idiopathic gout of right wrist 10/4/2017    Atrophic vaginitis     Cardiac cath 05/30/2012    Patent coronary arteries. LVEDP 20.  RA 11.  RV 42/10. PA 42/21. W 18.  CO 6.4 (TD), 6.4 (Raffaele Certain). PVR 1.7 Wood units. False-positive nuclear study.  Cardiac echocardiogram 05/11/2011    EF 65%. RVSP at least 35 mmHg. Mild IVCE. No significant valvular heart disease.  Cardiac nuclear imaging test 05/18/2012    Tech difficult. Apical ischemia. No infarction. EF 76%. No reg'l WMA. No TID.  Cardiovascular LLE venous duplex 06/17/2013    Left leg:  No DVT.     Diabetes     diet controlled, hypoglycemia from metformin previously     Dyslipidemia     Fatty liver     Fibrocystic breast disease     Fluid retention     GERD     H/O colonoscopy 4/12/13    polyp    Hypertension     Ill-defined condition     gout    Macular degeneration     Morbid obesity ([address]     Obstructive sleep apnea     Peripheral neuropathy     Rectocele     Thyroid cyst     bilat       Past Surgical History:  Past Surgical History:   Procedure Laterality Date    [facility] ARTERIAL ARTERIOTOMY 3M  5/25/2012    HX COLONOSCOPY  4/12/2013    HX HEART CATHETERIZATION  5/25/2012    HX HERNIA REPAIR      umbilical as a child    HX HYSTERECTOMY      52ys ago, QUIQUE, BSO    HX MOHS PROCEDURES      right    RI ANESTH,SURGERY OF SHOULDER         Family History:  Family History   Problem Relation Age of Onset    Cancer Mother         colon cancer    Colon Cancer Mother     Hypertension Mother     Diabetes Mother     Heart Disease Mother     Coronary Artery Disease Mother     Hypertension Father     Diabetes Father     Heart Disease Father     Coronary Artery Disease Father     Hypertension Sister     Diabetes Sister     Heart Disease Sister     Coronary Artery Disease Sister     Hypertension Brother     Diabetes Brother     Heart Disease Brother     Coronary Artery Disease Brother        Social History:  Social History     Tobacco Use    Smoking status: Former Smoker     Last attempt to quit: 1974     Years since quittin.4    Smokeless tobacco: Never Used    Tobacco comment: 1 pack every 2 weeks x 1 year, stopped 45yrs ago   Substance Use Topics    Alcohol use: Yes    Drug use: No       Allergies: Allergies   Allergen Reactions    Latex Rash    Nexium [Esomeprazole Magnesium] Swelling and Other (comments)     Lips Swollen, and facial rash and swelling    Dexilant [Dexlansoprazole] Other (comments)     Stomach swelling    Crestor [Rosuvastatin] Other (comments)     Upper respiratory illness    Lisinopril Unknown (comments)     Patient can't recall    Niaspan [Niacin] Other (comments)     Scratchy throat, \"asthma\" like symptoms    Pcn [Penicillins] Hives    Pravachol [Pravastatin] Myalgia    Sulfa (Sulfonamide Antibiotics) Rash    Zocor [Simvastatin] Myalgia and Other (comments)     Per patient chart \"(? Rash)\"         Review of Systems       Review of Systems   Constitutional: Negative for activity change, fatigue and fever. HENT: Negative for congestion and rhinorrhea. Eyes: Negative for visual disturbance. Respiratory: Negative for shortness of breath. Cardiovascular: Negative for chest pain and palpitations. Gastrointestinal: Negative for abdominal pain, diarrhea, nausea and vomiting. Genitourinary: Negative for dysuria and hematuria. Musculoskeletal: Positive for arthralgias (left hip and right leg) and myalgias. Negative for back pain. Skin: Negative for rash. Neurological: Positive for headaches.  Negative for dizziness, weakness and light-headedness. All other systems reviewed and are negative. Physical Exam     Visit Vitals  /82 (BP 1 Location: Left arm, BP Patient Position: At rest)   Pulse 83   Temp 97.8 °F (36.6 °C)   Resp 18   Wt 115.7 kg (255 lb)   SpO2 98%   BMI 43.77 kg/m²         Physical Exam   Constitutional: She is oriented to person, place, and time. She appears well-developed and well-nourished. No distress. HENT:   Head: Normocephalic. Right Ear: External ear normal.   Left Ear: External ear normal.   Mouth/Throat: No oropharyngeal exudate. Eyes: Conjunctivae and EOM are normal. Pupils are equal, round, and reactive to light. Right eye exhibits no discharge. Left eye exhibits no discharge. No scleral icterus. Neck: Normal range of motion. Neck supple. No JVD present. No tracheal deviation present. No thyromegaly present. Cardiovascular: Normal rate, regular rhythm, normal heart sounds and intact distal pulses. Exam reveals no gallop and no friction rub. No murmur heard. Pulmonary/Chest: Effort normal and breath sounds normal. No stridor. No respiratory distress. She has no wheezes. She has no rales. She exhibits no tenderness. Abdominal: Soft. Bowel sounds are normal. She exhibits no distension and no mass. There is no tenderness. There is no rebound and no guarding. Musculoskeletal: Normal range of motion. She exhibits tenderness (minimal, tenderness to palpation, left arm, and left leg). She exhibits no edema. Lymphadenopathy:     She has no cervical adenopathy. Neurological: She is alert and oriented to person, place, and time. She displays normal reflexes. No cranial nerve deficit. She exhibits normal muscle tone. Coordination normal.   Skin: Skin is warm and dry. No rash noted. She is not diaphoretic. No erythema. No pallor. Nursing note and vitals reviewed.         Diagnostic Study Results     Labs -  No results found for this or any previous visit (from the past 12 hour(s)). Radiologic Studies -   CT SPINE CERV WO CONT   Final Result   IMPRESSION[de-identified]      1. No acute traumatic findings of cervical spine. 2. Moderately severe multilevel degenerative findings along the cervical spine   as delineated above. Thank you for this referral.         CT HEAD WO CONT   Final Result   IMPRESSION[de-identified]   1.  No acute intracranial pathology. -Mild left frontal scalp contusion       2. Stable pattern of chronic white matter disease, typically small vessel   ischemic sequelae           Thank you for this referral.      XR HIP LT W OR WO PELV 2-3 VWS   Final Result   IMPRESSION:   1. Intact left hip         XR FEMUR LT 2 V   Final Result   IMPRESSION:   1. Intact left femur           Xr Hip Lt W Or Wo Pelv 2-3 Vws    Result Date: 12/22/2018  Procedure: XR HIP LT W OR WO PELV 2-3 VWS Indication: Pain to left hip after fall Comparison:  Body CT July 2018 Findings: Bones: Intact left hip joint alignment. No fracture. Minimum of greater trochanter enthesopathy. No high-grade joint space narrowing. Similar appearance of right hip. Mild pelvic enthesopathy. Lower lumbar spondylosis and degenerative sclerosis of the pubic symphysis. Soft tissues: Unremarkable     IMPRESSION: 1. Intact left hip     Xr Femur Lt 2 V    Result Date: 12/22/2018  Procedure: XR FEMUR LT 2 V Indication: left upper leg pain after fall Comparison:  Limited comparison prior knee x-rays July 2013 Findings: Bones: Intact left hip joint alignment. Femoral shaft intact. Moderately pronounced arthritis of the knee, pattern as before. Soft tissues: Unremarkable     IMPRESSION: 1. Intact left femur     Ct Head Wo Cont    Result Date: 12/22/2018  CT HEAD WO CONT History: hit head during fall , pain Technique: 5 mm axial images acquired through the brain.  CT scans at this facility are performed using dose optimization technique as appropriate with performed exam, to include automated exposure control, adjustment of mA and/or kV according to patient's size (including appropriate matching for site-specific examinations), or use of iterative reconstruction technique. Comparison: February 2018 Findings: Soft tissues: Small focus left frontal scalp contusion without foreign body Skull base/calvarium: Unremarkable. Brain: There is no acute intracranial hemorrhage or territorial infarction. Mild amount of chronic white matter lucency best appreciated left parietal area; unchanged. Small pleural-based calcifications right laterally, largest about 1 cm size, also unchanged. Ventricles and cisterns: Unremarkable for age. Orbits: Unremarkable. Paranasal Sinuses: Clear Other findings: None     IMPRESSION[de-identified] 1.  No acute intracranial pathology. -Mild left frontal scalp contusion 2. Stable pattern of chronic white matter disease, typically small vessel ischemic sequelae  Thank you for this referral.    Ct Spine Cerv Wo Cont    Result Date: 12/22/2018  EXAM: CT SPINE CERV WO CONT CLINICAL INDICATION/HISTORY: fall , pain; incident tonight TECHNIQUE: Contiguous 2.5 mm axial images were obtained through the cervical spine. From these, sagittal and coronal reconstructions were generated. CT scans at this facility are performed using dose optimization technique as appropriate with performed exam, to include automated exposure control, adjustment of mA and/or kV according to patient's size (including appropriate matching for site-specific examinations), or use of iterative reconstruction technique. COMPARISON: Plain films October 2007 FINDINGS: Acute findings: No acute fracture or traumatic subluxation. Alignment: Cervical straightening without facet dislocation Degenerative findings: Severe disc space narrowing C3-7; accompanying spondylosis and uncinate arthropathy. Also advanced multilevel facet arthropathy. Moderately pronounced spinal stenosis C4-5, C6-7, C7-T1. Varying degrees of moderately severe foraminal stenoses as well.  Similar degenerative disc disease in the upper thoracic spine. Other structures: Unremarkable     IMPRESSION[de-identified] 1. No acute traumatic findings of cervical spine. 2. Moderately severe multilevel degenerative findings along the cervical spine as delineated above. Thank you for this referral.       Medical Decision Making   I am the first provider for this patient. I reviewed the vital signs, available nursing notes, past medical history, past surgical history, family history and social history. Vital Signs-Reviewed the patient's vital signs. Pulse Oximetry Analysis -  98% on room air (normal)    Records Reviewed: Nursing Notes (Time of Review: 12:20 AM)    ED Course: Progress Notes, Reevaluation, and Consults:    Provider Notes (Medical Decision Making): DDx include contusion, fracture, and dislocation    Diagnosis     Clinical Impression:   1. Contusion of left lower extremity, initial encounter    2. Contusion of left upper arm, initial encounter        Disposition: discharge    Follow-up Information     Follow up With Specialties Details Why [address] [place] [address],[address], [address], DO Internal Medicine Call ED visit follow up [address]  [address]  [phone] [address] [facility] EMERGENCY DEPT Emergency Medicine  As needed, If symptoms worsen [address] [facility]  [phone]              Medication List      ASK your doctor about these medications    albuterol 90 mcg/actuation inhaler  Commonly known as:  PROVENTIL HFA, VENTOLIN HFA, PROAIR HFA  Take 1-2 Puffs by inhalation every four (4) hours as needed for Wheezing or Shortness of Breath.      aspirin 81 mg tablet     atorvastatin 20 mg tablet  Commonly known as:  LIPITOR  TAKE 1 TABLET EVERY DAY     chlorpheniramine-HYDROcodone 10-8 mg/5 mL suspension  Commonly known as:  TUSSIONEX  take 5 milliliters by mouth every 12 hours if needed for cough     fluconazole 150 mg tablet  Commonly known as:  DIFLUCAN fluticasone 50 mcg/actuation nasal spray  Commonly known as:  FLONASE  Si spray in both nostril twice daily     furosemide 20 mg tablet  Commonly known as:  LASIX  Take 1 Tab by mouth two (2) times a day. losartan 50 mg tablet  Commonly known as:  COZAAR  TAKE 2 TABLETS ONE TIME DAILY FOR HYPERTENSION     MULTI-VITAMIN PO     potassium chloride 10 mEq tablet  Commonly known as:  KLOR-CON  TAKE 2 TABLETS TWICE DAILY     raNITIdine 300 mg Tab  Commonly known as:  ZANTAC     VITAMIN D3 1,000 unit tablet  Generic drug:  cholecalciferol          _______________________________    Attestations:  Scribe Attestation     Tatum Mcfadden acting as a scribe for and in the presence of Liz Holbrook MD      2018 at 12:20 AM       Provider Attestation:      I personally performed the services described in the documentation, reviewed the documentation, as recorded by the scribe in my presence, and it accurately and completely records my words and actions.  2018 at 12:20 AM - Liz Holbrook MD    _______________________________

## 2018-12-26 ENCOUNTER — APPOINTMENT (OUTPATIENT)
Dept: GENERAL RADIOLOGY | Age: 73
End: 2018-12-26
Attending: EMERGENCY MEDICINE
Payer: MEDICARE

## 2018-12-26 ENCOUNTER — HOSPITAL ENCOUNTER (EMERGENCY)
Age: 73
Discharge: HOME | End: 2018-12-26
Attending: EMERGENCY MEDICINE | Admitting: EMERGENCY MEDICINE
Payer: MEDICARE

## 2018-12-26 VITALS — SYSTOLIC BLOOD PRESSURE: 178 MMHG | TEMPERATURE: 97.8 F | DIASTOLIC BLOOD PRESSURE: 91 MMHG | OXYGEN SATURATION: 99 %

## 2018-12-26 DIAGNOSIS — S73.102S: ICD-10-CM

## 2018-12-26 DIAGNOSIS — K21.9 GASTROESOPHAGEAL REFLUX DISEASE WITHOUT ESOPHAGITIS: ICD-10-CM

## 2018-12-26 DIAGNOSIS — T07.XXXA MULTIPLE BRUISES: ICD-10-CM

## 2018-12-26 DIAGNOSIS — I10 ESSENTIAL HYPERTENSION: ICD-10-CM
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
## 2018-12-26 LAB
ANION GAP SERPL CALC-SCNC: 7 MMOL/L (ref 3–18)
ATRIAL RATE: 64 BPM
BASOPHILS # BLD: 0 K/UL (ref 0–0.1)
BASOPHILS NFR BLD: 0 % (ref 0–2)
BNP SERPL-MCNC: 42 PG/ML (ref 0–1800)
BUN SERPL-MCNC: 15 MG/DL (ref 7–18)
BUN/CREAT SERPL: 19 (ref 12–20)
CALCIUM SERPL-MCNC: 8.7 MG/DL (ref 8.5–10.1)
CALCULATED P AXIS, ECG09: 33 DEGREES
CALCULATED R AXIS, ECG10: 12 DEGREES
CALCULATED T AXIS, ECG11: 12 DEGREES
CHLORIDE SERPL-SCNC: 105 MMOL/L (ref 100–108)
CK MB CFR SERPL CALC: 0.2 % (ref 0–4)
CK MB SERPL-MCNC: 2.3 NG/ML (ref 5–25)
CK SERPL-CCNC: 1377 U/L (ref 26–192)
CO2 SERPL-SCNC: 29 MMOL/L (ref 21–32)
CREAT SERPL-MCNC: 0.81 MG/DL (ref 0.6–1.3)
DIAGNOSIS, 93000: NORMAL
DIFFERENTIAL METHOD BLD: ABNORMAL
EOSINOPHIL # BLD: 0.3 K/UL (ref 0–0.4)
EOSINOPHIL NFR BLD: 4 % (ref 0–5)
ERYTHROCYTE [DISTWIDTH] IN BLOOD BY AUTOMATED COUNT: 14.3 % (ref 11.6–14.5)
GLUCOSE SERPL-MCNC: 101 MG/DL (ref 74–99)
HCT VFR BLD AUTO: 34.4 % (ref 35–45)
HGB BLD-MCNC: 11.1 G/DL (ref 12–16)
LYMPHOCYTES # BLD: 2 K/UL (ref 0.9–3.6)
LYMPHOCYTES NFR BLD: 27 % (ref 21–52)
MCH RBC QN AUTO: 29.1 PG (ref 24–34)
MCHC RBC AUTO-ENTMCNC: 32.3 G/DL (ref 31–37)
MCV RBC AUTO: 90.1 FL (ref 74–97)
MONOCYTES # BLD: 0.6 K/UL (ref 0.05–1.2)
MONOCYTES NFR BLD: 8 % (ref 3–10)
NEUTS SEG # BLD: 4.4 K/UL (ref 1.8–8)
NEUTS SEG NFR BLD: 61 % (ref 40–73)
P-R INTERVAL, ECG05: 174 MS
PLATELET # BLD AUTO: 222 K/UL (ref 135–420)
PMV BLD AUTO: 10.5 FL (ref 9.2–11.8)
POTASSIUM SERPL-SCNC: 3.8 MMOL/L (ref 3.5–5.5)
Q-T INTERVAL, ECG07: 394 MS
QRS DURATION, ECG06: 82 MS
QTC CALCULATION (BEZET), ECG08: 406 MS
RBC # BLD AUTO: 3.82 M/UL (ref 4.2–5.3)
SODIUM SERPL-SCNC: 141 MMOL/L (ref 136–145)
TROPONIN I SERPL-MCNC: <0.02 NG/ML (ref 0–0.06)
VENTRICULAR RATE, ECG03: 64 BPM
WBC # BLD AUTO: 7.3 K/UL (ref 4.6–13.2)

## 2018-12-26 PROCEDURE — 73521 X-RAY EXAM HIPS BI 2 VIEWS: CPT

## 2018-12-26 PROCEDURE — 80048 BASIC METABOLIC PNL TOTAL CA: CPT

## 2018-12-26 PROCEDURE — 93005 ELECTROCARDIOGRAM TRACING: CPT

## 2018-12-26 PROCEDURE — 83880 ASSAY OF NATRIURETIC PEPTIDE: CPT

## 2018-12-26 PROCEDURE — 85025 COMPLETE CBC W/AUTO DIFF WBC: CPT

## 2018-12-26 PROCEDURE — 82550 ASSAY OF CK (CPK): CPT

## 2018-12-26 PROCEDURE — 99284 EMERGENCY DEPT VISIT MOD MDM: CPT

## 2018-12-26 NOTE — ED PROVIDER NOTES
EMERGENCY DEPARTMENT HISTORY AND PHYSICAL EXAM    12:27 PM      Date: 2018  Patient Name: Ness Polanco    History of Presenting Illness     Chief Complaint   Patient presents with    Leg Pain         History Provided By: Patient    Chief Complaint: Leg Pain  Duration:  Days  Timing:  Constant  Location: Bilateral leg  Quality: \"Sore\"   Severity: Moderate  Modifying Factors: Improved after ambulating  Associated Symptoms: denies any other associated signs or symptoms      Additional History (Context): Ness Polanco is a 74 y.o. female with PMHx of HTN and DM presenting to the ED c/o constant moderate bilateral leg pain s/p fall 5 days ago. States she was hanging up clothes and slipped on a  on the floor, fell onto a hardwood floor with right leg in front of her and her left leg behind her. States she did hit her head during the fall. Pt was seen here the same day as the fall, had imaging done, and was diagnosed with contusion. Describes pain as \"sore. \" Notes she has done a lot of walking over the past couple days due to the holiday and notes her pain improved with walking. Denies any other symptoms or complaints. PCP: Tila Brown, DO    Current Outpatient Medications   Medication Sig Dispense Refill    albuterol (PROVENTIL HFA, VENTOLIN HFA, PROAIR HFA) 90 mcg/actuation inhaler Take 1-2 Puffs by inhalation every four (4) hours as needed for Wheezing or Shortness of Breath.  1 Inhaler 1    chlorpheniramine-HYDROcodone (TUSSIONEX) 10-8 mg/5 mL suspension take 5 milliliters by mouth every 12 hours if needed for cough 90 mL 0    fluticasone (FLONASE) 50 mcg/actuation nasal spray Si spray in both nostril twice daily 1 Bottle 1    losartan (COZAAR) 50 mg tablet TAKE 2 TABLETS ONE TIME DAILY FOR HYPERTENSION 60 Tab 5    fluconazole (DIFLUCAN) 150 mg tablet   1    raNITIdine (ZANTAC) 300 mg tablet take 1 tablet by mouth at bedtime  0    furosemide (LASIX) 20 mg tablet Take 1 Tab by mouth two (2) times a day. 90 Tab 1    potassium chloride (KLOR-CON) 10 mEq tablet TAKE 2 TABLETS TWICE DAILY 360 Tab 1    atorvastatin (LIPITOR) 20 mg tablet TAKE 1 TABLET EVERY DAY 90 Tab 1    cholecalciferol (VITAMIN D3) 1,000 unit tablet Take 1,000 Units by mouth daily.  aspirin 81 mg Tab Take 81 mg by mouth daily.  MULTIVITAMINS (MULTI-VITAMIN PO) Take 1 Tab by mouth daily. Past History     Past Medical History:  Past Medical History:   Diagnosis Date    Acute idiopathic gout of right wrist 10/4/2017    Atrophic vaginitis     Cardiac cath 05/30/2012    Patent coronary arteries. LVEDP 20.  RA 11.  RV 42/10. PA 42/21. W 18.  CO 6.4 (TD), 6.4 ([place]). PVR 1.7 Wood units. False-positive nuclear study.  Cardiac echocardiogram 05/11/2011    EF 65%. RVSP at least 35 mmHg. Mild IVCE. No significant valvular heart disease.  Cardiac nuclear imaging test 05/18/2012    Tech difficult. Apical ischemia. No infarction. EF 76%. No reg'l WMA. No TID.  Cardiovascular LLE venous duplex 06/17/2013    Left leg:  No DVT.     Diabetes     diet controlled, hypoglycemia from metformin previously     Dyslipidemia     Fatty liver     Fibrocystic breast disease     Fluid retention     GERD     H/O colonoscopy 4/12/13    polyp    Hypertension     Ill-defined condition     gout    Macular degeneration     Morbid obesity ([address]     Obstructive sleep apnea     Peripheral neuropathy     Rectocele     Thyroid cyst     bilat       Past Surgical History:  Past Surgical History:   Procedure Laterality Date    [facility] ARTERIAL ARTERIOTOMY 3M  5/25/2012    HX COLONOSCOPY  4/12/2013    HX HEART CATHETERIZATION  5/25/2012    HX HERNIA REPAIR      umbilical as a child    HX HYSTERECTOMY      52ys ago, QUIQUE, BSO    HX MOHS PROCEDURES      right    IN ANESTH,SURGERY OF SHOULDER         Family History:  Family History   Problem Relation Age of Onset    Cancer Mother         colon cancer  Colon Cancer Mother     Hypertension Mother     Diabetes Mother     Heart Disease Mother     Coronary Artery Disease Mother     Hypertension Father     Diabetes Father     Heart Disease Father     Coronary Artery Disease Father     Hypertension Sister     Diabetes Sister     Heart Disease Sister     Coronary Artery Disease Sister     Hypertension Brother     Diabetes Brother     Heart Disease Brother     Coronary Artery Disease Brother        Social History:  Social History     Tobacco Use    Smoking status: Former Smoker     Last attempt to quit: 1974     Years since quittin.4    Smokeless tobacco: Never Used    Tobacco comment: 1 pack every 2 weeks x 1 year, stopped 45yrs ago   Substance Use Topics    Alcohol use: Yes    Drug use: No       Allergies: Allergies   Allergen Reactions    Latex Rash    Nexium [Esomeprazole Magnesium] Swelling and Other (comments)     Lips Swollen, and facial rash and swelling    Dexilant [Dexlansoprazole] Other (comments)     Stomach swelling    Crestor [Rosuvastatin] Other (comments)     Upper respiratory illness    Lisinopril Unknown (comments)     Patient can't recall    Niaspan [Niacin] Other (comments)     Scratchy throat, \"asthma\" like symptoms    Pcn [Penicillins] Hives    Pravachol [Pravastatin] Myalgia    Sulfa (Sulfonamide Antibiotics) Rash    Zocor [Simvastatin] Myalgia and Other (comments)     Per patient chart \"(? Rash)\"         Review of Systems       Review of Systems   Constitutional: Negative for activity change and fever. HENT: Negative. Eyes: Negative. Respiratory: Negative. Cardiovascular: Negative. Gastrointestinal: Negative. Endocrine: Negative. Genitourinary: Negative. Musculoskeletal: Positive for arthralgias, gait problem and myalgias. Skin: Negative. Allergic/Immunologic: Negative. Hematological: Negative. Psychiatric/Behavioral: Negative.           Physical Exam     Visit Vitals  BP (!) 178/91   Temp 97.8 °F (36.6 °C)   SpO2 99%         Physical Exam   Constitutional: She is oriented to person, place, and time. She appears well-developed and well-nourished. No distress. HENT:   Head: Normocephalic and atraumatic. Eyes: Conjunctivae and EOM are normal. Pupils are equal, round, and reactive to light. Neck: Normal range of motion. Neck supple. No JVD present. No tracheal deviation present. No thyromegaly present. Cardiovascular: Normal rate and regular rhythm. No murmur heard. Pulmonary/Chest: Effort normal and breath sounds normal. No stridor. No respiratory distress. She has no wheezes. Abdominal: Soft. She exhibits no distension and no mass. There is no tenderness. There is no rebound and no guarding. Musculoskeletal: She exhibits edema. Bruising posterior left thigh and anterior knee. Ambulates without difficulty- Some pain in groin. Will Obtain Pelvic films. Lymphadenopathy:     She has no cervical adenopathy. Neurological: She is alert and oriented to person, place, and time. She displays normal reflexes. No cranial nerve deficit. She exhibits normal muscle tone. Coordination normal.   Skin: There is erythema.          Diagnostic Study Results     Labs -  Recent Results (from the past 12 hour(s))   EKG, 12 LEAD, INITIAL    Collection Time: 12/26/18 12:19 PM   Result Value Ref Range    Ventricular Rate 64 BPM    Atrial Rate 64 BPM    P-R Interval 174 ms    QRS Duration 82 ms    Q-T Interval 394 ms    QTC Calculation (Bezet) 406 ms    Calculated P Axis 33 degrees    Calculated R Axis 12 degrees    Calculated T Axis 12 degrees    Diagnosis       Normal sinus rhythm  T wave abnormality, consider anterior ischemia  Abnormal ECG  When compared with ECG of 12-FEB-2018 00:11,  T wave inversion now evident in Anterior leads     CBC WITH AUTOMATED DIFF    Collection Time: 12/26/18 12:29 PM   Result Value Ref Range    WBC 7.3 4.6 - 13.2 K/uL    RBC 3.82 (L) 4.20 - 5.30 M/uL    HGB 11.1 (L) 12.0 - 16.0 g/dL    HCT 34.4 (L) 35.0 - 45.0 %    MCV 90.1 74.0 - 97.0 FL    MCH 29.1 24.0 - 34.0 PG    MCHC 32.3 31.0 - 37.0 g/dL    RDW 14.3 11.6 - 14.5 %    PLATELET 561 427 - 734 K/uL    MPV 10.5 9.2 - 11.8 FL    NEUTROPHILS 61 40 - 73 %    LYMPHOCYTES 27 21 - 52 %    MONOCYTES 8 3 - 10 %    EOSINOPHILS 4 0 - 5 %    BASOPHILS 0 0 - 2 %    ABS. NEUTROPHILS 4.4 1.8 - 8.0 K/UL    ABS. LYMPHOCYTES 2.0 0.9 - 3.6 K/UL    ABS. MONOCYTES 0.6 0.05 - 1.2 K/UL    ABS. EOSINOPHILS 0.3 0.0 - 0.4 K/UL    ABS. BASOPHILS 0.0 0.0 - 0.1 K/UL    DF AUTOMATED     METABOLIC PANEL, BASIC    Collection Time: 12/26/18 12:29 PM   Result Value Ref Range    Sodium 141 136 - 145 mmol/L    Potassium 3.8 3.5 - 5.5 mmol/L    Chloride 105 100 - 108 mmol/L    CO2 29 21 - 32 mmol/L    Anion gap 7 3.0 - 18 mmol/L    Glucose 101 (H) 74 - 99 mg/dL    BUN 15 7.0 - 18 MG/DL    Creatinine 0.81 0.6 - 1.3 MG/DL    BUN/Creatinine ratio 19 12 - 20      GFR est AA >60 >60 ml/min/1.73m2    GFR est non-AA >60 >60 ml/min/1.73m2    Calcium 8.7 8.5 - 10.1 MG/DL       Radiologic Studies -   XR HIPS BI W AP PELV    (Results Pending)         Medical Decision Making   I am the first provider for this patient. I reviewed the vital signs, available nursing notes, past medical history, past surgical history, family history and social history. Vital Signs-Reviewed the patient's vital signs. Pulse Oximetry Analysis -  100% on room air, normal    Records Reviewed: Nursing Notes and Old Medical Records (Time of Review: 12:27 PM)    ED Course: Progress Notes, Reevaluation, and Consults:  Ambulatory with little limitation. Stable. Provider Notes (Medical Decision Making): r/o Pelvic fx - pubic ramus. Second degree strain left thigh muscles with bleeding    Diagnosis     Clinical Impression:   1. Fall, initial encounter    2. Multiple bruises    3. Thigh sprain, left, sequela    4. Gastroesophageal reflux disease without esophagitis    5. Essential hypertension        Disposition: home    Follow-up Information    None             Medication List      ASK your doctor about these medications    albuterol 90 mcg/actuation inhaler  Commonly known as:  PROVENTIL HFA, VENTOLIN HFA, PROAIR HFA  Take 1-2 Puffs by inhalation every four (4) hours as needed for Wheezing or Shortness of Breath. aspirin 81 mg tablet     atorvastatin 20 mg tablet  Commonly known as:  LIPITOR  TAKE 1 TABLET EVERY DAY     chlorpheniramine-HYDROcodone 10-8 mg/5 mL suspension  Commonly known as:  TUSSIONEX  take 5 milliliters by mouth every 12 hours if needed for cough     fluconazole 150 mg tablet  Commonly known as:  DIFLUCAN     fluticasone 50 mcg/actuation nasal spray  Commonly known as:  FLONASE  Si spray in both nostril twice daily     furosemide 20 mg tablet  Commonly known as:  LASIX  Take 1 Tab by mouth two (2) times a day. losartan 50 mg tablet  Commonly known as:  COZAAR  TAKE 2 TABLETS ONE TIME DAILY FOR HYPERTENSION     MULTI-VITAMIN PO     potassium chloride 10 mEq tablet  Commonly known as:  KLOR-CON  TAKE 2 TABLETS TWICE DAILY     raNITIdine 300 mg Tab  Commonly known as:  ZANTAC     VITAMIN D3 1,000 unit tablet  Generic drug:  cholecalciferol          _______________________________       Mario Alberto Alejandre acting as a scribe for and in the presence of Selene Cook MD      2018 at 12:27 PM       Provider Attestation:      I personally performed the services described in the documentation, reviewed the documentation, as recorded by the scribe in my presence, and it accurately and completely records my words and actions.  2018 at 12:27 PM - Selene Cook MD      _______________________________

## 2018-12-26 NOTE — ED TRIAGE NOTES
Pt co  bilat leg pain and swelling states was seen seen on Friday for a fall.  Pt states pain has worsened  Since then

## 2019-01-02 ENCOUNTER — OFFICE VISIT (OUTPATIENT)
Dept: FAMILY MEDICINE CLINIC | Age: 74
End: 2019-01-02

## 2019-01-02 VITALS
WEIGHT: 264 LBS | RESPIRATION RATE: 20 BRPM | DIASTOLIC BLOOD PRESSURE: 79 MMHG | OXYGEN SATURATION: 99 % | TEMPERATURE: 97.7 F | BODY MASS INDEX: 45.07 KG/M2 | HEART RATE: 73 BPM | SYSTOLIC BLOOD PRESSURE: 154 MMHG | HEIGHT: 64 IN

## 2019-01-02 DIAGNOSIS — M79.89 PAIN AND SWELLING OF LEFT LOWER LEG: Primary | ICD-10-CM

## 2019-01-02 DIAGNOSIS — R42 VERTIGO: ICD-10-CM

## 2019-01-02 DIAGNOSIS — M79.662 PAIN AND SWELLING OF LEFT LOWER LEG: Primary | ICD-10-CM

## 2019-01-02 RX ORDER — MECLIZINE HCL 12.5 MG 12.5 MG/1
12.5 TABLET ORAL
Qty: 20 TAB | Refills: 0 | Status: SHIPPED | OUTPATIENT
Start: 2019-01-02 | End: 2019-01-12

## 2019-01-02 RX ORDER — NAPROXEN 500 MG/1
500 TABLET ORAL 2 TIMES DAILY WITH MEALS
Qty: 30 TAB | Refills: 0 | Status: SHIPPED | OUTPATIENT
Start: 2019-01-02 | End: 2019-03-05 | Stop reason: ALTCHOICE

## 2019-01-02 NOTE — PROGRESS NOTES
Chief Complaint   Patient presents with   [facility] ED Follow-up    Fall     1. Have you been to the ER, urgent care clinic since your last visit? Hospitalized since your last visit? Yes When: 12/26/2018 Where: [facility] ED Reason for visit: Fall    2. Have you seen or consulted any other health care providers outside of the [facility] since your last visit? Include any pap smears or colon screening.  No

## 2019-01-02 NOTE — PROGRESS NOTES
HISTORY OF PRESENT Jennifer Mendez is a 74y.o. year old female comes in today for ED follow up: Hip pain and headache    Patient was seen in the ED on 12/21/18 and 12/26/18 for 12/21/18 Additional History (Context): Tommy Hemphill is a 74 y.o. female with a past medical history of DM, dyslipidemia, and HTN who presents with c/o fall onset 1 hour ago. Patient states that she slid and fell on a hardwood floor, hitting her head and left hip and thigh. Patient describes the pain as moderate and is exacerbated by movements. Patient denies LOC before or after the fall, and any other associated symptoms or complaints. .    12/26/18 Additional History (Context): Tommy Hemphill is a 74 y.o. female with PMHx of HTN and DM presenting to the ED c/o constant moderate bilateral leg pain s/p fall 5 days ago. States she was hanging up clothes and slipped on a  on the floor, fell onto a hardwood floor with right leg in front of her and her left leg behind her. States she did hit her head during the fall. Pt was seen here the same day as the fall, had imaging done, and was diagnosed with contusion. Describes pain as \"sore. \" Notes she has done a lot of walking over the past couple days due to the holiday and notes her pain improved with walking. Denies any other symptoms or complaints. Patient states since she was seen in the ED her leg pain had improved some. Patient states today the front of her left leg which she injured feels funny in the front of her leg. States the bruising has improved some. Patient states she did take aleve. Patient states her leg feels tight upon awakening this morning.     Allergies   Allergen Reactions    Latex Rash    Nexium [Esomeprazole Magnesium] Swelling and Other (comments)     Lips Swollen, and facial rash and swelling    Dexilant [Dexlansoprazole] Other (comments)     Stomach swelling    Crestor [Rosuvastatin] Other (comments)     Upper respiratory illness    Lisinopril Unknown (comments)     Patient can't recall    Niaspan [Niacin] Other (comments)     Scratchy throat, \"asthma\" like symptoms    Pcn [Penicillins] Hives    Pravachol [Pravastatin] Myalgia    Sulfa (Sulfonamide Antibiotics) Rash    Zocor [Simvastatin] Myalgia and Other (comments)     Per patient chart \"(? Rash)\"     Current Outpatient Medications   Medication Sig Dispense Refill    albuterol (PROVENTIL HFA, VENTOLIN HFA, PROAIR HFA) 90 mcg/actuation inhaler Take 1-2 Puffs by inhalation every four (4) hours as needed for Wheezing or Shortness of Breath. 1 Inhaler 1    chlorpheniramine-HYDROcodone (TUSSIONEX) 10-8 mg/5 mL suspension take 5 milliliters by mouth every 12 hours if needed for cough 90 mL 0    fluticasone (FLONASE) 50 mcg/actuation nasal spray Si spray in both nostril twice daily 1 Bottle 1    losartan (COZAAR) 50 mg tablet TAKE 2 TABLETS ONE TIME DAILY FOR HYPERTENSION 60 Tab 5    fluconazole (DIFLUCAN) 150 mg tablet   1    raNITIdine (ZANTAC) 300 mg tablet take 1 tablet by mouth at bedtime  0    furosemide (LASIX) 20 mg tablet Take 1 Tab by mouth two (2) times a day. 90 Tab 1    potassium chloride (KLOR-CON) 10 mEq tablet TAKE 2 TABLETS TWICE DAILY 360 Tab 1    atorvastatin (LIPITOR) 20 mg tablet TAKE 1 TABLET EVERY DAY 90 Tab 1    cholecalciferol (VITAMIN D3) 1,000 unit tablet Take 1,000 Units by mouth daily.  aspirin 81 mg Tab Take 81 mg by mouth daily.  MULTIVITAMINS (MULTI-VITAMIN PO) Take 1 Tab by mouth daily. Past Medical History:   Diagnosis Date    Acute idiopathic gout of right wrist 10/4/2017    Atrophic vaginitis     Cardiac cath 2012    Patent coronary arteries. LVEDP 20.  RA 11.  RV 42/10. PA 42/21. W 18.  CO 6.4 (TD), 6.4 ([place]). PVR 1.7 Wood units. False-positive nuclear study.  Cardiac echocardiogram 2011    EF 65%. RVSP at least 35 mmHg. Mild IVCE. No significant valvular heart disease.     Cardiac nuclear imaging test 2012 Tech difficult. Apical ischemia. No infarction. EF 76%. No reg'l WMA. No TID.  Cardiovascular LLE venous duplex 06/17/2013    Left leg:  No DVT.  Diabetes     diet controlled, hypoglycemia from metformin previously     Dyslipidemia     Fatty liver     Fibrocystic breast disease     Fluid retention     GERD     H/O colonoscopy 4/12/13    polyp    Hypertension     Ill-defined condition     gout    Macular degeneration     Morbid obesity ([address]     Obstructive sleep apnea     Peripheral neuropathy     Rectocele     Thyroid cyst     bilat       ROS:  Review of Systems   Constitutional: Negative for chills and fever. Respiratory: Negative for shortness of breath. Cardiovascular: Negative for chest pain and palpitations. Musculoskeletal: Positive for falls (12/21/18) and joint pain (left leg pain). Neurological: Negative for dizziness and headaches. Objective:  Visit Vitals  /79 (BP 1 Location: Left arm, BP Patient Position: Sitting)   Pulse 73   Temp 97.7 °F (36.5 °C) (Oral)   Resp 20   Ht 5' 4\" (1.626 m)   Wt 264 lb (119.7 kg)   SpO2 99%   BMI 45.32 kg/m²   Physical Exam   Constitutional: She is well-developed, well-nourished, and in no distress. HENT:   Head: Normocephalic and atraumatic. Neck: Normal range of motion. Neck supple. Cardiovascular: Normal rate, regular rhythm and normal heart sounds. Exam reveals no gallop and no friction rub. No murmur heard. Pulmonary/Chest: Effort normal and breath sounds normal. She has no wheezes. She has no rhonchi. She has no rales. Musculoskeletal: She exhibits edema. Left knee: She exhibits swelling and ecchymosis. Left upper leg: She exhibits tenderness and swelling. Left lower leg: She exhibits tenderness, swelling (with associated warmth laterally ) and edema. Legs:  Left calf 52.5 cm  Right calf 48 cm    Lymphadenopathy:     She has no cervical adenopathy. Skin: Skin is warm and dry. Ecchymosis (multiple areas to left leg ) noted. No abrasion noted. Assessment/Plan:     ICD-10-CM ICD-9-CM    1. Pain and swelling of left lower leg M79.662 729.5 DUPLEX LOWER EXT VENOUS LEFT    M79.89 729.81    2. Vertigo R42 780.4      Orders Placed This Encounter    meclizine (ANTIVERT) 12.5 mg tablet    naproxen (NAPROSYN) 500 mg tablet    CALCIUM PO   alarm signs when to seek emergent care provided and reviewed   I have discussed the diagnosis with the patient and the intended plan as seen in the above orders. The patient has received an after-visit summary and questions were answered concerning future plans. I have discussed medication side effects and warnings with the patient as well. Patient agreeable with above plan and verbalizes understanding. Follow-up Disposition:  Return in about 1 week (around 1/9/2019) for leg pain and swelling.

## 2019-01-02 NOTE — PATIENT INSTRUCTIONS
Contusion: Care Instructions  Your Care Instructions    Contusion is the medical term for a bruise. It is the result of a direct blow or an impact, such as a fall. Contusions are common sports injuries. Most people think of a bruise as a black-and-blue spot. This happens when small blood vessels get torn and leak blood under the skin. But bones, muscles, and organs can also get bruised. This may damage deep tissues but not cause a bruise you can see. The doctor will do a physical exam to find the location of your contusion. You may also have tests to make sure you do not have a more serious injury, such as a broken bone or nerve damage. These may include X-rays or other imaging tests like a CT scan or MRI. Deep-tissue contusions may cause pain and swelling. But if there is no serious damage, they will often get better in a few weeks with home treatment. The doctor has checked you carefully, but problems can develop later. If you notice any problems or new symptoms, get medical treatment right away. Follow-up care is a key part of your treatment and safety. Be sure to make and go to all appointments, and call your doctor if you are having problems. It's also a good idea to know your test results and keep a list of the medicines you take. How can you care for yourself at home? · Put ice or a cold pack on the sore area for 10 to 20 minutes at a time to stop swelling. Put a thin cloth between the ice pack and your skin. · Be safe with medicines. Read and follow all instructions on the label. ? If the doctor gave you a prescription medicine for pain, take it as prescribed. ? If you are not taking a prescription pain medicine, ask your doctor if you can take an over-the-counter medicine. · If you can, prop up the sore area on pillows as much as possible for the next few days. Try to keep the sore area above the level of your heart. When should you call for help?   Call your doctor now or seek immediate medical care if:    · Your pain gets worse.     · You have new or worse swelling.     · You have tingling, weakness, or numbness in the area near the contusion.     · The area near the contusion is cold or pale.    Watch closely for changes in your health, and be sure to contact your doctor if:    · You do not get better as expected. Where can you learn more? Go to http://sonai-moira.info/. Enter K755 in the search box to learn more about \"Contusion: Care Instructions. \"  Current as of: November 20, 2017  Content Version: 11.8  © 1474-7644 [employer]. Care instructions adapted under license by [employer] (which disclaims liability or warranty for this information). If you have questions about a medical condition or this instruction, always ask your healthcare professional. [address] any warranty or liability for your use of this information.

## 2019-01-03 ENCOUNTER — HOSPITAL ENCOUNTER (OUTPATIENT)
Dept: VASCULAR SURGERY | Age: 74
Discharge: HOME | End: 2019-01-03
Attending: NURSE PRACTITIONER
Payer: MEDICARE

## 2019-01-03 DIAGNOSIS — M79.89 PAIN AND SWELLING OF LEFT LOWER LEG: ICD-10-CM

## 2019-01-03 DIAGNOSIS — M79.662 PAIN AND SWELLING OF LEFT LOWER LEG: ICD-10-CM

## 2019-01-03 PROCEDURE — 93971 EXTREMITY STUDY: CPT

## 2019-01-08 ENCOUNTER — OFFICE VISIT (OUTPATIENT)
Dept: FAMILY MEDICINE CLINIC | Age: 74
End: 2019-01-08

## 2019-01-08 VITALS
HEIGHT: 64 IN | WEIGHT: 260.6 LBS | RESPIRATION RATE: 18 BRPM | BODY MASS INDEX: 44.49 KG/M2 | SYSTOLIC BLOOD PRESSURE: 140 MMHG | HEART RATE: 73 BPM | DIASTOLIC BLOOD PRESSURE: 85 MMHG | OXYGEN SATURATION: 97 % | TEMPERATURE: 98 F

## 2019-01-08 DIAGNOSIS — M79.605 PAIN OF LEFT LOWER EXTREMITY: Primary | ICD-10-CM

## 2019-01-08 RX ORDER — DICLOFENAC SODIUM 10 MG/G
GEL TOPICAL 4 TIMES DAILY
Qty: 100 G | Refills: 1 | Status: SHIPPED | OUTPATIENT
Start: 2019-01-08

## 2019-01-08 NOTE — PROGRESS NOTES
Hubert Senior is a  74 y.o. female presents today for office visit for routine follow up. Patient states that the left lower leg pain is a 2/10. 1. Have you been to the ER, urgent care clinic or hospitalized since your last visit? Yes 12- and 12- [facility] 2. Have you seen or consulted any other health care providers outside of the [address] [place] [address] since your last visit (Include any pap smears or colon screening)? NO

## 2019-01-08 NOTE — PATIENT INSTRUCTIONS
DASH Diet: Care Instructions Your Care Instructions The DASH diet is an eating plan that can help lower your blood pressure. DASH stands for Dietary Approaches to Stop Hypertension. Hypertension is high blood pressure. The DASH diet focuses on eating foods that are high in calcium, potassium, and magnesium. These nutrients can lower blood pressure. The foods that are highest in these nutrients are fruits, vegetables, low-fat dairy products, nuts, seeds, and legumes. But taking calcium, potassium, and magnesium supplements instead of eating foods that are high in those nutrients does not have the same effect. The DASH diet also includes whole grains, fish, and poultry. The DASH diet is one of several lifestyle changes your doctor may recommend to lower your high blood pressure. Your doctor may also want you to decrease the amount of sodium in your diet. Lowering sodium while following the DASH diet can lower blood pressure even further than just the DASH diet alone. Follow-up care is a key part of your treatment and safety. Be sure to make and go to all appointments, and call your doctor if you are having problems. It's also a good idea to know your test results and keep a list of the medicines you take. How can you care for yourself at home? Following the DASH diet · Eat 4 to 5 servings of fruit each day. A serving is 1 medium-sized piece of fruit, ½ cup chopped or canned fruit, 1/4 cup dried fruit, or 4 ounces (½ cup) of fruit juice. Choose fruit more often than fruit juice. · Eat 4 to 5 servings of vegetables each day. A serving is 1 cup of lettuce or raw leafy vegetables, ½ cup of chopped or cooked vegetables, or 4 ounces (½ cup) of vegetable juice. Choose vegetables more often than vegetable juice. · Get 2 to 3 servings of low-fat and fat-free dairy each day. A serving is 8 ounces of milk, 1 cup of yogurt, or 1 ½ ounces of cheese. · Eat 6 to 8 servings of grains each day. A serving is 1 slice of bread, 1 ounce of dry cereal, or ½ cup of cooked rice, pasta, or cooked cereal. Try to choose whole-grain products as much as possible. · Limit lean meat, poultry, and fish to 2 servings each day. A serving is 3 ounces, about the size of a deck of cards. · Eat 4 to 5 servings of nuts, seeds, and legumes (cooked dried beans, lentils, and split peas) each week. A serving is 1/3 cup of nuts, 2 tablespoons of seeds, or ½ cup of cooked beans or peas. · Limit fats and oils to 2 to 3 servings each day. A serving is 1 teaspoon of vegetable oil or 2 tablespoons of salad dressing. · Limit sweets and added sugars to 5 servings or less a week. A serving is 1 tablespoon jelly or jam, ½ cup sorbet, or 1 cup of lemonade. · Eat less than 2,300 milligrams (mg) of sodium a day. If you limit your sodium to 1,500 mg a day, you can lower your blood pressure even more. Tips for success · Start small. Do not try to make dramatic changes to your diet all at once. You might feel that you are missing out on your favorite foods and then be more likely to not follow the plan. Make small changes, and stick with them. Once those changes become habit, add a few more changes. · Try some of the following: ? Make it a goal to eat a fruit or vegetable at every meal and at snacks. This will make it easy to get the recommended amount of fruits and vegetables each day. ? Try yogurt topped with fruit and nuts for a snack or healthy dessert. ? Add lettuce, tomato, cucumber, and onion to sandwiches. ? Combine a ready-made pizza crust with low-fat mozzarella cheese and lots of vegetable toppings. Try using tomatoes, squash, spinach, broccoli, carrots, cauliflower, and onions. ? Have a variety of cut-up vegetables with a low-fat dip as an appetizer instead of chips and dip. ? Sprinkle sunflower seeds or chopped almonds over salads.  Or try adding chopped walnuts or almonds to cooked vegetables. ? Try some vegetarian meals using beans and peas. Add garbanzo or kidney beans to salads. Make burritos and tacos with mashed porras beans or black beans. Where can you learn more? Go to http://sonia-moira.info/. Enter I046 in the search box to learn more about \"DASH Diet: Care Instructions. \" Current as of: December 6, 2017 Content Version: 11.8 © 0161-0074 [employer]. Care instructions adapted under license by [employer] (which disclaims liability or warranty for this information). If you have questions about a medical condition or this instruction, always ask your healthcare professional. [address] any warranty or liability for your use of this information. Leg Pain: Care Instructions Your Care Instructions Many things can cause leg pain. Too much exercise or overuse can cause a muscle cramp (or charley horse). You can get leg cramps from not eating a balanced diet that has enough potassium, calcium, and other minerals. If you do not drink enough fluids or are taking certain medicines, you may develop leg cramps. Other causes of leg pain include injuries, blood flow problems, nerve damage, and twisted and enlarged veins (varicose veins). You can usually ease pain with self-care. Your doctor may recommend that you rest your leg and keep it elevated. Follow-up care is a key part of your treatment and safety. Be sure to make and go to all appointments, and call your doctor if you are having problems. It's also a good idea to know your test results and keep a list of the medicines you take. How can you care for yourself at home? · Take pain medicines exactly as directed. ? If the doctor gave you a prescription medicine for pain, take it as prescribed. ? If you are not taking a prescription pain medicine, ask your doctor if you can take an over-the-counter medicine. · Take any other medicines exactly as prescribed. Call your doctor if you think you are having a problem with your medicine. · Rest your leg while you have pain, and avoid standing for long periods of time. · Prop up your leg at or above the level of your heart when possible. · Make sure you are eating a balanced diet that is rich in calcium, potassium, and magnesium, especially if you are pregnant. · If directed by your doctor, put ice or a cold pack on the area for 10 to 20 minutes at a time. Put a thin cloth between the ice and your skin. · Your leg may be in a splint, a brace, or an elastic bandage, and you may have crutches to help you walk. Follow your doctor's directions about how long to wear supports and how to use the crutches. When should you call for help? Call 911 anytime you think you may need emergency care. For example, call if: 
  · You have sudden chest pain and shortness of breath, or you cough up blood.  
  · Your leg is cool or pale or changes color.  
 Call your doctor now or seek immediate medical care if: 
  · You have increasing or severe pain.  
  · Your leg suddenly feels weak and you cannot move it.  
  · You have signs of a blood clot, such as: 
? Pain in your calf, back of the knee, thigh, or groin. ? Redness and swelling in your leg or groin.  
  · You have signs of infection, such as: 
? Increased pain, swelling, warmth, or redness. ? Red streaks leading from the sore area. ? Pus draining from a place on your leg. ? A fever.  
  · You cannot bear weight on your leg.  
 Watch closely for changes in your health, and be sure to contact your doctor if: 
  · You do not get better as expected. Where can you learn more? Go to http://sonia-moira.info/. Enter W173 in the search box to learn more about \"Leg Pain: Care Instructions. \" Current as of: November 20, 2017 Content Version: 11.8 © 2953-3119 [employer].  Care instructions adapted under license by [address] (which disclaims liability or warranty for this information). If you have questions about a medical condition or this instruction, always ask your healthcare professional. [address] any warranty or liability for your use of this information.

## 2019-01-08 NOTE — PROGRESS NOTES
Chief Complaint Patient presents with  Leg Pain f/u HPI: 
Darylene Baars is a 74 y.o. -American female who presents today for emergency department follow-up. She slipped and fell at home and was seen in the emergency department on 12/22/18 with imaging of the left femur and left hip done that was unremarkable. CT of the head and CT of the cervical spine also done revealed no acute findings. She again presented on 12/26/18 to the emergency department and had imaging of bilateral hips done that was unremarkable. She was subsequently seen by nurse practitioner Gregoria Sanchez who ordered duplex ultrasound of left lower extremity that was negative for deep venous thrombosis. Left lower extremity pain is improved with naproxen. Otherwise she feels well today and voices no other complaints. She is compliant with her medications with no adverse effect noted. Past Medical History:  
Diagnosis Date  Acute idiopathic gout of right wrist 10/4/2017  Atrophic vaginitis  Cardiac cath 05/30/2012 Patent coronary arteries. LVEDP 20.  RA 11.  RV 42/10. PA 42/21. W 18.  CO 6.4 (TD), 6.4 (Meridee Laboy). PVR 1.7 Wood units. False-positive nuclear study.  Cardiac echocardiogram 05/11/2011 EF 65%. RVSP at least 35 mmHg. Mild IVCE. No significant valvular heart disease.  Cardiac nuclear imaging test 05/18/2012 Tech difficult. Apical ischemia. No infarction. EF 76%. No reg'l WMA. No TID.  Cardiovascular LLE venous duplex 06/17/2013 Left leg:  No DVT.  Diabetes   
 diet controlled, hypoglycemia from metformin previously  Dyslipidemia  Fatty liver  Fibrocystic breast disease  Fluid retention  GERD   
 H/O colonoscopy 4/12/13  
 polyp  Hypertension  Ill-defined condition   
 gout  Macular degeneration  Morbid obesity ([facility] [address]  Obstructive sleep apnea  Peripheral neuropathy  Rectocele  Thyroid cyst   
 bilat Allergies Allergen Reactions  Latex Rash  Nexium [Esomeprazole Magnesium] Swelling and Other (comments) Lips Swollen, and facial rash and swelling  Dexilant [Dexlansoprazole] Other (comments) Stomach swelling  Crestor [Rosuvastatin] Other (comments) Upper respiratory illness  Lisinopril Unknown (comments) Patient can't recall  Niaspan [Niacin] Other (comments) Scratchy throat, \"asthma\" like symptoms  Pcn [Penicillins] Hives  Pravachol [Pravastatin] Myalgia  Sulfa (Sulfonamide Antibiotics) Rash  Zocor [Simvastatin] Myalgia and Other (comments) Per patient chart \"(? Rash)\" Current Outpatient Medications Medication Sig Dispense Refill  meclizine (ANTIVERT) 12.5 mg tablet Take 1 Tab by mouth three (3) times daily as needed for up to 10 days. 20 Tab 0  
 naproxen (NAPROSYN) 500 mg tablet Take 1 Tab by mouth two (2) times daily (with meals). 30 Tab 0  
 CALCIUM PO Take  by mouth.  albuterol (PROVENTIL HFA, VENTOLIN HFA, PROAIR HFA) 90 mcg/actuation inhaler Take 1-2 Puffs by inhalation every four (4) hours as needed for Wheezing or Shortness of Breath. 1 Inhaler 1  
 fluticasone (FLONASE) 50 mcg/actuation nasal spray Si spray in both nostril twice daily 1 Bottle 1  
 losartan (COZAAR) 50 mg tablet TAKE 2 TABLETS ONE TIME DAILY FOR HYPERTENSION 60 Tab 5  
 raNITIdine (ZANTAC) 300 mg tablet take 1 tablet by mouth at bedtime  0  
 furosemide (LASIX) 20 mg tablet Take 1 Tab by mouth two (2) times a day. 90 Tab 1  potassium chloride (KLOR-CON) 10 mEq tablet TAKE 2 TABLETS TWICE DAILY 360 Tab 1  
 atorvastatin (LIPITOR) 20 mg tablet TAKE 1 TABLET EVERY DAY 90 Tab 1  cholecalciferol (VITAMIN D3) 1,000 unit tablet Take 1,000 Units by mouth daily.  aspirin 81 mg Tab Take 81 mg by mouth daily.  MULTIVITAMINS (MULTI-VITAMIN PO) Take 1 Tab by mouth daily. ROS: 
 
Constitutional: Negative for fever, chills, or fatigue Neurological: Negative for headache, dizziness, visual disturbance, or loss of conciousness Respiratory: Negative for SOB, hemoptysis, or wheezing Cardiovascular: Negative for chest pain, palpitation, or leg swelling Gastrointestinal: Negative for abdominal pain, nausea, vomiting, diarrhea, blood in stool, melena, or heartburn Musculoskeletal: Positive for fall. Physical Exam: 
Visit Vitals /85 Pulse 73 Temp 98 °F (36.7 °C) (Oral) Resp 18 Ht 5' 4\" (1.626 m) Wt 260 lb 9.6 oz (118.2 kg) SpO2 97% BMI 44.73 kg/m² BP Readings from Last 3 Encounters:  
01/08/19 140/85  
01/02/19 154/79  
12/26/18 (!) 178/91 General: a & o x 3, afebrile, well-nourished, interacting appropriately, in no acute distress Skin: warm and dry, no rashes , no bruises Neck: supple, symmetrical, no thyromegaly Respiratory: symmetrical chest expansion, lung sounds clear bilaterally, good air entry Cardiovascular: normal S1S2, regular rate and rhythm, no murmurs Abdomen: non-distended, normoactive bowel sounds x 4 quadrants, soft, non-tender to palpation Musculoskeletal: normal ROM on all joints, no swelling or deformity, no perilumbar tenderness, steady gait Psychiatry: appropriate mood and affect Extremity: No edema noted Lab Results Component Value Date/Time WBC 7.3 12/26/2018 12:29 PM  
 Hemoglobin, POC 12.6 04/12/2013 09:46 AM  
 HGB 11.1 (L) 12/26/2018 12:29 PM  
 Hematocrit, POC 37 04/12/2013 09:46 AM  
 HCT 34.4 (L) 12/26/2018 12:29 PM  
 PLATELET 109 15/31/5188 12:29 PM  
 MCV 90.1 12/26/2018 12:29 PM  
 
 
Lab Results Component Value Date/Time  Sodium 141 12/26/2018 12:29 PM  
 Potassium 3.8 12/26/2018 12:29 PM  
 Chloride 105 12/26/2018 12:29 PM  
 CO2 29 12/26/2018 12:29 PM  
 Anion gap 7 12/26/2018 12:29 PM  
 Glucose 101 (H) 12/26/2018 12:29 PM  
 BUN 15 12/26/2018 12:29 PM  
 Creatinine 0.81 12/26/2018 12:29 PM  
 BUN/Creatinine ratio 19 12/26/2018 12:29 PM  
 GFR est AA >60 12/26/2018 12:29 PM  
 GFR est non-AA >60 12/26/2018 12:29 PM  
 Calcium 8.7 12/26/2018 12:29 PM  
 Bilirubin, total 0.4 08/07/2018 10:05 AM  
 AST (SGOT) 12 (L) 08/07/2018 10:05 AM  
 Alk. phosphatase 79 08/07/2018 10:05 AM  
 Protein, total 6.8 08/07/2018 10:05 AM  
 Albumin 3.4 08/07/2018 10:05 AM  
 Globulin 3.4 08/07/2018 10:05 AM  
 A-G Ratio 1.0 08/07/2018 10:05 AM  
 ALT (SGPT) 16 08/07/2018 10:05 AM  
 
 
Lab Results Component Value Date/Time Cholesterol, total 169 08/07/2018 10:05 AM  
 HDL Cholesterol 62 (H) 08/07/2018 10:05 AM  
 LDL, calculated 85.6 08/07/2018 10:05 AM  
 VLDL, calculated 21.4 08/07/2018 10:05 AM  
 Triglyceride 107 08/07/2018 10:05 AM  
 CHOL/HDL Ratio 2.7 08/07/2018 10:05 AM  
 
 
Hemoglobin A1c (POC) Date Value Ref Range Status 06/23/2017 6.1 % Final  
  
 
Lab Results Component Value Date/Time Hemoglobin A1c 6.3 (H) 11/30/2018 12:00 AM  
 Hemoglobin A1c (POC) 6.1 06/23/2017 11:53 AM  
 Hemoglobin A1c, External 6.5 01/26/2017 Lab Results Component Value Date/Time TSH 1.70 08/07/2018 10:05 AM  
 TSH 2.20 01/11/2010 06:50 PM  
 Triiodothyronine (T3), free 3.1 12/21/2009 11:24 AM  
 T4, Free 0.9 01/11/2010 06:50 PM  
 
 
Assessment/Plan: ICD-10-CM ICD-9-CM 1. Pain of left lower extremity M79.605 729.5 diclofenac (VOLTAREN) 1 % gel Orders Placed This Encounter  diclofenac (VOLTAREN) 1 % gel Sig: Apply  to affected area four (4) times daily. Dispense:  100 g Refill:  1 Review of records of recent ED visit was done by me. Additional Notes: Discussed today's diagnosis, treatment plans. Discussed medication indications and side effects. After Visit Summary: Discussed provided printed patient instructions. Answered questions accordingly. Follow-up Disposition: As previously scheduled Rachel Montilla DO, MPH Internal medicine

## 2019-01-17 ENCOUNTER — TELEPHONE (OUTPATIENT)
Dept: FAMILY MEDICINE CLINIC | Age: 74
End: 2019-01-17

## 2019-01-17 RX ORDER — AZITHROMYCIN 250 MG/1
TABLET, FILM COATED ORAL
Qty: 6 TAB | Refills: 0 | Status: SHIPPED | OUTPATIENT
Start: 2019-01-17 | End: 2019-03-05 | Stop reason: ALTCHOICE

## 2019-01-17 NOTE — TELEPHONE ENCOUNTER
Patient states since her appt she has caught a cold. Says she has been in the house since Saturday. Patient states she is coughing and etc. Says she has taken Mucinex, Robitussin, and a cough syrup. Patient thinks she needs a antibiotic to get it all out.  Would like to know if she could have something called in.  pls advise

## 2019-01-31 ENCOUNTER — TELEPHONE (OUTPATIENT)
Dept: FAMILY MEDICINE CLINIC | Age: 74
End: 2019-01-31

## 2019-01-31 DIAGNOSIS — I11.9 BENIGN HYPERTENSIVE HEART DISEASE WITHOUT HEART FAILURE: ICD-10-CM

## 2019-01-31 RX ORDER — LOSARTAN POTASSIUM 50 MG/1
TABLET ORAL
Qty: 180 TAB | Refills: 3 | Status: SHIPPED | OUTPATIENT
Start: 2019-01-31 | End: 2019-06-12 | Stop reason: SDUPTHER

## 2019-01-31 NOTE — TELEPHONE ENCOUNTER
Caled patient could not leave message. The call was for patient to verify that she ordered Trueplus Ultra Thin 30 G Lancet from [facility].  Not in patient history

## 2019-02-04 DIAGNOSIS — R60.9 PERIPHERAL EDEMA: ICD-10-CM

## 2019-02-04 DIAGNOSIS — I11.9 BENIGN HYPERTENSIVE HEART DISEASE WITHOUT HEART FAILURE: ICD-10-CM

## 2019-02-05 ENCOUNTER — TELEPHONE (OUTPATIENT)
Dept: FAMILY MEDICINE CLINIC | Age: 74
End: 2019-02-05

## 2019-02-05 RX ORDER — BLOOD-GLUCOSE CONTROL, NORMAL
EACH MISCELLANEOUS
Qty: 1 PACKAGE | Refills: 2 | Status: SHIPPED | OUTPATIENT
Start: 2019-02-05

## 2019-02-05 RX ORDER — BLOOD-GLUCOSE METER
EACH MISCELLANEOUS
Qty: 1 EACH | Refills: 0 | Status: SHIPPED | OUTPATIENT
Start: 2019-02-05

## 2019-02-05 RX ORDER — EZETIMIBE 10 MG/1
10 TABLET ORAL DAILY
Qty: 30 TAB | Refills: 2 | Status: SHIPPED | OUTPATIENT
Start: 2019-02-05 | End: 2019-03-05 | Stop reason: SINTOL

## 2019-02-05 RX ORDER — FUROSEMIDE 20 MG/1
20 TABLET ORAL 2 TIMES DAILY
Qty: 90 TAB | Refills: 1 | Status: SHIPPED | OUTPATIENT
Start: 2019-02-05 | End: 2019-06-07 | Stop reason: SDUPTHER

## 2019-02-05 NOTE — TELEPHONE ENCOUNTER
Pt request change medication. Stated she didn't realize atorvastatin was generic for lipitor. Said she is allergic to lipitor. She has face swelling again. Now that she knows it is generic for lipitor she realized that is what is causing the swelling.  She stopped taking medication 2 days ago    She said not in any distress just asking if a different medication    Requesting a 1 month supply sent to [facility]

## 2019-02-05 NOTE — TELEPHONE ENCOUNTER
Zetia sent to the pharmacy and Lipitor discontinued as causing adverse effects and added to allergy list. Please call and advise her to stop taking Lipitor.

## 2019-02-05 NOTE — TELEPHONE ENCOUNTER
Pt calling re what is the next step re left leg swelling, inflammation and pain from fall she had 12/21/2018    Stated she has been seen several times and it is no better

## 2019-02-05 NOTE — TELEPHONE ENCOUNTER
Patient is aware stop taking Lipitor and Zetia was sent to the pharmacy. Patient verbalizes understanding.

## 2019-02-05 NOTE — TELEPHONE ENCOUNTER
Patient was informed that Dr. Winsome Law will address her issue today and her s/s get worse go immediately to the ED. Patient denies SOB and pain.

## 2019-02-22 NOTE — TELEPHONE ENCOUNTER
Pt calling now having issues with Zetia.  Pt stated having right hip and buttocks pain and she knows it's the Zetia because she stopped it 2 days ago and the pain is better    She said she can't take Lipitor, atorvastatin, crestor and another one that starts with an \"S\" and now Zetia    Please assist

## 2019-02-27 LAB — SPECIMEN STATUS REPORT, ROLRST: NORMAL

## 2019-03-05 ENCOUNTER — OFFICE VISIT (OUTPATIENT)
Dept: FAMILY MEDICINE CLINIC | Age: 74
End: 2019-03-05

## 2019-03-05 VITALS
SYSTOLIC BLOOD PRESSURE: 138 MMHG | HEIGHT: 64 IN | WEIGHT: 256.8 LBS | DIASTOLIC BLOOD PRESSURE: 90 MMHG | RESPIRATION RATE: 16 BRPM | OXYGEN SATURATION: 97 % | BODY MASS INDEX: 43.84 KG/M2 | TEMPERATURE: 97.7 F | HEART RATE: 71 BPM

## 2019-03-05 DIAGNOSIS — L85.3 DRY SKIN: ICD-10-CM

## 2019-03-05 DIAGNOSIS — I11.9 BENIGN HYPERTENSIVE HEART DISEASE WITHOUT HEART FAILURE: Primary | ICD-10-CM

## 2019-03-05 DIAGNOSIS — E78.00 PURE HYPERCHOLESTEROLEMIA: ICD-10-CM

## 2019-03-05 DIAGNOSIS — E55.9 HYPOVITAMINOSIS D: ICD-10-CM

## 2019-03-05 DIAGNOSIS — R20.2 PARESTHESIA OF LEFT LEG: ICD-10-CM

## 2019-03-05 DIAGNOSIS — R73.03 PREDIABETES: ICD-10-CM

## 2019-03-05 DIAGNOSIS — R60.9 PERIPHERAL EDEMA: ICD-10-CM

## 2019-03-05 NOTE — PATIENT INSTRUCTIONS

## 2019-03-05 NOTE — PROGRESS NOTES
Subjective:   Randa Biggs is a 66 y.o. female with hypertension presents for 3 months follow up. Patient Active Problem List   Diagnosis Code    Hypertension I11.9    Dyslipidemia E78.5    Sleep apnea/ on c-pap G47.30    Renal cyst N28.1    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 pylori infection A04.8    Acute idiopathic gout of right wrist M10.031    Sliding hiatal hernia K44.9    Pseudogout of right shoulder M11.211    Primary osteoarthritis involving multiple joints M15.0    Obesity, morbid (HCC) E66.01    Mild peripheral edema R60.9    Incidental pulmonary nodule, > 3mm and < 8mm R91.1    Prediabetes R73.03    Polyp of sigmoid colon D12.5    Multiple thyroid nodules E04.2    Morbid obesity with BMI of 40.0-44.9, adult (HCC) E66.01, Z68.41    Need for influenza vaccination Z23    Encounter for long-term (current) use of medications Z79.899    Acute maxillary sinusitis J01.00    Sinus congestion R09.81     Current Outpatient Medications   Medication Sig Dispense Refill    furosemide (LASIX) 20 mg tablet Take 1 Tab by mouth two (2) times a day. 90 Tab 1    Blood-Glucose Meter (TRUE METRIX AIR GLUCOSE METER) misc Check blood sugar twice daily. 1 Each 0    glucose blood VI test strips (TRUE METRIX GLUCOSE TEST STRIP) strip Check blood sugar twice daily. 100 Strip 2    lancets (ULTRA THIN LANCETS) 30 gauge misc Check blood sugar twice daily. 1 Package 2    losartan (COZAAR) 50 mg tablet take 2 tablets by mouth once daily for hypertension **(REQUEST FOR 90 DAY RX FOR PATIENT) 180 Tab 3    diclofenac (VOLTAREN) 1 % gel Apply  to affected area four (4) times daily. 100 g 1    naproxen (NAPROSYN) 500 mg tablet Take 1 Tab by mouth two (2) times daily (with meals). 30 Tab 0    CALCIUM PO Take  by mouth.       fluticasone (FLONASE) 50 mcg/actuation nasal spray Si spray in both nostril twice daily 1 Bottle 1    raNITIdine (ZANTAC) 300 mg tablet take 1 tablet by mouth at bedtime  0    potassium chloride (KLOR-CON) 10 mEq tablet TAKE 2 TABLETS TWICE DAILY 360 Tab 1    cholecalciferol (VITAMIN D3) 1,000 unit tablet Take 1,000 Units by mouth daily.  aspirin 81 mg Tab Take 81 mg by mouth daily.  MULTIVITAMINS (MULTI-VITAMIN PO) Take 1 Tab by mouth daily.  ezetimibe (ZETIA) 10 mg tablet Take 1 Tab by mouth daily. 30 Tab 2    albuterol (PROVENTIL HFA, VENTOLIN HFA, PROAIR HFA) 90 mcg/actuation inhaler Take 1-2 Puffs by inhalation every four (4) hours as needed for Wheezing or Shortness of Breath.  1 Inhaler 1      Allergies   Allergen Reactions    Latex Rash    Nexium [Esomeprazole Magnesium] Swelling and Other (comments)     Lips Swollen, and facial rash and swelling    Dexilant [Dexlansoprazole] Other (comments)     Stomach swelling    Crestor [Rosuvastatin] Other (comments)     Upper respiratory illness    Lipitor [Atorvastatin] Swelling    Lisinopril Unknown (comments)     Patient can't recall    Niaspan [Niacin] Other (comments)     Scratchy throat, \"asthma\" like symptoms    Pcn [Penicillins] Hives    Pravachol [Pravastatin] Myalgia    Sulfa (Sulfonamide Antibiotics) Rash    Zocor [Simvastatin] Myalgia and Other (comments)     Per patient chart \"(? Rash)\"     Past Surgical History:   Procedure Laterality Date    HC CNNLA ARTERIAL ARTERIOTOMY 3M  5/25/2012    HX COLONOSCOPY  4/12/2013    HX HEART CATHETERIZATION  5/25/2012    HX HERNIA REPAIR      umbilical as a child    HX HYSTERECTOMY      52ys ago, QUIQUE, BSO    HX MOHS PROCEDURES      right    [place] ANESTH,SURGERY OF SHOULDER       Family History   Problem Relation Age of Onset   [address] [facility] Cancer Mother         colon cancer    Colon Cancer Mother     Hypertension Mother     Diabetes Mother     Heart Disease Mother     Coronary Artery Disease Mother     Hypertension Father     Diabetes Father     Heart Disease Father     Coronary Artery Disease Father     Hypertension Sister     Diabetes Sister     Heart Disease Sister     Coronary Artery Disease Sister     Hypertension Brother     Diabetes Brother     Heart Disease Brother     Coronary Artery Disease Brother       Lab Results   Component Value Date/Time    Cholesterol, total 190 02/26/2019 09:37 AM    HDL Cholesterol 63 02/26/2019 09:37 AM    LDL, calculated 111 (H) 02/26/2019 09:37 AM    VLDL, calculated 16 02/26/2019 09:37 AM    Triglyceride 80 02/26/2019 09:37 AM    CHOL/HDL Ratio 2.7 08/07/2018 10:05 AM     Lab Results   Component Value Date/Time    Sodium 142 02/26/2019 09:37 AM    Potassium 4.1 02/26/2019 09:37 AM    Chloride 103 02/26/2019 09:37 AM    CO2 28 02/26/2019 09:37 AM    Anion gap 7 12/26/2018 12:29 PM    Glucose 89 02/26/2019 09:37 AM    BUN 13 02/26/2019 09:37 AM    Creatinine 0.77 02/26/2019 09:37 AM    BUN/Creatinine ratio 17 02/26/2019 09:37 AM    GFR est AA 87 02/26/2019 09:37 AM    GFR est non-AA 75 02/26/2019 09:37 AM    Calcium 9.1 02/26/2019 09:37 AM    Bilirubin, total 0.4 02/26/2019 09:37 AM    AST (SGOT) 16 02/26/2019 09:37 AM    Alk.  phosphatase 67 02/26/2019 09:37 AM    Protein, total 6.6 02/26/2019 09:37 AM    Albumin 3.7 02/26/2019 09:37 AM    Globulin 3.4 08/07/2018 10:05 AM    A-G Ratio 1.3 02/26/2019 09:37 AM    ALT (SGPT) 10 02/26/2019 09:37 AM     Lab Results   Component Value Date/Time    WBC 6.0 02/26/2019 09:37 AM    Hemoglobin, POC 12.6 04/12/2013 09:46 AM    HGB 12.2 02/26/2019 09:37 AM    Hematocrit, POC 37 04/12/2013 09:46 AM    HCT 37.7 02/26/2019 09:37 AM    PLATELET 988 91/44/4761 09:37 AM    MCV 88 02/26/2019 09:37 AM     Wt Readings from Last 3 Encounters:   03/05/19 256 lb 12.8 oz (116.5 kg)   01/08/19 260 lb 9.6 oz (118.2 kg)   01/02/19 264 lb (119.7 kg)     Last Point of Care HGB A1C  Hemoglobin A1c (POC)   Date Value Ref Range Status   06/23/2017 6.1 % Final      BP Readings from Last 3 Encounters:   03/05/19 (!) 148/91   01/08/19 140/85   01/02/19 154/79       Hypertension ROS: taking medications as instructed, no medication side effects noted, no TIA's, no chest pain on exertion, no dyspnea on exertion, no swelling of ankles. Review of Systems - Endocrine ROS: negative  Dermatological ROS: positive for - dry skin    New concerns: patient states she has a history of dry skin since her hysterectomy at the age of 20. Has tried multiple otc medications without improvement. Objective:   Visit Vitals  /90 (BP 1 Location: Left arm, BP Patient Position: Sitting)   Pulse 71   Temp 97.7 °F (36.5 °C) (Oral)   Resp 16   Ht 5' 4\" (1.626 m)   Wt 256 lb 12.8 oz (116.5 kg)   SpO2 97%   BMI 44.08 kg/m²      General appearance - alert, well appearing, and in no distress  Neck - supple, no significant adenopathy, carotids upstroke normal bilaterally, no bruits  Chest - clear to auscultation, no wheezes, rales or rhonchi, symmetric air entry  Heart - normal rate, regular rhythm, normal S1, S2, no murmurs, rubs, clicks or gallops  Extremities - peripheral pulses normal, no pedal edema, no clubbing or cyanosis, left lower lateral leg with approx. 15 cm area of numbness to palpation. Abdomen - soft, nontender, nondistended, no masses or organomegaly          Assessment/Plan:      ICD-10-CM ICD-9-CM    1. Hypertension I11.9 402.10 LIPID PANEL      METABOLIC PANEL, COMPREHENSIVE   2. Peripheral edema R60.9 782.3    3. Paresthesia of left leg R20.2 782.0 EMG ONE EXTREMITY LOWER LT   4. Prediabetes R73.03 790.29 HEMOGLOBIN A1C WITH EAG      LIPID PANEL      METABOLIC PANEL, COMPREHENSIVE   5. Hypovitaminosis D E55.9 268.9 VITAMIN D, 25 HYDROXY   6. Pure hypercholesterolemia E78.00 272.0 LIPID PANEL      METABOLIC PANEL, COMPREHENSIVE   7. Dry skin L85.3 701.1 REFERRAL TO DERMATOLOGY   will send to dermatology for facial dryness given failed multiple otc treatment  Above stable   reviewed diet, exercise and weight control.   I have discussed the diagnosis with the patient and the intended plan as seen in the above orders. The patient has received an after-visit summary and questions were answered concerning future plans. I have discussed medication side effects and warnings with the patient as well. Patient agreeable with above plan and verbalizes understanding. Follow-up Disposition:  Return in about 6 weeks (around 4/16/2019) for leg numbness/ routine follow up in 3 months .

## 2019-03-05 NOTE — PROGRESS NOTES
Pt is here for 3 month F/U HTN, prediabetes    1. Have you been to the ER, urgent care clinic since your last visit? Hospitalized since your last visit? No    2. Have you seen or consulted any other health care providers outside of the [facility] since your last visit? Include any pap smears or colon screening.  No

## 2019-03-29 ENCOUNTER — OFFICE VISIT (OUTPATIENT)
Dept: FAMILY MEDICINE CLINIC | Age: 74
End: 2019-03-29

## 2019-03-29 VITALS
HEIGHT: 64 IN | OXYGEN SATURATION: 98 % | DIASTOLIC BLOOD PRESSURE: 100 MMHG | SYSTOLIC BLOOD PRESSURE: 160 MMHG | RESPIRATION RATE: 17 BRPM | BODY MASS INDEX: 42.85 KG/M2 | WEIGHT: 251 LBS | TEMPERATURE: 97.4 F | HEART RATE: 72 BPM

## 2019-03-29 DIAGNOSIS — M79.604 RIGHT LEG PAIN: Primary | ICD-10-CM

## 2019-03-29 DIAGNOSIS — M25.551 RIGHT HIP PAIN: ICD-10-CM

## 2019-03-29 NOTE — PROGRESS NOTES
HISTORY OF PRESENT ILLNESS  Lynne Russ is a 66 y.o. female. Patient presents with concerns regarding a \"bump\"     HPI  Pt fell in December. About a week ago she felt like she got a cyst or maybe a boil coming up. She ahs been doing warm soaks which has helped. She has a SUV and is finding it harder and harder to get into it. She no longer can use that leg to get in. It hurts to sit on anything hard. She denies shortness of breath. She does have a pain behind her knee at the crease. Review of Systems   Constitutional: Negative. Respiratory: Negative. Cardiovascular: Negative. Musculoskeletal: Positive for joint pain (right upper posterior thigh pain and lpain in the posterior area of her right knee. ). Visit Vitals  Pulse 72   Temp 97.4 °F (36.3 °C) (Oral)   Resp 17   Ht 5' 4\" (1.626 m)   Wt 251 lb (113.9 kg)   SpO2 98%   BMI 43.08 kg/m²     Physical Exam   Constitutional: She is oriented to person, place, and time. She appears well-developed. No distress. Cardiovascular: Normal rate, regular rhythm and normal heart sounds. No murmur heard. Pulmonary/Chest: Effort normal and breath sounds normal. No respiratory distress. She has no wheezes. She has no rales. Musculoskeletal:        Right hip: She exhibits normal range of motion, normal strength, no tenderness, no swelling and no crepitus. Lumbar back: Normal.        Right upper leg: She exhibits tenderness. She exhibits no swelling, no edema and no deformity. Right lower leg: She exhibits tenderness. She exhibits no bony tenderness, no swelling, no edema and no deformity. Neurological: She is alert and oriented to person, place, and time. Skin: Skin is warm. No rash noted. No erythema. ASSESSMENT and PLAN    ICD-10-CM ICD-9-CM    1. Right leg pain M79.604 729.5 XR HIP RT W OR WO PELV 2-3 VWS      DUPLEX LOWER EXT VENOUS RIGHT   2.  Right hip pain M25.551 719.45 XR HIP RT W OR WO PELV 2-3 VWS     PLAN:  We discussed her symptoms and her biggest concern is having a blood clot. Duplex was ordered as well as the right hip x-ray. A MRI might be needed if both the duplex and x-ray are negative. Pt is to call with any concerns. I have discussed the diagnosis with the patient and the intended plan as seen in the above orders. The patient has received an after-visit summary and questions were answered concerning future plans. I have discussed medication side effects and warnings with the patient as well. Patient will call for further questions. Next step pending imaging results.

## 2019-03-29 NOTE — PROGRESS NOTES
Chief Complaint   Patient presents with    Skin Problem     bump on back of buttocks x 1 week     1. Have you been to the ER, urgent care clinic since your last visit? Hospitalized since your last visit? No    2. Have you seen or consulted any other health care providers outside of the [address] [place] [address] since your last visit? Include any pap smears or colon screening.  No

## 2019-04-01 ENCOUNTER — HOSPITAL ENCOUNTER (OUTPATIENT)
Dept: VASCULAR SURGERY | Age: 74
Discharge: HOME | End: 2019-04-01
Attending: NURSE PRACTITIONER
Payer: MEDICARE

## 2019-04-01 DIAGNOSIS — M79.604 RIGHT LEG PAIN: ICD-10-CM

## 2019-04-01 PROCEDURE — 93971 EXTREMITY STUDY: CPT

## 2019-04-03 ENCOUNTER — TELEPHONE (OUTPATIENT)
Dept: FAMILY MEDICINE CLINIC | Age: 74
End: 2019-04-03

## 2019-04-03 NOTE — TELEPHONE ENCOUNTER
----- Message from Jennifer Russ NP sent at 4/3/2019  7:45 AM EDT -----  Please advise Pt that her doppler study showed no DVT

## 2019-04-04 NOTE — TELEPHONE ENCOUNTER
Spoke with the patient. Advised the patient that doppler study was negative. She verbalized understanding.

## 2019-04-16 ENCOUNTER — OFFICE VISIT (OUTPATIENT)
Dept: VASCULAR SURGERY | Age: 74
End: 2019-04-16

## 2019-04-16 VITALS
BODY MASS INDEX: 42.85 KG/M2 | SYSTOLIC BLOOD PRESSURE: 136 MMHG | WEIGHT: 251 LBS | HEIGHT: 64 IN | DIASTOLIC BLOOD PRESSURE: 82 MMHG | HEART RATE: 80 BPM | RESPIRATION RATE: 17 BRPM

## 2019-04-16 DIAGNOSIS — M79.89 LEFT LEG SWELLING: ICD-10-CM

## 2019-04-16 DIAGNOSIS — M79.605 LEFT LEG PAIN: Primary | ICD-10-CM

## 2019-04-16 NOTE — PROGRESS NOTES
Ms Suzie Dillon is here at her own request for evaluation of leg pain/numbness/swelling    She actually has issues of both legs    She had a fall in December. No fractures but she states her left leg was very bruised and swollen and even though the bruising is resolved she still has some mild swelling but she is most bothered by an area of numbness on the lateral calf    She also has issues related to right leg with swelling and some hip/buttock pain  She did have a PVL of that leg a few weeks ago which was negative for DVT  She was to also have a hip xray but did not get that done    It was stated in a note from her PCP that if both the xray and PVL were negative they may order an MRI next.  I did mention to her to get the xray done and follow up with PCP    It does seem that most of her symptoms are musculoskeletal  She was persistent about the concern with the left leg numbness  I did explain this isn't typical vascular symptom   But her recent ultrasound was right leg only so will just go ahead and get left leg PVL for reassurance    I otherwise suggested leg elevation, compression modalities for any edema, that of which is only very minimal today    I will call her with the PVL results and notify PCP she was going to try and get that hip xray completed

## 2019-04-17 ENCOUNTER — HOSPITAL ENCOUNTER (EMERGENCY)
Age: 74
Discharge: HOME | End: 2019-04-18
Attending: EMERGENCY MEDICINE | Admitting: EMERGENCY MEDICINE
Payer: MEDICARE

## 2019-04-17 ENCOUNTER — HOSPITAL ENCOUNTER (OUTPATIENT)
Dept: VASCULAR SURGERY | Age: 74
Discharge: HOME | End: 2019-04-17
Attending: PHYSICIAN ASSISTANT
Payer: MEDICARE

## 2019-04-17 ENCOUNTER — HOSPITAL ENCOUNTER (OUTPATIENT)
Dept: GENERAL RADIOLOGY | Age: 74
Discharge: HOME | End: 2019-04-17
Attending: PHYSICIAN ASSISTANT
Payer: MEDICARE

## 2019-04-17 ENCOUNTER — HOSPITAL ENCOUNTER (OUTPATIENT)
Dept: GENERAL RADIOLOGY | Age: 74
Discharge: HOME | End: 2019-04-17
Attending: EMERGENCY MEDICINE
Payer: MEDICARE

## 2019-04-17 DIAGNOSIS — M79.10 MYALGIA: ICD-10-CM

## 2019-04-17 DIAGNOSIS — M79.604 RIGHT LEG PAIN: ICD-10-CM

## 2019-04-17 DIAGNOSIS — R20.2 PARESTHESIA: Primary | ICD-10-CM

## 2019-04-17 DIAGNOSIS — M79.89 LEFT LEG SWELLING: ICD-10-CM

## 2019-04-17 DIAGNOSIS — M25.551 RIGHT HIP PAIN: ICD-10-CM

## 2019-04-17 DIAGNOSIS — M79.605 LEFT LEG PAIN: ICD-10-CM

## 2019-04-17 LAB
ALBUMIN SERPL-MCNC: 3.4 G/DL (ref 3.4–5)
ALBUMIN/GLOB SERPL: 0.8 {RATIO} (ref 0.8–1.7)
ALP SERPL-CCNC: 88 U/L (ref 45–117)
ALT SERPL-CCNC: 17 U/L (ref 13–56)
ANION GAP SERPL CALC-SCNC: 9 MMOL/L (ref 3–18)
AST SERPL-CCNC: 32 U/L (ref 15–37)
BASOPHILS # BLD: 0 K/UL (ref 0–0.1)
BASOPHILS NFR BLD: 0 % (ref 0–2)
BILIRUB DIRECT SERPL-MCNC: <0.1 MG/DL (ref 0–0.2)
BILIRUB SERPL-MCNC: 0.3 MG/DL (ref 0.2–1)
BUN SERPL-MCNC: 16 MG/DL (ref 7–18)
BUN/CREAT SERPL: 16 (ref 12–20)
CALCIUM SERPL-MCNC: 8.8 MG/DL (ref 8.5–10.1)
CHLORIDE SERPL-SCNC: 106 MMOL/L (ref 100–108)
CK MB CFR SERPL CALC: 0.9 % (ref 0–4)
CK MB CFR SERPL CALC: NORMAL % (ref 0–4)
CK MB SERPL-MCNC: 1.2 NG/ML (ref 5–25)
CK MB SERPL-MCNC: <1 NG/ML (ref 5–25)
CK SERPL-CCNC: 132 U/L (ref 26–192)
CK SERPL-CCNC: 134 U/L (ref 26–192)
CO2 SERPL-SCNC: 28 MMOL/L (ref 21–32)
CREAT SERPL-MCNC: 1.02 MG/DL (ref 0.6–1.3)
DIFFERENTIAL METHOD BLD: NORMAL
EOSINOPHIL # BLD: 0.3 K/UL (ref 0–0.4)
EOSINOPHIL NFR BLD: 4 % (ref 0–5)
ERYTHROCYTE [DISTWIDTH] IN BLOOD BY AUTOMATED COUNT: 14 % (ref 11.6–14.5)
GLOBULIN SER CALC-MCNC: 4.4 G/DL (ref 2–4)
GLUCOSE SERPL-MCNC: 138 MG/DL (ref 74–99)
HCT VFR BLD AUTO: 40.5 % (ref 35–45)
HGB BLD-MCNC: 13.1 G/DL (ref 12–16)
LIPASE SERPL-CCNC: 57 U/L (ref 73–393)
LYMPHOCYTES # BLD: 3.4 K/UL (ref 0.9–3.6)
LYMPHOCYTES NFR BLD: 35 % (ref 21–52)
MCH RBC QN AUTO: 29.4 PG (ref 24–34)
MCHC RBC AUTO-ENTMCNC: 32.3 G/DL (ref 31–37)
MCV RBC AUTO: 90.8 FL (ref 74–97)
MONOCYTES # BLD: 0.8 K/UL (ref 0.05–1.2)
MONOCYTES NFR BLD: 8 % (ref 3–10)
NEUTS SEG # BLD: 5.1 K/UL (ref 1.8–8)
NEUTS SEG NFR BLD: 53 % (ref 40–73)
PLATELET # BLD AUTO: 182 K/UL (ref 135–420)
PMV BLD AUTO: 11.1 FL (ref 9.2–11.8)
POTASSIUM SERPL-SCNC: 3.5 MMOL/L (ref 3.5–5.5)
PROT SERPL-MCNC: 7.8 G/DL (ref 6.4–8.2)
RBC # BLD AUTO: 4.46 M/UL (ref 4.2–5.3)
SODIUM SERPL-SCNC: 143 MMOL/L (ref 136–145)
TROPONIN I SERPL-MCNC: <0.02 NG/ML (ref 0–0.04)
TROPONIN I SERPL-MCNC: <0.02 NG/ML (ref 0–0.04)
WBC # BLD AUTO: 9.6 K/UL (ref 4.6–13.2)

## 2019-04-17 PROCEDURE — 74011250636 HC RX REV CODE- 250/636: Performed by: EMERGENCY MEDICINE

## 2019-04-17 PROCEDURE — 85025 COMPLETE CBC W/AUTO DIFF WBC: CPT

## 2019-04-17 PROCEDURE — 71045 X-RAY EXAM CHEST 1 VIEW: CPT

## 2019-04-17 PROCEDURE — 99284 EMERGENCY DEPT VISIT MOD MDM: CPT

## 2019-04-17 PROCEDURE — 82550 ASSAY OF CK (CPK): CPT

## 2019-04-17 PROCEDURE — 80076 HEPATIC FUNCTION PANEL: CPT

## 2019-04-17 PROCEDURE — 80048 BASIC METABOLIC PNL TOTAL CA: CPT

## 2019-04-17 PROCEDURE — 83690 ASSAY OF LIPASE: CPT

## 2019-04-17 PROCEDURE — 93005 ELECTROCARDIOGRAM TRACING: CPT

## 2019-04-17 PROCEDURE — 96361 HYDRATE IV INFUSION ADD-ON: CPT

## 2019-04-17 PROCEDURE — 96360 HYDRATION IV INFUSION INIT: CPT

## 2019-04-17 PROCEDURE — 74011250637 HC RX REV CODE- 250/637: Performed by: EMERGENCY MEDICINE

## 2019-04-17 PROCEDURE — 93971 EXTREMITY STUDY: CPT

## 2019-04-17 PROCEDURE — 73502 X-RAY EXAM HIP UNI 2-3 VIEWS: CPT

## 2019-04-17 RX ORDER — CYCLOBENZAPRINE HCL 5 MG
5-10 TABLET ORAL
Qty: 22 TAB | Refills: 0 | Status: SHIPPED | OUTPATIENT
Start: 2019-04-17 | End: 2019-04-24

## 2019-04-17 RX ORDER — SODIUM CHLORIDE 9 MG/ML
125 INJECTION, SOLUTION INTRAVENOUS ONCE
Status: COMPLETED | OUTPATIENT
Start: 2019-04-17 | End: 2019-04-18

## 2019-04-17 RX ORDER — CYCLOBENZAPRINE HCL 10 MG
10 TABLET ORAL
Status: COMPLETED | OUTPATIENT
Start: 2019-04-17 | End: 2019-04-17

## 2019-04-17 RX ADMIN — CYCLOBENZAPRINE HYDROCHLORIDE 10 MG: 10 TABLET, FILM COATED ORAL at 21:40

## 2019-04-17 RX ADMIN — SODIUM CHLORIDE 125 ML/HR: 900 INJECTION, SOLUTION INTRAVENOUS at 19:59

## 2019-04-17 NOTE — ED TRIAGE NOTES
Patient reports to ED with complaints of bilateral arm and leg tingling after bringing groceries in the house. Patient denying any other symptoms.

## 2019-04-17 NOTE — ED PROVIDER NOTES
Pt c/o tingling in b/l arms. Started after stressful day per pt. Says left baker's cyst dx by us of legs today done to rule out dvt. Pt says arms were achy, no current pain. Feet have been tingly for last few days. No weakness or numbness. No chest pain or sob. No abd pain. No back pain. No fever or chills. Past Medical History:  
Diagnosis Date  Acute idiopathic gout of right wrist 10/4/2017  Atrophic vaginitis  Cardiac cath 05/30/2012 Patent coronary arteries. LVEDP 20.  RA 11.  RV 42/10. PA 42/21. W 18.  CO 6.4 (TD), 6.4 (Gonzalez Kocher). PVR 1.7 Wood units. False-positive nuclear study.  Cardiac echocardiogram 05/11/2011 EF 65%. RVSP at least 35 mmHg. Mild IVCE. No significant valvular heart disease.  Cardiac nuclear imaging test 05/18/2012 Tech difficult. Apical ischemia. No infarction. EF 76%. No reg'l WMA. No TID.  Cardiovascular LLE venous duplex 06/17/2013 Left leg:  No DVT.  Diabetes   
 diet controlled, hypoglycemia from metformin previously  Dyslipidemia  Fatty liver  Fibrocystic breast disease  Fluid retention  GERD   
 H/O colonoscopy 4/12/13  
 polyp  Hypertension  Ill-defined condition   
 gout  Macular degeneration  Morbid obesity ([address]  Obstructive sleep apnea  Peripheral neuropathy  Rectocele  Thyroid cyst   
 bilat Past Surgical History:  
Procedure Laterality Date  HC CNNLA ARTERIAL ARTERIOTOMY 3M  5/25/2012  HX COLONOSCOPY  4/12/2013  HX HEART CATHETERIZATION  5/25/2012  HX HERNIA REPAIR    
 umbilical as a child  HX HYSTERECTOMY 50ys ago, QUIQUE, BSO  HX MOHS PROCEDURES    
 right  [place] ANESTH,SURGERY OF SHOULDER Family History:  
Problem Relation Age of Onset  Cancer Mother   
     colon cancer  Colon Cancer Mother  Hypertension Mother  Diabetes Mother  Heart Disease Mother  Coronary Artery Disease Mother  Hypertension Father  Diabetes Father  Heart Disease Father  Coronary Artery Disease Father  Hypertension Sister  Diabetes Sister  Heart Disease Sister  Coronary Artery Disease Sister  Hypertension Brother  Diabetes Brother  Heart Disease Brother  Coronary Artery Disease Brother Social History Socioeconomic History  Marital status: LEGALLY  Spouse name: Not on file  Number of children: Not on file  Years of education: Not on file  Highest education level: Not on file Occupational History  Not on file Social Needs  Financial resource strain: Not on file  Food insecurity:  
  Worry: Not on file Inability: Not on file  Transportation needs:  
  Medical: Not on file Non-medical: Not on file Tobacco Use  Smoking status: Former Smoker Last attempt to quit: 1974 Years since quittin.7  Smokeless tobacco: Never Used  Tobacco comment: 1 pack every 2 weeks x 1 year, stopped 45yrs ago Substance and Sexual Activity  Alcohol use: Yes  Drug use: No  
 Sexual activity: Yes  
  Partners: Male Lifestyle  Physical activity:  
  Days per week: Not on file Minutes per session: Not on file  Stress: Not on file Relationships  Social connections:  
  Talks on phone: Not on file Gets together: Not on file Attends Qu[employer] service: Not on file Active member of club or organization: Not on file Attends meetings of clubs or organizations: Not on file Relationship status: Not on file  Intimate partner violence:  
  Fear of current or ex partner: Not on file Emotionally abused: Not on file Physically abused: Not on file Forced sexual activity: Not on file Other Topics Concern  Not on file Social History Narrative  Not on file ALLERGIES: Latex; Nexium [esomeprazole magnesium];  Jannice Forth [dexlansoprazole]; Crestor [rosuvastatin]; Lipitor [atorvastatin]; Lisinopril; Niaspan [niacin]; Pcn [penicillins]; Pravachol [pravastatin]; Sulfa (sulfonamide antibiotics); and Zocor [simvastatin] Review of Systems Constitutional: Negative for fever. HENT: Negative for congestion. Respiratory: Negative for cough and shortness of breath. Cardiovascular: Negative for chest pain. Gastrointestinal: Negative for abdominal pain and vomiting. Musculoskeletal: Positive for myalgias. Negative for back pain. Skin: Negative for rash. Neurological: Negative for light-headedness. All other systems reviewed and are negative. Vitals:  
 04/1945 BP: 161/84 Pulse: 74 Resp: 16 Temp: 97.7 °F (36.5 °C) SpO2: 100% Weight: 115.2 kg (254 lb) Height: 5' 4\" (1.626 m) Physical Exam  
Constitutional: She is oriented to person, place, and time. She appears well-developed. HENT:  
Head: Normocephalic and atraumatic. Eyes: Pupils are equal, round, and reactive to light. Neck: Normal range of motion. Cardiovascular: Normal rate and regular rhythm. No murmur heard. Pulmonary/Chest: Effort normal. She has no wheezes. Abdominal: Soft. There is no tenderness. Musculoskeletal: She exhibits no tenderness. Neurological: She is alert and oriented to person, place, and time. She has normal strength. No sensory deficit. Skin: Skin is dry. Capillary refill takes less than 2 seconds. No rash noted. She is not diaphoretic. Psychiatric: She has a normal mood and affect. Nursing note and vitals reviewed. MDM Procedures Vitals: 
Patient Vitals for the past 12 hrs: 
 Temp Pulse Resp BP SpO2  
04/1945 97.7 °F (36.5 °C) 74 16 161/84 100 % Medications ordered:  
Medications  
0.9% sodium chloride infusion (125 mL/hr IntraVENous New Bag 4/17/19 1959) cyclobenzaprine (FLEXERIL) tablet 10 mg (10 mg Oral Given 4/17/19 2140) Lab findings: Recent Results (from the past 12 hour(s)) EKG, 12 LEAD, INITIAL Collection Time: 04/17/19  7:51 PM  
Result Value Ref Range Ventricular Rate 73 BPM  
 Atrial Rate 73 BPM  
 P-R Interval 194 ms QRS Duration 80 ms  
 Q-T Interval 362 ms QTC Calculation (Bezet) 398 ms Calculated P Axis 31 degrees Calculated R Axis 12 degrees Calculated T Axis 19 degrees Diagnosis Normal sinus rhythm T wave abnormality, consider anterior ischemia Abnormal ECG When compared with ECG of 26-DEC-2018 12:19, No significant change was found CBC WITH AUTOMATED DIFF Collection Time: 04/17/19  7:56 PM  
Result Value Ref Range WBC 9.6 4.6 - 13.2 K/uL  
 RBC 4.46 4.20 - 5.30 M/uL  
 HGB 13.1 12.0 - 16.0 g/dL HCT 40.5 35.0 - 45.0 % MCV 90.8 74.0 - 97.0 FL  
 MCH 29.4 24.0 - 34.0 PG  
 MCHC 32.3 31.0 - 37.0 g/dL  
 RDW 14.0 11.6 - 14.5 % PLATELET 005 944 - 557 K/uL MPV 11.1 9.2 - 11.8 FL  
 NEUTROPHILS 53 40 - 73 % LYMPHOCYTES 35 21 - 52 % MONOCYTES 8 3 - 10 % EOSINOPHILS 4 0 - 5 % BASOPHILS 0 0 - 2 %  
 ABS. NEUTROPHILS 5.1 1.8 - 8.0 K/UL  
 ABS. LYMPHOCYTES 3.4 0.9 - 3.6 K/UL  
 ABS. MONOCYTES 0.8 0.05 - 1.2 K/UL  
 ABS. EOSINOPHILS 0.3 0.0 - 0.4 K/UL  
 ABS. BASOPHILS 0.0 0.0 - 0.1 K/UL  
 DF AUTOMATED METABOLIC PANEL, BASIC Collection Time: 04/17/19  7:56 PM  
Result Value Ref Range Sodium 143 136 - 145 mmol/L Potassium 3.5 3.5 - 5.5 mmol/L Chloride 106 100 - 108 mmol/L  
 CO2 28 21 - 32 mmol/L Anion gap 9 3.0 - 18 mmol/L Glucose 138 (H) 74 - 99 mg/dL BUN 16 7.0 - 18 MG/DL Creatinine 1.02 0.6 - 1.3 MG/DL  
 BUN/Creatinine ratio 16 12 - 20 GFR est AA >60 >60 ml/min/1.73m2 GFR est non-AA 53 (L) >60 ml/min/1.73m2 Calcium 8.8 8.5 - 10.1 MG/DL  
HEPATIC FUNCTION PANEL Collection Time: 04/17/19  7:56 PM  
Result Value Ref Range Protein, total 7.8 6.4 - 8.2 g/dL Albumin 3.4 3.4 - 5.0 g/dL Globulin 4.4 (H) 2.0 - 4.0 g/dL A-G Ratio 0.8 0.8 - 1.7 Bilirubin, total 0.3 0.2 - 1.0 MG/DL Bilirubin, direct <0.1 0.0 - 0.2 MG/DL Alk. phosphatase 88 45 - 117 U/L  
 AST (SGOT) 32 15 - 37 U/L  
 ALT (SGPT) 17 13 - 56 U/L  
LIPASE Collection Time: 04/17/19  7:56 PM  
Result Value Ref Range Lipase 57 (L) 73 - 393 U/L  
CARDIAC PANEL,(CK, CKMB & TROPONIN) Collection Time: 04/17/19  7:56 PM  
Result Value Ref Range  26 - 192 U/L  
 CK - MB 1.2 <3.6 ng/ml CK-MB Index 0.9 0.0 - 4.0 % Troponin-I, QT <0.02 0.0 - 0.045 NG/ML  
CARDIAC PANEL,(CK, CKMB & TROPONIN) Collection Time: 04/17/19 11:09 PM  
Result Value Ref Range  26 - 192 U/L  
 CK - MB <1.0 <3.6 ng/ml CK-MB Index  0.0 - 4.0 % CALCULATION NOT PERFORMED WHEN RESULT IS BELOW LINEAR LIMIT Troponin-I, QT <0.02 0.0 - 0.045 NG/ML  
 
 
 
X-Ray, CT or other radiology findings or impressions: 
XR CHEST PORT    (Results Pending) Progress notes, Consult notes or additional Procedure notes:  
 
ekg w t wave inversion, unchanged c/w 12/26/18 
 
 
11:50 PM pt w no sx's after flexeril, says feels much better, declines further ed tx or obs. req dc. No emc. Stable for dc and close f/u. Detailed ret inst given. Pt neuro intact, not c/w cva/tia. No ekg changes, neg trop x 2. Pt improved w tx, agrees w dc plan. No ind for admit. Pt verb understanding of detailed ret inst.  
 
Diagnosis: 1. Paresthesia 2. Myalgia Disposition: home Follow-up Information Follow up With Specialties Details Why Contact Info Fe Brooks NP Nurse Practitioner Schedule an appointment as soon as possible for a visit in 2 days  [address] [facility] [address] [facility] [id number] [phone] [address] EMERGENCY DEPT Emergency Medicine Go to As needed [place] [id number] [phone] Patient's Medications Start Taking  CYCLOBENZAPRINE (FLEXERIL) 5 MG TABLET    Take 1-2 Tabs by mouth three (3) times daily as needed for Muscle Spasm(s) for up to 7 days. Continue Taking ALBUTEROL (PROVENTIL HFA, VENTOLIN HFA, PROAIR HFA) 90 MCG/ACTUATION INHALER    Take 1-2 Puffs by inhalation every four (4) hours as needed for Wheezing or Shortness of Breath. ASPIRIN 81 MG TAB    Take 81 mg by mouth daily. ATORVASTATIN CALCIUM (LIPITOR PO)    Take  by mouth. BLOOD-GLUCOSE METER (TRUE METRIX AIR GLUCOSE METER) MISC    Check blood sugar twice daily. CALCIUM PO    Take  by mouth. CHOLECALCIFEROL (VITAMIN D3) 1,000 UNIT TABLET    Take 1,000 Units by mouth daily. DICLOFENAC (VOLTAREN) 1 % GEL    Apply  to affected area four (4) times daily. FLUTICASONE (FLONASE) 50 MCG/ACTUATION NASAL SPRAY    Si spray in both nostril twice daily FUROSEMIDE (LASIX) 20 MG TABLET    Take 1 Tab by mouth two (2) times a day. GLUCOSE BLOOD VI TEST STRIPS (TRUE METRIX GLUCOSE TEST STRIP) STRIP    Check blood sugar twice daily. LANCETS (ULTRA THIN LANCETS) 30 GAUGE MISC    Check blood sugar twice daily. LOSARTAN (COZAAR) 50 MG TABLET    take 2 tablets by mouth once daily for hypertension **(REQUEST FOR 90 DAY RX FOR PATIENT) MULTIVITAMINS (MULTI-VITAMIN PO)    Take 1 Tab by mouth daily. POTASSIUM CHLORIDE (KLOR-CON) 10 MEQ TABLET    TAKE 2 TABLETS TWICE DAILY  
 RANITIDINE (ZANTAC) 300 MG TABLET    take 1 tablet by mouth at bedtime These Medications have changed No medications on file Stop Taking No medications on file

## 2019-04-18 ENCOUNTER — TELEPHONE (OUTPATIENT)
Dept: FAMILY MEDICINE CLINIC | Age: 74
End: 2019-04-18

## 2019-04-18 ENCOUNTER — DOCUMENTATION ONLY (OUTPATIENT)
Dept: VASCULAR SURGERY | Age: 74
End: 2019-04-18

## 2019-04-18 VITALS
BODY MASS INDEX: 43.36 KG/M2 | TEMPERATURE: 97.7 F | WEIGHT: 254 LBS | OXYGEN SATURATION: 97 % | SYSTOLIC BLOOD PRESSURE: 128 MMHG | DIASTOLIC BLOOD PRESSURE: 75 MMHG | RESPIRATION RATE: 21 BRPM | HEART RATE: 66 BPM | HEIGHT: 64 IN

## 2019-04-18 LAB
ATRIAL RATE: 73 BPM
CALCULATED P AXIS, ECG09: 31 DEGREES
CALCULATED R AXIS, ECG10: 12 DEGREES
CALCULATED T AXIS, ECG11: 19 DEGREES
DIAGNOSIS, 93000: NORMAL
P-R INTERVAL, ECG05: 194 MS
Q-T INTERVAL, ECG07: 362 MS
QRS DURATION, ECG06: 80 MS
QTC CALCULATION (BEZET), ECG08: 398 MS
VENTRICULAR RATE, ECG03: 73 BPM

## 2019-04-18 NOTE — PROGRESS NOTES
I contacted and spoke with patient by phone to let her know the results that her venous Doppler study, just like the others, was negative for any DVT    Of note was that she had a fair amount of fluid on the knee, consistent with a Baker's cyst.  She also had her hip x-rays obtained as ordered by her primary care which it does show that she has some degenerative arthritis of her hip as well. So I suggested that she should try to arrange and see orthopedics.   I gave her contact number for the [facility] secure orthopedic specialist office

## 2019-04-18 NOTE — TELEPHONE ENCOUNTER
Received request from Pee Moore for refill on atorvastatin however this was discontinued due to allergic response. Spoke with Pt and she states she was the one who took herself off of atorvastatin. Pt states she has since started it again. Pt states she has an appointment with ALESIA Gonzalez next Thursday and made aware she will need to discuss taking this medication again with PCP at that time.

## 2019-04-18 NOTE — DISCHARGE INSTRUCTIONS
Return for pain, fever not resolving with motrin or tylenol, shortness of breath, vomiting, decreased fluid intake, weakness, numbness, dizziness, or any change or concerns. Patient Education        Numbness and Tingling: Care Instructions  Your Care Instructions    Many things can cause numbness or tingling. Swelling may put pressure on a nerve. This could cause you to lose feeling or have a pins-and-needles sensation on part of your body. Nerves may be damaged from trauma, toxins, or diseases, such as diabetes or multiple sclerosis (MS). Sometimes, though, the cause is not clear. If there is no clear reason for your symptoms, and you are not having any other symptoms, your doctor may suggest watching and waiting for a while to see if the numbness or tingling goes away on its own. Your doctor may want you to have blood or nerve tests to find the cause of your symptoms. Follow-up care is a key part of your treatment and safety. Be sure to make and go to all appointments, and call your doctor if you are having problems. It's also a good idea to know your test results and keep a list of the medicines you take. How can you care for yourself at home? · If your doctor prescribes medicine, take it exactly as directed. Call your doctor if you think you are having a problem with your medicine. · If you have any swelling, put ice or a cold pack on the area for 10 to 20 minutes at a time. Put a thin cloth between the ice and your skin. When should you call for help? Call 911 anytime you think you may need emergency care. For example, call if:    · You have weakness, numbness, or tingling in both legs.     · You lose bowel or bladder control.     · You have symptoms of a stroke. These may include:  ? Sudden numbness, tingling, weakness, or loss of movement in your face, arm, or leg, especially on only one side of your body. ? Sudden vision changes. ? Sudden trouble speaking.   ? Sudden confusion or trouble understanding simple statements. ? Sudden problems with walking or balance. ? A sudden, severe headache that is different from past headaches.    Watch closely for changes in your health, and be sure to contact your doctor if you have any problems, or if:    · You do not get better as expected. Where can you learn more? Go to http://sonia-moira.info/. Enter X170 in the search box to learn more about \"Numbness and Tingling: Care Instructions. \"  Current as of: Jaclyn 3, 2018  Content Version: 11.9  © 3246-8394 [employer]. Care instructions adapted under license by [employer] (which disclaims liability or warranty for this information). If you have questions about a medical condition or this instruction, always ask your healthcare professional. [address] any warranty or liability for your use of this information.

## 2019-04-23 ENCOUNTER — OFFICE VISIT (OUTPATIENT)
Dept: CARDIOLOGY CLINIC | Age: 74
End: 2019-04-23

## 2019-04-23 VITALS
HEIGHT: 64 IN | DIASTOLIC BLOOD PRESSURE: 90 MMHG | BODY MASS INDEX: 43.36 KG/M2 | SYSTOLIC BLOOD PRESSURE: 140 MMHG | HEART RATE: 69 BPM | OXYGEN SATURATION: 98 % | WEIGHT: 254 LBS

## 2019-04-23 DIAGNOSIS — R00.2 PALPITATIONS: Primary | ICD-10-CM

## 2019-04-23 DIAGNOSIS — I11.9 BENIGN HYPERTENSIVE HEART DISEASE WITHOUT HEART FAILURE: ICD-10-CM

## 2019-04-23 DIAGNOSIS — E78.5 DYSLIPIDEMIA: ICD-10-CM

## 2019-04-23 RX ORDER — METOPROLOL SUCCINATE 50 MG/1
50 TABLET, EXTENDED RELEASE ORAL DAILY
Qty: 30 TAB | Refills: 6 | Status: SHIPPED | OUTPATIENT
Start: 2019-04-23 | End: 2019-06-12 | Stop reason: SDUPTHER

## 2019-04-23 NOTE — PATIENT INSTRUCTIONS
Start Toprol XL 50 mg daily Stop Lipitor d/t possible allergiez call back in 2 weeks, we may start Crestor at that time Return 6 month

## 2019-04-25 ENCOUNTER — OFFICE VISIT (OUTPATIENT)
Dept: FAMILY MEDICINE CLINIC | Age: 74
End: 2019-04-25

## 2019-04-25 ENCOUNTER — TELEPHONE (OUTPATIENT)
Dept: FAMILY MEDICINE CLINIC | Age: 74
End: 2019-04-25

## 2019-04-25 VITALS
WEIGHT: 252.4 LBS | DIASTOLIC BLOOD PRESSURE: 84 MMHG | HEART RATE: 67 BPM | TEMPERATURE: 97.6 F | BODY MASS INDEX: 43.09 KG/M2 | RESPIRATION RATE: 16 BRPM | OXYGEN SATURATION: 98 % | HEIGHT: 64 IN | SYSTOLIC BLOOD PRESSURE: 139 MMHG

## 2019-04-25 DIAGNOSIS — M79.89 PAIN AND SWELLING OF LEFT LOWER LEG: Primary | ICD-10-CM

## 2019-04-25 DIAGNOSIS — M79.662 PAIN AND SWELLING OF LEFT LOWER LEG: Primary | ICD-10-CM

## 2019-04-25 NOTE — PROGRESS NOTES
Pt is here for 6 week follow up L leg numbness    1. Have you been to the ER, urgent care clinic since your last visit? Hospitalized since your last visit? Yes [facility] ED 4/17/19 paresthesia, myalgia    2. Have you seen or consulted any other health care providers outside of the [address] [place] [address] since your last visit? Include any pap smears or colon screening.  No

## 2019-04-25 NOTE — TELEPHONE ENCOUNTER
----- Message from Loly Sheriff NP sent at 4/18/2019  8:23 PM EDT -----  Please advise Pt that her x-ray shows mild degenerative disease and moderate osteoarthritis.

## 2019-04-25 NOTE — PROGRESS NOTES
HISTORY OF PRESENT ILLNESS  Rachel Garcia is a 66 y.o. female. Patient states numbness continues to improve. Reports she has minimal discomfort. Comments discoloration has also improved. Allergies   Allergen Reactions    Latex Rash    Nexium [Esomeprazole Magnesium] Swelling and Other (comments)     Lips Swollen, and facial rash and swelling    Dexilant [Dexlansoprazole] Other (comments)     Stomach swelling    Crestor [Rosuvastatin] Other (comments)     Upper respiratory illness    Lipitor [Atorvastatin] Swelling    Lisinopril Unknown (comments)     Patient can't recall    Niaspan [Niacin] Other (comments)     Scratchy throat, \"asthma\" like symptoms    Pcn [Penicillins] Hives    Pravachol [Pravastatin] Myalgia    Sulfa (Sulfonamide Antibiotics) Rash    Zocor [Simvastatin] Myalgia and Other (comments)     Per patient chart \"(? Rash)\"     Current Outpatient Medications   Medication Sig Dispense Refill    metoprolol succinate (TOPROL-XL) 50 mg XL tablet Take 1 Tab by mouth daily. 30 Tab 6    furosemide (LASIX) 20 mg tablet Take 1 Tab by mouth two (2) times a day. 90 Tab 1    Blood-Glucose Meter (TRUE METRIX AIR GLUCOSE METER) misc Check blood sugar twice daily. 1 Each 0    glucose blood VI test strips (TRUE METRIX GLUCOSE TEST STRIP) strip Check blood sugar twice daily. 100 Strip 2    lancets (ULTRA THIN LANCETS) 30 gauge misc Check blood sugar twice daily. 1 Package 2    losartan (COZAAR) 50 mg tablet take 2 tablets by mouth once daily for hypertension **(REQUEST FOR 90 DAY RX FOR PATIENT) 180 Tab 3    diclofenac (VOLTAREN) 1 % gel Apply  to affected area four (4) times daily. 100 g 1    CALCIUM PO Take  by mouth.  albuterol (PROVENTIL HFA, VENTOLIN HFA, PROAIR HFA) 90 mcg/actuation inhaler Take 1-2 Puffs by inhalation every four (4) hours as needed for Wheezing or Shortness of Breath.  1 Inhaler 1    fluticasone (FLONASE) 50 mcg/actuation nasal spray Si spray in both nostril twice daily 1 Bottle 1    raNITIdine (ZANTAC) 300 mg tablet take 1 tablet by mouth at bedtime  0    potassium chloride (KLOR-CON) 10 mEq tablet TAKE 2 TABLETS TWICE DAILY 360 Tab 1    cholecalciferol (VITAMIN D3) 1,000 unit tablet Take 1,000 Units by mouth daily.  aspirin 81 mg Tab Take 81 mg by mouth daily.  MULTIVITAMINS (MULTI-VITAMIN PO) Take 1 Tab by mouth daily. Past Medical History:   Diagnosis Date    Acute idiopathic gout of right wrist 10/4/2017    Atrophic vaginitis     Cardiac cath 05/30/2012    Patent coronary arteries. LVEDP 20.  RA 11.  RV 42/10. PA 42/21. W 18.  CO 6.4 (TD), 6.4 ([place]). PVR 1.7 Wood units. False-positive nuclear study.  Cardiac echocardiogram 05/11/2011    EF 65%. RVSP at least 35 mmHg. Mild IVCE. No significant valvular heart disease.  Cardiac nuclear imaging test 05/18/2012    Tech difficult. Apical ischemia. No infarction. EF 76%. No reg'l WMA. No TID.  Cardiovascular LLE venous duplex 06/17/2013    Left leg:  No DVT.     Diabetes     diet controlled, hypoglycemia from metformin previously     Dyslipidemia     Fatty liver     Fibrocystic breast disease     Fluid retention     GERD     H/O colonoscopy 4/12/13    polyp    Hypertension     Ill-defined condition     gout    Macular degeneration     Morbid obesity ([address]     Obstructive sleep apnea     Peripheral neuropathy     Rectocele     Thyroid cyst     bilat     Social History     Socioeconomic History    Marital status: LEGALLY      Spouse name: Not on file    Number of children: Not on file    Years of education: Not on file    Highest education level: Not on file   Occupational History    Not on file   Social Needs    Financial resource strain: Not on file    Food insecurity:     Worry: Not on file     Inability: Not on file    Transportation needs:     Medical: Not on file     Non-medical: Not on file   Tobacco Use    Smoking status: Former Smoker     Last attempt to quit: 1974     Years since quittin.8    Smokeless tobacco: Never Used    Tobacco comment: 1 pack every 2 weeks x 1 year, stopped 45yrs ago   Substance and Sexual Activity    Alcohol use: Yes    Drug use: No    Sexual activity: Yes     Partners: Male   Lifestyle    Physical activity:     Days per week: Not on file     Minutes per session: Not on file    Stress: Not on file   Relationships    Social connections:     Talks on phone: Not on file     Gets together: Not on file     Attends Tenriism service: Not on file     Active member of club or organization: Not on file     Attends meetings of clubs or organizations: Not on file     Relationship status: Not on file    Intimate partner violence:     Fear of current or ex partner: Not on file     Emotionally abused: Not on file     Physically abused: Not on file     Forced sexual activity: Not on file   Other Topics Concern    Not on file   Social History Narrative    Not on file     Review of Systems   Constitutional: Negative for chills and fever. Musculoskeletal: Negative for joint pain. /84 (BP 1 Location: Left arm)   Pulse 67   Temp 97.6 °F (36.4 °C) (Oral)   Resp 16   Ht 5' 4\" (1.626 m)   Wt 252 lb 6.4 oz (114.5 kg)   SpO2 98%   BMI 43.32 kg/m²   Physical Exam   Constitutional: She appears well-developed and well-nourished. No distress. HENT:   Head: Normocephalic and atraumatic. Neck: Normal range of motion. Neck supple. Cardiovascular: Normal rate, regular rhythm and normal heart sounds. Exam reveals no gallop and no friction rub. No murmur heard. Pulmonary/Chest: Effort normal and breath sounds normal. She has no wheezes. She has no rhonchi. She has no rales. Musculoskeletal:        Left lower leg: She exhibits no tenderness. Lymphadenopathy:     She has no cervical adenopathy. Skin: Skin is warm and dry. ASSESSMENT and PLAN    ICD-10-CM ICD-9-CM    1.  Pain and swelling of left lower leg M79.662 729.5     M79.89 729.81      Above improving  I have discussed the diagnosis with the patient and the intended plan as seen in the above orders. The patient has received an after-visit summary and questions were answered concerning future plans. I have discussed medication side effects and warnings with the patient as well. Patient agreeable with above plan and verbalizes understanding. Follow-up and Dispositions    · Return in about 2 months (around 6/25/2019) for DM/HTN.

## 2019-05-08 ENCOUNTER — TELEPHONE (OUTPATIENT)
Dept: FAMILY MEDICINE CLINIC | Age: 74
End: 2019-05-08

## 2019-05-08 NOTE — TELEPHONE ENCOUNTER
Patient calling stated she thinks she needs to get a MRI done on her leg it is getting numb again.  Patient would like a call back from the nurse     pls advise

## 2019-05-09 DIAGNOSIS — R20.2 PARESTHESIA OF LEFT LEG: Primary | ICD-10-CM

## 2019-05-09 NOTE — TELEPHONE ENCOUNTER
Pt was supposed to be scheduled for an EMG based on an order placed 3/5/19.  for scheduling to return call concerning Pt's order for an EMG.

## 2019-05-09 NOTE — TELEPHONE ENCOUNTER
Referral placed for EMG of L leg for paresthesia to Dr Eva Nagel. LM for Pt that neurology referral was placed for Pt to have EMG & that office will contact to schedule her appointment.

## 2019-05-14 DIAGNOSIS — E87.6 HYPOKALEMIA: ICD-10-CM

## 2019-05-14 RX ORDER — POTASSIUM CHLORIDE 750 MG/1
TABLET, EXTENDED RELEASE ORAL
Qty: 360 TAB | Refills: 1 | Status: SHIPPED | OUTPATIENT
Start: 2019-05-14 | End: 2019-06-04 | Stop reason: SDUPTHER

## 2019-05-14 NOTE — TELEPHONE ENCOUNTER
Requested Prescriptions     Pending Prescriptions Disp Refills    potassium chloride (KLOR-CON) 10 mEq tablet 360 Tab 1     Patient states she is out of medication and would like it sent to her local pharmacy for 30. She also would like to have a script sent in to her mail order pharmacy through her [address].

## 2019-05-15 ENCOUNTER — TELEPHONE (OUTPATIENT)
Dept: CARDIOLOGY CLINIC | Age: 74
End: 2019-05-15

## 2019-05-15 DIAGNOSIS — E78.5 DYSLIPIDEMIA: Primary | ICD-10-CM

## 2019-05-15 NOTE — TELEPHONE ENCOUNTER
Patient called stating swellling around mouth has subsided since stopping Lipitor x 2 month, patient has an allergy to Crestor and a reaction to Zetia. Please advise.   Thank you

## 2019-05-17 ENCOUNTER — HOSPITAL ENCOUNTER (OUTPATIENT)
Dept: ULTRASOUND IMAGING | Age: 74
Discharge: HOME | End: 2019-05-17
Payer: MEDICARE

## 2019-05-17 DIAGNOSIS — E04.2 MULTIPLE THYROID NODULES: ICD-10-CM

## 2019-05-17 PROCEDURE — 76536 US EXAM OF HEAD AND NECK: CPT

## 2019-05-20 NOTE — TELEPHONE ENCOUNTER
I would make sure her chart reflects allergies to Zetia and since she can't take any statin drugs, I would just encourage diet. Her cholesterol is a little elevated but not markedly so with an LDL at 111. When she did have a heart catheterization she did not have significant obstructive disease so I do not think we would be able to get her approved for a PCSK9 inhibitor and so I would just encourage as much of a whole foods plant-based diet as possible and minimizing milk, eggs, cheese, meats and processed foods as much as is tolerable. Please let her know.  ES

## 2019-05-24 ENCOUNTER — OFFICE VISIT (OUTPATIENT)
Dept: SURGERY | Age: 74
End: 2019-05-24

## 2019-05-24 VITALS
RESPIRATION RATE: 18 BRPM | OXYGEN SATURATION: 95 % | WEIGHT: 248 LBS | DIASTOLIC BLOOD PRESSURE: 80 MMHG | HEIGHT: 64 IN | SYSTOLIC BLOOD PRESSURE: 127 MMHG | BODY MASS INDEX: 42.34 KG/M2 | HEART RATE: 64 BPM | TEMPERATURE: 96.8 F

## 2019-05-24 DIAGNOSIS — E04.2 MULTIPLE THYROID NODULES: Primary | ICD-10-CM

## 2019-05-24 NOTE — PROGRESS NOTES
Progress Note    Patient: Noemí Gaines  MRN: [id number]  SSN: [id number]   YOB: 1945  Age: 66 y.o. Sex: female     Chief Complaint   Patient presents with    Follow-up     multinofular thyroid. f/u US of thyroid. HPI    Ms. Carl Marvin is a 57-year-old but I follow for bilateral small thyroid nodules. She is here with her ultrasound this year that shows stability in her nodules from last year. She is got no endocrine type complaints with hyper or hypothyroidism. Her TSH in February was normal.  We will follow her up one more time in a year to prove stability of these nodules. Past Medical History:   Diagnosis Date    Acute idiopathic gout of right wrist 10/4/2017    Atrophic vaginitis     Cardiac cath 05/30/2012    Patent coronary arteries. LVEDP 20.  RA 11.  RV 42/10. PA 42/21. W 18.  CO 6.4 (TD), 6.4 ([place]). PVR 1.7 Wood units. False-positive nuclear study.  Cardiac echocardiogram 05/11/2011    EF 65%. RVSP at least 35 mmHg. Mild IVCE. No significant valvular heart disease.  Cardiac nuclear imaging test 05/18/2012    Tech difficult. Apical ischemia. No infarction. EF 76%. No reg'l WMA. No TID.  Cardiovascular LLE venous duplex 06/17/2013    Left leg:  No DVT.     Diabetes     diet controlled, hypoglycemia from metformin previously     Dyslipidemia     Fatty liver     Fibrocystic breast disease     Fluid retention     GERD     H/O colonoscopy 4/12/13    polyp    Hypertension     Ill-defined condition     gout    Macular degeneration     Morbid obesity ([address]     Obstructive sleep apnea     Peripheral neuropathy     Rectocele     Thyroid cyst     bilat     Past Surgical History:   Procedure Laterality Date    [facility]. CNNLA ARTERIAL ARTERIOTOMY 3M  5/25/2012    HX COLONOSCOPY  4/12/2013    HX HEART CATHETERIZATION  5/25/2012    HX HERNIA REPAIR      umbilical as a child    HX HYSTERECTOMY      50ys ago, QUIQUE, BSO    HX MOHS PROCEDURES      right    ID ANESTH,SURGERY OF SHOULDER       Allergies   Allergen Reactions    Latex Rash    Nexium [Esomeprazole Magnesium] Swelling and Other (comments)     Lips Swollen, and facial rash and swelling    Dexilant [Dexlansoprazole] Other (comments)     Stomach swelling    Crestor [Rosuvastatin] Other (comments)     Upper respiratory illness    Lipitor [Atorvastatin] Swelling    Lisinopril Unknown (comments)     Patient can't recall    Niaspan [Niacin] Other (comments)     Scratchy throat, \"asthma\" like symptoms    Pcn [Penicillins] Hives    Pravachol [Pravastatin] Myalgia    Sulfa (Sulfonamide Antibiotics) Rash    Zocor [Simvastatin] Myalgia and Other (comments)     Per patient chart \"(? Rash)\"     Current Outpatient Medications   Medication Sig Dispense Refill    potassium chloride (KLOR-CON) 10 mEq tablet TAKE 2 TABLETS TWICE DAILY 360 Tab 1    metoprolol succinate (TOPROL-XL) 50 mg XL tablet Take 1 Tab by mouth daily. 30 Tab 6    furosemide (LASIX) 20 mg tablet Take 1 Tab by mouth two (2) times a day. 90 Tab 1    Blood-Glucose Meter (TRUE METRIX AIR GLUCOSE METER) misc Check blood sugar twice daily. 1 Each 0    glucose blood VI test strips (TRUE METRIX GLUCOSE TEST STRIP) strip Check blood sugar twice daily. 100 Strip 2    lancets (ULTRA THIN LANCETS) 30 gauge misc Check blood sugar twice daily. 1 Package 2    losartan (COZAAR) 50 mg tablet take 2 tablets by mouth once daily for hypertension **(REQUEST FOR 90 DAY RX FOR PATIENT) 180 Tab 3    CALCIUM PO Take  by mouth.  fluticasone (FLONASE) 50 mcg/actuation nasal spray Si spray in both nostril twice daily 1 Bottle 1    raNITIdine (ZANTAC) 300 mg tablet take 1 tablet by mouth at bedtime  0    cholecalciferol (VITAMIN D3) 1,000 unit tablet Take 1,000 Units by mouth daily.  aspirin 81 mg Tab Take 81 mg by mouth daily.  MULTIVITAMINS (MULTI-VITAMIN PO) Take 1 Tab by mouth daily.       diclofenac (VOLTAREN) 1 % gel Apply  to affected area four (4) times daily. 100 g 1    albuterol (PROVENTIL HFA, VENTOLIN HFA, PROAIR HFA) 90 mcg/actuation inhaler Take 1-2 Puffs by inhalation every four (4) hours as needed for Wheezing or Shortness of Breath. 1 Inhaler 1     Social History     Socioeconomic History    Marital status: LEGALLY      Spouse name: Not on file    Number of children: Not on file    Years of education: Not on file    Highest education level: Not on file   Occupational History    Not on file   Social Needs    Financial resource strain: Not on file    Food insecurity:     Worry: Not on file     Inability: Not on file    Transportation needs:     Medical: Not on file     Non-medical: Not on file   Tobacco Use    Smoking status: Former Smoker     Last attempt to quit: 1974     Years since quittin.8    Smokeless tobacco: Never Used    Tobacco comment: 1 pack every 2 weeks x 1 year, stopped 45yrs ago   Substance and Sexual Activity    Alcohol use:  Yes    Drug use: No    Sexual activity: Yes     Partners: Male   Lifestyle    Physical activity:     Days per week: Not on file     Minutes per session: Not on file    Stress: Not on file   Relationships    Social connections:     Talks on phone: Not on file     Gets together: Not on file     Attends Mu-ism service: Not on file     Active member of club or organization: Not on file     Attends meetings of clubs or organizations: Not on file     Relationship status: Not on file    Intimate partner violence:     Fear of current or ex partner: Not on file     Emotionally abused: Not on file     Physically abused: Not on file     Forced sexual activity: Not on file   Other Topics Concern    Not on file   Social History Narrative    Not on file     Family History   Problem Relation Age of Onset    Cancer Mother         colon cancer    Colon Cancer Mother     Hypertension Mother     Diabetes Mother     Heart Disease Mother     Coronary Artery Disease Mother  Hypertension Father     Diabetes Father     Heart Disease Father     Coronary Artery Disease Father     Hypertension Sister     Diabetes Sister     Heart Disease Sister     Coronary Artery Disease Sister     Hypertension Brother     Diabetes Brother     Heart Disease Brother     Coronary Artery Disease Brother          Review of systems:  Patient denies any reflux, emesis, abdominal pain, change in bowel habits, hematochezia, melena, fever, weight loss, fatigue chills, dermatitis, abnormal moles, change in vision, vertigo, epistaxis, dysphagia, hoarseness, chest pain, palpitations, hypertension, edema, cough, shortness of breath, wheezing, hemoptysis, snoring, hematuria, diabetes, thyroid disease, anemia, bruising, history of blood transfusion, dizziness, headache, or fainting. Physical Examination    Well developed well nourished female in no apparent distress  Visit Vitals  /80   Pulse 64   Temp 96.8 °F (36 °C) (Oral)   Resp 18   Ht 5' 4\" (1.626 m)   Wt 112.5 kg (248 lb)   SpO2 95%   BMI 42.57 kg/m²      Head: normocephalic, atraumatic  Mouth: Clear, no overt lesions, oral mucosa pink and moist  Neck: supple, no masses, no adenopathy or carotid bruits, trachea midline  Resp: clear to auscultation bilaterally, no wheeze, rhonchi or rales, excursions normal and symmetrical  Cardio: Regular rate and rhythm, no murmurs, clicks, gallops or rubs, no edema or varicosities  Abdomen: soft, nontender, nondistended, normoactive bowel sounds, no hernias, no hepatosplenomegaly,   Back: Deferred  Extremeties: warm, well-perfused, no tenderness or swelling, normal gait/station  Neuro: sensation and strength grossly intact and symmetrical  Psych: alert and oriented to person, place and time  Breast exam deferred    IMPRESSION  Small bilateral thyroid nodules    PLAN  No orders of the defined types were placed in this encounter.     Follow-up in 1 year with ultrasound  David Recinos MD

## 2019-05-24 NOTE — PROGRESS NOTES
Chief Complaint   Patient presents with    Follow-up     multinofular thyroid. f/u US of thyroid. Pt states not having any issues at this time.

## 2019-06-04 DIAGNOSIS — E87.6 HYPOKALEMIA: ICD-10-CM

## 2019-06-04 RX ORDER — POTASSIUM CHLORIDE 750 MG/1
TABLET, EXTENDED RELEASE ORAL
Qty: 360 TAB | Refills: 1 | Status: SHIPPED | OUTPATIENT
Start: 2019-06-04 | End: 2019-06-12 | Stop reason: SDUPTHER

## 2019-06-07 DIAGNOSIS — R60.9 PERIPHERAL EDEMA: ICD-10-CM

## 2019-06-07 DIAGNOSIS — I11.9 BENIGN HYPERTENSIVE HEART DISEASE WITHOUT HEART FAILURE: ICD-10-CM

## 2019-06-07 NOTE — TELEPHONE ENCOUNTER
Requested Prescriptions     Pending Prescriptions Disp Refills    furosemide (LASIX) 20 mg tablet 90 Tab 1     Sig: Take 1 Tab by mouth two (2) times a day.

## 2019-06-09 RX ORDER — FUROSEMIDE 20 MG/1
20 TABLET ORAL 2 TIMES DAILY
Qty: 90 TAB | Refills: 1 | Status: SHIPPED | OUTPATIENT
Start: 2019-06-09 | End: 2019-06-10 | Stop reason: SDUPTHER

## 2019-06-10 DIAGNOSIS — I11.9 BENIGN HYPERTENSIVE HEART DISEASE WITHOUT HEART FAILURE: ICD-10-CM

## 2019-06-10 DIAGNOSIS — R60.9 PERIPHERAL EDEMA: ICD-10-CM

## 2019-06-10 RX ORDER — FUROSEMIDE 20 MG/1
20 TABLET ORAL 2 TIMES DAILY
Qty: 20 TAB | Refills: 0 | Status: SHIPPED | OUTPATIENT
Start: 2019-06-10 | End: 2019-06-12 | Stop reason: SDUPTHER

## 2019-06-10 NOTE — TELEPHONE ENCOUNTER
Patient calling would like to know if she can have a short supply of her lasix sent to her local pharmacy until she receives her order from her mail order pharmacy [address] [place] [address]     pls advise

## 2019-06-11 ENCOUNTER — OFFICE VISIT (OUTPATIENT)
Dept: NEUROLOGY | Age: 74
End: 2019-06-11

## 2019-06-11 VITALS
SYSTOLIC BLOOD PRESSURE: 130 MMHG | RESPIRATION RATE: 17 BRPM | DIASTOLIC BLOOD PRESSURE: 85 MMHG | OXYGEN SATURATION: 98 % | BODY MASS INDEX: 45.08 KG/M2 | TEMPERATURE: 96.8 F | WEIGHT: 245 LBS | HEART RATE: 60 BPM | HEIGHT: 62 IN

## 2019-06-11 DIAGNOSIS — E11.42 DIABETIC POLYNEUROPATHY ASSOCIATED WITH TYPE 2 DIABETES MELLITUS (HCC): Primary | ICD-10-CM

## 2019-06-11 NOTE — PROGRESS NOTES
Angus Harris presents today for   Chief Complaint   Patient presents with    Procedure     EMG LLE       Is someone accompanying this pt? No    Is the patient using any DME equipment during OV? No    Are there any discrepancies in patient's vital signs?   No

## 2019-06-11 NOTE — PROGRESS NOTES
HPI:  67 y/o female referred by Aleshia Odonnell NP/Brigida Vyas NP for evaluation of left leg numbness. She had a fall late last year in December, where she reportedly made a split that hurt her leg in a way that resulted in her whole leg becoming painful and black and blue. She eventually recovered from this, but she was left with a numbness that is restricted to the left anterior tibial area from below the knee down the anterior lateral aspect of the tibia almost down to the ankle. The numbness does not extend into the foot. She has no numbness anywhere else. She has no pain. She reports that this has been improving over time. She has been prediabetic for 8 years. Her nerve conduction studies showed a moderate sensorimotor polyneuropathy with axonal loss (reported included in this encounter). EMG was deferred due to expected low yield. Social History     Socioeconomic History    Marital status: LEGALLY      Spouse name: Not on file    Number of children: Not on file    Years of education: Not on file    Highest education level: Not on file   Occupational History    Not on file   Social Needs    Financial resource strain: Not on file    Food insecurity:     Worry: Not on file     Inability: Not on file    Transportation needs:     Medical: Not on file     Non-medical: Not on file   Tobacco Use    Smoking status: Former Smoker     Last attempt to quit: 1974     Years since quittin.9    Smokeless tobacco: Never Used    Tobacco comment: 1 pack every 2 weeks x 1 year, stopped 45yrs ago   Substance and Sexual Activity    Alcohol use:  Yes    Drug use: No    Sexual activity: Yes     Partners: Male   Lifestyle    Physical activity:     Days per week: Not on file     Minutes per session: Not on file    Stress: Not on file   Relationships    Social connections:     Talks on phone: Not on file     Gets together: Not on file     Attends Che[employer] service: Not on file Active member of club or organization: Not on file     Attends meetings of clubs or organizations: Not on file     Relationship status: Not on file    Intimate partner violence:     Fear of current or ex partner: Not on file     Emotionally abused: Not on file     Physically abused: Not on file     Forced sexual activity: Not on file   Other Topics Concern    Not on file   Social History Narrative    Not on file       Family History   Problem Relation Age of Onset    Cancer Mother         colon cancer    Colon Cancer Mother     Hypertension Mother     Diabetes Mother     Heart Disease Mother     Coronary Artery Disease Mother     Hypertension Father     Diabetes Father     Heart Disease Father     Coronary Artery Disease Father     Hypertension Sister     Diabetes Sister     Heart Disease Sister     Coronary Artery Disease Sister     Hypertension Brother     Diabetes Brother     Heart Disease Brother     Coronary Artery Disease Brother        Current Outpatient Medications   Medication Sig Dispense Refill    furosemide (LASIX) 20 mg tablet Take 1 Tab by mouth two (2) times a day. 20 Tab 0    potassium chloride (KLOR-CON) 10 mEq tablet TAKE 2 TABLETS TWICE DAILY 360 Tab 1    metoprolol succinate (TOPROL-XL) 50 mg XL tablet Take 1 Tab by mouth daily. 30 Tab 6    Blood-Glucose Meter (TRUE METRIX AIR GLUCOSE METER) misc Check blood sugar twice daily. 1 Each 0    glucose blood VI test strips (TRUE METRIX GLUCOSE TEST STRIP) strip Check blood sugar twice daily. 100 Strip 2    lancets (ULTRA THIN LANCETS) 30 gauge misc Check blood sugar twice daily. 1 Package 2    losartan (COZAAR) 50 mg tablet take 2 tablets by mouth once daily for hypertension **(REQUEST FOR 90 DAY RX FOR PATIENT) 180 Tab 3    CALCIUM PO Take  by mouth.       fluticasone (FLONASE) 50 mcg/actuation nasal spray Si spray in both nostril twice daily 1 Bottle 1    raNITIdine (ZANTAC) 300 mg tablet take 1 tablet by mouth at bedtime  0    cholecalciferol (VITAMIN D3) 1,000 unit tablet Take 1,000 Units by mouth daily.  aspirin 81 mg Tab Take 81 mg by mouth daily.  MULTIVITAMINS (MULTI-VITAMIN PO) Take 1 Tab by mouth daily.  diclofenac (VOLTAREN) 1 % gel Apply  to affected area four (4) times daily. 100 g 1    albuterol (PROVENTIL HFA, VENTOLIN HFA, PROAIR HFA) 90 mcg/actuation inhaler Take 1-2 Puffs by inhalation every four (4) hours as needed for Wheezing or Shortness of Breath. 1 Inhaler 1       Past Medical History:   Diagnosis Date    Acute idiopathic gout of right wrist 10/4/2017    Atrophic vaginitis     Cardiac cath 05/30/2012    Patent coronary arteries. LVEDP 20.  RA 11.  RV 42/10. PA 42/21. W 18.  CO 6.4 (TD), 6.4 (Delberta Hopper). PVR 1.7 Wood units. False-positive nuclear study.  Cardiac echocardiogram 05/11/2011    EF 65%. RVSP at least 35 mmHg. Mild IVCE. No significant valvular heart disease.  Cardiac nuclear imaging test 05/18/2012    Tech difficult. Apical ischemia. No infarction. EF 76%. No reg'l WMA. No TID.  Cardiovascular LLE venous duplex 06/17/2013    Left leg:  No DVT.     Diabetes     diet controlled, hypoglycemia from metformin previously     Dyslipidemia     Fatty liver     Fibrocystic breast disease     Fluid retention     GERD     H/O colonoscopy 4/12/13    polyp    Hypertension     Ill-defined condition     gout    Macular degeneration     Morbid obesity ([address]     Obstructive sleep apnea     Peripheral neuropathy     Rectocele     Thyroid cyst     bilat       Past Surgical History:   Procedure Laterality Date    [facility]. CNNLA ARTERIAL ARTERIOTOMY 3M  5/25/2012    HX COLONOSCOPY  4/12/2013    HX HEART CATHETERIZATION  5/25/2012    HX HERNIA REPAIR      umbilical as a child    HX HYSTERECTOMY      50ys ago, QUIQUE, BSO    HX MOHS PROCEDURES      right    PA ANESTH,SURGERY OF SHOULDER         Allergies   Allergen Reactions    Latex Rash    Nexium [Esomeprazole Magnesium] Swelling and Other (comments)     Lips Swollen, and facial rash and swelling    Dexilant [Dexlansoprazole] Other (comments)     Stomach swelling    Crestor [Rosuvastatin] Other (comments)     Upper respiratory illness    Lipitor [Atorvastatin] Swelling    Lisinopril Unknown (comments)     Patient can't recall    Niaspan [Niacin] Other (comments)     Scratchy throat, \"asthma\" like symptoms    Pcn [Penicillins] Hives    Pravachol [Pravastatin] Myalgia    Sulfa (Sulfonamide Antibiotics) Rash    Zocor [Simvastatin] Myalgia and Other (comments)     Per patient chart \"(? Rash)\"       Patient Active Problem List   Diagnosis Code    Hypertension I11.9    Dyslipidemia E78.5    Sleep apnea/ on c-pap G47.30    Renal cyst N28.1    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 pylori infection A04.8    Acute idiopathic gout of right wrist M10.031    Sliding hiatal hernia K44.9    Pseudogout of right shoulder M11.211    Primary osteoarthritis involving multiple joints M15.0    Obesity, morbid (HCC) E66.01    Mild peripheral edema R60.9    Incidental pulmonary nodule, > 3mm and < 8mm R91.1    Prediabetes R73.03    Polyp of sigmoid colon D12.5    Multiple thyroid nodules E04.2    Morbid obesity with BMI of 40.0-44.9, adult (HCC) E66.01, Z68.41    Need for influenza vaccination Z23    Encounter for long-term (current) use of medications Z79.899    Acute maxillary sinusitis J01.00    Sinus congestion R09.81         Review of Systems:   Constitutional: no fever or chills  Skin denies rash or itching  HEENT:  Denies tinnitus, hearing loss, or visual changes  Respiratory: denies shortness of breath  Cardiovascular: denies chest pain, dyspnea on exertion  Gastrointestinal: does not report nausea or vomiting  Genitourinary: does not report dysuria or incontinence  Musculoskeletal: does not report joint pain or swelling  Endocrine: denies weight change  Hematology: denies easy bruising or bleeding   Neurological: as above in HPI      PHYSICAL EXAMINATION:         Vital signs:    Visit Vitals  /85 (BP 1 Location: Left arm, BP Patient Position: Sitting)   Pulse 60   Temp 96.8 °F (36 °C) (Oral)   Resp 17   Ht 5' 2\" (1.575 m)   Wt 111.1 kg (245 lb)   SpO2 98%   BMI 44.81 kg/m²         GENERAL:                  Well developed, obese, in no apparent distress. HEART:                       RR  EXTREMITIES:           No edema is identified. Pulses are +2. HEAD:                         Normocephalic, atraumatic. NEUROLOGIC EXAMINATION       MENTAL STATUS:     Awake, alert, and oriented x 4. Attention and STM are grossly normal. There is no aphasia. Fund of knowledge is adequate. Mood and affect are appropriate  CRANIAL NERVES:   Visual fields are full to confrontation. Pupils are reactive to light and accommodation. Extraocular movements are intact and there is no nystagmus. Facial sensation is normal  Face is symmetrical.   Hearing is present. SCM/TPZ 5/5  Tongue protrudes midline, palate elevates symmetrically. MOTOR:          5/5 strength      CEREBELLAR:           No tremors    SENSORY:      Decreased distal pinprick and vibration to midcalf level bilaterally. Romberg is negative. DTR's:                        Absent bilateral ankle jerks, no long tract signs                 GAIT:                           Normal gait    . Normal vitamin B12 levels and TSH within the last 18 months. Impression: On a clinical basis she appears to have symptoms that are restricted to the left superficial peroneal sensory nerve and I suspect this is her problem, likely posttraumatic and improving.   Electrodiagnostic testing is showing relatively diffuse motor as well as some sensory abnormalities without particular involvement of this nerve, which I suspect is secondary to an underlying sensorimotor polyneuropathy that precludes me from properly observing any kind of focal entrapment on an electrical basis. Plan: 1-counseled her about expectations regarding diabetic polyneuropathy. 2- advised her about my diagnostic impression. Prognosis is not clear, but she is improving and sometimes these symptoms from this type of nerve lesions may continue to improve for up to a year. 3-no further neurological interventions recommended. PLEASE NOTE:   Portions of this document may have been produced using voice recognition software. Unrecognized errors in transcription may be present. This note will not be viewable in [address]

## 2019-06-11 NOTE — PROGRESS NOTES
[facility]  [address] [phone]    Neurophysiology Report    Patient: Carlie Landa     ID: [id number] Physician: Zeina Jean MD   Gender: Female Ref Phys: Garrett Blanco NP   Handedness: Ollie Nieto     Study Date: June 11, 2019         Patient History:  Left tibial anterior numbness    Nerve Conduction Studies  Anti Sensory Summary Table     Stim Site NR Peak (ms) Norm Peak (ms) O-P Amp (µV) Norm O-P Amp Dist (cm) Laz (m/s)   Left Sup Peron Anti Sensory (Lower Leg)   Ankle    3.7 <4.4 7.6 >6.0 14.0 56.0   Right Sup Peron Anti Sensory (Lower Leg)   Ankle    2.9 <4.4 11.8 >6.0 14.0 77.8   Left Sural Anti Sensory (Ankle)   Calf    2.7  10.1 >5 14.0 63.6   Right Sural Anti Sensory (Ankle)   Calf    2.6  7.4 >5 14.0 77.8     Motor Summary Table     Stim Site NR Onset (ms) Norm Onset (ms) O-P Amp (mV) Norm O-P Amp Dist (cm) Laz (m/s) Norm Laz (m/s)   Left Peroneal Motor (EDB)   Ankle    3.1 <6.5 2.4 >2.0 34.0 45.9 >44   Fibula (Head)    10.5  1.1  10.0 142.9    Pop Fossa    11.2  2.2       Right Peroneal Motor (EDB)   Ankle    3.1 <6.5 1.7 >2.0 32.0 45.7 >44   Fibula (Head)    10.1  0.6  10.0 66.7    Pop Fossa    11.6  1.2       Left Tibial Motor (Abd Drummond Brev)   Ankle    5.2 <5.8 0.5 >4.0      Right Tibial Motor (Abd Drummond Brev)   Ankle    4.8 <5.8 0.5 >4.0                    NCS/EMG FINDINGS:     Evaluation of the Left peroneal motor, the Left superficial peroneal sensory, the Right superficial peroneal sensory, the Left sural sensory, and the Right sural sensory nerves were unremarkable.  The Right peroneal motor, the Left tibial motor, and the Right tibial motor nerves showed reduced amplitude (R1.7, L0.5, R0.5 mV). INTERPRETATION:   Diffuse sensorimotor polyneuropathy with axonal loss. There is on electrical evidence of a specific entrapment neuropathy to correlate with left anterior tibial numbness.  EMG was deferred due to expected low yield.         ___________________________  Freya Gastelum MD  Board Certified in Neurology          Waveforms:                    Mya [address] NOTE        Chart reviewed for the following:   Lorin Maldonado MD, have reviewed the History, Physical and updated the Allergic reactions for [address] performed immediately prior to start of procedure:   Lorin Maldonado MD, have performed the following reviews on Rachel Garcia prior to the start of the procedure:            * Patient was identified by name and date of birth   * Agreement on procedure being performed was verified  * Risks and Benefits explained to the patient  * Procedure site verified and marked as necessary  * Patient was positioned for comfort  * Consent was signed and verified     Time: 1:41 PM    Date of procedure: 6/11/2019    Procedure performed by:  PROCEDURE ROOM    Provider assisted by: Omar Cifuentes     Patient assisted by: self    How tolerated by patient: tolerated the procedure well with no complications    Post Procedural Pain Scale: 0 - No Hurt    Comments: none

## 2019-06-12 ENCOUNTER — OFFICE VISIT (OUTPATIENT)
Dept: FAMILY MEDICINE CLINIC | Age: 74
End: 2019-06-12

## 2019-06-12 VITALS
BODY MASS INDEX: 45.27 KG/M2 | WEIGHT: 246 LBS | TEMPERATURE: 97.8 F | SYSTOLIC BLOOD PRESSURE: 137 MMHG | DIASTOLIC BLOOD PRESSURE: 74 MMHG | OXYGEN SATURATION: 98 % | HEIGHT: 62 IN | RESPIRATION RATE: 17 BRPM | HEART RATE: 61 BPM

## 2019-06-12 DIAGNOSIS — R53.83 FATIGUE, UNSPECIFIED TYPE: ICD-10-CM

## 2019-06-12 DIAGNOSIS — E87.6 HYPOKALEMIA: ICD-10-CM

## 2019-06-12 DIAGNOSIS — E66.01 MORBID OBESITY WITH BMI OF 40.0-44.9, ADULT (HCC): ICD-10-CM

## 2019-06-12 DIAGNOSIS — I10 ESSENTIAL HYPERTENSION: Primary | ICD-10-CM

## 2019-06-12 DIAGNOSIS — E78.2 MIXED HYPERLIPIDEMIA: ICD-10-CM

## 2019-06-12 DIAGNOSIS — E55.9 VITAMIN D DEFICIENCY: ICD-10-CM

## 2019-06-12 DIAGNOSIS — R60.9 PERIPHERAL EDEMA: ICD-10-CM

## 2019-06-12 PROBLEM — R09.81 SINUS CONGESTION: Status: RESOLVED | Noted: 2018-10-03 | Resolved: 2019-06-12

## 2019-06-12 PROBLEM — H10.13 ALLERGIC CONJUNCTIVITIS OF BOTH EYES: Status: RESOLVED | Noted: 2017-09-20 | Resolved: 2019-06-12

## 2019-06-12 PROBLEM — J01.00 ACUTE MAXILLARY SINUSITIS: Status: RESOLVED | Noted: 2018-10-03 | Resolved: 2019-06-12

## 2019-06-12 PROBLEM — R73.03 PREDIABETES: Status: RESOLVED | Noted: 2018-02-20 | Resolved: 2019-06-12

## 2019-06-12 PROBLEM — A04.8 H. PYLORI INFECTION: Status: RESOLVED | Noted: 2017-10-04 | Resolved: 2019-06-12

## 2019-06-12 RX ORDER — FUROSEMIDE 20 MG/1
20 TABLET ORAL 2 TIMES DAILY
Qty: 180 TAB | Refills: 1 | Status: SHIPPED | OUTPATIENT
Start: 2019-06-12 | End: 2021-08-23

## 2019-06-12 RX ORDER — METOPROLOL SUCCINATE 50 MG/1
50 TABLET, EXTENDED RELEASE ORAL DAILY
Qty: 90 TAB | Refills: 1 | Status: SHIPPED | OUTPATIENT
Start: 2019-06-12 | End: 2019-06-12 | Stop reason: SDUPTHER

## 2019-06-12 RX ORDER — POTASSIUM CHLORIDE 20 MEQ/1
TABLET, EXTENDED RELEASE ORAL
Qty: 180 TAB | Refills: 1 | Status: SHIPPED | OUTPATIENT
Start: 2019-06-12 | End: 2019-09-17 | Stop reason: SDUPTHER

## 2019-06-12 RX ORDER — METOPROLOL SUCCINATE 50 MG/1
50 TABLET, EXTENDED RELEASE ORAL DAILY
Qty: 90 TAB | Refills: 1 | Status: SHIPPED | OUTPATIENT
Start: 2019-06-12 | End: 2021-04-12

## 2019-06-12 RX ORDER — LOSARTAN POTASSIUM 50 MG/1
TABLET ORAL
Qty: 180 TAB | Refills: 3 | Status: SHIPPED | OUTPATIENT
Start: 2019-06-12 | End: 2021-04-12

## 2019-06-12 RX ORDER — POTASSIUM CHLORIDE 20 MEQ/1
TABLET, EXTENDED RELEASE ORAL
Qty: 180 TAB | Refills: 1 | Status: SHIPPED | OUTPATIENT
Start: 2019-06-12 | End: 2019-06-12 | Stop reason: SDUPTHER

## 2019-06-12 RX ORDER — FUROSEMIDE 20 MG/1
20 TABLET ORAL 2 TIMES DAILY
Qty: 180 TAB | Refills: 1 | Status: SHIPPED | OUTPATIENT
Start: 2019-06-12 | End: 2019-06-12 | Stop reason: SDUPTHER

## 2019-06-12 NOTE — PROGRESS NOTES
Chief Complaint   Patient presents with    Follow Up Chronic Condition     1. Have you been to the ER, urgent care clinic since your last visit? Hospitalized since your last visit? No    2. Have you seen or consulted any other health care providers outside of the [address] [place] [address] since your last visit? Include any pap smears or colon screening.  No

## 2019-06-12 NOTE — PROGRESS NOTES
HISTORY OF PRESENT ILLNESS  Murray Parkeh is a 66 y.o. female. Patient presents for chronic care. HPI  Pt did not eat this am  Pt is primarily eating fruits and vegetables. She has lost 20lbs. She does not eat meat any more. Pt saw cardiology. Review of Systems   Constitutional: Negative. HENT: Negative. Eyes: Negative. Respiratory: Negative. Cardiovascular: Negative. Genitourinary: Negative. Musculoskeletal: Negative. Visit Vitals  /74 (BP 1 Location: Left arm, BP Patient Position: Sitting)   Pulse 61   Temp 97.8 °F (36.6 °C) (Oral)   Resp 17   Ht 5' 2\" (1.575 m)   Wt 246 lb (111.6 kg)   SpO2 98%   BMI 44.99 kg/m²     Physical Exam   Constitutional: She is oriented to person, place, and time. She appears well-developed. No distress. Cardiovascular: Normal rate, regular rhythm and normal heart sounds. No murmur heard. Pulmonary/Chest: Effort normal and breath sounds normal. No respiratory distress. She has no wheezes. She has no rales. Neurological: She is alert and oriented to person, place, and time. No cranial nerve deficit. Psychiatric: She has a normal mood and affect. Her behavior is normal. Judgment and thought content normal.     I have reviewed/discussed the above normal BMI with the patient. I have recommended the following interventions: dietary management education, guidance, and counseling and encourage exercise . Luis Gomez ASSESSMENT and PLAN    ICD-10-CM ICD-9-CM    1. Essential hypertension M37 672.3 METABOLIC PANEL, COMPREHENSIVE      losartan (COZAAR) 50 mg tablet      furosemide (LASIX) 20 mg tablet      metoprolol succinate (TOPROL-XL) 50 mg XL tablet   2. Peripheral edema R60.9 782.3 furosemide (LASIX) 20 mg tablet   3. Hypokalemia D78.1 505.7 METABOLIC PANEL, COMPREHENSIVE      potassium chloride (K-DUR, KLOR-CON) 20 mEq tablet   4. Morbid obesity with BMI of 40.0-44.9, adult ([facility] E66.01 278.01     Z68.41 V85.41    5.  Fatigue, unspecified type R53.83 780.79 HGB & HCT      TSH 3RD GENERATION   6. Mixed hyperlipidemia E78.2 272.2 LIPID PANEL   7. Vitamin D deficiency E55.9 268.9 VITAMIN D, 25 HYDROXY     PLAN:  Together we reviewed her problem list, medication list and labs. I have discussed the diagnosis with the patient and the intended plan as seen in the above orders. The patient has received an after-visit summary and questions were answered concerning future plans. I have discussed medication side effects and warnings with the patient as well. Patient will call for further questions. Follow-up and Dispositions    · Return in about 6 months (around 12/12/2019) for chronic care.

## 2019-06-13 LAB
25(OH)D3+25(OH)D2 SERPL-MCNC: 31.3 NG/ML (ref 30–100)
ALBUMIN SERPL-MCNC: 4 G/DL (ref 3.5–4.8)
ALBUMIN/GLOB SERPL: 1.4 {RATIO} (ref 1.2–2.2)
ALP SERPL-CCNC: 69 IU/L (ref 39–117)
ALT SERPL-CCNC: 9 IU/L (ref 0–32)
AST SERPL-CCNC: 15 IU/L (ref 0–40)
BILIRUB SERPL-MCNC: 0.7 MG/DL (ref 0–1.2)
BUN SERPL-MCNC: 17 MG/DL (ref 8–27)
BUN/CREAT SERPL: 20 (ref 12–28)
CALCIUM SERPL-MCNC: 9.6 MG/DL (ref 8.7–10.3)
CHLORIDE SERPL-SCNC: 102 MMOL/L (ref 96–106)
CHOLEST SERPL-MCNC: 209 MG/DL (ref 100–199)
CO2 SERPL-SCNC: 27 MMOL/L (ref 20–29)
CREAT SERPL-MCNC: 0.85 MG/DL (ref 0.57–1)
GLOBULIN SER CALC-MCNC: 2.8 G/DL (ref 1.5–4.5)
GLUCOSE SERPL-MCNC: 91 MG/DL (ref 65–99)
HCT VFR BLD AUTO: 40.6 % (ref 34–46.6)
HDLC SERPL-MCNC: 59 MG/DL
HGB BLD-MCNC: 12.9 G/DL (ref 11.1–15.9)
LDLC SERPL CALC-MCNC: 134 MG/DL (ref 0–99)
POTASSIUM SERPL-SCNC: 4.8 MMOL/L (ref 3.5–5.2)
PROT SERPL-MCNC: 6.8 G/DL (ref 6–8.5)
SODIUM SERPL-SCNC: 142 MMOL/L (ref 134–144)
TRIGL SERPL-MCNC: 81 MG/DL (ref 0–149)
TSH SERPL DL<=0.005 MIU/L-ACNC: 1.6 UIU/ML (ref 0.45–4.5)
VLDLC SERPL CALC-MCNC: 16 MG/DL (ref 5–40)

## 2019-08-28 ENCOUNTER — TELEPHONE (OUTPATIENT)
Dept: FAMILY MEDICINE CLINIC | Age: 74
End: 2019-08-28

## 2019-08-28 NOTE — TELEPHONE ENCOUNTER
Patient is calling stated her BP medication is making her feel drunk and like she is high. Patient stated if she does not take these medications then her BP goes up. Patient is asking for a call back from the nurse.      pls advise

## 2019-08-30 ENCOUNTER — HOSPITAL ENCOUNTER (EMERGENCY)
Age: 74
Discharge: HOME | End: 2019-08-30
Attending: EMERGENCY MEDICINE
Payer: MEDICARE

## 2019-08-30 VITALS
HEART RATE: 61 BPM | DIASTOLIC BLOOD PRESSURE: 88 MMHG | RESPIRATION RATE: 16 BRPM | OXYGEN SATURATION: 99 % | BODY MASS INDEX: 43.05 KG/M2 | TEMPERATURE: 97.9 F | SYSTOLIC BLOOD PRESSURE: 148 MMHG | HEIGHT: 63 IN | WEIGHT: 243 LBS

## 2019-08-30 DIAGNOSIS — I10 ELEVATED BLOOD PRESSURE READING WITH DIAGNOSIS OF HYPERTENSION: Primary | ICD-10-CM

## 2019-08-30 DIAGNOSIS — R42 LIGHTHEADEDNESS: ICD-10-CM

## 2019-08-30 DIAGNOSIS — B37.31 YEAST VAGINITIS: ICD-10-CM

## 2019-08-30 LAB
ALBUMIN SERPL-MCNC: 3.3 G/DL (ref 3.4–5)
ALBUMIN/GLOB SERPL: 0.9 {RATIO} (ref 0.8–1.7)
ALP SERPL-CCNC: 68 U/L (ref 45–117)
ALT SERPL-CCNC: 14 U/L (ref 13–56)
ANION GAP SERPL CALC-SCNC: 9 MMOL/L (ref 3–18)
APPEARANCE UR: CLEAR
AST SERPL-CCNC: 14 U/L (ref 10–38)
ATRIAL RATE: 58 BPM
BASOPHILS # BLD: 0 K/UL (ref 0–0.1)
BASOPHILS NFR BLD: 0 % (ref 0–2)
BILIRUB SERPL-MCNC: 0.6 MG/DL (ref 0.2–1)
BILIRUB UR QL: NEGATIVE
BUN SERPL-MCNC: 15 MG/DL (ref 7–18)
BUN/CREAT SERPL: 19 (ref 12–20)
CALCIUM SERPL-MCNC: 9.3 MG/DL (ref 8.5–10.1)
CALCULATED P AXIS, ECG09: 15 DEGREES
CALCULATED R AXIS, ECG10: -2 DEGREES
CALCULATED T AXIS, ECG11: 0 DEGREES
CHLORIDE SERPL-SCNC: 103 MMOL/L (ref 100–111)
CK MB CFR SERPL CALC: NORMAL % (ref 0–4)
CK MB SERPL-MCNC: <1 NG/ML (ref 5–25)
CK SERPL-CCNC: 109 U/L (ref 26–192)
CO2 SERPL-SCNC: 30 MMOL/L (ref 21–32)
COLOR UR: YELLOW
CREAT SERPL-MCNC: 0.8 MG/DL (ref 0.6–1.3)
DIAGNOSIS, 93000: NORMAL
DIFFERENTIAL METHOD BLD: ABNORMAL
EOSINOPHIL # BLD: 0.3 K/UL (ref 0–0.4)
EOSINOPHIL NFR BLD: 5 % (ref 0–5)
ERYTHROCYTE [DISTWIDTH] IN BLOOD BY AUTOMATED COUNT: 13.8 % (ref 11.6–14.5)
GLOBULIN SER CALC-MCNC: 3.7 G/DL (ref 2–4)
GLUCOSE SERPL-MCNC: 98 MG/DL (ref 74–99)
GLUCOSE UR STRIP.AUTO-MCNC: NEGATIVE MG/DL
HCT VFR BLD AUTO: 37.3 % (ref 35–45)
HGB BLD-MCNC: 12.3 G/DL (ref 12–16)
HGB UR QL STRIP: NEGATIVE
KETONES UR QL STRIP.AUTO: NEGATIVE MG/DL
LEUKOCYTE ESTERASE UR QL STRIP.AUTO: NEGATIVE
LYMPHOCYTES # BLD: 2.1 K/UL (ref 0.9–3.6)
LYMPHOCYTES NFR BLD: 32 % (ref 21–52)
MCH RBC QN AUTO: 29.7 PG (ref 24–34)
MCHC RBC AUTO-ENTMCNC: 33 G/DL (ref 31–37)
MCV RBC AUTO: 90.1 FL (ref 74–97)
MONOCYTES # BLD: 0.5 K/UL (ref 0.05–1.2)
MONOCYTES NFR BLD: 7 % (ref 3–10)
NEUTS SEG # BLD: 3.7 K/UL (ref 1.8–8)
NEUTS SEG NFR BLD: 56 % (ref 40–73)
NITRITE UR QL STRIP.AUTO: NEGATIVE
P-R INTERVAL, ECG05: 192 MS
PH UR STRIP: 6 [PH] (ref 5–8)
PLATELET # BLD AUTO: 178 K/UL (ref 135–420)
PMV BLD AUTO: 10.9 FL (ref 9.2–11.8)
POTASSIUM SERPL-SCNC: 3.4 MMOL/L (ref 3.5–5.5)
PROT SERPL-MCNC: 7 G/DL (ref 6.4–8.2)
PROT UR STRIP-MCNC: NEGATIVE MG/DL
Q-T INTERVAL, ECG07: 410 MS
QRS DURATION, ECG06: 74 MS
QTC CALCULATION (BEZET), ECG08: 402 MS
RBC # BLD AUTO: 4.14 M/UL (ref 4.2–5.3)
SODIUM SERPL-SCNC: 142 MMOL/L (ref 136–145)
SP GR UR REFRACTOMETRY: 1.01 (ref 1–1.03)
TROPONIN I SERPL-MCNC: <0.02 NG/ML (ref 0–0.04)
TSH SERPL DL<=0.05 MIU/L-ACNC: 1.91 UIU/ML (ref 0.36–3.74)
UROBILINOGEN UR QL STRIP.AUTO: 0.2 EU/DL (ref 0.2–1)
VENTRICULAR RATE, ECG03: 58 BPM
WBC # BLD AUTO: 6.7 K/UL (ref 4.6–13.2)

## 2019-08-30 PROCEDURE — 74011250637 HC RX REV CODE- 250/637: Performed by: EMERGENCY MEDICINE

## 2019-08-30 PROCEDURE — 84443 ASSAY THYROID STIM HORMONE: CPT

## 2019-08-30 PROCEDURE — 81003 URINALYSIS AUTO W/O SCOPE: CPT

## 2019-08-30 PROCEDURE — 80053 COMPREHEN METABOLIC PANEL: CPT

## 2019-08-30 PROCEDURE — 99285 EMERGENCY DEPT VISIT HI MDM: CPT

## 2019-08-30 PROCEDURE — 93005 ELECTROCARDIOGRAM TRACING: CPT

## 2019-08-30 PROCEDURE — 82550 ASSAY OF CK (CPK): CPT

## 2019-08-30 PROCEDURE — 85025 COMPLETE CBC W/AUTO DIFF WBC: CPT

## 2019-08-30 RX ORDER — FLUCONAZOLE 200 MG/1
200 TABLET ORAL
Status: COMPLETED | OUTPATIENT
Start: 2019-08-30 | End: 2019-08-30

## 2019-08-30 RX ADMIN — FLUCONAZOLE 200 MG: 200 TABLET ORAL at 11:24

## 2019-08-30 NOTE — ED TRIAGE NOTES
Pt states that she has been dizzy, lightheaded, and nauseated since yesterday. Pt states that she has been having high BP and a burning sensation around the vaginal area.

## 2019-08-30 NOTE — ED NOTES
Héctor Rowe is a 66 y.o. female that was discharged in good condition. The patients diagnosis, condition and treatment were explained to  patient and aftercare instructions were given. The patient verbalized understanding. Patient armband removed and shredded.

## 2019-08-30 NOTE — TELEPHONE ENCOUNTER
Alexander Gary, NP  You 20 hours ago (5:31 PM)      What is her blood pressure? It might be too low. Pt should be seen for evaluation.     Routing comment

## 2019-08-30 NOTE — DISCHARGE INSTRUCTIONS
Patient Education        Candidiasis: Care Instructions  Your Care Instructions  Candidiasis (say \"xll-[id number]-uh-mannie\") is a yeast infection. Yeast normally lives in your body. But it can cause problems if your body's defenses don't work as they should. Some medicines can increase your chance of getting a yeast infection. These include antibiotics, steroids, and cancer drugs. And some diseases like AIDS and diabetes can make you more likely to get yeast infections. There are different types of yeast infections. Aranza Pole is a yeast infection in the mouth. It usually occurs in people with weak immune systems. It causes white patches inside the mouth and throat. Yeast infections of the skin usually occur in skin folds where the skin stays moist. They cause red, oozing patches on your skin. Babies can get these infections under the diaper. People who often wear gloves can get them on their hands. Many women get vaginal yeast infections. They are most common when women take antibiotics. These infections can cause the vagina to itch and burn. They also cause white discharge that looks like cottage cheese. In rare cases, yeast infects the blood. This can cause serious disease. This kind of infection is treated with medicine given through a needle into a vein (IV). After you start treatment, a yeast infection usually goes away quickly. But if your immune system is weak, the infection may come back. Tell your doctor if you get yeast infections often. Follow-up care is a key part of your treatment and safety. Be sure to make and go to all appointments, and call your doctor if you are having problems. It's also a good idea to know your test results and keep a list of the medicines you take. How can you care for yourself at home? · Take your medicines exactly as prescribed. Call your doctor if you think you are having a problem with your medicine. · Use antibiotics only as directed by your doctor.   · Eat yogurt with live cultures. It has bacteria called lactobacillus. It may help prevent some types of yeast infections. · Keep your skin clean and dry. Put powder on moist places. · If you are using a cream or suppository to treat a vaginal yeast infection, don't use condoms or a diaphragm. Use a different type of birth control. · Eat a healthy diet and get regular exercise. This will help keep your immune system strong. When should you call for help? Watch closely for changes in your health, and be sure to contact your doctor if:    · You do not get better as expected. Where can you learn more? Go to http://sonia-moira.info/. Enter X015 in the search box to learn more about \"Candidiasis: Care Instructions. \"  Current as of: February 19, 2019  Content Version: 12.1  © 8109-0448 [employer]. Care instructions adapted under license by [employer] (which disclaims liability or warranty for this information). If you have questions about a medical condition or this instruction, always ask your healthcare professional. [place] [address] any warranty or liability for your use of this information. Patient Education        High Blood Pressure: Care Instructions  Overview    It's normal for blood pressure to go up and down throughout the day. But if it stays up, you have high blood pressure. Another name for high blood pressure is hypertension. Despite what a lot of people think, high blood pressure usually doesn't cause headaches or make you feel dizzy or lightheaded. It usually has no symptoms. But it does increase your risk of stroke, heart attack, and other problems. You and your doctor will talk about your risks of these problems based on your blood pressure. Your doctor will give you a goal for your blood pressure. Your goal will be based on your health and your age.   Lifestyle changes, such as eating healthy and being active, are always important to help lower blood pressure. You might also take medicine to reach your blood pressure goal.  Follow-up care is a key part of your treatment and safety. Be sure to make and go to all appointments, and call your doctor if you are having problems. It's also a good idea to know your test results and keep a list of the medicines you take. How can you care for yourself at home? Medical treatment  · If you stop taking your medicine, your blood pressure will go back up. You may take one or more types of medicine to lower your blood pressure. Be safe with medicines. Take your medicine exactly as prescribed. Call your doctor if you think you are having a problem with your medicine. · Talk to your doctor before you start taking aspirin every day. Aspirin can help certain people lower their risk of a heart attack or stroke. But taking aspirin isn't right for everyone, because it can cause serious bleeding. · See your doctor regularly. You may need to see the doctor more often at first or until your blood pressure comes down. · If you are taking blood pressure medicine, talk to your doctor before you take decongestants or anti-inflammatory medicine, such as ibuprofen. Some of these medicines can raise blood pressure. · Learn how to check your blood pressure at home. Lifestyle changes  · Stay at a healthy weight. This is especially important if you put on weight around the waist. Losing even 10 pounds can help you lower your blood pressure. · If your doctor recommends it, get more exercise. Walking is a good choice. Bit by bit, increase the amount you walk every day. Try for at least 30 minutes on most days of the week. You also may want to swim, bike, or do other activities. · Avoid or limit alcohol. Talk to your doctor about whether you can drink any alcohol. · Try to limit how much sodium you eat to less than 2,300 milligrams (mg) a day. Your doctor may ask you to try to eat less than 1,500 mg a day.   · Eat plenty of fruits (such as bananas and oranges), vegetables, legumes, whole grains, and low-fat dairy products. · Lower the amount of saturated fat in your diet. Saturated fat is found in animal products such as milk, cheese, and meat. Limiting these foods may help you lose weight and also lower your risk for heart disease. · Do not smoke. Smoking increases your risk for heart attack and stroke. If you need help quitting, talk to your doctor about stop-smoking programs and medicines. These can increase your chances of quitting for good. When should you call for help? Call 911 anytime you think you may need emergency care. This may mean having symptoms that suggest that your blood pressure is causing a serious heart or blood vessel problem. Your blood pressure may be over 180/120.   For example, call 911 if:    · You have symptoms of a heart attack. These may include:  ? Chest pain or pressure, or a strange feeling in the chest.  ? Sweating. ? Shortness of breath. ? Nausea or vomiting. ? Pain, pressure, or a strange feeling in the back, neck, jaw, or upper belly or in one or both shoulders or arms. ? Lightheadedness or sudden weakness. ? A fast or irregular heartbeat.     · You have symptoms of a stroke. These may include:  ? Sudden numbness, tingling, weakness, or loss of movement in your face, arm, or leg, especially on only one side of your body. ? Sudden vision changes. ? Sudden trouble speaking. ? Sudden confusion or trouble understanding simple statements. ? Sudden problems with walking or balance. ? A sudden, severe headache that is different from past headaches.     · You have severe back or belly pain.    Do not wait until your blood pressure comes down on its own.  Get help right away.   Call your doctor now or seek immediate care if:    · Your blood pressure is much higher than normal (such as 180/120 or higher), but you don't have symptoms.     · You think high blood pressure is causing symptoms, such as:  ? Severe headache.  ? Blurry vision.    Watch closely for changes in your health, and be sure to contact your doctor if:    · Your blood pressure measures higher than your doctor recommends at least 2 times. That means the top number is higher or the bottom number is higher, or both.     · You think you may be having side effects from your blood pressure medicine. Where can you learn more? Go to http://sonia-moira.info/. Enter C528 in the search box to learn more about \"High Blood Pressure: Care Instructions. \"  Current as of: July 22, 2018  Content Version: 12.1  © 1193-2754 [employer]. Care instructions adapted under license by [employer] (which disclaims liability or warranty for this information). If you have questions about a medical condition or this instruction, always ask your healthcare professional. [address] any warranty or liability for your use of this information. Patient Education        Lightheadedness or Faintness: Care Instructions  Your Care Instructions  Lightheadedness is a feeling that you are about to faint or \"pass out. \" You do not feel as if you or your surroundings are moving. It is different from vertigo, which is the feeling that you or things around you are spinning or tilting. Lightheadedness usually goes away or gets better when you lie down. If lightheadedness gets worse, it can lead to a fainting spell. It is common to feel lightheaded from time to time. Lightheadedness usually is not caused by a serious problem. It often is caused by a short-lasting drop in blood pressure and blood flow to your head that occurs when you get up too quickly from a seated or lying position. Follow-up care is a key part of your treatment and safety. Be sure to make and go to all appointments, and call your doctor if you are having problems.  It's also a good idea to know your test results and keep a list of the medicines you take. How can you care for yourself at home? · Lie down for 1 or 2 minutes when you feel lightheaded. After lying down, sit up slowly and remain sitting for 1 to 2 minutes before slowly standing up. · Avoid movements, positions, or activities that have made you lightheaded in the past.  · Get plenty of rest, especially if you have a cold or flu, which can cause lightheadedness. · Make sure you drink plenty of fluids, especially if you have a fever or have been sweating. · Do not drive or put yourself and others in danger while you feel lightheaded. When should you call for help? Call 911 anytime you think you may need emergency care. For example, call if:    · You have symptoms of a stroke. These may include:  ? Sudden numbness, tingling, weakness, or loss of movement in your face, arm, or leg, especially on only one side of your body. ? Sudden vision changes. ? Sudden trouble speaking. ? Sudden confusion or trouble understanding simple statements. ? Sudden problems with walking or balance. ? A sudden, severe headache that is different from past headaches.     · You have symptoms of a heart attack. These may include:  ? Chest pain or pressure, or a strange feeling in the chest.  ? Sweating. ? Shortness of breath. ? Nausea or vomiting. ? Pain, pressure, or a strange feeling in the back, neck, jaw, or upper belly or in one or both shoulders or arms. ? Lightheadedness or sudden weakness. ? A fast or irregular heartbeat. After you call 911, the  may tell you to chew 1 adult-strength or 2 to 4 low-dose aspirin. Wait for an ambulance. Do not try to drive yourself.    Watch closely for changes in your health, and be sure to contact your doctor if:    · Your lightheadedness gets worse or does not get better with home care. Where can you learn more? Go to http://sonia-moira.info/.   Enter T125 in the search box to learn more about \"Lightheadedness or Faintness: Care Instructions. \"  Current as of: September 23, 2018  Content Version: 12.1  © 0157-3156 [employer]. Care instructions adapted under license by [employer] (which disclaims liability or warranty for this information). If you have questions about a medical condition or this instruction, always ask your healthcare professional. [place] [address] any warranty or liability for your use of this information.

## 2019-08-30 NOTE — ED PROVIDER NOTES
EMERGENCY DEPARTMENT HISTORY AND PHYSICAL EXAM    10:25 AM      Date: 8/30/2019  Patient Name: Stacia Ferrell    History of Presenting Illness     Chief Complaint   Patient presents with    Dizziness    Hypertension         History Provided By: Patient    Additional History (Context): Stacia Ferrell is a 66 y.o. female with diabetes, hypertension and hyperlipidemia who presents with chief complaint of lightheadedness prior to arrival to the ED by EMS. Patient reports that her blood pressure has been elevated intermittently for the past few days. She states that she takes her blood pressure medication as prescribed. Patient does report having some itchiness in her vaginal area, but no dysuria or hematuria. She denies any headache, chest pain, shortness of breath, numbness, weakness and no other complaint. PCP: Gloria Huizar NP        Past History     Past Medical History:  Past Medical History:   Diagnosis Date    Acute idiopathic gout of right wrist 10/4/2017    Atrophic vaginitis     Cardiac cath 05/30/2012    Patent coronary arteries. LVEDP 20.  RA 11.  RV 42/10. PA 42/21. W 18.  CO 6.4 (TD), 6.4 (Robin Schwalbe). PVR 1.7 Wood units. False-positive nuclear study.  Cardiac echocardiogram 05/11/2011    EF 65%. RVSP at least 35 mmHg. Mild IVCE. No significant valvular heart disease.  Cardiac nuclear imaging test 05/18/2012    Tech difficult. Apical ischemia. No infarction. EF 76%. No reg'l WMA. No TID.  Cardiovascular LLE venous duplex 06/17/2013    Left leg:  No DVT.     Diabetes     diet controlled, hypoglycemia from metformin previously     Dyslipidemia     Fatty liver     Fibrocystic breast disease     Fluid retention     GERD     H/O colonoscopy 4/12/13    polyp    Hypertension     Ill-defined condition     gout    Macular degeneration     Morbid obesity (HCC)     Obstructive sleep apnea     Peripheral neuropathy     Rectocele     Thyroid cyst     bilat       Past Surgical History:  Past Surgical History:   Procedure Laterality Date    [facility]. CNNLA ARTERIAL ARTERIOTOMY 3M  2012    HX COLONOSCOPY  2013    HX HEART CATHETERIZATION  2012    HX HERNIA REPAIR      umbilical as a child    HX HYSTERECTOMY      52ys ago, QUIQUE, BSO    HX MOHS PROCEDURES      right    AK ANESTH,SURGERY OF SHOULDER         Family History:  Family History   Problem Relation Age of Onset   [facility] Cancer Mother         colon cancer    Colon Cancer Mother     Hypertension Mother     Diabetes Mother     Heart Disease Mother     Coronary Artery Disease Mother    [facility] Hypertension Father     Diabetes Father     Heart Disease Father     Coronary Artery Disease Father     Hypertension Sister     Diabetes Sister     Heart Disease Sister     Coronary Artery Disease Sister     Hypertension Brother     Diabetes Brother     Heart Disease Brother     Coronary Artery Disease Brother        Social History:  Social History     Tobacco Use    Smoking status: Former Smoker     Last attempt to quit: 1974     Years since quittin.1    Smokeless tobacco: Never Used    Tobacco comment: 1 pack every 2 weeks x 1 year, stopped 45yrs ago   Substance Use Topics    Alcohol use: Yes    Drug use: No       Allergies:   Allergies   Allergen Reactions    Latex Rash    Nexium [Esomeprazole Magnesium] Swelling and Other (comments)     Lips Swollen, and facial rash and swelling    Dexilant [Dexlansoprazole] Other (comments)     Stomach swelling    Crestor [Rosuvastatin] Other (comments)     Upper respiratory illness    Lipitor [Atorvastatin] Swelling    Lisinopril Unknown (comments)     Patient can't recall    Niaspan [Niacin] Other (comments)     Scratchy throat, \"asthma\" like symptoms    Pcn [Penicillins] Hives    Pravachol [Pravastatin] Myalgia    Sulfa (Sulfonamide Antibiotics) Rash    Zocor [Simvastatin] Myalgia and Other (comments)     Per patient chart \"(? Rash)\"         Review of Systems       Review of Systems   Constitutional: Negative for chills and fever. HENT: Negative for congestion, rhinorrhea, sore throat and trouble swallowing. Eyes: Negative for visual disturbance. Respiratory: Negative for cough, shortness of breath and wheezing. Cardiovascular: Negative for chest pain. Gastrointestinal: Negative for abdominal pain, nausea and vomiting. Endocrine: Negative for polyuria. Genitourinary: Negative for difficulty urinating, dysuria and pelvic pain. Vaginal itchiness   Musculoskeletal: Negative for arthralgias and neck stiffness. Skin: Negative for pallor and rash. Neurological: Positive for light-headedness. Negative for dizziness, weakness, numbness and headaches. Hematological: Does not bruise/bleed easily. Psychiatric/Behavioral: Negative for confusion and dysphoric mood. All other systems reviewed and are negative. Physical Exam     Visit Vitals  /90 (BP 1 Location: Right arm, BP Patient Position: At rest)   Pulse 61   Temp 97.9 °F (36.6 °C)   Resp 18   Ht 5' 3\" (1.6 m)   Wt 110.2 kg (243 lb)   SpO2 100%   BMI 43.05 kg/m²         Physical Exam   Constitutional: She is oriented to person, place, and time. She appears well-developed and well-nourished. No distress. HENT:   Head: Normocephalic and atraumatic. Mouth/Throat: Oropharynx is clear and moist.   Eyes: Pupils are equal, round, and reactive to light. Conjunctivae are normal. No scleral icterus. Neck: Normal range of motion. Neck supple. Cardiovascular: Normal rate and intact distal pulses. Exam reveals no gallop and no friction rub. Capillary refill < 3 seconds  No carotid bruits   Pulmonary/Chest: Effort normal and breath sounds normal. No respiratory distress. She has no wheezes. Abdominal: Soft. Bowel sounds are normal. She exhibits no distension. There is no tenderness.    Genitourinary:   Genitourinary Comments: Female AYAN Jenkins assisted with exam of vaginal region. Patient with minimal erythema around vaginal area with a few satellite lesions consistent with yeast vaginitis   Musculoskeletal: Normal range of motion. She exhibits no edema. Lymphadenopathy:     She has no cervical adenopathy. Neurological: She is alert and oriented to person, place, and time. She has normal reflexes. No cranial nerve deficit. She exhibits normal muscle tone. Coordination normal.   Sensation intact  Strength 5 out of 5 bilateral lower and upper extremities  No slurred speech  No drifting   Skin: Skin is warm and dry. She is not diaphoretic. Psychiatric: She has a normal mood and affect. Her behavior is normal. Thought content normal.   Nursing note and vitals reviewed.         Diagnostic Study Results     Labs -  Recent Results (from the past 12 hour(s))   EKG, 12 LEAD, INITIAL    Collection Time: 08/30/19 10:25 AM   Result Value Ref Range    Ventricular Rate 58 BPM    Atrial Rate 58 BPM    P-R Interval 192 ms    QRS Duration 74 ms    Q-T Interval 410 ms    QTC Calculation (Bezet) 402 ms    Calculated P Axis 15 degrees    Calculated R Axis -2 degrees    Calculated T Axis 0 degrees    Diagnosis       Sinus bradycardia  Otherwise normal ECG  When compared with ECG of 17-APR-2019 19:51,  No significant change was found     URINALYSIS W/ RFLX MICROSCOPIC    Collection Time: 08/30/19 10:30 AM   Result Value Ref Range    Color YELLOW      Appearance CLEAR      Specific gravity 1.008 1.005 - 1.030      pH (UA) 6.0 5.0 - 8.0      Protein NEGATIVE  NEG mg/dL    Glucose NEGATIVE  NEG mg/dL    Ketone NEGATIVE  NEG mg/dL    Bilirubin NEGATIVE  NEG      Blood NEGATIVE  NEG      Urobilinogen 0.2 0.2 - 1.0 EU/dL    Nitrites NEGATIVE  NEG      Leukocyte Esterase NEGATIVE  NEG     CBC WITH AUTOMATED DIFF    Collection Time: 08/30/19 10:34 AM   Result Value Ref Range    WBC 6.7 4.6 - 13.2 K/uL    RBC 4.14 (L) 4.20 - 5.30 M/uL    HGB 12.3 12.0 - 16.0 g/dL    HCT 37.3 35.0 - 45.0 %    MCV 90.1 74.0 - 97.0 FL    MCH 29.7 24.0 - 34.0 PG    MCHC 33.0 31.0 - 37.0 g/dL    RDW 13.8 11.6 - 14.5 %    PLATELET 066 170 - 297 K/uL    MPV 10.9 9.2 - 11.8 FL    NEUTROPHILS 56 40 - 73 %    LYMPHOCYTES 32 21 - 52 %    MONOCYTES 7 3 - 10 %    EOSINOPHILS 5 0 - 5 %    BASOPHILS 0 0 - 2 %    ABS. NEUTROPHILS 3.7 1.8 - 8.0 K/UL    ABS. LYMPHOCYTES 2.1 0.9 - 3.6 K/UL    ABS. MONOCYTES 0.5 0.05 - 1.2 K/UL    ABS. EOSINOPHILS 0.3 0.0 - 0.4 K/UL    ABS. BASOPHILS 0.0 0.0 - 0.1 K/UL    DF AUTOMATED     METABOLIC PANEL, COMPREHENSIVE    Collection Time: 08/30/19 10:34 AM   Result Value Ref Range    Sodium 142 136 - 145 mmol/L    Potassium 3.4 (L) 3.5 - 5.5 mmol/L    Chloride 103 100 - 111 mmol/L    CO2 30 21 - 32 mmol/L    Anion gap 9 3.0 - 18 mmol/L    Glucose 98 74 - 99 mg/dL    BUN 15 7.0 - 18 MG/DL    Creatinine 0.80 0.6 - 1.3 MG/DL    BUN/Creatinine ratio 19 12 - 20      GFR est AA >60 >60 ml/min/1.73m2    GFR est non-AA >60 >60 ml/min/1.73m2    Calcium 9.3 8.5 - 10.1 MG/DL    Bilirubin, total 0.6 0.2 - 1.0 MG/DL    ALT (SGPT) 14 13 - 56 U/L    AST (SGOT) 14 10 - 38 U/L    Alk. phosphatase 68 45 - 117 U/L    Protein, total 7.0 6.4 - 8.2 g/dL    Albumin 3.3 (L) 3.4 - 5.0 g/dL    Globulin 3.7 2.0 - 4.0 g/dL    A-G Ratio 0.9 0.8 - 1.7     TSH 3RD GENERATION    Collection Time: 08/30/19 10:34 AM   Result Value Ref Range    TSH 1.91 0.36 - 3.74 uIU/mL   CARDIAC PANEL,(CK, CKMB & TROPONIN)    Collection Time: 08/30/19 10:34 AM   Result Value Ref Range     26 - 192 U/L    CK - MB <1.0 <3.6 ng/ml    CK-MB Index  0.0 - 4.0 %     CALCULATION NOT PERFORMED WHEN RESULT IS BELOW LINEAR LIMIT    Troponin-I, QT <0.02 0.0 - 0.045 NG/ML       Radiologic Studies -   No orders to display         Medical Decision Making   I am the first provider for this patient. I reviewed the vital signs, available nursing notes, past medical history, past surgical history, family history and social history.     Vital Signs-Reviewed the patient's vital signs.    Pulse Oximetry Analysis -  100 on room air (Interpretation) normal    Cardiac Monitor:  Rate: 61  Rhythm:  Normal Sinus Rhythm     EKG: Interpreted by the EP Dr. Mateo Magaña. Time Interpreted: 10:25 AM   Rate: 58   Rhythm: Sinus bradycardia   Interpretation: Normal QRS duration, normal QTC, no ST elevation, no ST depression       Records Reviewed: Nursing Notes and Old Medical Records (Time of Review: 10:25 AM)    Provider Notes (Medical Decision Making): DDX: Elevated blood pressure, metabolic, UTI, yeast infection, cardiac    Per EMS blood pressure was 190/100 on arrival.  Patient states that she took her blood pressure medication this morning. On arrival here blood pressure 160/90 improving. Has no headache    We will check labs, EKG, UA    MDM    Medications   fluconazole (DIFLUCAN) tablet 200 mg (200 mg Oral Given 8/30/19 1124)           ED Course: Progress Notes, Reevaluation, and Consults:  Reassessed patient blood pressure continues to improve, 140/80 on the monitor. Patient is feeling much better, no dizziness or lightheadedness, is resolved. Patient admits that she has been under quite a bit of stress recently. 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 I explained to her that this could be associated with her elevated blood pressure but it is significantly improved here. We will have her follow-up with her PCP to get blood pressure recheck. Patient currently is asymptomatic. No alarming labs    I have reassessed the patient. I have discussed the workup, results and plan with the patient and patient is in agreement. Patient is feeling much better. Patient was discharge in stable condition. Patient was given outpatient follow up. Patient is to return to emergency department if any new or worsening condition. Diagnosis     Clinical Impression:   1. Elevated blood pressure reading with diagnosis of hypertension    2. Lightheadedness    3. Yeast vaginitis        Disposition: Discharged    Follow-up Information     Follow up With Specialties Details Why Contact Kamron Pierre Doctor, NP Nurse Practitioner Schedule an appointment as soon as possible for a visit in 3 days  [address]  [address] [place]  [address]  [phone] [address] [facility] EMERGENCY DEPT Emergency Medicine  As needed, If symptoms worsen [address] [facility] [place]  [phone]           Patient's Medications   Start Taking    No medications on file   Continue Taking    ASPIRIN 81 MG TAB    Take 81 mg by mouth daily. BLOOD-GLUCOSE METER (TRUE METRIX AIR GLUCOSE METER) MISC    Check blood sugar twice daily. CALCIUM PO    Take  by mouth. CHOLECALCIFEROL (VITAMIN D3) 1,000 UNIT TABLET    Take 1,000 Units by mouth daily. DICLOFENAC (VOLTAREN) 1 % GEL    Apply  to affected area four (4) times daily. FLUTICASONE (FLONASE) 50 MCG/ACTUATION NASAL SPRAY    Si spray in both nostril twice daily    FUROSEMIDE (LASIX) 20 MG TABLET    Take 1 Tab by mouth two (2) times a day. GLUCOSE BLOOD VI TEST STRIPS (TRUE METRIX GLUCOSE TEST STRIP) STRIP    Check blood sugar twice daily. LANCETS (ULTRA THIN LANCETS) 30 GAUGE MISC    Check blood sugar twice daily. LOSARTAN (COZAAR) 50 MG TABLET    take 2 tablets by mouth once daily for hypertension **(REQUEST FOR 90 DAY RX FOR PATIENT)    METOPROLOL SUCCINATE (TOPROL-XL) 50 MG XL TABLET    Take 1 Tab by mouth daily. MULTIVITAMINS (MULTI-VITAMIN PO)    Take 1 Tab by mouth daily. POTASSIUM CHLORIDE (K-DUR, KLOR-CON) 20 MEQ TABLET    TAKE 2 TABLETS TWICE DAILY    RANITIDINE (ZANTAC) 300 MG TABLET    take 1 tablet by mouth at bedtime   These Medications have changed    No medications on file   Stop Taking    No medications on file         Rhina Lesch, DO Dragon medical dictation software was used for portions of this report. Unintended transcription errors may occur.      My signature above authenticates this document and my orders, the final    diagnosis (es), discharge prescription (s), and instructions in the Epic    record.

## 2019-08-31 DIAGNOSIS — B37.31 YEAST INFECTION OF THE VAGINA: ICD-10-CM

## 2019-08-31 RX ORDER — FLUCONAZOLE 150 MG/1
TABLET ORAL
Qty: 1 TAB | Refills: 10 | OUTPATIENT
Start: 2019-08-31

## 2019-09-03 ENCOUNTER — OFFICE VISIT (OUTPATIENT)
Dept: FAMILY MEDICINE CLINIC | Age: 74
End: 2019-09-03

## 2019-09-03 VITALS
WEIGHT: 243.8 LBS | DIASTOLIC BLOOD PRESSURE: 84 MMHG | BODY MASS INDEX: 43.2 KG/M2 | TEMPERATURE: 97 F | HEART RATE: 72 BPM | SYSTOLIC BLOOD PRESSURE: 142 MMHG | HEIGHT: 63 IN | OXYGEN SATURATION: 97 % | RESPIRATION RATE: 16 BRPM

## 2019-09-03 DIAGNOSIS — R26.9 ALTERED GAIT: ICD-10-CM

## 2019-09-03 DIAGNOSIS — N76.0 ACUTE VAGINITIS: Primary | ICD-10-CM

## 2019-09-03 RX ORDER — FLUCONAZOLE 150 MG/1
TABLET ORAL
Qty: 1 TAB | Refills: 1 | Status: SHIPPED | OUTPATIENT
Start: 2019-09-03 | End: 2022-02-22

## 2019-09-03 RX ORDER — OMEPRAZOLE 20 MG/1
20 CAPSULE, DELAYED RELEASE ORAL 2 TIMES DAILY
Refills: 0 | COMMUNITY
Start: 2019-08-30 | End: 2022-02-22

## 2019-09-03 NOTE — PROGRESS NOTES
HISTORY OF PRESENT ILLNESS  Faisal Mccray is a 66 y.o. female. Patient presents for follow up ED      HPI  Pt seen in ED on 8-30-19 for dizziness and hypertension. When EMS took her blood pressure is was 190/100 then 160/90  EKG: stable no changes from 4-17-19 and 8-30-19  Cardiac panel normal.    Pt was given one Diflucan for her yest infection. Pt was on Dexilant which didn't agree with her and stress at [employer]. GI switched her to Prilosec    Taking all her medicine in the am.    Pt states she feels like she drifts when she wlks. No vision changes, nausea or vomiting    Review of Systems   Constitutional: Negative. HENT: Negative. Respiratory: Negative. Cardiovascular: Negative. Psychiatric/Behavioral: Positive for depression. Visit Vitals  /84 (BP 1 Location: Left arm)   Pulse 72   Temp 97 °F (36.1 °C) (Oral)   Resp 16   Ht 5' 3\" (1.6 m)   Wt 243 lb 12.8 oz (110.6 kg)   SpO2 97%   BMI 43.19 kg/m²     Physical Exam   Constitutional: She is oriented to person, place, and time. She appears well-developed. No distress. Cardiovascular: Normal rate, regular rhythm and normal heart sounds. No murmur heard. Pulmonary/Chest: Effort normal and breath sounds normal. No respiratory distress. She has no wheezes. She has no rales. Neurological: She is alert and oriented to person, place, and time. Psychiatric: Her behavior is normal. Judgment and thought content normal. Cognition and memory are normal. She exhibits a depressed mood. ASSESSMENT and PLAN    ICD-10-CM ICD-9-CM    1. Acute vaginitis N76.0 616.10 fluconazole (DIFLUCAN) 150 mg tablet   2. Altered gait R26.9 781.2 REFERRAL TO NEUROLOGY     PLAN:  We discussed the stress she is having at her [employer]. We discussed her concern regarding the change in her gait, the drifting. I have discussed the diagnosis with the patient and the intended plan as seen in the above orders.   The patient has received an after-visit summary and questions were answered concerning future plans. I have discussed medication side effects and warnings with the patient as well. Patient will call for further questions. Follow-up and Dispositions    · Return if symptoms worsen or fail to improve.

## 2019-09-03 NOTE — TELEPHONE ENCOUNTER
Pt states she went to the ED for dizziness & elevated BP & they discovered she had a yeast infection but only gave her 1 diclucan. Pt states it ususally take 2 or 3 tabs to get rid of the infection. Pt states she has appointment today & will discuss this with Lakeisha

## 2019-09-03 NOTE — PROGRESS NOTES
Pt is here for ED follow up for elevated BP & yeast infection. Pt states the lorsatan which she takes in the morning & feels like she is drunk & dizzy afterwards. States she had been taking the metoprolol in the evening but is currently taking the BP meds all in the morning now. Pt requesting a refill in diflucan as it takes more than one to clear a yeast infection    1. Have you been to the ER, urgent care clinic since your last visit? Hospitalized since your last visit? Yes [facility] ED 8/30/19 Elevated BP. 2. Have you seen or consulted any other health care providers outside of the [address] [place] [address] since your last visit? Include any pap smears or colon screening.  Yes GI -Dr Bindu Dee 8/19 GERD

## 2019-09-09 ENCOUNTER — TELEPHONE (OUTPATIENT)
Dept: FAMILY MEDICINE CLINIC | Age: 74
End: 2019-09-09

## 2019-09-09 NOTE — TELEPHONE ENCOUNTER
Patient calling asking for a call back about her losartan and prilosec. Patient stated she thinks they are working against her.  Patient does not think they are working well together     pls advise

## 2019-10-01 ENCOUNTER — TELEPHONE (OUTPATIENT)
Dept: FAMILY MEDICINE CLINIC | Age: 74
End: 2019-10-01

## 2019-10-01 NOTE — TELEPHONE ENCOUNTER
Patient is requesting a order for a MRI on her right leg.  Estela Dill that she fell in February and went to the ER and still having pain

## 2019-10-02 ENCOUNTER — OFFICE VISIT (OUTPATIENT)
Dept: FAMILY MEDICINE CLINIC | Age: 74
End: 2019-10-02

## 2019-10-02 VITALS
BODY MASS INDEX: 43.41 KG/M2 | OXYGEN SATURATION: 96 % | DIASTOLIC BLOOD PRESSURE: 80 MMHG | WEIGHT: 245 LBS | SYSTOLIC BLOOD PRESSURE: 130 MMHG | HEIGHT: 63 IN | TEMPERATURE: 97.9 F | HEART RATE: 68 BPM | RESPIRATION RATE: 16 BRPM

## 2019-10-02 DIAGNOSIS — M54.41 CHRONIC RIGHT-SIDED LOW BACK PAIN WITH RIGHT-SIDED SCIATICA: Primary | ICD-10-CM

## 2019-10-02 DIAGNOSIS — G89.29 CHRONIC RIGHT-SIDED LOW BACK PAIN WITH RIGHT-SIDED SCIATICA: Primary | ICD-10-CM

## 2019-10-02 RX ORDER — GABAPENTIN 300 MG/1
CAPSULE ORAL
Qty: 30 CAP | Refills: 0 | Status: SHIPPED | OUTPATIENT
Start: 2019-10-02 | End: 2021-04-12

## 2019-10-02 NOTE — PATIENT INSTRUCTIONS
Learning About Low Back Pain  What is low back pain? Low back pain is pain that can occur anywhere below the ribs and above the legs. It is very common. Almost everyone has it at one time or another. Low back pain can be:  Acute. This is new pain that can last a few days to a few weeks--at the most a few months. Chronic. This pain can last for more than a few months. Sometimes it can last for years. What are some myths about low back pain? Here are some common myths about low back pain--and the facts:  Myth: \"I need to rest my back when I have back pain. \"  Fact: Staying active won't hurt you. It may help you get better faster. Myth: \"I need prescription pain medicine. \"  Fact: It's best to try to let time and being active heal your back. Opioid pain medicines--such as hydrocodone or oxycodone--usually don't work any better than over-the-counter medicines like ibuprofen or naproxen. And opioids can cause serious problems like opioid use disorder or overdose. Moderate to severe opioid use disorder is sometimes called addiction. Myth: \"I need a test like an X-ray or an MRI to diagnose my low back pain. \"  Fact: Getting a test right away won't help you get better faster. And it could lead you down a treatment path you may not need, since most people get better on their own. What causes low back pain? In most cases, there isn't a clear cause. This can be frustrating, because your back hurts and there's no obvious reason. Your back pain can be caused by:  Overuse or muscle strain. This can happen from playing sports, lifting heavy things, or not being physically fit. A herniated disc. This is a problem with the cushion between the bones in your back. Arthritis. With age, you may have changes in your bones that can narrow the space around your nerves. Other causes. In rare cases, the cause is a serious illness like an infection or cancer. But there are usually other symptoms too.   What are the symptoms? Your symptoms depend on your body and the cause of your back pain. You may feel:  · Pain that's sharp or dull. It may be in one small area or over a broad area. But even bad pain doesn't mean that it's caused by something serious. · Leg pain, numbness, or tingling. When a nerve gets squeezed--such as from a disc problem or arthritis--you may have symptoms in your leg or foot. You can even have leg symptoms from a back problem without having any pain in your back. How is low back pain diagnosed? A physical exam is the main way to diagnose low back pain. Your doctor may examine your back, check your nerves by testing your reflexes, and make sure that your muscles are strong. He or she also will ask questions about your back and overall health. Most people don't need any tests right away. Tests often don't show the reason for your pain. If your pain lasts more than 6 weeks or you have symptoms that your doctor is more concerned about, he or she may order tests. These may include an X-ray, a CT scan, or an MRI. In some cases, tests can help your doctor find a cause for your pain, especially for pain in one or both legs. How is low back pain treated? Most acute low back pain gets better on its own within a few weeks, no matter what the cause. Time and doing usual activities are all that most people need to feel better. Using heat or ice and taking over-the-counter pain medicine also can help while your body heals. If you aren't getting better on your own or your pain is very bad, your doctor may recommend:  · Physical therapy. · Spinal manipulation, such as by a chiropractor. · Acupuncture. · Massage. · Injections of steroid medicine in your back (especially for pain that involves your legs). If you have chronic low back pain, treatment will help you understand and manage your pain. Treatment may include:  · Staying active. This may include walking or doing back exercises.   · Physical therapy. · Medicines. Some of these medicines are also used for other problems, like depression. · Pain management. Your doctor may have you see a pain specialist.  · Counseling. Having chronic pain can be hard. It may help to talk to someone who can help you cope with your pain. Surgery isn't needed for most people. But it may help some types of low back pain. Follow-up care is a key part of your treatment and safety. Be sure to make and go to all appointments, and call your doctor if you are having problems. It's also a good idea to know your test results and keep a list of the medicines you take. When should you call for help? Call  911 anytime you think you may need emergency care. For example, call if:  · You can't move a leg at all. Call your doctor now or seek immediate medical care if:  · You have new or worse symptoms in your legs, belly, or buttocks. Symptoms may include:  ? Numbness or tingling. ? Weakness. ? Pain. · You lose bladder or bowel control. Watch closely for changes in your health, and be sure to contact your doctor if:  · Along with the back pain, you have a fever, lose weight, or don't feel well. · You do not get better as expected. Where can you learn more? Go to http://sonia-moira.info/. Enter A007 in the search box to learn more about \"Learning About Low Back Pain. \"  Current as of: June 26, 2019  Content Version: 12.2  © 9226-6588 [employer]. Care instructions adapted under license by [employer] (which disclaims liability or warranty for this information). If you have questions about a medical condition or this instruction, always ask your healthcare professional. [place] [address] any warranty or liability for your use of this information.

## 2019-10-02 NOTE — PROGRESS NOTES
HPI:   4-6 months of low back pain with radiation to (R)LE ; no rash, weakness, dysuria or abdominal pain  She attributes pain to a fall months ago          ROS is otherwise negative. Past Medical History:   Diagnosis Date    Acute idiopathic gout of right wrist 10/4/2017    Atrophic vaginitis     Cardiac cath 05/30/2012    Patent coronary arteries. LVEDP 20.  RA 11.  RV 42/10. PA 42/21. W 18.  CO 6.4 (TD), 6.4 (Meghann Hacker). PVR 1.7 Wood units. False-positive nuclear study.  Cardiac echocardiogram 05/11/2011    EF 65%. RVSP at least 35 mmHg. Mild IVCE. No significant valvular heart disease.  Cardiac nuclear imaging test 05/18/2012    Tech difficult. Apical ischemia. No infarction. EF 76%. No reg'l WMA. No TID.  Cardiovascular LLE venous duplex 06/17/2013    Left leg:  No DVT.     Diabetes     diet controlled, hypoglycemia from metformin previously     Dyslipidemia     Fatty liver     Fibrocystic breast disease     Fluid retention     GERD     H/O colonoscopy 4/12/13    polyp    Hypertension     Ill-defined condition     gout    Macular degeneration     Morbid obesity ([address]     Obstructive sleep apnea     Peripheral neuropathy     Rectocele     Thyroid cyst     bilat       Past Surgical History:   Procedure Laterality Date    [facility] ARTERIAL ARTERIOTOMY 3M  5/25/2012    HX COLONOSCOPY  4/12/2013    HX HEART CATHETERIZATION  5/25/2012    HX HERNIA REPAIR      umbilical as a child    HX HYSTERECTOMY      52ys ago, QUIQUE, BSO    HX MOHS PROCEDURES      right    [place] ANESTH,SURGERY OF SHOULDER         Social History     Socioeconomic History    Marital status: LEGALLY      Spouse name: Not on file    Number of children: Not on file    Years of education: Not on file    Highest education level: Not on file   Occupational History    Not on file   Social Needs    Financial resource strain: Not on file    Food insecurity:     Worry: Not on file     Inability: Not on file   [facility] needs:     Medical: Not on file     Non-medical: Not on file   Tobacco Use    Smoking status: Former Smoker     Last attempt to quit: 1974     Years since quittin.2    Smokeless tobacco: Never Used    Tobacco comment: 1 pack every 2 weeks x 1 year, stopped 45yrs ago   Substance and Sexual Activity    Alcohol use:  Yes    Drug use: No    Sexual activity: Yes     Partners: Male   Lifestyle    Physical activity:     Days per week: Not on file     Minutes per session: Not on file    Stress: Not on file   Relationships    Social connections:     Talks on phone: Not on file     Gets together: Not on file     Attends Taoist service: Not on file     Active member of club or organization: Not on file     Attends meetings of clubs or organizations: Not on file     Relationship status: Not on file    Intimate partner violence:     Fear of current or ex partner: Not on file     Emotionally abused: Not on file     Physically abused: Not on file     Forced sexual activity: Not on file   Other Topics Concern    Not on file   Social History Narrative    Not on file       Allergies   Allergen Reactions    Latex Rash    Nexium [Esomeprazole Magnesium] Swelling and Other (comments)     Lips Swollen, and facial rash and swelling    Dexilant [Dexlansoprazole] Other (comments)     Stomach swelling    Crestor [Rosuvastatin] Other (comments)     Upper respiratory illness    Lipitor [Atorvastatin] Swelling    Lisinopril Unknown (comments)     Patient can't recall    Niaspan [Niacin] Other (comments)     Scratchy throat, \"asthma\" like symptoms    Pcn [Penicillins] Hives    Pravachol [Pravastatin] Myalgia    Sulfa (Sulfonamide Antibiotics) Rash    Zocor [Simvastatin] Myalgia and Other (comments)     Per patient chart \"(? Rash)\"       Family History   Problem Relation Age of Onset    Cancer Mother         colon cancer    Colon Cancer Mother     Hypertension Mother     Diabetes Mother     Heart Disease Mother     Coronary Artery Disease Mother     Hypertension Father     Diabetes Father     Heart Disease Father     Coronary Artery Disease Father     Hypertension Sister     Diabetes Sister     Heart Disease Sister     Coronary Artery Disease Sister     Hypertension Brother     Diabetes Brother     Heart Disease Brother     Coronary Artery Disease Brother        Current Outpatient Medications   Medication Sig Dispense Refill    potassium chloride (K-DUR, KLOR-CON) 20 mEq tablet TAKE 2 TABLETS TWICE DAILY 360 Tab 1    omeprazole (PRILOSEC) 20 mg capsule Take 20 mg by mouth two (2) times a day. Hasnt started yet  0    fluconazole (DIFLUCAN) 150 mg tablet FDA advises cautious prescribing of oral fluconazole in pregnancy. Take one now and may repat in 7-10days. 1 Tab 1    losartan (COZAAR) 50 mg tablet take 2 tablets by mouth once daily for hypertension **(REQUEST FOR 90 DAY RX FOR PATIENT) 180 Tab 3    metoprolol succinate (TOPROL-XL) 50 mg XL tablet Take 1 Tab by mouth daily. 90 Tab 1    furosemide (LASIX) 20 mg tablet Take 1 Tab by mouth two (2) times a day. 180 Tab 1    Blood-Glucose Meter (TRUE METRIX AIR GLUCOSE METER) misc Check blood sugar twice daily. 1 Each 0    glucose blood VI test strips (TRUE METRIX GLUCOSE TEST STRIP) strip Check blood sugar twice daily. 100 Strip 2    lancets (ULTRA THIN LANCETS) 30 gauge misc Check blood sugar twice daily. 1 Package 2    diclofenac (VOLTAREN) 1 % gel Apply  to affected area four (4) times daily. 100 g 1    CALCIUM PO Take  by mouth.  fluticasone (FLONASE) 50 mcg/actuation nasal spray Si spray in both nostril twice daily 1 Bottle 1    raNITIdine (ZANTAC) 300 mg tablet take 1 tablet by mouth at bedtime  0    cholecalciferol (VITAMIN D3) 1,000 unit tablet Take 1,000 Units by mouth daily.  aspirin 81 mg Tab Take 81 mg by mouth daily.  MULTIVITAMINS (MULTI-VITAMIN PO) Take 1 Tab by mouth daily. Visit Vitals  /80 (BP 1 Location: Right arm, BP Patient Position: Sitting)   Pulse 68   Temp 97.9 °F (36.6 °C) (Tympanic)   Resp 16   Ht 5' 3\" (1.6 m)   Wt 245 lb (111.1 kg)   SpO2 96%   BMI 43.40 kg/m²       PE  obese in NAD  HEENT: OP: clear. Neck: supple w/o mass   Chest: clear. CV: RRR w/o m,r,g  Abd: soft, NT  Ext: tr edema. +(R) paravertebral lumbar tenderness; neg SLR; no rash  Neuro: NF. Assessment and Plan    Encounter Diagnoses   Name Primary?     Chronic right-sided low back pain with right-sided sciatica Yes     Chronic back pain with sciatica - to ortho for more  complete eval  neurontin 300 mg qhs  F/U worsening or unimproved 7 days  OV 2 weeks with PCP  I have explained plan to patient and the patient verbalizes understanding

## 2019-10-02 NOTE — PROGRESS NOTES
Chief Complaint   Patient presents with    Leg Pain       Pt preferred language for health care discussion is english. Is someone accompanying this pt? no    Is the patient using any DME equipment during OV? no    Depression Screening:  3 most recent [facility] Screens 4/23/2019 4/16/2019 7/12/2018 5/31/2018 10/16/2017 9/5/2017 6/14/2017   Little interest or pleasure in doing things Not at all Not at all Not at all Not at all Not at all Not at all Not at all   Feeling down, depressed, irritable, or hopeless Not at all Not at all Not at all Not at all Not at all Not at all Not at all   Total Score PHQ 2 0 0 0 0 0 0 0       Learning Assessment:  Learning Assessment 3/5/2019 1/8/2019 7/12/2018 7/2/2018 9/5/2017 2/19/2016 1/28/2015   PRIMARY LEARNER Patient Patient Patient Patient Patient Patient Patient   HIGHEST LEVEL OF EDUCATION - PRIMARY LEARNER  GRADUATED [address] - - NONE - - -   CO-LEARNER CAREGIVER No - - - No - -   Jeb Martinez   LEARNER PREFERENCE PRIMARY DEMONSTRATION OTHER (COMMENT) READING DEMONSTRATION DEMONSTRATION DEMONSTRATION DEMONSTRATION     READING - - - - - -   ANSWERED BY self patient patient self patient patient patient   RELATIONSHIP SELF SELF SELF SELF SELF SELF SELF       Abuse Screening:  Abuse Screening Questionnaire 1/8/2019 7/12/2018 1/3/2018 6/14/2017   Do you ever feel afraid of your partner? N N N N   Are you in a relationship with someone who physically or mentally threatens you? N N N N   Is it safe for you to go home? [place] [address]       Fall Risk  Fall Risk Assessment, last 12 mths 4/23/2019   Able to walk? Yes   Fall in past 12 months? No   Fall with injury? -   Number of falls in past 12 months -   Fall Risk Score -           Advance Directive:  1. Do you have an advance directive in place? Patient Reply:no    2.  If not, would you like material regarding how to put one in place? Patient Reply: no    2. Per patient no changes to their ACP contact no. Coordination of Care:  1. Have you been to the ER, urgent care clinic since your last visit? Hospitalized since your last visit? no    2. Have you seen or consulted any other health care providers outside of the [address] [place] [address] since your last visit? Include any pap smears or colon screening.  no    Provider prefers to do his own med reconciliation

## 2019-10-03 ENCOUNTER — DOCUMENTATION ONLY (OUTPATIENT)
Dept: NEUROLOGY | Age: 74
End: 2019-10-03

## 2019-10-07 ENCOUNTER — OFFICE VISIT (OUTPATIENT)
Dept: NEUROLOGY | Age: 74
End: 2019-10-07

## 2019-10-07 VITALS
SYSTOLIC BLOOD PRESSURE: 120 MMHG | BODY MASS INDEX: 43.59 KG/M2 | DIASTOLIC BLOOD PRESSURE: 80 MMHG | OXYGEN SATURATION: 97 % | TEMPERATURE: 98.2 F | HEIGHT: 63 IN | WEIGHT: 246 LBS | RESPIRATION RATE: 16 BRPM | HEART RATE: 67 BPM

## 2019-10-07 DIAGNOSIS — M79.604 RIGHT LEG PAIN: ICD-10-CM

## 2019-10-07 DIAGNOSIS — M51.36 LUMBAR DEGENERATIVE DISC DISEASE: ICD-10-CM

## 2019-10-07 DIAGNOSIS — E11.42 DIABETIC POLYNEUROPATHY ASSOCIATED WITH TYPE 2 DIABETES MELLITUS (HCC): ICD-10-CM

## 2019-10-07 DIAGNOSIS — M15.9 PRIMARY OSTEOARTHRITIS INVOLVING MULTIPLE JOINTS: Primary | ICD-10-CM

## 2019-10-07 NOTE — PROGRESS NOTES
HPI:  65 y/o female referred by Estrada Whitfield NP for problems with balance. For some time she has felt that she is veering to the right. She finds herself sometimes staggering like an alcoholic. This is gone on for a while. I had seen her back in June after she came with complaints of left foot and tibial area numbness which is improving but which started after she had a fall in December 2018 because of these aforementioned problems walking. A CT at that time showed diffuse microangiopathic changes. She had a CT of the cervical spine showing some degenerative joint disease without anything. Electrodiagnostic testing showed evidence of a moderate sensorimotor polyneuropathy with axonal loss which I felt was secondary to the fact that she has had borderline diabetes for at least 8 years. What is bothering her the most now is a pain that starts in the lower part of the buttocks and in the right posterior hip area and encompasses the hamstring and sometimes goes down to the ankle. She reports she has had a significant amount of swelling of the right knee and has a lot of pain in the right knee. She has a hard time walking because of this pain on the right lower extremity. She thinks that this is coming from her back. Reportedly she had some sort of fracture back in [id number]. The last available lumbar spine x-rays from June 2010 showed that compared to 2008 she had an unchanged L1 anterior wedge fracture with 25% of loss of anterior body height with otherwise diffuse degenerative changes. A right hip x-ray in April 2019 is reported as showing mild degenerative changes with \"Coxa Profunda\" and moderate right SI joint and pubic symphysis osteoarthritis. I do not see any recent knee images.         Social History     Socioeconomic History    Marital status: LEGALLY      Spouse name: Not on file    Number of children: Not on file    Years of education: Not on file    Highest education level: Not on file   Occupational History    Not on file   Social Needs    Financial resource strain: Not on file    Food insecurity:     Worry: Not on file     Inability: Not on file    Transportation needs:     Medical: Not on file     Non-medical: Not on file   Tobacco Use    Smoking status: Former Smoker     Last attempt to quit: 1974     Years since quittin.2    Smokeless tobacco: Never Used    Tobacco comment: 1 pack every 2 weeks x 1 year, stopped 45yrs ago   Substance and Sexual Activity    Alcohol use:  Yes    Drug use: No    Sexual activity: Yes     Partners: Male   Lifestyle    Physical activity:     Days per week: Not on file     Minutes per session: Not on file    Stress: Not on file   Relationships    Social connections:     Talks on phone: Not on file     Gets together: Not on file     Attends Evangelical service: Not on file     Active member of club or organization: Not on file     Attends meetings of clubs or organizations: Not on file     Relationship status: Not on file    Intimate partner violence:     Fear of current or ex partner: Not on file     Emotionally abused: Not on file     Physically abused: Not on file     Forced sexual activity: Not on file   Other Topics Concern    Not on file   Social History Narrative    Not on file       Family History   Problem Relation Age of Onset    Cancer Mother         colon cancer    Colon Cancer Mother     Hypertension Mother     Diabetes Mother     Heart Disease Mother     Coronary Artery Disease Mother     Hypertension Father     Diabetes Father     Heart Disease Father     Coronary Artery Disease Father     Hypertension Sister     Diabetes Sister     Heart Disease Sister     Coronary Artery Disease Sister     Hypertension Brother     Diabetes Brother     Heart Disease Brother     Coronary Artery Disease Brother        Current Outpatient Medications   Medication Sig Dispense Refill    gabapentin (NEURONTIN) 300 mg capsule 1 qhs 30 Cap 0    potassium chloride (K-DUR, KLOR-CON) 20 mEq tablet TAKE 2 TABLETS TWICE DAILY 360 Tab 1    omeprazole (PRILOSEC) 20 mg capsule Take 20 mg by mouth two (2) times a day. Hasnt started yet  0    fluconazole (DIFLUCAN) 150 mg tablet FDA advises cautious prescribing of oral fluconazole in pregnancy. Take one now and may repat in 7-10days. 1 Tab 1    losartan (COZAAR) 50 mg tablet take 2 tablets by mouth once daily for hypertension **(REQUEST FOR 90 DAY RX FOR PATIENT) 180 Tab 3    metoprolol succinate (TOPROL-XL) 50 mg XL tablet Take 1 Tab by mouth daily. 90 Tab 1    furosemide (LASIX) 20 mg tablet Take 1 Tab by mouth two (2) times a day. 180 Tab 1    diclofenac (VOLTAREN) 1 % gel Apply  to affected area four (4) times daily. 100 g 1    CALCIUM PO Take  by mouth.  raNITIdine (ZANTAC) 300 mg tablet take 1 tablet by mouth at bedtime  0    cholecalciferol (VITAMIN D3) 1,000 unit tablet Take 1,000 Units by mouth daily.  aspirin 81 mg Tab Take 81 mg by mouth daily.  MULTIVITAMINS (MULTI-VITAMIN PO) Take 1 Tab by mouth daily.  Blood-Glucose Meter (TRUE METRIX AIR GLUCOSE METER) misc Check blood sugar twice daily. 1 Each 0    glucose blood VI test strips (TRUE METRIX GLUCOSE TEST STRIP) strip Check blood sugar twice daily. 100 Strip 2    lancets (ULTRA THIN LANCETS) 30 gauge misc Check blood sugar twice daily. 1 Package 2    fluticasone (FLONASE) 50 mcg/actuation nasal spray Si spray in both nostril twice daily 1 Bottle 1       Past Medical History:   Diagnosis Date    Acute idiopathic gout of right wrist 10/4/2017    Atrophic vaginitis     Cardiac cath 2012    Patent coronary arteries. LVEDP 20.  RA 11.  RV 42/10. PA 42/21. W 18.  CO 6.4 (TD), 6.4 (Vickii Dears). PVR 1.7 Wood units. False-positive nuclear study.  Cardiac echocardiogram 2011    EF 65%. RVSP at least 35 mmHg. Mild IVCE. No significant valvular heart disease.     Cardiac nuclear imaging test 05/18/2012    Tech difficult. Apical ischemia. No infarction. EF 76%. No reg'l WMA. No TID.  Cardiovascular LLE venous duplex 06/17/2013    Left leg:  No DVT.     Diabetes     diet controlled, hypoglycemia from metformin previously     Dyslipidemia     Fatty liver     Fibrocystic breast disease     Fluid retention     GERD     H/O colonoscopy 4/12/13    polyp    Hypertension     Ill-defined condition     gout    Macular degeneration     Morbid obesity ([address]     Obstructive sleep apnea     Peripheral neuropathy     Rectocele     Thyroid cyst     bilat       Past Surgical History:   Procedure Laterality Date    [facility] ARTERIAL ARTERIOTOMY 3M  5/25/2012    HX COLONOSCOPY  4/12/2013    HX HEART CATHETERIZATION  5/25/2012    HX HERNIA REPAIR      umbilical as a child    HX HYSTERECTOMY      52ys ago, QUIQUE, BSO    HX MOHS PROCEDURES      right    CT ANESTH,SURGERY OF SHOULDER         Allergies   Allergen Reactions    Latex Rash    Nexium [Esomeprazole Magnesium] Swelling and Other (comments)     Lips Swollen, and facial rash and swelling    Dexilant [Dexlansoprazole] Other (comments)     Stomach swelling    Crestor [Rosuvastatin] Other (comments)     Upper respiratory illness    Lipitor [Atorvastatin] Swelling    Lisinopril Unknown (comments)     Patient can't recall    Niaspan [Niacin] Other (comments)     Scratchy throat, \"asthma\" like symptoms    Pcn [Penicillins] Hives    Pravachol [Pravastatin] Myalgia    Sulfa (Sulfonamide Antibiotics) Rash    Zocor [Simvastatin] Myalgia and Other (comments)     Per patient chart \"(? Rash)\"       Patient Active Problem List   Diagnosis Code    Hypertension I11.9    Dyslipidemia E78.5    Sleep apnea/ on c-pap G47.30    Renal cyst N28.1    Acquired absence of both cervix and uterus Z90.710    Acute idiopathic gout of right wrist M10.031    Sliding hiatal hernia K44.9    Pseudogout of right shoulder M11.211    Primary osteoarthritis involving multiple joints M15.0    Obesity, morbid ([facility]) E66.01    Mild peripheral edema R60.9    Incidental pulmonary nodule, > 3mm and < 8mm R91.1    Polyp of sigmoid colon D12.5    Multiple thyroid nodules E04.2    Morbid obesity with BMI of 40.0-44.9, adult (HCC) E66.01, Z68.41    Need for influenza vaccination Z23    Encounter for long-term (current) use of medications Z79.899         Review of Systems:   Constitutional: no fever or chills  Skin denies rash or itching  HEENT:  Denies tinnitus, hearing loss, or visual changes  Respiratory: denies shortness of breath  Cardiovascular: denies chest pain, dyspnea on exertion  Musculoskeletal: does not report joint pain or swelling  Neurological: as above in HPI      PHYSICAL EXAMINATION:         Vital signs:    Visit Vitals  /80 (BP 1 Location: Left arm, BP Patient Position: Sitting)   Pulse 67   Temp 98.2 °F (36.8 °C) (Oral)   Resp 16   Ht 5' 3\" (1.6 m)   Wt 111.6 kg (246 lb)   LMP  (LMP Unknown)   SpO2 97%   BMI 43.58 kg/m²         GENERAL:                  Well developed, obese, in no apparent distress. HEART:                       RR  EXTREMITIES:           No edema is identified. Pulses are +2. Marked pain to palpation in the anterior and lateral knee compartments on the right side not seen on the left. [place] Churchman sign is positive on the right side. Straight leg raising right lower extremity is normal.  HEAD:                         Normocephalic, atraumatic. NEUROLOGIC EXAMINATION       MENTAL STATUS:     Awake, alert, and oriented x 4. Attention and STM are grossly normal. There is no aphasia. Fund of knowledge is adequate. Mood and affect are appropriate  CRANIAL NERVES:   Visual fields are full to confrontation. Pupils are reactive to light and accommodation. Extraocular movements are intact and there is no nystagmus. Face is symmetrical.   Hearing is present.                                                      MOTOR: 5/5 strength      CEREBELLAR:           No tremors      SENSORY:      Decreased distal pinprick and vibration to midcalf level bilaterally. DTR's:                        Absent bilateral ankle jerks, no long tract signs                 GAIT:                            Painful gait with a slight right limp        Impression: She has had chronic problems walking which are multifactorial, from the neurological perspective she does have cerebrovascular disease and she has a moderate to severe sensorimotor polyneuropathy secondary to chronic prediabetic state. This has been recently evaluated and requires no additional testing. She also has a significant amount of polyarticular disease, including chronic lumbar degenerative joint disease, but she is also presented with very prominent findings suggesting right hip as well as right knee pathology explaining the right lower extremity pain which I think is more musculoskeletal rather than secondary to sciatica. Recs: Patient has a pending orthopedic evaluation. I think she needs to have her right hip and right knee evaluated. If there is continued questions about the possibility that she may have a lumbosacral radiculopathy then I will be glad to see her after orthopedic evaluation, but the clinical symptoms and the examination are highly consistent with his right lower extremity pain being secondary to polyarticular/musculoskeletal issues to a large degree related to her obesity. I counseled her in detail regarding stroke risk factor modification and prevention. I counseled her extensively about the need to lose weight and continued compliance with her obstructive sleep apnea.   We went over her past history of degenerative joint disease of the lumbar spine and why I feel that not only her presentation is suggesting that the right lower extremity issues are related more to polyarticular issues at mentioned above, but that 74 even if it is the case the next that would be to do some physical therapy because I do not anticipate that a surgery at this age would be prudent with the exception of very exigent circumstances. She will return as needed. 25 out of 40 minutes were spent counseling as described above. PLEASE NOTE:   Portions of this document may have been produced using voice recognition software. Unrecognized errors in transcription may be present. This note will not be viewable in [address]

## 2019-10-07 NOTE — PROGRESS NOTES
Yaquelin Watson presents today for   Chief Complaint   Patient presents with    Follow-up    Neurologic Problem       Is someone accompanying this pt? No    Is the patient using any DME equipment during OV? NO    Depression Screening:  3 most recent PHQ Screens 4/23/2019   Little interest or pleasure in doing things Not at all   Feeling down, depressed, irritable, or hopeless Not at all   Total Score PHQ 2 0       Learning Assessment:  Learning Assessment 3/5/2019   PRIMARY LEARNER Patient   HIGHEST LEVEL OF EDUCATION - PRIMARY LEARNER  GRADUATED HIGH SCHOOL OR GED   BARRIERS PRIMARY LEARNER NONE   CO-LEARNER CAREGIVER No   PRIMARY LANGUAGE ENGLISH   LEARNER PREFERENCE PRIMARY DEMONSTRATION     READING   ANSWERED BY self   RELATIONSHIP SELF       Abuse Screening:  Abuse Screening Questionnaire 1/8/2019   Do you ever feel afraid of your partner? N   Are you in a relationship with someone who physically or mentally threatens you? N   Is it safe for you to go home? Y         Coordination of Care:  1. Have you been to the ER, urgent care clinic since your last visit? Hospitalized since your last visit? No    2. Have you seen or consulted any other health care providers outside of the [address] [place] [address] since your last visit? Include any pap smears or colon screening.  No

## 2019-10-22 ENCOUNTER — OFFICE VISIT (OUTPATIENT)
Dept: ORTHOPEDIC SURGERY | Age: 74
End: 2019-10-22

## 2019-10-22 VITALS
SYSTOLIC BLOOD PRESSURE: 117 MMHG | BODY MASS INDEX: 43.41 KG/M2 | WEIGHT: 245 LBS | HEIGHT: 63 IN | TEMPERATURE: 97.1 F | RESPIRATION RATE: 15 BRPM | HEART RATE: 63 BPM | DIASTOLIC BLOOD PRESSURE: 81 MMHG

## 2019-10-22 DIAGNOSIS — S76.311A HAMSTRING STRAIN, RIGHT, INITIAL ENCOUNTER: Primary | ICD-10-CM

## 2019-10-22 RX ORDER — PREDNISONE 20 MG/1
TABLET ORAL
Qty: 22 TAB | Refills: 0 | Status: SHIPPED | OUTPATIENT
Start: 2019-10-22 | End: 2021-04-12

## 2019-10-22 NOTE — PROGRESS NOTES
HISTORY OF PRESENT Nelson Villatoro is a 66y.o. year old female comes in today as new patient for: right leg pain    Patients symptoms have been present for a few months. Pain level 10 - Worst pain ever/10 posterior thigh at knee. It has improved with wrap and OTC topical as well as heat. It is described as pain in leg back of thigh after fall and did split after slipping on clothes hangers in brothers home [id number] and was bad left prior but now issues on right the last couple months. IMAGING: Normal left femur and hip XR [id number]. Past Surgical History:   Procedure Laterality Date    [facility]. CNNLA ARTERIAL ARTERIOTOMY 3M  2012    HX COLONOSCOPY  2013    HX HEART CATHETERIZATION  2012    HX HERNIA REPAIR      umbilical as a child    HX HYSTERECTOMY      52ys ago, QUIQUE, BSO    HX MOHS PROCEDURES      right    [place] ANESTH,SURGERY OF SHOULDER       Social History     Socioeconomic History    Marital status: LEGALLY      Spouse name: Not on file    Number of children: Not on file    Years of education: Not on file    Highest education level: Not on file   Tobacco Use    Smoking status: Former Smoker     Last attempt to quit: 1974     Years since quittin.3    Smokeless tobacco: Never Used    Tobacco comment: 1 pack every 2 weeks x 1 year, stopped 45yrs ago   Substance and Sexual Activity    Alcohol use: Yes    Drug use: No    Sexual activity: Yes     Partners: Male      Current Outpatient Medications   Medication Sig Dispense Refill    gabapentin (NEURONTIN) 300 mg capsule 1 qhs 30 Cap 0    potassium chloride (K-DUR, KLOR-CON) 20 mEq tablet TAKE 2 TABLETS TWICE DAILY 360 Tab 1    losartan (COZAAR) 50 mg tablet take 2 tablets by mouth once daily for hypertension **(REQUEST FOR 90 DAY RX FOR PATIENT) 180 Tab 3    metoprolol succinate (TOPROL-XL) 50 mg XL tablet Take 1 Tab by mouth daily.  90 Tab 1    furosemide (LASIX) 20 mg tablet Take 1 Tab by mouth two (2) times a day. 180 Tab 1    diclofenac (VOLTAREN) 1 % gel Apply  to affected area four (4) times daily. 100 g 1    CALCIUM PO Take  by mouth.  cholecalciferol (VITAMIN D3) 1,000 unit tablet Take 1,000 Units by mouth daily.  aspirin 81 mg Tab Take 81 mg by mouth daily.  MULTIVITAMINS (MULTI-VITAMIN PO) Take 1 Tab by mouth daily.  omeprazole (PRILOSEC) 20 mg capsule Take 20 mg by mouth two (2) times a day. Hasnt started yet  0    fluconazole (DIFLUCAN) 150 mg tablet FDA advises cautious prescribing of oral fluconazole in pregnancy. Take one now and may repat in 7-10days. 1 Tab 1    Blood-Glucose Meter (TRUE METRIX AIR GLUCOSE METER) misc Check blood sugar twice daily. 1 Each 0    glucose blood VI test strips (TRUE METRIX GLUCOSE TEST STRIP) strip Check blood sugar twice daily. 100 Strip 2    lancets (ULTRA THIN LANCETS) 30 gauge misc Check blood sugar twice daily. 1 Package 2    fluticasone (FLONASE) 50 mcg/actuation nasal spray Si spray in both nostril twice daily 1 Bottle 1    raNITIdine (ZANTAC) 300 mg tablet take 1 tablet by mouth at bedtime  0     Past Medical History:   Diagnosis Date    Acute idiopathic gout of right wrist 10/4/2017    Atrophic vaginitis     Cardiac cath 2012    Patent coronary arteries. LVEDP 20.  RA 11.  RV 42/10. PA 42/21. W 18.  CO 6.4 (TD), 6.4 (Areatha Tess). PVR 1.7 Wood units. False-positive nuclear study.  Cardiac echocardiogram 2011    EF 65%. RVSP at least 35 mmHg. Mild IVCE. No significant valvular heart disease.  Cardiac nuclear imaging test 2012    Tech difficult. Apical ischemia. No infarction. EF 76%. No reg'l WMA. No TID.  Cardiovascular LLE venous duplex 2013    Left leg:  No DVT.     Diabetes     diet controlled, hypoglycemia from metformin previously     Dyslipidemia     Fatty liver     Fibrocystic breast disease     Fluid retention     GERD     H/O colonoscopy 13    polyp    Hypertension     Ill-defined condition     gout    Macular degeneration     Morbid obesity ([facility] [address]     Obstructive sleep apnea     Peripheral neuropathy     Rectocele     Thyroid cyst     bilat     Family History   Problem Relation Age of Onset    Cancer Mother         colon cancer    Colon Cancer Mother     Hypertension Mother     Diabetes Mother     Heart Disease Mother     Coronary Artery Disease Mother     Hypertension Father     Diabetes Father     Heart Disease Father     Coronary Artery Disease Father     Hypertension Sister     Diabetes Sister     Heart Disease Sister     Coronary Artery Disease Sister     Hypertension Brother     Diabetes Brother     Heart Disease Brother     Coronary Artery Disease Brother      ROS:  + swell right thigh, + bruise left side prior. No numb. All other systems reviewed and negative aside from that written in the HPI. Objective:  /81   Pulse 63   Temp 97.1 °F (36.2 °C)   Resp 15   Ht 5' 3\" (1.6 m)   Wt 245 lb (111.1 kg)   LMP  (LMP Unknown)   BMI 43.40 kg/m²   GEN: Appears stated age in NAD. HEAD:  Normocephalic, atraumatic. NEURO:  Sensation intact light touch B/L lower extremities. MS:  LE Strength +5/5 bilateral .   bilateral  Knee:  No Effusion . Lachman negative. Valgus negative. Varus negative. negative joint line tenderness medial.  Beth negative. Posterior drawer negative. Noble compression test negative. Patellar apprehension negative. Patellar facet  negative tender to palpation medial no crepitance bilateral .  Pes anserine negative TTP bilateral Tender nodule distal hamstring belly right and TTP lateral/medial hamstring tendons worse resisted knee flexion  EXT: no clubbing/cyanosis. no edema. SKIN: Warm/dry without rash. HEENT: Conjunctiva/lids WNL. External canals/nares WNL. Tongue midline. PERRL, EOMI. Hearing intact. NECK: Trachea midline. Supple, Full ROM. No thyromegaly. CARDIAC: No edema. LUNGS: Normal effort.    ABD: Soft, no masses. No HSM. PSYCH: A+O x3. Appropriate judgment and insight. Assessment/Plan:     ICD-10-CM ICD-9-CM    1. Hamstring strain, right, initial encounter S76.311A 843.8 predniSONE (DELTASONE) 20 mg tablet      REFERRAL TO PHYSICAL THERAPY       Patient (or guardian if minor) verbalizes understanding of evaluation and plan. Will stretch hamstring as demo and start PT w/ prednisone taper and RTC 4-6 weeks.

## 2019-10-22 NOTE — PATIENT INSTRUCTIONS
Perform stretches daily, follow-up on referral to physical therapy, use medication as prescribed and return to office in about 4 weeks.

## 2019-10-28 ENCOUNTER — TELEPHONE (OUTPATIENT)
Dept: ORTHOPEDIC SURGERY | Age: 74
End: 2019-10-28

## 2019-10-28 NOTE — TELEPHONE ENCOUNTER
Called and LVM on Pt identified VM to inform her that it is okay to take both medications simultaneously. Pt was instructed to call back with any further questions.

## 2019-10-28 NOTE — TELEPHONE ENCOUNTER
Patient called and wanted to verify if she should take the Gabapentin and the Prednisone.     Please advise [phone]

## 2019-10-31 ENCOUNTER — TELEPHONE (OUTPATIENT)
Dept: ORTHOPEDIC SURGERY | Age: 74
End: 2019-10-31

## 2019-10-31 DIAGNOSIS — S76.311A HAMSTRING STRAIN, RIGHT, INITIAL ENCOUNTER: Primary | ICD-10-CM

## 2019-10-31 NOTE — TELEPHONE ENCOUNTER
Patient called to address two things:     1.) Patient advised the prednisone is making her feel drowsy, \"drunk/high\", she can't concentrate and can't focus. She wants to know if she should continue taking it?    2.) Patient called to schedule PT and she was told the referral was sent to a location she didn't want to go to ([facility]).  Patient advised she would like the referral sent to [facility] location ([address] [place] [address])     Patient can be reached at: [phone]

## 2019-10-31 NOTE — TELEPHONE ENCOUNTER
In regards to previous message. Pt was contacted and informed per EVERARDO that side affects are likely due to Gabapentin and that she should f/u with prescribing provider regarding those side affects. EVERARDO would like her to continue with Prednisone. She was also informed that new PT order was sent to [facility] on [facility]. Pt thanked caller. Verified Results  MA FFDM SCREEN MIMI W ADRIAN W CAD [id number] 02:46PM SUSI HUTCHINSON   Ordering Provider: SUSI HUTCHINSON.    Reason For Study: routine,routine.   [Oct 1, 2018 4:55PM SUSI HUTCHINSON]  MMG was normal except the radiologist did want to get additional views of one area of the Rt breast.  This is very common.  Orders were entered. To be done at Breast center.     Test Name Result Flag Reference   MA FFDM SCREEN MIMI W ADRIAN W CAD (Report)     Accession #    [id number]    #[id number] - MA FFDM SCREEN MIMI W ADRIAN W CAD    BILATERAL DIGITAL SCREENING MAMMOGRAM 3D/2D WITH CAD: 9/24/2018    CLINICAL HISTORY:Routine annual screening mammogram - tomosynthesis.  66 year old woman presenting for her screening mammogram without any new breast complaints.    COMPARISON:   Comparison is made to exams dated: 1/16/2017 mammogram - [facility] and   3/3/2009 mammogram - [facility].    FINDINGS:  The tissue of both breasts is extremely dense, which lowers the sensitivity of mammography.    There is an asymmetry in the right breast middle depth lateral region seen on the craniocaudal   view only.    No other significant masses, calcifications, or other findings are seen in either breast.  Current study was also evaluated with a Computer Aided Detection (CAD) system. Digital Breast   Tomosynthesis (DBT) images were obtained and used to assist in the interpretation of this   examination.    IMPRESSION: INCOMPLETE NEED ADDITIONAL IMAGING EVALUATION    The asymmetry in the right breast is indeterminate. Additional views with possible ultrasound   are recommended.    MAMMOGRAPHY BI-RADS: 0 Incomplete Need Additional Imaging Evaluation    Terry Fox M.D.  cg/penshauna:9/26/2018 08:50:20    Imaging Technologist: RT Nick(R)(M), [facility]  letter sent: Addl Imaging Single Exam     **** FINAL ****    Dictated By:   FABRICIO, TERRY MCCLOUD    Electronically Reviewed and  Approved By:  ROBERT REGALADO MD       Message   MMG was normal except the radiologist did want to get additional views of one area of the Rt breast.   This is very common.   Orders were entered. To be done at Breast center.   (Pls get authorization if needed)

## 2019-10-31 NOTE — TELEPHONE ENCOUNTER
Should continue pednisone as is improving Sx and inflammation and Sx likely actually due to gabapentin. Will fax order to [facility].

## 2019-11-13 ENCOUNTER — APPOINTMENT (OUTPATIENT)
Dept: PHYSICAL THERAPY | Age: 74
End: 2019-11-13

## 2019-11-16 ENCOUNTER — HOSPITAL ENCOUNTER (OUTPATIENT)
Age: 74
Discharge: HOME | End: 2019-11-16
Payer: MEDICARE

## 2019-11-16 DIAGNOSIS — G43.019 COMMON MIGRAINE WITH INTRACTABLE MIGRAINE: ICD-10-CM

## 2019-11-16 PROCEDURE — 70551 MRI BRAIN STEM W/O DYE: CPT

## 2019-11-18 ENCOUNTER — HOSPITAL ENCOUNTER (OUTPATIENT)
Dept: PHYSICAL THERAPY | Age: 74
End: 2019-11-18
Payer: MEDICARE

## 2019-11-20 ENCOUNTER — OFFICE VISIT (OUTPATIENT)
Dept: CARDIOLOGY CLINIC | Age: 74
End: 2019-11-20

## 2019-11-20 VITALS
OXYGEN SATURATION: 98 % | HEART RATE: 67 BPM | SYSTOLIC BLOOD PRESSURE: 150 MMHG | BODY MASS INDEX: 43.94 KG/M2 | DIASTOLIC BLOOD PRESSURE: 77 MMHG | WEIGHT: 248 LBS | RESPIRATION RATE: 18 BRPM | HEIGHT: 63 IN

## 2019-11-20 DIAGNOSIS — I11.9 BENIGN HYPERTENSIVE HEART DISEASE WITHOUT HEART FAILURE: ICD-10-CM

## 2019-11-20 DIAGNOSIS — R73.03 PREDIABETES: ICD-10-CM

## 2019-11-20 DIAGNOSIS — E78.5 DYSLIPIDEMIA: Primary | ICD-10-CM

## 2019-11-20 RX ORDER — FAMOTIDINE 20 MG/1
TABLET, FILM COATED ORAL
Refills: 0 | COMMUNITY
Start: 2019-11-04 | End: 2021-04-12

## 2019-11-20 NOTE — PROGRESS NOTES
HPI:   I saw Sara Balderas in my office today in cardiovascular evaluation regarding her problems with palpitations in the past. Ms. Lamar Balderas is a pleasant, morbidly obese, 63 year old [facility] American female with history of hypertension, dyslipidemia, type 2 diabetes mellitus, gastroesophageal reflux disease, obstructive sleep apnea, and nonobstructive coronary artery disease documented on cardiac catheterization by my former associate Dr. Carmelo Bower back in May of 2012. She also had a right heart catheterization at that time, which demonstrated mild elevated pulmonary pressures and she did have some mild increase in her left ventricular end diastolic pressure at 20 mmHg at that time. She comes in today relates that she is doing reasonably well. She is remaining quite active and denies any cardiovascular symptomatology at this time. Encounter Diagnoses   Name Primary?  Dyslipidemia Yes    Hypertension     Prediabetes        Discussion: This lady appears to be doing reasonably well at this juncture except for the fact that her blood pressure remains somewhat elevated as it was when I saw her several months ago. Her blood pressure when checked by my staff today was 150/77 and I checked again myself and I got 150/100. She has been taking Lasix 20 mg twice a day and taking her Cozaar 50 mg twice a day as well as Toprol 50 mg daily, although she takes her Toprol at night. I recommended that she take her metoprolol in both her Cozaar at the same time in the morning and just take Lasix in the morning and not at night to see if that would get her blood pressure better controlled. She is going to continue with her current regimen and check her blood pressure a several times at home and if her blood pressure is not well controlled above 719 systolic for the most part she is going to make the above changes.   I told her it would take a week or 2 for her blood pressure to equilibrate and during that period of time she may be somewhat fatigued, but it takes some time for the body to get used to lower blood pressure and I thought it was worth her trying to continue the medication if possible. Her latest lipid profile that have a copy of was done back on June 12, 2019 showed total cholesterol of 209 with triglycerides of 81, HDL 59, VLDL of 16, and LDL of 791 which is certainly significantly elevated on no statin therapy since she is had side effects with statins in the past.  I do not think her cholesterol is high enough where her risk factors high enough to warrant a PCSK9 inhibitor at this time I think the only thing we could do is recommend diet and possibly consider Zetia if her cholesterol remains elevated. She otherwise seems to be doing fairly well with a stable appearing electrocardiogram so I am simply a plan to see her again in several months. PCP: Eda Skiff, PA-C      Past Medical History:   Diagnosis Date    Acute idiopathic gout of right wrist 10/4/2017    Atrophic vaginitis     Cardiac cath 05/30/2012    Patent coronary arteries. LVEDP 20.  RA 11.  RV 42/10. PA 42/21. W 18.  CO 6.4 (TD), 6.4 (Elnita Appl). PVR 1.7 Wood units. False-positive nuclear study.  Cardiac echocardiogram 05/11/2011    EF 65%. RVSP at least 35 mmHg. Mild IVCE. No significant valvular heart disease.  Cardiac nuclear imaging test 05/18/2012    Tech difficult. Apical ischemia. No infarction. EF 76%. No reg'l WMA. No TID.  Cardiovascular LLE venous duplex 06/17/2013    Left leg:  No DVT.     Diabetes     diet controlled, hypoglycemia from metformin previously     Dyslipidemia     Fatty liver     Fibrocystic breast disease     Fluid retention     GERD     H/O colonoscopy 4/12/13    polyp    Hypertension     Ill-defined condition     gout    Macular degeneration     Morbid obesity ([facility] [address]     Obstructive sleep apnea     Peripheral neuropathy     Rectocele     Thyroid cyst     bilat       Past Surgical History:   Procedure Laterality Date    HC CNNLA ARTERIAL ARTERIOTOMY 3M  2012    HX COLONOSCOPY  2013    HX HEART CATHETERIZATION  2012    HX HERNIA REPAIR      umbilical as a child    HX HYSTERECTOMY      50ys ago, QUIQUE, BSO    HX MOHS PROCEDURES      right    [place] ANESTH,SURGERY OF SHOULDER       Current Outpatient Medications   Medication Sig    famotidine (PEPCID) 20 mg tablet take 1 tablet by mouth twice a day    predniSONE (DELTASONE) 20 mg tablet Take 2 tabs in AM with food for 7 days then 1 tab until gone    potassium chloride (K-DUR, KLOR-CON) 20 mEq tablet TAKE 2 TABLETS TWICE DAILY    omeprazole (PRILOSEC) 20 mg capsule Take 20 mg by mouth two (2) times a day. Hasnt started yet    fluconazole (DIFLUCAN) 150 mg tablet FDA advises cautious prescribing of oral fluconazole in pregnancy. Take one now and may repat in 7-10days.  losartan (COZAAR) 50 mg tablet take 2 tablets by mouth once daily for hypertension **(REQUEST FOR 90 DAY RX FOR PATIENT)    metoprolol succinate (TOPROL-XL) 50 mg XL tablet Take 1 Tab by mouth daily.  furosemide (LASIX) 20 mg tablet Take 1 Tab by mouth two (2) times a day.  Blood-Glucose Meter (TRUE METRIX AIR GLUCOSE METER) misc Check blood sugar twice daily.  glucose blood VI test strips (TRUE METRIX GLUCOSE TEST STRIP) strip Check blood sugar twice daily.  lancets (ULTRA THIN LANCETS) 30 gauge misc Check blood sugar twice daily.  diclofenac (VOLTAREN) 1 % gel Apply  to affected area four (4) times daily.  CALCIUM PO Take  by mouth.  fluticasone (FLONASE) 50 mcg/actuation nasal spray Si spray in both nostril twice daily    cholecalciferol (VITAMIN D3) 1,000 unit tablet Take 1,000 Units by mouth daily.  aspirin 81 mg Tab Take 81 mg by mouth daily.  MULTIVITAMINS (MULTI-VITAMIN PO) Take 1 Tab by mouth daily.  gabapentin (NEURONTIN) 300 mg capsule 1 qhs     No current facility-administered medications for this visit. Allergies   Allergen Reactions    Latex Rash    Nexium [Esomeprazole Magnesium] Swelling and Other (comments)     Lips Swollen, and facial rash and swelling    Dexilant [Dexlansoprazole] Other (comments)     Stomach swelling    Crestor [Rosuvastatin] Other (comments)     Upper respiratory illness    Lipitor [Atorvastatin] Swelling    Lisinopril Unknown (comments)     Patient can't recall    Niaspan [Niacin] Other (comments)     Scratchy throat, \"asthma\" like symptoms    Pcn [Penicillins] Hives    Pravachol [Pravastatin] Myalgia    Sulfa (Sulfonamide Antibiotics) Rash    Zocor [Simvastatin] Myalgia and Other (comments)     Per patient chart \"(? Rash)\"       Social History:   Social History     Tobacco Use    Smoking status: Former Smoker     Last attempt to quit: 1974     Years since quittin.3    Smokeless tobacco: Never Used    Tobacco comment: 1 pack every 2 weeks x 1 year, stopped 45yrs ago   Substance Use Topics    Alcohol use: Yes         Family history: family history includes Cancer in her mother; Colon Cancer in her mother; Coronary Artery Disease in her brother, father, mother, and sister; Diabetes in her brother, father, mother, and sister; Heart Disease in her brother, father, mother, and sister; Hypertension in her brother, father, mother, and sister. Review of Systems:   Constitutional: Negative. Respiratory: Negative. Cardiovascular: Positive for orthopnea. Negative for chest pain, palpitations and leg swelling. Gastrointestinal: Negative. Musculoskeletal: Negative. Neurological: Negative for dizziness. Physical Exam:   The patient is an alert, oriented, well developed, well nourished 66 y.o.  female who was in no acute distress at the time of my examination.   Visit Vitals  /77 (BP 1 Location: Left arm, BP Patient Position: Sitting)   Pulse 67   Resp 18   Ht 5' 3\" (1.6 m)   Wt 112.5 kg (248 lb)   SpO2 98%   BMI 43.93 kg/m² BP Readings from Last 3 Encounters:   11/20/19 150/77   10/22/19 117/81   10/07/19 120/80      Wt Readings from Last 3 Encounters:   11/20/19 112.5 kg (248 lb)   10/22/19 111.1 kg (245 lb)   10/07/19 111.6 kg (246 lb)     HEENT: Conjuctiva white, mucosa moist, no pallor or cyanosis. NECK: Supple without masses, tenderness or thyromegaly. There was no jugular venous distention. Carotid are full bilaterally without bruits. CHEST: Symmetrical with good excursion. LUNGS: Clear to auscultation in all fields. HEART: Regular rate and rhythm. The apex is not displaced. There were no lifts, thrills or heaves. There is a normal S1 and S2 without appreciable murmurs, rubs, clicks, or gallops auscultated. ABDOMEN: Soft without masses, tenderness or organomegaly. EXTREMITIES: Full peripheral pulses with trace peripheral edema. Review of Data: See PMH and Cardiology and Imaging sections for cardiac testing  Lab Results   Component Value Date/Time    Cholesterol, total 209 (H) 06/12/2019 11:42 AM    HDL Cholesterol 59 06/12/2019 11:42 AM    LDL, calculated 134 (H) 06/12/2019 11:42 AM    Triglyceride 81 06/12/2019 11:42 AM    CHOL/HDL Ratio 2.7 08/07/2018 10:05 AM       Results for orders placed or performed in visit on 11/20/19   AMB POC EKG ROUTINE W/ 12 LEADS, INTER & REP     Status: None    Narrative    Normal sinus rhythm, rate 67. Nonspecific T wave abnormality inferiorly. Borderline low voltage in the precordial leads. Compared to the EKG of August 30, 2019 there was little interval change. Teofilo Mckeon D.O., F.A.C.C. [facility]  [facility] [place]  [facility]. [address]    PLEASE NOTE:  This document has been produced using voice recognition software. Unrecognized errors in transcription may be present.

## 2019-11-20 NOTE — PROGRESS NOTES
Cristina Jordan presents today for No chief complaint on file. Cristina Jordan preferred language for health care discussion is english/other. Is someone accompanying this pt? no    Is the patient using any DME equipment during [address]? no    Depression Screening:  3 most recent PHQ Screens 4/23/2019   Little interest or pleasure in doing things Not at all   Feeling down, depressed, irritable, or hopeless Not at all   Total Score PHQ 2 0       Learning Assessment:  Learning Assessment 3/5/2019   PRIMARY LEARNER Patient   HIGHEST LEVEL OF EDUCATION - PRIMARY LEARNER  GRADUATED HIGH SCHOOL OR GED   BARRIERS PRIMARY LEARNER NONE   CO-LEARNER CAREGIVER No   PRIMARY LANGUAGE ENGLISH   LEARNER PREFERENCE PRIMARY DEMONSTRATION     READING   ANSWERED BY self   RELATIONSHIP SELF       Abuse Screening:  Abuse Screening Questionnaire 1/8/2019   Do you ever feel afraid of your partner? N   Are you in a relationship with someone who physically or mentally threatens you? N   Is it safe for you to go home? Y       Fall Risk  Fall Risk Assessment, last 12 mths 4/23/2019   Able to walk? Yes   Fall in past 12 months? No   Fall with injury? -   Number of falls in past 12 months -   Fall Risk Score -       Pt currently taking Anticoagulant therapy? no    Coordination of Care:  1. Have you been to the ER, urgent care clinic since your last visit? Hospitalized since your last visit? no    2. Have you seen or consulted any other health care providers outside of the [address] [place] [address] since your last visit? Include any pap smears or colon screening.  no

## 2019-11-20 NOTE — PATIENT INSTRUCTIONS
Check blood pressure at home if top number is over 309 systolic,  take both blood pressure medications in the morning and take lasix all in morning instead of twice daily If that doesn't work Follow up with PCP

## 2019-11-25 ENCOUNTER — HOSPITAL ENCOUNTER (OUTPATIENT)
Dept: PHYSICAL THERAPY | Age: 74
Discharge: HOME | End: 2019-11-25
Payer: MEDICARE

## 2019-11-25 PROCEDURE — 97530 THERAPEUTIC ACTIVITIES: CPT

## 2019-11-25 PROCEDURE — 97161 PT EVAL LOW COMPLEX 20 MIN: CPT

## 2019-11-25 PROCEDURE — 97110 THERAPEUTIC EXERCISES: CPT

## 2019-11-25 NOTE — PROGRESS NOTES
[facility] - [facility], [address] [place] [address]  [phone] fax  Plan of Care/ Statement of Necessity for Physical Therapy Services    Patient name: Raj Mora Start of Care: 2019   Referral source: Danny EspinosaDO : 1945    Medical Diagnosis: Right leg pain [M79.604]  Strain of muscle, fascia and tendon of the posterior muscle group at thigh level, right thigh, initial encounter [M46.432P]  Payor: Migel Hearing / Plan: [address] [place] [address] HMO / Product Type: Managed Care Medicare /  Onset Date:10/31/19    Treatment Diagnosis: right leg pain   Prior Hospitalization: see medical history Provider#: [id number]   Medications: Verified on Patient summary List    Comorbidities: HTN   Prior Level of Function: functionally independent, 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 Assessment/ key information: Pt is a pleasant 66 y.o. female who presents with c/o right LE pain following a fall in 2018 in which her right foot stepped on a slippery surface and the subsequent fall caused her to \"do the splits\". She reports visiting the emergency room where any sort of vascular involvement of bilateral LEs was ruled out despite significant bruising bilaterally. Signs/symptoms at eval consistent with right hamstring involvement, possible strain, and likely degenerative changes in bilateral tibiofemoral joints per patients reports of increased knee pain bilaterally since onset of symptoms. Functional deficits include: decreased functional LE strength, decreased right knee AROM, decreased sitting tolerance, and impaired stair negotiation abilities. Rehab potential is good.  Pt would benefit from skilled PT to address above deficits to improve Pt's function and ability to return to PLOF ambulation and driving abilities with decreased pain. Evaluation Complexity History MEDIUM  Complexity : 1-2 comorbidities / personal factors will impact the outcome/ POC ; Examination LOW Complexity : 1-2 Standardized tests and measures addressing body structure, function, activity limitation and / or participation in recreation  ;Presentation LOW Complexity : Stable, uncomplicated  ;Clinical Decision Making MEDIUM Complexity : FOTO score of 26-74  Overall Complexity Rating: LOW   Problem List: pain affecting function, decrease ROM, decrease strength, edema affecting function, impaired gait/ balance, decrease ADL/ functional abilitiies, decrease activity tolerance, decrease flexibility/ joint mobility and decrease transfer abilities   Treatment Plan may include any combination of the following: Therapeutic exercise, Therapeutic activities, Neuromuscular re-education, Physical agent/modality, Gait/balance training, Manual therapy, Aquatic therapy, Patient education, Self Care training, Functional mobility training, Home safety training and Stair training  Patient / Family readiness to learn indicated by: asking questions  Persons(s) to be included in education: patient (P)  Barriers to Learning/Limitations: None  Patient Goal (s): avoid surgery, and no pain at all  Patient Self Reported Health Status: good  Rehabilitation Potential: good    Short Term Goals: To be accomplished in 1 week  - Goal: Pt to be compliant with initial HEP to improve bilateral LE strength during gait and transfers. Status at last note/certification: Established and reviewed with Pt  Long Term Goals: To be accomplished in 4 weeks  - Goal: Pt to complete 5 sit to stand transfers without UE support in 15 seconds or less to demonstrate improved functional LE strength.   Status at last note/certification: 15 seconds  - Goal: Patient will demonstrate 0-120 degrees AROM right knee to increase ease of ADLs  Status at last note/certification: right knee 0-82 degrees AROM  - Goal: Patient will ascend and descend 3 steps with reciprocal pattern to increase ease of entry into home  Status at last note/certification: 3 steps with step to pattern   - Goal: Patient will tolerate ambulation time of 30 min without symptom exacerbation to facilitate improved community ambulation. Status at last note/certification: 10 min  - Goal: Patient will increase FOTO > 57 pts to demonstrate increased functional mobility. Status at last note/certification: FOTO 45 pts       Frequency / Duration: Patient to be seen 1-2 times per week for 8 treatments. Patient/ Caregiver education and instruction: Diagnosis, prognosis, exercises   [x]  Plan of care has been reviewed with PTA    Certification Period: 11/25/19-12/24/19  Cecil Mar 11/25/2019 2:41 PM  _____________________________________________________________________  I certify that the above Therapy Services are being furnished while the patient is under my care. I agree with the treatment plan and certify that this therapy is necessary.     Physician's Signature:____________Date:_________TIME:________    ** Signature, Date and Time must be completed for valid certification **    Please sign and return to [facility] - [address]  [phone] fax

## 2019-11-25 NOTE — PROGRESS NOTES
PT DAILY TREATMENT NOTE 10-18    Patient Name: Makenzie Brand  Date:2019  : 1945  [x]  Patient  Verified  Payor: Anay Diallo / Plan: [address] [place] [address] HMO / Product Type: Managed Care Medicare /    In time:2:50  Out time:3:30  Total Treatment Time (min): 40  Visit #: 1 of 8    Medicare/BCBS Only   Total Timed Codes (min):  25 1:1 Treatment Time:  40       Treatment Area: Right leg pain [M79.604]  Strain of muscle, fascia and tendon of the posterior muscle group at thigh level, right thigh, initial encounter [S76.311A]    SUBJECTIVE  Pain Level (0-10 scale): 3  Any medication changes, allergies to medications, adverse drug reactions, diagnosis change, or new procedure performed?: [x] No    [] Yes (see summary sheet for update)  Subjective functional status/changes:   [] No changes reported  See POC    OBJECTIVE    15 min [x]Eval                  []Re-Eval       10 min Therapeutic Exercise:  [] See flow sheet :   Rationale: increase ROM and increase strength to improve the patients ability to perform stair negotiation with improved LE strength. 15 min Therapeutic Activity:  []  See flow sheet : Patient education on therapy assessment, prognosis, expectations for therapy sessions, patient goals, role of therapy in decreasing pain, protecting modesty during therapy, and HEP. Rationale: to improve the patients ability to adhere to HEP and therapy sessions for increased compliance when working toward therapy goals.              With   [] TE   [x] TA   [] neuro   [] other: Patient Education: [x] Review HEP    [] Progressed/Changed HEP based on:   [] positioning   [] body mechanics   [] transfers   [] heat/ice application    [] other:      Other Objective/Functional Measures: See POC     Pain Level (0-10 scale) post treatment: 3    ASSESSMENT/Changes in Function: See POC    Patient will continue to benefit from skilled PT services to modify and progress therapeutic interventions, address functional mobility deficits, address ROM deficits, address strength deficits, analyze and address soft tissue restrictions, analyze and cue movement patterns, analyze and modify body mechanics/ergonomics, assess and modify postural abnormalities, address imbalance/dizziness and instruct in home and community integration to attain remaining goals.      [x]  See Plan of Care  []  See progress note/recertification  []  See Discharge Summary         Progress towards goals / Updated goals:  See POC    PLAN  [x]  Upgrade activities as tolerated     []  Continue plan of care  [x]  Update interventions per flow sheet       []  Discharge due to:_  []  Other:_      Gregory Doshi 11/25/2019  2:41 PM    Future Appointments   Date Time Provider Rupinder Jurado   11/25/2019  2:45 PM Larkin Motes [facility]   5/20/2020 10:40 AM Milton mUana  [facility]

## 2019-12-03 ENCOUNTER — HOSPITAL ENCOUNTER (OUTPATIENT)
Dept: PHYSICAL THERAPY | Age: 74
Discharge: HOME | End: 2019-12-03
Payer: MEDICARE

## 2019-12-03 PROCEDURE — 97140 MANUAL THERAPY 1/> REGIONS: CPT

## 2019-12-03 PROCEDURE — 97112 NEUROMUSCULAR REEDUCATION: CPT

## 2019-12-03 PROCEDURE — 97110 THERAPEUTIC EXERCISES: CPT

## 2019-12-03 NOTE — PROGRESS NOTES
PT DAILY TREATMENT NOTE 10-18    Patient Name: Jyoti Case  Date:12/3/2019  : 1945  [x]  Patient  Verified  Payor: [facility] / Plan: [address] [place] [address] HMO / Product Type: Managed Care Medicare /    In time:2:05  Out time:2:52  Total Treatment Time (min): 52  Visit #: 2 of 8    Medicare/BCBS Only   Total Timed Codes (min):  47 1:1 Treatment Time:  52       Treatment Area: Right leg pain [M79.604]  Strain of muscle, fascia and tendon of the posterior muscle group at thigh level, right thigh, initial encounter [S76.311A]    SUBJECTIVE  Pain Level (0-10 scale): 0  Any medication changes, allergies to medications, adverse drug reactions, diagnosis change, or new procedure performed?: [x] No    [] Yes (see summary sheet for update)  Subjective functional status/changes:   [] No changes reported  \"It only hurts really bad when I am driving or at the very end of the day. \"    OBJECTIVE      20 min Therapeutic Exercise:  [x] See flow sheet :   Rationale: increase ROM and increase strength to improve the patients ability to perform gait and transfers with improved LE strength and mobility. 19 min Neuromuscular Re-education:  [x]  See flow sheet :   Rationale: increase strength, improve coordination, improve balance and increase proprioception  to improve the patients ability to perform stair negotiation and functional mobility in the home/community with improved quadriceps control and decreased falls risk. 8 min Manual Therapy:  STM to left adductor and medial hamstring musculature   Rationale: decrease pain, increase ROM, increase tissue extensibility and decrease trigger points to improve ease of ADLs after therapy session.             With   [] TE   [] TA   [] neuro   [] other: Patient Education: [x] Review HEP    [] Progressed/Changed HEP based on:   [] positioning   [] body mechanics   [] transfers   [] heat/ice application    [] other:      Other Objective/Functional Measures: Initiated therapy per flowsheet     Pain Level (0-10 scale) post treatment: 0    ASSESSMENT/Changes in Function: Patient requiring moderate verbal and visual cuing for all newly introduced interventions during today's session. Pt encouraged to make time during the day to complete her HEP and needed self care activities. Patient will continue to benefit from skilled PT services to modify and progress therapeutic interventions, address functional mobility deficits, address ROM deficits, address strength deficits, analyze and address soft tissue restrictions, analyze and cue movement patterns, analyze and modify body mechanics/ergonomics, assess and modify postural abnormalities and address imbalance/dizziness to attain remaining goals. []  See Plan of Care  []  See progress note/recertification  []  See Discharge Summary         Progress towards goals / Updated goals:  Short Term Goals: To be accomplished in 1 week  - Goal: Pt to be compliant with initial HEP to improve bilateral LE strength during gait and transfers. Status at last note/certification: Established and reviewed with Pt  Current: progressing, pt reports occasional compliance  Long Term Goals: To be accomplished in 4 weeks  - Goal: Pt to complete 5 sit to stand transfers without UE support in 15 seconds or less to demonstrate improved functional LE strength. Status at last note/certification: 25 seconds  Current: reassess next visit  - Goal: Patient will demonstrate 0-120 degrees AROM right knee to increase ease of ADLs  Status at last note/certification: right knee 0-82 degrees AROM  Current:  0-91 deg right knee PROM today  - Goal: Patient will ascend and descend 3 steps with reciprocal pattern to increase ease of entry into home  Status at last note/certification: 3 steps with step to pattern   Current:   - Goal: Patient will tolerate ambulation time of 30 min without symptom exacerbation to facilitate improved community ambulation.   Status at last note/certification: 10 min  Current: reassess closer to MD note  - Goal: Patient will increase FOTO > 57 pts to demonstrate increased functional mobility.   Status at last note/certification: FOTO 45 pts   Current: reassess at MD note    PLAN  [x]  Upgrade activities as tolerated     [x]  Continue plan of care  []  Update interventions per flow sheet       []  Discharge due to:_  []  Other:_      Lawence Pack 12/3/2019  2:01 PM    Future Appointments   Date Time Provider Rupinder Jurado   5/20/2020 10:40 AM Janina Maynard  [facility]

## 2019-12-04 ENCOUNTER — APPOINTMENT (OUTPATIENT)
Dept: PHYSICAL THERAPY | Age: 74
End: 2019-12-04
Payer: MEDICARE

## 2019-12-12 ENCOUNTER — HOSPITAL ENCOUNTER (OUTPATIENT)
Dept: PHYSICAL THERAPY | Age: 74
Discharge: HOME | End: 2019-12-12
Payer: MEDICARE

## 2019-12-12 PROCEDURE — 97140 MANUAL THERAPY 1/> REGIONS: CPT

## 2019-12-12 PROCEDURE — 97112 NEUROMUSCULAR REEDUCATION: CPT

## 2019-12-12 NOTE — PROGRESS NOTES
PT DAILY TREATMENT NOTE 10-18    Patient Name: Milton Marie  Date:2019  : 1945  [x]  Patient  Verified  Payor: Coretta Boudreaux / Plan: [address] [place] [address] HMO / Product Type: Managed Care Medicare /    In time:11:25  Out time:12:10  Total Treatment Time (min): 45  Visit #: 3 of 8    Medicare/BCBS Only   Total Timed Codes (min): 45  1:1 Treatment Time: 45        Treatment Area: Right leg pain [M79.604]  Strain of muscle, fascia and tendon of the posterior muscle group at thigh level, right thigh, initial encounter [S76.311A]    SUBJECTIVE  Pain Level (0-10 scale): 0/10  Any medication changes, allergies to medications, adverse drug reactions, diagnosis change, or new procedure performed?: [x] No    [] Yes (see summary sheet for update)  Subjective functional status/changes:   [] No changes reported  \"It hurts when I move the leg a certain way, like when I'm getting in/out of the car and at night when moving around in the bed. .\"    OBJECTIVE    37 min Neuromuscular Re-education:  []  See flow sheet :   Rationale: increase strength, improve balance and increase proprioception  to improve the patients ability to improve core, glute strength/stability, improve postural awareness, standing balance     8 min Manual Therapy:  STM, gentle TrP release to left adductors, medial HS; gentle tibiofemoral distraction   Rationale: increase tissue extensibility and decrease trigger points to reduce pain and improve mobility for ease with getting in/out of bed, car       With   [] TE   [] TA   [] neuro   [] other: Patient Education: [x] Review HEP    [] Progressed/Changed HEP based on:   [] positioning   [] body mechanics   [] transfers   [] heat/ice application    [] other:      Other Objective/Functional Measures: Continued with Rx program per flow sheet. Pt with lower tolerance to manual unless very gentle due to presence of trigger points that reproduced Pt's pain.   Stretches given and distraction eased pain and assisted with mobility. Initiated HS curls in prone, emphasizing core and glute activation, prior to HS curl for proper technique. Pain Level (0-10 scale) post treatment: 0/10    ASSESSMENT/Changes in Function: Pt given HEP of adductor, calf, and HS stretches to assist with mm tightness that reproduces Pt's pain. Will continue to progress per Pt tolerance to improve functional capabilities without increased pain. Patient will continue to benefit from skilled PT services to modify and progress therapeutic interventions, address functional mobility deficits, address ROM deficits, address strength deficits, analyze and address soft tissue restrictions, analyze and cue movement patterns, analyze and modify body mechanics/ergonomics, assess and modify postural abnormalities and address imbalance/dizziness to attain remaining goals. []  See Plan of Care  []  See progress note/recertification  []  See Discharge Summary         Progress towards goals / Updated goals:  Short Term Goals: To be accomplished in 1 week  - Goal: Pt to be compliant with initial HEP to improve bilateral LE strength during gait and transfers. Status at last note/certification: Established and reviewed with Pt  Current: progressing, pt reports occasional compliance  Long Term Goals: To be accomplished in 4 weeks  - Goal: Pt to complete 5 sit to stand transfers without UE support in 15 seconds or less to demonstrate improved functional LE strength.   Status at last note/certification: 25 seconds  Current: reassess near time of MD note  - Goal: Patient will demonstrate 0-120 degrees AROM right knee to increase ease of ADLs  Status at last note/certification: right knee 0-82 degrees AROM  Current:  0-91 deg right knee PROM today  - Goal: Patient will ascend and descend 3 steps with reciprocal pattern to increase ease of entry into home  Status at last note/certification: 3 steps with step to pattern   Current: reassess at MD note  - Goal: Patient will tolerate ambulation time of 30 min without symptom exacerbation to facilitate improved community ambulation. Status at last note/certification: 10 min  Current: reassess closer to MD note  - Goal: Patient will increase FOTO > 57 pts to demonstrate increased functional mobility.   Status at last note/certification: FOTO 45 pts   Current: reassess at MD note    PLAN  [x]  Upgrade activities as tolerated     [x]  Continue plan of care  []  Update interventions per flow sheet       []  Discharge due to:_  []  Other:_      Kendal Gold, HEIDI 12/12/2019  2:01 PM    Future Appointments   Date Time Provider Rupinder Jurado   12/17/2019  2:00 PM Kendal Gold, [facility] [address] [facility]   12/23/2019 11:15 AM [facility]   12/27/2019 10:30 AM [facility] CAL RM 1 2D [facility]   12/27/2019 11:00 AM [facility] BONE DEXA RM 1 [facility]   5/20/2020 10:40 AM Gordy Butts,  [facility]

## 2019-12-17 ENCOUNTER — APPOINTMENT (OUTPATIENT)
Dept: PHYSICAL THERAPY | Age: 74
End: 2019-12-17
Payer: MEDICARE

## 2019-12-19 LAB — EF %, EXTERNAL: NORMAL

## 2019-12-23 ENCOUNTER — APPOINTMENT (OUTPATIENT)
Dept: PHYSICAL THERAPY | Age: 74
End: 2019-12-23
Payer: MEDICARE

## 2019-12-27 ENCOUNTER — HOSPITAL ENCOUNTER (OUTPATIENT)
Dept: BONE DENSITY | Age: 74
Discharge: HOME | End: 2019-12-27
Attending: INTERNAL MEDICINE
Payer: MEDICARE

## 2019-12-27 ENCOUNTER — HOSPITAL ENCOUNTER (OUTPATIENT)
Dept: MAMMOGRAPHY | Age: 74
Discharge: HOME | End: 2019-12-27
Attending: INTERNAL MEDICINE
Payer: MEDICARE

## 2019-12-27 DIAGNOSIS — Z78.0 POST-MENOPAUSAL: ICD-10-CM

## 2019-12-27 DIAGNOSIS — Z12.31 SCREENING MAMMOGRAM, ENCOUNTER FOR: ICD-10-CM

## 2019-12-27 PROCEDURE — 77080 DXA BONE DENSITY AXIAL: CPT

## 2019-12-27 PROCEDURE — 77067 SCR MAMMO BI INCL CAD: CPT

## 2020-01-15 NOTE — PROGRESS NOTES
[facility] - [facility], [address] [place] [address]  [phone] fax    Discharge Summary      Patient name: Rica Edmondson Start of Care: 19   Referral source: Lele ReymundoDO mimi : 1945   Medical/Treatment Diagnosis: Right leg pain [M79.604]  Strain of muscle, fascia and tendon of the posterior muscle group at thigh level, right thigh, initial encounter Colby Guillermo  Payor: Christopher Alejandre / Plan: [address] [place] [address] HMO / Product Type: Managed Care Medicare /  Onset Date:10/31/19     Prior Hospitalization: see medical history Provider#: [id number]   Medications: Verified on Patient Summary List     Comorbidities: HTN   Prior Level of Function: functionally independent, 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 Status at last note/certification: Established and reviewed with Pt  Current: progressing, pt reports occasional compliance  Long Term Goals: To be accomplished in 4 weeks  - Goal: Pt to complete 5 sit to stand transfers without UE support in 15 seconds or less to demonstrate improved functional LE strength.   Status at last note/certification: 25 seconds  Current: unable to reassess due to unexpected D/C  - Goal: Patient will demonstrate 0-120 degrees AROM right knee to increase ease of ADLs  Status at last note/certification: right knee 0-82 degrees AROM  Current:  0-91 deg right knee PROM today  - Goal: Patient will ascend and descend 3 steps with reciprocal pattern to increase ease of entry into home  Status at last note/certification: 3 steps with step to pattern   Current: unable to reassess due to unexpected D/C  - Goal: Patient will tolerate ambulation time of 30 min without symptom exacerbation to facilitate improved community ambulation. Status at last note/certification: 10 min  Current: unable to reassess due to unexpected D/C  - Goal: Patient will increase FOTO > 57 pts to demonstrate increased functional mobility. Status at last note/certification: ATXK 12 RPO   Current: unable to reassess due to unexpected D/C       Assessment/ Summary of Care: Pt attended therapy for 3 visits for the treatment of right LE pain. At this point, the patient reports that she is in too much pain to continue therapy and would like to focus on other medical issues instead of her therapy at this time. Thus, the patient will be discharged at this time without further instruction. We would be happy to help this patient in the future if she elects to return to physical therapy.     RECOMMENDATIONS:  [x]Discontinue therapy: []Patient has reached or is progressing toward set goals      [x]Patient is non-compliant or has abdicated      []Due to lack of appreciable progress towards set goals    Mj Martinez 1/15/2020 1:32 PM

## 2020-02-20 DIAGNOSIS — E87.6 HYPOKALEMIA: ICD-10-CM

## 2020-02-21 RX ORDER — POTASSIUM CHLORIDE 20 MEQ/1
TABLET, EXTENDED RELEASE ORAL
Qty: 360 TAB | Refills: 1 | OUTPATIENT
Start: 2020-02-21

## 2020-04-16 NOTE — PROGRESS NOTES
HPI:  I saw Elena PedrazaAtul Rankin in my office today in cardiovascular evaluation regarding her problems with palpitations in the past. Ms. Louie Rankin is a pleasant, morbidly obese, 63 year old [facility] American female with history of hypertension, dyslipidemia, type 2 diabetes mellitus, gastroesophageal reflux disease, obstructive sleep apnea, and nonobstructive coronary artery disease documented on cardiac catheterization by my former associate Dr. Tonya Gonzalez back in May of 2012. She also had a right heart catheterization at that time, which demonstrated mild elevated pulmonary pressures and she did have some mild increase in her left ventricular end diastolic pressure at 20 mmHg at that time. She comes in today and relates that she is doing reasonably well, but recently her blood pressure has been elevated and it was elevated when we checked it again today at 156/100 and 140/90 on 2 separate checks. She is not having any chest pain, shortness of breath with exertion or really any other cardiovascular complaints. Encounter Diagnoses Name Primary?  Palpitations Yes  Dyslipidemia  Hypertension Discussion: This lady appears to be doing reasonably well but her blood pressure is somewhat elevated and she is really only on Cozaar and Lasix for her blood pressure. She has had some problems with palpitations in the past although they are not terribly frequent currently. I am going to start her on Toprol-XL 50 mg daily and I told her that it would take a week or 2 for this new medication in addition to her other medications to reach a steady state stent so that we can see how her blood pressure is doing and she is going to follow-up with her primary care team for further adjustment of her blood pressure medications moving forward.   Certainly Cozaar could be increased and if her blood pressure is not controlled with beta-blocker, angiotensin receptor blocker, and diuretic another Provider Encounter- Full Thickness wound    HISTORY OF PRESENT ILLNESS  Wound History:    START OF CARE IN CLINIC: 1/7/20               REFERRING PROVIDER: CELE REYES                 WOUND ETIOLOGY: venous              LOCATION: Left lower leg              HISTORY: Patient was admitted to [facility] 12/26/19 for LLE blisters filled with yellow pus and pain. Past medical history of CHF, hypertension, anxiety, tobacco abuse; history of chronic poorly healing wounds due to peripheral vascular disease. Patient has been seen at [facility] clinic 2016, 2018 & last discharged from [facility] wound end of 2019.     Pertinent Medical History: has a past medical history of Anxiety, Charcot's joint of left foot, non-diabetic (3/21/2016), Chronic congestive heart failure (HCC) (11/16/2017), Hepatitis C, chronic (HCC), Hypertension, Migraine, Polysubstance abuse (HCC) (3/8/2018), Tobacco use (4/18/2016), Ulcer of left lower extremity with necrosis of muscle (HCC) (3/21/2016), and Venous stasis ulcer ([facility]) (2017).              ETIOLOGY HISTORY:  Vascular Surgeon: Dr. White. Compression Circ-aid. Varicose Veins none visible     TOBACCO USE: Patient denies; patient also denies alcohol and recreational drug use    Patient's problem list, allergies, and current medications reviewed and updated in Epic    Interval History:  1/14/2020. Initial clinic visit with KEV Coyne, JORDAN-BC. Patient reports feeling well.  He states wound has been present since hospitalization 12/26/19. He does note improvement. Denies wound drainage, odor. Denies erythema, swelling, pain. Pt is not a diabetic. Denies current alcohol, tobacco, or drug use. Pt stating that he has upcoming FU appt with [facility] (next week) to FU on a recent procedure he had to his LLE. He can not recall the procedure, but states it was done in office last week.  He also has an upcoming appt with [facility] as referred by PCP due to Charcot.     1/28/20:  Clinic visit with KEV Coyne FNP-BC. Patient reports feeling well.  He is happy with wound progress and feels the wound is improving.  Denies wound drainage, odor. Denies erythema, swelling, pain. Pt is not a diabetic. Denies current alcohol, tobacco, or drug use. Pt followed by [facility].  He also has been referred to [facility] by PCP due to Charcot, but has not yet followed up or scheduled with them.    2/4/20: Clinic visit with KEV Coyne FNP-BC. Patient reports feeling well, he feels wound is improving. Denies wound drainage, odor. Denies erythema, swelling, pain. He also has been referred to [facility] by PCP due to Charcot, but has not yet followed up or scheduled with them.    2/11/20: Clinic visit with KEV Coyne FNP-BC. Patient reports feeling well, overall he is happy with the wound progress. Denies wound drainage, odor. Denies erythema, swelling, pain. He has not yet established with Ortho, but he reports that he did get his shoes and inserts.     2/18/20: Clinic visit with KEV Coyne FNP-BC. Patient reports feeling well and is pleased with the wound progress. Denies wound drainage, odor. Denies erythema, swelling, pain. Ortho referral was sent to [place] due to insurance. Information provided to patient.     3/4/2020: Clinic visit with KEV Luna. Patient states that they are feeling well today.  Patient denies fever, chills, nausea, vomiting, lightheadedness, dizziness, shortness of breath and chest pain.  Removed small amount of scab forming over wound bed.  Patient just has a small open wound at the areas have epithelialized.  Hopefully the wound very resolved in the next few weeks.    3/17/2020 : Clinic visit with KEV Brantley.  Kodi states he is feeling well, denies fevers, chills, nausea, vomiting.  He continues to tolerate 2 layer compression wrap.    4/16/2020: Clinic visit with KEV Luna. Patient states that they are  consideration would be to add a drug like Norvasc. His lipid profile which was completed on February 26, 2019 was somewhat elevated with total cholesterol of 190, triglycerides of 80, HDL of 63, LDL of 111, and VLDL of 16 which I think is suboptimal control on Lipitor 20 mg daily. She has had some swelling in her mouth recently and on higher doses of Lipitor has had more swelling so it sounds as if she may have a reaction to Lipitor so I told her to discontinue this medication altogether and when all of her symptoms resolved she is to give us a call in a couple of weeks and I would consider probably starting her on Crestor 20 mg daily and repeating her lipid profile in a couple of months. Since she is otherwise doing reasonably well I will simply plan to follow-up in about 6 months. PCP: Xavier Galindo PA-C Past Medical History:  
Diagnosis Date  Acute idiopathic gout of right wrist 10/4/2017  Atrophic vaginitis  Cardiac cath 05/30/2012 Patent coronary arteries. LVEDP 20.  RA 11.  RV 42/10. PA 42/21. W 18.  CO 6.4 (TD), 6.4 (Nanda Roam). PVR 1.7 Wood units. False-positive nuclear study.  Cardiac echocardiogram 05/11/2011 EF 65%. RVSP at least 35 mmHg. Mild IVCE. No significant valvular heart disease.  Cardiac nuclear imaging test 05/18/2012 Tech difficult. Apical ischemia. No infarction. EF 76%. No reg'l WMA. No TID.  Cardiovascular LLE venous duplex 06/17/2013 Left leg:  No DVT.  Diabetes   
 diet controlled, hypoglycemia from metformin previously  Dyslipidemia  Fatty liver  Fibrocystic breast disease  Fluid retention  GERD   
 H/O colonoscopy 4/12/13  
 polyp  Hypertension  Ill-defined condition   
 gout  Macular degeneration  Morbid obesity ([facility] [address]  Obstructive sleep apnea  Peripheral neuropathy  Rectocele  Thyroid cyst   
 bilat Past Surgical History:  
Procedure Laterality Date feeling well today.  Patient denies fever, chills, nausea, vomiting, lightheadedness, dizziness, shortness of breath and chest pain.  Patient presented to clinic today with new development of the left lower extremity ulcers, significantly swollen left lower extremity, weeping superficial tissue (yellow serous fluid).  Foul-smelling odor emanating from left lower extremity.  Patient had been weeping from this leg so much that he placed his leg in a plastic bag, the plantar aspect of his left foot severely macerated from placing his foot in the plastic bag with weeping from his swollen extremity.  I gave the patient the option of direct admission or going directly over to the ER.  Vital signs were currently stable patient was alert and oriented.  However, the patient and his daughter wanted to go directly over to the ER at this time.    REVIEW OF SYSTEMS:   Review of Systems   Constitutional: Negative for chills, fever and malaise/fatigue.   Respiratory: Negative for cough, sputum production, shortness of breath and wheezing.    Cardiovascular: Positive for leg swelling. Negative for chest pain and palpitations.        Chronic   Gastrointestinal: Negative for abdominal pain, diarrhea, nausea and vomiting.   Musculoskeletal: Positive for joint pain. Negative for myalgias.        Chronic   Skin:        Swollen left leg, redness, fluid coming from leg   Neurological: Negative for dizziness, sensory change, speech change and headaches.   Psychiatric/Behavioral: Negative for depression and substance abuse.       PHYSICAL EXAMINATION:   /69   Pulse 94   Temp 35.9 °C (96.6 °F) (Temporal)   Resp 20   SpO2 98%     Physical Exam   Constitutional: He is oriented to person, place, and time and well-developed, well-nourished, and in no distress.   Disheveled unkept   HENT:   Head: Normocephalic and atraumatic.   Eyes: Pupils are equal, round, and reactive to light. Conjunctivae and EOM are normal.   Neck: Normal range of   HC CNNLA ARTERIAL ARTERIOTOMY 3M  2012  HX COLONOSCOPY  2013  HX HEART CATHETERIZATION  2012  HX HERNIA REPAIR    
 umbilical as a child  HX HYSTERECTOMY 50ys ago, QUIQUE, BSO  HX MOHS PROCEDURES    
 right  MD ANESTH,SURGERY OF SHOULDER Current Outpatient Medications Medication Sig  cyclobenzaprine (FLEXERIL) 5 mg tablet Take 1-2 Tabs by mouth three (3) times daily as needed for Muscle Spasm(s) for up to 7 days.  atorvastatin calcium (LIPITOR PO) Take  by mouth.  furosemide (LASIX) 20 mg tablet Take 1 Tab by mouth two (2) times a day. (Patient taking differently: Take 20 mg by mouth two (2) times a day.)  Blood-Glucose Meter (TRUE METRIX AIR GLUCOSE METER) misc Check blood sugar twice daily.  glucose blood VI test strips (TRUE METRIX GLUCOSE TEST STRIP) strip Check blood sugar twice daily.  lancets (ULTRA THIN LANCETS) 30 gauge misc Check blood sugar twice daily.  losartan (COZAAR) 50 mg tablet take 2 tablets by mouth once daily for hypertension **(REQUEST FOR 90 DAY RX FOR PATIENT)  diclofenac (VOLTAREN) 1 % gel Apply  to affected area four (4) times daily.  CALCIUM PO Take  by mouth.  albuterol (PROVENTIL HFA, VENTOLIN HFA, PROAIR HFA) 90 mcg/actuation inhaler Take 1-2 Puffs by inhalation every four (4) hours as needed for Wheezing or Shortness of Breath.  fluticasone (FLONASE) 50 mcg/actuation nasal spray Si spray in both nostril twice daily  raNITIdine (ZANTAC) 300 mg tablet take 1 tablet by mouth at bedtime  potassium chloride (KLOR-CON) 10 mEq tablet TAKE 2 TABLETS TWICE DAILY  cholecalciferol (VITAMIN D3) 1,000 unit tablet Take 1,000 Units by mouth daily.  aspirin 81 mg Tab Take 81 mg by mouth daily.  MULTIVITAMINS (MULTI-VITAMIN PO) Take 1 Tab by mouth daily. No current facility-administered medications for this visit. Allergies Allergen Reactions  Latex Rash motion. Neck supple.   Cardiovascular: Normal rate and intact distal pulses.   Pulmonary/Chest: Effort normal. No respiratory distress. He has no wheezes. He exhibits no tenderness.   Abdominal: He exhibits no distension. There is no abdominal tenderness.   Musculoskeletal: Normal range of motion.         General: Deformity and edema present.      Comments: Left charcot foot  3+pitting edema to bilateral LE   Neurological: He is alert and oriented to person, place, and time.   Skin: Skin is warm and dry. There is erythema.   3+ pitting edema to left lower extremity with erythema, significant amount of yellow serous drainage from tissue (weeping).  Severe maceration to plantar aspect of left foot.  Pain to palpation  See wound care assessment/photos   Psychiatric: Affect and judgment normal.       WOUND ASSESSMENT     Wound 04/16/20 Venous Ulcer Ankle Medial;Posterior Left -Left Medial/Posterior Ankle (Active)   Wound Image    4/16/2020 10:30 AM   Site Assessment Red;Yellow 4/16/2020 10:30 AM   Periwound Assessment Maceration;Edema 4/16/2020 10:30 AM   Margins Attached edges 4/16/2020 10:30 AM   Drainage Amount Copious 4/16/2020 10:30 AM   Drainage Description Serous 4/16/2020 10:30 AM   Treatments Cleansed 4/16/2020 10:30 AM   Wound Cleansing Foam Cleanser/Washcloth 4/16/2020 10:30 AM   Periwound Protectant Not Applicable 4/16/2020 10:30 AM   Dressing Options Absorbent Abdominal Pad;Dry Roll Gauze;Hypafix Tape 4/16/2020 10:30 AM   Dressing Changed New 4/16/2020 10:30 AM   Non-staged Wound Description Full thickness 4/16/2020 10:30 AM   Wound Length (cm) 14 cm 4/16/2020 10:30 AM   Wound Width (cm) 15 cm 4/16/2020 10:30 AM   Wound Depth (cm) 0.4 cm 4/16/2020 10:30 AM   Wound Surface Area (cm^2) 210 cm^2 4/16/2020 10:30 AM   Wound Volume (cm^3) 84 cm^3 4/16/2020 10:30 AM   Wound Bed Granulation (%) 60 % 4/16/2020 10:30 AM   Wound Bed Epithelium (%) 0 % 4/16/2020 10:30 AM   Wound Bed Slough (%) 40 % 4/16/2020 10:30 AM   Nexium [Esomeprazole Magnesium] Swelling and Other (comments) Lips Swollen, and facial rash and swelling  Dexilant [Dexlansoprazole] Other (comments) Stomach swelling  Crestor [Rosuvastatin] Other (comments) Upper respiratory illness  Lipitor [Atorvastatin] Swelling  Lisinopril Unknown (comments) Patient can't recall  Niaspan [Niacin] Other (comments) Scratchy throat, \"asthma\" like symptoms  Pcn [Penicillins] Hives  Pravachol [Pravastatin] Myalgia  Sulfa (Sulfonamide Antibiotics) Rash  Zocor [Simvastatin] Myalgia and Other (comments) Per patient chart \"(? Rash)\" Social History:  
Social History Tobacco Use  Smoking status: Former Smoker Last attempt to quit: 1974 Years since quittin.8  Smokeless tobacco: Never Used  Tobacco comment: 1 pack every 2 weeks x 1 year, stopped 45yrs ago Substance Use Topics  Alcohol use: Yes Family history: family history includes Cancer in her mother; Colon Cancer in her mother; Coronary Artery Disease in her brother, father, mother, and sister; Diabetes in her brother, father, mother, and sister; Heart Disease in her brother, father, mother, and sister; Hypertension in her brother, father, mother, and sister. Review of Systems:  
Constitutional: Negative. Respiratory: Negative. Cardiovascular: Positive for orthopnea. Negative for chest pain, palpitations and leg swelling. Gastrointestinal: Negative. Musculoskeletal: Negative. Neurological: Negative for dizziness. Physical Exam:  
The patient is an alert, oriented, well developed, well nourished 66 y.o.  female who was in no acute distress at the time of my examination. Visit Vitals /90 Pulse 69 Ht 5' 4\" (1.626 m) Wt 115.2 kg (254 lb) SpO2 98% BMI 43.60 kg/m² BP Readings from Last 3 Encounters:  
19 140/90  
19 128/75  
19 136/82   Wound Bed Eschar (%) 0 % 4/16/2020 10:30 AM   Tunneling (cm) 0 cm 4/16/2020 10:30 AM   Undermining (cm) 0 cm 4/16/2020 10:30 AM   Wound Odor None 4/16/2020 10:30 AM   Exposed Structures None 4/16/2020 10:30 AM       Wound 04/16/20 Venous Ulcer Leg Anterior Left -Left Anterior LE (Active)             PROCEDURE:   Patient presented with severe left lower extremity 3+ pitting edema.  Patient's left lower extremity is severely erythemic, painful to palpation, foul-smelling, severely macerated and weeping yellow serous fluid.  Patient was given the option for direct admission or to go over directly to the ER.  Daughter and patient chose to go directly to the ER at this time.  Patient will need IV antibiotics.  Patient in stable condition in clinic see vital signs and assessment.    Pertinent Labs and Diagnostics:    Labs:     A1c:   Lab Results   Component Value Date/Time    HBA1C 5.7 (H) 01/23/2020 11:22 AM          VASCULAR STUDIES: BHUPINDER 12/27/2019   Right.    Doppler waveform of the common femoral artery is of high amplitude and    triphasic.    Doppler waveforms at the ankle are brisk and triphasic.    Ankle-brachial index is normal.       Left.    Could not perform ankle-brachial index due to large blisters/ulcers.   Normal toe-brachial index: 0.94   Doppler waveform of the common femoral artery is of high amplitude and    triphasic.    Biphasic waveforms seen at the ankle.         ASSESSMENT AND PLAN:   1. Skin ulcer of left lower leg with fat layer exposed (HCC)      04/16/20: Patient sent to the ER see interval history note above     Wound care: Copious amounts of abdominal pads and Kerlix to left lower extremity due to severe weeping.    2. Chronic venous stasis  Comments: Pt established at [facility] and had a recent procedure done.no records available.     3. Acquired deformity of left ankle and foot, Charcot's joint (non diabetic)  Patient established with [facility] and received custom shoes and inserts.  Wt Readings from Last 3 Encounters:  
04/23/19 115.2 kg (254 lb) 04/17/19 115.2 kg (254 lb) 04/16/19 113.9 kg (251 lb) HEENT: Conjuctiva white, mucosa moist, no pallor or cyanosis. NECK: Supple without masses, tenderness or thyromegaly. There was no jugular venous distention. Carotid are full bilaterally without bruits. CHEST: Symmetrical with good excursion. LUNGS: Clear to auscultation in all fields. HEART: Regular rate and rhythm. The apex is not displaced. There were no lifts, thrills or heaves. There is a normal S1 and S2 without appreciable murmurs, rubs, clicks, or gallops auscultated. ABDOMEN: Soft without masses, tenderness or organomegaly. EXTREMITIES: Full peripheral pulses with trace peripheral edema. Review of Data: See PMH and Cardiology and Imaging sections for cardiac testing Lab Results Component Value Date/Time Cholesterol, total 190 02/26/2019 09:37 AM  
 HDL Cholesterol 63 02/26/2019 09:37 AM  
 LDL, calculated 111 (H) 02/26/2019 09:37 AM  
 Triglyceride 80 02/26/2019 09:37 AM  
 CHOL/HDL Ratio 2.7 08/07/2018 10:05 AM  
 
 
Results for orders placed or performed in visit on 04/23/19 AMB POC EKG ROUTINE W/ 12 LEADS, INTER & REP     Status: None Narrative Normal sinus rhythm rate 69. Low voltage in the precordial leads. This EKG is otherwise within normal limits and similar to the EKG of August 27, 2018. Glenn Whitten D.O., F.A.C.C. [facility] [address] [place] [address] and [facility] [place] [address] [address] Cindy Knight [address] [place] [phone] PLEASE NOTE:  This document has been produced using voice recognition software. Unrecognized errors in transcription may be present.   -Patient educated to return to [facility] to readjust his custom shoes and inserts part of the insert is folded over into the shoe bed.  Per patient his PCP has referred him to Orthopedics  Patient was sent to a podiatrist in [place] who told him that he does not perform surgeries on Charcot feet.  Patient was also referred to [facility] orthopedics however, daughter reports that they called and were told that they are more than urgent care facility.  He was then referred to an orthopedist in [place] due to his insurance however, it does not sound like the patient wants to travel to [facility] for assessment and intervention   -Patient has not scheduled, does not know when he will do so.    PATIENT EDUCATION    -Patient advised to go directly to the ER for assessment and IV antibiotics of left lower extremity.  Patient was seen for 15 minutes face to face of which > 50% of appointment time was spent on counseling and coordination of care regarding the above.    Please note that this note may have been created using voice recognition software. I have worked with technical experts from [facility] to optimize the interface.  I have made every reasonable attempt to correct obvious errors, but there may be errors of grammar and possibly content that I did not discover before finalizing the note.    N

## 2020-08-31 NOTE — PROGRESS NOTES
Prescription(s) eprescribed to pharmacy.     Progress Note    Patient: Lala Albarado  MRN: [id number]  SSN: [id number]   YOB: 1945  Age: 74 y.o. Sex: female     Chief Complaint   Patient presents with    Thyroid Problem     tirads 3       HPI    Ms Claudia Manzanares is a 75 yo woman who recently discovered she has a multinodular Thyroid. Her USG shows multiple small nodules that are not suspicious. She has no symptoms of thyroid disease either hypo or hyperthyroid. She has no swallowing or breathing difficulties. No Hx or radiation exposure    Past Medical History:   Diagnosis Date    Acute idiopathic gout of right wrist 10/4/2017    Atrophic vaginitis     Cardiac cath 05/30/2012    Patent coronary arteries. LVEDP 20.  RA 11.  RV 42/10. PA 42/21. W 18.  CO 6.4 (TD), 6.4 ([place]). PVR 1.7 Wood units. False-positive nuclear study.  Cardiac echocardiogram 05/11/2011    EF 65%. RVSP at least 35 mmHg. Mild IVCE. No significant valvular heart disease.  Cardiac nuclear imaging test 05/18/2012    Tech difficult. Apical ischemia. No infarction. EF 76%. No reg'l WMA. No TID.  Cardiovascular LLE venous duplex 06/17/2013    Left leg:  No DVT.     Diabetes     diet controlled, hypoglycemia from metformin previously     Dyslipidemia     Fatty liver     Fibrocystic breast disease     Fluid retention     GERD     H/O colonoscopy 4/12/13    polyp    Hypertension     Hypertension     Ill-defined condition     gout    Macular degeneration     Morbid obesity ([address]     Obstructive sleep apnea     Peripheral neuropathy     Rectocele      Past Surgical History:   Procedure Laterality Date    [facility] CNNLA ARTERIAL ARTERIOTOMY 3M  5/25/2012    HX COLONOSCOPY  4/12/2013    HX HEART CATHETERIZATION  5/25/2012    HX HERNIA REPAIR      umbilical as a child    HX HYSTERECTOMY      50ys ago, QUIQUE, BSO    HX MOHS PROCEDURES      right    NE ANESTH,SURGERY OF SHOULDER       Allergies   Allergen Reactions    Latex Rash    Nexium [Esomeprazole Magnesium] Swelling and Other (comments)     Lips Swollen, and facial rash and swelling    Crestor [Rosuvastatin] Other (comments)     Upper respiratory illness    Lisinopril Unknown (comments)     Patient can't recall    Niaspan [Niacin] Other (comments)     Scratchy throat, \"asthma\" like symptoms    Pcn [Penicillins] Hives    Pravachol [Pravastatin] Myalgia    Sulfa (Sulfonamide Antibiotics) Rash    Zocor [Simvastatin] Myalgia and Other (comments)     Per patient chart \"(? Rash)\"     Current Outpatient Prescriptions   Medication Sig Dispense Refill    furosemide (LASIX) 20 mg tablet Take 1 Tab by mouth two (2) times a day. 90 Tab 1    losartan (COZAAR) 50 mg tablet TAKE 2 TABLETS ONE TIME DAILY FOR HYPERTENSION 180 Tab 1    potassium chloride (KLOR-CON) 10 mEq tablet TAKE 2 TABLETS TWICE DAILY 360 Tab 1    atorvastatin (LIPITOR) 20 mg tablet TAKE 1 TABLET EVERY DAY 90 Tab 1    cholecalciferol (VITAMIN D3) 1,000 unit tablet Take 1,000 Units by mouth daily.  aspirin 81 mg Tab Take 81 mg by mouth daily.  MULTIVITAMINS (MULTI-VITAMIN PO) Take 1 Tab by mouth daily.  bisacodyl (DULCOLAX) 5 mg EC tablet take 2 tablets by mouth as directed  0    albuterol (PROVENTIL HFA, VENTOLIN HFA, PROAIR HFA) 90 mcg/actuation inhaler Take 1 Puff by inhalation every six (6) hours as needed for Wheezing. 1 Inhaler 0     Social History     Social History    Marital status: LEGALLY      Spouse name: N/A    Number of children: N/A    Years of education: N/A     Occupational History    Not on file.      Social History Main Topics    Smoking status: Former Smoker    Smokeless tobacco: Never Used      Comment: 1 pack every 2 weeks x 1 year, stopped 45yrs ago    Alcohol use No      Comment: occassionally    Drug use: No    Sexual activity: No     Other Topics Concern    Not on file     Social History Narrative     Family History   Problem Relation Age of Onset    Cancer Mother      colon cancer  Colon Cancer Mother     Hypertension Mother     Diabetes Mother     Heart Disease Mother     Coronary Artery Disease Mother     Hypertension Father     Diabetes Father     Heart Disease Father     Coronary Artery Disease Father     Hypertension Sister     Diabetes Sister     Heart Disease Sister     Coronary Artery Disease Sister     Hypertension Brother     Diabetes Brother     Heart Disease Brother     Coronary Artery Disease Brother          Review of systems:  Patient denies any reflux, emesis, abdominal pain, change in bowel habits, hematochezia, melena, fever, weight loss, fatigue chills, dermatitis, abnormal moles, change in vision, vertigo, epistaxis, dysphagia, hoarseness, chest pain, palpitations, hypertension, edema, cough, shortness of breath, wheezing, hemoptysis, snoring, hematuria, diabetes, thyroid disease, anemia, bruising, history of blood transfusion, dizziness, headache, or fainting.     Physical Examination    Well developed well nourished female in no apparent distress  Visit Vitals    /88    Pulse 77    Temp 98 °F (36.7 °C) (Oral)    Resp 18    Ht 5' 4\" (1.626 m)    Wt 112.5 kg (248 lb)    SpO2 98%    BMI 42.57 kg/m2      Head: normocephalic, atraumatic  Mouth: Clear, no overt lesions, oral mucosa pink and moist  Neck: supple, no masses, no adenopathy or carotid bruits, trachea midline  Resp: clear to auscultation bilaterally, no wheeze, rhonchi or rales, excursions normal and symmetrical  Cardio: Regular rate and rhythm, no murmurs, clicks, gallops or rubs, no edema or varicosities  Abdomen: soft, nontender, nondistended, normoactive bowel sounds, no hernias, no hepatosplenomegaly,   Back: na  Extremeties: warm, well-perfused, no tenderness or swelling, normal gait/station  Neuro: sensation and strength grossly intact and symmetrical  Psych: alert and oriented to person, place and time  Breast exam na    IMPRESSION  Multinodular thyroid    PLAN  No orders of the defined types were placed in this encounter.     Repeat USG in 6 mo  Jonas Maynard MD

## 2020-09-02 ENCOUNTER — OFFICE VISIT (OUTPATIENT)
Dept: CARDIOLOGY CLINIC | Age: 75
End: 2020-09-02

## 2020-09-02 VITALS
HEIGHT: 63 IN | DIASTOLIC BLOOD PRESSURE: 88 MMHG | SYSTOLIC BLOOD PRESSURE: 126 MMHG | BODY MASS INDEX: 43.41 KG/M2 | OXYGEN SATURATION: 97 % | WEIGHT: 245 LBS | HEART RATE: 73 BPM

## 2020-09-02 DIAGNOSIS — I11.9 BENIGN HYPERTENSIVE HEART DISEASE WITHOUT HEART FAILURE: ICD-10-CM

## 2020-09-02 DIAGNOSIS — R00.2 PALPITATIONS: Primary | ICD-10-CM

## 2020-09-02 DIAGNOSIS — R73.03 PREDIABETES: ICD-10-CM

## 2020-09-02 DIAGNOSIS — E66.01 MORBID OBESITY WITH BMI OF 40.0-44.9, ADULT (HCC): ICD-10-CM

## 2020-09-02 DIAGNOSIS — E78.5 DYSLIPIDEMIA: ICD-10-CM

## 2020-09-02 NOTE — PROGRESS NOTES
HPI:   I saw Maryan Garcia in my office today in cardiovascular evaluation regarding her problems with palpitations in the past. Ms. Garcia is a pleasant, morbidly obese, 63 year [facility] with history of hypertension, dyslipidemia, type 2 diabetes mellitus, gastroesophageal reflux disease, obstructive sleep apnea, and nonobstructive coronary artery disease documented on cardiac catheterization by my former associate Dr. Vidal Fraction back in May of 2012. She also had a right heart catheterization at that time, which demonstrated mild elevated pulmonary pressures and she did have some mild increase in her left ventricular end diastolic pressure at 20 mmHg at that time. She comes in today relates that she is doing reasonably well. She is remaining quite active and except for some vague left sided chest pain from time to time and rare palpitations denies any other cardiovascular symptomatology at this time. Encounter Diagnoses   Name Primary?  Palpitations Yes    Morbid obesity with BMI of 40.0-44.9, adult (HCC)     Hypertension     Dyslipidemia     Prediabetes        Discussion: This lady appears to be doing reasonably well at this juncture no recommendations for change. She is not complaining of any symptoms to suggest the development of symptomatic obstructive coronary disease or heart failure. Her latest lipid profile that I have a copy of was done back on June 12, 2019 showed total cholesterol of 209 with triglycerides of 81, HDL 59, VLDL of 16, and LDL of 179 which is certainly significantly elevated on no statin therapy. She has not been able to take statins due to side effects. I do not think her cholesterol is high enough or her risk factors high enough to warrant a PCSK9 inhibitor at this time.  I did mention to her that she could consider getting a coronary calcium score which would better assess her 10-year risk of having a cardiac event and if high enough could provide enough evidence to get her a PCSK9 inhibitor. Will leave decision on whether or not to further treat her high cholesterol to her family physician. I think the only thing we could recommend sides a whole foods plant-based diet to lower her cholesterol to be taken orally would be either Zetia or Nexlizet    She otherwise seems to be doing fairly well with a stable appearing electrocardiogram so I am simply a plan to her seen in several months by my associate Dr. Andrew Yuen since I will be retiring. PCP: Allie Garcia. Gagandeep Silver M.D. Past Medical History:   Diagnosis Date    Acute idiopathic gout of right wrist 10/4/2017    Atrophic vaginitis     Cardiac cath 05/30/2012    Patent coronary arteries. LVEDP 20.  RA 11.  RV 42/10. PA 42/21. W 18.  CO 6.4 (TD), 6.4 ([employer]). PVR 1.7 Wood units. False-positive nuclear study.  Cardiac echocardiogram 05/11/2011    EF 65%. RVSP at least 35 mmHg. Mild IVCE. No significant valvular heart disease.  Cardiac nuclear imaging test 05/18/2012    Tech difficult. Apical ischemia. No infarction. EF 76%. No reg'l WMA. No TID.  Cardiovascular LLE venous duplex 06/17/2013    Left leg:  No DVT.     Diabetes     diet controlled, hypoglycemia from metformin previously     Dyslipidemia     Fatty liver     Fibrocystic breast disease     Fluid retention     GERD     H/O colonoscopy 4/12/13    polyp    Hypertension     Ill-defined condition     gout    Macular degeneration     Morbid obesity ([address]     Obstructive sleep apnea     Peripheral neuropathy     Rectocele     Thyroid cyst     bilat       Past Surgical History:   Procedure Laterality Date    [facility] CNNLA ARTERIAL ARTERIOTOMY 3M  5/25/2012    HX COLONOSCOPY  4/12/2013    HX HEART CATHETERIZATION  5/25/2012    HX HERNIA REPAIR      umbilical as a child    HX HYSTERECTOMY      52ys ago, QUIQUE, BSO    HX MOHS PROCEDURES      right    DE ANESTH,SURGERY OF SHOULDER       Current Outpatient Medications   Medication Sig    famotidine (PEPCID) 20 mg tablet take 1 tablet by mouth twice a day    potassium chloride (K-DUR, KLOR-CON) 20 mEq tablet TAKE 2 TABLETS TWICE DAILY    fluconazole (DIFLUCAN) 150 mg tablet FDA advises cautious prescribing of oral fluconazole in pregnancy. Take one now and may repat in 7-10days.  losartan (COZAAR) 50 mg tablet take 2 tablets by mouth once daily for hypertension **(REQUEST FOR 90 DAY RX FOR PATIENT) (Patient taking differently: 50 mg daily. take 2 tablets by mouth once daily for hypertension **(REQUEST FOR 90 DAY RX FOR PATIENT))    metoprolol succinate (TOPROL-XL) 50 mg XL tablet Take 1 Tab by mouth daily.  furosemide (LASIX) 20 mg tablet Take 1 Tab by mouth two (2) times a day.  Blood-Glucose Meter (TRUE METRIX AIR GLUCOSE METER) misc Check blood sugar twice daily.  glucose blood VI test strips (TRUE METRIX GLUCOSE TEST STRIP) strip Check blood sugar twice daily.  lancets (ULTRA THIN LANCETS) 30 gauge misc Check blood sugar twice daily.  diclofenac (VOLTAREN) 1 % gel Apply  to affected area four (4) times daily.  CALCIUM PO Take  by mouth.  fluticasone (FLONASE) 50 mcg/actuation nasal spray Si spray in both nostril twice daily    cholecalciferol (VITAMIN D3) 1,000 unit tablet Take 1,000 Units by mouth daily.  aspirin 81 mg Tab Take 81 mg by mouth daily.  MULTIVITAMINS (MULTI-VITAMIN PO) Take 1 Tab by mouth daily.  predniSONE (DELTASONE) 20 mg tablet Take 2 tabs in AM with food for 7 days then 1 tab until gone    gabapentin (NEURONTIN) 300 mg capsule 1 qhs    omeprazole (PRILOSEC) 20 mg capsule Take 20 mg by mouth two (2) times a day. Hasnt started yet     No current facility-administered medications for this visit.           Allergies   Allergen Reactions    Latex Rash    Nexium [Esomeprazole Magnesium] Swelling and Other (comments)     Lips Swollen, and facial rash and swelling    Dexilant [Dexlansoprazole] Other (comments)     Stomach swelling    Crestor [Rosuvastatin] Other (comments)     Upper respiratory illness    Lipitor [Atorvastatin] Swelling    Lisinopril Unknown (comments)     Patient can't recall    Niaspan [Niacin] Other (comments)     Scratchy throat, \"asthma\" like symptoms    Pcn [Penicillins] Hives    Pravachol [Pravastatin] Myalgia    Sulfa (Sulfonamide Antibiotics) Rash    Zocor [Simvastatin] Myalgia and Other (comments)     Per patient chart \"(? Rash)\"       Social History:   Social History     Tobacco Use    Smoking status: Former Smoker     Last attempt to quit: 1974     Years since quittin.1    Smokeless tobacco: Never Used    Tobacco comment: 1 pack every 2 weeks x 1 year, stopped 45yrs ago   Substance Use Topics    Alcohol use: Yes         Family history: family history includes Cancer in her mother; Colon Cancer in her mother; Coronary Artery Disease in her brother, father, mother, and sister; Diabetes in her brother, father, mother, and sister; Heart Disease in her brother, father, mother, and sister; Hypertension in her brother, father, mother, and sister. Review of Systems:   Constitutional: Negative. Respiratory: Negative. Cardiovascular: Positive for orthopnea. Negative for chest pain, palpitations and leg swelling. Gastrointestinal: Negative. Musculoskeletal: Negative. Neurological: Negative for dizziness. Physical Exam:   The patient is an alert, oriented, well developed, well nourished 66 y.o.  female who was in no acute distress at the time of my examination.   Visit Vitals  /88   Pulse 73   Ht 5' 3\" (1.6 m)   Wt 245 lb (111.1 kg)   SpO2 97%   BMI 43.40 kg/m²      BP Readings from Last 3 Encounters:   20 126/88   19 150/77   10/22/19 117/81      Wt Readings from Last 3 Encounters:   20 245 lb (111.1 kg)   19 248 lb (112.5 kg)   10/22/19 245 lb (111.1 kg)     HEENT: Conjuctiva white, mucosa moist, no pallor or cyanosis. NECK: Supple without masses, tenderness or thyromegaly. There was no jugular venous distention. Carotid are full bilaterally without bruits. CHEST: Symmetrical with good excursion. LUNGS: Clear to auscultation in all fields. HEART: Regular rate and rhythm. The apex is not displaced. There were no lifts, thrills or heaves. There is a normal S1 and S2 without appreciable murmurs, rubs, clicks, or gallops auscultated. ABDOMEN: Soft without masses, tenderness or organomegaly. EXTREMITIES: Full peripheral pulses with trace peripheral edema. Review of Data: See PMH and Cardiology and Imaging sections for cardiac testing  Lab Results   Component Value Date/Time    Cholesterol, total 209 (H) 06/12/2019 11:42 AM    HDL Cholesterol 59 06/12/2019 11:42 AM    LDL, calculated 134 (H) 06/12/2019 11:42 AM    Triglyceride 81 06/12/2019 11:42 AM    CHOL/HDL Ratio 2.7 08/07/2018 10:05 AM       Results for orders placed or performed in visit on 09/02/20   AMB POC EKG ROUTINE W/ 12 LEADS, INTER & REP     Status: None    Narrative    Normal sinus rhythm rate 73. Low voltage in the precordial leads. This EKG otherwise within normal limits and similar to the EKG of November 20, 2019         Sandra Lau D.O., F.A.C.C. [facility]  [facility]  [address] [place] [address]. [address]    PLEASE NOTE:  This document has been produced using voice recognition software. Unrecognized errors in transcription may be present.

## 2020-09-02 NOTE — PROGRESS NOTES
Reggie Werner presents today for   Chief Complaint   Patient presents with    Chest Pain (Angina)     pressure on the left side        Reggie Werner preferred language for health care discussion is english/other. Is someone accompanying this pt? no    Is the patient using any DME equipment during [address]? no    Depression Screening:  3 most recent PHQ Screens 11/20/2019   Little interest or pleasure in doing things Not at all   Feeling down, depressed, irritable, or hopeless Not at all   Total Score PHQ 2 0       Learning Assessment:  Learning Assessment 3/5/2019   PRIMARY LEARNER Patient   HIGHEST LEVEL OF EDUCATION - PRIMARY LEARNER  GRADUATED HIGH SCHOOL OR GED   BARRIERS PRIMARY LEARNER NONE   CO-LEARNER CAREGIVER No   PRIMARY LANGUAGE ENGLISH   LEARNER PREFERENCE PRIMARY DEMONSTRATION     READING   ANSWERED BY self   RELATIONSHIP SELF       Abuse Screening:  Abuse Screening Questionnaire 1/8/2019   Do you ever feel afraid of your partner? N   Are you in a relationship with someone who physically or mentally threatens you? N   Is it safe for you to go home? Y       Fall Risk  Fall Risk Assessment, last 12 mths 11/20/2019   Able to walk? Yes   Fall in past 12 months? No   Fall with injury? -   Number of falls in past 12 months -   Fall Risk Score -       Pt currently taking Anticoagulant therapy? no    Coordination of Care:  1. Have you been to the ER, urgent care clinic since your last visit? Hospitalized since your last visit? no    2. Have you seen or consulted any other health care providers outside of the [address] [place] [address] since your last visit? Include any pap smears or colon screening.  no

## 2020-09-02 NOTE — Clinical Note
Please send copy of letter to Dr. Hernando Aguiar, [facility]

## 2021-02-03 ENCOUNTER — TRANSCRIBE ORDER (OUTPATIENT)
Dept: SCHEDULING | Age: 76
End: 2021-02-03

## 2021-02-03 DIAGNOSIS — Z12.31 VISIT FOR SCREENING MAMMOGRAM: Primary | ICD-10-CM

## 2021-02-27 ENCOUNTER — HOSPITAL ENCOUNTER (EMERGENCY)
Age: 76
Discharge: HOME | End: 2021-02-27
Attending: EMERGENCY MEDICINE
Payer: MEDICARE

## 2021-02-27 ENCOUNTER — HOSPITAL ENCOUNTER (EMERGENCY)
Dept: CT IMAGING | Age: 76
Discharge: HOME | End: 2021-02-27
Attending: EMERGENCY MEDICINE
Payer: MEDICARE

## 2021-02-27 VITALS
BODY MASS INDEX: 40.97 KG/M2 | SYSTOLIC BLOOD PRESSURE: 152 MMHG | WEIGHT: 240 LBS | RESPIRATION RATE: 18 BRPM | HEART RATE: 64 BPM | DIASTOLIC BLOOD PRESSURE: 88 MMHG | HEIGHT: 64 IN | OXYGEN SATURATION: 100 % | TEMPERATURE: 98.4 F

## 2021-02-27 DIAGNOSIS — I10 ESSENTIAL HYPERTENSION: Primary | ICD-10-CM

## 2021-02-27 LAB
ANION GAP SERPL CALC-SCNC: 4 MMOL/L (ref 3–18)
BASOPHILS # BLD: 0 K/UL (ref 0–0.1)
BASOPHILS NFR BLD: 0 % (ref 0–2)
BUN SERPL-MCNC: 10 MG/DL (ref 7–18)
BUN/CREAT SERPL: 13 (ref 12–20)
CALCIUM SERPL-MCNC: 8.9 MG/DL (ref 8.5–10.1)
CHLORIDE SERPL-SCNC: 105 MMOL/L (ref 100–111)
CO2 SERPL-SCNC: 31 MMOL/L (ref 21–32)
CREAT SERPL-MCNC: 0.75 MG/DL (ref 0.6–1.3)
DIFFERENTIAL METHOD BLD: NORMAL
EOSINOPHIL # BLD: 0.3 K/UL (ref 0–0.4)
EOSINOPHIL NFR BLD: 4 % (ref 0–5)
ERYTHROCYTE [DISTWIDTH] IN BLOOD BY AUTOMATED COUNT: 14.1 % (ref 11.6–14.5)
GLUCOSE SERPL-MCNC: 91 MG/DL (ref 74–99)
HCT VFR BLD AUTO: 40 % (ref 35–45)
HGB BLD-MCNC: 12.8 G/DL (ref 12–16)
LYMPHOCYTES # BLD: 2.4 K/UL (ref 0.9–3.6)
LYMPHOCYTES NFR BLD: 35 % (ref 21–52)
MAGNESIUM SERPL-MCNC: 2.3 MG/DL (ref 1.6–2.6)
MCH RBC QN AUTO: 28.8 PG (ref 24–34)
MCHC RBC AUTO-ENTMCNC: 32 G/DL (ref 31–37)
MCV RBC AUTO: 90.1 FL (ref 74–97)
MONOCYTES # BLD: 0.6 K/UL (ref 0.05–1.2)
MONOCYTES NFR BLD: 8 % (ref 3–10)
NEUTS SEG # BLD: 3.6 K/UL (ref 1.8–8)
NEUTS SEG NFR BLD: 53 % (ref 40–73)
PLATELET # BLD AUTO: 203 K/UL (ref 135–420)
PMV BLD AUTO: 10.5 FL (ref 9.2–11.8)
POTASSIUM SERPL-SCNC: 3.9 MMOL/L (ref 3.5–5.5)
RBC # BLD AUTO: 4.44 M/UL (ref 4.2–5.3)
SODIUM SERPL-SCNC: 140 MMOL/L (ref 136–145)
WBC # BLD AUTO: 6.8 K/UL (ref 4.6–13.2)

## 2021-02-27 PROCEDURE — 85025 COMPLETE CBC W/AUTO DIFF WBC: CPT

## 2021-02-27 PROCEDURE — 99284 EMERGENCY DEPT VISIT MOD MDM: CPT

## 2021-02-27 PROCEDURE — 80048 BASIC METABOLIC PNL TOTAL CA: CPT

## 2021-02-27 PROCEDURE — 83735 ASSAY OF MAGNESIUM: CPT

## 2021-02-27 PROCEDURE — 70450 CT HEAD/BRAIN W/O DYE: CPT

## 2021-02-27 NOTE — ED PROVIDER NOTES
HPI patient presents today complaining of problems with her blood pressure. She says she been checking her pressure at home and has been worried about the readings she is been getting. She says she is doing getting systolic readings in the 669 range. She says she has been trying to get an appointment with her primary care physician for several months but has been night unable to do so. She expresses anxiety about feelings that she might not be taking her blood pressure medicines correctly or that they need to be changed. She said she was recently told by a nurse from her PCPs office to the stop taking her losartan medication which she has done and she feels this is made her blood pressure worse. She denies any chest pain or shortness of breath. She says that she does sometimes feel dizzy and have difficulty walking and the spells come and go and she feels they are related to her blood pressure as well. She developed denies any focal motor or sensory complaints. No further specifics given at this time. Past Medical History:   Diagnosis Date    Acute idiopathic gout of right wrist 10/4/2017    Atrophic vaginitis     Cardiac cath 05/30/2012    Patent coronary arteries. LVEDP 20.  RA 11.  RV 42/10. PA 42/21. W 18.  CO 6.4 (TD), 6.4 ([employer]). PVR 1.7 Wood units. False-positive nuclear study.  Cardiac echocardiogram 05/11/2011    EF 65%. RVSP at least 35 mmHg. Mild IVCE. No significant valvular heart disease.  Cardiac nuclear imaging test 05/18/2012    Tech difficult. Apical ischemia. No infarction. EF 76%. No reg'l WMA. No TID.  Cardiovascular LLE venous duplex 06/17/2013    Left leg:  No DVT.     Diabetes     diet controlled, hypoglycemia from metformin previously     Dyslipidemia     Fatty liver     Fibrocystic breast disease     Fluid retention     GERD     H/O colonoscopy 4/12/13    polyp    Hypertension     Ill-defined condition     gout    Macular degeneration     Morbid obesity ([facility] [address]     Obstructive sleep apnea     Peripheral neuropathy     Rectocele     Thyroid cyst     bilat       Past Surgical History:   Procedure Laterality Date    [facility] ARTERIAL ARTERIOTOMY 3M  2012    HX COLONOSCOPY  2013    HX HEART CATHETERIZATION  2012    HX HERNIA REPAIR      umbilical as a child    HX HYSTERECTOMY      52ys ago, QUIQUE, BSO    HX MOHS PROCEDURES      right    CO ANESTH,SURGERY OF SHOULDER           Family History:   Problem Relation Age of Onset    Cancer Mother         colon cancer    Colon Cancer Mother     Hypertension Mother     Diabetes Mother     Heart Disease Mother     Coronary Artery Disease Mother     Hypertension Father     Diabetes Father     Heart Disease Father     Coronary Artery Disease Father     Hypertension Sister     Diabetes Sister     Heart Disease Sister     Coronary Artery Disease Sister     Hypertension Brother     Diabetes Brother     Heart Disease Brother     Coronary Artery Disease Brother        Social History     Socioeconomic History    Marital status: LEGALLY      Spouse name: Not on file    Number of children: Not on file    Years of education: Not on file    Highest education level: Not on file   Occupational History    Not on file   Social Needs    Financial resource strain: Not on file    Food insecurity     Worry: Not on file     Inability: Not on file    Transportation needs     Medical: Not on file     Non-medical: Not on file   Tobacco Use    Smoking status: Former Smoker     Quit date: 1974     Years since quittin.6    Smokeless tobacco: Never Used    Tobacco comment: 1 pack every 2 weeks x 1 year, stopped 45yrs ago   Substance and Sexual Activity    Alcohol use:  Yes    Drug use: No    Sexual activity: Yes     Partners: Male   Lifestyle    Physical activity     Days per week: Not on file     Minutes per session: Not on file    Stress: Not on file   Relationships  Social connections     Talks on phone: Not on file     Gets together: Not on file     Attends Synagogue service: Not on file     Active member of club or organization: Not on file     Attends meetings of clubs or organizations: Not on file     Relationship status: Not on file    Intimate partner violence     Fear of current or ex partner: Not on file     Emotionally abused: Not on file     Physically abused: Not on file     Forced sexual activity: Not on file   Other Topics Concern    Not on file   Social History Narrative    Not on file         ALLERGIES: Latex, Nexium [esomeprazole magnesium], Dexilant [dexlansoprazole], Crestor [rosuvastatin], Lipitor [atorvastatin], Lisinopril, Niaspan [niacin], Pcn [penicillins], Pravachol [pravastatin], Sulfa (sulfonamide antibiotics), and Zocor [simvastatin]    Review of Systems   Constitutional: Negative. HENT: Negative. Eyes: Negative. Respiratory: Negative. Cardiovascular: Negative. Gastrointestinal: Negative. Genitourinary: Negative. Musculoskeletal: Negative. Neurological: Positive for dizziness. Psychiatric/Behavioral: Negative. Vitals:    02/27/21 1320   BP: (!) 155/97   Pulse: 64   Resp: 18   Temp: 98.4 °F (36.9 °C)   SpO2: 100%   Weight: 108.9 kg (240 lb)   Height: 5' 3.5\" (1.613 m)            Physical Exam  Vitals signs and nursing note reviewed. Constitutional:       Appearance: She is well-developed. HENT:      Head: Normocephalic and atraumatic. Nose: Nose normal.      Mouth/Throat:      Mouth: Mucous membranes are moist.   Eyes:      Conjunctiva/sclera: Conjunctivae normal.      Pupils: Pupils are equal, round, and reactive to light. Neck:      Musculoskeletal: Normal range of motion and neck supple. Cardiovascular:      Rate and Rhythm: Normal rate and regular rhythm. Pulmonary:      Effort: Pulmonary effort is normal.      Breath sounds: Normal breath sounds. Abdominal:      Palpations: Abdomen is soft. Musculoskeletal: Normal range of motion. Skin:     General: Skin is warm and dry. Neurological:      Mental Status: She is alert and oriented to person, place, and time. Psychiatric:         Mood and Affect: Mood normal.          MDM  Number of Diagnoses or Management Options  Diagnosis management comments: I reviewed the ED results with the patient. I additionally sat down with her about the treatment of hypertension and answered her questions. After discussion with the physician, patient says she feels better and her anxiety has been greatly relieved. We will instruct her to return home to resume her normal daily blood pressure medications and to follow-up with her primary care physician later next week for recheck. Patient understands and agrees with this plan.   Evin Rhoades MD 3:45 PM         Procedures

## 2021-02-27 NOTE — ED TRIAGE NOTES
Patient states she's been checking her BP at home and it's been reading high. States saw her PCP recently for same and told machine not working properly, that BP normal in office. Also states she has a bruise to her abdomen that she is concerned for.

## 2021-02-27 NOTE — ED NOTES
Cleo Sheppard is a 64 y.o. female that was discharged in good condition. The patients diagnosis, condition and treatment were explained to  patient and aftercare instructions were given. The patient verbalized understanding. Patient armband removed and shredded.

## 2021-04-12 ENCOUNTER — OFFICE VISIT (OUTPATIENT)
Dept: CARDIOLOGY CLINIC | Age: 76
End: 2021-04-12
Payer: MEDICARE

## 2021-04-12 ENCOUNTER — HOSPITAL ENCOUNTER (OUTPATIENT)
Dept: MAMMOGRAPHY | Age: 76
Discharge: HOME | End: 2021-04-12
Attending: FAMILY MEDICINE
Payer: MEDICARE

## 2021-04-12 VITALS
DIASTOLIC BLOOD PRESSURE: 82 MMHG | HEIGHT: 63 IN | WEIGHT: 241 LBS | BODY MASS INDEX: 42.7 KG/M2 | OXYGEN SATURATION: 97 % | HEART RATE: 70 BPM | SYSTOLIC BLOOD PRESSURE: 130 MMHG

## 2021-04-12 DIAGNOSIS — R00.2 PALPITATIONS: Primary | ICD-10-CM

## 2021-04-12 DIAGNOSIS — R00.2 PALPITATIONS: ICD-10-CM

## 2021-04-12 DIAGNOSIS — Z12.31 VISIT FOR SCREENING MAMMOGRAM: ICD-10-CM

## 2021-04-12 PROCEDURE — G8754 DIAS BP LESS 90: HCPCS | Performed by: INTERNAL MEDICINE

## 2021-04-12 PROCEDURE — G8536 NO DOC ELDER MAL SCRN: HCPCS | Performed by: INTERNAL MEDICINE

## 2021-04-12 PROCEDURE — G8752 SYS BP LESS 140: HCPCS | Performed by: INTERNAL MEDICINE

## 2021-04-12 PROCEDURE — 1101F PT FALLS ASSESS-DOCD LE1/YR: CPT | Performed by: INTERNAL MEDICINE

## 2021-04-12 PROCEDURE — G8399 PT W/DXA RESULTS DOCUMENT: HCPCS | Performed by: INTERNAL MEDICINE

## 2021-04-12 PROCEDURE — G8428 CUR MEDS NOT DOCUMENT: HCPCS | Performed by: INTERNAL MEDICINE

## 2021-04-12 PROCEDURE — 77063 BREAST TOMOSYNTHESIS BI: CPT

## 2021-04-12 PROCEDURE — 99214 OFFICE O/P EST MOD 30 MIN: CPT | Performed by: INTERNAL MEDICINE

## 2021-04-12 PROCEDURE — G8510 SCR DEP NEG, NO PLAN REQD: HCPCS | Performed by: INTERNAL MEDICINE

## 2021-04-12 PROCEDURE — 1090F PRES/ABSN URINE INCON ASSESS: CPT | Performed by: INTERNAL MEDICINE

## 2021-04-12 PROCEDURE — 93000 ELECTROCARDIOGRAM COMPLETE: CPT | Performed by: INTERNAL MEDICINE

## 2021-04-12 PROCEDURE — G8417 CALC BMI ABV UP PARAM F/U: HCPCS | Performed by: INTERNAL MEDICINE

## 2021-04-12 RX ORDER — CARVEDILOL 6.25 MG/1
6.25 TABLET ORAL 2 TIMES DAILY
COMMUNITY
Start: 2021-02-25 | End: 2021-08-23

## 2021-04-12 NOTE — LETTER
4/12/2021 Patient: Tamra Douglas YOB: 1945 Date of Visit: 4/12/2021 Desiree Monroy DO 
[place] [id number] Via Fax: [phone] Dear Desiree Monroy DO, Thank you for referring Ms. Talon Molina to [facility] for evaluation. My notes for this consultation are attached. If you have questions, please do not hesitate to call me. I look forward to following your patient along with you. Sincerely, Valencia Mendoza MD

## 2021-04-12 NOTE — PROGRESS NOTES
[facility]    Ms. Presley Celestin is 75-year-old female with a history of pulmonary hypertension, diabetes, hypertension, hyperlipidemia, sleep apnea    Patient is here today for initial cardiac evaluation. She used to be established patient of Dr. Carissa Campbell in the past.  She denies any prior history of MI or CHF  Patient has longstanding history of palpitation on and off. However she feels like it may be slightly worse. She feels like fluttering sensation in the chest however lasts only for less than a minute. Occasionally she feels like she is dizzy. She never had a presyncope or syncope. She denies any chest pain or chest tightness to be concern of angina. She thinks that overall her swelling and symptoms are better since metoprolol was changed to Coreg  Denies any nausea, vomiting, abdominal pain, fever, chills, sputum production. No hematuria or other bleeding complaints    Past Medical History:   Diagnosis Date    Atrophic vaginitis     Cardiac cath 05/30/2012    Patent coronary arteries. LVEDP 20.  RA 11.  RV 42/10. PA 42/21. W 18.  CO 6.4 (TD), 6.4 ([employer]). PVR 1.7 Wood units. False-positive nuclear study.  Diabetes     diet controlled, hypoglycemia from metformin previously     Dyslipidemia     Fatty liver     Fibrocystic breast disease     GERD     Gout     H/O colonoscopy 4/12/13    polyp    Hypertension     Macular degeneration     Morbid obesity ([address]     Obstructive sleep apnea     Peripheral neuropathy     Rectocele     Thyroid cyst     bilat       Review of Systems:  Cardiac symptoms as noted above in HPI. All others negative. Current Outpatient Medications   Medication Sig    carvediloL (COREG) 6.25 mg tablet 6.25 mg two (2) times a day.  potassium chloride (K-DUR, KLOR-CON) 20 mEq tablet TAKE 2 TABLETS TWICE DAILY    omeprazole (PRILOSEC) 20 mg capsule Take 20 mg by mouth two (2) times a day.  Hasnt started yet    fluconazole (DIFLUCAN) 150 mg tablet FDA advises cautious prescribing of oral fluconazole in pregnancy. Take one now and may repat in 7-10days.  furosemide (LASIX) 20 mg tablet Take 1 Tab by mouth two (2) times a day.  Blood-Glucose Meter (TRUE METRIX AIR GLUCOSE METER) misc Check blood sugar twice daily.  glucose blood VI test strips (TRUE METRIX GLUCOSE TEST STRIP) strip Check blood sugar twice daily.  lancets (ULTRA THIN LANCETS) 30 gauge misc Check blood sugar twice daily.  diclofenac (VOLTAREN) 1 % gel Apply  to affected area four (4) times daily.  CALCIUM PO Take  by mouth.  fluticasone (FLONASE) 50 mcg/actuation nasal spray Si spray in both nostril twice daily    cholecalciferol (VITAMIN D3) 1,000 unit tablet Take 1,000 Units by mouth daily.  aspirin 81 mg Tab Take 81 mg by mouth daily.  MULTIVITAMINS (MULTI-VITAMIN PO) Take 1 Tab by mouth daily. No current facility-administered medications for this visit.         Past Surgical History:   Procedure Laterality Date    [facility] ARTERIAL ARTERIOTOMY 3M  2012    HX COLONOSCOPY  2013    HX HEART CATHETERIZATION  2012    HX HERNIA REPAIR      umbilical as a child    HX HYSTERECTOMY      52ys ago, QUIQUE, BSO    HX MOHS PROCEDURES      right    [place] ANESTH,SURGERY OF SHOULDER         Allergies and Sensitivities:  Allergies   Allergen Reactions    Latex Rash    Nexium [Esomeprazole Magnesium] Swelling and Other (comments)     Lips Swollen, and facial rash and swelling    Dexilant [Dexlansoprazole] Other (comments)     Stomach swelling    Crestor [Rosuvastatin] Other (comments)     Upper respiratory illness    Lipitor [Atorvastatin] Swelling    Lisinopril Unknown (comments)     Patient can't recall    Niaspan [Niacin] Other (comments)     Scratchy throat, \"asthma\" like symptoms    Pcn [Penicillins] Hives    Pravachol [Pravastatin] Myalgia    Sulfa (Sulfonamide Antibiotics) Rash    Zocor [Simvastatin] Myalgia and Other (comments)     Per patient chart \"(? Rash)\"       Family History:  Family History   Problem Relation Age of Onset   [place] [facility] Cancer Mother         colon cancer    Colon Cancer Mother    [place] [facility] Hypertension Mother     Diabetes Mother     Heart Disease Mother     Coronary Artery Disease Mother     Hypertension Father     Diabetes Father     Heart Disease Father     Coronary Artery Disease Father     Hypertension Sister     Diabetes Sister     Heart Disease Sister     Coronary Artery Disease Sister     Hypertension Brother     Diabetes Brother     Heart Disease Brother     Coronary Artery Disease Brother        Social History:  Social History     Tobacco Use    Smoking status: Former Smoker     Quit date: 1974     Years since quittin.7    Smokeless tobacco: Never Used    Tobacco comment: 1 pack every 2 weeks x 1 year, stopped 45yrs ago   Substance Use Topics    Alcohol use: Yes    Drug use: No     She  reports that she quit smoking about 46 years ago. She has never used smokeless tobacco.  She  reports current alcohol use. Physical Exam:  BP Readings from Last 3 Encounters:   21 130/82   21 (!) 152/88   20 126/88         Pulse Readings from Last 3 Encounters:   21 70   21 64   20 73          Wt Readings from Last 3 Encounters:   21 109.3 kg (241 lb)   21 108.9 kg (240 lb)   20 111.1 kg (245 lb)       Constitutional: Oriented to person, place, and time. HENT: Head: Normocephalic and atraumatic. Neck: No JVD present. Cardiovascular: Regular rhythm. No murmur, gallop or rubs appreciated  Lung: Breath sounds normal. No respiratory distress. No ronchi or rales appreciated  Abdominal: No tenderness. No rebound and no guarding. Musculoskeletal: There is no lower extremity edema. No cynosis  Lymphadenopathy:  No cervical or supraclavicular adenopathy appriciated.    Neurological: No gross motor deficit noted.  Skin: No visible skin rash noted. No Ear discharge noted  Psychiatric: Normal mood and affect. LABS:   @  Lab Results   Component Value Date/Time    WBC 6.8 02/27/2021 02:14 PM    Hemoglobin, POC 12.6 04/12/2013 09:46 AM    HGB 12.8 02/27/2021 02:14 PM    Hematocrit, POC 37 04/12/2013 09:46 AM    HCT 40.0 02/27/2021 02:14 PM    PLATELET 742 57/90/3491 02:14 PM    MCV 90.1 02/27/2021 02:14 PM     Lab Results   Component Value Date/Time    Sodium 140 02/27/2021 02:14 PM    Potassium 3.9 02/27/2021 02:14 PM    Chloride 105 02/27/2021 02:14 PM    CO2 31 02/27/2021 02:14 PM    Glucose 91 02/27/2021 02:14 PM    BUN 10 02/27/2021 02:14 PM    Creatinine 0.75 02/27/2021 02:14 PM     Lipids Latest Ref Rng & Units 6/12/2019 2/26/2019 8/7/2018 2/13/2018 10/4/2017   Chol, Total 100 - 199 mg/dL 209(H) 190 169 182 179   HDL >39 mg/dL 59 63 62(H) 63 71   LDL 0 - 99 mg/dL 134(H) 111(H) 85.6 106(H) 95   Trig 0 - 149 mg/dL 81 80 107 64 67   Chol/HDL Ratio 0 - 5.0   - - 2.7 - -   Some recent data might be hidden     Lab Results   Component Value Date/Time    ALT (SGPT) 14 08/30/2019 10:34 AM     Lab Results   Component Value Date/Time    Hemoglobin A1c 5.7 (H) 02/26/2019 09:37 AM    Hemoglobin A1c (POC) 6.1 06/23/2017 11:53 AM    Hemoglobin A1c, External 6.5 01/26/2017     Lab Results   Component Value Date/Time    TSH 1.91 08/30/2019 10:34 AM       EKG  04/2021: Sinus rhythm at 70 bpm.  Nonspecific T wave changes. No ST changes of ischemia. ECHO    STRESS TEST (EST, PHARM, NUC, ECHO etc)    CATHETERIZATION (2012)  1. Mild pulmonary venous hypertension secondary to hypertensive cardiovascular disease. 2. Angiographically normal coronary arteries. 3. Catheter-induced atrial fibrillation. 4. False-positive nuclear stress test.    IMPRESSION & PLAN:  Ms. Octavio Arias is 68-year-old female with multiple medical problems    Palpitation:  Denies any symptoms concerning for hyperthyroidism.   No presyncope or syncope  We will place event monitor for the sake of completeness  We will check echo to rule out pulmonary hypertension and structural abnormality of the heart    Hypertension:  /82. Continue current medication. Echo ordered to rule out hypertensive cardiovascular heart disease    Hyperlipidemia:  Last lipid profile was in June 2019 and LDL level was 134. Unfortunately patient has not been able to tolerate statin medication in the past.  She does not remember the names but she has tried 2 different medications. I am going to try Livalo, low-dose for hyperlipidemia. PCSK9 inhibitor can be considered in the future if she is not able to tolerate Livalo. Sleep apnea:  She is using CPAP machine regularly. Will check echo to rule out pulmonary hypertension    Importance of diet and exercise was discussed with patient. This plan was discussed with patient who is in agreement. Thank you for allowing me to participate in patient care. Please feel free to call me if you have any question or concern. Mathew Chowdhury MD  Please note: This document has been produced using voice recognition software. Unrecognized errors in transcription may be present.

## 2021-04-12 NOTE — PROGRESS NOTES
Tram Sher presents today for   Chief Complaint   Patient presents with    Follow-up     prev skillen patient, 6 month follow up     Palpitations     flutters on exertion for gthe last 6 months     Leg Swelling     some swelling in the ankles        Tram Sher preferred language for health care discussion is english/other. Is someone accompanying this pt? no    Is the patient using any DME equipment during [address]? no    Depression Screening:  3 most recent PHQ Screens 4/12/2021   Little interest or pleasure in doing things Not at all   Feeling down, depressed, irritable, or hopeless Not at all   Total Score PHQ 2 0       Learning Assessment:  Learning Assessment 3/5/2019   PRIMARY LEARNER Patient   HIGHEST LEVEL OF EDUCATION - PRIMARY LEARNER  GRADUATED HIGH SCHOOL OR GED   BARRIERS PRIMARY LEARNER NONE   CO-LEARNER CAREGIVER No   PRIMARY LANGUAGE ENGLISH   LEARNER PREFERENCE PRIMARY DEMONSTRATION     READING   ANSWERED BY self   RELATIONSHIP SELF       Abuse Screening:  Abuse Screening Questionnaire 4/12/2021   Do you ever feel afraid of your partner? N   Are you in a relationship with someone who physically or mentally threatens you? N   Is it safe for you to go home? Y       Fall Risk  Fall Risk Assessment, last 12 mths 4/12/2021   Able to walk? Yes   Fall in past 12 months? 0   Do you feel unsteady? 0   Are you worried about falling 0   Number of falls in past 12 months -   Fall with injury? -       Pt currently taking Anticoagulant therapy? no    Coordination of Care:  1. Have you been to the ER, urgent care clinic since your last visit? Hospitalized since your last visit? no    2. Have you seen or consulted any other health care providers outside of the [address] [place] [address] since your last visit? Include any pap smears or colon screening.  no    no

## 2021-04-15 NOTE — TELEPHONE ENCOUNTER
Patient unable to afford Livalo and was wondering what alternatives were available or programs to help with price.

## 2021-04-15 NOTE — TELEPHONE ENCOUNTER
Contacted pt at [facility]. Two patient Identifiers confirmed. Advised pt per Dr Venice Perez. Pt verbalized understanding.

## 2021-04-27 ENCOUNTER — OFFICE VISIT (OUTPATIENT)
Dept: CARDIOLOGY CLINIC | Age: 76
End: 2021-04-27

## 2021-04-27 VITALS
HEIGHT: 63 IN | SYSTOLIC BLOOD PRESSURE: 134 MMHG | WEIGHT: 242 LBS | RESPIRATION RATE: 18 BRPM | TEMPERATURE: 97.2 F | DIASTOLIC BLOOD PRESSURE: 81 MMHG | HEART RATE: 76 BPM | BODY MASS INDEX: 42.88 KG/M2 | OXYGEN SATURATION: 98 %

## 2021-04-27 NOTE — PROGRESS NOTES
Philip Akbar presents today for   Chief Complaint   Patient presents with    Medication Problem       Philip Akbar preferred language for health care discussion is english/other. Personal Protective Equipment:   Personal Protective Equipment was used including: mask-surgical and hands-gloves. Patient was placed on no precaution(s). Patient was masked. Precautions:   Patient currently on None  Patient currently roomed with door closed    Is someone accompanying this pt? no    Is the patient using any DME equipment during [address]? no    Depression Screening:  3 most recent PHQ Screens 4/27/2021   Little interest or pleasure in doing things Not at all   Feeling down, depressed, irritable, or hopeless Not at all   Total Score PHQ 2 0       Learning Assessment:  Learning Assessment 3/5/2019   PRIMARY LEARNER Patient   HIGHEST LEVEL OF EDUCATION - PRIMARY LEARNER  GRADUATED HIGH SCHOOL OR GED   BARRIERS PRIMARY LEARNER NONE   CO-LEARNER CAREGIVER No   PRIMARY LANGUAGE ENGLISH   LEARNER PREFERENCE PRIMARY DEMONSTRATION     READING   ANSWERED BY self   RELATIONSHIP SELF       Abuse Screening:  Abuse Screening Questionnaire 4/12/2021   Do you ever feel afraid of your partner? N   Are you in a relationship with someone who physically or mentally threatens you? N   Is it safe for you to go home? Y       Fall Risk  Fall Risk Assessment, last 12 mths 4/27/2021   Able to walk? Yes   Fall in past 12 months? 0   Do you feel unsteady? 0   Are you worried about falling 0   Number of falls in past 12 months -   Fall with injury? -       Pt currently taking Anticoagulant therapy? no    Coordination of Care:  1. Have you been to the ER, urgent care clinic since your last visit? Hospitalized since your last visit? no    2. Have you seen or consulted any other health care providers outside of the [address] [place] [address] Patric since your last visit? Include any pap smears or colon screening.  no

## 2021-04-27 NOTE — PROGRESS NOTES
[facility]    Ms. Dheeraj Dominguez is 77-year-old female with a history of pulmonary hypertension, diabetes, hypertension, hyperlipidemia, sleep apnea    Patient is here today for initial cardiac evaluation. She used to be established patient of Dr. Rosanne Prasad in the past.  She denies any prior history of MI or CHF  Patient has longstanding history of palpitation on and off. However she feels like it may be slightly worse. She feels like fluttering sensation in the chest however lasts only for less than a minute. Occasionally she feels like she is dizzy. She never had a presyncope or syncope. She denies any chest pain or chest tightness to be concern of angina. She thinks that overall her swelling and symptoms are better since metoprolol was changed to Coreg  Denies any nausea, vomiting, abdominal pain, fever, chills, sputum production. No hematuria or other bleeding complaints    Past Medical History:   Diagnosis Date    Atrophic vaginitis     Cardiac cath 05/30/2012    Patent coronary arteries. LVEDP 20.  RA 11.  RV 42/10. PA 42/21. W 18.  CO 6.4 (TD), 6.4 ([facility]). PVR 1.7 Wood units. False-positive nuclear study.  Diabetes     diet controlled, hypoglycemia from metformin previously     Dyslipidemia     Fatty liver     Fibrocystic breast disease     GERD     Gout     H/O colonoscopy 4/12/13    polyp    Hypertension     Macular degeneration     Morbid obesity ([address]     Obstructive sleep apnea     Peripheral neuropathy     Rectocele     Thyroid cyst     bilat       Review of Systems:  Cardiac symptoms as noted above in HPI. All others negative. Current Outpatient Medications   Medication Sig    carvediloL (COREG) 6.25 mg tablet 6.25 mg two (2) times a day.  potassium chloride (K-DUR, KLOR-CON) 20 mEq tablet TAKE 2 TABLETS TWICE DAILY    omeprazole (PRILOSEC) 20 mg capsule Take 20 mg by mouth two (2) times a day.  Hasnt started yet    furosemide (LASIX) 20 mg tablet Take 1 Tab by mouth two (2) times a day.  aspirin 81 mg Tab Take 81 mg by mouth daily.  pitavastatin calcium (Livalo) 2 mg tablet Take 1 Tab by mouth daily.  fluconazole (DIFLUCAN) 150 mg tablet FDA advises cautious prescribing of oral fluconazole in pregnancy. Take one now and may repat in 7-10days.  Blood-Glucose Meter (TRUE METRIX AIR GLUCOSE METER) misc Check blood sugar twice daily.  glucose blood VI test strips (TRUE METRIX GLUCOSE TEST STRIP) strip Check blood sugar twice daily.  lancets (ULTRA THIN LANCETS) 30 gauge misc Check blood sugar twice daily.  diclofenac (VOLTAREN) 1 % gel Apply  to affected area four (4) times daily.  CALCIUM PO Take  by mouth.  fluticasone (FLONASE) 50 mcg/actuation nasal spray Si spray in both nostril twice daily    cholecalciferol (VITAMIN D3) 1,000 unit tablet Take 1,000 Units by mouth daily.  MULTIVITAMINS (MULTI-VITAMIN PO) Take 1 Tab by mouth daily. No current facility-administered medications for this visit.         Past Surgical History:   Procedure Laterality Date    [facility]. [facility] ARTERIAL ARTERIOTOMY 3M  2012    HX COLONOSCOPY  2013    HX HEART CATHETERIZATION  2012    HX HERNIA REPAIR      umbilical as a child    HX HYSTERECTOMY      52ys ago, QUIQUE, BSO    HX MOHS PROCEDURES      right    [place] ANESTH,SURGERY OF SHOULDER         Allergies and Sensitivities:  Allergies   Allergen Reactions    Latex Rash    Nexium [Esomeprazole Magnesium] Swelling and Other (comments)     Lips Swollen, and facial rash and swelling    Dexilant [Dexlansoprazole] Other (comments)     Stomach swelling    Crestor [Rosuvastatin] Other (comments)     Upper respiratory illness    Lipitor [Atorvastatin] Swelling    Lisinopril Unknown (comments)     Patient can't recall    Niaspan [Niacin] Other (comments)     Scratchy throat, \"asthma\" like symptoms    Pcn [Penicillins] Hives    Pravachol [Pravastatin] Myalgia    Sulfa (Sulfonamide Antibiotics) Rash    Zocor [Simvastatin] Myalgia and Other (comments)     Per patient chart \"(? Rash)\"       Family History:  Family History   Problem Relation Age of Onset   [facility] Cancer Mother         colon cancer    Colon Cancer Mother    [facility] Hypertension Mother     Diabetes Mother     Heart Disease Mother     Coronary Artery Disease Mother     Hypertension Father     Diabetes Father     Heart Disease Father     Coronary Artery Disease Father     Hypertension Sister     Diabetes Sister     Heart Disease Sister     Coronary Artery Disease Sister     Hypertension Brother     Diabetes Brother     Heart Disease Brother     Coronary Artery Disease Brother        Social History:  Social History     Tobacco Use    Smoking status: Former Smoker     Quit date: 1974     Years since quittin.8    Smokeless tobacco: Never Used    Tobacco comment: 1 pack every 2 weeks x 1 year, stopped 45yrs ago   Substance Use Topics    Alcohol use: Yes    Drug use: No     She  reports that she quit smoking about 46 years ago. She has never used smokeless tobacco.  She  reports current alcohol use. Physical Exam:  BP Readings from Last 3 Encounters:   21 134/81   21 130/82   21 (!) 152/88         Pulse Readings from Last 3 Encounters:   21 76   21 70   21 64          Wt Readings from Last 3 Encounters:   21 242 lb (109.8 kg)   21 241 lb (109.3 kg)   21 240 lb (108.9 kg)       Constitutional: Oriented to person, place, and time. HENT: Head: Normocephalic and atraumatic. Neck: No JVD present. Cardiovascular: Regular rhythm. No murmur, gallop or rubs appreciated  Lung: Breath sounds normal. No respiratory distress. No ronchi or rales appreciated  Abdominal: No tenderness. No rebound and no guarding. Musculoskeletal: There is no lower extremity edema.  No cynosis  Lymphadenopathy:  No cervical or supraclavicular adenopathy appriciated. Neurological: No gross motor deficit noted. Skin: No visible skin rash noted. No Ear discharge noted  Psychiatric: Normal mood and affect. LABS:   @  Lab Results   Component Value Date/Time    WBC 6.8 02/27/2021 02:14 PM    Hemoglobin, POC 12.6 04/12/2013 09:46 AM    HGB 12.8 02/27/2021 02:14 PM    Hematocrit, POC 37 04/12/2013 09:46 AM    HCT 40.0 02/27/2021 02:14 PM    PLATELET 640 33/71/5837 02:14 PM    MCV 90.1 02/27/2021 02:14 PM     Lab Results   Component Value Date/Time    Sodium 140 02/27/2021 02:14 PM    Potassium 3.9 02/27/2021 02:14 PM    Chloride 105 02/27/2021 02:14 PM    CO2 31 02/27/2021 02:14 PM    Glucose 91 02/27/2021 02:14 PM    BUN 10 02/27/2021 02:14 PM    Creatinine 0.75 02/27/2021 02:14 PM     Lipids Latest Ref Rng & Units 6/12/2019 2/26/2019 8/7/2018 2/13/2018 10/4/2017   Chol, Total 100 - 199 mg/dL 209(H) 190 169 182 179   HDL >39 mg/dL 59 63 62(H) 63 71   LDL 0 - 99 mg/dL 134(H) 111(H) 85.6 106(H) 95   Trig 0 - 149 mg/dL 81 80 107 64 67   Chol/HDL Ratio 0 - 5.0   - - 2.7 - -   Some recent data might be hidden     Lab Results   Component Value Date/Time    ALT (SGPT) 14 08/30/2019 10:34 AM     Lab Results   Component Value Date/Time    Hemoglobin A1c 5.7 (H) 02/26/2019 09:37 AM    Hemoglobin A1c (POC) 6.1 06/23/2017 11:53 AM    Hemoglobin A1c, External 6.5 01/26/2017     Lab Results   Component Value Date/Time    TSH 1.91 08/30/2019 10:34 AM       EKG  04/2021: Sinus rhythm at 70 bpm.  Nonspecific T wave changes. No ST changes of ischemia. ECHO    STRESS TEST (EST, PHARM, NUC, ECHO etc)    CATHETERIZATION (2012)  1. Mild pulmonary venous hypertension secondary to hypertensive cardiovascular disease. 2. Angiographically normal coronary arteries. 3. Catheter-induced atrial fibrillation.   4. False-positive nuclear stress test.    IMPRESSION & PLAN:  Ms. Katherine Alcaraz is 68-year-old female with multiple medical problems    Palpitation:  Denies any symptoms concerning for hyperthyroidism. No presyncope or syncope  We will place event monitor for the sake of completeness  We will check echo to rule out pulmonary hypertension and structural abnormality of the heart    Hypertension:  /82. Continue current medication. Echo ordered to rule out hypertensive cardiovascular heart disease    Hyperlipidemia:  Last lipid profile was in June 2019 and LDL level was 134. Unfortunately patient has not been able to tolerate statin medication in the past.  She does not remember the names but she has tried 2 different medications. I am going to try Livalo, low-dose for hyperlipidemia. PCSK9 inhibitor can be considered in the future if she is not able to tolerate Livalo. Sleep apnea:  She is using CPAP machine regularly. Will check echo to rule out pulmonary hypertension    Importance of diet and exercise was discussed with patient. This plan was discussed with patient who is in agreement. Thank you for allowing me to participate in patient care. Please feel free to call me if you have any question or concern. Chente Wise MD  Please note: This document has been produced using voice recognition software. Unrecognized errors in transcription may be present.

## 2021-05-03 ENCOUNTER — TELEPHONE (OUTPATIENT)
Dept: CARDIOLOGY CLINIC | Age: 76
End: 2021-05-03

## 2021-05-03 NOTE — TELEPHONE ENCOUNTER
[place] [employer]: [id number] - [place] Case ID: [id number] - Rx #: [id number] help? Call us at [phone]  Outcome  Approvedon April 14  PA Case: [id number], Status: Approved, Coverage Starts on: 1/1/2021 12:00:00 AM, Coverage Ends on: 12/31/2021 12:00:00 AM. Questions? Contact [phone].

## 2021-05-28 LAB
ECHO AO ROOT DIAM: 3.47 CM
ECHO LA AREA 4C: 26.17 CM2
ECHO LA VOL 2C: 60.55 ML (ref 22–52)
ECHO LA VOL 4C: 88.79 ML (ref 22–52)
ECHO LA VOL BP: 79.69 ML (ref 22–52)
ECHO LA VOL/BSA BIPLANE: 37.95 ML/M2 (ref 16–28)
ECHO LA VOLUME INDEX A2C: 28.83 ML/M2 (ref 16–28)
ECHO LA VOLUME INDEX A4C: 42.28 ML/M2 (ref 16–28)
ECHO LV E' LATERAL VELOCITY: 10.42 CM/S
ECHO LV E' SEPTAL VELOCITY: 7.64 CM/S
ECHO LV INTERNAL DIMENSION DIASTOLIC: 4.76 CM (ref 3.9–5.3)
ECHO LV INTERNAL DIMENSION SYSTOLIC: 2.51 CM
ECHO LV IVSD: 1 CM (ref 0.6–0.9)
ECHO LV MASS 2D: 178.3 G (ref 67–162)
ECHO LV MASS INDEX 2D: 84.9 G/M2 (ref 43–95)
ECHO LV POSTERIOR WALL DIASTOLIC: 1.09 CM (ref 0.6–0.9)
ECHO LVOT CARDIAC OUTPUT: 5.41 LITER/MINUTE
ECHO LVOT DIAM: 2.17 CM
ECHO LVOT PEAK GRADIENT: 5.07 MMHG
ECHO LVOT PEAK VELOCITY: 112.63 CM/S
ECHO LVOT SV: 90.7 ML
ECHO LVOT VTI: 24.43 CM
ECHO MV A VELOCITY: 81.01 CM/S
ECHO MV E DECELERATION TIME (DT): 205.54 MS
ECHO MV E VELOCITY: 63.19 CM/S
ECHO MV E/A RATIO: 0.78
ECHO MV E/E' LATERAL: 6.06
ECHO MV E/E' RATIO (AVERAGED): 7.17
ECHO MV E/E' SEPTAL: 8.27
ECHO PVEIN A DURATION: 108.16 MS
ECHO PVEIN A VELOCITY: 28.76 CM/S
ECHO RV TAPSE: 2.43 CM (ref 1.5–2)
ECHO TV REGURGITANT MAX VELOCITY: 230.62 CM/S
ECHO TV REGURGITANT PEAK GRADIENT: 21.27 MMHG
LVOT MG: 2.47 MMHG

## 2021-08-23 ENCOUNTER — OFFICE VISIT (OUTPATIENT)
Dept: CARDIOLOGY CLINIC | Age: 76
End: 2021-08-23
Payer: MEDICARE

## 2021-08-23 VITALS
BODY MASS INDEX: 42.88 KG/M2 | OXYGEN SATURATION: 99 % | DIASTOLIC BLOOD PRESSURE: 98 MMHG | WEIGHT: 242 LBS | SYSTOLIC BLOOD PRESSURE: 140 MMHG | HEART RATE: 65 BPM | HEIGHT: 63 IN

## 2021-08-23 DIAGNOSIS — I10 ESSENTIAL HYPERTENSION: ICD-10-CM

## 2021-08-23 DIAGNOSIS — R60.9 PERIPHERAL EDEMA: ICD-10-CM

## 2021-08-23 PROCEDURE — 1090F PRES/ABSN URINE INCON ASSESS: CPT | Performed by: INTERNAL MEDICINE

## 2021-08-23 PROCEDURE — G8417 CALC BMI ABV UP PARAM F/U: HCPCS | Performed by: INTERNAL MEDICINE

## 2021-08-23 PROCEDURE — 1101F PT FALLS ASSESS-DOCD LE1/YR: CPT | Performed by: INTERNAL MEDICINE

## 2021-08-23 PROCEDURE — 99214 OFFICE O/P EST MOD 30 MIN: CPT | Performed by: INTERNAL MEDICINE

## 2021-08-23 PROCEDURE — G8428 CUR MEDS NOT DOCUMENT: HCPCS | Performed by: INTERNAL MEDICINE

## 2021-08-23 PROCEDURE — G8510 SCR DEP NEG, NO PLAN REQD: HCPCS | Performed by: INTERNAL MEDICINE

## 2021-08-23 PROCEDURE — G8536 NO DOC ELDER MAL SCRN: HCPCS | Performed by: INTERNAL MEDICINE

## 2021-08-23 PROCEDURE — G8399 PT W/DXA RESULTS DOCUMENT: HCPCS | Performed by: INTERNAL MEDICINE

## 2021-08-23 PROCEDURE — G8753 SYS BP > OR = 140: HCPCS | Performed by: INTERNAL MEDICINE

## 2021-08-23 PROCEDURE — G8755 DIAS BP > OR = 90: HCPCS | Performed by: INTERNAL MEDICINE

## 2021-08-23 RX ORDER — FUROSEMIDE 20 MG/1
20 TABLET ORAL DAILY
Qty: 90 TABLET | Refills: 3 | Status: SHIPPED | OUTPATIENT
Start: 2021-08-23 | End: 2022-02-22 | Stop reason: SDUPTHER

## 2021-08-23 RX ORDER — CARVEDILOL 3.12 MG/1
6.25 TABLET ORAL 2 TIMES DAILY
Qty: 180 TABLET | Refills: 3 | Status: SHIPPED | OUTPATIENT
Start: 2021-08-23 | End: 2021-09-08

## 2021-08-23 NOTE — PROGRESS NOTES
[facility]    Ms. Jamin Blake is 64 y. o.female with a history of pulmonary hypertension, diabetes, hypertension, hyperlipidemia, sleep apnea    Patient is here today for cardiac evaluation. She denies any prior history of MI or CHF  Patient has longstanding history of palpitation on and off. She underwent event monitoring in 07/2021, low risk without any evidence of arrhythmia or significant PVC burden    Since last visit, patient is doing relatively okay. She denies any angina or heart failure symptoms. She denies any excessive swelling. She denies presyncope or syncope. She has been feeling fatigue and tired with no dizziness. She thinks that it may be because of medication  Denies any nausea, vomiting, abdominal pain, fever, chills, sputum production. No hematuria or other bleeding complaints    Past Medical History:   Diagnosis Date    Atrophic vaginitis     Cardiac cath 05/30/2012    Patent coronary arteries. LVEDP 20.  RA 11.  RV 42/10. PA 42/21. W 18.  CO 6.4 (TD), 6.4 (Zaynab Jacob). PVR 1.7 Wood units. False-positive nuclear study.  Diabetes     diet controlled, hypoglycemia from metformin previously     Dyslipidemia     Fatty liver     Fibrocystic breast disease     GERD     Gout     H/O colonoscopy 4/12/13    polyp    Hypertension     Macular degeneration     Morbid obesity ([facility] [address]     Obstructive sleep apnea     Peripheral neuropathy     Rectocele     Thyroid cyst     bilat       Review of Systems:  Cardiac symptoms as noted above in HPI. All others negative. Current Outpatient Medications   Medication Sig    carvediloL (COREG) 6.25 mg tablet 6.25 mg two (2) times a day.  potassium chloride (K-DUR, KLOR-CON) 20 mEq tablet TAKE 2 TABLETS TWICE DAILY    omeprazole (PRILOSEC) 20 mg capsule Take 20 mg by mouth two (2) times a day.  Hasnt started yet    fluconazole (DIFLUCAN) 150 mg tablet FDA advises cautious prescribing of oral fluconazole in pregnancy. Take one now and may repat in 7-10days.  furosemide (LASIX) 20 mg tablet Take 1 Tab by mouth two (2) times a day.  Blood-Glucose Meter (TRUE METRIX AIR GLUCOSE METER) misc Check blood sugar twice daily.  glucose blood VI test strips (TRUE METRIX GLUCOSE TEST STRIP) strip Check blood sugar twice daily.  lancets (ULTRA THIN LANCETS) 30 gauge misc Check blood sugar twice daily.  diclofenac (VOLTAREN) 1 % gel Apply  to affected area four (4) times daily.  CALCIUM PO Take  by mouth.  fluticasone (FLONASE) 50 mcg/actuation nasal spray Si spray in both nostril twice daily    cholecalciferol (VITAMIN D3) 1,000 unit tablet Take 2,000 Units by mouth daily.  aspirin 81 mg Tab Take 81 mg by mouth daily.  MULTIVITAMINS (MULTI-VITAMIN PO) Take 1 Tab by mouth daily. No current facility-administered medications for this visit.        Past Surgical History:   Procedure Laterality Date    [facility]    HX COLONOSCOPY  2013    HX HEART CATHETERIZATION  2012    HX HERNIA REPAIR      umbilical as a child    HX HYSTERECTOMY      52ys ago, QUIQUE, BSO    HX MOHS PROCEDURES      right    UT ANESTH,SURGERY OF SHOULDER         Allergies and Sensitivities:  Allergies   Allergen Reactions    Latex Rash    Nexium [Esomeprazole Magnesium] Swelling and Other (comments)     Lips Swollen, and facial rash and swelling    Dexilant [Dexlansoprazole] Other (comments)     Stomach swelling    Crestor [Rosuvastatin] Other (comments)     Upper respiratory illness    Lipitor [Atorvastatin] Swelling    Lisinopril Unknown (comments)     Patient can't recall    Niaspan [Niacin] Other (comments)     Scratchy throat, \"asthma\" like symptoms    Pcn [Penicillins] Hives    Pravachol [Pravastatin] Myalgia    Sulfa (Sulfonamide Antibiotics) Rash    Zocor [Simvastatin] Myalgia and Other (comments)     Per patient chart \"(? Rash)\" Family History:  Family History   Problem Relation Age of Onset   Wilhelminia Blazing Cancer Mother         colon cancer    Colon Cancer Mother     Hypertension Mother     Diabetes Mother     Heart Disease Mother     Coronary Artery Disease Mother     Hypertension Father     Diabetes Father     Heart Disease Father     Coronary Artery Disease Father     Hypertension Sister     Diabetes Sister     Heart Disease Sister     Coronary Artery Disease Sister     Hypertension Brother     Diabetes Brother     Heart Disease Brother     Coronary Artery Disease Brother        Social History:  Social History     Tobacco Use    Smoking status: Former Smoker     Quit date: 1974     Years since quittin.3    Smokeless tobacco: Never Used    Tobacco comment: 1 pack every 2 weeks x 1 year, stopped 45yrs ago   Substance Use Topics    Alcohol use: Yes    Drug use: No     She  reports that she quit smoking about 47 years ago. She has never used smokeless tobacco.  She  reports current alcohol use. Physical Exam:  BP Readings from Last 3 Encounters:   21 (!) 156/102   21 134/81   21 134/81         Pulse Readings from Last 3 Encounters:   21 65   21 76   21 70          Wt Readings from Last 3 Encounters:   21 109.8 kg (242 lb)   21 109.8 kg (242 lb)   21 109.8 kg (242 lb)       Constitutional: Oriented to person, place, and time. HENT: Head: Normocephalic and atraumatic. Neck: No JVD present. Cardiovascular: Regular rhythm. No murmur, gallop or rubs appreciated  Lung: Breath sounds normal. No respiratory distress. No ronchi or rales appreciated  Abdominal: No tenderness. No rebound and no guarding. Musculoskeletal: There is no lower extremity edema.  No cynosis    LABS:   @  Lab Results   Component Value Date/Time    WBC 6.8 2021 02:14 PM    Hemoglobin, POC 12.6 2013 09:46 AM    HGB 12.8 2021 02:14 PM    Hematocrit, POC 37 2013 09:46 AM    HCT 40.0 02/27/2021 02:14 PM    PLATELET 581 84/50/6496 02:14 PM    MCV 90.1 02/27/2021 02:14 PM     Lab Results   Component Value Date/Time    Sodium 140 02/27/2021 02:14 PM    Potassium 3.9 02/27/2021 02:14 PM    Chloride 105 02/27/2021 02:14 PM    CO2 31 02/27/2021 02:14 PM    Glucose 91 02/27/2021 02:14 PM    BUN 10 02/27/2021 02:14 PM    Creatinine 0.75 02/27/2021 02:14 PM     Lipids Latest Ref Rng & Units 6/12/2019 2/26/2019 8/7/2018 2/13/2018 10/4/2017   Chol, Total 100 - 199 mg/dL 209(H) 190 169 182 179   HDL >39 mg/dL 59 63 62(H) 63 71   LDL 0 - 99 mg/dL 134(H) 111(H) 85.6 106(H) 95   Trig 0 - 149 mg/dL 81 80 107 64 67   Chol/HDL Ratio 0 - 5.0   - - 2.7 - -   Some recent data might be hidden     Lab Results   Component Value Date/Time    ALT (SGPT) 14 08/30/2019 10:34 AM     Lab Results   Component Value Date/Time    Hemoglobin A1c 5.7 (H) 02/26/2019 09:37 AM    Hemoglobin A1c (POC) 6.1 06/23/2017 11:53 AM    Hemoglobin A1c, External 6.5 01/26/2017 12:00 AM     Lab Results   Component Value Date/Time    TSH 1.91 08/30/2019 10:34 AM       EKG  04/2021: Sinus rhythm at 70 bpm.  Nonspecific T wave changes. No ST changes of ischemia.    04/12/21    ECHO ADULT COMPLETE 05/28/2021   Interpretation Summary  · LV: Estimated LVEF is 55 - 60%. Visually measured ejection fraction. Normal cavity size, wall thickness and systolic function (ejection fraction normal). Wall motion: normal. Age-appropriate left ventricular diastolic function. · LA: Mildly dilated left atrium. Left Atrium volume index is 38 mL/m2. · PA: Pulmonary arterial systolic pressure is 24 mmHg. STRESS TEST (EST, PHARM, NUC, ECHO etc)    CATHETERIZATION (2012)  1. Mild pulmonary venous hypertension secondary to hypertensive cardiovascular disease. 2. Angiographically normal coronary arteries. 3. Catheter-induced atrial fibrillation.   4. False-positive nuclear stress test.    IMPRESSION & PLAN:  Ms. Manav Benitez is 64-year-old female with multiple medical problems    Palpitation:  Denies any symptoms concerning for hyperthyroidism. No presyncope or syncope  Event monitor in 07/2021, low risk. Sinus rhythm, no A. fib or pause noted. PVC burden less than 1%  Echocardiogram in 05/2021, low risk finding as mentioned above. Hypertension:  /98. She did not take her antihypertensive medication today as she was outside above. Currently she is on Coreg 6.25 mg twice daily  She thinks that she has been feeling fatigue and tired because of the medication. We will try to reduce dose of Coreg to 3.125 mg twice daily. I have asked her to check her blood pressure at home regularly and make a diary. We will see her back in the office in 2 weeks for blood pressure check. I have asked her to bring her blood pressure cuff with her to calibrate. She is also on Lasix however she does not have the swelling. I will reduce Lasix to 20 mg daily    Hyperlipidemia:  Last lipid profile was in June 2019 and LDL level was 134. Unfortunately patient has not been able to tolerate statin medication in the past.  She does not remember the names but she has tried 2 different medications. I am going to try Livalo, low-dose for hyperlipidemia. PCSK9 inhibitor can be considered in the future if she is not able to tolerate Livalo. This plan was discussed with patient who is in agreement. Thank you for allowing me to participate in patient care. Please feel free to call me if you have any question or concern. Darrin Martinez MD  Please note: This document has been produced using voice recognition software. Unrecognized errors in transcription may be present.

## 2021-08-23 NOTE — PROGRESS NOTES
Sumi Armstrong presents today for   Chief Complaint   Patient presents with    Follow-up     3 month follow up       Sumi Armstrong preferred language for health care discussion is english/other. Is someone accompanying this pt? no    Is the patient using any DME equipment during [address]? no    Depression Screening:  3 most recent PHQ Screens 8/23/2021   Little interest or pleasure in doing things Not at all   Feeling down, depressed, irritable, or hopeless Not at all   Total Score PHQ 2 0       Learning Assessment:  Learning Assessment 8/23/2021   PRIMARY LEARNER Patient   HIGHEST LEVEL OF EDUCATION - PRIMARY LEARNER  -   BARRIERS PRIMARY LEARNER -   CO-LEARNER CAREGIVER -   PRIMARY LANGUAGE ENGLISH   LEARNER PREFERENCE PRIMARY DEMONSTRATION     -   ANSWERED BY patient   RELATIONSHIP SELF       Abuse Screening:  Abuse Screening Questionnaire 8/23/2021   Do you ever feel afraid of your partner? N   Are you in a relationship with someone who physically or mentally threatens you? N   Is it safe for you to go home? Y       Fall Risk  Fall Risk Assessment, last 12 mths 8/23/2021   Able to walk? Yes   Fall in past 12 months? 0   Do you feel unsteady? 0   Are you worried about falling 0   Number of falls in past 12 months -   Fall with injury? -           Pt currently taking Anticoagulant therapy? no    Pt currently taking Antiplatelet therapy ? ASA 81 mg once a day      Coordination of Care:  1. Have you been to the ER, urgent care clinic since your last visit? Hospitalized since your last visit? no    2. Have you seen or consulted any other health care providers outside of the [address] [place] [address] since your last visit? Include any pap smears or colon screening.  no

## 2021-08-23 NOTE — PATIENT INSTRUCTIONS
Decrease coreg to 3.125 mg one tablet twice a day   Decrease lasix to 20 mg daily   Monitor blood pressure once a day for 2 weeks

## 2021-08-23 NOTE — LETTER
8/23/2021    Patient: Fabian Tesfaye   YOB: 1945   Date of Visit: 8/23/2021     Lalo Yuan, [address] [facility] [address]  Via Fax: [phone]    Dear Lalo Yuan DO,      Thank you for referring Ms. Jimenez Kahn to [facility] for evaluation. My notes for this consultation are attached. If you have questions, please do not hesitate to call me. I look forward to following your patient along with you.       Sincerely,    Deisy Alvarez MD

## 2021-09-01 NOTE — PROGRESS NOTES
Shira Lin presents today for follow-up of her blood pressure. When seen by Dr. Miriam Young on 8/23/21, she complained of fatigue while taking her carvedilol. This was then decreased to 3.125mg BID and he also decreased her lasix to 20mg daily as she did not exhibit any edema. However, her blood pressure was elevated during that visit. She was asked to monitor and record her blood pressures at home and to bring her blood pressure monitor with her when she returns for follow-up. She states that when she decreased the carvedilol as instructed, her blood pressure became elevated with SBP going up to the 160's and 170's so she resumed taking her carvedilol 6.25mg BID. She did not bring her blood pressure diary but she reports continued elevated blood pressures. She also reported that she could not afford the Livalo 2mg that Dr. Lorraine Fregoso prescribed and she wants to discuss possibly restarting a statin that she has taken in the past.  She has most, if not all, of the statins listed in her allergies but she states that she is not positive that she had allergic reactions to all of them and would like to try one of the older statins again. She is a 64year old female with history of pulmonary hypertension, diabetes, hypertension, hyperlipidemia, sleep apnea. Her last echo was done on 5/28/21 and it showed an EF of 55-60% with normal wall motion. Her PASP was 24 mmHg. PMH:  Past Medical History:   Diagnosis Date    Atrophic vaginitis     Cardiac cath 05/30/2012    Patent coronary arteries. LVEDP 20.  RA 11.  RV 42/10. PA 42/21. W 18.  CO 6.4 (TD), 6.4 ([facility]). PVR 1.7 Wood units. False-positive nuclear study.     Diabetes     diet controlled, hypoglycemia from metformin previously     Dyslipidemia     Fatty liver     Fibrocystic breast disease     GERD     Gout     H/O colonoscopy 4/12/13    polyp    Hypertension     Macular degeneration     Morbid obesity ([address]     Obstructive sleep apnea     Peripheral neuropathy     Rectocele     Thyroid cyst     bilat       PSH:  Past Surgical History:   Procedure Laterality Date    [facility] ARTERIAL ARTERIOTOMY 3M  2012    HX COLONOSCOPY  2013    HX HEART CATHETERIZATION  2012    HX HERNIA REPAIR      umbilical as a child    HX HYSTERECTOMY      50ys ago, QUIQUE, BSO    HX MOHS PROCEDURES      right    ID ANESTH,SURGERY OF SHOULDER         MEDS:  Current Outpatient Medications   Medication Sig    potassium chloride (K-DUR, KLOR-CON) 20 mEq tablet Take 1 Tablet by mouth two (2) times a day.  carvediloL (COREG) 6.25 mg tablet Take 1 Tablet by mouth two (2) times daily (with meals).  pravastatin (PRAVACHOL) 20 mg tablet Take 1 Tablet by mouth nightly.  losartan (COZAAR) 25 mg tablet Take 1 Tablet by mouth daily.  furosemide (LASIX) 20 mg tablet Take 1 Tablet by mouth daily.  omeprazole (PRILOSEC) 20 mg capsule Take 20 mg by mouth two (2) times a day. Hasnt started yet    fluconazole (DIFLUCAN) 150 mg tablet FDA advises cautious prescribing of oral fluconazole in pregnancy. Take one now and may repat in 7-10days.  Blood-Glucose Meter (TRUE METRIX AIR GLUCOSE METER) misc Check blood sugar twice daily.  glucose blood VI test strips (TRUE METRIX GLUCOSE TEST STRIP) strip Check blood sugar twice daily.  lancets (ULTRA THIN LANCETS) 30 gauge misc Check blood sugar twice daily.  diclofenac (VOLTAREN) 1 % gel Apply  to affected area four (4) times daily.  CALCIUM PO Take  by mouth.  fluticasone (FLONASE) 50 mcg/actuation nasal spray Si spray in both nostril twice daily    cholecalciferol (VITAMIN D3) 1,000 unit tablet Take 2,000 Units by mouth daily.  aspirin 81 mg Tab Take 81 mg by mouth daily.  MULTIVITAMINS (MULTI-VITAMIN PO) Take 1 Tab by mouth daily. No current facility-administered medications for this visit.            Allergies and Sensitivities:  Allergies   Allergen Reactions    Latex Rash    Nexium [Esomeprazole Magnesium] Swelling and Other (comments)     Lips Swollen, and facial rash and swelling    Dexilant [Dexlansoprazole] Other (comments)     Stomach swelling    Crestor [Rosuvastatin] Other (comments)     Upper respiratory illness    Lipitor [Atorvastatin] Swelling    Lisinopril Unknown (comments)     Patient can't recall    Niaspan [Niacin] Other (comments)     Scratchy throat, \"asthma\" like symptoms    Pcn [Penicillins] Hives    Pravachol [Pravastatin] Myalgia    Sulfa (Sulfonamide Antibiotics) Rash    Zocor [Simvastatin] Myalgia and Other (comments)     Per patient chart \"(? Rash)\"       Family History:  Family History   Problem Relation Age of Onset    Cancer Mother         colon cancer    Colon Cancer Mother     Hypertension Mother     Diabetes Mother     Heart Disease Mother     Coronary Artery Disease Mother     Hypertension Father     Diabetes Father     Heart Disease Father     Coronary Artery Disease Father     Hypertension Sister     Diabetes Sister     Heart Disease Sister     Coronary Artery Disease Sister     Hypertension Brother     Diabetes Brother     Heart Disease Brother     Coronary Artery Disease Brother        Social History:  She  reports that she quit smoking about 47 years ago. She has never used smokeless tobacco.  She  reports current alcohol use. Physical:  Visit Vitals  BP (!) 140/90   Pulse 65   Ht 5' 3\" (1.6 m)   Wt 108.4 kg (239 lb)   SpO2 97%   BMI 42.34 kg/m²       Her weight is down 3 pounds since her last visit    Exam:  Neck:  Supple, no JVD, no carotid bruits  CV:  Normal S1 and  S2, no murmurs, rubs, or gallops noted  Lungs:  Clear to ausculation throughout, no wheezes or rales  Abd:  Soft, non-tender, non-distended with good bowel sounds.   No hepatosplenomegaly  Extremities:  Trace lower extremity edema around her ankles      Data:  EKG:  Not done today      LABS:  Lab Results   Component Value Date/Time Sodium 140 02/27/2021 02:14 PM    Potassium 3.9 02/27/2021 02:14 PM    Chloride 105 02/27/2021 02:14 PM    CO2 31 02/27/2021 02:14 PM    Glucose 91 02/27/2021 02:14 PM    BUN 10 02/27/2021 02:14 PM    Creatinine 0.75 02/27/2021 02:14 PM     Lab Results   Component Value Date/Time    Cholesterol, total 209 (H) 06/12/2019 11:42 AM    HDL Cholesterol 59 06/12/2019 11:42 AM    LDL, calculated 134 (H) 06/12/2019 11:42 AM    Triglyceride 81 06/12/2019 11:42 AM    CHOL/HDL Ratio 2.7 08/07/2018 10:05 AM     Lab Results   Component Value Date/Time    ALT (SGPT) 14 08/30/2019 10:34 AM         Impression/Plan:  1. Essential hypertension, blood pressure elevated and suboptimally controlled  2. Pulmonary hypertension  3. Hyperlipidemia, intolerant to statins but would like to retry one of the older generation statins  4. Diabetes mellitus, recommend Hgb A1c less than 7% from cardiac standpoint  5. Sleep apnea, uses CPAP    Ms. Lelia Blas was seen today for blood pressure follow-up. During her last visit, her carvedilol was decreased to 3.125mg BID due to complaints of some fatigue but she states that her blood pressure became quite elevated so she restarted her 6.25mg BID dose. She reports that her blood pressures remain elevated. She states that she has been eating more fruits and vegetables and less red meat, she has not been cooking with or adding salt to her food, or eating out. We had a long discussion regarding hypertension and the importance of diet and lifestyle changes. Patient education material re:  Low sodium diet and heart healthy diet attached to her after visit summary. Greater than 50% of a 35 minute visit was spent in discussion, counseling, and answering questions. Her blood pressure was 140/90 when I rechecked it. She has not taken her lasix yet today. I will restart her on losartan 25mg once a day.   She states that she took this medication in the past but thought her dose was too high so she stopped taking it. It is not listed as an allergy so will re-try this medication. She will return for follow-up in 2 weeks. She was given a lab slip for a BMP to be done prior to returning for follow-up as she is also taking potassium 20meq BID. She told me that she could not afford the Livalo that was prescribed by Dr. Alfreda Ferrell. She would like to try one of the older generation statins that she has taken in the past and was not quite sure if she truly had an allergic reaction or side effect from the medication. Will re-try pravastatin 20mg once a day (in the evening). She states that she already purchased some Co-Q10 and will begin taking it when she begins taking the pravastatin. I asked that she call the office if she develops muscle or joint aches after she begins taking the medication. She was given positive reinforcement for the dietary changes she has made and was encouraged to walk daily for exercise if possible. Her weight is down 3 pounds since her last visit. She was advised to maintain a low sodium diet. She will follow-up with Dr. Fara Bello as scheduled and as needed. Sotero Smith MSN, FNP-BC    Please note:  Portions of this chart were created with Dragon medical speech to text program.  Unrecognized errors may be present.

## 2021-09-08 ENCOUNTER — OFFICE VISIT (OUTPATIENT)
Dept: CARDIOLOGY CLINIC | Age: 76
End: 2021-09-08
Payer: MEDICARE

## 2021-09-08 VITALS
HEIGHT: 63 IN | WEIGHT: 239 LBS | OXYGEN SATURATION: 97 % | DIASTOLIC BLOOD PRESSURE: 90 MMHG | BODY MASS INDEX: 42.35 KG/M2 | HEART RATE: 65 BPM | SYSTOLIC BLOOD PRESSURE: 140 MMHG

## 2021-09-08 DIAGNOSIS — I10 ESSENTIAL HYPERTENSION: Primary | ICD-10-CM

## 2021-09-08 DIAGNOSIS — E87.6 HYPOKALEMIA: ICD-10-CM

## 2021-09-08 DIAGNOSIS — E78.5 DYSLIPIDEMIA: ICD-10-CM

## 2021-09-08 DIAGNOSIS — E66.01 MORBID OBESITY WITH BMI OF 40.0-44.9, ADULT (HCC): ICD-10-CM

## 2021-09-08 PROCEDURE — G8417 CALC BMI ABV UP PARAM F/U: HCPCS | Performed by: NURSE PRACTITIONER

## 2021-09-08 PROCEDURE — G8399 PT W/DXA RESULTS DOCUMENT: HCPCS | Performed by: NURSE PRACTITIONER

## 2021-09-08 PROCEDURE — G8427 DOCREV CUR MEDS BY ELIG CLIN: HCPCS | Performed by: NURSE PRACTITIONER

## 2021-09-08 PROCEDURE — G8753 SYS BP > OR = 140: HCPCS | Performed by: NURSE PRACTITIONER

## 2021-09-08 PROCEDURE — 1101F PT FALLS ASSESS-DOCD LE1/YR: CPT | Performed by: NURSE PRACTITIONER

## 2021-09-08 PROCEDURE — 1090F PRES/ABSN URINE INCON ASSESS: CPT | Performed by: NURSE PRACTITIONER

## 2021-09-08 PROCEDURE — 99214 OFFICE O/P EST MOD 30 MIN: CPT | Performed by: NURSE PRACTITIONER

## 2021-09-08 PROCEDURE — G8536 NO DOC ELDER MAL SCRN: HCPCS | Performed by: NURSE PRACTITIONER

## 2021-09-08 PROCEDURE — G8755 DIAS BP > OR = 90: HCPCS | Performed by: NURSE PRACTITIONER

## 2021-09-08 PROCEDURE — G8432 DEP SCR NOT DOC, RNG: HCPCS | Performed by: NURSE PRACTITIONER

## 2021-09-08 RX ORDER — LOSARTAN POTASSIUM 25 MG/1
25 TABLET ORAL DAILY
Qty: 30 TABLET | Refills: 4 | Status: SHIPPED | OUTPATIENT
Start: 2021-09-08 | End: 2022-01-06

## 2021-09-08 RX ORDER — PRAVASTATIN SODIUM 20 MG/1
20 TABLET ORAL
Qty: 30 TABLET | Refills: 4 | Status: SHIPPED | OUTPATIENT
Start: 2021-09-08 | End: 2022-04-04

## 2021-09-08 RX ORDER — CARVEDILOL 6.25 MG/1
6.25 TABLET ORAL 2 TIMES DAILY WITH MEALS
Qty: 1 TABLET | Refills: 0
Start: 2021-09-08 | End: 2021-09-22 | Stop reason: SINTOL

## 2021-09-08 RX ORDER — POTASSIUM CHLORIDE 20 MEQ/1
20 TABLET, EXTENDED RELEASE ORAL 2 TIMES DAILY
Qty: 1 TABLET | Refills: 0
Start: 2021-09-08 | End: 2022-02-22 | Stop reason: SDUPTHER

## 2021-09-08 NOTE — PROGRESS NOTES
Fabian Tesfaye presents today for   Chief Complaint   Patient presents with    Follow-up     2 weeks        Fabian Tesfaye preferred language for health care discussion is english/other. Is someone accompanying this pt? no    Is the patient using any DME equipment during [address]? no    Depression Screening:  3 most recent PHQ Screens 9/8/2021   Little interest or pleasure in doing things Not at all   Feeling down, depressed, irritable, or hopeless Not at all   Total Score PHQ 2 0       Learning Assessment:  Learning Assessment 8/23/2021   PRIMARY LEARNER Patient   HIGHEST LEVEL OF EDUCATION - PRIMARY LEARNER  -   BARRIERS PRIMARY LEARNER -   CO-LEARNER CAREGIVER -   PRIMARY LANGUAGE ENGLISH   LEARNER PREFERENCE PRIMARY DEMONSTRATION     -   ANSWERED BY patient   RELATIONSHIP SELF       Abuse Screening:  Abuse Screening Questionnaire 8/23/2021   Do you ever feel afraid of your partner? N   Are you in a relationship with someone who physically or mentally threatens you? N   Is it safe for you to go home? Y       Fall Risk  Fall Risk Assessment, last 12 mths 9/8/2021   Able to walk? Yes   Fall in past 12 months? 0   Do you feel unsteady? 0   Are you worried about falling 0   Number of falls in past 12 months -   Fall with injury? -       Pt currently taking Anticoagulant therapy? ASA 81mg every day     Coordination of Care:  1. Have you been to the ER, urgent care clinic since your last visit? Hospitalized since your last visit? no    2. Have you seen or consulted any other health care providers outside of the [address] [place] [address] since your last visit? Include any pap smears or colon screening.  no

## 2021-09-08 NOTE — PATIENT INSTRUCTIONS
Restart losartan 25mg once a day  Start pravastatin 20mg once a day (take in the evening)  All other medications to remain the same for now  BMP to be done at your earliest convenience  Follow-up with Jennifer Gregory in 2 weeks  Low sodium diet, 2000mg per day    Low Sodium Diet (2,000 Milligram): Care Instructions  Overview     Limiting sodium can be an important part of managing some health problems. The most common source of sodium is salt. People get most of the salt in their diet from canned, prepared, and packaged foods. Fast food and restaurant meals also are very high in sodium. Your doctor will probably limit your sodium to less than 2,000 milligrams (mg) a day. This limit counts all the sodium in prepared and packaged foods and any salt you add to your food. Follow-up care is a key part of your treatment and safety. Be sure to make and go to all appointments, and call your doctor if you are having problems. It's also a good idea to know your test results and keep a list of the medicines you take. How can you care for yourself at home? Read food labels  · Read labels on cans and food packages. The labels tell you how much sodium is in each serving. Make sure that you look at the serving size. If you eat more than the serving size, you have eaten more sodium. · Food labels also tell you the Percent Daily Value for sodium. Choose products with low Percent Daily Values for sodium. · Be aware that sodium can come in forms other than salt, including monosodium glutamate (MSG), sodium citrate, and sodium bicarbonate (baking soda). MSG is often added to Asian food. When you eat out, you can sometimes ask for food without MSG or added salt. Buy low-sodium foods  · Buy foods that are labeled \"unsalted\" (no salt added), \"sodium-free\" (less than 5 mg of sodium per serving), or \"low-sodium\" (140 mg or less of sodium per serving). Foods labeled \"reduced-sodium\" and \"light sodium\" may still have too much sodium.  Be sure to read the label to see how much sodium you are getting. · Buy fresh vegetables, or frozen vegetables without added sauces. Buy low-sodium versions of canned vegetables, soups, and other canned goods. Prepare low-sodium meals  · Cut back on the amount of salt you use in cooking. This will help you adjust to the taste. Do not add salt after cooking. One teaspoon of salt has about 2,300 mg of sodium. · Take the salt shaker off the table. · Flavor your food with garlic, lemon juice, onion, vinegar, herbs, and spices. Do not use soy sauce, lite soy sauce, steak sauce, onion salt, garlic salt, celery salt, or ketchup on your food. · Use low-sodium salad dressings, sauces, and ketchup. Or make your own salad dressings and sauces without adding salt. · Use less salt (or none) when recipes call for it. You can often use half the salt a recipe calls for without losing flavor. Other foods such as rice, pasta, and grains do not need added salt. · Rinse canned vegetables, and cook them in fresh water. This removes somebut not allof the salt. · Avoid water that is naturally high in sodium or that has been treated with water softeners, which add sodium. If you buy bottled water, read the label and choose a sodium-free brand. Avoid high-sodium foods  · Avoid eating:  ? Smoked, cured, salted, and canned meat, fish, and poultry. ? Ham, galvan, hot dogs, and luncheon meats. ? Regular, hard, and processed cheese and regular peanut butter. ? Crackers with salted tops, and other salted snack foods such as pretzels, chips, and salted popcorn. ? Frozen prepared meals, unless labeled low-sodium. ? Canned and dried soups, broths, and bouillon, unless labeled sodium-free or low-sodium. ? Canned vegetables, unless labeled sodium-free or low-sodium. ? Western Silva fries, pizza, tacos, and other fast foods. ? Pickles, olives, ketchup, and other condiments, especially soy sauce, unless labeled sodium-free or low-sodium.   Where can you learn more? Go to http://www.gray.com/  Enter V843 in the search box to learn more about \"Low Sodium Diet (2,000 Milligram): Care Instructions. \"  Current as of: December 17, 2020               Content Version: 12.8  © 3697-5312 [employer]. Care instructions adapted under license by "[employer] (which disclaims liability or warranty for this information). If you have questions about a medical condition or this instruction, always ask your healthcare professional. [place] [address] any warranty or liability for your use of this information. Learning About High Blood Pressure  What is high blood pressure? Blood pressure is a measure of how hard the blood pushes against the walls of your arteries. It's normal for blood pressure to go up and down throughout the day. But if it stays up, you have high blood pressure. Another name for high blood pressure is hypertension. Two numbers tell you your blood pressure. The first number is the systolic pressure (top number). It shows how hard the blood pushes when your heart is pumping. The second number is the diastolic pressure (bottom number). It shows how hard the blood pushes between heartbeats, when your heart is relaxed and filling with blood. Your doctor will give you a goal for your blood pressure based on your health and your age. High blood pressure (hypertension) means that the top number stays high, or the bottom number stays high, or both. High blood pressure increases the risk of stroke, heart attack, and other problems. What happens when you have high blood pressure? · Blood flows through your arteries with too much force. Over time, this can damage the heart and the walls of your arteries. But you can't feel it. High blood pressure usually doesn't cause symptoms. · High blood pressure makes your heart work harder.  And that can lead to heart failure, which means your heart doesn't pump as much blood as your body needs. · Fat and calcium start to build up in your arteries. This buildup is called hardening of the arteries. It can cause many problems including a heart attack and stroke. · Arteries also carry blood and oxygen to organs like your eyes, kidneys, and brain. If high blood pressure damages those arteries, it can lead to vision loss, kidney disease, stroke, and a higher risk of dementia. How can you prevent high blood pressure? · Stay at a healthy weight. · Try to limit how much sodium you eat to less than 2,300 milligrams (mg) a day. If you limit your sodium to 1,500 mg a day, you can lower your blood pressure even more. ? Buy foods that are labeled \"unsalted,\" \"sodium-free,\" or \"low-sodium. \" Foods labeled \"reduced-sodium\" and \"light sodium\" may still have too much sodium. ? Flavor your food with garlic, lemon juice, onion, vinegar, herbs, and spices instead of salt. Do not use soy sauce, steak sauce, onion salt, garlic salt, mustard, or ketchup on your food. ? Use less salt (or none) when recipes call for it. You can often use half the salt a recipe calls for without losing flavor. · Be physically active. Get at least 30 minutes of exercise on most days of the week. Walking is a good choice. You also may want to do other activities, such as running, swimming, cycling, or playing tennis or team sports. · Limit alcohol to 2 drinks a day for men and 1 drink a day for women. · Eat plenty of fruits, vegetables, and low-fat dairy products. Eat less saturated and total fats. How is high blood pressure treated? · Your doctor will suggest making lifestyle changes to help your heart. For example, your doctor may ask you to eat healthy foods, quit smoking, lose extra weight, and be more active. · If lifestyle changes don't help enough, your doctor may recommend that you take medicine. · When blood pressure is very high, medicines are needed to lower it.   Follow-up care is a key part of your treatment and safety. Be sure to make and go to all appointments, and call your doctor if you are having problems. It's also a good idea to know your test results and keep a list of the medicines you take. Where can you learn more? Go to http://www.segundo.com/  Enter P501 in the search box to learn more about \"Learning About High Blood Pressure. \"  Current as of: August 31, 2020               Content Version: 12.8  © 2006-2021 [employer]. Care instructions adapted under license by [employer] (which disclaims liability or warranty for this information). If you have questions about a medical condition or this instruction, always ask your healthcare professional. [address] any warranty or liability for your use of this information. Heart-Healthy Diet: Care Instructions  Your Care Instructions     A heart-healthy diet has lots of vegetables, fruits, nuts, beans, and whole grains, and is low in salt. It limits foods that are high in saturated fat, such as meats, cheeses, and fried foods. It may be hard to change your diet, but even small changes can lower your risk of heart attack and heart disease. Follow-up care is a key part of your treatment and safety. Be sure to make and go to all appointments, and call your doctor if you are having problems. It's also a good idea to know your test results and keep a list of the medicines you take. How can you care for yourself at home? Watch your portions  · Use food labels to learn what the recommended servings are for the foods you eat. · Eat only the number of calories you need to stay at a healthy weight. If you need to lose weight, eat fewer calories than your body burns (through exercise and other physical activity). Eat more fruits and vegetables  · Eat a variety of fruit and vegetables every day.  Dark green, deep orange, red, or yellow fruits and vegetables are especially good for you. Examples include spinach, carrots, peaches, and berries. · Keep carrots, celery, and other veggies handy for snacks. Buy fruit that is in season and store it where you can see it so that you will be tempted to eat it. · Cook dishes that have a lot of veggies in them, such as stir-fries and soups. Limit saturated fat  · Read food labels, and try to avoid saturated fats. They increase your risk of heart disease. · Use olive or canola oil when you cook. · Bake, broil, grill, or steam foods instead of frying them. · Choose lean meats instead of high-fat meats such as hot dogs and sausages. Cut off all visible fat when you prepare meat. · Eat fish, skinless poultry, and meat alternatives such as soy products instead of high-fat meats. Soy products, such as tofu, may be especially good for your heart. · Choose low-fat or fat-free milk and dairy products. Eat foods high in fiber  · Eat a variety of grain products every day. Include whole-grain foods that have lots of fiber and nutrients. Examples of whole-grain foods include oats, whole wheat bread, and brown rice. · Buy whole-grain breads and cereals, instead of white bread or pastries. Limit salt and sodium  · Limit how much salt and sodium you eat to help lower your blood pressure. · Taste food before you salt it. Add only a little salt when you think you need it. With time, your taste buds will adjust to less salt. · Eat fewer snack items, fast foods, and other high-salt, processed foods. Check food labels for the amount of sodium in packaged foods. · Choose low-sodium versions of canned goods (such as soups, vegetables, and beans). Limit sugar  · Limit drinks and foods with added sugar. These include candy, desserts, and soda pop. Limit alcohol  · Limit alcohol to no more than 2 drinks a day for men and 1 drink a day for women. Too much alcohol can cause health problems. When should you call for help?   Watch closely for changes in your health, and be sure to contact your doctor if:    · You would like help planning heart-healthy meals. Where can you learn more? Go to http://www.[employer]H [employer].com/  Enter V137 in the search box to learn more about \"Heart-Healthy Diet: Care Instructions. \"  Current as of: December 17, 2020               Content Version: 12.8  © 2900-3844 [employer]. Care instructions adapted under license by [employer] (which disclaims liability or warranty for this information). If you have questions about a medical condition or this instruction, always ask your healthcare professional. [place] [address] any warranty or liability for your use of this information.

## 2021-09-15 LAB — CREATININE, EXTERNAL: 0.68

## 2021-09-22 ENCOUNTER — OFFICE VISIT (OUTPATIENT)
Dept: CARDIOLOGY CLINIC | Age: 76
End: 2021-09-22
Payer: MEDICARE

## 2021-09-22 VITALS
HEART RATE: 56 BPM | DIASTOLIC BLOOD PRESSURE: 70 MMHG | SYSTOLIC BLOOD PRESSURE: 130 MMHG | BODY MASS INDEX: 42.52 KG/M2 | WEIGHT: 240 LBS | HEIGHT: 63 IN | OXYGEN SATURATION: 97 %

## 2021-09-22 DIAGNOSIS — I10 ESSENTIAL HYPERTENSION: Primary | ICD-10-CM

## 2021-09-22 DIAGNOSIS — E78.5 DYSLIPIDEMIA: ICD-10-CM

## 2021-09-22 DIAGNOSIS — R60.9 PERIPHERAL EDEMA: ICD-10-CM

## 2021-09-22 PROCEDURE — 99213 OFFICE O/P EST LOW 20 MIN: CPT | Performed by: NURSE PRACTITIONER

## 2021-09-22 PROCEDURE — G8427 DOCREV CUR MEDS BY ELIG CLIN: HCPCS | Performed by: NURSE PRACTITIONER

## 2021-09-22 PROCEDURE — G8432 DEP SCR NOT DOC, RNG: HCPCS | Performed by: NURSE PRACTITIONER

## 2021-09-22 PROCEDURE — 1101F PT FALLS ASSESS-DOCD LE1/YR: CPT | Performed by: NURSE PRACTITIONER

## 2021-09-22 PROCEDURE — G8754 DIAS BP LESS 90: HCPCS | Performed by: NURSE PRACTITIONER

## 2021-09-22 PROCEDURE — 1090F PRES/ABSN URINE INCON ASSESS: CPT | Performed by: NURSE PRACTITIONER

## 2021-09-22 PROCEDURE — G8752 SYS BP LESS 140: HCPCS | Performed by: NURSE PRACTITIONER

## 2021-09-22 PROCEDURE — G8399 PT W/DXA RESULTS DOCUMENT: HCPCS | Performed by: NURSE PRACTITIONER

## 2021-09-22 PROCEDURE — G8417 CALC BMI ABV UP PARAM F/U: HCPCS | Performed by: NURSE PRACTITIONER

## 2021-09-22 PROCEDURE — G8536 NO DOC ELDER MAL SCRN: HCPCS | Performed by: NURSE PRACTITIONER

## 2021-09-22 RX ORDER — GLUCOSAMINE HCL 500 MG
100 TABLET ORAL DAILY
Qty: 1 TABLET | Refills: 0
Start: 2021-09-22

## 2021-09-22 RX ORDER — ATENOLOL 25 MG/1
25 TABLET ORAL DAILY
COMMUNITY
End: 2022-02-22 | Stop reason: SDUPTHER

## 2021-09-22 NOTE — PROGRESS NOTES
Tram Ndiaye presents today for   Chief Complaint   Patient presents with    Follow-up     2 week blood pressure check       Is someone accompanying this pt? no    Is the patient using any DME equipment during OV? no    Depression Screening:  3 most recent PHQ Screens 9/22/2021   Little interest or pleasure in doing things Not at all   Feeling down, depressed, irritable, or hopeless Not at all   Total Score PHQ 2 0       Learning Assessment:  Learning Assessment 8/23/2021   PRIMARY LEARNER Patient   HIGHEST LEVEL OF EDUCATION - PRIMARY LEARNER  -   BARRIERS PRIMARY LEARNER -   CO-LEARNER CAREGIVER -   PRIMARY LANGUAGE ENGLISH   LEARNER PREFERENCE PRIMARY DEMONSTRATION     -   ANSWERED BY patient   RELATIONSHIP SELF       Fall Risk  Fall Risk Assessment, last 12 mths 9/22/2021   Able to walk? Yes   Fall in past 12 months? 0   Do you feel unsteady? 0   Are you worried about falling 0   Number of falls in past 12 months -   Fall with injury? -       Pt currently taking Antiplatelet therapy? Yes, ASA 81    Coordination of Care:  1. Have you been to the ER, urgent care clinic since your last visit? Hospitalized since your last visit? no    2. Have you seen or consulted any other health care providers outside of the [address] [place] [address] since your last visit? Include any pap smears or colon screening.  no

## 2021-09-22 NOTE — PATIENT INSTRUCTIONS
Lipid panel/LFTs on/around Nov. 8, 2021 (fast for 12 hours) -lab slip given  Continue present medication regimen  Follow-up with Dr. Axel Thomas as scheduled and as needed  Call the office if you have any cardiac problems or concerns prior to your next appointment

## 2021-11-06 LAB
HBA1C MFR BLD HPLC: 6 %
LDL-C, EXTERNAL: 115

## 2022-01-04 ENCOUNTER — DOCUMENTATION ONLY (OUTPATIENT)
Dept: CARDIOLOGY CLINIC | Age: 77
End: 2022-01-04

## 2022-01-06 ENCOUNTER — OFFICE VISIT (OUTPATIENT)
Dept: CARDIOLOGY CLINIC | Age: 77
End: 2022-01-06
Payer: MEDICARE

## 2022-01-06 VITALS
WEIGHT: 239.8 LBS | SYSTOLIC BLOOD PRESSURE: 140 MMHG | DIASTOLIC BLOOD PRESSURE: 90 MMHG | BODY MASS INDEX: 42.49 KG/M2 | HEIGHT: 63 IN | OXYGEN SATURATION: 98 % | HEART RATE: 63 BPM

## 2022-01-06 DIAGNOSIS — I10 ESSENTIAL HYPERTENSION: ICD-10-CM

## 2022-01-06 DIAGNOSIS — E78.5 DYSLIPIDEMIA: ICD-10-CM

## 2022-01-06 DIAGNOSIS — R00.2 PALPITATIONS: Primary | ICD-10-CM

## 2022-01-06 PROCEDURE — G8399 PT W/DXA RESULTS DOCUMENT: HCPCS | Performed by: NURSE PRACTITIONER

## 2022-01-06 PROCEDURE — G8427 DOCREV CUR MEDS BY ELIG CLIN: HCPCS | Performed by: NURSE PRACTITIONER

## 2022-01-06 PROCEDURE — 1090F PRES/ABSN URINE INCON ASSESS: CPT | Performed by: NURSE PRACTITIONER

## 2022-01-06 PROCEDURE — 1101F PT FALLS ASSESS-DOCD LE1/YR: CPT | Performed by: NURSE PRACTITIONER

## 2022-01-06 PROCEDURE — 99213 OFFICE O/P EST LOW 20 MIN: CPT | Performed by: NURSE PRACTITIONER

## 2022-01-06 PROCEDURE — G8417 CALC BMI ABV UP PARAM F/U: HCPCS | Performed by: NURSE PRACTITIONER

## 2022-01-06 PROCEDURE — G8755 DIAS BP > OR = 90: HCPCS | Performed by: NURSE PRACTITIONER

## 2022-01-06 PROCEDURE — G8536 NO DOC ELDER MAL SCRN: HCPCS | Performed by: NURSE PRACTITIONER

## 2022-01-06 PROCEDURE — G8432 DEP SCR NOT DOC, RNG: HCPCS | Performed by: NURSE PRACTITIONER

## 2022-01-06 PROCEDURE — G8752 SYS BP LESS 140: HCPCS | Performed by: NURSE PRACTITIONER

## 2022-01-06 RX ORDER — LOSARTAN POTASSIUM 25 MG/1
25 TABLET ORAL 2 TIMES DAILY
Qty: 60 TABLET | Refills: 6 | Status: SHIPPED | OUTPATIENT
Start: 2022-01-06 | End: 2022-02-22 | Stop reason: SDUPTHER

## 2022-01-06 NOTE — PATIENT INSTRUCTIONS
Increase losartan to 25mg twice a day  All other medications to remain the same  Follow-up with Dr. Georgia Sosa as scheduled and as needed  Low sodium diet, 1500mg per day      Low Sodium Diet (2,000 Milligram): Care Instructions  Overview     Limiting sodium can be an important part of managing some health problems. The most common source of sodium is salt. People get most of the salt in their diet from canned, prepared, and packaged foods. Fast food and restaurant meals also are very high in sodium. Your doctor will probably limit your sodium to less than 2,000 milligrams (mg) a day. This limit counts all the sodium in prepared and packaged foods and any salt you add to your food. Follow-up care is a key part of your treatment and safety. Be sure to make and go to all appointments, and call your doctor if you are having problems. It's also a good idea to know your test results and keep a list of the medicines you take. How can you care for yourself at home? Read food labels  · Read labels on cans and food packages. The labels tell you how much sodium is in each serving. Make sure that you look at the serving size. If you eat more than the serving size, you have eaten more sodium. · Food labels also tell you the Percent Daily Value for sodium. Choose products with low Percent Daily Values for sodium. · Be aware that sodium can come in forms other than salt, including monosodium glutamate (MSG), sodium citrate, and sodium bicarbonate (baking soda). MSG is often added to Asian food. When you eat out, you can sometimes ask for food without MSG or added salt. Buy low-sodium foods  · Buy foods that are labeled \"unsalted\" (no salt added), \"sodium-free\" (less than 5 mg of sodium per serving), or \"low-sodium\" (140 mg or less of sodium per serving). Foods labeled \"reduced-sodium\" and \"light sodium\" may still have too much sodium. Be sure to read the label to see how much sodium you are getting.   · Buy fresh vegetables, or frozen vegetables without added sauces. Buy low-sodium versions of canned vegetables, soups, and other canned goods. Prepare low-sodium meals  · Cut back on the amount of salt you use in cooking. This will help you adjust to the taste. Do not add salt after cooking. One teaspoon of salt has about 2,300 mg of sodium. · Take the salt shaker off the table. · Flavor your food with garlic, lemon juice, onion, vinegar, herbs, and spices. Do not use soy sauce, lite soy sauce, steak sauce, onion salt, garlic salt, celery salt, or ketchup on your food. · Use low-sodium salad dressings, sauces, and ketchup. Or make your own salad dressings and sauces without adding salt. · Use less salt (or none) when recipes call for it. You can often use half the salt a recipe calls for without losing flavor. Other foods such as rice, pasta, and grains do not need added salt. · Rinse canned vegetables, and cook them in fresh water. This removes some--but not all--of the salt. · Avoid water that is naturally high in sodium or that has been treated with water softeners, which add sodium. If you buy bottled water, read the label and choose a sodium-free brand. Avoid high-sodium foods  · Avoid eating:  ? Smoked, cured, salted, and canned meat, fish, and poultry. ? Ham, galvan, hot dogs, and luncheon meats. ? Regular, hard, and processed cheese and regular peanut butter. ? Crackers with salted tops, and other salted snack foods such as pretzels, chips, and salted popcorn. ? Frozen prepared meals, unless labeled low-sodium. ? Canned and dried soups, broths, and bouillon, unless labeled sodium-free or low-sodium. ? Canned vegetables, unless labeled sodium-free or low-sodium. ? Western Silva fries, pizza, tacos, and other fast foods. ? Pickles, olives, ketchup, and other condiments, especially soy sauce, unless labeled sodium-free or low-sodium. Where can you learn more?   Go to http://www.gray.com/  Enter K958 in the search box to learn more about \"Low Sodium Diet (2,000 Milligram): Care Instructions. \"  Current as of: December 17, 2020               Content Version: 13.0  © 0262-6498 [employer]. Care instructions adapted under license by [employer] (which disclaims liability or warranty for this information). If you have questions about a medical condition or this instruction, always ask your healthcare professional. [address] any warranty or liability for your use of this information. DASH Diet: Care Instructions  Your Care Instructions     The DASH diet is an eating plan that can help lower your blood pressure. DASH stands for Dietary Approaches to Stop Hypertension. Hypertension is high blood pressure. The DASH diet focuses on eating foods that are high in calcium, potassium, and magnesium. These nutrients can lower blood pressure. The foods that are highest in these nutrients are fruits, vegetables, low-fat dairy products, nuts, seeds, and legumes. But taking calcium, potassium, and magnesium supplements instead of eating foods that are high in those nutrients does not have the same effect. The DASH diet also includes whole grains, fish, and poultry. The DASH diet is one of several lifestyle changes your doctor may recommend to lower your high blood pressure. Your doctor may also want you to decrease the amount of sodium in your diet. Lowering sodium while following the DASH diet can lower blood pressure even further than just the DASH diet alone. Follow-up care is a key part of your treatment and safety. Be sure to make and go to all appointments, and call your doctor if you are having problems. It's also a good idea to know your test results and keep a list of the medicines you take. How can you care for yourself at home? Following the DASH diet  · Eat 4 to 5 servings of fruit each day.  A serving is 1 medium-sized piece of fruit, ½ cup chopped or canned fruit, 1/4 cup dried fruit, or 4 ounces (½ cup) of fruit juice. Choose fruit more often than fruit juice. · Eat 4 to 5 servings of vegetables each day. A serving is 1 cup of lettuce or raw leafy vegetables, ½ cup of chopped or cooked vegetables, or 4 ounces (½ cup) of vegetable juice. Choose vegetables more often than vegetable juice. · Get 2 to 3 servings of low-fat and fat-free dairy each day. A serving is 8 ounces of milk, 1 cup of yogurt, or 1 ½ ounces of cheese. · Eat 6 to 8 servings of grains each day. A serving is 1 slice of bread, 1 ounce of dry cereal, or ½ cup of cooked rice, pasta, or cooked cereal. Try to choose whole-grain products as much as possible. · Limit lean meat, poultry, and fish to 2 servings each day. A serving is 3 ounces, about the size of a deck of cards. · Eat 4 to 5 servings of nuts, seeds, and legumes (cooked dried beans, lentils, and split peas) each week. A serving is 1/3 cup of nuts, 2 tablespoons of seeds, or ½ cup of cooked beans or peas. · Limit fats and oils to 2 to 3 servings each day. A serving is 1 teaspoon of vegetable oil or 2 tablespoons of salad dressing. · Limit sweets and added sugars to 5 servings or less a week. A serving is 1 tablespoon jelly or jam, ½ cup sorbet, or 1 cup of lemonade. · Eat less than 2,300 milligrams (mg) of sodium a day. If you limit your sodium to 1,500 mg a day, you can lower your blood pressure even more. · Be aware that all of these are the suggested number of servings for people who eat 1,800 to 2,000 calories a day. Your recommended number of servings may be different if you need more or fewer calories. Tips for success  · Start small. Do not try to make dramatic changes to your diet all at once. You might feel that you are missing out on your favorite foods and then be more likely to not follow the plan. Make small changes, and stick with them.  Once those changes become habit, add a few more changes. · Try some of the following:  ? Make it a goal to eat a fruit or vegetable at every meal and at snacks. This will make it easy to get the recommended amount of fruits and vegetables each day. ? Try yogurt topped with fruit and nuts for a snack or healthy dessert. ? Add lettuce, tomato, cucumber, and onion to sandwiches. ? Combine a ready-made pizza crust with low-fat mozzarella cheese and lots of vegetable toppings. Try using tomatoes, squash, spinach, broccoli, carrots, cauliflower, and onions. ? Have a variety of cut-up vegetables with a low-fat dip as an appetizer instead of chips and dip. ? Sprinkle sunflower seeds or chopped almonds over salads. Or try adding chopped walnuts or almonds to cooked vegetables. ? Try some vegetarian meals using beans and peas. Add garbanzo or kidney beans to salads. Make burritos and tacos with mashed porras beans or black beans. Where can you learn more? Go to http://www.IntegralReach.com/  Enter H967 in the search box to learn more about \"DASH Diet: Care Instructions. \"  Current as of: April 29, 2021               Content Version: 13.0  © 2214-1657 [employer]. Care instructions adapted under license by [employer] (which disclaims liability or warranty for this information). If you have questions about a medical condition or this instruction, always ask your healthcare professional. [place] [address] any warranty or liability for your use of this information.

## 2022-01-06 NOTE — PROGRESS NOTES
Juan Daniel Bassett presents today for evaluation of complaints of palpitations. She states that she has noticed a fluttering sensation in the morning when she first wakes up. She is not sure if it is related to her hiatal hernia and reflux. She offers no other cardiac complaints. When I saw her in Sept 2021, I started her on losartan 25mg daily and pravastatin 20mg daily. She had been on losartan 50mg daily but she states that she had some problems on the higher dose. She requested medication for her hyperlipidemia during that visit. She stated that in the past, she had problems with other statins that she was placed on. She was unable to afford the Livalo started by Dr. Mark Melissa. She is a 64year old female with history of pulmonary hypertension, diabetes, hypertension, hyperlipidemia, sleep apnea. Her last echo was done on 5/28/21 and it showed an EF of 55-60% with normal wall motion. Her PASP was 24 mmHg. When seen by Dr. China Fine on 8/23/21, she complained of fatigue while taking her carvedilol. This was then decreased to 3.125mg BID and he also decreased her lasix to 20mg daily as she did not exhibit any edema. She apparently had problems with dizziness while on cavedilol and her PCP at [facility], discontinued it and started her on atenolol. She wore a 30 day event monitor in April 2021 and it showed sinus rhythm with average heart rate of 66 bpm, no AFIB or pauses, and PVC burden less than 1%. Denies chest pain, tightness, heaviness, and admits to palpitations. Denies shortness of breath at rest, dyspnea on exertion, orthopnea and PND. Denies abdominal bloating. Denies lightheadedness, dizziness, and syncope. Admits to mild, occasional lower extremity edema and denies claudication. Denies nausea, vomiting, diarrhea, melena, hematochezia. Denies hematuria, urgency, frequency. Denies fever, chills.           PMH:  Past Medical History:   Diagnosis Date    Atrophic vaginitis  Cardiac cath 05/30/2012    Patent coronary arteries. LVEDP 20.  RA 11.  RV 42/10. PA 42/21. W 18.  CO 6.4 (TD), 6.4 (Lauryn Wibaux). PVR 1.7 Wood units. False-positive nuclear study.  Diabetes     diet controlled, hypoglycemia from metformin previously     Dyslipidemia     Fatty liver     Fibrocystic breast disease     GERD     Gout     H/O colonoscopy 4/12/13    polyp    Hypertension     Macular degeneration     Morbid obesity ([address]     Obstructive sleep apnea     Peripheral neuropathy     Rectocele     Thyroid cyst     bilat       PSH:  Past Surgical History:   Procedure Laterality Date    [facility] ARTERIAL ARTERIOTOMY 3M  5/25/2012    HX COLONOSCOPY  4/12/2013    HX HEART CATHETERIZATION  5/25/2012    HX HERNIA REPAIR      umbilical as a child    HX HYSTERECTOMY      52ys ago, QUIQUE, BSO    HX MOHS PROCEDURES      right    NV ANESTH,SURGERY OF SHOULDER         MEDS:  Current Outpatient Medications   Medication Sig    atenoloL (TENORMIN) 25 mg tablet Take 25 mg by mouth daily.  ubidecarenone (coenzyme Q10) 100 mg tab Take 100 mg by mouth daily.  potassium chloride (K-DUR, KLOR-CON) 20 mEq tablet Take 1 Tablet by mouth two (2) times a day.  pravastatin (PRAVACHOL) 20 mg tablet Take 1 Tablet by mouth nightly.  losartan (COZAAR) 25 mg tablet Take 1 Tablet by mouth daily.  furosemide (LASIX) 20 mg tablet Take 1 Tablet by mouth daily.  omeprazole (PRILOSEC) 20 mg capsule Take 20 mg by mouth two (2) times a day. Hasnt started yet    fluconazole (DIFLUCAN) 150 mg tablet FDA advises cautious prescribing of oral fluconazole in pregnancy. Take one now and may repat in 7-10days.  Blood-Glucose Meter (TRUE METRIX AIR GLUCOSE METER) misc Check blood sugar twice daily.  glucose blood VI test strips (TRUE METRIX GLUCOSE TEST STRIP) strip Check blood sugar twice daily.  lancets (ULTRA THIN LANCETS) 30 gauge misc Check blood sugar twice daily.     diclofenac (VOLTAREN) 1 % gel Apply  to affected area four (4) times daily.  CALCIUM PO Take  by mouth.  fluticasone (FLONASE) 50 mcg/actuation nasal spray Si spray in both nostril twice daily    cholecalciferol (VITAMIN D3) 1,000 unit tablet Take 2,000 Units by mouth daily.  aspirin 81 mg Tab Take 81 mg by mouth daily.  MULTIVITAMINS (MULTI-VITAMIN PO) Take 1 Tab by mouth daily. No current facility-administered medications for this visit. Allergies and Sensitivities:  Allergies   Allergen Reactions    Latex Rash    Nexium [Esomeprazole Magnesium] Swelling and Other (comments)     Lips Swollen, and facial rash and swelling    Dexilant [Dexlansoprazole] Other (comments)     Stomach swelling    Crestor [Rosuvastatin] Other (comments)     Upper respiratory illness    Lipitor [Atorvastatin] Swelling    Lisinopril Unknown (comments)     Patient can't recall    Niaspan [Niacin] Other (comments)     Scratchy throat, \"asthma\" like symptoms    Pcn [Penicillins] Hives    Sulfa (Sulfonamide Antibiotics) Rash    Zocor [Simvastatin] Myalgia and Other (comments)     Per patient chart \"(? Rash)\"         Family History:  Family History   Problem Relation Age of Onset    Cancer Mother         colon cancer    Colon Cancer Mother     Hypertension Mother     Diabetes Mother     Heart Disease Mother     Coronary Art Dis Mother     Hypertension Father     Diabetes Father     Heart Disease Father     Coronary Art Dis Father     Hypertension Sister     Diabetes Sister     Heart Disease Sister     Coronary Art Dis Sister     Hypertension Brother     Diabetes Brother     Heart Disease Brother     Coronary Art Dis Brother        Social History:  She  reports that she quit smoking about 47 years ago. She has never used smokeless tobacco.  She  reports current alcohol use.       Physical:  Visit Vitals  BP (!) 140/90   Pulse 63   Ht 5' 3\" (1.6 m)   Wt 108.8 kg (239 lb 12.8 oz)   SpO2 98%   BMI 42.48 kg/m²       Her weight is down 1 pound since her last visit    Exam:  Neck:  Supple, no JVD, no carotid bruits  CV:  Normal S1 and  S2, no murmurs, rubs, or gallops noted  Lungs:  Clear to ausculation throughout, no wheezes or rales  Abd:  Soft, non-tender, obese with good bowel sounds. No hepatosplenomegaly  Extremities:  Trace lower extremity edema around her ankles      Data:  EKG:        LABS:  Lab Results   Component Value Date/Time    Sodium 140 02/27/2021 02:14 PM    Potassium 3.9 02/27/2021 02:14 PM    Chloride 105 02/27/2021 02:14 PM    CO2 31 02/27/2021 02:14 PM    Glucose 91 02/27/2021 02:14 PM    BUN 10 02/27/2021 02:14 PM    Creatinine 0.75 02/27/2021 02:14 PM     Lab Results   Component Value Date/Time    Cholesterol, total 209 (H) 06/12/2019 11:42 AM    HDL Cholesterol 59 06/12/2019 11:42 AM    LDL, calculated 134 (H) 06/12/2019 11:42 AM    Triglyceride 81 06/12/2019 11:42 AM    CHOL/HDL Ratio 2.7 08/07/2018 10:05 AM     Lab Results   Component Value Date/Time    ALT (SGPT) 14 08/30/2019 10:34 AM         Impression/Plan:  1. Essential hypertension, blood pressure elevated  2. Pulmonary hypertension  3. Hyperlipidemia, now on pravastatin 20mg and continues to tolerate it well  4. Diabetes mellitus, recommend Hgb A1c less than 7% from cardiac standpoint  5. Sleep apnea, uses CPAP    Ms. Rosy Vargas was seen today for evaluation of complaints of palpitations. She states that she has noticed it a few times in the morning when she awakens. She describes it as a \"fluttering\" sensation. She felt it occurring while in the office and at the time that her EKG was being obtained. Her EKG shows NSR without any ectopy. I also auscultated at the same time she noticed the fluttering and her heart rate was well controlled with no ectopics noted. I offered another event monitor but she declined and stated that she will call and let us know if she notices it occurring more often.   She states that she sometimes notices the fluttering sensation when she has \"gas\" and when she takes something for it, it goes away. She is concerned that she may be having trouble with her hiatal hernia and she is trying to get an appointment with her gastroenterologist.     She denies chest pain, pressure, tightness, heaviness, shortness of breath, orthopnea, PND. She has minimal edema around her ankles. Her blood pressure is elevated today. She states that she has not been able to tolerate higher doses of medications. In the past, she took losartan 50mg and she complained of dizziness and pain in her side so it was decreased to 25mg once a day and she has done well with this dose. She is willing to try losartan 25mg BID to help improve her blood pressure. Hopefully, she will be able to tolerate the split dose of 25mg BID. She will call us to let us know if she has any problems with the increased dose. Her blood pressure will be re-evaluated when she sees Dr. Tani Sepulveda in February 2022. She brought a copy of her labs done in early November 2021 at [place]. Her lipid panel was included and it showed a total cholesterol of 193, triglycerides 77, HDL 64, and LDL of 115. Her CMP was within normal limits. She is taking pravastatin 20mg along with Co-Q 10 and she has done well with the statin. I recommended increasing the pravastatin to 40mg daily but she would like to try modifying her diet before we increase the dose. She has had problems with other statin drugs in the past.     She will follow-up with Dr. Samira Tejeda as scheduled and as needed. Purvi Chaves MSN, FNP-BC    Please note:  Portions of this chart were created with Dragon medical speech to text program.  Unrecognized errors may be present.

## 2022-02-21 NOTE — PROGRESS NOTES
Ashley Joseph presents today for   Chief Complaint   Patient presents with    New Patient     establishing care today    Arm Pain     Left arm/shoulder pain x 1 year           1. \"Have you been to the ER, urgent care clinic since your last visit? Hospitalized since your last visit? \" new patient    2. \"Have you seen or consulted any other health care providers outside of the [address] [place] [address] since your last visit? \" yes    3. For patients aged 39-70: Has the patient had a colonoscopy / FIT/ Cologuard? Yes - no Care Gap present      If the patient is female:    4. For patients aged 41-77: Has the patient had a mammogram within the past 2 years? Yes - no Care Gap present  See top three    5. For patients aged 21-65: Has the patient had a pap smear?  NA - based on age or sex

## 2022-02-22 ENCOUNTER — HOSPITAL ENCOUNTER (OUTPATIENT)
Dept: LAB | Age: 77
Discharge: HOME | End: 2022-02-22
Payer: MEDICARE

## 2022-02-22 ENCOUNTER — OFFICE VISIT (OUTPATIENT)
Dept: INTERNAL MEDICINE CLINIC | Age: 77
End: 2022-02-22

## 2022-02-22 VITALS
BODY MASS INDEX: 42.17 KG/M2 | TEMPERATURE: 97.8 F | RESPIRATION RATE: 14 BRPM | WEIGHT: 238 LBS | HEIGHT: 63 IN | DIASTOLIC BLOOD PRESSURE: 73 MMHG | HEART RATE: 63 BPM | SYSTOLIC BLOOD PRESSURE: 129 MMHG | OXYGEN SATURATION: 98 %

## 2022-02-22 DIAGNOSIS — G89.29 CHRONIC LEFT SHOULDER PAIN: ICD-10-CM

## 2022-02-22 DIAGNOSIS — S46.002S OSTEOARTHRITIS OF LEFT SHOULDER DUE TO ROTATOR CUFF INJURY: ICD-10-CM

## 2022-02-22 DIAGNOSIS — E04.1 THYROID NODULE: ICD-10-CM

## 2022-02-22 DIAGNOSIS — I10 ESSENTIAL HYPERTENSION: ICD-10-CM

## 2022-02-22 DIAGNOSIS — M25.512 CHRONIC LEFT SHOULDER PAIN: ICD-10-CM

## 2022-02-22 DIAGNOSIS — G47.30 SLEEP APNEA, UNSPECIFIED TYPE: ICD-10-CM

## 2022-02-22 DIAGNOSIS — E66.01 OBESITY, CLASS III, BMI 40-49.9 (MORBID OBESITY) (HCC): ICD-10-CM

## 2022-02-22 DIAGNOSIS — R73.9 HYPERGLYCEMIA: ICD-10-CM

## 2022-02-22 DIAGNOSIS — E78.5 DYSLIPIDEMIA: ICD-10-CM

## 2022-02-22 DIAGNOSIS — E87.6 HYPOKALEMIA: ICD-10-CM

## 2022-02-22 DIAGNOSIS — I11.9 BENIGN HYPERTENSIVE HEART DISEASE WITHOUT HEART FAILURE: ICD-10-CM

## 2022-02-22 DIAGNOSIS — K21.9 GASTROESOPHAGEAL REFLUX DISEASE, UNSPECIFIED WHETHER ESOPHAGITIS PRESENT: ICD-10-CM

## 2022-02-22 DIAGNOSIS — E04.1 THYROID NODULE: Primary | ICD-10-CM

## 2022-02-22 DIAGNOSIS — M19.112 OSTEOARTHRITIS OF LEFT SHOULDER DUE TO ROTATOR CUFF INJURY: ICD-10-CM

## 2022-02-22 LAB
ALBUMIN SERPL-MCNC: 3.7 G/DL (ref 3.4–5)
ALBUMIN/GLOB SERPL: 0.9 {RATIO} (ref 0.8–1.7)
ALP SERPL-CCNC: 82 U/L (ref 45–117)
ALT SERPL-CCNC: 16 U/L (ref 13–56)
ANION GAP SERPL CALC-SCNC: 4 MMOL/L (ref 3–18)
AST SERPL-CCNC: 20 U/L (ref 10–38)
BASOPHILS # BLD: 0 K/UL (ref 0–0.1)
BASOPHILS NFR BLD: 0 % (ref 0–2)
BILIRUB SERPL-MCNC: 0.6 MG/DL (ref 0.2–1)
BUN SERPL-MCNC: 14 MG/DL (ref 7–18)
BUN/CREAT SERPL: 18 (ref 12–20)
CALCIUM SERPL-MCNC: 9.5 MG/DL (ref 8.5–10.1)
CHLORIDE SERPL-SCNC: 104 MMOL/L (ref 100–111)
CHOLEST SERPL-MCNC: 247 MG/DL
CO2 SERPL-SCNC: 32 MMOL/L (ref 21–32)
CREAT SERPL-MCNC: 0.79 MG/DL (ref 0.6–1.3)
CREAT UR-MCNC: 206 MG/DL (ref 30–125)
CRP SERPL-MCNC: 0.3 MG/DL (ref 0–0.3)
DIFFERENTIAL METHOD BLD: NORMAL
EOSINOPHIL # BLD: 0.3 K/UL (ref 0–0.4)
EOSINOPHIL NFR BLD: 3 % (ref 0–5)
ERYTHROCYTE [DISTWIDTH] IN BLOOD BY AUTOMATED COUNT: 13.9 % (ref 11.6–14.5)
EST. AVERAGE GLUCOSE BLD GHB EST-MCNC: 120 MG/DL
GLOBULIN SER CALC-MCNC: 3.9 G/DL (ref 2–4)
GLUCOSE SERPL-MCNC: 87 MG/DL (ref 74–99)
HBA1C MFR BLD: 5.8 % (ref 4.2–5.6)
HCT VFR BLD AUTO: 42 % (ref 35–45)
HDLC SERPL-MCNC: 76 MG/DL (ref 40–60)
HDLC SERPL: 3.3 {RATIO} (ref 0–5)
HGB BLD-MCNC: 13.1 G/DL (ref 12–16)
IMM GRANULOCYTES # BLD AUTO: 0 K/UL (ref 0–0.04)
IMM GRANULOCYTES NFR BLD AUTO: 0 % (ref 0–0.5)
LDLC SERPL CALC-MCNC: 146.6 MG/DL (ref 0–100)
LIPID PROFILE,FLP: ABNORMAL
LYMPHOCYTES # BLD: 2.9 K/UL (ref 0.9–3.6)
LYMPHOCYTES NFR BLD: 36 % (ref 21–52)
MCH RBC QN AUTO: 28.7 PG (ref 24–34)
MCHC RBC AUTO-ENTMCNC: 31.2 G/DL (ref 31–37)
MCV RBC AUTO: 92.1 FL (ref 78–100)
MICROALBUMIN UR-MCNC: 1.04 MG/DL (ref 0–3)
MICROALBUMIN/CREAT UR-RTO: 5 MG/G (ref 0–30)
MONOCYTES # BLD: 0.6 K/UL (ref 0.05–1.2)
MONOCYTES NFR BLD: 7 % (ref 3–10)
NEUTS SEG # BLD: 4.3 K/UL (ref 1.8–8)
NEUTS SEG NFR BLD: 53 % (ref 40–73)
NRBC # BLD: 0 K/UL (ref 0–0.01)
NRBC BLD-RTO: 0 PER 100 WBC
PLATELET # BLD AUTO: 235 K/UL (ref 135–420)
PMV BLD AUTO: 11.2 FL (ref 9.2–11.8)
POTASSIUM SERPL-SCNC: 4.3 MMOL/L (ref 3.5–5.5)
PROT SERPL-MCNC: 7.6 G/DL (ref 6.4–8.2)
RBC # BLD AUTO: 4.56 M/UL (ref 4.2–5.3)
SODIUM SERPL-SCNC: 140 MMOL/L (ref 136–145)
T4 FREE SERPL-MCNC: 1 NG/DL (ref 0.7–1.5)
TRIGL SERPL-MCNC: 122 MG/DL (ref ?–150)
TSH SERPL DL<=0.05 MIU/L-ACNC: 1.6 UIU/ML (ref 0.36–3.74)
URATE SERPL-MCNC: 5 MG/DL (ref 2.6–7.2)
VLDLC SERPL CALC-MCNC: 24.4 MG/DL
WBC # BLD AUTO: 8.1 K/UL (ref 4.6–13.2)

## 2022-02-22 PROCEDURE — 1090F PRES/ABSN URINE INCON ASSESS: CPT | Performed by: INTERNAL MEDICINE

## 2022-02-22 PROCEDURE — G8427 DOCREV CUR MEDS BY ELIG CLIN: HCPCS | Performed by: INTERNAL MEDICINE

## 2022-02-22 PROCEDURE — 84550 ASSAY OF BLOOD/URIC ACID: CPT

## 2022-02-22 PROCEDURE — G8752 SYS BP LESS 140: HCPCS | Performed by: INTERNAL MEDICINE

## 2022-02-22 PROCEDURE — 86235 NUCLEAR ANTIGEN ANTIBODY: CPT

## 2022-02-22 PROCEDURE — 1101F PT FALLS ASSESS-DOCD LE1/YR: CPT | Performed by: INTERNAL MEDICINE

## 2022-02-22 PROCEDURE — G8510 SCR DEP NEG, NO PLAN REQD: HCPCS | Performed by: INTERNAL MEDICINE

## 2022-02-22 PROCEDURE — G8417 CALC BMI ABV UP PARAM F/U: HCPCS | Performed by: INTERNAL MEDICINE

## 2022-02-22 PROCEDURE — G8754 DIAS BP LESS 90: HCPCS | Performed by: INTERNAL MEDICINE

## 2022-02-22 PROCEDURE — 82043 UR ALBUMIN QUANTITATIVE: CPT

## 2022-02-22 PROCEDURE — 85025 COMPLETE CBC W/AUTO DIFF WBC: CPT

## 2022-02-22 PROCEDURE — 83036 HEMOGLOBIN GLYCOSYLATED A1C: CPT

## 2022-02-22 PROCEDURE — G8399 PT W/DXA RESULTS DOCUMENT: HCPCS | Performed by: INTERNAL MEDICINE

## 2022-02-22 PROCEDURE — 99204 OFFICE O/P NEW MOD 45 MIN: CPT | Performed by: INTERNAL MEDICINE

## 2022-02-22 PROCEDURE — 36415 COLL VENOUS BLD VENIPUNCTURE: CPT

## 2022-02-22 PROCEDURE — 83520 IMMUNOASSAY QUANT NOS NONAB: CPT

## 2022-02-22 PROCEDURE — 80053 COMPREHEN METABOLIC PANEL: CPT

## 2022-02-22 PROCEDURE — 84439 ASSAY OF FREE THYROXINE: CPT

## 2022-02-22 PROCEDURE — 86140 C-REACTIVE PROTEIN: CPT

## 2022-02-22 PROCEDURE — 80061 LIPID PANEL: CPT

## 2022-02-22 PROCEDURE — G8536 NO DOC ELDER MAL SCRN: HCPCS | Performed by: INTERNAL MEDICINE

## 2022-02-22 RX ORDER — LOSARTAN POTASSIUM 25 MG/1
25 TABLET ORAL 2 TIMES DAILY
Qty: 180 TABLET | Refills: 2 | Status: SHIPPED | OUTPATIENT
Start: 2022-02-22 | End: 2022-09-29 | Stop reason: SDUPTHER

## 2022-02-22 RX ORDER — FUROSEMIDE 20 MG/1
20 TABLET ORAL DAILY
Qty: 90 TABLET | Refills: 2 | Status: SHIPPED | OUTPATIENT
Start: 2022-02-22 | End: 2022-09-29

## 2022-02-22 RX ORDER — PANTOPRAZOLE SODIUM 40 MG/1
40 TABLET, DELAYED RELEASE ORAL 2 TIMES DAILY
COMMUNITY
Start: 2022-02-09 | End: 2022-02-22 | Stop reason: SDUPTHER

## 2022-02-22 RX ORDER — ATENOLOL 25 MG/1
25 TABLET ORAL DAILY
Qty: 90 TABLET | Refills: 2 | Status: SHIPPED | OUTPATIENT
Start: 2022-02-22 | End: 2022-05-23

## 2022-02-22 RX ORDER — PANTOPRAZOLE SODIUM 40 MG/1
40 TABLET, DELAYED RELEASE ORAL 2 TIMES DAILY
Qty: 180 TABLET | Refills: 2 | Status: SHIPPED | OUTPATIENT
Start: 2022-02-22

## 2022-02-22 RX ORDER — POTASSIUM CHLORIDE 20 MEQ/1
20 TABLET, EXTENDED RELEASE ORAL 2 TIMES DAILY
Qty: 180 TABLET | Refills: 2 | Status: SHIPPED | OUTPATIENT
Start: 2022-02-22 | End: 2022-09-29 | Stop reason: SDUPTHER

## 2022-02-22 NOTE — PATIENT INSTRUCTIONS
Eating Healthy Foods: Care Instructions  Your Care Instructions     Eating healthy foods can help lower your risk for disease. Healthy food gives you energy and keeps your heart strong, your brain active, your muscles working, and your bones strong. A healthy diet includes a variety of foods from the basic food groups: grains, vegetables, fruits, milk and milk products, and meat and beans. Some people may eat more of their favorite foods from only one food group and, as a result, miss getting the nutrients they need. So, it is important to pay attention not only to what you eat but also to what you are missing from your diet. You can eat a healthy, balanced diet by making a few small changes. Follow-up care is a key part of your treatment and safety. Be sure to make and go to all appointments, and call your doctor if you are having problems. It's also a good idea to know your test results and keep a list of the medicines you take. How can you care for yourself at home? Look at what you eat  · Keep a food diary for a week or two and record everything you eat or drink. Track the number of servings you eat from each food group. · For a balanced diet every day, eat a variety of:  ? 6 or more ounce-equivalents of grains, such as cereals, breads, crackers, rice, or pasta, every day. An ounce-equivalent is 1 slice of bread, 1 cup of ready-to-eat cereal, or ½ cup of cooked rice, cooked pasta, or cooked cereal.  ? 2½ cups of vegetables, especially:  § Dark-green vegetables such as broccoli and spinach. § Orange vegetables such as carrots and sweet potatoes. § Dry beans (such as porras and kidney beans) and peas (such as lentils). ? 2 cups of fresh, frozen, or canned fruit. A small apple or 1 banana or orange equals 1 cup. ? 3 cups of nonfat or low-fat milk, yogurt, or other milk products. ? 5½ ounces of meat and beans, such as chicken, fish, lean meat, beans, nuts, and seeds.  One egg, 1 tablespoon of peanut butter, ½ ounce nuts or seeds, or ¼ cup of cooked beans equals 1 ounce of meat. · Learn how to read food labels for serving sizes and ingredients. Fast-food and convenience-food meals often contain few or no fruits or vegetables. Make sure you eat some fruits and vegetables to make the meal more nutritious. · Look at your food diary. For each food group, add up what you have eaten and then divide the total by the number of days. This will give you an idea of how much you are eating from each food group. See if you can find some ways to change your diet to make it more healthy. Start small  · Do not try to make dramatic changes to your diet all at once. You might feel that you are missing out on your favorite foods and then be more likely to fail. · Start slowly, and gradually change your habits. Try some of the following:  ? Use whole wheat bread instead of white bread. ? Use nonfat or low-fat milk instead of whole milk. ? Eat brown rice instead of white rice, and eat whole wheat pasta instead of white-flour pasta. ? Try low-fat cheeses and low-fat yogurt. ? Add more fruits and vegetables to meals and have them for snacks. ? Add lettuce, tomato, cucumber, and onion to sandwiches. ? Add fruit to yogurt and cereal.  Enjoy food  · You can still eat your favorite foods. You just may need to eat less of them. If your favorite foods are high in fat, salt, and sugar, limit how often you eat them, but do not cut them out entirely. · Eat a wide variety of foods. Make healthy choices when eating out  · The type of restaurant you choose can help you make healthy choices. Even fast-food chains are now offering more low-fat or healthier choices on the menu. · Choose smaller portions, or take half of your meal home. · When eating out, try:  ? A veggie pizza with a whole wheat crust or grilled chicken (instead of sausage or pepperoni).   ? Pasta with roasted vegetables, grilled chicken, or marinara sauce instead of cream sauce. ? A vegetable wrap or grilled chicken wrap. ? Broiled or poached food instead of fried or breaded items. Make healthy choices easy  · Buy packaged, prewashed, ready-to-eat fresh vegetables and fruits, such as baby carrots, salad mixes, and chopped or shredded broccoli and cauliflower. · Buy packaged, presliced fruits, such as melon or pineapple. · Choose 100% fruit or vegetable juice instead of soda. Limit juice intake to 4 to 6 oz (½ to ¾ cup) a day. · Blend low-fat yogurt, fruit juice, and canned or frozen fruit to make a smoothie for breakfast or a snack. Where can you learn more? Go to http://www.segundo.com/  Enter T756 in the search box to learn more about \"Eating Healthy Foods: Care Instructions. \"  Current as of: December 17, 2020               Content Version: 13.0  © 2006-2021 [employer]. Care instructions adapted under license by [employer] (which disclaims liability or warranty for this information). If you have questions about a medical condition or this instruction, always ask your healthcare professional. [place] [address] any warranty or liability for your use of this information.

## 2022-02-22 NOTE — PROGRESS NOTES
INTERNISTS OF [facility]:  2/22/2022, MRN: [id number]      Vladimir Schuler is a 76 y.o. female and presents to clinic as a  New Patient (establishing care today) and Arm Pain (Left arm/shoulder pain x 1 year)      Subjective: The patient is a 64-year-old female with history of obesity, thyroid nodules, GERD, lumbar disc disease, lumbar facet arthropathy, right heel spur, colon polyps, pulmonary nodule per EHR, gout, hiatal hernia, pseudogout per EHR, renal cyst, hypertension, and hyperlipidemia. 1. Gout: Last flare up: earlier this month after eating shrimp. She is not on prophylactic medication. She occasionally gets gout along her left foot. Triggers: seafood. She gets flare ups once or twice a year. She had a tear in her right shoulder in the past and had surgery. 12/4/15 Right Shoulder CT: Advanced degenerative changes of sternoclavicular joint and small erosions suggesting inflammatory arthropathy, less likely infectious arthropathy, recommend clinical correlation. Chronic full-thickness rotator cuff rupture of the supraspinatus, infraspinatus and subscapularis with muscular atrophy. Mild to moderate glenohumeral degenerative changes with superior and anterior subluxation of the humeral head. Widening of the acromioclavicular joint and mild joint subluxation, query history of prior distal clavicular resection versus acromioclavicular joint separation. 2. GERD: Taking Protonix. +H/o hiatal hernia. She has a history of gastritis, negative for H. pylori. 3. HTN: Blood pressure is 129/73. She takes atenolol and losartan along with potassium and Lasix (she takes it once a day and a second dose prn). No adverse effects except for diuresis from the lasix. She sees [facility]. She feels unsteady on her feet. 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 4. HLD: Taking pravastatin. No adverse effects. Her LDL in November was 115. Total cholesterol is 193. Triglycerides are 77.   HDL is 64.  She has a copy of her results with her today. 5. Prediabetes: Her most recent labs from November show that her A1c was 6.    6. LUE Pain: Present for 1 year. She had an x-ray. We do not have records. Pain is worse with lying down on her left side. No relief with rubbing this area. She cannot take NSAIDs due to her h/o GERD. No relief with tylenol. She cannot lift her arm above her head. She never tried PT and was never referred to Orthopedics for her arm sx.     7. Thyroid Nodules: Her TFTs from November are unremarkable. 5/17/19 Thyroid Ultrasound: Multicystic thyroid gland. Many of the nodules are stable. Nonspecific changes of the medial mid right thyroid cyst and isthmus cyst noted. Continued one year ultrasound follow-up recommended. Patient Active Problem List    Diagnosis Date Noted    Morbid obesity with BMI of 40.0-44.9, adult ([address] 08/28/2018    Need for influenza vaccination 08/28/2018    Encounter for long-term (current) use of medications 08/28/2018    Multiple thyroid nodules 07/02/2018    Polyp of sigmoid colon 05/01/2018    Incidental pulmonary nodule, > 3mm and < 8mm 02/20/2018    Mild peripheral edema 01/03/2018    Obesity, morbid ([address] 12/27/2017    Acute idiopathic gout of right wrist 10/04/2017    Sliding hiatal hernia 10/04/2017    Pseudogout of right shoulder 10/04/2017    Primary osteoarthritis involving multiple joints 10/04/2017    Acquired absence of both cervix and uterus 06/07/2017    Renal cyst 08/29/2014    Sleep apnea/ on c-pap 10/30/2013    Hypertension     Dyslipidemia        Current Outpatient Medications   Medication Sig Dispense Refill    pantoprazole (PROTONIX) 40 mg tablet Take 40 mg by mouth two (2) times a day.  losartan (COZAAR) 25 mg tablet Take 1 Tablet by mouth two (2) times a day. 60 Tablet 6    atenoloL (TENORMIN) 25 mg tablet Take 25 mg by mouth daily.  ubidecarenone (coenzyme Q10) 100 mg tab Take 100 mg by mouth daily. 1 Tablet 0    potassium chloride (K-DUR, KLOR-CON) 20 mEq tablet Take 1 Tablet by mouth two (2) times a day. 1 Tablet 0    pravastatin (PRAVACHOL) 20 mg tablet Take 1 Tablet by mouth nightly. 30 Tablet 4    furosemide (LASIX) 20 mg tablet Take 1 Tablet by mouth daily. 90 Tablet 3    Blood-Glucose Meter (TRUE METRIX AIR GLUCOSE METER) misc Check blood sugar twice daily. 1 Each 0    glucose blood VI test strips (TRUE METRIX GLUCOSE TEST STRIP) strip Check blood sugar twice daily. 100 Strip 2    lancets (ULTRA THIN LANCETS) 30 gauge misc Check blood sugar twice daily. 1 Package 2    diclofenac (VOLTAREN) 1 % gel Apply  to affected area four (4) times daily. 100 g 1    CALCIUM PO Take  by mouth.  fluticasone (FLONASE) 50 mcg/actuation nasal spray Si spray in both nostril twice daily 1 Bottle 1    cholecalciferol (VITAMIN D3) 1,000 unit tablet Take 2,000 Units by mouth daily.  aspirin 81 mg Tab Take 81 mg by mouth daily.  MULTIVITAMINS (MULTI-VITAMIN PO) Take 1 Tab by mouth daily.  pitavastatin calcium (Livalo) 2 mg tablet Take 1 Tablet by mouth daily. (Patient not taking: Reported on 2022) 90 Tablet 3    omeprazole (PRILOSEC) 20 mg capsule Take 20 mg by mouth two (2) times a day. Hasnt started yet  0    fluconazole (DIFLUCAN) 150 mg tablet FDA advises cautious prescribing of oral fluconazole in pregnancy. Take one now and may repat in 7-10days.  (Patient not taking: Reported on 2022) 1 Tab 1       Allergies   Allergen Reactions    Latex Rash    Nexium [Esomeprazole Magnesium] Swelling and Other (comments)     Lips Swollen, and facial rash and swelling    Dexilant [Dexlansoprazole] Other (comments)     Stomach swelling    Crestor [Rosuvastatin] Other (comments)     Upper respiratory illness    Lipitor [Atorvastatin] Swelling    Lisinopril Unknown (comments)     Patient can't recall    Niaspan [Niacin] Other (comments)     Scratchy throat, \"asthma\" like symptoms    Pcn [Penicillins] Hives    Sulfa (Sulfonamide Antibiotics) Rash    Zocor [Simvastatin] Myalgia and Other (comments)     Per patient chart \"(? Rash)\"       Past Medical History:   Diagnosis Date    Atrophic vaginitis     Cardiac cath 2012    Patent coronary arteries. LVEDP 20.  RA 11.  RV 42/10. PA 42/21. W 18.  CO 6.4 (TD), 6.4 ([place] [place]). PVR 1.7 Wood units. False-positive nuclear study.     Diabetes     diet controlled, hypoglycemia from metformin previously     Dyslipidemia     Fatty liver     Fibrocystic breast disease     GERD     Gout     H/O colonoscopy 13    polyp    Hypertension     Macular degeneration     Morbid obesity ([address]     Obstructive sleep apnea     Peripheral neuropathy     Rectocele     Thyroid cyst     bilat       Past Surgical History:   Procedure Laterality Date    [facility] ARTERIAL ARTERIOTOMY 3M  2012    HX COLONOSCOPY  2013    HX COLONOSCOPY  2018    HX HEART CATHETERIZATION  2012    HX HERNIA REPAIR      umbilical as a child    HX HYSTERECTOMY      52ys ago, QUIQUE, BSO    HX MOHS PROCEDURES      right    VT ANESTH,SURGERY OF SHOULDER         Family History   Problem Relation Age of Onset    Cancer Mother         colon cancer    Colon Cancer Mother     Hypertension Mother     Diabetes Mother     Heart Disease Mother     Coronary Art Dis Mother     Hypertension Father     Diabetes Father     Heart Disease Father     Coronary Art Dis Father     Hypertension Sister     Diabetes Sister     Heart Disease Sister     Coronary Art Dis Sister     Hypertension Brother     Diabetes Brother     Heart Disease Brother     Coronary Art Dis Brother        Social History     Tobacco Use    Smoking status: Former Smoker     Quit date: 1974     Years since quittin.6    Smokeless tobacco: Never Used    Tobacco comment: 1 pack every 2 weeks x 1 year, stopped 45yrs ago   Substance Use Topics    Alcohol use: Yes       ROS Review of Systems   Constitutional: Negative for chills and fever. HENT: Negative for ear pain and sore throat. Eyes: Negative for blurred vision and pain. Respiratory: Negative for cough and shortness of breath. Cardiovascular: Negative for chest pain. Gastrointestinal: Negative for abdominal pain, blood in stool and melena. Genitourinary: Negative for dysuria and hematuria. Musculoskeletal: Positive for joint pain. Negative for myalgias. Skin: Negative for rash. Neurological: Negative for headaches. Endo/Heme/Allergies: Does not bruise/bleed easily. Psychiatric/Behavioral: Negative for substance abuse. Objective     Vitals:    02/22/22 1019   BP: 129/73   Pulse: 63   Resp: 14   Temp: 97.8 °F (36.6 °C)   TempSrc: Temporal   SpO2: 98%   Weight: 238 lb (108 kg)   Height: 5' 3\" (1.6 m)   PainSc:   7   PainLoc: Arm       Physical Exam  Vitals and nursing note reviewed. HENT:      Head: Normocephalic and atraumatic. Right Ear: External ear normal.      Left Ear: External ear normal.   Eyes:      General: No scleral icterus. Right eye: No discharge. Left eye: No discharge. Conjunctiva/sclera: Conjunctivae normal.   Cardiovascular:      Rate and Rhythm: Normal rate and regular rhythm. Heart sounds: Normal heart sounds. No murmur heard. No friction rub. No gallop. Pulmonary:      Effort: Pulmonary effort is normal. No respiratory distress. Breath sounds: Normal breath sounds. No wheezing or rales. Abdominal:      General: Bowel sounds are normal. There is no distension. Palpations: Abdomen is soft. There is no mass. Tenderness: There is no abdominal tenderness. There is no guarding or rebound. Musculoskeletal:         General: No swelling (BUE) or tenderness (BUE except along her left shoulder area. ). Cervical back: Neck supple.       Comments: She has decreased ROM along her left shoulder 2/2 pain   Lymphadenopathy:      Cervical: No cervical adenopathy. Skin:     General: Skin is warm and dry. Findings: No erythema or rash. Neurological:      Mental Status: She is alert. Motor: No abnormal muscle tone. Gait: Gait normal.   Psychiatric:         Mood and Affect: Mood normal.         LABS   Data Review:   Lab Results   Component Value Date/Time    WBC 6.8 02/27/2021 02:14 PM    Hemoglobin, POC 12.6 04/12/2013 09:46 AM    HGB 12.8 02/27/2021 02:14 PM    Hematocrit, POC 37 04/12/2013 09:46 AM    HCT 40.0 02/27/2021 02:14 PM    PLATELET 713 59/49/4421 02:14 PM    MCV 90.1 02/27/2021 02:14 PM       Lab Results   Component Value Date/Time    Sodium 140 02/27/2021 02:14 PM    Potassium 3.9 02/27/2021 02:14 PM    Chloride 105 02/27/2021 02:14 PM    CO2 31 02/27/2021 02:14 PM    Anion gap 4 02/27/2021 02:14 PM    Glucose 91 02/27/2021 02:14 PM    BUN 10 02/27/2021 02:14 PM    Creatinine 0.75 02/27/2021 02:14 PM    BUN/Creatinine ratio 13 02/27/2021 02:14 PM    GFR est AA >60 02/27/2021 02:14 PM    GFR est non-AA >60 02/27/2021 02:14 PM    Calcium 8.9 02/27/2021 02:14 PM       Lab Results   Component Value Date/Time    Cholesterol, total 209 (H) 06/12/2019 11:42 AM    HDL Cholesterol 59 06/12/2019 11:42 AM    LDL, calculated 134 (H) 06/12/2019 11:42 AM    VLDL, calculated 16 06/12/2019 11:42 AM    Triglyceride 81 06/12/2019 11:42 AM    CHOL/HDL Ratio 2.7 08/07/2018 10:05 AM       Lab Results   Component Value Date/Time    Hemoglobin A1c 5.7 (H) 02/26/2019 09:37 AM    Hemoglobin A1c (POC) 6.1 06/23/2017 11:53 AM    Hemoglobin A1c, External 6.0 11/06/2021 12:00 AM       Assessment/Plan:   1. Thyroid nodule hx:   -Ordering a thyroid ultrasound.  -We will check labs. ORDERS:  - US THYROID/PARATHYROID/SOFT TISS; Future  - METABOLIC PANEL, COMPREHENSIVE; Future  - CBC WITH AUTOMATED DIFF; Future  - TSH AND FREE T4; Future    2. Chronic left shoulder pain: From a rotator cuff injury per PE findings  -Ordering labs.   -We will place a referral to Orthopedics. -Requesting records from her previous PCP    ORDERS:  - URIC ACID; Future  - C REACTIVE PROTEIN, QT; Future  - TOM COMPREHENSIVE PANEL; Future  - RHEUMASSURE; Future  - REFERRAL TO ORTHOPEDICS    3. Hypertension: BP is stable. +H/o prediabetes. +Obesity.   -Continue with medication as prescribed. Refilling her meds  -I briefly encouraged her to maintain a low-carb diet  -We will check labs. ORDERS:  - HEMOGLOBIN A1C WITH EAG; Future  - LIPID PANEL; Future  - METABOLIC PANEL, COMPREHENSIVE; Future  - MICROALBUMIN, UR, RAND W/ MICROALB/CREAT RATIO; Future  - TSH AND FREE T4; Future  - potassium chloride (K-DUR, KLOR-CON M20) 20 mEq tablet; Take 1 Tablet by mouth two (2) times a day. Dispense: 180 Tablet; Refill: 2  - furosemide (LASIX) 20 mg tablet; Take 1 Tablet by mouth daily. Dispense: 90 Tablet; Refill: 2    4. GERD:   - Refilling her PPI    5. Gout:   - Checking labs  - F/u with me in 6-8 wks. 6. Health Maintenance:  - I will do a MWV at her upcoming apt if time permits. Health Maintenance Due   Topic Date Due    Hepatitis C Screening  Never done    COVID-19 Vaccine (1) 02/10/1950    Shingrix Vaccine Age 50> (1 of 2) Never done    Pneumococcal 65+ years (1 of 1 - PPSV23) 01/01/2013    Medicare Yearly Exam  12/08/2019    Eye Exam Retinal or Dilated  08/01/2020     Lab review: labs are reviewed in the EHR    I have discussed the diagnosis with the patient and the intended plan as seen in the above orders. The patient has received an after-visit summary and questions were answered concerning future plans. I have discussed medication side effects and warnings with the patient as well. I have reviewed the plan of care with the patient, accepted their input and they are in agreement with the treatment goals. All questions were answered. The patient understands the plan of care. Handouts provided today with above information.  Pt instructed if symptoms worsen to call the office or report to the ED for continued care. Greater than 50% of the visit time was spent in counseling and/or coordination of care. Voice recognition was used to generate this report, which may have resulted in some phonetic based errors in grammar and contents. Even though attempts were made to correct all the mistakes, some may have been missed, and remained in the body of the document.           Basil Marina MD

## 2022-02-23 PROBLEM — E66.01 OBESITY, MORBID (HCC): Status: RESOLVED | Noted: 2017-12-27 | Resolved: 2022-02-23

## 2022-02-23 PROBLEM — Z90.710 ACQUIRED ABSENCE OF BOTH CERVIX AND UTERUS: Status: RESOLVED | Noted: 2017-06-07 | Resolved: 2022-02-23

## 2022-02-23 PROBLEM — M1A.0790 CHRONIC GOUT OF FOOT: Status: ACTIVE | Noted: 2017-10-04

## 2022-02-23 PROBLEM — Z79.899 ENCOUNTER FOR LONG-TERM (CURRENT) USE OF MEDICATIONS: Status: RESOLVED | Noted: 2018-08-28 | Resolved: 2022-02-23

## 2022-02-23 PROBLEM — Z23 NEED FOR INFLUENZA VACCINATION: Status: RESOLVED | Noted: 2018-08-28 | Resolved: 2022-02-23

## 2022-02-23 PROBLEM — E04.2 MULTIPLE THYROID NODULES: Status: RESOLVED | Noted: 2018-07-02 | Resolved: 2022-02-23

## 2022-02-23 PROBLEM — K21.9 GERD (GASTROESOPHAGEAL REFLUX DISEASE): Status: ACTIVE | Noted: 2022-02-23

## 2022-02-23 PROBLEM — R60.0 MILD PERIPHERAL EDEMA: Status: RESOLVED | Noted: 2018-01-03 | Resolved: 2022-02-23

## 2022-02-23 LAB
CENTROMERE B AB SER-ACNC: 0.3 AI (ref 0–0.9)
CHROMATIN AB SERPL-ACNC: <0.2 AI (ref 0–0.9)
DSDNA AB SER-ACNC: <1 IU/ML (ref 0–9)
ENA JO1 AB SER-ACNC: <0.2 AI (ref 0–0.9)
ENA RNP AB SER-ACNC: <0.2 AI (ref 0–0.9)
ENA SCL70 AB SER-ACNC: <0.2 AI (ref 0–0.9)
ENA SM AB SER-ACNC: <0.2 AI (ref 0–0.9)
ENA SS-A AB SER-ACNC: <0.2 AI (ref 0–0.9)
ENA SS-B AB SER-ACNC: <0.2 AI (ref 0–0.9)
SEE BELOW, 164869: NORMAL

## 2022-02-24 NOTE — PROGRESS NOTES
Please let her know that her CBC and uric acid level are normal.  Her CMP and inflammatory marker (CRP) are normal.  Please let her know that her A1c is 5.8. She has prediabetes. Her thyroid labs are normal.  Her total cholesterol was 247, up from 209 2019. Triglycerides are 122. HDL 76. LDL is 146, up from 134. We will discuss these results more in depth at her upcoming appointment. For now, she should continue maintaining a heart healthy diet. She should reduce her carb intake in order to prevent progression to type 2 diabetes.     Dr. Carmen Hill  [facility], [address] [place] [address], [address] [place] [address] Str.  Phone: [phone]  Fax: [phone]

## 2022-02-24 NOTE — PROGRESS NOTES
Patient contacted, patient identified with two identifiers (Name & ). Patient aware of results per DR. Bowers and verbalizes understanding.

## 2022-03-04 LAB
14.3.3 ETA, RHEUM. ARTHRITIS: 0.36 NG/ML
CCP IGA+IGG SERPL IA-ACNC: <20 UNITS
RHEUMATOID FACT SERPL-ACNC: <14 UNITS/ML

## 2022-03-07 NOTE — PROGRESS NOTES
I will let her know at her upcoming appointment that her CBC and uric acid level are normal.  Her CMP and inflammatory marker (CRP) are normal.  Her A1c is 5.8. She has prediabetes. Her thyroid labs are normal.  Her total cholesterol was 247, up from 209 2019. Triglycerides are 122. HDL 76. LDL is 146, up from 134. We will discuss these results more in depth at her upcoming appointment. For now, she should continue maintaining a heart healthy diet. She should reduce her carb intake in order to prevent progression to type 2 diabetes. One of her rheumatoid arthritis labs is mildly elevated.     Dr. Dave Alonso  [facility], [address] [place] [address], [address] [place], [place] [facility]  Phone: [phone]  Fax: [phone]

## 2022-03-18 ENCOUNTER — OFFICE VISIT (OUTPATIENT)
Dept: ORTHOPEDIC SURGERY | Age: 77
End: 2022-03-18
Payer: MEDICARE

## 2022-03-18 VITALS — BODY MASS INDEX: 41.15 KG/M2 | HEIGHT: 64 IN | TEMPERATURE: 96.4 F | WEIGHT: 241 LBS

## 2022-03-18 DIAGNOSIS — M19.012 PRIMARY OSTEOARTHRITIS OF LEFT SHOULDER: Primary | ICD-10-CM

## 2022-03-18 DIAGNOSIS — M79.605 PAIN OF LEFT LOWER EXTREMITY: ICD-10-CM

## 2022-03-18 DIAGNOSIS — M25.512 LEFT SHOULDER PAIN, UNSPECIFIED CHRONICITY: ICD-10-CM

## 2022-03-18 PROBLEM — M1A.0790 CHRONIC GOUT OF FOOT: Status: ACTIVE | Noted: 2017-10-04

## 2022-03-18 PROBLEM — K21.9 GERD (GASTROESOPHAGEAL REFLUX DISEASE): Status: ACTIVE | Noted: 2022-02-23

## 2022-03-18 PROBLEM — R73.03 PREDIABETES: Status: ACTIVE | Noted: 2018-02-20

## 2022-03-18 PROCEDURE — 73030 X-RAY EXAM OF SHOULDER: CPT | Performed by: ORTHOPAEDIC SURGERY

## 2022-03-18 PROCEDURE — G8756 NO BP MEASURE DOC: HCPCS | Performed by: ORTHOPAEDIC SURGERY

## 2022-03-18 PROCEDURE — G8432 DEP SCR NOT DOC, RNG: HCPCS | Performed by: ORTHOPAEDIC SURGERY

## 2022-03-18 PROCEDURE — 99214 OFFICE O/P EST MOD 30 MIN: CPT | Performed by: ORTHOPAEDIC SURGERY

## 2022-03-18 PROCEDURE — G8427 DOCREV CUR MEDS BY ELIG CLIN: HCPCS | Performed by: ORTHOPAEDIC SURGERY

## 2022-03-18 PROCEDURE — G8417 CALC BMI ABV UP PARAM F/U: HCPCS | Performed by: ORTHOPAEDIC SURGERY

## 2022-03-18 PROCEDURE — G8536 NO DOC ELDER MAL SCRN: HCPCS | Performed by: ORTHOPAEDIC SURGERY

## 2022-03-18 PROCEDURE — 1090F PRES/ABSN URINE INCON ASSESS: CPT | Performed by: ORTHOPAEDIC SURGERY

## 2022-03-18 PROCEDURE — 1101F PT FALLS ASSESS-DOCD LE1/YR: CPT | Performed by: ORTHOPAEDIC SURGERY

## 2022-03-18 PROCEDURE — G8399 PT W/DXA RESULTS DOCUMENT: HCPCS | Performed by: ORTHOPAEDIC SURGERY

## 2022-03-18 RX ORDER — DICLOFENAC SODIUM 10 MG/G
GEL TOPICAL
Qty: 100 G | Refills: 3 | Status: SHIPPED | OUTPATIENT
Start: 2022-03-18

## 2022-03-18 NOTE — PROGRESS NOTES
Patient: Roland Tejeda                MRN: [id number]       SSN: [id number]  YOB: 1945        AGE: 76 y.o. SEX: female  Body mass index is 42.02 kg/m². PCP: Juliet Sanchez MD  03/18/22    CHIEF COMPLAINT: Left shoulder pain 8/10    HPI: Roland Tejeda is a 76 y.o. female patient who presents to the office today with left shoulder pain for over 6 months. She says he may been having pain for up to a year. She notices the pain with use of the arm. She also has pain at night. She notices limited range of motion. She is not had any surgery or other treatment of the left shoulder. She rotates taking Tylenol ibuprofen and Aleve for the pain which does help. Past Medical History:   Diagnosis Date    Atrophic vaginitis     Cardiac cath 05/30/2012    Patent coronary arteries. LVEDP 20.  RA 11.  RV 42/10. PA 42/21. W 18.  CO 6.4 (TD), 6.4 ([facility]). PVR 1.7 Wood units. False-positive nuclear study.     Diabetes     diet controlled, hypoglycemia from metformin previously     Dyslipidemia     Fatty liver     Fibrocystic breast disease     GERD     Gout     H/O colonoscopy 4/12/13    polyp    Hypertension     Macular degeneration     Morbid obesity ([address]     Obstructive sleep apnea     Peripheral neuropathy     Rectocele     Thyroid cyst     bilat       Family History   Problem Relation Age of Onset    Cancer Mother         colon cancer    Colon Cancer Mother     Hypertension Mother     Diabetes Mother     Heart Disease Mother     Coronary Art Dis Mother     Hypertension Father     Diabetes Father     Heart Disease Father     Coronary Art Dis Father     Hypertension Sister     Diabetes Sister     Heart Disease Sister     Coronary Art Dis Sister     Hypertension Brother     Diabetes Brother     Heart Disease Brother     Coronary Art Dis Brother        Current Outpatient Medications   Medication Sig Dispense Refill    pantoprazole (PROTONIX) 40 mg tablet Take 1 Tablet by mouth two (2) times a day. 180 Tablet 2    losartan (COZAAR) 25 mg tablet Take 1 Tablet by mouth two (2) times a day. 180 Tablet 2    atenoloL (TENORMIN) 25 mg tablet Take 1 Tablet by mouth daily. 90 Tablet 2    potassium chloride (K-DUR, KLOR-CON M20) 20 mEq tablet Take 1 Tablet by mouth two (2) times a day. 180 Tablet 2    furosemide (LASIX) 20 mg tablet Take 1 Tablet by mouth daily. 90 Tablet 2    ubidecarenone (coenzyme Q10) 100 mg tab Take 100 mg by mouth daily. 1 Tablet 0    pravastatin (PRAVACHOL) 20 mg tablet Take 1 Tablet by mouth nightly. 30 Tablet 4    Blood-Glucose Meter (TRUE METRIX AIR GLUCOSE METER) misc Check blood sugar twice daily. 1 Each 0    glucose blood VI test strips (TRUE METRIX GLUCOSE TEST STRIP) strip Check blood sugar twice daily. 100 Strip 2    lancets (ULTRA THIN LANCETS) 30 gauge misc Check blood sugar twice daily. 1 Package 2    diclofenac (VOLTAREN) 1 % gel Apply  to affected area four (4) times daily. 100 g 1    CALCIUM PO Take  by mouth.  fluticasone (FLONASE) 50 mcg/actuation nasal spray Si spray in both nostril twice daily 1 Bottle 1    cholecalciferol (VITAMIN D3) 1,000 unit tablet Take 2,000 Units by mouth daily.  aspirin 81 mg Tab Take 81 mg by mouth daily.  MULTIVITAMINS (MULTI-VITAMIN PO) Take 1 Tab by mouth daily.          Allergies   Allergen Reactions    Latex Rash    Nexium [Esomeprazole Magnesium] Swelling and Other (comments)     Lips Swollen, and facial rash and swelling    Dexilant [Dexlansoprazole] Other (comments)     Stomach swelling    Crestor [Rosuvastatin] Other (comments)     Upper respiratory illness    Lipitor [Atorvastatin] Swelling    Lisinopril Unknown (comments)     Patient can't recall    Niaspan [Niacin] Other (comments)     Scratchy throat, \"asthma\" like symptoms    Pcn [Penicillins] Hives    Sulfa (Sulfonamide Antibiotics) Rash    Zocor [Simvastatin] Myalgia and Other (comments) Per patient chart \"(? Rash)\"       Past Surgical History:   Procedure Laterality Date    [facility]    HX COLONOSCOPY  2013    HX COLONOSCOPY  2018    HX HEART CATHETERIZATION  2012    HX HERNIA REPAIR      umbilical as a child    HX HYSTERECTOMY      52ys ago, QUIQUE, BSO    HX MOHS PROCEDURES      right    HI ANESTH,SURGERY OF SHOULDER         Social History     Socioeconomic History    Marital status: LEGALLY      Spouse name: Not on file    Number of children: Not on file    Years of education: Not on file    Highest education level: Not on file   Occupational History    Not on file   Tobacco Use    Smoking status: Former Smoker     Quit date: 1974     Years since quittin.7    Smokeless tobacco: Never Used    Tobacco comment: 1 pack every 2 weeks x 1 year, stopped 45yrs ago   Substance and Sexual Activity    Alcohol use: Yes    Drug use: No    Sexual activity: Yes     Partners: Male   Other Topics Concern    Not on file   Social History Narrative    Not on file     Social Determinants of Health     Financial Resource Strain:     Difficulty of Paying Living Expenses: Not on file   Food Insecurity:     Worried About Running Out of Food in the Last Year: Not on file    Long of Food in the Last Year: Not on file   Transportation Needs:     Lack of Transportation (Medical): Not on file    Lack of Transportation (Non-Medical):  Not on file   Physical Activity:     Days of Exercise per Week: Not on file    Minutes of Exercise per Session: Not on file   Stress:     Feeling of Stress : Not on file   Social Connections:     Frequency of Communication with Friends and Family: Not on file    Frequency of Social Gatherings with Friends and Family: Not on file    Attends [employer] Services: Not on file    Active Member of Clubs or Organizations: Not on file    Attends Club or Organization Meetings: Not on file    Marital Status: Not on file   Intimate Partner Violence:     Fear of Current or Ex-Partner: Not on file    Emotionally Abused: Not on file    Physically Abused: Not on file    Sexually Abused: Not on file   Housing Stability:     Unable to Pay for Housing in the Last Year: Not on file    Number of [place] in the Last Year: Not on file    Unstable Housing in the Last Year: Not on file       REVIEW OF SYSTEMS:    14 point review of systems on the intake form is negative except as noted in the HPI    PHYSICAL EXAMINATION:  Visit Vitals  Temp (!) 96.4 °F (35.8 °C)   Ht 5' 3.5\" (1.613 m)   Wt 241 lb (109.3 kg)   LMP  (LMP Unknown)   BMI 42.02 kg/m²     Body mass index is 42.02 kg/m². GENERAL: Alert and oriented x3, in no acute distress, well-developed, well-nourished. HEENT: Normocephalic, atraumatic. Shoulder Examination     R   L  ROM   FF  Full   90  ER  Full   20   IR  Full   Hip  Rotator Cuff Pain   Supra  -   -   Infra  -   -   Subscap -   -  Crepitus  -   +  Effusion  -   -  Warmth  -   -   Erythema  -   -  Instability  -   -  AC Joint TTP  -   -  Clavicle   Deformity -   -   TTP  -   -  Proximal Humerus   Deformity -   -   TTP  -   -  Deltoid Strength 5   5  Biceps Strength 5   5  Biceps Deformity -   -  Biceps Groove Pain -   -  Impingement Sign -   -       IMAGING:  X-rays 4 views left shoulder were taken in the office today. These x-rays show severe left shoulder glenohumeral osteoarthritis. There is complete joint space collapse. There are osteophytes present throughout the glenohumeral joint. ASSESSMENT & PLAN  Diagnosis: Left shoulder severe glenohumeral osteoarthritis    Glendy Duque has severe left shoulder glenohumeral joint osteoarthritis. We discussed the treatment options for this today at length including and up to surgery. The surgery that I would recommend for this would likely be a reverse shoulder arthroplasty due to her age.   She does have severe arthritis and meets the criteria for surgery but she would like to try nonsurgical management first.  We discussed a corticosteroid injection, oral anti-inflammatories, topical anti-inflammatories, and physical therapy. She would like to try a topical anti-inflammatory which was prescribed. Follow-up in 6 to 8 weeks if her symptoms persist for further evaluation and management. Prescription medication management discussed. Electronically signed by: Yajaira [place] MD    Note: This note was completed using voice recognition software.   Any typographical/name errors or mistakes are unintentional.

## 2022-03-19 PROBLEM — M15.9 PRIMARY OSTEOARTHRITIS INVOLVING MULTIPLE JOINTS: Status: ACTIVE | Noted: 2017-10-04

## 2022-03-19 PROBLEM — E66.01 MORBID OBESITY WITH BMI OF 40.0-44.9, ADULT (HCC): Status: ACTIVE | Noted: 2018-08-28

## 2022-03-19 PROBLEM — K44.9 SLIDING HIATAL HERNIA: Status: ACTIVE | Noted: 2017-10-04

## 2022-03-19 PROBLEM — R91.1 INCIDENTAL PULMONARY NODULE, > 3MM AND < 8MM: Status: ACTIVE | Noted: 2018-02-20

## 2022-03-19 PROBLEM — M15.0 PRIMARY OSTEOARTHRITIS INVOLVING MULTIPLE JOINTS: Status: ACTIVE | Noted: 2017-10-04

## 2022-03-20 PROBLEM — M11.211: Status: ACTIVE | Noted: 2017-10-04

## 2022-03-20 PROBLEM — K63.5 POLYP OF SIGMOID COLON: Status: ACTIVE | Noted: 2018-05-01

## 2022-03-24 NOTE — TELEPHONE ENCOUNTER
I called [facility]. Medication were call in from visit today. I called Pt in regards to Scripts called into the pharmacy.   [facility] [address]
Pt calling re: the following prescriptions given today. They were sent to her mail pharmacy.  She needs them called to [facility] asap, she wants to pick them up today    olopatadine (PATANOL) 0.1 % ophthalmic solution    losartan (COZAAR) 50 mg tablet    furosemide (LASIX) 20 mg tablet    call in to [facility], [address] [facility] return call when done
EMS

## 2022-03-30 ENCOUNTER — HOSPITAL ENCOUNTER (OUTPATIENT)
Dept: ULTRASOUND IMAGING | Age: 77
Discharge: HOME | End: 2022-03-30
Attending: INTERNAL MEDICINE
Payer: MEDICARE

## 2022-03-30 PROCEDURE — 76536 US EXAM OF HEAD AND NECK: CPT

## 2022-04-01 ENCOUNTER — TRANSCRIBE ORDER (OUTPATIENT)
Dept: SCHEDULING | Age: 77
End: 2022-04-01

## 2022-04-01 DIAGNOSIS — Z12.31 VISIT FOR SCREENING MAMMOGRAM: Primary | ICD-10-CM

## 2022-04-03 NOTE — PROGRESS NOTES
I will discuss her results with her tomorrow at her apt. She has multiple b/l subcentimeter thyroid cysts. There is a new anterior lobe (left) thyroid mass. \"Borderline size for percutaneous biopsy. \"     Dr. Keyana Victoria  [facility], [address] [place] [address], [address] [place] [address] Str.  Phone: [phone]  Fax: [phone]

## 2022-04-04 ENCOUNTER — OFFICE VISIT (OUTPATIENT)
Dept: INTERNAL MEDICINE CLINIC | Age: 77
End: 2022-04-04
Payer: MEDICARE

## 2022-04-04 VITALS
WEIGHT: 242 LBS | TEMPERATURE: 97.2 F | BODY MASS INDEX: 42.88 KG/M2 | HEIGHT: 63 IN | RESPIRATION RATE: 16 BRPM | DIASTOLIC BLOOD PRESSURE: 84 MMHG | SYSTOLIC BLOOD PRESSURE: 179 MMHG | OXYGEN SATURATION: 98 % | HEART RATE: 58 BPM

## 2022-04-04 DIAGNOSIS — Z23 ENCOUNTER FOR IMMUNIZATION: ICD-10-CM

## 2022-04-04 DIAGNOSIS — M25.512 CHRONIC LEFT SHOULDER PAIN: ICD-10-CM

## 2022-04-04 DIAGNOSIS — E04.1 THYROID NODULE: ICD-10-CM

## 2022-04-04 DIAGNOSIS — R73.03 PREDIABETES: ICD-10-CM

## 2022-04-04 DIAGNOSIS — G89.29 CHRONIC LEFT SHOULDER PAIN: ICD-10-CM

## 2022-04-04 DIAGNOSIS — Z71.89 ADVANCE CARE PLANNING: ICD-10-CM

## 2022-04-04 DIAGNOSIS — Z00.00 MEDICARE ANNUAL WELLNESS VISIT, SUBSEQUENT: ICD-10-CM

## 2022-04-04 DIAGNOSIS — I11.9 BENIGN HYPERTENSIVE HEART DISEASE WITHOUT HEART FAILURE: ICD-10-CM

## 2022-04-04 DIAGNOSIS — E78.5 HYPERLIPIDEMIA, UNSPECIFIED HYPERLIPIDEMIA TYPE: Primary | ICD-10-CM

## 2022-04-04 DIAGNOSIS — M1A.0790 CHRONIC GOUT OF FOOT, UNSPECIFIED CAUSE, UNSPECIFIED LATERALITY: ICD-10-CM

## 2022-04-04 PROCEDURE — 1090F PRES/ABSN URINE INCON ASSESS: CPT | Performed by: INTERNAL MEDICINE

## 2022-04-04 PROCEDURE — G8510 SCR DEP NEG, NO PLAN REQD: HCPCS | Performed by: INTERNAL MEDICINE

## 2022-04-04 PROCEDURE — 1101F PT FALLS ASSESS-DOCD LE1/YR: CPT | Performed by: INTERNAL MEDICINE

## 2022-04-04 PROCEDURE — G8427 DOCREV CUR MEDS BY ELIG CLIN: HCPCS | Performed by: INTERNAL MEDICINE

## 2022-04-04 PROCEDURE — G0009 ADMIN PNEUMOCOCCAL VACCINE: HCPCS | Performed by: INTERNAL MEDICINE

## 2022-04-04 PROCEDURE — G0439 PPPS, SUBSEQ VISIT: HCPCS | Performed by: INTERNAL MEDICINE

## 2022-04-04 PROCEDURE — G8536 NO DOC ELDER MAL SCRN: HCPCS | Performed by: INTERNAL MEDICINE

## 2022-04-04 PROCEDURE — 99214 OFFICE O/P EST MOD 30 MIN: CPT | Performed by: INTERNAL MEDICINE

## 2022-04-04 PROCEDURE — G8753 SYS BP > OR = 140: HCPCS | Performed by: INTERNAL MEDICINE

## 2022-04-04 PROCEDURE — G8417 CALC BMI ABV UP PARAM F/U: HCPCS | Performed by: INTERNAL MEDICINE

## 2022-04-04 PROCEDURE — G8754 DIAS BP LESS 90: HCPCS | Performed by: INTERNAL MEDICINE

## 2022-04-04 PROCEDURE — G8399 PT W/DXA RESULTS DOCUMENT: HCPCS | Performed by: INTERNAL MEDICINE

## 2022-04-04 PROCEDURE — 90732 PPSV23 VACC 2 YRS+ SUBQ/IM: CPT | Performed by: INTERNAL MEDICINE

## 2022-04-04 NOTE — PATIENT INSTRUCTIONS
Results for Tommy Alejandra (MRN [id number]) as of 4/4/2022 12:02   Ref. Range 2/22/2022 13:06   WBC Latest Ref Range: 4.6 - 13.2 K/uL 8.1   NRBC Latest Ref Range: 0  WBC 0.0   RBC Latest Ref Range: 4.20 - 5.30 M/uL 4.56   HGB Latest Ref Range: 12.0 - 16.0 g/dL 13.1   HCT Latest Ref Range: 35.0 - 45.0 % 42.0   MCV Latest Ref Range: 78.0 - 100.0 FL 92.1   MCH Latest Ref Range: 24.0 - 34.0 PG 28.7   MCHC Latest Ref Range: 31.0 - 37.0 g/dL 31.2   RDW Latest Ref Range: 11.6 - 14.5 % 13.9   PLATELET Latest Ref Range: 135 - 420 K/uL 235   MPV Latest Ref Range: 9.2 - 11.8 FL 11.2   NEUTROPHILS Latest Ref Range: 40 - 73 % 53   LYMPHOCYTES Latest Ref Range: 21 - 52 % 36   MONOCYTES Latest Ref Range: 3 - 10 % 7   EOSINOPHILS Latest Ref Range: 0 - 5 % 3   BASOPHILS Latest Ref Range: 0 - 2 % 0   IMMATURE GRANULOCYTES Latest Ref Range: 0.0 - 0.5 % 0   DF Latest Units:   AUTOMATED   ABSOLUTE NRBC Latest Ref Range: 0.00 - 0.01 K/uL 0.00   ABS. NEUTROPHILS Latest Ref Range: 1.8 - 8.0 K/UL 4.3   ABS. IMM. GRANS. Latest Ref Range: 0.00 - 0.04 K/UL 0.0   ABS. LYMPHOCYTES Latest Ref Range: 0.9 - 3.6 K/UL 2.9   ABS. MONOCYTES Latest Ref Range: 0.05 - 1.2 K/UL 0.6   ABS. EOSINOPHILS Latest Ref Range: 0.0 - 0.4 K/UL 0.3   ABS.  BASOPHILS Latest Ref Range: 0.0 - 0.1 K/UL 0.0   Sodium Latest Ref Range: 136 - 145 mmol/L 140   Potassium Latest Ref Range: 3.5 - 5.5 mmol/L 4.3   Chloride Latest Ref Range: 100 - 111 mmol/L 104   CO2 Latest Ref Range: 21 - 32 mmol/L 32   Anion gap Latest Ref Range: 3.0 - 18 mmol/L 4   Glucose Latest Ref Range: 74 - 99 mg/dL 87   BUN Latest Ref Range: 7.0 - 18 MG/DL 14   Creatinine Latest Ref Range: 0.6 - 1.3 MG/DL 0.79   BUN/Creatinine ratio Latest Ref Range: 12 - 20   18   Calcium Latest Ref Range: 8.5 - 10.1 MG/DL 9.5   GFR est non-AA Latest Ref Range: >60 ml/min/1.73m2 >60   GFR est AA Latest Ref Range: >60 ml/min/1.73m2 >60   Bilirubin, total Latest Ref Range: 0.2 - 1.0 MG/DL 0.6   Protein, total Latest Ref Range: 6.4 - 8.2 g/dL 7.6   Albumin Latest Ref Range: 3.4 - 5.0 g/dL 3.7   Globulin Latest Ref Range: 2.0 - 4.0 g/dL 3.9   A-G Ratio Latest Ref Range: 0.8 - 1.7   0.9   ALT Latest Ref Range: 13 - 56 U/L 16   AST Latest Ref Range: 10 - 38 U/L 20   Alk. phosphatase Latest Ref Range: 45 - 117 U/L 82   Uric acid Latest Ref Range: 2.6 - 7.2 MG/DL 5.0   Triglyceride Latest Ref Range: <150 MG/   Cholesterol, total Latest Ref Range: <200 MG/ (H)   HDL Cholesterol Latest Ref Range: 40 - 60 MG/DL 76 (H)   CHOL/HDL Ratio Latest Ref Range: 0 - 5.0   3.3   VLDL, calculated Latest Units: MG/DL 24.4   LDL, calculated Latest Ref Range: 0 - 100 MG/.6 (H)   Hemoglobin A1c, (calculated) Latest Ref Range: 4.2 - 5.6 % 5.8 (H)   Est. average glucose Latest Units: mg/dL 120   C-Reactive protein Latest Ref Range: 0 - 0.3 mg/dL 0.3   Creatinine, urine Latest Ref Range: 30 - 125 mg/dL 206.00 (H)   Microalbumin,urine random Latest Ref Range: 0 - 3.0 MG/DL 1.04   Microalbumin/Creat. Ratio Latest Ref Range: 0 - 30 mg/g 5   T4, Free Latest Ref Range: 0.7 - 1.5 NG/DL 1.0   TSH Latest Ref Range: 0.36 - 3.74 uIU/mL 1.60       Results for Don Yu (MRN [id number]) as of 4/4/2022 12:02   Ref.  Range 2/22/2022 13:06   WBC Latest Ref Range: 4.6 - 13.2 K/uL 8.1   NRBC Latest Ref Range: 0  WBC 0.0   RBC Latest Ref Range: 4.20 - 5.30 M/uL 4.56   HGB Latest Ref Range: 12.0 - 16.0 g/dL 13.1   HCT Latest Ref Range: 35.0 - 45.0 % 42.0   MCV Latest Ref Range: 78.0 - 100.0 FL 92.1   MCH Latest Ref Range: 24.0 - 34.0 PG 28.7   MCHC Latest Ref Range: 31.0 - 37.0 g/dL 31.2   RDW Latest Ref Range: 11.6 - 14.5 % 13.9   PLATELET Latest Ref Range: 135 - 420 K/uL 235   MPV Latest Ref Range: 9.2 - 11.8 FL 11.2   NEUTROPHILS Latest Ref Range: 40 - 73 % 53   LYMPHOCYTES Latest Ref Range: 21 - 52 % 36   MONOCYTES Latest Ref Range: 3 - 10 % 7   EOSINOPHILS Latest Ref Range: 0 - 5 % 3   BASOPHILS Latest Ref Range: 0 - 2 % 0 IMMATURE GRANULOCYTES Latest Ref Range: 0.0 - 0.5 % 0   DF Latest Units:   AUTOMATED   ABSOLUTE NRBC Latest Ref Range: 0.00 - 0.01 K/uL 0.00   ABS. NEUTROPHILS Latest Ref Range: 1.8 - 8.0 K/UL 4.3   ABS. IMM. GRANS. Latest Ref Range: 0.00 - 0.04 K/UL 0.0   ABS. LYMPHOCYTES Latest Ref Range: 0.9 - 3.6 K/UL 2.9   ABS. MONOCYTES Latest Ref Range: 0.05 - 1.2 K/UL 0.6   ABS. EOSINOPHILS Latest Ref Range: 0.0 - 0.4 K/UL 0.3   ABS. BASOPHILS Latest Ref Range: 0.0 - 0.1 K/UL 0.0   Sodium Latest Ref Range: 136 - 145 mmol/L 140   Potassium Latest Ref Range: 3.5 - 5.5 mmol/L 4.3   Chloride Latest Ref Range: 100 - 111 mmol/L 104   CO2 Latest Ref Range: 21 - 32 mmol/L 32   Anion gap Latest Ref Range: 3.0 - 18 mmol/L 4   Glucose Latest Ref Range: 74 - 99 mg/dL 87   BUN Latest Ref Range: 7.0 - 18 MG/DL 14   Creatinine Latest Ref Range: 0.6 - 1.3 MG/DL 0.79   BUN/Creatinine ratio Latest Ref Range: 12 - 20   18   Calcium Latest Ref Range: 8.5 - 10.1 MG/DL 9.5   GFR est non-AA Latest Ref Range: >60 ml/min/1.73m2 >60   GFR est AA Latest Ref Range: >60 ml/min/1.73m2 >60   Bilirubin, total Latest Ref Range: 0.2 - 1.0 MG/DL 0.6   Protein, total Latest Ref Range: 6.4 - 8.2 g/dL 7.6   Albumin Latest Ref Range: 3.4 - 5.0 g/dL 3.7   Globulin Latest Ref Range: 2.0 - 4.0 g/dL 3.9   A-G Ratio Latest Ref Range: 0.8 - 1.7   0.9   ALT Latest Ref Range: 13 - 56 U/L 16   AST Latest Ref Range: 10 - 38 U/L 20   Alk.  phosphatase Latest Ref Range: 45 - 117 U/L 82   Uric acid Latest Ref Range: 2.6 - 7.2 MG/DL 5.0   Triglyceride Latest Ref Range: <150 MG/   Cholesterol, total Latest Ref Range: <200 MG/ (H)   HDL Cholesterol Latest Ref Range: 40 - 60 MG/DL 76 (H)   CHOL/HDL Ratio Latest Ref Range: 0 - 5.0   3.3   VLDL, calculated Latest Units: MG/DL 24.4   LDL, calculated Latest Ref Range: 0 - 100 MG/.6 (H)   Hemoglobin A1c, (calculated) Latest Ref Range: 4.2 - 5.6 % 5.8 (H)   Est. average glucose Latest Units: mg/dL 120   C-Reactive protein Latest Ref Range: 0 - 0.3 mg/dL 0.3   Creatinine, urine Latest Ref Range: 30 - 125 mg/dL 206.00 (H)   Microalbumin,urine random Latest Ref Range: 0 - 3.0 MG/DL 1.04   Microalbumin/Creat. Ratio Latest Ref Range: 0 - 30 mg/g 5   T4, Free Latest Ref Range: 0.7 - 1.5 NG/DL 1.0   TSH Latest Ref Range: 0.36 - 3.74 uIU/mL 1.60     Vaccine Information Statement    Pneumococcal Polysaccharide Vaccine (PPSV23): What You Need to Know    Many Vaccine Information Statements are available in [place] and other languages. See www.immunize.org/vis  Hojas de información sobre vacunas están disponibles en español y en muchos otros idiomas. Visite www.immunize.org/vis    1. Why get vaccinated? Pneumococcal polysaccharide vaccine (PPSV23) can prevent pneumococcal disease. Pneumococcal disease refers to any illness caused by pneumococcal bacteria. These bacteria can cause many types of illnesses, including pneumonia, which is an infection of the lungs. Pneumococcal bacteria are one of the most common causes of pneumonia. Besides pneumonia, pneumococcal bacteria can also cause:   Ear infections   Sinus infections   Meningitis (infection of the tissue covering the brain and spinal cord)   Bacteremia (bloodstream infection)    Anyone can get pneumococcal disease, but children under 3years of age, people with certain medical conditions, adults 72 years or older, and cigarette smokers are at the highest risk. Most pneumococcal infections are mild. However, some can result in long-term problems, such as brain damage or hearing loss. Meningitis, bacteremia, and pneumonia caused by pneumococcal disease can be fatal.     2. PPSV23     PPSV23 protects against 23 types of bacteria that cause pneumococcal disease. PPSV23 is recommended for:   All adults 72 years or older,   Anyone 2 years or older with certain medical conditions that can lead to an increased risk for pneumococcal disease.     Most people need only one dose of PPSV23. A second dose of PPSV23, and another type of pneumococcal vaccine called PCV13, are recommended for certain high-risk groups. Your health care provider can give you more information. People 65 years or older should get a dose of PPSV23 even if they have already gotten one or more doses of the vaccine before they turned 72.    3. Talk with your health care provider    Tell your vaccine provider if the person getting the vaccine:   Has had an allergic reaction after a previous dose of PPSV23, or has any severe, life-threatening allergies. In some cases, your health care provider may decide to postpone PPSV23 vaccination to a future visit. People with minor illnesses, such as a cold, may be vaccinated. People who are moderately or severely ill should usually wait until they recover before getting PPSV23. Your health care provider can give you more information. 4. Risks of a vaccine reaction     Redness or pain where the shot is given, feeling tired, fever, or muscle aches can happen after PPSV23. People sometimes faint after medical procedures, including vaccination. Tell your provider if you feel dizzy or have vision changes or ringing in the ears. As with any medicine, there is a very remote chance of a vaccine causing a severe allergic reaction, other serious injury, or death. 5. What if there is a serious problem? An allergic reaction could occur after the vaccinated person leaves the clinic. If you see signs of a severe allergic reaction (hives, swelling of the face and throat, difficulty breathing, a fast heartbeat, dizziness, or weakness), call 9-1-1 and get the person to the nearest hospital.    For other signs that concern you, call your health care provider. Adverse reactions should be reported to the Vaccine Adverse Event Reporting System (VAERS). Your health care provider will usually file this report, or you can do it yourself.  Visit the VAERS website at www.vaers. [facility].gov or call 4-990.391.4030. VAERS is only for reporting reactions, and [facility] staff do not give medical advice. 6. How can I learn more?  Ask your health care provider.  Call your local or state health department.  Contact the Centers for Disease Control and Prevention (CDC):  - Call 7-345.361.5308 (1-800-CDC-INFO) or  - Visit CDCs website at www.cdc.gov/vaccines    Vaccine Information Statement   PPSV23   10/30/2019    [facility] and [facility] for Disease Control and Prevention    Office Use Only      Medicare Wellness Visit, Female     The best way to live healthy is to have a lifestyle where you eat a well-balanced diet, exercise regularly, limit alcohol use, and quit all forms of tobacco/nicotine, if applicable. Regular preventive services are another way to keep healthy. Preventive services (vaccines, screening tests, monitoring & exams) can help personalize your care plan, which helps you manage your own care. Screening tests can find health problems at the earliest stages, when they are easiest to treat. [facility] follows the current, evidence-based guidelines published by the [place] (USPSTF) when recommending preventive services for our patients. Because we follow these guidelines, sometimes recommendations change over time as research supports it. (For example, mammograms used to be recommended annually. Even though Medicare will still pay for an annual mammogram, the newer guidelines recommend a mammogram every two years for women of average risk). Of course, you and your doctor may decide to screen more often for some diseases, based on your risk and your co-morbidities (chronic disease you are already diagnosed with).      Preventive services for you include:  - Medicare offers their members a free annual wellness visit, which is time for you and your primary care provider to discuss and plan for your preventive service needs. Take advantage of this benefit every year!  -All adults over the age of 72 should receive the recommended pneumonia vaccines. Current USPSTF guidelines recommend a series of two vaccines for the best pneumonia protection.   -All adults should have a flu vaccine yearly and a tetanus vaccine every 10 years.   -All adults age 48 and older should receive the shingles vaccines (series of two vaccines). -All adults age 38-68 who are overweight should have a diabetes screening test once every three years.   -All adults born between [birth year] and 1965 should be screened once for Hepatitis C.  -Other screening tests and preventive services for persons with diabetes include: an eye exam to screen for diabetic retinopathy, a kidney function test, a foot exam, and stricter control over your cholesterol.   -Cardiovascular screening for adults with routine risk involves an electrocardiogram (ECG) at intervals determined by your doctor.   -Colorectal cancer screenings should be done for adults age 54-65 with no increased risk factors for colorectal cancer. There are a number of acceptable methods of screening for this type of cancer. Each test has its own benefits and drawbacks. Discuss with your doctor what is most appropriate for you during your annual wellness visit. The different tests include: colonoscopy (considered the best screening method), a fecal occult blood test, a fecal DNA test, and sigmoidoscopy.    -A bone mass density test is recommended when a woman turns 65 to screen for osteoporosis. This test is only recommended one time, as a screening. Some providers will use this same test as a disease monitoring tool if you already have osteoporosis. -Breast cancer screenings are recommended every other year for women of normal risk, age 54-69.  -Cervical cancer screenings for women over age 72 are only recommended with certain risk factors.      Here is a list of your current Health Maintenance items (your personalized list of preventive services) with a due date:  Health Maintenance Due   Topic Date Due    Hepatitis C Test  Never done    Shingles Vaccine (1 of 2) Never done    Annual Well Visit  12/08/2019    Eye Exam  08/01/2020            Body Mass Index: Care Instructions  Your Care Instructions     Body mass index (BMI) can help you see if your weight is raising your risk for health problems. It uses a formula to compare how much you weigh with how tall you are. · A BMI lower than 18.5 is considered underweight. · A BMI between 18.5 and 24.9 is considered healthy. · A BMI between 25 and 29.9 is considered overweight. A BMI of 30 or higher is considered obese. If your BMI is in the normal range, it means that you have a lower risk for weight-related health problems. If your BMI is in the overweight or obese range, you may be at increased risk for weight-related health problems, such as high blood pressure, heart disease, stroke, arthritis or joint pain, and diabetes. If your BMI is in the underweight range, you may be at increased risk for health problems such as fatigue, lower protection (immunity) against illness, muscle loss, bone loss, hair loss, and hormone problems. BMI is just one measure of your risk for weight-related health problems. You may be at higher risk for health problems if you are not active, you eat an unhealthy diet, or you drink too much alcohol or use tobacco products. Follow-up care is a key part of your treatment and safety. Be sure to make and go to all appointments, and call your doctor if you are having problems. It's also a good idea to know your test results and keep a list of the medicines you take. How can you care for yourself at home? · Practice healthy eating habits. This includes eating plenty of fruits, vegetables, whole grains, lean protein, and low-fat dairy. · If your doctor recommends it, get more exercise.  Walking is a good choice. Bit by bit, increase the amount you walk every day. Try for at least 30 minutes on most days of the week. · Do not smoke. Smoking can increase your risk for health problems. If you need help quitting, talk to your doctor about stop-smoking programs and medicines. These can increase your chances of quitting for good. · Limit alcohol to 2 drinks a day for men and 1 drink a day for women. Too much alcohol can cause health problems. If you have a BMI higher than 25  · Your doctor may do other tests to check your risk for weight-related health problems. This may include measuring the distance around your waist. A waist measurement of more than 40 inches in men or 35 inches in women can increase the risk of weight-related health problems. · Talk with your doctor about steps you can take to stay healthy or improve your health. You may need to make lifestyle changes to lose weight and stay healthy, such as changing your diet and getting regular exercise. If you have a BMI lower than 18.5  · Your doctor may do other tests to check your risk for health problems. · Talk with your doctor about steps you can take to stay healthy or improve your health. You may need to make lifestyle changes to gain or maintain weight and stay healthy, such as getting more healthy foods in your diet and doing exercises to build muscle. Where can you learn more? Go to http://www.segundo.com/  Enter S176 in the search box to learn more about \"Body Mass Index: Care Instructions. \"  Current as of: December 27, 2021               Content Version: 13.2  © 2006-2022 [employer]. Care instructions adapted under license by [employer] (which disclaims liability or warranty for this information).  If you have questions about a medical condition or this instruction, always ask your healthcare professional. [place] [address] any warranty or liability for your use of this information.

## 2022-04-04 NOTE — PROGRESS NOTES
INTERNISTS OF [facility]:  4/4/2022, MRN: [id number]      George See is a 76 y.o. female and presents to clinic for Follow-up and Hypertension      Subjective: The patient is a 75-year-old female with history of obesity, thyroid nodules, GERD, lumbar disc disease, lumbar facet arthropathy, right heel spur, colon polyps, pulmonary nodule per EHR, gout, hiatal hernia, pseudogout per EHR, renal cyst, positive rheumatoid arthritis serology, hypertension, prediabetes, and hyperlipidemia. 1. LUE Pain: Pain is 10/10. She is thinking of calling her orthopedist for a therapeutic injection. It's hard to sleep 2/2 pain sx. She has trouble lifting her arm above her head w/o discomfort. She was told she needs a shoulder replacement surgery. Pain is 10/19. 2. HLD: On pravastatin but taking it irregularly. She took zetia in the past but states that it worsened her joint pain. +Followed by a Cardiologist. Her LDL is in the 140s range. 3. HTN: Her BP is very high. She is in a lot of pain. On atenolol, lasix, and potassium. Note that her BP was <140/90 at her last apt.     4. General :   - She's had an eye exam in the past 2 yrs but we don't have records. +Blurry vision. Wearing glasses. - She wants her daughter to be her primary POA. - One of her RF serology labs was mildly positive. Her TOM, the remaining RF serologies, and her CPR were unremarkable. 5. Thyroid Nodule Hx: Her labs earlier this year were WNL. 3/30/22 Thyroid Ultrasound:   Multiple bilateral subcentimeter thyroid cysts and probable cysts appear similar in size as above. New low-attenuation region, potential mass anterior left lobe as above. Borderline size for percutaneous biopsy as discussed. 6. Prediabetes: Her A1C is 5.8 per labs earlier this year. 7. Gout: Her uric acid level is 5.   Her CRP is undetectable.     12/4/15 Right Shoulder CT: Advanced degenerative changes of sternoclavicular joint and small erosions suggesting inflammatory arthropathy, less likely infectious arthropathy, recommend clinical correlation. Chronic full-thickness rotator cuff rupture of the supraspinatus, infraspinatus and subscapularis with muscular atrophy. Mild to moderate glenohumeral degenerative changes with superior and anterior subluxation of the humeral head. Widening of the acromioclavicular joint and mild joint subluxation, query history of prior distal clavicular resection versus acromioclavicular joint separation. Patient Active Problem List    Diagnosis Date Noted    GERD (gastroesophageal reflux disease) 02/23/2022    Morbid obesity with BMI of 40.0-44.9, adult ([facility] [address] 08/28/2018    Polyp of sigmoid colon 05/01/2018    Incidental pulmonary nodule, > 3mm and < 8mm 02/20/2018    Prediabetes 02/20/2018    Chronic gout of foot 10/04/2017    Sliding hiatal hernia 10/04/2017    Pseudogout of right shoulder 10/04/2017    Primary osteoarthritis involving multiple joints 10/04/2017    Renal cyst 08/29/2014    Sleep apnea/ on c-pap 10/30/2013    Hypertension     Dyslipidemia        Current Outpatient Medications   Medication Sig Dispense Refill    diclofenac (VOLTAREN) 1 % gel Apply 4 grams to left shoulder  g 3    pantoprazole (PROTONIX) 40 mg tablet Take 1 Tablet by mouth two (2) times a day. 180 Tablet 2    losartan (COZAAR) 25 mg tablet Take 1 Tablet by mouth two (2) times a day. 180 Tablet 2    atenoloL (TENORMIN) 25 mg tablet Take 1 Tablet by mouth daily. 90 Tablet 2    potassium chloride (K-DUR, KLOR-CON M20) 20 mEq tablet Take 1 Tablet by mouth two (2) times a day. 180 Tablet 2    furosemide (LASIX) 20 mg tablet Take 1 Tablet by mouth daily. 90 Tablet 2    ubidecarenone (coenzyme Q10) 100 mg tab Take 100 mg by mouth daily. 1 Tablet 0    Blood-Glucose Meter (TRUE METRIX AIR GLUCOSE METER) misc Check blood sugar twice daily.  1 Each 0    glucose blood VI test strips (TRUE METRIX GLUCOSE TEST STRIP) strip Check blood sugar twice daily. 100 Strip 2    lancets (ULTRA THIN LANCETS) 30 gauge misc Check blood sugar twice daily. 1 Package 2    diclofenac (VOLTAREN) 1 % gel Apply  to affected area four (4) times daily. 100 g 1    CALCIUM PO Take  by mouth.  fluticasone (FLONASE) 50 mcg/actuation nasal spray Si spray in both nostril twice daily 1 Bottle 1    cholecalciferol (VITAMIN D3) 1,000 unit tablet Take 2,000 Units by mouth daily.  aspirin 81 mg Tab Take 81 mg by mouth daily.  MULTIVITAMINS (MULTI-VITAMIN PO) Take 1 Tab by mouth daily.  pravastatin (PRAVACHOL) 20 mg tablet Take 1 Tablet by mouth nightly. (Patient not taking: Reported on 2022) 30 Tablet 4       Allergies   Allergen Reactions    Latex Rash    Nexium [Esomeprazole Magnesium] Swelling and Other (comments)     Lips Swollen, and facial rash and swelling    Dexilant [Dexlansoprazole] Other (comments)     Stomach swelling    Crestor [Rosuvastatin] Other (comments)     Upper respiratory illness    Lipitor [Atorvastatin] Swelling    Lisinopril Unknown (comments)     Patient can't recall    Niaspan [Niacin] Other (comments)     Scratchy throat, \"asthma\" like symptoms    Pcn [Penicillins] Hives    Sulfa (Sulfonamide Antibiotics) Rash    Zocor [Simvastatin] Myalgia and Other (comments)     Per patient chart \"(? Rash)\"       Past Medical History:   Diagnosis Date    Atrophic vaginitis     Cardiac cath 2012    Patent coronary arteries. LVEDP 20.  RA 11.  RV 42/10. PA 42/21. W 18.  CO 6.4 (TD), 6.4 (Mariel Feliciano). PVR 1.7 Wood units. False-positive nuclear study.     Diabetes     diet controlled, hypoglycemia from metformin previously     Dyslipidemia     Fatty liver     Fibrocystic breast disease     GERD     Gout     H/O colonoscopy 13    polyp    Hypertension     Macular degeneration     Morbid obesity ([address]     Obstructive sleep apnea     Peripheral neuropathy     Rectocele     Thyroid cyst     bilat       Past Surgical History:   Procedure Laterality Date    [facility]. CNNLA ARTERIAL ARTERIOTOMY 3M  2012    HX COLONOSCOPY  2013    HX COLONOSCOPY  2018    HX HEART CATHETERIZATION  2012    HX HERNIA REPAIR      umbilical as a child    HX HYSTERECTOMY      52ys ago, QUIQUE, BSO    HX MOHS PROCEDURES      right    [place] ANESTH,SURGERY OF SHOULDER         Family History   Problem Relation Age of Onset    Cancer Mother         colon cancer    Colon Cancer Mother     Hypertension Mother     Diabetes Mother     Heart Disease Mother     Coronary Art Dis Mother     Hypertension Father     Diabetes Father     Heart Disease Father     Coronary Art Dis Father     Hypertension Sister     Diabetes Sister     Heart Disease Sister     Coronary Art Dis Sister     Hypertension Brother     Diabetes Brother     Heart Disease Brother     Coronary Art Dis Brother        Social History     Tobacco Use    Smoking status: Former Smoker     Quit date: 1974     Years since quittin.7    Smokeless tobacco: Never Used    Tobacco comment: 1 pack every 2 weeks x 1 year, stopped 45yrs ago   Substance Use Topics    Alcohol use: Yes       ROS   Review of Systems   Constitutional: Negative for chills and fever. HENT: Negative for ear pain and sore throat. Eyes: Positive for blurred vision (chronic). Negative for pain. Respiratory: Negative for cough and shortness of breath. Cardiovascular: Negative for chest pain. Gastrointestinal: Negative for abdominal pain, blood in stool and melena. Genitourinary: Negative for dysuria and hematuria. Musculoskeletal: Positive for joint pain. Negative for myalgias. Skin: Negative for rash. Neurological: Negative for headaches. Endo/Heme/Allergies: Does not bruise/bleed easily. Psychiatric/Behavioral: Negative for depression and substance abuse.        Objective     Vitals:    22 1126 22 1129   BP: (!) 167/89 (!) 179/84   Pulse: (!) 58 Resp: 16    Temp: 97.2 °F (36.2 °C)    TempSrc: Temporal    SpO2: 98%    Weight: 242 lb (109.8 kg)    Height: 5' 3\" (1.6 m)    PainSc:  10 - Worst pain ever    PainLoc: Arm        Physical Exam  Vitals and nursing note reviewed. HENT:      Head: Normocephalic and atraumatic. Right Ear: External ear normal.      Left Ear: External ear normal.   Eyes:      General: No scleral icterus. Right eye: No discharge. Left eye: No discharge. Conjunctiva/sclera: Conjunctivae normal.   Cardiovascular:      Rate and Rhythm: Normal rate and regular rhythm. Heart sounds: Normal heart sounds. No murmur heard. No friction rub. No gallop. Pulmonary:      Effort: Pulmonary effort is normal. No respiratory distress. Breath sounds: Normal breath sounds. No wheezing or rales. Abdominal:      General: Bowel sounds are normal. There is no distension. Palpations: Abdomen is soft. There is no mass. Tenderness: There is no abdominal tenderness. There is no guarding or rebound. Musculoskeletal:         General: No swelling (BUE) or tenderness (BUE). Cervical back: Neck supple. Comments: She has decreased ROM along her LUE 2/2 pain at the level of the shoulder jt. Lymphadenopathy:      Cervical: No cervical adenopathy. Skin:     General: Skin is warm and dry. Findings: No erythema or rash. Neurological:      Mental Status: She is alert. Motor: No abnormal muscle tone.       Gait: Gait normal.   Psychiatric:         Mood and Affect: Mood normal.         LABS   Data Review:   Lab Results   Component Value Date/Time    WBC 8.1 02/22/2022 01:06 PM    Hemoglobin, POC 12.6 04/12/2013 09:46 AM    HGB 13.1 02/22/2022 01:06 PM    Hematocrit, POC 37 04/12/2013 09:46 AM    HCT 42.0 02/22/2022 01:06 PM    PLATELET 050 17/98/7927 01:06 PM    MCV 92.1 02/22/2022 01:06 PM       Lab Results   Component Value Date/Time    Sodium 140 02/22/2022 01:06 PM    Potassium 4.3 02/22/2022 01:06 PM    Chloride 104 02/22/2022 01:06 PM    CO2 32 02/22/2022 01:06 PM    Anion gap 4 02/22/2022 01:06 PM    Glucose 87 02/22/2022 01:06 PM    BUN 14 02/22/2022 01:06 PM    Creatinine 0.79 02/22/2022 01:06 PM    BUN/Creatinine ratio 18 02/22/2022 01:06 PM    GFR est AA >60 02/22/2022 01:06 PM    GFR est non-AA >60 02/22/2022 01:06 PM    Calcium 9.5 02/22/2022 01:06 PM       Lab Results   Component Value Date/Time    Cholesterol, total 247 (H) 02/22/2022 01:06 PM    HDL Cholesterol 76 (H) 02/22/2022 01:06 PM    LDL, calculated 146.6 (H) 02/22/2022 01:06 PM    VLDL, calculated 24.4 02/22/2022 01:06 PM    Triglyceride 122 02/22/2022 01:06 PM    CHOL/HDL Ratio 3.3 02/22/2022 01:06 PM       Lab Results   Component Value Date/Time    Hemoglobin A1c 5.8 (H) 02/22/2022 01:06 PM    Hemoglobin A1c (POC) 6.1 06/23/2017 11:53 AM    Hemoglobin A1c, External 6.0 11/06/2021 12:00 AM       Assessment/Plan:   1. Hypertension: BP is falsely elevated today secondary to pain.  -Continue with medication as prescribed.  -Placing a referral to a dietitian for assistance. ORDERS:  - REFERRAL TO DIETITIAN    2. Chronic gout of foot: Uric acid level is reassuring.  -I encouraged her to avoid foods that trigger gout.  -Placing a referral to a dietitian. ORDERS:  - REFERRAL TO DIETITIAN    3. Hyperlipidemia: Her LDL is 146. +Prediabetes.   -We discussed the option of starting Zetia but she states she has tried this before. Franky Farias She wants to hold off on this option and try aggressive dietary measures. I am placing a referral to a dietitian as result  -We will check a lipid panel later this year. Orders placed. - She will need a f/u A1C in 6 months - to be ordered later this year. ORDERS:  - REFERRAL TO DIETITIAN    4. Thyroid nodule:  We discussed her recent findings.  -Ordering a follow-up thyroid ultrasound in 6 months.  -We discussed the need for a biopsy or evaluation with ear nose and throat doctor in the event that her thyroid nodules increase in size over time. ORDERS:  - US THYROID/PARATHYROID/SOFT TISS; Future    5. LUE Pain: Likely from a combination of osteoarthritis and rotator cuff tendinopathy.  -She declines seeing orthopedics at this time.  -Activity as tolerated.  -Okay to use Voltaren gel as needed along the affected area. -She will let me know she is interested in physical therapy, which was discussed with her today        Health Maintenance Due   Topic Date Due    Hepatitis C Screening  Never done    Shingrix Vaccine Age 50> (1 of 2) Never done    Pneumococcal 65+ years (1 of 1 - PPSV23) 01/01/2013    Medicare Yearly Exam  12/08/2019    Eye Exam Retinal or Dilated  08/01/2020         Lab review: labs are reviewed in the EHR and ordered as mentioned above. I have discussed the diagnosis with the patient and the intended plan as seen in the above orders. The patient has received an after-visit summary and questions were answered concerning future plans. I have discussed medication side effects and warnings with the patient as well. I have reviewed the plan of care with the patient, accepted their input and they are in agreement with the treatment goals. All questions were answered. The patient understands the plan of care. Handouts provided today with above information. Pt instructed if symptoms worsen to call the office or report to the ED for continued care. Greater than 50% of the visit time was spent in counseling and/or coordination of care. Voice recognition was used to generate this report, which may have resulted in some phonetic based errors in grammar and contents. Even though attempts were made to correct all the mistakes, some may have been missed, and remained in the body of the document.           Altagracia Mcmanus MD

## 2022-04-04 NOTE — PROGRESS NOTES
Gisselle Herrera presents today for   Chief Complaint   Patient presents with    Follow-up    Hypertension       1. \"Have you been to the ER, urgent care clinic since your last visit? Hospitalized since your last visit? \" no    2. \"Have you seen or consulted any other health care providers outside of the [address] [place] [address] since your last visit? \" no     3. For patients aged 39-70: Has the patient had a colonoscopy / FIT/ Cologuard? NA - based on age      If the patient is female:    4. For patients aged 41-77: Has the patient had a mammogram within the past 2 years? NA - based on age or sex  See top three    5. For patients aged 21-65: Has the patient had a pap smear?  NA - based on age or sex

## 2022-04-04 NOTE — PROGRESS NOTES
Sandyolivia Hood 1945 female who presents for routine immunizations. Patient denies any symptoms , reactions or allergies that would exclude them from being immunized today. Risks and adverse reactions were discussed and the VIS was given to them. All questions were addressed. Order placed for [id number],  per Verbal Order from  with read back. Patient was observed for 15 min post injection. There were no reactions observed.     Mary Zimmerman LPN

## 2022-04-11 PROBLEM — E04.1 THYROID NODULE: Status: ACTIVE | Noted: 2022-04-11

## 2022-04-12 NOTE — PROGRESS NOTES
INTERNISTS OF [facility]:  04/11/22, [id number]      The Subsequent Medicare Annual Wellness Exam PROGRESS NOTE    This is a Subsequent Medicare Annual Wellness Exam (AWV). I have reviewed the patient's medical history in detail and updated the computerized patient record. Sri Daly is a 76 y.o.  female and presents for an annual wellness exam.    SUBJECTIVE    Past Medical History:   Diagnosis Date    Atrophic vaginitis     Cardiac cath 05/30/2012    Patent coronary arteries. LVEDP 20.  RA 11.  RV 42/10. PA 42/21. W 18.  CO 6.4 (TD), 6.4 (Murlean Oswaldo). PVR 1.7 Wood units. False-positive nuclear study.  Diabetes     diet controlled, hypoglycemia from metformin previously     Dyslipidemia     Fatty liver     Fibrocystic breast disease     GERD     Gout     H/O colonoscopy 4/12/13    polyp    Hypertension     Macular degeneration     Morbid obesity ([address]     Obstructive sleep apnea     Peripheral neuropathy     Rectocele     Thyroid cyst     bilat      Past Surgical History:   Procedure Laterality Date    [facility] ARTERIAL ARTERIOTOMY 3M  5/25/2012    HX COLONOSCOPY  4/12/2013    HX COLONOSCOPY  03/24/2018    HX HEART CATHETERIZATION  5/25/2012    HX HERNIA REPAIR      umbilical as a child    HX HYSTERECTOMY      52ys ago, QUIQUE, BSO    HX MOHS PROCEDURES      right    NM ANESTH,SURGERY OF SHOULDER       Current Outpatient Medications   Medication Sig Dispense Refill    diclofenac (VOLTAREN) 1 % gel Apply 4 grams to left shoulder  g 3    pantoprazole (PROTONIX) 40 mg tablet Take 1 Tablet by mouth two (2) times a day. 180 Tablet 2    losartan (COZAAR) 25 mg tablet Take 1 Tablet by mouth two (2) times a day. 180 Tablet 2    atenoloL (TENORMIN) 25 mg tablet Take 1 Tablet by mouth daily. 90 Tablet 2    potassium chloride (K-DUR, KLOR-CON M20) 20 mEq tablet Take 1 Tablet by mouth two (2) times a day.  180 Tablet 2    furosemide (LASIX) 20 mg tablet Take 1 Tablet by mouth daily. 90 Tablet 2    ubidecarenone (coenzyme Q10) 100 mg tab Take 100 mg by mouth daily. 1 Tablet 0    Blood-Glucose Meter (TRUE METRIX AIR GLUCOSE METER) misc Check blood sugar twice daily. 1 Each 0    glucose blood VI test strips (TRUE METRIX GLUCOSE TEST STRIP) strip Check blood sugar twice daily. 100 Strip 2    lancets (ULTRA THIN LANCETS) 30 gauge misc Check blood sugar twice daily. 1 Package 2    diclofenac (VOLTAREN) 1 % gel Apply  to affected area four (4) times daily. 100 g 1    CALCIUM PO Take  by mouth.  fluticasone (FLONASE) 50 mcg/actuation nasal spray Si spray in both nostril twice daily 1 Bottle 1    cholecalciferol (VITAMIN D3) 1,000 unit tablet Take 2,000 Units by mouth daily.  aspirin 81 mg Tab Take 81 mg by mouth daily.  MULTIVITAMINS (MULTI-VITAMIN PO) Take 1 Tab by mouth daily.        Allergies   Allergen Reactions    Latex Rash    Nexium [Esomeprazole Magnesium] Swelling and Other (comments)     Lips Swollen, and facial rash and swelling    Dexilant [Dexlansoprazole] Other (comments)     Stomach swelling    Crestor [Rosuvastatin] Other (comments)     Upper respiratory illness    Lipitor [Atorvastatin] Swelling    Lisinopril Unknown (comments)     Patient can't recall    Niaspan [Niacin] Other (comments)     Scratchy throat, \"asthma\" like symptoms    Pcn [Penicillins] Hives    Sulfa (Sulfonamide Antibiotics) Rash    Zocor [Simvastatin] Myalgia and Other (comments)     Per patient chart \"(? Rash)\"     Family History   Problem Relation Age of Onset    Cancer Mother         colon cancer    Colon Cancer Mother     Hypertension Mother     Diabetes Mother     Heart Disease Mother     Coronary Art Dis Mother     Hypertension Father     Diabetes Father     Heart Disease Father     Coronary Art Dis Father     Hypertension Sister     Diabetes Sister     Heart Disease Sister     Coronary Art Dis Sister     Hypertension Brother     Diabetes Brother     Heart Disease Brother     Coronary Art Dis Brother      Social History     Tobacco Use    Smoking status: Former Smoker     Quit date: 1974     Years since quittin.7    Smokeless tobacco: Never Used    Tobacco comment: 1 pack every 2 weeks x 1 year, stopped 45yrs ago   Substance Use Topics    Alcohol use: Yes     Patient Active Problem List   Diagnosis Code    Hypertension I11.9    Dyslipidemia E78.5    Sleep apnea/ on c-pap G47.30    Renal cyst N28.1    Chronic gout of foot M1A.0790    Sliding hiatal hernia K44.9    Pseudogout of right shoulder M11.211    Primary osteoarthritis involving multiple joints M15.9    Incidental pulmonary nodule, > 3mm and < 8mm R91.1    Prediabetes R73.03    Polyp of sigmoid colon K63.5    Morbid obesity with BMI of 40.0-44.9, adult (HCC) E66.01, Z68.41    GERD (gastroesophageal reflux disease) K21.9     The patient is a 49-year-old female with history of obesity, thyroid nodules, GERD, lumbar disc disease, lumbar facet arthropathy, right heel spur, colon polyps, pulmonary nodule per EHR, gout, hiatal hernia, pseudogout per EHR, renal cyst, positive rheumatoid arthritis serology, hypertension, and hyperlipidemia. Health Maintenance History  Immunizations reviewed: Tdap up to date   Pneumovax: up to date   Flu: up to date  Zoster: over-due    Immunization History   Administered Date(s) Administered    COVID-19, Pfizer [employer], DO NOT Dilute, Khoa-Sucrose, 12+ yrs, PF, 30mcg/0.3 mL dose 2021, 2021, 2021    Influenza High Dose Vaccine PF 2017, 2021    Influenza Vaccine 10/30/2013, 10/21/2014    Influenza Vaccine (>6 mo Afluria QUAD Vial 12210 (0.25 mL) / 56741 (0.5 mL)) 2018    Pneumococcal Polysaccharide (PPSV-23) 2008, 2022    Tdap 2013       Colonoscopy: Up to date. No bleeding. Eye exam: Up to date but we don't have records. Mammo: Up to date. Dexascan: Up to date. Pelvic/Pap: Up to date. No bleeding. Review of Systems   Constitutional: Negative for chills and fever. HENT: Negative for ear pain and sore throat. Eyes: Positive for blurred vision. Negative for pain. Respiratory: Negative for cough and shortness of breath. Cardiovascular: Negative for chest pain. Gastrointestinal: Negative for abdominal pain, blood in stool and melena. Genitourinary: Negative for dysuria and hematuria. Musculoskeletal: Positive for joint pain. Negative for myalgias. Skin: Negative for rash. Neurological: Negative for headaches. Endo/Heme/Allergies: Does not bruise/bleed easily. Psychiatric/Behavioral: Negative for depression and substance abuse. Depression Risk Factor Screening:      Patient Health Questionnaire (PHQ-2)   Over the last 2 weeks, how often have you been bothered by any of the following problems? · Little interest or pleasure in doing things? · Not at all. [0]  · Feeling down, depressed, or hopeless? · Not at all. [0]    Total Score: 0/6  PHQ-2 Assessment Scoring:   A score of 2 or more requires further screening with the PHQ-9    Alcohol Risk Factor Screening:   Women: On any occasion during the past 3 months, have you had more than 3 drinks containing alcohol? no    Do you average more than 7 drinks per week? no      Tobacco Use Screening:     Social History     Tobacco Use   Smoking Status Former Smoker    Quit date: 1974    Years since quittin.7   Smokeless Tobacco Never Used   Tobacco Comment    1 pack every 2 weeks x 1 year, stopped 45yrs ago       Hearing Loss    Hearing is good. . There have been no changes to the pt's hearing. No additional studies/evaluation is warranted at this time    Activities of Daily Living   Self-care. Requires assistance with: no ADLs  She does not need help with ADLs/IADLs    Fall Risk   No falls w/I the past year.     Abuse Screen   None    Additional Examination Findings:  Vitals: 04/04/22 1126 04/04/22 1129   BP: (!) 167/89 (!) 179/84   Pulse: (!) 58    Resp: 16    Temp: 97.2 °F (36.2 °C)    TempSrc: Temporal    SpO2: 98%    Weight: 242 lb (109.8 kg)    Height: 5' 3\" (1.6 m)    PainSc:  10 - Worst pain ever    PainLoc: Arm       Body mass index is 42.87 kg/m². Evaluation of Cognitive Function:  Mood/affect: Euthymic  Appearance: Well-groomed  Family member/caregiver input: She is not with a family member today      General:   Well-nourished, well-groomed, pleasant, alert, in no acute distress. Head:  Normocephalic, atraumatic  Ears:  External ears WNL  Eyes:  Clear sclera  Neuro:   Alert, conversant, appropriate, following commands  Skin:    No rashes noted  Psych: Affect, mood and judgment appropriate      Dementia Screen:  Clock Drawing (ten past eleven) Exercise: Unremarkable.       LABS   Data Review:   Lab Results   Component Value Date/Time    Sodium 140 02/22/2022 01:06 PM    Potassium 4.3 02/22/2022 01:06 PM    Chloride 104 02/22/2022 01:06 PM    CO2 32 02/22/2022 01:06 PM    Anion gap 4 02/22/2022 01:06 PM    Glucose 87 02/22/2022 01:06 PM    BUN 14 02/22/2022 01:06 PM    Creatinine 0.79 02/22/2022 01:06 PM    BUN/Creatinine ratio 18 02/22/2022 01:06 PM    GFR est AA >60 02/22/2022 01:06 PM    GFR est non-AA >60 02/22/2022 01:06 PM    Calcium 9.5 02/22/2022 01:06 PM       Lab Results   Component Value Date/Time    WBC 8.1 02/22/2022 01:06 PM    Hemoglobin, POC 12.6 04/12/2013 09:46 AM    HGB 13.1 02/22/2022 01:06 PM    Hematocrit, POC 37 04/12/2013 09:46 AM    HCT 42.0 02/22/2022 01:06 PM    PLATELET 252 95/08/4956 01:06 PM    MCV 92.1 02/22/2022 01:06 PM       Lab Results   Component Value Date/Time    Hemoglobin A1c 5.8 (H) 02/22/2022 01:06 PM    Hemoglobin A1c (POC) 6.1 06/23/2017 11:53 AM    Hemoglobin A1c, External 6.0 11/06/2021 12:00 AM       Lab Results   Component Value Date/Time    Cholesterol, total 247 (H) 02/22/2022 01:06 PM    HDL Cholesterol 76 (H) 02/22/2022 01:06 PM    LDL, calculated 146.6 (H) 02/22/2022 01:06 PM    VLDL, calculated 24.4 02/22/2022 01:06 PM    Triglyceride 122 02/22/2022 01:06 PM    CHOL/HDL Ratio 3.3 02/22/2022 01:06 PM         Patient Care Team:  Tawana Farias MD as PCP - General (Family Medicine)  Tawana Farias MD as PCP - [address] [place] [address] Provider  Martell Madison MD (Neurology)  James Arias MD (Gastroenterology)  Robles Orr DO (Cardiology)  Diana Mart MD (Endocrinology)  Darinel Gallegos (Physician Assistant)  Arturo Kessler RN as Ambulatory Care Manager  Jeramie Nevarez MD as Consulting Provider (Neurology)    End-of-life planning  Advanced Directive in the case than an injury or illness causes the patient to be unable to make health care decisions was discussed with the patient. Advice/Referrals/Counselling/Plan:   Education and counseling provided:  Are appropriate based on today's review and evaluation  End-of-Life planning (with patient's consent)  Pneumococcal Vaccine  Influenza Vaccine  Screening Mammography  Screening Pap and pelvic (covered once every 2 years)  Colorectal cancer screening tests  Cardiovascular screening blood test  Bone mass measurement (DEXA)  Screening for glaucoma  Diabetes screening test  Include in education list (weight loss, physical activity, smoking cessation, fall prevention, and nutrition)    ICD-10-CM ICD-9-CM    1. Hyperlipidemia, unspecified hyperlipidemia type  E78.5 272.4 REFERRAL TO DIETITIAN      LIPID PANEL   2. Hypertension  I11.9 402.10 REFERRAL TO DIETITIAN   3. Chronic gout of foot, unspecified cause, unspecified laterality  M1A.0790 274.02 REFERRAL TO DIETITIAN   4. Thyroid nodule  E04.1 241.0 US THYROID/PARATHYROID/SOFT TISS   5. Encounter for immunization  Z23 V03.89 PNEUMOCOCCAL POLYSACCHARIDE VACCINE, 23-VALENT, ADULT OR IMMUNOSUPPRESSED PT DOSE,      ADMIN PNEUMOCOCCAL VACCINE   6. Chronic left shoulder pain  M25.512 719.41     G89.29 338.29    7. Prediabetes  R73.03 790.29      reviewed diet, exercise and weight control. Brief written plan, checklist    Assessment/Plan:    Health Maintenance:  - The pneumococcal vaccine was given today.   - Requesting her last eye exam  - I encouraged her to get all recommended vaccinations. ORDERS:  - PNEUMOCOCCAL POLYSACCHARIDE VACCINE, 23-VALENT, ADULT OR IMMUNOSUPPRESSED PT DOSE,  - ADMIN PNEUMOCOCCAL VACCINE        Lab review: labs are reviewed in the [address] [place] [address] (ACP) Provider Conversation     Date of ACP Conversation:  4/4/22  Persons included in Conversation:  patient    Authorized Decision Maker (if patient is incapable of making informed decisions): This person is:   Next of Kin by law (only applies in absence of a Healthcare Power of  or Legal Guardian)          For Patients with Decision Making Capacity:   Values/Goals: Exploration of values, goals, and preferences if recovery is not expected, even with continued medical treatment in the event of:  Imminent death  Severe, permanent brain injury    Conversation Outcomes / Follow-Up Plan:   ACP in process - information provided, considering goals and options. She was given an advance directive to complete today. Her daughter's contact info is already in her chart. She wants her daughter to be her primary POA. I have discussed the diagnosis with the patient and the intended plan as seen in the above orders. The patient has received an after-visit summary and questions were answered concerning future plans. I have discussed medication side effects and warnings with the patient as well. I have reviewed the plan of care with the patient, accepted their input and they are in agreement with the treatment goals. Follow-up and Dispositions    · Return in about 3 months (around 7/4/2022) for BP check.

## 2022-04-12 NOTE — ACP (ADVANCE CARE PLANNING)
Advance Care Planning  Advance Care Planning (ACP) Provider Conversation     Date of ACP Conversation:  4/4/22  Persons included in Conversation:  patient    Authorized Decision Maker (if patient is incapable of making informed decisions): This person is:   Next of Kin by law (only applies in absence of a Healthcare Power of  or Legal Guardian)          For Patients with Decision Making Capacity:   Values/Goals: Exploration of values, goals, and preferences if recovery is not expected, even with continued medical treatment in the event of:  Imminent death  Severe, permanent brain injury    Conversation Outcomes / Follow-Up Plan:   ACP in process - information provided, considering goals and options. She was given an advance directive to complete today. Her daughter's contact info is already in her chart. She wants her daughter to be her primary POA.      Dr. Xiomara Harris  [facility] [address].  Phone: [phone]  Fax: [phone]

## 2022-04-22 ENCOUNTER — HOSPITAL ENCOUNTER (OUTPATIENT)
Dept: MAMMOGRAPHY | Age: 77
Discharge: HOME | End: 2022-04-22
Attending: INTERNAL MEDICINE
Payer: MEDICARE

## 2022-04-22 DIAGNOSIS — Z12.31 VISIT FOR SCREENING MAMMOGRAM: ICD-10-CM

## 2022-04-22 PROCEDURE — 77063 BREAST TOMOSYNTHESIS BI: CPT

## 2022-05-06 ENCOUNTER — OFFICE VISIT (OUTPATIENT)
Dept: ORTHOPEDIC SURGERY | Age: 77
End: 2022-05-06
Payer: MEDICARE

## 2022-05-06 VITALS — HEART RATE: 60 BPM | OXYGEN SATURATION: 95 % | TEMPERATURE: 97.3 F

## 2022-05-06 DIAGNOSIS — G89.29 CHRONIC PAIN OF BOTH SHOULDERS: ICD-10-CM

## 2022-05-06 DIAGNOSIS — M75.101 NONTRAUMATIC TEAR OF RIGHT ROTATOR CUFF, UNSPECIFIED TEAR EXTENT: ICD-10-CM

## 2022-05-06 DIAGNOSIS — M19.012 PRIMARY OSTEOARTHRITIS OF LEFT SHOULDER: Primary | ICD-10-CM

## 2022-05-06 DIAGNOSIS — M25.511 CHRONIC PAIN OF BOTH SHOULDERS: ICD-10-CM

## 2022-05-06 DIAGNOSIS — M25.512 CHRONIC PAIN OF BOTH SHOULDERS: ICD-10-CM

## 2022-05-06 PROCEDURE — 20610 DRAIN/INJ JOINT/BURSA W/O US: CPT | Performed by: ORTHOPAEDIC SURGERY

## 2022-05-06 RX ORDER — TRIAMCINOLONE ACETONIDE 40 MG/ML
40 INJECTION, SUSPENSION INTRA-ARTICULAR; INTRAMUSCULAR ONCE
Status: COMPLETED | OUTPATIENT
Start: 2022-05-06 | End: 2022-05-06

## 2022-05-06 RX ADMIN — TRIAMCINOLONE ACETONIDE 40 MG: 40 INJECTION, SUSPENSION INTRA-ARTICULAR; INTRAMUSCULAR at 12:04

## 2022-05-06 NOTE — PROGRESS NOTES
Patient: Khalif Edwards                MRN: [id number]       SSN: [id number]  YOB: 1945        AGE: 76 y.o. SEX: female  There is no height or weight on file to calculate BMI. PCP: Ivis Austin MD  05/06/22    Chief Complaint: Left shoulder pain, right shoulder pain    HPI: Khalif Edwards is a 76 y.o. female patient who returns to the office today for bilateral shoulder pain. She continues to have left shoulder pain and started to have some right shoulder pain. Pain of left shoulder is with range of motion. The pain in the right shoulder is also with range of motion trying to raise the arm above shoulder height. Past Medical History:   Diagnosis Date    Atrophic vaginitis     Cardiac cath 05/30/2012    Patent coronary arteries. LVEDP 20.  RA 11.  RV 42/10. PA 42/21. W 18.  CO 6.4 (TD), 6.4 (Faythe Highman). PVR 1.7 Wood units. False-positive nuclear study.     Diabetes     diet controlled, hypoglycemia from metformin previously     Dyslipidemia     Fatty liver     Fibrocystic breast disease     GERD     Gout     H/O colonoscopy 4/12/13    polyp    Hypertension     Macular degeneration     Morbid obesity ([address]     Obstructive sleep apnea     Peripheral neuropathy     Rectocele     Thyroid cyst     bilat       Family History   Problem Relation Age of Onset    Cancer Mother         colon cancer    Colon Cancer Mother     Hypertension Mother     Diabetes Mother     Heart Disease Mother     Coronary Art Dis Mother     Hypertension Father     Diabetes Father     Heart Disease Father     Coronary Art Dis Father     Hypertension Sister     Diabetes Sister     Heart Disease Sister     Coronary Art Dis Sister     Hypertension Brother     Diabetes Brother     Heart Disease Brother     Coronary Art Dis Brother     Cancer Brother        Current Outpatient Medications   Medication Sig Dispense Refill    diclofenac (VOLTAREN) 1 % gel Apply 4 grams to left shoulder  g 3    pantoprazole (PROTONIX) 40 mg tablet Take 1 Tablet by mouth two (2) times a day. 180 Tablet 2    losartan (COZAAR) 25 mg tablet Take 1 Tablet by mouth two (2) times a day. 180 Tablet 2    atenoloL (TENORMIN) 25 mg tablet Take 1 Tablet by mouth daily. 90 Tablet 2    potassium chloride (K-DUR, KLOR-CON M20) 20 mEq tablet Take 1 Tablet by mouth two (2) times a day. 180 Tablet 2    furosemide (LASIX) 20 mg tablet Take 1 Tablet by mouth daily. 90 Tablet 2    ubidecarenone (coenzyme Q10) 100 mg tab Take 100 mg by mouth daily. 1 Tablet 0    Blood-Glucose Meter (TRUE METRIX AIR GLUCOSE METER) misc Check blood sugar twice daily. 1 Each 0    glucose blood VI test strips (TRUE METRIX GLUCOSE TEST STRIP) strip Check blood sugar twice daily. 100 Strip 2    lancets (ULTRA THIN LANCETS) 30 gauge misc Check blood sugar twice daily. 1 Package 2    diclofenac (VOLTAREN) 1 % gel Apply  to affected area four (4) times daily. 100 g 1    CALCIUM PO Take  by mouth.  fluticasone (FLONASE) 50 mcg/actuation nasal spray Si spray in both nostril twice daily 1 Bottle 1    cholecalciferol (VITAMIN D3) 1,000 unit tablet Take 2,000 Units by mouth daily.  aspirin 81 mg Tab Take 81 mg by mouth daily.  MULTIVITAMINS (MULTI-VITAMIN PO) Take 1 Tab by mouth daily.          Allergies   Allergen Reactions    Latex Rash    Nexium [Esomeprazole Magnesium] Swelling and Other (comments)     Lips Swollen, and facial rash and swelling    Dexilant [Dexlansoprazole] Other (comments)     Stomach swelling    Crestor [Rosuvastatin] Other (comments)     Upper respiratory illness    Lipitor [Atorvastatin] Swelling    Lisinopril Unknown (comments)     Patient can't recall    Niaspan [Niacin] Other (comments)     Scratchy throat, \"asthma\" like symptoms    Pcn [Penicillins] Hives    Sulfa (Sulfonamide Antibiotics) Rash    Zocor [Simvastatin] Myalgia and Other (comments)     Per patient chart \"(? Rash)\"       Past Surgical History:   Procedure Laterality Date    [facility] ARTERIAL ARTERIOTOMY 3M  2012    HX COLONOSCOPY  2013    HX COLONOSCOPY  2018    HX HEART CATHETERIZATION  2012    HX HERNIA REPAIR      umbilical as a child    HX HYSTERECTOMY      52ys ago, QUIQUE, BSO    HX MOHS PROCEDURES      right    NV ANESTH,SURGERY OF SHOULDER         Social History     Socioeconomic History    Marital status: LEGALLY      Spouse name: Not on file    Number of children: Not on file    Years of education: Not on file    Highest education level: Not on file   Occupational History    Not on file   Tobacco Use    Smoking status: Former Smoker     Quit date: 1974     Years since quittin.8    Smokeless tobacco: Never Used    Tobacco comment: 1 pack every 2 weeks x 1 year, stopped 45yrs ago   Substance and Sexual Activity    Alcohol use: Yes    Drug use: No    Sexual activity: Yes     Partners: Male   Other Topics Concern    Not on file   Social History Narrative    Not on file     Social Determinants of Health     Financial Resource Strain:     Difficulty of Paying Living Expenses: Not on file   Food Insecurity:     Worried About Running Out of Food in the Last Year: Not on file    Long of Food in the Last Year: Not on file   Transportation Needs:     Lack of Transportation (Medical): Not on file    Lack of Transportation (Non-Medical):  Not on file   Physical Activity:     Days of Exercise per Week: Not on file    Minutes of Exercise per Session: Not on file   Stress:     Feeling of Stress : Not on file   Social Connections:     Frequency of Communication with Friends and Family: Not on file    Frequency of Social Gatherings with Friends and Family: Not on file    Attends Yazidi Services: Not on file    Active Member of Clubs or Organizations: Not on file    Attends Club or Organization Meetings: Not on file    Marital Status: Not on file Intimate Partner Violence:     Fear of Current or Ex-Partner: Not on file    Emotionally Abused: Not on file    Physically Abused: Not on file    Sexually Abused: Not on file   Housing Stability:     Unable to Pay for Housing in the Last Year: Not on file    Number of [place] in the Last Year: Not on file    Unstable Housing in the Last Year: Not on file       REVIEW OF SYSTEMS:      No changes from previous review of systems unless noted. PHYSICAL EXAMINATION:  Visit Vitals  Pulse 60   Temp 97.3 °F (36.3 °C) (Temporal)   LMP  (LMP Unknown)   SpO2 95%     There is no height or weight on file to calculate BMI. GENERAL: Alert and oriented x3, in no acute distress. HEENT: Normocephalic, atraumatic. Shoulder Examination     R   L  ROM   FF  Full   90  ER  Full   20   IR  Full   Hip  Rotator Cuff Pain   Supra  +   +   Infra  +   -   Subscap +   -  Crepitus  -   +  Effusion  -   -  Warmth  -   -   Erythema  -   -  Instability  -   -  AC Joint TTP  -   -  Clavicle   Deformity -   -   TTP  -   -  Proximal Humerus   Deformity -   -   TTP  -   -  Deltoid Strength 5   5  Biceps Strength 5   5  Biceps Deformity -   -  Biceps Groove Pain -   -  Impingement Sign -   -       IMAGING:      ASSESSMENT & PLAN  Diagnosis: Right shoulder rotator cuff tear, left shoulder glenohumeral osteoarthritis likely rotator cuff arthropathy    Cintia Funk continues to have symptomatic shoulder pain of both shoulders. The left shoulder has known osteoarthritis. The right shoulder likely has a rotator cuff injury. I continue to recommend surgery for the left shoulder which would likely be a reverse shoulder arthroplasty as a definitive management of her shoulder pain and symptoms. However she is not interested in surgery at this time. She would like to try corticosteroid injection in the left shoulder. We also discussed this for the right but she would like to hold off on that today.   I will see her back as needed. Prescription medication management discussed with patient. [facility]  OFFICE PROCEDURE PROGRESS NOTE        Chart reviewed for the following:   Robinson Thomas MD, have reviewed the History, Physical and updated the Allergic reactions for Margarette Felix 31 performed immediately prior to start of procedure:   Robinson Thomas MD, have performed the following reviews on Ham Whipple prior to the start of the procedure:            * Patient was identified by name and date of birth   * Agreement on procedure being performed was verified  * Risks and Benefits explained to the patient  * Procedure site verified and marked as necessary  * Patient was positioned for comfort  * Consent was signed and verified    Time: 12:04 PM    Location: Left shoulder joint intra-articular injection  GHJ    Kenalog 40mg & 3cc Lidocaine    Date of procedure: 5/6/2022    Procedure performed by:  Gera Alva MD    Provider assisted by: In Office MA    Patient assisted by: self    How tolerated by patient: tolerated the procedure well with no complications    Post Procedural Pain Scale: 0 - No Hurt    Comments: none      Electronically signed by: Gera Alva MD    Note: This note was completed using voice recognition software.   Any typographical/name errors or mistakes are unintentional.

## 2022-05-13 ENCOUNTER — HOSPITAL ENCOUNTER (OUTPATIENT)
Dept: LAB | Age: 77
Discharge: HOME | End: 2022-05-13

## 2022-05-13 ENCOUNTER — TELEPHONE (OUTPATIENT)
Dept: INTERNAL MEDICINE CLINIC | Age: 77
End: 2022-05-13

## 2022-05-13 ENCOUNTER — HOSPITAL ENCOUNTER (OUTPATIENT)
Dept: NUTRITION | Age: 77
Discharge: HOME | End: 2022-05-13
Payer: MEDICARE

## 2022-05-13 DIAGNOSIS — E78.5 HYPERLIPIDEMIA, UNSPECIFIED HYPERLIPIDEMIA TYPE: ICD-10-CM

## 2022-05-13 DIAGNOSIS — M79.10 MYALGIA: ICD-10-CM

## 2022-05-13 DIAGNOSIS — M79.10 MYALGIA: Primary | ICD-10-CM

## 2022-05-13 LAB — XX-LABCORP SPECIMEN COL,LCBCF: NORMAL

## 2022-05-13 PROCEDURE — 97802 MEDICAL NUTRITION INDIV IN: CPT

## 2022-05-13 PROCEDURE — 99001 SPECIMEN HANDLING PT-LAB: CPT

## 2022-05-13 NOTE — TELEPHONE ENCOUNTER
Patient stating since she has cut back on salt intake her toes are cramping. She is going to [facility] today to have her labs drawn and wants to know if Potassium can be added to that order.

## 2022-05-13 NOTE — PROGRESS NOTES
[facility] - [facility] Services     Nutrition Assessment - Medical Nutrition Therapy   Outpatient Initial Evaluation         Patient Name: Ham Whipple : 1945   Treatment Diagnosis: Hypertensive heart disease without heart failure, Idiopathic chronic gout, unspecified ankle and foot, without tophus (tophi), Hyperlipidemia, unspecified   Referral Source: Yifan Genao MD [facility]): 2022     Gender: female Age: 76 y.o. Ht: 63 in Wt:  232 lb  kg   BMI: 41.1 BMR   Male  BMR Female [id number]     Past Medical History:  Prediabetes, High Blood Pressure, Gout, Arthritis     Pertinent Medications:   Potassium, Lasix, Losartan, Omeprazole, Vitamin D, Multivitamin, Aspirin      Biochemical Data:   Lab Results   Component Value Date/Time    Hemoglobin A1c 5.8 (H) 2022 01:06 PM    Hemoglobin A1c (POC) 6.1 2017 11:53 AM    Hemoglobin A1c, External 6.0 2021 12:00 AM     Lab Results   Component Value Date/Time    Sodium 140 2022 01:06 PM    Potassium 4.3 2022 01:06 PM    Chloride 104 2022 01:06 PM    CO2 32 2022 01:06 PM    Anion gap 4 2022 01:06 PM    Glucose 87 2022 01:06 PM    BUN 14 2022 01:06 PM    Creatinine 0.79 2022 01:06 PM    BUN/Creatinine ratio 18 2022 01:06 PM    GFR est AA >60 2022 01:06 PM    GFR est non-AA >60 2022 01:06 PM    Calcium 9.5 2022 01:06 PM    Bilirubin, total 0.6 2022 01:06 PM    Alk.  phosphatase 82 2022 01:06 PM    Protein, total 7.6 2022 01:06 PM    Albumin 3.7 2022 01:06 PM    Globulin 3.9 2022 01:06 PM    A-G Ratio 0.9 2022 01:06 PM    ALT (SGPT) 16 2022 01:06 PM    AST (SGOT) 20 2022 01:06 PM     Lab Results   Component Value Date/Time    Cholesterol, total 247 (H) 2022 01:06 PM    HDL Cholesterol 76 (H) 2022 01:06 PM    LDL-C, External 115 2021 12:00 AM    LDL, calculated 146.6 (H) 2022 01:06 PM    VLDL, calculated 24.4 02/22/2022 01:06 PM    Triglyceride 122 02/22/2022 01:06 PM    CHOL/HDL Ratio 3.3 02/22/2022 01:06 PM     Lab Results   Component Value Date/Time    ALT (SGPT) 16 02/22/2022 01:06 PM    AST (SGOT) 20 02/22/2022 01:06 PM    Alk. phosphatase 82 02/22/2022 01:06 PM    Bilirubin, direct <0.1 04/17/2019 07:56 PM    Bilirubin, total 0.6 02/22/2022 01:06 PM     Lab Results   Component Value Date/Time    Creatinine, POC 0.8 07/06/2018 01:12 PM    Creatinine 0.79 02/22/2022 01:06 PM     Lab Results   Component Value Date/Time    BUN 14 02/22/2022 01:06 PM    BUN, POC 7 04/12/2013 09:46 AM     Lab Results   Component Value Date/Time    Microalbumin/Creat ratio (mg/g creat) 5 02/22/2022 01:06 PM    Microalbumin,urine random 1.04 02/22/2022 01:06 PM        Assessment:    Patient present today to learn about nutrition related to HTN, PreDM, and living overall healthier lifestyle. Patient reports that she has been attempting to make changes to her diet over the past few weeks and has noticed great improvements in her blood sugar just by monitoring salt intake. 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 Patient reports that she normally eats the foods that her brother enjoys to avoid having to prepare two different meals. Patient has come to realize that the foods her brother prefers are not what she should be eating daily to improved her blood pressure and prediabetes. Patient reports that she would like to start walking for exercise but is not comfortable starting until her blood pressure is well controlled. Food & Nutrition: 24 Hour Recall:  Breakfast: oatmeal, applesauce   Lunch: salad (turkey, raisins, croutons)  Dinner: turkey, corn, onions   Drinks: diet tea, water   Snacks: applesauce, popcorn     Patient reports that intake above does not represent her long-term eating habits. Patient has recently made changes to diet.  Patient reports intake of salty foods, along with foods high in saturated fats before making changes. Estimate Needs   Calories:  1600 Protein: 100 Carbs: 180 Fat: 53   Kcal/day  g/day  g/day  g/day        percent: 25  39  30               Nutrition Diagnosis   Undesirable food choices related to food and nutrition related knowledge deficit as evidenced by patient reporting that she is very confused on what foods to eat and reports no prior nutrition education. Nutrition Intervention &  Education: Educated patient on the need for a consistent carbohydrate intake related to diabetic control and weight loss. Educated patient on nutrition label reading, emphasizing carbohydrates and serving size. Educated patient on protein sources, encouraged patient to incorporate mostly lean protein source with all carbohydrates. Encouraged patient to exercise after meals when possible. Handouts Provided: [x]  Carbohydrates  [x]  Protein  [x]  Non-starchy Vegatbles  [x]  Food Label  [x]  Meal and Snack Ideas  []  Food Journals [x]  Diabetes  []  Cholesterol  []  Sodium  []  Gen Nutr Guidelines  []  SBGM Guidelines  []  Others:   Information Reviewed with: Patient    Readiness to Change Stage: []  Pre-contemplative    []  Contemplative  [x]  Preparation               []  Action                  []  Maintenance   Potential Barriers to Learning: []  Decline in memory    []  Language barrier   []  Other:  []  Emotional                  []  Limited mobility  [x]  Lack of motivation     [] Vision, hearing or cognitive impairment   Expected Compliance: Fair      Nutritional Goal - To promote lifestyle changes to result in:    [x]  Weight loss  [x]  Improved diabetic control  []  Decreased cholesterol levels  [x]  Decreased blood pressure  []  Weight maintenance []  Preventing any interactions associated with food allergies  []  Adequate weight gain toward goal weight  []  Other:        Patient Goals:   1. Patient will consume three meals per day 4-5 hours apart. 2. Patient will include a protein at all meals and snacks. 3. Patient will drink 64 ounces of water daily.       Dietitian Signature: Rudy Levy RD Date: 5/13/2022   Follow-up:  Time: 12:18 PM

## 2022-05-13 NOTE — TELEPHONE ENCOUNTER
Please let her know that I ordered labs per her request.  Potassium is being checked along with a CPK muscle blood test given her cramping symptoms.     Dr. Reanna Jose  [facility], [address] [place] [address], [facility] [address]  Phone: [phone]  Fax: [phone]

## 2022-05-14 LAB
BUN SERPL-MCNC: 13 MG/DL (ref 8–27)
BUN/CREAT SERPL: 14 (ref 12–28)
CALCIUM SERPL-MCNC: 9.4 MG/DL (ref 8.7–10.3)
CHLORIDE SERPL-SCNC: 100 MMOL/L (ref 96–106)
CHOLEST SERPL-MCNC: 237 MG/DL (ref 100–199)
CK SERPL-CCNC: 63 U/L (ref 32–182)
CO2 SERPL-SCNC: 26 MMOL/L (ref 20–29)
CREAT SERPL-MCNC: 0.94 MG/DL (ref 0.57–1)
EGFR: 62 ML/MIN/1.73
GLUCOSE SERPL-MCNC: 92 MG/DL (ref 65–99)
HDLC SERPL-MCNC: 67 MG/DL
IMP & REVIEW OF LAB RESULTS: NORMAL
LDLC SERPL CALC-MCNC: 157 MG/DL (ref 0–99)
POTASSIUM SERPL-SCNC: 4.2 MMOL/L (ref 3.5–5.2)
SODIUM SERPL-SCNC: 141 MMOL/L (ref 134–144)
TRIGL SERPL-MCNC: 76 MG/DL (ref 0–149)
VLDLC SERPL CALC-MCNC: 13 MG/DL (ref 5–40)

## 2022-05-17 NOTE — TELEPHONE ENCOUNTER
Please let her know that her electrolytes are normal.  Her kidney function labs are normal.  Her cholesterol has worsened. Her total cholesterol is 237. Her LDL is 157, up from 146 in February. Her triglycerides are 76. Her HDL is down to 67 from 76.   Did she schedule with a nutritionist?    Dr. Gabriel Larios  [facility], [address] [place] [address], [address] [place], [place] [address] [facility] [address]  Phone: [phone]  Fax: [phone]

## 2022-05-18 ENCOUNTER — TELEPHONE (OUTPATIENT)
Dept: INTERNAL MEDICINE CLINIC | Age: 77
End: 2022-05-18

## 2022-05-18 NOTE — TELEPHONE ENCOUNTER
I called the patient and advised. Patient verbalized understanding. Patient saw the nutritionist on 5/13/2022. Patient feels like she is eating healthy and is asking if there is another medication she can take to bring her cholesterol into better control.

## 2022-05-18 NOTE — TELEPHONE ENCOUNTER
Patient reached and given results. Patient was seen by Nutrition on 5-13-22, see note in EHR. Patient asking if she could take something for the cholesterol, but not everyday.

## 2022-05-18 NOTE — TELEPHONE ENCOUNTER
----- Message from Gilmar Corral MD sent at 5/17/2022  4:29 PM EDT -----  Please let her know that her electrolytes are normal.  Her kidney function labs are normal.  Her cholesterol has worsened. Her total cholesterol is 237. Her LDL is 157, up from 146 in February. Her triglycerides are 76. Her HDL is down to 67 from 76.   Did she schedule with a nutritionist?    Dr. Arelia Litten  [facility] [address], [address] [place] [address] Str[address]  Phone: [phone]  Fax: [phone]

## 2022-05-19 RX ORDER — PRAVASTATIN SODIUM 10 MG/1
10 TABLET ORAL EVERY OTHER DAY
Qty: 15 TABLET | Refills: 11 | Status: SHIPPED | OUTPATIENT
Start: 2022-05-19 | End: 2022-08-03

## 2022-05-19 NOTE — TELEPHONE ENCOUNTER
Patient reached and given message. Patient asked if it was important to eat fiber. I explained that increased fiber will help with her cholesterol levels. Patient states that the handout that was given to her at the nutrition appointment referenced carbs. I gave the patient the number to nutrition office so that she may clarify what is important for her to consume.

## 2022-05-23 ENCOUNTER — OFFICE VISIT (OUTPATIENT)
Dept: CARDIOLOGY CLINIC | Age: 77
End: 2022-05-23
Payer: MEDICARE

## 2022-05-23 VITALS
WEIGHT: 235 LBS | SYSTOLIC BLOOD PRESSURE: 132 MMHG | HEIGHT: 63 IN | BODY MASS INDEX: 41.64 KG/M2 | DIASTOLIC BLOOD PRESSURE: 78 MMHG | OXYGEN SATURATION: 98 % | HEART RATE: 74 BPM

## 2022-05-23 DIAGNOSIS — E78.00 PURE HYPERCHOLESTEROLEMIA: ICD-10-CM

## 2022-05-23 DIAGNOSIS — R00.2 PALPITATIONS: ICD-10-CM

## 2022-05-23 DIAGNOSIS — I10 ESSENTIAL HYPERTENSION WITH GOAL BLOOD PRESSURE LESS THAN 140/90: Primary | ICD-10-CM

## 2022-05-23 PROCEDURE — G8417 CALC BMI ABV UP PARAM F/U: HCPCS | Performed by: INTERNAL MEDICINE

## 2022-05-23 PROCEDURE — G8399 PT W/DXA RESULTS DOCUMENT: HCPCS | Performed by: INTERNAL MEDICINE

## 2022-05-23 PROCEDURE — 99214 OFFICE O/P EST MOD 30 MIN: CPT | Performed by: INTERNAL MEDICINE

## 2022-05-23 PROCEDURE — 1101F PT FALLS ASSESS-DOCD LE1/YR: CPT | Performed by: INTERNAL MEDICINE

## 2022-05-23 PROCEDURE — G8752 SYS BP LESS 140: HCPCS | Performed by: INTERNAL MEDICINE

## 2022-05-23 PROCEDURE — G8428 CUR MEDS NOT DOCUMENT: HCPCS | Performed by: INTERNAL MEDICINE

## 2022-05-23 PROCEDURE — G8536 NO DOC ELDER MAL SCRN: HCPCS | Performed by: INTERNAL MEDICINE

## 2022-05-23 PROCEDURE — G8510 SCR DEP NEG, NO PLAN REQD: HCPCS | Performed by: INTERNAL MEDICINE

## 2022-05-23 PROCEDURE — 1090F PRES/ABSN URINE INCON ASSESS: CPT | Performed by: INTERNAL MEDICINE

## 2022-05-23 PROCEDURE — G8754 DIAS BP LESS 90: HCPCS | Performed by: INTERNAL MEDICINE

## 2022-05-23 NOTE — PROGRESS NOTES
[facility]    Ms. Paul Uriarte is 76 y. o.female with a history of pulmonary hypertension, diabetes, hypertension, hyperlipidemia, sleep apnea    Patient is here today for cardiac evaluation. She denies any prior history of MI or CHF  Patient has longstanding history of palpitation on and off. She underwent event monitoring in 07/2021, low risk without any evidence of arrhythmia or significant PVC burden    Patient denies any cardiac symptoms. In fact, patient has stopped taking atenolol because of fatigue and tiredness. This was stopped under supervision of PCP. She has been feeling much better since then. Her blood pressure at home is running 885-302 systolic. She denies any angina or heart failure symptoms. Denies any nausea, vomiting, abdominal pain, fever, chills, sputum production. No hematuria or other bleeding complaints    Past Medical History:   Diagnosis Date    Atrophic vaginitis     Cardiac cath 05/30/2012    Patent coronary arteries. LVEDP 20.  RA 11.  RV 42/10. PA 42/21. W 18.  CO 6.4 (TD), 6.4 (Rachel Furl). PVR 1.7 Wood units. False-positive nuclear study.  Diabetes     diet controlled, hypoglycemia from metformin previously     Dyslipidemia     Fatty liver     Fibrocystic breast disease     GERD     Gout     H/O colonoscopy 4/12/13    polyp    Hypertension     Macular degeneration     Morbid obesity ([address]     Obstructive sleep apnea     Peripheral neuropathy     Rectocele     Thyroid cyst     bilat       Review of Systems:  Cardiac symptoms as noted above in HPI. All others negative. Current Outpatient Medications   Medication Sig    pravastatin (PRAVACHOL) 10 mg tablet Take 1 Tablet by mouth every other day.  diclofenac (VOLTAREN) 1 % gel Apply 4 grams to left shoulder QID    pantoprazole (PROTONIX) 40 mg tablet Take 1 Tablet by mouth two (2) times a day.     losartan (COZAAR) 25 mg tablet Take 1 Tablet by mouth two (2) times a day.  potassium chloride (K-DUR, KLOR-CON M20) 20 mEq tablet Take 1 Tablet by mouth two (2) times a day.  furosemide (LASIX) 20 mg tablet Take 1 Tablet by mouth daily.  ubidecarenone (coenzyme Q10) 100 mg tab Take 100 mg by mouth daily.  Blood-Glucose Meter (TRUE METRIX AIR GLUCOSE METER) misc Check blood sugar twice daily.  glucose blood VI test strips (TRUE METRIX GLUCOSE TEST STRIP) strip Check blood sugar twice daily.  lancets (ULTRA THIN LANCETS) 30 gauge misc Check blood sugar twice daily.  diclofenac (VOLTAREN) 1 % gel Apply  to affected area four (4) times daily.  CALCIUM PO Take  by mouth.  fluticasone (FLONASE) 50 mcg/actuation nasal spray Si spray in both nostril twice daily    cholecalciferol (VITAMIN D3) 1,000 unit tablet Take 2,000 Units by mouth daily.  aspirin 81 mg Tab Take 81 mg by mouth daily.  MULTIVITAMINS (MULTI-VITAMIN PO) Take 1 Tab by mouth daily.  atenoloL (TENORMIN) 25 mg tablet Take 1 Tablet by mouth daily. (Patient not taking: Reported on 2022)     No current facility-administered medications for this visit.        Past Surgical History:   Procedure Laterality Date    [facility]  [facility]    HX COLONOSCOPY  2013    HX COLONOSCOPY  2018    HX HEART CATHETERIZATION  2012    HX HERNIA REPAIR      umbilical as a child    HX HYSTERECTOMY      50ys ago, QUIQUE, BSO    HX MOHS PROCEDURES      right    RI ANESTH,SURGERY OF SHOULDER         Allergies and Sensitivities:  Allergies   Allergen Reactions    Latex Rash    Nexium [Esomeprazole Magnesium] Swelling and Other (comments)     Lips Swollen, and facial rash and swelling    Dexilant [Dexlansoprazole] Other (comments)     Stomach swelling    Crestor [Rosuvastatin] Other (comments)     Upper respiratory illness    Lipitor [Atorvastatin] Swelling    Lisinopril Unknown (comments)     Patient can't recall    Niaspan [Niacin] Other (comments)     Scratchy throat, \"asthma\" like symptoms    Pcn [Penicillins] Hives    Sulfa (Sulfonamide Antibiotics) Rash    Zocor [Simvastatin] Myalgia and Other (comments)     Per patient chart \"(? Rash)\"       Family History:  Family History   Problem Relation Age of Onset   Figueroa Saldaña Cancer Mother         colon cancer    Colon Cancer Mother    Figueroa Saldaña Hypertension Mother     Diabetes Mother     Heart Disease Mother     Coronary Art Dis Mother     Hypertension Father     Diabetes Father     Heart Disease Father     Coronary Art Dis Father     Hypertension Sister     Diabetes Sister     Heart Disease Sister     Coronary Art Dis Sister     Hypertension Brother     Diabetes Brother     Heart Disease Brother     Coronary Art Dis Brother     Cancer Brother        Social History:  Social History     Tobacco Use    Smoking status: Former Smoker     Quit date: 1974     Years since quittin.8    Smokeless tobacco: Never Used    Tobacco comment: 1 pack every 2 weeks x 1 year, stopped 45yrs ago   Vaping Use    Vaping Use: Never used   Substance Use Topics    Alcohol use: Yes    Drug use: No     She  reports that she quit smoking about 47 years ago. She has never used smokeless tobacco.  She  reports current alcohol use. Physical Exam:  BP Readings from Last 3 Encounters:   22 132/78   22 (!) 179/84   22 129/73         Pulse Readings from Last 3 Encounters:   22 74   22 60   22 (!) 58          Wt Readings from Last 3 Encounters:   22 106.6 kg (235 lb)   22 109.8 kg (242 lb)   22 109.3 kg (241 lb)       Constitutional: Oriented to person, place, and time. HENT: Head: Normocephalic and atraumatic. Neck: No JVD present. Cardiovascular: Regular rhythm. No murmur, gallop or rubs appreciated  Lung: Breath sounds normal. No respiratory distress. No ronchi or rales appreciated  Abdominal: No tenderness. No rebound and no guarding. Musculoskeletal: There is no lower extremity edema. No cynosis    LABS:   @  Lab Results   Component Value Date/Time    WBC 8.1 02/22/2022 01:06 PM    Hemoglobin, POC 12.6 04/12/2013 09:46 AM    HGB 13.1 02/22/2022 01:06 PM    Hematocrit, POC 37 04/12/2013 09:46 AM    HCT 42.0 02/22/2022 01:06 PM    PLATELET 680 83/77/5753 01:06 PM    MCV 92.1 02/22/2022 01:06 PM     Lab Results   Component Value Date/Time    Sodium 141 05/13/2022 12:00 AM    Potassium 4.2 05/13/2022 12:00 AM    Chloride 100 05/13/2022 12:00 AM    CO2 26 05/13/2022 12:00 AM    Glucose 92 05/13/2022 12:00 AM    BUN 13 05/13/2022 12:00 AM    Creatinine 0.94 05/13/2022 12:00 AM     Lipids Latest Ref Rng & Units 5/13/2022 2/22/2022 6/12/2019 2/26/2019 8/7/2018   Chol, Total 100 - 199 mg/dL 237(H) 247(H) 209(H) 190 169   HDL >39 mg/dL 67 76(H) 59 63 62(H)   LDL 0 - 99 mg/dL 157(H) 146. 6(H) 134(H) 111(H) 85.6   Trig 0 - 149 mg/dL 76 122 81 80 107   Chol/HDL Ratio 0 - 5.0   - 3.3 - - 2.7   Some recent data might be hidden     Lab Results   Component Value Date/Time    ALT (SGPT) 16 02/22/2022 01:06 PM     Lab Results   Component Value Date/Time    Hemoglobin A1c 5.8 (H) 02/22/2022 01:06 PM    Hemoglobin A1c (POC) 6.1 06/23/2017 11:53 AM    Hemoglobin A1c, External 6.0 11/06/2021 12:00 AM     Lab Results   Component Value Date/Time    TSH 1.60 02/22/2022 01:06 PM       EKG  04/2021: Sinus rhythm at 70 bpm.  Nonspecific T wave changes. No ST changes of ischemia.    04/12/21    ECHO ADULT COMPLETE 05/28/2021   Interpretation Summary  · LV: Estimated LVEF is 55 - 60%. Visually measured ejection fraction. Normal cavity size, wall thickness and systolic function (ejection fraction normal). Wall motion: normal. Age-appropriate left ventricular diastolic function. · LA: Mildly dilated left atrium. Left Atrium volume index is 38 mL/m2. · PA: Pulmonary arterial systolic pressure is 24 mmHg.     STRESS TEST (EST, PHARM, NUC, ECHO etc)    CATHETERIZATION (2012)  1. Mild pulmonary venous hypertension secondary to hypertensive cardiovascular disease. 2. Angiographically normal coronary arteries. 3. Catheter-induced atrial fibrillation. 4. False-positive nuclear stress test.    IMPRESSION & PLAN:  Ms. Lizandro Wright is 76 y.o. female with multiple medical problems    Palpitation:  Denies any symptoms concerning for hyperthyroidism. No presyncope or syncope  No palpitation since last visit  Event monitor in 07/2021, low risk. Sinus rhythm, no A. fib or pause noted. PVC burden less than 1%  Echocardiogram in 05/2021, low risk finding as mentioned above. Hypertension:  /78. Currently on Cozaar 25 mg daily and Lasix. She used to be on atenolol however this was discontinued because of side effect. Hyperlipidemia:  Last lipid profile was in June 2019 and LDL level was 134. Unfortunately patient has not been able to tolerate statin medication in the past.  She does not remember the names but she has tried 2 different medications. Patient has been started on low-dose of Pravachol 10 mg every other day by PCP. We will try Livalo instead as this medication has less myalgia. PCSK9 inhibitor can be considered in the future if she is not able to tolerate Livalo. This plan was discussed with patient who is in agreement. Thank you for allowing me to participate in patient care. Please feel free to call me if you have any question or concern. Susie Foley MD  Please note: This document has been produced using voice recognition software. Unrecognized errors in transcription may be present.

## 2022-05-23 NOTE — LETTER
5/23/2022    Patient: Kofi Hood   YOB: 1945   Date of Visit: 5/23/2022     Maninder Griffin, [address]  [place] [address]  Via In Basket    Dear Maninder Griffin MD,      Thank you for referring Ms. Lulú Trujillo to [facility] for evaluation. My notes for this consultation are attached. If you have questions, please do not hesitate to call me. I look forward to following your patient along with you.       Sincerely,    Asif Albrecht MD

## 2022-05-23 NOTE — PROGRESS NOTES
Sky Alvarez presents today for   Chief Complaint   Patient presents with    Follow-up     6 month       Sky Antonio preferred language for health care discussion is english/other. Is someone accompanying this pt? no    Is the patient using any DME equipment during [address]? no    Depression Screening:  3 most recent PHQ Screens 5/23/2022   Little interest or pleasure in doing things Not at all   Feeling down, depressed, irritable, or hopeless Not at all   Total Score PHQ 2 0       Learning Assessment:  Learning Assessment 5/23/2022   PRIMARY LEARNER Patient   HIGHEST LEVEL OF EDUCATION - PRIMARY LEARNER  -   BARRIERS PRIMARY LEARNER -   CO-LEARNER CAREGIVER -   PRIMARY LANGUAGE ENGLISH   LEARNER PREFERENCE PRIMARY DEMONSTRATION     -   ANSWERED BY patient   RELATIONSHIP SELF       Abuse Screening:  Abuse Screening Questionnaire 5/23/2022   Do you ever feel afraid of your partner? N   Are you in a relationship with someone who physically or mentally threatens you? N   Is it safe for you to go home? Y       Fall Risk  Fall Risk Assessment, last 12 mths 5/23/2022   Able to walk? Yes   Fall in past 12 months? 0   Do you feel unsteady? 0   Are you worried about falling 0   Is TUG test greater than 12 seconds? -   Is the gait abnormal? -   Number of falls in past 12 months -   Fall with injury? -           Pt currently taking Anticoagulant therapy? no    Pt currently taking Antiplatelet therapy ? Aspirin  81 mg daily      Coordination of Care:  1. Have you been to the ER, urgent care clinic since your last visit? Hospitalized since your last visit? no    2. Have you seen or consulted any other health care providers outside of the [address] [place] [address] since your last visit? Include any pap smears or colon screening.  no

## 2022-06-13 ENCOUNTER — TELEPHONE (OUTPATIENT)
Dept: INTERNAL MEDICINE CLINIC | Age: 77
End: 2022-06-13

## 2022-06-13 NOTE — TELEPHONE ENCOUNTER
Patient called requesting to speak with a nurse. She states that about 2 weeks ago, a bottle of soda fell out her fridge and hit the bottom part of her leg, close to her foot. She states that she had been using ice to relieve the pain/swelling and thought it would be okay but it has gotten worse. She states that the swelling gets worse the more she walks on it and she now has a knot on the back of her leg too. Patient is requesting a return call to see what she can do. Patient can be reached at [phone].   Please advise, thank you

## 2022-06-14 ENCOUNTER — OFFICE VISIT (OUTPATIENT)
Dept: INTERNAL MEDICINE CLINIC | Age: 77
End: 2022-06-14
Payer: MEDICARE

## 2022-06-14 VITALS
OXYGEN SATURATION: 96 % | HEART RATE: 68 BPM | HEIGHT: 63 IN | DIASTOLIC BLOOD PRESSURE: 79 MMHG | WEIGHT: 236 LBS | BODY MASS INDEX: 41.82 KG/M2 | SYSTOLIC BLOOD PRESSURE: 138 MMHG | TEMPERATURE: 97.6 F | RESPIRATION RATE: 16 BRPM

## 2022-06-14 DIAGNOSIS — R60.0 LEG EDEMA, LEFT: ICD-10-CM

## 2022-06-14 DIAGNOSIS — M89.8X6 PAIN IN LEFT TIBIA: ICD-10-CM

## 2022-06-14 DIAGNOSIS — M25.572 ACUTE LEFT ANKLE PAIN: ICD-10-CM

## 2022-06-14 DIAGNOSIS — M89.8X6 PAIN IN LEFT TIBIA: Primary | ICD-10-CM

## 2022-06-14 PROCEDURE — G8432 DEP SCR NOT DOC, RNG: HCPCS | Performed by: INTERNAL MEDICINE

## 2022-06-14 PROCEDURE — G8399 PT W/DXA RESULTS DOCUMENT: HCPCS | Performed by: INTERNAL MEDICINE

## 2022-06-14 PROCEDURE — 1090F PRES/ABSN URINE INCON ASSESS: CPT | Performed by: INTERNAL MEDICINE

## 2022-06-14 PROCEDURE — G8536 NO DOC ELDER MAL SCRN: HCPCS | Performed by: INTERNAL MEDICINE

## 2022-06-14 PROCEDURE — G8754 DIAS BP LESS 90: HCPCS | Performed by: INTERNAL MEDICINE

## 2022-06-14 PROCEDURE — G8417 CALC BMI ABV UP PARAM F/U: HCPCS | Performed by: INTERNAL MEDICINE

## 2022-06-14 PROCEDURE — 1123F ACP DISCUSS/DSCN MKR DOCD: CPT | Performed by: INTERNAL MEDICINE

## 2022-06-14 PROCEDURE — G8427 DOCREV CUR MEDS BY ELIG CLIN: HCPCS | Performed by: INTERNAL MEDICINE

## 2022-06-14 PROCEDURE — 99214 OFFICE O/P EST MOD 30 MIN: CPT | Performed by: INTERNAL MEDICINE

## 2022-06-14 NOTE — PROGRESS NOTES
INTERNISTS OF [facility]:  6/14/2022, MRN: [id number]      Alvin Yun is a 76 y.o. female and presents to clinic for Foot Swelling (Foot swelling after injury to foot 2 weeks ago. Patient denies any pain. Swelling is not present in the morning, swelling present after walking)      Subjective: The patient is a 77-year-old female with history of obesity, thyroid nodules, GERD, lumbar disc disease, lumbar facet arthropathy, right heel spur, colon polyps, pulmonary nodule per EHR, gout, hiatal hernia, pseudogout per EHR, renal cyst, positive rheumatoid arthritis serology, hypertension, and hyperlipidemia. Left Foot Pain: In April, she injured her left foot. A bottle of juice fell down on her LLE just before the onset of pain. The area has been swollen and painful. Sx worsen with ambulation. Sx have been worse along the past 2 wks. She did a lot of walking, visiting a family member at [facility] who was hospitalized. She is using cold compresses. No SOB/CP sx. She has some numbness along her left leg area, just above her left ankle. No alleviating factors are known. Patient Active Problem List    Diagnosis Date Noted    Thyroid nodule 04/11/2022    GERD (gastroesophageal reflux disease) 02/23/2022    Morbid obesity with BMI of 40.0-44.9, adult ([facility] [address] 08/28/2018    Polyp of sigmoid colon 05/01/2018    Incidental pulmonary nodule, > 3mm and < 8mm 02/20/2018    Prediabetes 02/20/2018    Chronic gout of foot 10/04/2017    Sliding hiatal hernia 10/04/2017    Pseudogout of right shoulder 10/04/2017    Primary osteoarthritis involving multiple joints 10/04/2017    Renal cyst 08/29/2014    Sleep apnea/ on c-pap 10/30/2013    Hypertension     Dyslipidemia        Current Outpatient Medications   Medication Sig Dispense Refill    pitavastatin calcium (Livalo) 4 mg tab tablet Take 1 Tablet by mouth daily. 30 Tablet 6    pravastatin (PRAVACHOL) 10 mg tablet Take 1 Tablet by mouth every other day.  15 Tablet 11    diclofenac (VOLTAREN) 1 % gel Apply 4 grams to left shoulder  g 3    pantoprazole (PROTONIX) 40 mg tablet Take 1 Tablet by mouth two (2) times a day. 180 Tablet 2    losartan (COZAAR) 25 mg tablet Take 1 Tablet by mouth two (2) times a day. 180 Tablet 2    potassium chloride (K-DUR, KLOR-CON M20) 20 mEq tablet Take 1 Tablet by mouth two (2) times a day. 180 Tablet 2    furosemide (LASIX) 20 mg tablet Take 1 Tablet by mouth daily. 90 Tablet 2    ubidecarenone (coenzyme Q10) 100 mg tab Take 100 mg by mouth daily. 1 Tablet 0    Blood-Glucose Meter (TRUE METRIX AIR GLUCOSE METER) misc Check blood sugar twice daily. 1 Each 0    glucose blood VI test strips (TRUE METRIX GLUCOSE TEST STRIP) strip Check blood sugar twice daily. 100 Strip 2    lancets (ULTRA THIN LANCETS) 30 gauge misc Check blood sugar twice daily. 1 Package 2    diclofenac (VOLTAREN) 1 % gel Apply  to affected area four (4) times daily. 100 g 1    CALCIUM PO Take  by mouth.  fluticasone (FLONASE) 50 mcg/actuation nasal spray Si spray in both nostril twice daily 1 Bottle 1    cholecalciferol (VITAMIN D3) 1,000 unit tablet Take 2,000 Units by mouth daily.  aspirin 81 mg Tab Take 81 mg by mouth daily.  MULTIVITAMINS (MULTI-VITAMIN PO) Take 1 Tab by mouth daily.          Allergies   Allergen Reactions    Latex Rash    Nexium [Esomeprazole Magnesium] Swelling and Other (comments)     Lips Swollen, and facial rash and swelling    Dexilant [Dexlansoprazole] Other (comments)     Stomach swelling    Crestor [Rosuvastatin] Other (comments)     Upper respiratory illness    Lipitor [Atorvastatin] Swelling    Lisinopril Unknown (comments)     Patient can't recall    Niaspan [Niacin] Other (comments)     Scratchy throat, \"asthma\" like symptoms    Pcn [Penicillins] Hives    Sulfa (Sulfonamide Antibiotics) Rash    Zocor [Simvastatin] Myalgia and Other (comments)     Per patient chart \"(? Rash)\"       Past Medical History:   Diagnosis Date    Atrophic vaginitis     Cardiac cath 2012    Patent coronary arteries. LVEDP 20.  RA 11.  RV 42/10. PA 42/21. W 18.  CO 6.4 (TD), 6.4 (Shellie Bence). PVR 1.7 Wood units. False-positive nuclear study.  Diabetes     diet controlled, hypoglycemia from metformin previously     Dyslipidemia     Fatty liver     Fibrocystic breast disease     GERD     Gout     H/O colonoscopy 13    polyp    Hypertension     Macular degeneration     Morbid obesity ([address]     Obstructive sleep apnea     Peripheral neuropathy     Rectocele     Thyroid cyst     bilat       Past Surgical History:   Procedure Laterality Date    [facility] ARTERIAL ARTERIOTOMY 3M  2012    HX COLONOSCOPY  2013    HX COLONOSCOPY  2018    HX HEART CATHETERIZATION  2012    HX HERNIA REPAIR      umbilical as a child    HX HYSTERECTOMY      52ys ago, QUIQUE, BSO    HX MOHS PROCEDURES      right    DE ANESTH,SURGERY OF SHOULDER         Family History   Problem Relation Age of Onset    Cancer Mother         colon cancer    Colon Cancer Mother     Hypertension Mother     Diabetes Mother     Heart Disease Mother     Coronary Art Dis Mother     Hypertension Father     Diabetes Father     Heart Disease Father     Coronary Art Dis Father     Hypertension Sister     Diabetes Sister     Heart Disease Sister     Coronary Art Dis Sister     Hypertension Brother     Diabetes Brother     Heart Disease Brother     Coronary Art Dis Brother     Cancer Brother        Social History     Tobacco Use    Smoking status: Former Smoker     Quit date: 1974     Years since quittin.9    Smokeless tobacco: Never Used    Tobacco comment: 1 pack every 2 weeks x 1 year, stopped 45yrs ago   Substance Use Topics    Alcohol use: Yes       ROS   Review of Systems   Constitutional: Negative for chills and fever. HENT: Negative for ear pain and sore throat.     Eyes: Negative for blurred vision and pain. Respiratory: Negative for cough and shortness of breath. Cardiovascular: Negative for chest pain. Gastrointestinal: Negative for abdominal pain, blood in stool and melena. Genitourinary: Negative for dysuria and hematuria. Musculoskeletal: Positive for joint pain. Negative for myalgias. Skin: Negative for rash. Neurological: Positive for tingling. Negative for headaches. Endo/Heme/Allergies: Does not bruise/bleed easily. Psychiatric/Behavioral: Negative for substance abuse. Objective     Vitals:    06/14/22 1336   BP: 138/79   Pulse: 68   Resp: 16   Temp: 97.6 °F (36.4 °C)   TempSrc: Temporal   SpO2: 96%   Weight: 236 lb (107 kg)   Height: 5' 3\" (1.6 m)   PainSc:   0 - No pain       Physical Exam  Vitals and nursing note reviewed. HENT:      Head: Normocephalic and atraumatic. Right Ear: External ear normal.      Left Ear: External ear normal.   Eyes:      General: No scleral icterus. Right eye: No discharge. Left eye: No discharge. Conjunctiva/sclera: Conjunctivae normal.   Cardiovascular:      Rate and Rhythm: Normal rate and regular rhythm. Heart sounds: Normal heart sounds. No murmur heard. No friction rub. No gallop. Pulmonary:      Effort: Pulmonary effort is normal. No respiratory distress. Breath sounds: Normal breath sounds. No wheezing or rales. Chest:      Chest wall: No tenderness. Abdominal:      General: Bowel sounds are normal. There is no distension. Palpations: Abdomen is soft. Tenderness: There is no abdominal tenderness. There is no guarding. Musculoskeletal:         General: No swelling (BUE) or tenderness (BUE). Cervical back: Neck supple. Comments: Medial left tibia is ttp and lle>rle   Lymphadenopathy:      Cervical: No cervical adenopathy. Skin:     General: Skin is warm and dry. Findings: No erythema or rash. Neurological:      Mental Status: She is alert.       Motor: No abnormal muscle tone. Gait: Gait normal.   Psychiatric:         Mood and Affect: Mood normal.         LABS   Data Review:   Lab Results   Component Value Date/Time    WBC 8.1 02/22/2022 01:06 PM    Hemoglobin, POC 12.6 04/12/2013 09:46 AM    HGB 13.1 02/22/2022 01:06 PM    Hematocrit, POC 37 04/12/2013 09:46 AM    HCT 42.0 02/22/2022 01:06 PM    PLATELET 654 58/22/0291 01:06 PM    MCV 92.1 02/22/2022 01:06 PM       Lab Results   Component Value Date/Time    Sodium 141 05/13/2022 12:00 AM    Potassium 4.2 05/13/2022 12:00 AM    Chloride 100 05/13/2022 12:00 AM    CO2 26 05/13/2022 12:00 AM    Anion gap 4 02/22/2022 01:06 PM    Glucose 92 05/13/2022 12:00 AM    BUN 13 05/13/2022 12:00 AM    Creatinine 0.94 05/13/2022 12:00 AM    BUN/Creatinine ratio 14 05/13/2022 12:00 AM    GFR est AA >60 02/22/2022 01:06 PM    GFR est non-AA >60 02/22/2022 01:06 PM    Calcium 9.4 05/13/2022 12:00 AM       Lab Results   Component Value Date/Time    Cholesterol, total 237 (H) 05/13/2022 12:00 AM    HDL Cholesterol 67 05/13/2022 12:00 AM    LDL, calculated 157 (H) 05/13/2022 12:00 AM    LDL, calculated 146.6 (H) 02/22/2022 01:06 PM    VLDL, calculated 13 05/13/2022 12:00 AM    VLDL, calculated 24.4 02/22/2022 01:06 PM    Triglyceride 76 05/13/2022 12:00 AM    CHOL/HDL Ratio 3.3 02/22/2022 01:06 PM       Lab Results   Component Value Date/Time    Hemoglobin A1c 5.8 (H) 02/22/2022 01:06 PM    Hemoglobin A1c (POC) 6.1 06/23/2017 11:53 AM    Hemoglobin A1c, External 6.0 11/06/2021 12:00 AM       Assessment/Plan:   Pain in left tibia and ankle: Etiology is unknown. Must r/o DVT and fracture. - Xrays ordered to r/o fracture. - PVL study of the LLE ordered  - OK to take tylenol prn sx relief. ORDERS:  - XR TIB/FIB LT; Future  - DUPLEX LOWER EXT VENOUS LEFT;  Future  - XR ANKLE LT MIN 3 V; Future      Health Maintenance Due   Topic Date Due    Hepatitis C Screening  Never done    Shingrix Vaccine Age 50> (1 of 2) Never done   [facility] Eye Exam Retinal or Dilated  08/01/2020         Lab review: labs are reviewed in the EHR    I have discussed the diagnosis with the patient and the intended plan as seen in the above orders. The patient has received an after-visit summary and questions were answered concerning future plans. I have discussed medication side effects and warnings with the patient as well. I have reviewed the plan of care with the patient, accepted their input and they are in agreement with the treatment goals. All questions were answered. The patient understands the plan of care. Handouts provided today with above information. Pt instructed if symptoms worsen to call the office or report to the ED for continued care. Greater than 50% of the visit time was spent in counseling and/or coordination of care. Voice recognition was used to generate this report, which may have resulted in some phonetic based errors in grammar and contents. Even though attempts were made to correct all the mistakes, some may have been missed, and remained in the body of the document.           Becki Dunlap MD

## 2022-06-14 NOTE — PROGRESS NOTES
Rocrhona Bush presents today for   Chief Complaint   Patient presents with    Foot Swelling     Foot swelling after injury to foot 2 weeks ago. Patient denies any pain. Swelling is not present in the morning, swelling present after walking           1. \"Have you been to the ER, urgent care clinic since your last visit? Hospitalized since your last visit? \" no    2. \"Have you seen or consulted any other health care providers outside of the [address] [place] [address] since your last visit? \" yes     3. For patients aged 39-70: Has the patient had a colonoscopy / FIT/ Cologuard? NA - based on age      If the patient is female:    4. For patients aged 41-77: Has the patient had a mammogram within the past 2 years? NA - based on age or sex  See top three    5. For patients aged 21-65: Has the patient had a pap smear?  NA - based on age or sex

## 2022-06-15 ENCOUNTER — HOSPITAL ENCOUNTER (OUTPATIENT)
Dept: GENERAL RADIOLOGY | Age: 77
Discharge: HOME | End: 2022-06-15
Attending: INTERNAL MEDICINE
Payer: MEDICARE

## 2022-06-15 ENCOUNTER — HOSPITAL ENCOUNTER (OUTPATIENT)
Dept: VASCULAR SURGERY | Age: 77
Discharge: HOME | End: 2022-06-15
Attending: INTERNAL MEDICINE
Payer: MEDICARE

## 2022-06-15 PROCEDURE — 73610 X-RAY EXAM OF ANKLE: CPT

## 2022-06-15 PROCEDURE — 73590 X-RAY EXAM OF LOWER LEG: CPT

## 2022-06-15 PROCEDURE — 93971 EXTREMITY STUDY: CPT

## 2022-06-16 NOTE — PROGRESS NOTES
Patient is aware xrays do not show any evidence of a fracture. Her PVL study is negative for a DVT. Her swelling will likely recede/resolve over time with elevation of her leg and the application of cold compresses. From her studies, it appears she has a soft tissue injury that should resolve within the next 4-6 weeks. Patient verbalizes understanding.

## 2022-06-16 NOTE — PROGRESS NOTES
Please let her know that her xrays do not show any evidence of a fracture. Her PVL study is negative for a DVT. Her swelling will likely recede/resolve over time with elevation of her leg and the application of cold compresses. From her studies, it appears she has a soft tissue injury that should resolve within the next 4-6 weeks.     Dr. Jon Barrera  [facility] of [facility], [address] [place] [address], [facility] [address]  Phone: [phone]  Fax: [phone]

## 2022-07-12 ENCOUNTER — TELEPHONE (OUTPATIENT)
Dept: INTERNAL MEDICINE CLINIC | Age: 77
End: 2022-07-12

## 2022-07-12 DIAGNOSIS — R73.9 HYPERGLYCEMIA: ICD-10-CM

## 2022-07-12 DIAGNOSIS — R76.8 RHEUMATOID FACTOR POSITIVE: Primary | ICD-10-CM

## 2022-07-12 NOTE — TELEPHONE ENCOUNTER
Patient wants to know if she needs lab prior to her appointment on 08/03/2022. If she does need the labs she would like a call back on her cell phone.

## 2022-07-12 NOTE — TELEPHONE ENCOUNTER
Yes. Please have her get labs before her upcoming apt to assess her blood sugar.     Dr. Salinas Friday  [facility] of [facility] [place], [address] [facility], [facility] [address]  Phone: [phone]  Fax: [phone]

## 2022-07-20 ENCOUNTER — APPOINTMENT (OUTPATIENT)
Dept: INTERNAL MEDICINE CLINIC | Age: 77
End: 2022-07-20

## 2022-07-21 LAB
CRP SERPL-MCNC: 2 MG/L (ref 0–10)
EST. AVERAGE GLUCOSE BLD GHB EST-MCNC: 126 MG/DL
HBA1C MFR BLD: 6 % (ref 4.8–5.6)

## 2022-07-25 ENCOUNTER — TELEPHONE (OUTPATIENT)
Dept: INTERNAL MEDICINE CLINIC | Age: 77
End: 2022-07-25

## 2022-07-25 RX ORDER — FLUCONAZOLE 150 MG/1
150 TABLET ORAL
Qty: 2 TABLET | Refills: 0 | Status: SHIPPED | OUTPATIENT
Start: 2022-07-25 | End: 2022-08-02

## 2022-07-25 NOTE — TELEPHONE ENCOUNTER
Patient stating she has a yeast infection. Wants to know if Diflucan can be sent in to [facility]. She is requesting two doses because one never works for her.

## 2022-07-27 NOTE — PROGRESS NOTES
I will discuss her results with her at her upcoming appointment. Her A1c is up to 6 from 5.8 in February. Her CRP is unremarkable.     Dr. Javier Solomon  [facility] of [facility], [address] [place] [address], [address] [place] [address] Str[address]  Phone: [phone]  Fax: [phone]

## 2022-08-03 ENCOUNTER — HOSPITAL ENCOUNTER (OUTPATIENT)
Dept: GENERAL RADIOLOGY | Age: 77
Discharge: HOME | End: 2022-08-03
Payer: MEDICARE

## 2022-08-03 ENCOUNTER — OFFICE VISIT (OUTPATIENT)
Dept: INTERNAL MEDICINE CLINIC | Age: 77
End: 2022-08-03
Payer: MEDICARE

## 2022-08-03 VITALS
WEIGHT: 234 LBS | DIASTOLIC BLOOD PRESSURE: 88 MMHG | HEIGHT: 63 IN | TEMPERATURE: 98.3 F | SYSTOLIC BLOOD PRESSURE: 148 MMHG | RESPIRATION RATE: 12 BRPM | OXYGEN SATURATION: 98 % | HEART RATE: 73 BPM | BODY MASS INDEX: 41.46 KG/M2

## 2022-08-03 DIAGNOSIS — R73.03 PREDIABETES: ICD-10-CM

## 2022-08-03 DIAGNOSIS — L65.9 ALOPECIA: ICD-10-CM

## 2022-08-03 DIAGNOSIS — R51.9 NONINTRACTABLE HEADACHE, UNSPECIFIED CHRONICITY PATTERN, UNSPECIFIED HEADACHE TYPE: ICD-10-CM

## 2022-08-03 DIAGNOSIS — I11.9 BENIGN HYPERTENSIVE HEART DISEASE WITHOUT HEART FAILURE: ICD-10-CM

## 2022-08-03 DIAGNOSIS — E04.1 THYROID NODULE: ICD-10-CM

## 2022-08-03 DIAGNOSIS — E78.5 HYPERLIPIDEMIA, UNSPECIFIED HYPERLIPIDEMIA TYPE: ICD-10-CM

## 2022-08-03 DIAGNOSIS — S16.1XXA CERVICAL MYOFASCIAL STRAIN, INITIAL ENCOUNTER: ICD-10-CM

## 2022-08-03 DIAGNOSIS — M25.512 CHRONIC LEFT SHOULDER PAIN: Primary | ICD-10-CM

## 2022-08-03 DIAGNOSIS — G89.29 CHRONIC LEFT SHOULDER PAIN: Primary | ICD-10-CM

## 2022-08-03 PROCEDURE — 70260 X-RAY EXAM OF SKULL: CPT

## 2022-08-03 PROCEDURE — G8536 NO DOC ELDER MAL SCRN: HCPCS | Performed by: INTERNAL MEDICINE

## 2022-08-03 PROCEDURE — 1123F ACP DISCUSS/DSCN MKR DOCD: CPT | Performed by: INTERNAL MEDICINE

## 2022-08-03 PROCEDURE — 99214 OFFICE O/P EST MOD 30 MIN: CPT | Performed by: INTERNAL MEDICINE

## 2022-08-03 PROCEDURE — G8754 DIAS BP LESS 90: HCPCS | Performed by: INTERNAL MEDICINE

## 2022-08-03 PROCEDURE — G8753 SYS BP > OR = 140: HCPCS | Performed by: INTERNAL MEDICINE

## 2022-08-03 PROCEDURE — 1101F PT FALLS ASSESS-DOCD LE1/YR: CPT | Performed by: INTERNAL MEDICINE

## 2022-08-03 PROCEDURE — 1090F PRES/ABSN URINE INCON ASSESS: CPT | Performed by: INTERNAL MEDICINE

## 2022-08-03 PROCEDURE — G8427 DOCREV CUR MEDS BY ELIG CLIN: HCPCS | Performed by: INTERNAL MEDICINE

## 2022-08-03 PROCEDURE — G8399 PT W/DXA RESULTS DOCUMENT: HCPCS | Performed by: INTERNAL MEDICINE

## 2022-08-03 PROCEDURE — G8417 CALC BMI ABV UP PARAM F/U: HCPCS | Performed by: INTERNAL MEDICINE

## 2022-08-03 PROCEDURE — G8432 DEP SCR NOT DOC, RNG: HCPCS | Performed by: INTERNAL MEDICINE

## 2022-08-03 RX ORDER — CYCLOBENZAPRINE HCL 5 MG
5 TABLET ORAL
Qty: 30 TABLET | Refills: 3 | Status: SHIPPED | OUTPATIENT
Start: 2022-08-03

## 2022-08-03 NOTE — PROGRESS NOTES
INTERNISTS OF [facility]:  8/3/2022, MRN: [id number]      Delicia Caballero is a 76 y.o. female and presents to clinic for Follow-up, Labs (7-20-22), and Ear Pain (Left ear and left side of head x 3 days after attending [facility])      Subjective: The patient is a 49-year-old female with history of obesity, thyroid nodules, GERD, lumbar disc disease, lumbar facet arthropathy, right heel spur, colon polyps, pulmonary nodule per EHR, gout, prediabetes, hiatal hernia, pseudogout per EHR, renal cyst, positive rheumatoid arthritis serology, hypertension, and hyperlipidemia. 1.  Prediabetes: Her most recent labs show that her A1c is up to 6 from 5.8 in February. She met with the dietician since her last apt. 2.  Left Ear/Neck Pain: Present for 3 days after attending [facility]. Hearing loss: none. No fever, eye pain, or SOB. No sore throat. Associated sx: a left sided HA. \"It's hurting right now. \"     3. Hyperlipidemia: Treated with livalo. Her weight is 234 pounds. BMI is 41.    4.  Hypertension: Blood pressure is 149/88. She is on losartan, potassium, and Lasix. Her BP is likely falsely elevated today. 5.  Gout and Left Shoulder Pain: Despite history of a positive return arthritis serology and pseudogout per EHR findings, patient had an unremarkable CRP for her recent labs. +Left shoulder pain is improving. She saw Demetrius Habermann for an injection and \"it ain't been right since. \"     6. Thyroid Nodule Hx: She is not yet scheduled for a thyroid ultrasound in October: . Previously seen by ENT. 3/30/22 Thyroid Ultrasound:   Multiple bilateral subcentimeter thyroid cysts and probable cysts appear similar in size as above. New low-attenuation region, potential mass anterior left lobe as above. Borderline size for percutaneous biopsy as discussed. 7. Alopecia: She has not tried anything for hair loss but is self conscious about her alopecia. +Family h/o alopecia. She has pain along the top of her scalp.  No alleviating factors are known. Patient Active Problem List    Diagnosis Date Noted    Thyroid nodule 2022    GERD (gastroesophageal reflux disease) 2022    Morbid obesity with BMI of 40.0-44.9, adult ([facility] [place] [address] 2018    Polyp of sigmoid colon 2018    Incidental pulmonary nodule, > 3mm and < 8mm 2018    Prediabetes 2018    Chronic gout of foot 10/04/2017    Sliding hiatal hernia 10/04/2017    Pseudogout of right shoulder 10/04/2017    Primary osteoarthritis involving multiple joints 10/04/2017    Renal cyst 2014    Sleep apnea/ on c-pap 10/30/2013    Hypertension     Dyslipidemia        Current Outpatient Medications   Medication Sig Dispense Refill    pitavastatin calcium (Livalo) 4 mg tab tablet Take 1 Tablet by mouth daily. 30 Tablet 6    diclofenac (VOLTAREN) 1 % gel Apply 4 grams to left shoulder  g 3    pantoprazole (PROTONIX) 40 mg tablet Take 1 Tablet by mouth two (2) times a day. 180 Tablet 2    losartan (COZAAR) 25 mg tablet Take 1 Tablet by mouth two (2) times a day. 180 Tablet 2    potassium chloride (K-DUR, KLOR-CON M20) 20 mEq tablet Take 1 Tablet by mouth two (2) times a day. 180 Tablet 2    furosemide (LASIX) 20 mg tablet Take 1 Tablet by mouth daily. 90 Tablet 2    ubidecarenone (coenzyme Q10) 100 mg tab Take 100 mg by mouth daily. 1 Tablet 0    Blood-Glucose Meter (TRUE METRIX AIR GLUCOSE METER) misc Check blood sugar twice daily. 1 Each 0    glucose blood VI test strips (TRUE METRIX GLUCOSE TEST STRIP) strip Check blood sugar twice daily. 100 Strip 2    lancets (ULTRA THIN LANCETS) 30 gauge misc Check blood sugar twice daily. 1 Package 2    diclofenac (VOLTAREN) 1 % gel Apply  to affected area four (4) times daily. 100 g 1    CALCIUM PO Take  by mouth.       fluticasone (FLONASE) 50 mcg/actuation nasal spray Si spray in both nostril twice daily 1 Bottle 1    cholecalciferol (VITAMIN D3) (1000 Units /25 mcg) tablet Take 2,000 Units by mouth daily.      aspirin 81 mg Tab Take 81 mg by mouth daily. MULTIVITAMINS (MULTI-VITAMIN PO) Take 1 Tab by mouth daily. pravastatin (PRAVACHOL) 10 mg tablet Take 1 Tablet by mouth every other day. (Patient not taking: Reported on 8/3/2022) 15 Tablet 11       Allergies   Allergen Reactions    Latex Rash    Nexium [Esomeprazole Magnesium] Swelling and Other (comments)     Lips Swollen, and facial rash and swelling    Dexilant [Dexlansoprazole] Other (comments)     Stomach swelling    Crestor [Rosuvastatin] Other (comments)     Upper respiratory illness    Lipitor [Atorvastatin] Swelling    Lisinopril Unknown (comments)     Patient can't recall    Niaspan [Niacin] Other (comments)     Scratchy throat, \"asthma\" like symptoms    Pcn [Penicillins] Hives    Sulfa (Sulfonamide Antibiotics) Rash    Zocor [Simvastatin] Myalgia and Other (comments)     Per patient chart \"(? Rash)\"       Past Medical History:   Diagnosis Date    Atrophic vaginitis     Cardiac cath 05/30/2012    Patent coronary arteries. LVEDP 20.  RA 11.  RV 42/10. PA 42/21. W 18.  CO 6.4 (TD), 6.4 (Obadiah Alejandra). PVR 1.7 Wood units. False-positive nuclear study.     Diabetes     diet controlled, hypoglycemia from metformin previously     Dyslipidemia     Fatty liver     Fibrocystic breast disease     GERD     Gout     H/O colonoscopy 4/12/13    polyp    Hypertension     Macular degeneration     Morbid obesity ([address]     Obstructive sleep apnea     Peripheral neuropathy     Rectocele     Thyroid cyst     bilat       Past Surgical History:   Procedure Laterality Date    HC CNNLA ARTERIAL ARTERIOTOMY 3M  5/25/2012    HX COLONOSCOPY  4/12/2013    HX COLONOSCOPY  03/24/2018    HX HEART CATHETERIZATION  5/25/2012    HX HERNIA REPAIR      umbilical as a child    HX HYSTERECTOMY      52ys ago, QUIQUE, BSO    HX MOHS PROCEDURES      right    [place] ANESTH,SURGERY OF SHOULDER         Family History   Problem Relation Age of Onset    Cancer Mother         colon cancer Colon Cancer Mother     Hypertension Mother     Diabetes Mother     Heart Disease Mother     Coronary Art Dis Mother     Hypertension Father     Diabetes Father     Heart Disease Father     Coronary Art Dis Father     Hypertension Sister     Diabetes Sister     Heart Disease Sister     Coronary Art Dis Sister     Hypertension Brother     Diabetes Brother     Heart Disease Brother     Coronary Art Dis Brother     Cancer Brother        Social History     Tobacco Use    Smoking status: Former     Types: Cigarettes     Quit date: 1974     Years since quittin.0    Smokeless tobacco: Never    Tobacco comments:     1 pack every 2 weeks x 1 year, stopped 45yrs ago   Substance Use Topics    Alcohol use: Yes       ROS   Review of Systems   Constitutional:  Negative for chills and fever. HENT:  Positive for ear pain. Negative for hearing loss and sore throat. Eyes:  Negative for blurred vision and pain. Respiratory:  Negative for cough and shortness of breath. Cardiovascular:  Negative for chest pain. Gastrointestinal:  Negative for abdominal pain, blood in stool and melena. Genitourinary:  Negative for dysuria and hematuria. Musculoskeletal:  Positive for joint pain (left shoulder pain). Negative for myalgias. Skin:  Negative for rash. Neurological:  Positive for headaches. Endo/Heme/Allergies:  Does not bruise/bleed easily. Psychiatric/Behavioral:  Negative for substance abuse. Objective     Vitals:    22 1122   BP: (!) 149/88   Pulse: 73   Resp: 12   Temp: 98.3 °F (36.8 °C)   TempSrc: Temporal   SpO2: 98%   Weight: 234 lb (106.1 kg)   Height: 5' 3\" (1.6 m)   PainSc:   4   PainLoc: Generalized       Physical Exam  Vitals and nursing note reviewed. HENT:      Head: Normocephalic and atraumatic. Right Ear: Tympanic membrane and external ear normal.      Left Ear: Tympanic membrane and external ear normal.   Eyes:      General: No scleral icterus.         Right eye: No discharge. Left eye: No discharge. Conjunctiva/sclera: Conjunctivae normal.   Cardiovascular:      Rate and Rhythm: Normal rate and regular rhythm. Heart sounds: Normal heart sounds. No murmur heard. No friction rub. No gallop. Pulmonary:      Effort: Pulmonary effort is normal. No respiratory distress. Breath sounds: Normal breath sounds. No wheezing or rales. Abdominal:      General: Bowel sounds are normal. There is no distension. Palpations: Abdomen is soft. There is no mass. Tenderness: There is no abdominal tenderness. There is no guarding or rebound. Musculoskeletal:         General: No swelling (BUE) or tenderness (BUE). Cervical back: Neck supple. Comments: She has TTP along left sided cervical paraspinal muscles and along her left trapezius muscle areas. She has decreased ROM along her left shoulder 2/2 pain   Lymphadenopathy:      Cervical: No cervical adenopathy. Skin:     General: Skin is warm and dry. Findings: No erythema or rash. Comments: The top of her scalp is TTP. There is associated alopecia along her scalp   Neurological:      Mental Status: She is alert. Motor: No abnormal muscle tone.       Gait: Gait normal.   Psychiatric:         Mood and Affect: Mood normal.       LABS   Data Review:   Lab Results   Component Value Date/Time    WBC 8.1 02/22/2022 01:06 PM    Hemoglobin, POC 12.6 04/12/2013 09:46 AM    HGB 13.1 02/22/2022 01:06 PM    Hematocrit, POC 37 04/12/2013 09:46 AM    HCT 42.0 02/22/2022 01:06 PM    PLATELET 734 78/45/5213 01:06 PM    MCV 92.1 02/22/2022 01:06 PM       Lab Results   Component Value Date/Time    Sodium 141 05/13/2022 12:00 AM    Potassium 4.2 05/13/2022 12:00 AM    Chloride 100 05/13/2022 12:00 AM    CO2 26 05/13/2022 12:00 AM    Anion gap 4 02/22/2022 01:06 PM    Glucose 92 05/13/2022 12:00 AM    BUN 13 05/13/2022 12:00 AM    Creatinine 0.94 05/13/2022 12:00 AM    BUN/Creatinine ratio 14 05/13/2022 12:00 AM    GFR est AA >60 02/22/2022 01:06 PM    GFR est non-AA >60 02/22/2022 01:06 PM    Calcium 9.4 05/13/2022 12:00 AM       Lab Results   Component Value Date/Time    Cholesterol, total 237 (H) 05/13/2022 12:00 AM    HDL Cholesterol 67 05/13/2022 12:00 AM    LDL, calculated 157 (H) 05/13/2022 12:00 AM    LDL, calculated 146.6 (H) 02/22/2022 01:06 PM    VLDL, calculated 13 05/13/2022 12:00 AM    VLDL, calculated 24.4 02/22/2022 01:06 PM    Triglyceride 76 05/13/2022 12:00 AM    CHOL/HDL Ratio 3.3 02/22/2022 01:06 PM       Lab Results   Component Value Date/Time    Hemoglobin A1c 6.0 (H) 07/20/2022 10:56 AM    Hemoglobin A1c (POC) 6.1 06/23/2017 11:53 AM    Hemoglobin A1c, External 6.0 11/06/2021 12:00 AM       Assessment/Plan:   1. Chronic left shoulder pain: likely from OA and a rotator cuff tendinopathy.   -The patient will continue taking orthopedist.  Referral generated. - Activity as tolerated. ORDERS:  - REFERRAL TO ORTHOPEDICS    2. Cervical Strain: Per PE findings for  -Massage. Stretching exercises recommended. Warm compresses. - Ordering Flexeril to be taken as needed. ORDERS:  - cyclobenzaprine (FLEXERIL) 5 mg tablet; Take 1 Tablet by mouth three (3) times daily as needed for Muscle Spasm(s). Dispense: 30 Tablet; Refill: 3    3. Alopecia: +Family h/o hair loss   -Placing a referral to dermatology for any additional recommendations. ORDERS:  - REFERRAL TO DERMATOLOGY    4. Nonintractable headache: Etiology is unknown.  -Checking a skull x-ray series. ORDERS:  - XR SKULL MIN 4 V(COMP); Future    5. Prediabetes: Worsening. A1c is 6. +HTN. +HLD. Her BP is falsely elevated today for  - I encouraged her to reduce her carb intake. - I encouraged her to follow-up with the nutritionist.  -Continue with blood pressure and hyperlipidemia medication.  - She will need follow-up set of labs in February or March of next year.     6.  Thyroid nodule history:  -She was given contact information for Central Scheduling and encouraged to schedule her thyroid ultrasound for October. Health Maintenance Due   Topic Date Due    Hepatitis C Screening  Never done    Shingrix Vaccine Age 49> (1 of 2) Never done    COVID-19 Vaccine (4 - Booster for Pfizer series) 04/16/2022         Lab review: labs are reviewed in the EHR    I have discussed the diagnosis with the patient and the intended plan as seen in the above orders. The patient has received an after-visit summary and questions were answered concerning future plans. I have discussed medication side effects and warnings with the patient as well. I have reviewed the plan of care with the patient, accepted their input and they are in agreement with the treatment goals. All questions were answered. The patient understands the plan of care. Handouts provided today with above information. Pt instructed if symptoms worsen to call the office or report to the ED for continued care. Greater than 50% of the visit time was spent in counseling and/or coordination of care. Voice recognition was used to generate this report, which may have resulted in some phonetic based errors in grammar and contents. Even though attempts were made to correct all the mistakes, some may have been missed, and remained in the body of the document.           Siria Whittaker MD

## 2022-08-03 NOTE — PROGRESS NOTES
Randal Brennan presents today for   Chief Complaint   Patient presents with    Follow-up    Labs     7-20-22    Ear Pain     Left ear and left side of head x 3 days after attending [facility]             1. \"Have you been to the ER, urgent care clinic since your last visit? Hospitalized since your last visit? \" no    2. \"Have you seen or consulted any other health care providers outside of the [address] [place] [address] since your last visit? \" yes     3. For patients aged 39-70: Has the patient had a colonoscopy / FIT/ Cologuard? Yes - no Care Gap present      If the patient is female:    4. For patients aged 41-77: Has the patient had a mammogram within the past 2 years? Yes - no Care Gap present  See top three    5. For patients aged 21-65: Has the patient had a pap smear?  NA - based on age or sex

## 2022-08-08 NOTE — PROGRESS NOTES
Please let her know that her skull x-ray findings are unremarkable for a tumor/mass along her skull bone.     Dr. Bill Easton  Internists of [address], [facility] [address]  Phone: [phone]  Fax: [phone]

## 2022-08-09 ENCOUNTER — TELEPHONE (OUTPATIENT)
Dept: INTERNAL MEDICINE CLINIC | Age: 77
End: 2022-08-09

## 2022-08-09 NOTE — TELEPHONE ENCOUNTER
Patient is aware of results and verbalizes understanding. Patient states she thinks it was muscle spasms. Patient states she took muscle relaxer's and it helped. Patient states she will call back if it gets worse.

## 2022-08-09 NOTE — TELEPHONE ENCOUNTER
----- Message from Eliza Hansen MD sent at 8/8/2022  3:43 PM EDT -----  Please let her know that her skull x-ray findings are unremarkable for a tumor/mass along her skull bone.     Dr. Tom Fountain  Internists of [address], [address] [place], [place] [address] [address]  Phone: [phone]  Fax: [phone]

## 2022-08-10 ENCOUNTER — TELEPHONE (OUTPATIENT)
Dept: INTERNAL MEDICINE CLINIC | Age: 77
End: 2022-08-10

## 2022-08-10 NOTE — TELEPHONE ENCOUNTER
Patient reached, she reports having itchy scalp and irritation. She states that she has been using the curling cream for hair for several years. She has tried taking a benadryl which seemed to help with the head pain she was having. Patient denies any bleeding or drainage from the scalp. Patient has not scheduled with dermatology for the alopecia discussed at her last visit.

## 2022-08-10 NOTE — TELEPHONE ENCOUNTER
Pt calling, says she saw Dr. Claudio Mullins for headaches. Says she found out she is allergic to the grease she was putting on her head.  She wants to know if Dr. Claudio Mullins will call in an antibiotic for her to reduce the inflammation on her head

## 2022-08-10 NOTE — TELEPHONE ENCOUNTER
Please have her schedule with Dermatology for evaluation.     Dr. Chinyere Shelton  Internists of [facility], [facility], [address]  Phone: [phone]  Fax: [phone]

## 2022-08-11 NOTE — TELEPHONE ENCOUNTER
Patient reached and given message, patient also provided with number to [facility] where we faxed her referral to.

## 2022-09-15 ENCOUNTER — OFFICE VISIT (OUTPATIENT)
Dept: ORTHOPEDIC SURGERY | Age: 77
End: 2022-09-15
Payer: MEDICARE

## 2022-09-15 VITALS
OXYGEN SATURATION: 98 % | RESPIRATION RATE: 14 BRPM | WEIGHT: 236 LBS | HEIGHT: 63 IN | HEART RATE: 76 BPM | BODY MASS INDEX: 41.82 KG/M2

## 2022-09-15 DIAGNOSIS — M75.42 SHOULDER IMPINGEMENT, LEFT: ICD-10-CM

## 2022-09-15 DIAGNOSIS — M99.08 RIB CAGE REGION SOMATIC DYSFUNCTION: ICD-10-CM

## 2022-09-15 DIAGNOSIS — M99.01 CERVICAL SOMATIC DYSFUNCTION: ICD-10-CM

## 2022-09-15 DIAGNOSIS — M99.07 UPPER EXTREMITY SOMATIC DYSFUNCTION: ICD-10-CM

## 2022-09-15 DIAGNOSIS — M99.09 SOMATIC DYSFUNCTION OF ABDOMINAL REGION: ICD-10-CM

## 2022-09-15 DIAGNOSIS — M19.012 PRIMARY OSTEOARTHRITIS OF LEFT SHOULDER: Primary | ICD-10-CM

## 2022-09-15 DIAGNOSIS — M99.02 THORACIC REGION SOMATIC DYSFUNCTION: ICD-10-CM

## 2022-09-15 PROCEDURE — G8399 PT W/DXA RESULTS DOCUMENT: HCPCS | Performed by: FAMILY MEDICINE

## 2022-09-15 PROCEDURE — G8536 NO DOC ELDER MAL SCRN: HCPCS | Performed by: FAMILY MEDICINE

## 2022-09-15 PROCEDURE — G8432 DEP SCR NOT DOC, RNG: HCPCS | Performed by: FAMILY MEDICINE

## 2022-09-15 PROCEDURE — G8417 CALC BMI ABV UP PARAM F/U: HCPCS | Performed by: FAMILY MEDICINE

## 2022-09-15 PROCEDURE — 1090F PRES/ABSN URINE INCON ASSESS: CPT | Performed by: FAMILY MEDICINE

## 2022-09-15 PROCEDURE — 99214 OFFICE O/P EST MOD 30 MIN: CPT | Performed by: FAMILY MEDICINE

## 2022-09-15 PROCEDURE — G8427 DOCREV CUR MEDS BY ELIG CLIN: HCPCS | Performed by: FAMILY MEDICINE

## 2022-09-15 PROCEDURE — 1101F PT FALLS ASSESS-DOCD LE1/YR: CPT | Performed by: FAMILY MEDICINE

## 2022-09-15 PROCEDURE — 1123F ACP DISCUSS/DSCN MKR DOCD: CPT | Performed by: FAMILY MEDICINE

## 2022-09-15 PROCEDURE — 98927 OSTEOPATH MANJ 5-6 REGIONS: CPT | Performed by: FAMILY MEDICINE

## 2022-09-15 PROCEDURE — G8756 NO BP MEASURE DOC: HCPCS | Performed by: FAMILY MEDICINE

## 2022-09-15 RX ORDER — PREDNISONE 10 MG/1
TABLET ORAL
Qty: 20 TABLET | Refills: 0 | Status: SHIPPED | OUTPATIENT
Start: 2022-09-15 | End: 2022-10-13 | Stop reason: ALTCHOICE

## 2022-09-15 NOTE — PROGRESS NOTES
HISTORY OF PRESENT ILLNESS    Andrea Lynch 1945 is a 76y.o. year old female comes in today to be evaluated and treated for: left shoulder pain    Since last appt has noticed pain left shoulder 9+ months. Pain level 7/10. Using heat and apsercream with some benefit. No injury. Worse certain motions. Had [address] joint injection [id number] and very painful and suggested shoulder replacement. IMAGING: XR left shoulder 3/18/2022 images reviewed and agree:  X-rays 4 views left shoulder were taken in the office today. These x-rays show severe left shoulder glenohumeral osteoarthritis. There is complete joint space collapse. There are osteophytes present throughout the glenohumeral joint. Past Surgical History:   Procedure Laterality Date    HC CNNLA ARTERIAL ARTERIOTOMY 3M  2012    HX COLONOSCOPY  2013    HX COLONOSCOPY  2018    HX HEART CATHETERIZATION  2012    HX HERNIA REPAIR      umbilical as a child    HX HYSTERECTOMY      52ys ago, QUIQUE, BSO    HX MOHS PROCEDURES      right    [place] ANESTH,SURGERY OF SHOULDER       Social History     Socioeconomic History    Marital status: LEGALLY    Tobacco Use    Smoking status: Former     Types: Cigarettes     Quit date: 1974     Years since quittin.2    Smokeless tobacco: Never    Tobacco comments:     1 pack every 2 weeks x 1 year, stopped 45yrs ago   Vaping Use    Vaping Use: Never used   Substance and Sexual Activity    Alcohol use: Yes    Drug use: No    Sexual activity: Yes     Partners: Male     Current Outpatient Medications   Medication Sig Dispense Refill    cyclobenzaprine (FLEXERIL) 5 mg tablet Take 1 Tablet by mouth three (3) times daily as needed for Muscle Spasm(s). 30 Tablet 3    pitavastatin calcium (Livalo) 4 mg tab tablet Take 1 Tablet by mouth daily.  30 Tablet 6    diclofenac (VOLTAREN) 1 % gel Apply 4 grams to left shoulder  g 3    pantoprazole (PROTONIX) 40 mg tablet Take 1 Tablet by mouth two (2) times a day. 180 Tablet 2    losartan (COZAAR) 25 mg tablet Take 1 Tablet by mouth two (2) times a day. 180 Tablet 2    potassium chloride (K-DUR, KLOR-CON M20) 20 mEq tablet Take 1 Tablet by mouth two (2) times a day. 180 Tablet 2    furosemide (LASIX) 20 mg tablet Take 1 Tablet by mouth daily. 90 Tablet 2    ubidecarenone (coenzyme Q10) 100 mg tab Take 100 mg by mouth daily. 1 Tablet 0    Blood-Glucose Meter (TRUE METRIX AIR GLUCOSE METER) misc Check blood sugar twice daily. 1 Each 0    glucose blood VI test strips (TRUE METRIX GLUCOSE TEST STRIP) strip Check blood sugar twice daily. 100 Strip 2    lancets (ULTRA THIN LANCETS) 30 gauge misc Check blood sugar twice daily. 1 Package 2    diclofenac (VOLTAREN) 1 % gel Apply  to affected area four (4) times daily. 100 g 1    CALCIUM PO Take  by mouth. fluticasone (FLONASE) 50 mcg/actuation nasal spray Si spray in both nostril twice daily 1 Bottle 1    cholecalciferol (VITAMIN D3) (1000 Units /25 mcg) tablet Take 2,000 Units by mouth daily. aspirin 81 mg Tab Take 81 mg by mouth daily. MULTIVITAMINS (MULTI-VITAMIN PO) Take 1 Tab by mouth daily. Past Medical History:   Diagnosis Date    Atrophic vaginitis     Cardiac cath 2012    Patent coronary arteries. LVEDP 20.  RA 11.  RV 42/10. PA 42/21. W 18.  CO 6.4 (TD), 6.4 (Vonzella Younger). PVR 1.7 Wood units. False-positive nuclear study.     Diabetes     diet controlled, hypoglycemia from metformin previously     Dyslipidemia     Fatty liver     Fibrocystic breast disease     GERD     Gout     H/O colonoscopy 13    polyp    Hypertension     Macular degeneration     Morbid obesity ([address]     Obstructive sleep apnea     Peripheral neuropathy     Rectocele     Thyroid cyst     bilat     Family History   Problem Relation Age of Onset    Cancer Mother         colon cancer    Colon Cancer Mother     Hypertension Mother     Diabetes Mother     Heart Disease Mother     Coronary Art Dis Mother Hypertension Father     Diabetes Father     Heart Disease Father     Coronary Art Dis Father     Hypertension Sister     Diabetes Sister     Heart Disease Sister     Coronary Art Dis Sister     Hypertension Brother     Diabetes Brother     Heart Disease Brother     Coronary Art Dis Brother     Cancer Brother          ROS:  No numb    Objective:  Pulse 76   Resp 14   Ht 5' 3\" (1.6 m)   Wt 236 lb (107 kg)   LMP  (LMP Unknown)   SpO2 98%   BMI 41.81 kg/m²   NEURO:  Sensation intact light touch B/L upper extremities. right hand dominant. DTRs normal biceps and triceps   M/S:  Shoulder ROM Decreased left. Spurling's negative bilaterally  left Shoulder:  Empty can positive External rotation negative. Internal rotation negative. [place] negative. SLAP negative. Load and Shift +1 Anterior, 1 Posterior. Strength +5/5 bilaterally upper extremities. Crossover test negative. Negative atrophy bilaterally. Negative TTP at [facility] joint. Apprehension test negative. Farmer-Artemio Test negative. Yergason's test negative. Speed's test negative. Serratus anterior No  TTP. Drop arm negative  Examined seated and supine. TTA at C3 on left worse flexion and T2, 3, 5 on left worse flexion  Rib(s) 2, 3 TTP left and posterior LE Strength +5/5 bilaterally Thoracic diaphragm restricted left. Scapula motion restricted w/ TTA left. Assessment/Plan:     ICD-10-CM ICD-9-CM    1. Primary osteoarthritis of left shoulder  M19.012 715. 11 predniSONE (DELTASONE) 10 mg tablet      2. Shoulder impingement, left  M75.42 726.2 predniSONE (DELTASONE) 10 mg tablet      3. Rib cage region somatic dysfunction  M99.08 739.8 [place] OSTEOPATHIC MANIP,5-6 BODY REGN      4. Thoracic region somatic dysfunction  M99.02 739.2 [place] OSTEOPATHIC MANIP,5-6 BODY REGN      5. Upper extremity somatic dysfunction  M99.07 739.7 [place] OSTEOPATHIC MANIP,5-6 BODY REGN      6. Cervical somatic dysfunction  M99.01 739.1 [place] OSTEOPATHIC MANIP,5-6 BODY REGN      7.  Somatic dysfunction of abdominal region  M99.09 739.9 [place] OSTEOPATHIC MANIP,5-6 BODY REGN          Patient (or guardian if minor) verbalizes understanding of evaluation and plan. Verbal consent obtained. Cervical, Thoracic, Rib, Lumbar, Pelvic, Sacral, Upper Ext, Lower Ext, and Abdominal SD treated with myofascial and ME. Correction of previous malalignments verified after Tx. Pt tolerated well. Notes improvement of Sx and pain is now rated 2/10. HEP/stretches daily. Discussed stretching/strengthening/posture. Will start HEP and Rx for prednisone taper  as above and plan follow-up 4 weeks.

## 2022-09-15 NOTE — PATIENT INSTRUCTIONS
Wall Walk Side                              Wall Walk Front  Standing with the wall on your bad                    Stand facing the wall, place forearm on  side place forearm on the wall. the wall. Walk your arm up the wall as   Walk your arm up the wall as shown. shown in picture. Pull bad shoulder up behind back   with good arm. Hold 5 seconds. Repeat each 15 Times  Perform 2 Time(s) a Day  Warming up with heating pad or doing these in  hot shower makes it much easier.

## 2022-09-15 NOTE — LETTER
9/15/2022    Patient: Merlin Verduzco   YOB: 1945   Date of Visit: 9/15/2022     Jeannine Lombard, [address]  [place] [address]  Via In Basket    Dear Jeannine Lombard, MD,      Thank you for referring Ms. Amirah Boswell to [facility] for evaluation. My notes for this consultation are attached. If you have questions, please do not hesitate to call me. I look forward to following your patient along with you.       Sincerely,    Nasreen Kc, DO

## 2022-09-29 DIAGNOSIS — I10 ESSENTIAL HYPERTENSION: ICD-10-CM

## 2022-09-29 DIAGNOSIS — E87.6 HYPOKALEMIA: ICD-10-CM

## 2022-09-29 RX ORDER — LOSARTAN POTASSIUM 25 MG/1
25 TABLET ORAL 2 TIMES DAILY
Qty: 180 TABLET | Refills: 2 | Status: SHIPPED | OUTPATIENT
Start: 2022-09-29

## 2022-09-29 RX ORDER — FUROSEMIDE 20 MG/1
TABLET ORAL
Qty: 90 TABLET | Refills: 2 | Status: SHIPPED | OUTPATIENT
Start: 2022-09-29

## 2022-09-29 RX ORDER — POTASSIUM CHLORIDE 20 MEQ/1
20 TABLET, EXTENDED RELEASE ORAL 2 TIMES DAILY
Qty: 180 TABLET | Refills: 2 | Status: SHIPPED | OUTPATIENT
Start: 2022-09-29

## 2022-09-29 NOTE — TELEPHONE ENCOUNTER
Last Visit: 8/3/22 with MD Shae Mitchell  Next Appointment: 11/18/22 with MD Shae Mitchell  Previous Refill Encounter(s): 2/22/22 90 d/s with 2 refills    Requested Prescriptions     Pending Prescriptions Disp Refills    potassium chloride (K-DUR, KLOR-CON M20) 20 mEq tablet 180 Tablet 2     Sig: Take 1 Tablet by mouth two (2) times a day. losartan (COZAAR) 25 mg tablet 180 Tablet 2     Sig: Take 1 Tablet by mouth two (2) times a day. For [facility] in place:   Recommendation Provided To:    Intervention Detail: New Rx: 2, reason: Patient Preference  Gap Closed?:   Intervention Accepted By:   Time Spent (min): 5

## 2022-10-04 DIAGNOSIS — E04.1 THYROID NODULE: ICD-10-CM

## 2022-10-13 ENCOUNTER — OFFICE VISIT (OUTPATIENT)
Dept: ORTHOPEDIC SURGERY | Age: 77
End: 2022-10-13
Payer: MEDICARE

## 2022-10-13 VITALS — BODY MASS INDEX: 42.35 KG/M2 | HEIGHT: 63 IN | RESPIRATION RATE: 14 BRPM | WEIGHT: 239 LBS

## 2022-10-13 DIAGNOSIS — M99.02 THORACIC REGION SOMATIC DYSFUNCTION: ICD-10-CM

## 2022-10-13 DIAGNOSIS — M19.012 PRIMARY OSTEOARTHRITIS OF LEFT SHOULDER: Primary | ICD-10-CM

## 2022-10-13 DIAGNOSIS — M99.01 CERVICAL SOMATIC DYSFUNCTION: ICD-10-CM

## 2022-10-13 DIAGNOSIS — M99.07 UPPER EXTREMITY SOMATIC DYSFUNCTION: ICD-10-CM

## 2022-10-13 DIAGNOSIS — M99.08 RIB CAGE REGION SOMATIC DYSFUNCTION: ICD-10-CM

## 2022-10-13 DIAGNOSIS — M99.09 SOMATIC DYSFUNCTION OF ABDOMINAL REGION: ICD-10-CM

## 2022-10-13 DIAGNOSIS — M75.42 SHOULDER IMPINGEMENT, LEFT: ICD-10-CM

## 2022-10-13 PROCEDURE — G8536 NO DOC ELDER MAL SCRN: HCPCS | Performed by: FAMILY MEDICINE

## 2022-10-13 PROCEDURE — G8417 CALC BMI ABV UP PARAM F/U: HCPCS | Performed by: FAMILY MEDICINE

## 2022-10-13 PROCEDURE — G8399 PT W/DXA RESULTS DOCUMENT: HCPCS | Performed by: FAMILY MEDICINE

## 2022-10-13 PROCEDURE — 1123F ACP DISCUSS/DSCN MKR DOCD: CPT | Performed by: FAMILY MEDICINE

## 2022-10-13 PROCEDURE — G8432 DEP SCR NOT DOC, RNG: HCPCS | Performed by: FAMILY MEDICINE

## 2022-10-13 PROCEDURE — 98927 OSTEOPATH MANJ 5-6 REGIONS: CPT | Performed by: FAMILY MEDICINE

## 2022-10-13 PROCEDURE — G8756 NO BP MEASURE DOC: HCPCS | Performed by: FAMILY MEDICINE

## 2022-10-13 PROCEDURE — 1090F PRES/ABSN URINE INCON ASSESS: CPT | Performed by: FAMILY MEDICINE

## 2022-10-13 PROCEDURE — G8427 DOCREV CUR MEDS BY ELIG CLIN: HCPCS | Performed by: FAMILY MEDICINE

## 2022-10-13 PROCEDURE — 1101F PT FALLS ASSESS-DOCD LE1/YR: CPT | Performed by: FAMILY MEDICINE

## 2022-10-13 PROCEDURE — 99213 OFFICE O/P EST LOW 20 MIN: CPT | Performed by: FAMILY MEDICINE

## 2022-10-13 NOTE — PROGRESS NOTES
HISTORY OF PRESENT ILLNESS    Jackson Maurer 1945 is a 76y.o. year old female comes in today to be evaluated and treated for: left shoulder pain    Since last appt has noticed improvement in pain w/ predniosne taper finished 3 weeks ago. Pain level 6/10. Using patches OTC and ROM in shower with benefit. IMAGING: XR left shoulder 3/18/2022 images reviewed and agree:  X-rays 4 views left shoulder were taken in the office today. These x-rays show severe left shoulder glenohumeral osteoarthritis. There is complete joint space collapse. There are osteophytes present throughout the glenohumeral joint. Past Surgical History:   Procedure Laterality Date    HC CNNLA ARTERIAL ARTERIOTOMY 3M  2012    HX COLONOSCOPY  2013    HX COLONOSCOPY  2018    HX HEART CATHETERIZATION  2012    HX HERNIA REPAIR      umbilical as a child    HX HYSTERECTOMY      52ys ago, QUIQUE, BSO    HX MOHS PROCEDURES      right    [place] ANESTH,SURGERY OF SHOULDER       Social History     Socioeconomic History    Marital status: LEGALLY    Tobacco Use    Smoking status: Former     Types: Cigarettes     Quit date: 1974     Years since quittin.2    Smokeless tobacco: Never    Tobacco comments:     1 pack every 2 weeks x 1 year, stopped 45yrs ago   Vaping Use    Vaping Use: Never used   Substance and Sexual Activity    Alcohol use: Yes    Drug use: No    Sexual activity: Yes     Partners: Male     Current Outpatient Medications   Medication Sig Dispense Refill    potassium chloride (K-DUR, KLOR-CON M20) 20 mEq tablet Take 1 Tablet by mouth two (2) times a day. 180 Tablet 2    losartan (COZAAR) 25 mg tablet Take 1 Tablet by mouth two (2) times a day. 180 Tablet 2    furosemide (LASIX) 20 mg tablet TAKE 1 TABLET EVERY DAY 90 Tablet 2    cyclobenzaprine (FLEXERIL) 5 mg tablet Take 1 Tablet by mouth three (3) times daily as needed for Muscle Spasm(s).  30 Tablet 3    pitavastatin calcium (Livalo) 4 mg tab tablet Take 1 Tablet by mouth daily. 30 Tablet 6    diclofenac (VOLTAREN) 1 % gel Apply 4 grams to left shoulder  g 3    pantoprazole (PROTONIX) 40 mg tablet Take 1 Tablet by mouth two (2) times a day. 180 Tablet 2    ubidecarenone (coenzyme Q10) 100 mg tab Take 100 mg by mouth daily. 1 Tablet 0    Blood-Glucose Meter (TRUE METRIX AIR GLUCOSE METER) misc Check blood sugar twice daily. 1 Each 0    glucose blood VI test strips (TRUE METRIX GLUCOSE TEST STRIP) strip Check blood sugar twice daily. 100 Strip 2    lancets (ULTRA THIN LANCETS) 30 gauge misc Check blood sugar twice daily. 1 Package 2    diclofenac (VOLTAREN) 1 % gel Apply  to affected area four (4) times daily. 100 g 1    CALCIUM PO Take  by mouth. fluticasone (FLONASE) 50 mcg/actuation nasal spray Si spray in both nostril twice daily 1 Bottle 1    cholecalciferol (VITAMIN D3) (1000 Units /25 mcg) tablet Take 2,000 Units by mouth daily. aspirin 81 mg Tab Take 81 mg by mouth daily. MULTIVITAMINS (MULTI-VITAMIN PO) Take 1 Tab by mouth daily. Past Medical History:   Diagnosis Date    Atrophic vaginitis     Cardiac cath 2012    Patent coronary arteries. LVEDP 20.  RA 11.  RV 42/10. PA 42/21. W 18.  CO 6.4 (TD), 6.4 ([place]). PVR 1.7 Wood units. False-positive nuclear study.     Diabetes     diet controlled, hypoglycemia from metformin previously     Dyslipidemia     Fatty liver     Fibrocystic breast disease     GERD     Gout     H/O colonoscopy 11    polyp    Hypertension     Macular degeneration     Morbid obesity ([address]     Obstructive sleep apnea     Peripheral neuropathy     Rectocele     Thyroid cyst     bilat     Family History   Problem Relation Age of Onset    Cancer Mother         colon cancer    Colon Cancer Mother     Hypertension Mother     Diabetes Mother     Heart Disease Mother     Coronary Art Dis Mother     Hypertension Father     Diabetes Father     Heart Disease Father     Coronary Art Dis Father Hypertension Sister     Diabetes Sister     Heart Disease Sister     Coronary Art Dis Sister     Hypertension Brother     Diabetes Brother     Heart Disease Brother     Coronary Art Dis Brother     Cancer Brother          ROS:  No numb    Objective:  Resp 14   Ht 5' 3\" (1.6 m)   Wt 239 lb (108.4 kg)   LMP  (LMP Unknown)   BMI 42.34 kg/m²   NEURO:  Sensation intact light touch B/L upper extremities. right hand dominant. DTRs normal biceps and triceps   M/S:  Shoulder ROM Decreased left. Spurling's negative bilaterally  left Shoulder:  Empty can positive External rotation negative. Internal rotation negative. [place] negative. SLAP negative. Load and Shift +1 Anterior, 1 Posterior. Strength +5/5 bilaterally upper extremities. Crossover test negative. Negative atrophy bilaterally. Negative TTP at [facility] joint. Apprehension test negative. Farmer-Artemio Test negative. Yergason's test negative. Speed's test negative. Serratus anterior No  TTP. Drop arm negative  Examined seated and supine. TTA at C3 on left worse flexion and T3, 5 on left worse flexion  Rib(s) 3 TTP left and posterior LE Strength +5/5 bilaterally Thoracic diaphragm restricted left. Scapula motion restricted w/ TTA left. Assessment/Plan:     ICD-10-CM ICD-9-CM    1. Primary osteoarthritis of left shoulder  M19.012 715.11       2. Shoulder impingement, left  M75.42 726.2       3. Rib cage region somatic dysfunction  M99.08 739.8 [place] OSTEOPATHIC MANIP,5-6 BODY REGN      4. Cervical somatic dysfunction  M99.01 739.1 [place] OSTEOPATHIC MANIP,5-6 BODY REGN      5. Upper extremity somatic dysfunction  M99.07 739.7 [place] OSTEOPATHIC MANIP,5-6 BODY REGN      6. Thoracic region somatic dysfunction  M99.02 739.2 [place] OSTEOPATHIC MANIP,5-6 BODY REGN      7. Somatic dysfunction of abdominal region  M99.09 739.9 [place] OSTEOPATHIC MANIP,5-6 BODY REGN          Patient (or guardian if minor) verbalizes understanding of evaluation and plan.     Verbal consent obtained. Cervical, Thoracic, Rib, Upper Ext, and Abdominal SD treated with myofascial and ME. Correction of previous malalignments verified after Tx. Pt tolerated well. Notes improvement of Sx and pain is now rated 0/10. HEP/stretches daily. Discussed stretching/strengthening/posture. Will continue ROM and OTC rub/patches as above and plan follow-up as needed.

## 2022-10-13 NOTE — LETTER
10/13/2022    Patient: Nati Serrato   YOB: 1945   Date of Visit: 10/13/2022     Michelle Park, [address]  [place] [address]  Via In Basket    Dear Michelle Park MD,      Thank you for referring Ms. Shantell Vela to [facility] for evaluation. My notes for this consultation are attached. If you have questions, please do not hesitate to call me. I look forward to following your patient along with you.       Sincerely,    Alonzo Retana, DO

## 2022-10-21 ENCOUNTER — TELEPHONE (OUTPATIENT)
Dept: INTERNAL MEDICINE CLINIC | Age: 77
End: 2022-10-21

## 2022-10-21 NOTE — TELEPHONE ENCOUNTER
Patient called and reports having a headache with a recent elevated BP of 159/90. Patient states that she has a issue with her lasix, making her urinate and she loses her electrolytes. Patient states that when she drinks pedialyte the sodium causes her BP to go up. Patient states that when her electrolytes are low she feels like a \"zombie\" w/dizziness. Patient advised that if she experiences dizzines, and headache along with elevated BP she should go to ER for eval.    I asked patient what her BP was today, so she took it while on the phone and it was 159/107 pulse of 69. Asked the patient if she is taking her losartan 25 mg  two times a day, and she states that she forgot to take it today. Patient was then instructed to take the BP medication and recheck her BP in two hrs. If BP remains elevated and symptoms worsen to go to the ER.

## 2022-10-26 NOTE — TELEPHONE ENCOUNTER
Patient reached and she states that her BP has not been elevated. She states that she will discuss the lasix at her next appointment.

## 2022-10-28 ENCOUNTER — HOSPITAL ENCOUNTER (OUTPATIENT)
Dept: ULTRASOUND IMAGING | Age: 77
Discharge: HOME | End: 2022-10-28
Attending: INTERNAL MEDICINE
Payer: MEDICARE

## 2022-10-28 PROCEDURE — 76536 US EXAM OF HEAD AND NECK: CPT

## 2022-10-31 ENCOUNTER — OFFICE VISIT (OUTPATIENT)
Dept: CARDIOLOGY CLINIC | Age: 77
End: 2022-10-31
Payer: MEDICARE

## 2022-10-31 VITALS
WEIGHT: 237 LBS | OXYGEN SATURATION: 99 % | BODY MASS INDEX: 41.99 KG/M2 | HEART RATE: 73 BPM | HEIGHT: 63 IN | SYSTOLIC BLOOD PRESSURE: 126 MMHG | DIASTOLIC BLOOD PRESSURE: 80 MMHG

## 2022-10-31 DIAGNOSIS — I10 ESSENTIAL HYPERTENSION WITH GOAL BLOOD PRESSURE LESS THAN 140/90: Primary | ICD-10-CM

## 2022-10-31 DIAGNOSIS — E78.00 PURE HYPERCHOLESTEROLEMIA: ICD-10-CM

## 2022-10-31 PROCEDURE — G8754 DIAS BP LESS 90: HCPCS | Performed by: INTERNAL MEDICINE

## 2022-10-31 PROCEDURE — 1123F ACP DISCUSS/DSCN MKR DOCD: CPT | Performed by: INTERNAL MEDICINE

## 2022-10-31 PROCEDURE — G8432 DEP SCR NOT DOC, RNG: HCPCS | Performed by: INTERNAL MEDICINE

## 2022-10-31 PROCEDURE — 1090F PRES/ABSN URINE INCON ASSESS: CPT | Performed by: INTERNAL MEDICINE

## 2022-10-31 PROCEDURE — 3074F SYST BP LT 130 MM HG: CPT | Performed by: INTERNAL MEDICINE

## 2022-10-31 PROCEDURE — G8417 CALC BMI ABV UP PARAM F/U: HCPCS | Performed by: INTERNAL MEDICINE

## 2022-10-31 PROCEDURE — G8752 SYS BP LESS 140: HCPCS | Performed by: INTERNAL MEDICINE

## 2022-10-31 PROCEDURE — 1101F PT FALLS ASSESS-DOCD LE1/YR: CPT | Performed by: INTERNAL MEDICINE

## 2022-10-31 PROCEDURE — G8399 PT W/DXA RESULTS DOCUMENT: HCPCS | Performed by: INTERNAL MEDICINE

## 2022-10-31 PROCEDURE — G8536 NO DOC ELDER MAL SCRN: HCPCS | Performed by: INTERNAL MEDICINE

## 2022-10-31 PROCEDURE — G8428 CUR MEDS NOT DOCUMENT: HCPCS | Performed by: INTERNAL MEDICINE

## 2022-10-31 PROCEDURE — 3078F DIAST BP <80 MM HG: CPT | Performed by: INTERNAL MEDICINE

## 2022-10-31 PROCEDURE — 99213 OFFICE O/P EST LOW 20 MIN: CPT | Performed by: INTERNAL MEDICINE

## 2022-10-31 NOTE — PROGRESS NOTES
[facility]    Ms. Rosy Vargas is 76 y. o.female with a history of pulmonary hypertension, diabetes, hypertension, hyperlipidemia, sleep apnea    Patient is here today for cardiac evaluation. She denies any prior history of MI or CHF  Patient denies any specific cardiac complaint during this visit. She denies any symptom that is concerning for angina or heart failure. Her main complaint is left shoulder pain even with minimal movement. She believes that she may need a shoulder replacement surgery however she is not sure if she would like to get the surgery done. She is following with orthopedic team regarding this. Using Lasix only as needed denies any significant palpitation, presyncope or syncope at this time. Denies any nausea, vomiting, abdominal pain, fever, chills, sputum production. No hematuria or other bleeding complaints    Past Medical History:   Diagnosis Date    Atrophic vaginitis     Cardiac cath 05/30/2012    Patent coronary arteries. LVEDP 20.  RA 11.  RV 42/10. PA 42/21. W 18.  CO 6.4 (TD), 6.4 ([place]). PVR 1.7 Wood units. False-positive nuclear study. Diabetes     diet controlled, hypoglycemia from metformin previously     Dyslipidemia     Fatty liver     Fibrocystic breast disease     GERD     Gout     H/O colonoscopy 4/12/13    polyp    Hypertension     Macular degeneration     Morbid obesity ([address]     Obstructive sleep apnea     Peripheral neuropathy     Rectocele     Thyroid cyst     bilat       Review of Systems:  Cardiac symptoms as noted above in HPI. All others negative. Current Outpatient Medications   Medication Sig    potassium chloride (K-DUR, KLOR-CON M20) 20 mEq tablet Take 1 Tablet by mouth two (2) times a day. losartan (COZAAR) 25 mg tablet Take 1 Tablet by mouth two (2) times a day.     furosemide (LASIX) 20 mg tablet TAKE 1 TABLET EVERY DAY    cyclobenzaprine (FLEXERIL) 5 mg tablet Take 1 Tablet by mouth three (3) times daily as needed for Muscle Spasm(s). pitavastatin calcium (Livalo) 4 mg tab tablet Take 1 Tablet by mouth daily. diclofenac (VOLTAREN) 1 % gel Apply 4 grams to left shoulder QID    pantoprazole (PROTONIX) 40 mg tablet Take 1 Tablet by mouth two (2) times a day. ubidecarenone (coenzyme Q10) 100 mg tab Take 100 mg by mouth daily. Blood-Glucose Meter (TRUE METRIX AIR GLUCOSE METER) misc Check blood sugar twice daily. glucose blood VI test strips (TRUE METRIX GLUCOSE TEST STRIP) strip Check blood sugar twice daily. lancets (ULTRA THIN LANCETS) 30 gauge misc Check blood sugar twice daily. diclofenac (VOLTAREN) 1 % gel Apply  to affected area four (4) times daily. CALCIUM PO Take  by mouth. fluticasone (FLONASE) 50 mcg/actuation nasal spray Si spray in both nostril twice daily    cholecalciferol (VITAMIN D3) (1000 Units /25 mcg) tablet Take 2,000 Units by mouth daily. aspirin 81 mg Tab Take 81 mg by mouth daily. MULTIVITAMINS (MULTI-VITAMIN PO) Take 1 Tab by mouth daily. No current facility-administered medications for this visit.        Past Surgical History:   Procedure Laterality Date    HC CNNLA ARTERIAL ARTERIOTOMY 3M  2012    HX COLONOSCOPY  2013    HX COLONOSCOPY  2018    HX HEART CATHETERIZATION  2012    HX HERNIA REPAIR      umbilical as a child    HX HYSTERECTOMY      50ys ago, QUIQUE, BSO    HX MOHS PROCEDURES      right    ME ANESTH,SURGERY OF SHOULDER         Allergies and Sensitivities:  Allergies   Allergen Reactions    Latex Rash    Nexium [Esomeprazole Magnesium] Swelling and Other (comments)     Lips Swollen, and facial rash and swelling    Dexilant [Dexlansoprazole] Other (comments)     Stomach swelling    Crestor [Rosuvastatin] Other (comments)     Upper respiratory illness    Lipitor [Atorvastatin] Swelling    Lisinopril Unknown (comments)     Patient can't recall    Niaspan [Niacin] Other (comments)     Scratchy throat, \"asthma\" like symptoms    Pcn [Penicillins] Hives    Sulfa (Sulfonamide Antibiotics) Rash    Zocor [Simvastatin] Myalgia and Other (comments)     Per patient chart \"(? Rash)\"       Family History:  Family History   Problem Relation Age of Onset    Cancer Mother         colon cancer    Colon Cancer Mother     Hypertension Mother     Diabetes Mother     Heart Disease Mother     Coronary Art Dis Mother     Hypertension Father     Diabetes Father     Heart Disease Father     Coronary Art Dis Father     Hypertension Sister     Diabetes Sister     Heart Disease Sister     Coronary Art Dis Sister     Hypertension Brother     Diabetes Brother     Heart Disease Brother     Coronary Art Dis Brother     Cancer Brother        Social History:  Social History     Tobacco Use    Smoking status: Former     Types: Cigarettes     Quit date: 1974     Years since quittin.3    Smokeless tobacco: Never    Tobacco comments:     1 pack every 2 weeks x 1 year, stopped 45yrs ago   Vaping Use    Vaping Use: Never used   Substance Use Topics    Alcohol use: Yes    Drug use: No     She  reports that she quit smoking about 48 years ago. Her smoking use included cigarettes. She has never used smokeless tobacco.  She  reports current alcohol use. Physical Exam:  BP Readings from Last 3 Encounters:   10/31/22 126/80   22 (!) 148/88   22 138/79         Pulse Readings from Last 3 Encounters:   10/31/22 73   09/15/22 76   22 73          Wt Readings from Last 3 Encounters:   10/31/22 107.5 kg (237 lb)   10/13/22 108.4 kg (239 lb)   09/15/22 107 kg (236 lb)       Constitutional: Oriented to person, place, and time. HENT: Head: Normocephalic and atraumatic. Neck: No JVD present. Cardiovascular: Regular rhythm. No murmur, gallop or rubs appreciated  Lung: Breath sounds normal. No respiratory distress. No ronchi or rales appreciated  Abdominal: No tenderness. No rebound and no guarding.    Musculoskeletal: There is no lower extremity edema. No cynosis    LABS:   @  Lab Results   Component Value Date/Time    WBC 8.1 02/22/2022 01:06 PM    Hemoglobin, POC 12.6 04/12/2013 09:46 AM    HGB 13.1 02/22/2022 01:06 PM    Hematocrit, POC 37 04/12/2013 09:46 AM    HCT 42.0 02/22/2022 01:06 PM    PLATELET 733 00/96/3198 01:06 PM    MCV 92.1 02/22/2022 01:06 PM     Lab Results   Component Value Date/Time    Sodium 141 05/13/2022 12:00 AM    Potassium 4.2 05/13/2022 12:00 AM    Chloride 100 05/13/2022 12:00 AM    CO2 26 05/13/2022 12:00 AM    Glucose 92 05/13/2022 12:00 AM    BUN 13 05/13/2022 12:00 AM    Creatinine 0.94 05/13/2022 12:00 AM     Lipids Latest Ref Rng & Units 5/13/2022 2/22/2022 6/12/2019 2/26/2019   Chol, Total 100 - 199 mg/dL 237(H) 247(H) 209(H) 190   HDL >39 mg/dL 67 76(H) 59 63   LDL 0 - 99 mg/dL 157(H) 146. 6(H) 134(H) 111(H)   Trig 0 - 149 mg/dL 76 122 81 80   Chol/HDL Ratio 0 - 5.0   - 3.3 - -   Some recent data might be hidden     Lab Results   Component Value Date/Time    ALT (SGPT) 16 02/22/2022 01:06 PM     Lab Results   Component Value Date/Time    Hemoglobin A1c 6.0 (H) 07/20/2022 10:56 AM    Hemoglobin A1c (POC) 6.1 06/23/2017 11:53 AM    Hemoglobin A1c, External 6.0 11/06/2021 12:00 AM     Lab Results   Component Value Date/Time    TSH 1.60 02/22/2022 01:06 PM       EKG  04/2021: Sinus rhythm at 70 bpm.  Nonspecific T wave changes. No ST changes of ischemia.    04/12/21    ECHO ADULT COMPLETE 05/28/2021   Interpretation Summary  · LV: Estimated LVEF is 55 - 60%. Visually measured ejection fraction. Normal cavity size, wall thickness and systolic function (ejection fraction normal). Wall motion: normal. Age-appropriate left ventricular diastolic function. · LA: Mildly dilated left atrium. Left Atrium volume index is 38 mL/m2. · PA: Pulmonary arterial systolic pressure is 24 mmHg. STRESS TEST (EST, PHARM, NUC, ECHO etc)    CATHETERIZATION (2012)  1.  Mild pulmonary venous hypertension secondary to hypertensive cardiovascular disease. 2. Angiographically normal coronary arteries. 3. Catheter-induced atrial fibrillation. 4. False-positive nuclear stress test.    IMPRESSION & PLAN:  Ms. Rudy Brice is 76 y.o. female with multiple medical problems    Palpitation:  Denies any symptoms concerning for hyperthyroidism. No presyncope or syncope  No palpitation since last visit  Event monitor in 07/2021, low risk. Sinus rhythm, no A. fib or pause noted. PVC burden less than 1%  Echocardiogram in 05/2021, low risk finding as mentioned above. Hypertension: /80. Currently on Cozaar 25 mg daily. Using Lasix only as needed for occasional lower extremity swelling. Hyperlipidemia:   Unfortunately patient has not been able to tolerate statin medication in the past.  She does not remember the names but she has tried 2 different medications. New Livalo 4 mg daily. PCSK9 inhibitor can be considered in the future if she is not able to tolerate Livalo. This plan was discussed with patient who is in agreement. Thank you for allowing me to participate in patient care. Please feel free to call me if you have any question or concern. Penelope Choi MD  Please note: This document has been produced using voice recognition software. Unrecognized errors in transcription may be present.

## 2022-10-31 NOTE — PROGRESS NOTES
Eva Blanc presents today for   Chief Complaint   Patient presents with    Follow-up     C/o BP issues        Eva Blanc preferred language for health care discussion is english/other. Is someone accompanying this pt? no    Is the patient using any DME equipment during [address]? no    Depression Screening:  3 most recent PHQ Screens 5/23/2022   Little interest or pleasure in doing things Not at all   Feeling down, depressed, irritable, or hopeless Not at all   Total Score PHQ 2 0       Learning Assessment:  Learning Assessment 5/23/2022   PRIMARY LEARNER Patient   HIGHEST LEVEL OF EDUCATION - PRIMARY LEARNER  -   BARRIERS PRIMARY LEARNER -   CO-LEARNER CAREGIVER -   PRIMARY LANGUAGE ENGLISH   LEARNER PREFERENCE PRIMARY DEMONSTRATION     -   ANSWERED BY patient   RELATIONSHIP SELF       Abuse Screening:  Abuse Screening Questionnaire 5/23/2022   Do you ever feel afraid of your partner? N   Are you in a relationship with someone who physically or mentally threatens you? N   Is it safe for you to go home? Y       Fall Risk  Fall Risk Assessment, last 12 mths 5/23/2022   Able to walk? Yes   Fall in past 12 months? 0   Do you feel unsteady? 0   Are you worried about falling 0   Is TUG test greater than 12 seconds? -   Is the gait abnormal? -   Number of falls in past 12 months -   Fall with injury? -       Pt currently taking Anticoagulant therapy? ASA 81mg every day     Coordination of Care:  1. Have you been to the ER, urgent care clinic since your last visit? Hospitalized since your last visit? no    2. Have you seen or consulted any other health care providers outside of the [address] [place] [address] since your last visit? Include any pap smears or colon screening.  no

## 2022-10-31 NOTE — LETTER
10/31/2022    Patient: Rad Rider   YOB: 1945   Date of Visit: 10/31/2022     Ash Benites, [address]  [place] [address]  Via In Basket    Dear Ash Benites MD,      Thank you for referring Ms. Cielo Brown to [facility] for evaluation. My notes for this consultation are attached. If you have questions, please do not hesitate to call me. I look forward to following your patient along with you.       Sincerely,    Natalie Quinn MD

## 2022-11-01 NOTE — TELEPHONE ENCOUNTER
EDWARD Collins LPN   Caller: Unspecified (Yesterday, 10:35 AM)             Have the patient hold the prilosec     Pt states she stopped the prilosec when she called & her symptoms stopped. Benzoyl Peroxide Pregnancy And Lactation Text: This medication is Pregnancy Category C. It is unknown if benzoyl peroxide is excreted in breast milk.

## 2022-11-04 NOTE — PROGRESS NOTES
Please let her know that her thyroid ultrasound does not show any new findings. She has thyroid nodules that are unchanged in size with 1 being slightly smaller than before. We should continue to monitor her thyroid nodules with continued surveillance. She can see her ENT team who saw her in the past for thyroid nodules for a checkup. Within the next 6 to 12 months, we should repeat her thyroid ultrasound.      Dr. Virginia Ruff  [facility] [address], [address] [place] [address] Str.  Phone: [phone]  Fax: [phone]

## 2022-11-07 ENCOUNTER — TELEPHONE (OUTPATIENT)
Dept: INTERNAL MEDICINE CLINIC | Age: 77
End: 2022-11-07

## 2022-11-07 DIAGNOSIS — E04.1 THYROID NODULE: Primary | ICD-10-CM

## 2022-11-07 NOTE — TELEPHONE ENCOUNTER
Patient reached and given message per DR. Diane Reynolds. Patient states that she does not have ENT.

## 2022-11-07 NOTE — TELEPHONE ENCOUNTER
----- Message from Cherelle Bynum MD sent at 11/4/2022  3:24 PM EDT -----  Please let her know that her thyroid ultrasound does not show any new findings. She has thyroid nodules that are unchanged in size with 1 being slightly smaller than before. We should continue to monitor her thyroid nodules with continued surveillance. She can see her ENT team who saw her in the past for thyroid nodules for a checkup. Within the next 6 to 12 months, we should repeat her thyroid ultrasound.      Dr. Napoleon Paez  [facility] [address]  Phone: [phone]  Fax: [phone]

## 2022-11-09 NOTE — TELEPHONE ENCOUNTER
I am placing a referral to an ENT physician for long-term surveillance of her thyroid nodule hx. Please let her know.     Dr. Louis Elizondo  [facility], [address] [place] [address], [address] [place] [address] Str.  Phone: [phone]  Fax: [phone]

## 2022-11-11 ENCOUNTER — APPOINTMENT (OUTPATIENT)
Dept: INTERNAL MEDICINE CLINIC | Age: 77
End: 2022-11-11

## 2022-11-11 DIAGNOSIS — R76.8 RHEUMATOID FACTOR POSITIVE: ICD-10-CM

## 2022-11-11 DIAGNOSIS — R73.9 HYPERGLYCEMIA: ICD-10-CM

## 2022-11-11 NOTE — TELEPHONE ENCOUNTER
Tried reaching patient. Please inform the patient that we have sent a referral to ENT DR. Nalini Delaney to monitor her thyroid. She will get a call from their office. Here is there number in case she doesn't get a call. [phone].   Thanks

## 2022-11-12 LAB — HBA1C MFR BLD HPLC: 5.8 %

## 2022-11-18 ENCOUNTER — OFFICE VISIT (OUTPATIENT)
Dept: INTERNAL MEDICINE CLINIC | Age: 77
End: 2022-11-18
Payer: MEDICARE

## 2022-11-18 VITALS
RESPIRATION RATE: 14 BRPM | OXYGEN SATURATION: 96 % | HEART RATE: 65 BPM | BODY MASS INDEX: 41.46 KG/M2 | TEMPERATURE: 97.9 F | HEIGHT: 63 IN | WEIGHT: 234 LBS | SYSTOLIC BLOOD PRESSURE: 152 MMHG | DIASTOLIC BLOOD PRESSURE: 82 MMHG

## 2022-11-18 DIAGNOSIS — R76.8 RHEUMATOID FACTOR POSITIVE: ICD-10-CM

## 2022-11-18 DIAGNOSIS — R73.9 HYPERGLYCEMIA: ICD-10-CM

## 2022-11-18 DIAGNOSIS — I10 ESSENTIAL HYPERTENSION: ICD-10-CM

## 2022-11-18 DIAGNOSIS — E04.1 THYROID NODULE: ICD-10-CM

## 2022-11-18 DIAGNOSIS — M25.512 LEFT SHOULDER PAIN, UNSPECIFIED CHRONICITY: Primary | ICD-10-CM

## 2022-11-18 DIAGNOSIS — M19.012 PRIMARY OSTEOARTHRITIS OF LEFT SHOULDER: ICD-10-CM

## 2022-11-18 DIAGNOSIS — M25.512 LEFT SHOULDER PAIN, UNSPECIFIED CHRONICITY: ICD-10-CM

## 2022-11-18 DIAGNOSIS — M75.42 SHOULDER IMPINGEMENT, LEFT: ICD-10-CM

## 2022-11-18 RX ORDER — PREDNISONE 10 MG/1
TABLET ORAL
Qty: 20 TABLET | Refills: 0 | Status: SHIPPED | OUTPATIENT
Start: 2022-11-18

## 2022-11-18 NOTE — PROGRESS NOTES
Mariza Loja presents today for   Chief Complaint   Patient presents with    Follow-up    Labs     11-11-22       1. \"Have you been to the ER, urgent care clinic since your last visit? Hospitalized since your last visit? \" NO    2. \"Have you seen or consulted any other health care providers outside of the [address] [place] [address] since your last visit? \" YES     3. For patients aged 39-70: Has the patient had a colonoscopy / FIT/ Cologuard? NA - based on age      If the patient is female:    4. For patients aged 41-77: Has the patient had a mammogram within the past 2 years? NA - based on age or sex  See top three    5. For patients aged 21-65: Has the patient had a pap smear?  NA - based on age or sex

## 2022-11-18 NOTE — PROGRESS NOTES
INTERNISTS OF [facility]:  11/18/2022, MRN: [id number]      Jackson Maurer is a 76 y.o. female and presents to clinic for Follow-up and Labs (11-11-22)      Subjective: The patient is a 68-year-old female with history of obesity, thyroid nodules, GERD, lumbar disc disease, lumbar facet arthropathy, right heel spur, colon polyps, pulmonary nodule per EHR, gout, prediabetes, hiatal hernia, pseudogout per EHR, renal cyst, positive rheumatoid arthritis serology, hypertension, alopecia, and hyperlipidemia. 1. HTN: She met with  last month. Her BP was 126/80. It's elevated from pain along her right shoulder today measuring 152/82. 2. Prediabetes: Her A1C is pending. I don't have her results at this time. 3. Left Shoulder Pain and Neck Pain: +Responded well to prednisone. She wants to see a different provider/orthopedist for evaluation. [facility] and her shoulder pain worsened per her hx. She has a h/o a positive RA serology lab and pseudogout per the EHR yet her CRP in the past has been WNL. Her CRP per her recent labs is pending. 4. Thyroid Nodule: She is scheduled with ENT next month. 3/30/22 Thyroid Ultrasound:   Multiple bilateral subcentimeter thyroid cysts and probable cysts appear similar in size as above. New low-attenuation region, potential mass anterior left lobe as above. Borderline size for percutaneous biopsy as discussed.          Patient Active Problem List    Diagnosis Date Noted    Thyroid nodule 04/11/2022    GERD (gastroesophageal reflux disease) 02/23/2022    Morbid obesity with BMI of 40.0-44.9, adult ([facility] [address] 08/28/2018    Polyp of sigmoid colon 05/01/2018    Incidental pulmonary nodule, > 3mm and < 8mm 02/20/2018    Prediabetes 02/20/2018    Chronic gout of foot 10/04/2017    Sliding hiatal hernia 10/04/2017    Pseudogout of right shoulder 10/04/2017    Primary osteoarthritis involving multiple joints 10/04/2017    Renal cyst 08/29/2014 Sleep apnea/ on c-pap 10/30/2013    Hypertension     Dyslipidemia        Current Outpatient Medications   Medication Sig Dispense Refill    potassium chloride (K-DUR, KLOR-CON M20) 20 mEq tablet Take 1 Tablet by mouth two (2) times a day. 180 Tablet 2    losartan (COZAAR) 25 mg tablet Take 1 Tablet by mouth two (2) times a day. 180 Tablet 2    furosemide (LASIX) 20 mg tablet TAKE 1 TABLET EVERY DAY 90 Tablet 2    cyclobenzaprine (FLEXERIL) 5 mg tablet Take 1 Tablet by mouth three (3) times daily as needed for Muscle Spasm(s). 30 Tablet 3    pitavastatin calcium (Livalo) 4 mg tab tablet Take 1 Tablet by mouth daily. 30 Tablet 6    diclofenac (VOLTAREN) 1 % gel Apply 4 grams to left shoulder  g 3    pantoprazole (PROTONIX) 40 mg tablet Take 1 Tablet by mouth two (2) times a day. 180 Tablet 2    ubidecarenone (coenzyme Q10) 100 mg tab Take 100 mg by mouth daily. 1 Tablet 0    Blood-Glucose Meter (TRUE METRIX AIR GLUCOSE METER) misc Check blood sugar twice daily. 1 Each 0    glucose blood VI test strips (TRUE METRIX GLUCOSE TEST STRIP) strip Check blood sugar twice daily. 100 Strip 2    lancets (ULTRA THIN LANCETS) 30 gauge misc Check blood sugar twice daily. 1 Package 2    diclofenac (VOLTAREN) 1 % gel Apply  to affected area four (4) times daily. 100 g 1    CALCIUM PO Take  by mouth. fluticasone (FLONASE) 50 mcg/actuation nasal spray Si spray in both nostril twice daily 1 Bottle 1    cholecalciferol (VITAMIN D3) (1000 Units /25 mcg) tablet Take 2,000 Units by mouth daily. aspirin 81 mg Tab Take 81 mg by mouth daily. MULTIVITAMINS (MULTI-VITAMIN PO) Take 1 Tab by mouth daily.          Allergies   Allergen Reactions    Latex Rash    Nexium [Esomeprazole Magnesium] Swelling and Other (comments)     Lips Swollen, and facial rash and swelling    Dexilant [Dexlansoprazole] Other (comments)     Stomach swelling    Crestor [Rosuvastatin] Other (comments)     Upper respiratory illness    Lipitor [Atorvastatin] Swelling    Lisinopril Unknown (comments)     Patient can't recall    Niaspan [Niacin] Other (comments)     Scratchy throat, \"asthma\" like symptoms    Pcn [Penicillins] Hives    Sulfa (Sulfonamide Antibiotics) Rash    Zocor [Simvastatin] Myalgia and Other (comments)     Per patient chart \"(? Rash)\"       Past Medical History:   Diagnosis Date    Atrophic vaginitis     Cardiac cath 2012    Patent coronary arteries. LVEDP 20.  RA 11.  RV 42/10. PA 42/21. W 18.  CO 6.4 (TD), 6.4 (Iline Ajrun). PVR 1.7 Wood units. False-positive nuclear study.     Diabetes     diet controlled, hypoglycemia from metformin previously     Dyslipidemia     Fatty liver     Fibrocystic breast disease     GERD     Gout     H/O colonoscopy 11    polyp    Hypertension     Macular degeneration     Morbid obesity ([address]     Obstructive sleep apnea     Peripheral neuropathy     Rectocele     Thyroid cyst     bilat       Past Surgical History:   Procedure Laterality Date    HC CNNLA ARTERIAL ARTERIOTOMY 3M  2012    HX COLONOSCOPY  2013    HX COLONOSCOPY  2018    HX HEART CATHETERIZATION  2012    HX HERNIA REPAIR      umbilical as a child    HX HYSTERECTOMY      52ys ago, QUIQUE, BSO    HX MOHS PROCEDURES      right    IN ANESTH,SURGERY OF SHOULDER         Family History   Problem Relation Age of Onset    Cancer Mother         colon cancer    Colon Cancer Mother     Hypertension Mother     Diabetes Mother     Heart Disease Mother     Coronary Art Dis Mother     Hypertension Father     Diabetes Father     Heart Disease Father     Coronary Art Dis Father     Hypertension Sister     Diabetes Sister     Heart Disease Sister     Coronary Art Dis Sister     Hypertension Brother     Diabetes Brother     Heart Disease Brother     Coronary Art Dis Brother     Cancer Brother        Social History     Tobacco Use    Smoking status: Former     Types: Cigarettes     Quit date: 1974     Years since quittin.3 Smokeless tobacco: Never    Tobacco comments:     1 pack every 2 weeks x 1 year, stopped 45yrs ago   Substance Use Topics    Alcohol use: Yes       ROS   Review of Systems   Constitutional:  Negative for chills and fever. HENT:  Negative for ear pain and sore throat. Eyes:  Negative for pain. Respiratory:  Negative for cough and shortness of breath. Cardiovascular:  Negative for chest pain. Gastrointestinal:  Negative for abdominal pain, blood in stool and melena. Genitourinary:  Negative for dysuria and hematuria. Musculoskeletal:  Positive for joint pain. Negative for myalgias. Skin:  Negative for rash. Neurological:  Negative for headaches. Endo/Heme/Allergies:  Does not bruise/bleed easily. Psychiatric/Behavioral:  Negative for substance abuse. Objective     Vitals:    11/18/22 1152 11/18/22 1159   BP: (!) 152/63 (!) 152/82   Pulse: 65    Resp: 14    Temp: 97.9 °F (36.6 °C)    TempSrc: Temporal    SpO2: 96%    Weight: 234 lb (106.1 kg)    Height: 5' 3\" (1.6 m)    PainSc:   8    PainLoc: Generalized        Physical Exam  Vitals and nursing note reviewed. HENT:      Head: Normocephalic and atraumatic. Right Ear: External ear normal.      Left Ear: External ear normal.   Eyes:      General: No scleral icterus. Right eye: No discharge. Left eye: No discharge. Conjunctiva/sclera: Conjunctivae normal.   Cardiovascular:      Rate and Rhythm: Normal rate and regular rhythm. Heart sounds: Normal heart sounds. No murmur heard. No friction rub. No gallop. Pulmonary:      Effort: Pulmonary effort is normal. No respiratory distress. Breath sounds: Normal breath sounds. No wheezing or rales. Abdominal:      General: Bowel sounds are normal. There is no distension. Palpations: Abdomen is soft. There is no mass. Tenderness: There is no abdominal tenderness. There is no guarding or rebound.    Musculoskeletal:         General: No swelling (BUE) or tenderness (BUE except along her left shoulder). Cervical back: Neck supple. Comments: She has discomfort with ROM exercises along her Left shoulder jt   Lymphadenopathy:      Cervical: No cervical adenopathy. Skin:     General: Skin is warm and dry. Findings: No erythema or rash. Neurological:      Mental Status: She is alert. Motor: No abnormal muscle tone. Gait: Gait normal.   Psychiatric:         Mood and Affect: Mood normal.       LABS   Data Review:   Lab Results   Component Value Date/Time    WBC 8.1 02/22/2022 01:06 PM    Hemoglobin, POC 12.6 04/12/2013 09:46 AM    HGB 13.1 02/22/2022 01:06 PM    Hematocrit, POC 37 04/12/2013 09:46 AM    HCT 42.0 02/22/2022 01:06 PM    PLATELET 136 01/66/6734 01:06 PM    MCV 92.1 02/22/2022 01:06 PM       Lab Results   Component Value Date/Time    Sodium 141 05/13/2022 12:00 AM    Potassium 4.2 05/13/2022 12:00 AM    Chloride 100 05/13/2022 12:00 AM    CO2 26 05/13/2022 12:00 AM    Anion gap 4 02/22/2022 01:06 PM    Glucose 92 05/13/2022 12:00 AM    BUN 13 05/13/2022 12:00 AM    Creatinine 0.94 05/13/2022 12:00 AM    BUN/Creatinine ratio 14 05/13/2022 12:00 AM    GFR est AA >60 02/22/2022 01:06 PM    GFR est non-AA >60 02/22/2022 01:06 PM    Calcium 9.4 05/13/2022 12:00 AM       Lab Results   Component Value Date/Time    Cholesterol, total 237 (H) 05/13/2022 12:00 AM    HDL Cholesterol 67 05/13/2022 12:00 AM    LDL, calculated 157 (H) 05/13/2022 12:00 AM    LDL, calculated 146.6 (H) 02/22/2022 01:06 PM    VLDL, calculated 13 05/13/2022 12:00 AM    VLDL, calculated 24.4 02/22/2022 01:06 PM    Triglyceride 76 05/13/2022 12:00 AM    CHOL/HDL Ratio 3.3 02/22/2022 01:06 PM       Lab Results   Component Value Date/Time    Hemoglobin A1c 6.0 (H) 07/20/2022 10:56 AM    Hemoglobin A1c (POC) 6.1 06/23/2017 11:53 AM    Hemoglobin A1c, External 6.0 11/06/2021 12:00 AM       Assessment/Plan:   1. Left shoulder pain: Worsening.  2/2 OA and shoulder impingement  - Left shoulder MRI ordered  - Prednisone course prescribed for sx relief (temporary)  - Pt will schedule an apt with a different orthopedist per her preference. ORDERS:  - MRI SHOULDER LT WO CONT; Future  - predniSONE (DELTASONE) 10 mg tablet; Take 4 tabs in AM with food for 2 days, 3 tabs for 2 days, 2 tabs for 2 days, then 1 tab until gone. Dispense: 20 Tablet; Refill: 0    2. Thyroid nodule: Unchanged. - I encouraged her to go to her scheduled ENT apt.  - Checking labs in 6 months    ORDERS:  - CBC WITH AUTOMATED DIFF; Future  - TSH AND FREE T4; Future  - METABOLIC PANEL, COMPREHENSIVE; Future    3. Essential hypertension: BP is falsely elevated 2/2 pain from #1.   - Checking labs in 6 months  - I encouraged her to check her BP outside of our office and to notify me if readings are persistently greater than 140/90.   - C/w rx as prescribed. ORDERS:  - TSH AND FREE T4; Future  - METABOLIC PANEL, COMPREHENSIVE; Future  - LIPID PANEL; Future  - MICROALBUMIN, UR, RAND W/ MICROALB/CREAT RATIO; Future    4. Hyperglycemia/Prediabetes: Her last A1C was 6  - I will retrieve her recent labs results. - Low carb diet recommended. - Checking labs in 6 months. ORDERS:  - METABOLIC PANEL, COMPREHENSIVE; Future  - HEMOGLOBIN A1C WITH EAG; Future    5. Rheumatoid factor positive: Unchanged  - Awaiting her lab result  - Checking a CRP In 6 months. ORDERS:  - C REACTIVE PROTEIN, QT; Future          ICD-10-CM ICD-9-CM    1. Left shoulder pain, unspecified chronicity  M25.512 719.41 MRI SHOULDER LT WO CONT      predniSONE (DELTASONE) 10 mg tablet      2. Primary osteoarthritis of left shoulder  M19.012 715.11       3. Shoulder impingement, left  M75.42 726.2       4. Thyroid nodule  E04.1 241.0 CBC WITH AUTOMATED DIFF      TSH AND FREE T4      METABOLIC PANEL, COMPREHENSIVE      5.  Essential hypertension  I10 401.9 TSH AND FREE T4      METABOLIC PANEL, COMPREHENSIVE      LIPID PANEL      MICROALBUMIN, UR, RAND W/ MICROALB/CREAT RATIO      6. Hyperglycemia  B43.8 639.34 METABOLIC PANEL, COMPREHENSIVE      HEMOGLOBIN A1C WITH EAG      7. Rheumatoid factor positive  R76.8 795.79 C REACTIVE PROTEIN, QT            Health Maintenance Due   Topic Date Due    Hepatitis C Screening  Never done    Shingrix Vaccine Age 49> (1 of 2) Never done    COVID-19 Vaccine (4 - Booster for Pfizer series) 02/10/2022    Flu Vaccine (1) 08/01/2022         Lab review: labs are reviewed in the EHR and ordered as mentioned above    I have discussed the diagnosis with the patient and the intended plan as seen in the above orders. The patient has received an after-visit summary and questions were answered concerning future plans. I have discussed medication side effects and warnings with the patient as well. I have reviewed the plan of care with the patient, accepted their input and they are in agreement with the treatment goals. All questions were answered. The patient understands the plan of care. Handouts provided today with above information. Pt instructed if symptoms worsen to call the office or report to the ED for continued care. Greater than 50% of the visit time was spent in counseling and/or coordination of care. Voice recognition was used to generate this report, which may have resulted in some phonetic based errors in grammar and contents. Even though attempts were made to correct all the mistakes, some may have been missed, and remained in the body of the document.           Jeni Garcia MD

## 2022-11-21 NOTE — TELEPHONE ENCOUNTER
[facility] -  CLINIC FOLLOW UP     Patient Care Team:  Chitra Alexis APRN as PCP - General (Nurse Practitioner)  Jarrett Rodriguez MD as Referring Physician (Internal Medicine)  Ki Esquivel MD as Cardiologist (Cardiology)  Chitra Alexis APRN as Referring Physician (Nurse Practitioner)  Wenceslao Paulino MD as Cardiologist (Cardiology)  Vishnu Esquivel [facility] Medication, lancets, glucometer, and testing strips sent to pharmacy. Please call and notify pt. Eliquis. Will need to consider JOSEFINA/ECV in [place], NAYAN Craig

## 2022-12-01 ENCOUNTER — TELEPHONE (OUTPATIENT)
Dept: INTERNAL MEDICINE CLINIC | Age: 77
End: 2022-12-01

## 2022-12-01 NOTE — TELEPHONE ENCOUNTER
Results received A1C 5.8 improved from 6.0 back in July. CRP 2. Patient given results. Please advise if anything further is needed.

## 2022-12-01 NOTE — TELEPHONE ENCOUNTER
Patient reached and notified that we have contacted [facility] and they are faxing results now.  We will call her with the results

## 2022-12-01 NOTE — TELEPHONE ENCOUNTER
Patient states that she had labs done on 11/11/22 and her follow up appt on 11/18/22. She states at the time of the appt on 11/18/22 with Dr. Gaby Giron, they hadn't been able to view her lab results yet. She states she still has not heard anything, she is calling to make sure she doesn't need to come back in to have the labs redrawn or are there results viewable now? Patient can be reached at [phone].   Please advise, thank you

## 2022-12-05 ENCOUNTER — TELEPHONE (OUTPATIENT)
Dept: INTERNAL MEDICINE CLINIC | Age: 77
End: 2022-12-05

## 2022-12-05 NOTE — TELEPHONE ENCOUNTER
Patient called and said dentist prescribed her some antibiotics and she would like to know if it will be ok to take it with her other medication.  Please advise

## 2022-12-05 NOTE — TELEPHONE ENCOUNTER
She should be able to take an antibiotic but I would like to know which one was prescribed first before telling her this. Please find out which one and let me know.     Dr. Noah Ortiz  [facility], [address] [place] [address], [address] [place] [address] Str.  Phone: [phone]  Fax: [phone]

## 2022-12-05 NOTE — TELEPHONE ENCOUNTER
Patient reached and states that she was asking if prednisone is okay to take with antibiotic. Patient advised that they are okay to take together.

## 2022-12-21 ENCOUNTER — APPOINTMENT (OUTPATIENT)
Age: 77
End: 2022-12-21
Attending: INTERNAL MEDICINE

## 2022-12-23 ENCOUNTER — HOSPITAL ENCOUNTER (OUTPATIENT)
Dept: MRI IMAGING | Age: 77
Discharge: HOME | End: 2022-12-23
Attending: INTERNAL MEDICINE
Payer: MEDICARE

## 2022-12-23 PROCEDURE — 73221 MRI JOINT UPR EXTREM W/O DYE: CPT

## 2022-12-30 ENCOUNTER — TELEPHONE (OUTPATIENT)
Dept: INTERNAL MEDICINE CLINIC | Age: 77
End: 2022-12-30

## 2022-12-30 NOTE — TELEPHONE ENCOUNTER
----- Message from Rainell Hamman, MD sent at 12/30/2022  9:22 AM EST -----  Please let her know that her left shoulder MRI shows a full thickness tear of her supraspinatus rotator cuff muscle. There is a partial tear along her infraspinatus rotator cuff muscle and severe arthritis along her shoulder. Please have her scheduled to see Orthopedics given these findings.     Dr. Che Form  [facility] [address]  Phone: [phone]  Fax: [phone]

## 2022-12-30 NOTE — PROGRESS NOTES
Please let her know that her left shoulder MRI shows a full thickness tear of her supraspinatus rotator cuff muscle. There is a partial tear along her infraspinatus rotator cuff muscle and severe arthritis along her shoulder. Please have her scheduled to see Orthopedics given these findings.     Dr. Mercedes Alcaraz  [facility], [address] [place] [address], [address] [place] [address] Str.  Phone: [phone]  Fax: [phone]

## 2022-12-30 NOTE — TELEPHONE ENCOUNTER
Pt reached and made aware of imaging results per Dr. Berenice West and was advised to follow up with her orthopedist. Pt verbalized understanding.

## 2023-01-05 ENCOUNTER — HOSPITAL ENCOUNTER (OUTPATIENT)
Dept: ULTRASOUND IMAGING | Age: 78
Discharge: HOME | End: 2023-01-05
Payer: MEDICARE

## 2023-01-05 DIAGNOSIS — E04.1 THYROID NODULE: ICD-10-CM

## 2023-01-05 PROCEDURE — 76536 US EXAM OF HEAD AND NECK: CPT

## 2023-01-25 ENCOUNTER — TELEPHONE (OUTPATIENT)
Dept: INTERNAL MEDICINE CLINIC | Age: 78
End: 2023-01-25

## 2023-01-25 RX ORDER — FLUCONAZOLE 150 MG/1
TABLET ORAL
Qty: 2 TABLET | Refills: 0 | Status: SHIPPED | OUTPATIENT
Start: 2023-01-25

## 2023-01-25 NOTE — TELEPHONE ENCOUNTER
----- Message from Kyle Galeazzi sent at 1/25/2023  8:59 AM EST -----  Subject: Message to Provider    QUESTIONS  Information for Provider? Patient had a tooth pulled and was put on   prednisone and antibiotics and now has developed a yeast infection. She   was wanting to know if she can get some meds called in at [facility], [address] [place] [address]  ---------------------------------------------------------------------------  --------------  Marshall Hall JLTQ  [phone]; OK to leave message on voicemail  ---------------------------------------------------------------------------  --------------  SCRIPT ANSWERS  Relationship to Patient?  Self

## 2023-01-25 NOTE — TELEPHONE ENCOUNTER
Diflucan ordered as requested. Please let her know.     Dr. Sedrick Faria  [facility], [facility], [address] [place] [address] Str.  Phone: [phone]  Fax: [phone]

## 2023-02-04 DIAGNOSIS — I10 ESSENTIAL HYPERTENSION: ICD-10-CM

## 2023-02-04 DIAGNOSIS — E04.1 THYROID NODULE: Primary | ICD-10-CM

## 2023-02-04 DIAGNOSIS — R73.9 HYPERGLYCEMIA: ICD-10-CM

## 2023-02-05 DIAGNOSIS — I10 ESSENTIAL HYPERTENSION: Primary | ICD-10-CM

## 2023-02-05 DIAGNOSIS — R76.8 RHEUMATOID FACTOR POSITIVE: Primary | ICD-10-CM

## 2023-02-06 DIAGNOSIS — R73.9 HYPERGLYCEMIA: Primary | ICD-10-CM

## 2023-02-16 ENCOUNTER — HOSPITAL ENCOUNTER (OUTPATIENT)
Facility: HOSPITAL | Age: 78
Setting detail: RECURRING SERIES
Discharge: HOME | End: 2023-02-19
Payer: COMMERCIAL

## 2023-02-16 PROCEDURE — 97110 THERAPEUTIC EXERCISES: CPT

## 2023-02-16 PROCEDURE — 97140 MANUAL THERAPY 1/> REGIONS: CPT

## 2023-02-16 PROCEDURE — 97162 PT EVAL MOD COMPLEX 30 MIN: CPT

## 2023-02-16 NOTE — PROGRESS NOTES
[address] [place]ber[place], [address] FW:[phone] Fx: [phone]  Plan of Care / Statement of Necessity for Physical Therapy Services     Patient Name: Ruma Gagnon : 1945   Medical   Diagnosis: Left shoulder pain Treatment Diagnosis: Pain in left shoulder [M25.512]   Onset Date: Late      Referral Source: Lee Garcia MD [facility]): 2023   Prior Hospitalization: See medical history Provider #: [id number]   Prior Level of Function: The patient reports that she had improved ease of dressing and ADLs prior to onset. Comorbidities: Diabetes, Thyroid problems, HTN, Latex Allergy, hx left RTC repair, BMI > 30. Medications: Verified on Patient Summary List     Assessment / key information:  The patient is an [de-identified]year old right handed female with a chief complaint of left shoulder pain that has been ongoing since . She reports difficulty with dressing, sleeping, and chore production. Upon objective examination the patient demonstrates significant limitation in both AROM and PROM of left shoulder. She has signs and symptoms consistent with left shoulder OA and RTC pathology with impairments consisting of pain, decreased ROM, decreased strength, limited ADL ease, and decreased ease of hygiene. The patient will benefit from skilled PT in order to address the aforementioned impairments. Evaluation Complexity:  History:  HIGH Complexity :3+ comorbidities / personal factors will impact the outcome/ POC ; Examination:  MEDIUM Complexity : 3 Standardized tests and measures addressin body structure, function, activity limitation and / or participation in recreation  ;Presentation:  MEDIUM Complexity : Evolving with changing characteristics  ; Clinical Decision Making:  MEDIUM Complexity : FOTO score of 26-74 FOTO score = an established functional score where 100 = no disability  Overall Complexity Rating: MEDIUM  Problem List: pain affecting function, decrease ROM, decrease strength, decrease ADL/functional abilities, decrease activity tolerance, decrease flexibility/joint mobility, and decrease transfer abilities    Treatment Plan may include any combination of the followin Therapeutic Exercise, 66896 Neuromuscular Re-Education, 04493 Manual Therapy, 38683 Therapeutic Activity, and 10634 Self Care/Home Management  Vasopnuematic compression justification:  Per bilateral girth measures taken and listed above the edema is considered significant and having an impact on the patient's strength, self care, and ADLs  Patient / Family readiness to learn indicated by: asking questions, trying to perform skills, interest, return verbalization , and return demonstration   Persons(s) to be included in education: patient (P)  Barriers to Learning/Limitations: none  Measures taken if barriers to learning present: None  Patient Goal (s): Mobility maybe - it needs repair. Patient Self Reported Health Status: good  Rehabilitation Potential: good    Short Term Goals: To be accomplished in 2 weeks  The patient will demonstrate independence and compliance with HEP to maximize therapeutic benefit. 2. The patient will improve PROM of left shoulder to 125 degrees to improve ease of ADLs. Long Term Goals: To be accomplished in 4 weeks  The patient will improve FOTO score to 56 to improve quality of life. The patient will improve functional ER to occiput to improve ease of grooming. The patient will improve AROM of left shoulder to 100 degrees to improve ease of chore production. The patient will improve functional IR to sacrum to improve ease of dressing. Frequency / Duration: Patient to be seen 2 times per week for 4 weeks  Goals will be assigned and reassessed every 10 visits/ 30 days per guidelines .     Patient/ Caregiver education and instruction: Diagnosis, prognosis, self care, activity modification, and exercises [x] Plan of care has been reviewed with PTA    Certification Period: 2/16/2023 - 5/16/2023    Trinity Montaño, PT       2/16/2023       12:53 PM  ===================================================================  I certify that the above Therapy Services are being furnished while the patient is under my care. I agree with the treatment plan and certify that this therapy is necessary. [de-identified] Signature:_________________________   DATE:_________   TIME:________                           Maribell Royal MD    ** Signature, Date and Time must be completed for valid certification **  [facility] or you may fax the signed copy to [phone]. Thank you.

## 2023-02-16 NOTE — PROGRESS NOTES
PHYSICAL / OCCUPATIONAL THERAPY - DAILY TREATMENT NOTE (updated )    Patient Name: Azalea Macias    Date: 2023    : 1945  Insurance: Payor: Lopez Garduno / Plan: [employer] HMO / Product Type: *No Product type* /      Patient  verified Yes     Visit #   Current / Total 1 8   Time   In / Out 12:01 12:54   Pain   In / Out 6/10 3/10   Subjective Functional Status/Changes: The patient has a chief complaint of left shoulder pain that limits her ease of Adls. Changes to:  Meds, Allergies, Med Hx, Sx Hx? If yes, update Summary List no       TREATMENT AREA =  Pain in left shoulder [M25.512]    OBJECTIVE    Modalities Rationale:     decrease pain and increase tissue extensibility to improve patient's ability to progress to PLOF and address remaining functional goals. 5 min  unbilled []  Ice     [x]  Heat    location/position:  Seated - left shoulder   Skin assessment post-treatment (if applicable):    []  intact    []  redness- no adverse reaction                 []redness - adverse reaction:         Therapeutic Procedures: Tx Min Billable or 1:1 Min (if diff from Tx Min) Procedure, Rationale, Specifics   15 15 [id number] Therapeutic Exercise (timed):  increase ROM, strength, coordination, balance, and proprioception to improve patient's ability to progress to PLOF and address remaining functional goals. (see flow sheet as applicable)     Details if applicable:       8 8 76347 Manual Therapy (timed):  decrease pain, increase ROM, increase tissue extensibility, and decrease trigger points to improve patient's ability to progress to PLOF and address remaining functional goals. The manual therapy interventions were performed at a separate and distinct time from the therapeutic activities interventions .  (see flow sheet as applicable)     Details if applicable:  gentle joint mobs inferior/posterior capsule grade I to II into flexion/ABD/ER     [facility] Totals Reminder: bill using total billable min of TIMED therapeutic procedures (example: do not include dry needle or estim unattended, both untimed codes, in totals to left)  8-22 min = 1 unit; 23-37 min = 2 units; 38-52 min = 3 units; 53-67 min = 4 units; 68-82 min = 5 units   Total Total     [x]  Patient Education billed concurrently with other procedures   [x] Review HEP    [] Progressed/Changed HEP, detail:    [] Other detail:       Objective Information/Functional Measures/Assessment    See POC    Patient will continue to benefit from skilled PT / OT services to modify and progress therapeutic interventions, analyze and address functional mobility deficits, analyze and address ROM deficits, analyze and address strength deficits, analyze and address soft tissue restrictions, analyze and cue for proper movement patterns, analyze and modify for postural abnormalities, and instruct in home and community integration to address functional deficits and attain remaining goals. Progress toward goals / Updated goals:  []  See Progress Note/Recertification    Short Term Goals: To be accomplished in 2 weeks  The patient will demonstrate independence and compliance with HEP to maximize therapeutic benefit. IE: issued HEP  2. The patient will improve PROM of left shoulder to 125 degrees to improve ease of ADLs. IE: 70 degrees  Long Term Goals: To be accomplished in 4 weeks  The patient will improve FOTO score to 56 to improve quality of life. IE: 45  The patient will improve functional ER to occiput to improve ease of grooming. IE: Temporal lobe  The patient will improve AROM of left shoulder to 100 degrees to improve ease of chore production. IE: 55 degrees  The patient will improve functional IR to sacrum to improve ease of dressing.     IE: glute    PLAN  Yes  Continue plan of care  [x]  Upgrade activities as tolerated  []  Discharge due to :  []  Other:    Mariann Rowe, PT    2/16/2023    1:01 PM    Future Appointments   Date Time Provider Romelia Jimenez 5/3/2023 11:30 AM SHARLENE Isaacs - NP [facility] BS AMB   5/15/2023 10:55 AM IOC LAB VISIT [facility] BS AMB   5/22/2023 11:20 AM Scott Gomez MD [facility] BS AMB   10/30/2023 11:30 AM Simon De León MD [facility] BS AMB

## 2023-02-23 ENCOUNTER — HOSPITAL ENCOUNTER (OUTPATIENT)
Facility: HOSPITAL | Age: 78
Setting detail: RECURRING SERIES
Discharge: HOME | End: 2023-02-26
Payer: COMMERCIAL

## 2023-02-23 PROCEDURE — 97110 THERAPEUTIC EXERCISES: CPT

## 2023-02-23 PROCEDURE — 97140 MANUAL THERAPY 1/> REGIONS: CPT

## 2023-02-23 NOTE — PROGRESS NOTES
PHYSICAL / OCCUPATIONAL THERAPY - DAILY TREATMENT NOTE (updated )    Patient Name: Russell Izaguirre    Date: 2023    : 1945  Insurance: Payor: Umu Peters / Plan: [employer] HMO / Product Type: *No Product type* /      Patient  verified Yes     Visit #   Current / Total 2 8   Time   In / Out 130 217   Pain   In / Out 3 4   Subjective Functional Status/Changes: Patient reports that she's completed some of her HEP. She recently buried her brother who was her roommate, and now lives alone. Changes to:  Meds, Allergies, Med Hx, Sx Hx? If yes, update Summary List no       TREATMENT AREA =  Pain in left shoulder [M25.512]    OBJECTIVE    Modalities Rationale:     decrease pain and increase tissue extensibility to improve patient's ability to progress to PLOF and address remaining functional goals. min [] Estim Unattended, type/location:                                      []  w/ice    []  w/heat    min [] Estim Attended, type/location:                                     []  w/US     []  w/ice    []  w/heat    []  TENS insruct      min []  Mechanical Traction: type/lbs                   []  pro   []  sup   []  int   []  cont    []  before manual    []  after manual    min []  Ultrasound, settings/location:      min []  Iontophoresis w/ dexamethasone, location:                                               []  take home patch       []  in clinic   10 min  unbill []  Ice     [x]  Heat    location/position: Left shoulder/short sitting    min []  Paraffin,  details:     min []  Vasopneumatic Device, press/temp:     min []  Marcos Jones / Claude Lira:     If using vaso (only need to measure limb vaso being performed on)      pre-treatment girth :       post-treatment girth :       measured at (landmark location) :      min []  Other:    Skin assessment post-treatment (if applicable):    [x]  intact    [x]  redness- no adverse reaction                 []redness - adverse reaction:         Therapeutic Procedures: Tx Min Billable or 1:1 Min (if diff from Tx Min) Procedure, Rationale, Specifics   29 29 27622 Therapeutic Exercise (timed):  increase ROM, strength, coordination, balance, and proprioception to improve patient's ability to progress to PLOF and address remaining functional goals. (see flow sheet as applicable)     Details if applicable:       8 8 83156 Manual Therapy (timed):  decrease pain, increase ROM, increase tissue extensibility, and decrease trigger points to improve patient's ability to progress to PLOF and address remaining functional goals. The manual therapy interventions were performed at a separate and distinct time from the therapeutic activities interventions . (see flow sheet as applicable)     Details if applicable:    -right S/L: scapular clocks  -supine: PROM into flexion and abduction with gentle GH 1-2 posterior and inferior GHJ mobs          Details if applicable:            Details if applicable:            Details if applicable:     44 39 [facility] Totals Reminder: bill using total billable min of TIMED therapeutic procedures (example: do not include dry needle or estim unattended, both untimed codes, in totals to left)  8-22 min = 1 unit; 23-37 min = 2 units; 38-52 min = 3 units; 53-67 min = 4 units; 68-82 min = 5 units   Total Total     [x]  Patient Education billed concurrently with other procedures   [x] Review HEP    [] Progressed/Changed HEP, detail:    [] Other detail:       Objective Information/Functional Measures/Assessment    Initiated POC per flowsheet. Patient tolerated treatment well, reporting no adverse responses throughout activities. Moderate cueing required to decrease mm guarding/AROM during manual and pendulums. Patient with report of slightly increased discomfort by end of session but left in no apparent distress and exhibiting a positive attitude toward PT.      Patient will continue to benefit from skilled PT / OT services to modify and progress therapeutic interventions, analyze and address functional mobility deficits, analyze and address ROM deficits, analyze and address strength deficits, analyze and address soft tissue restrictions, analyze and cue for proper movement patterns, analyze and modify for postural abnormalities, and instruct in home and community integration to address functional deficits and attain remaining goals.    Progress toward goals / Updated goals:  []  See Progress Note/Recertification    Short Term Goals: To be accomplished in 2 weeks  The patient will demonstrate independence and compliance with HEP to maximize therapeutic benefit.              IE: issued HEP  Current: ongoing, patient reports inconsistent compliance with HEP due to caretaking duties/ planning for her brother. 23  2. The patient will improve PROM of left shoulder to 125 degrees to improve ease of ADLs.              IE: 70 degrees  Long Term Goals: To be accomplished in 4 weeks  The patient will improve FOTO score to 56 to improve quality of life.              IE: 38  The patient will improve functional ER to occiput to improve ease of grooming.  IE: Temporal lobe  The patient will improve AROM of left shoulder to 100 degrees to improve ease of chore production.  IE: 55 degrees  The patient will improve functional IR to sacrum to improve ease of dressing.               IE: glute    PLAN  Yes  Continue plan of care  []  Upgrade activities as tolerated  []  Discharge due to :  []  Other:    Ainsley Hernandez PTA    2023    9:58 AM    Future Appointments   Date Time Provider Department Center   2023  1:30 PM [facility] [place]   2023  2:30 PM [facility] [place]   3/2/2023 12:30 PM [facility] Davey[facility] [place]   3/7/2023 12:30 PM Juan [facility] [place]   3/9/2023 12:30 PM [facility]ico[facility] Jimenez [facility] [place]   3/14/2023 12:30 PM Juan Rey [facility] [place]  3/16/2023  1:00 PM Zee Fatima, PT [facility]   5/3/2023 11:30 AM SHARLENE Ariza - NP [facility] BS AMB   5/15/2023 10:55 AM IO LAB VISIT [facility] BS AMB   5/22/2023 11:20 AM Gifty Gongora MD [facility] BS AMB   10/30/2023 11:30 AM Simon Barbour MD [facility] BS AMB

## 2023-02-28 ENCOUNTER — HOSPITAL ENCOUNTER (OUTPATIENT)
Facility: HOSPITAL | Age: 78
Setting detail: RECURRING SERIES
Discharge: HOME | End: 2023-03-03
Payer: COMMERCIAL

## 2023-02-28 PROCEDURE — 97140 MANUAL THERAPY 1/> REGIONS: CPT

## 2023-02-28 PROCEDURE — 97110 THERAPEUTIC EXERCISES: CPT

## 2023-02-28 NOTE — PROGRESS NOTES
PHYSICAL / OCCUPATIONAL THERAPY - DAILY TREATMENT NOTE (updated )    Patient Name: Mumtaz Comes    Date: 2023    : 1945  Insurance: Payor: Estrell[employer] Card / Plan: [employer] HMO / Product Type: *No Product type* /      Patient  verified Yes     Visit #   Current / Total 3 8   Time   In / Out 234 315   Pain   In / Out 3 2-3   Subjective Functional Status/Changes: Patient denies soreness post last visit; \"It felt better\". Changes to:  Meds, Allergies, Med Hx, Sx Hx? If yes, update Summary List no       TREATMENT AREA =  Pain in left shoulder [M25.512]    OBJECTIVE    Modalities Rationale:     decrease pain and increase tissue extensibility to improve patient's ability to progress to PLOF and address remaining functional goals. min [] Estim Unattended, type/location:                                      []  w/ice    []  w/heat    min [] Estim Attended, type/location:                                     []  w/US     []  w/ice    []  w/heat    []  TENS insruct      min []  Mechanical Traction: type/lbs                   []  pro   []  sup   []  int   []  cont    []  before manual    []  after manual    min []  Ultrasound, settings/location:      min []  Iontophoresis w/ dexamethasone, location:                                               []  take home patch       []  in clinic   10 min  unbill []  Ice     [x]  Heat    location/position: Left shoulder/short sitting    min []  Paraffin,  details:     min []  Vasopneumatic Device, press/temp:     min []  Dallas Kendell / Uriah Perone: If using vaso (only need to measure limb vaso being performed on)      pre-treatment girth :       post-treatment girth :       measured at (landmark location) :      min []  Other:    Skin assessment post-treatment (if applicable):    [x]  intact    [x]  redness- no adverse reaction                 []redness - adverse reaction:         Therapeutic Procedures:     Tx Min Billable or 1:1 Min (if diff from [employer]) Procedure, Rationale, Specifics   23  49070 Therapeutic Exercise (timed):  increase ROM, strength, coordination, balance, and proprioception to improve patient's ability to progress to PLOF and address remaining functional goals. (see flow sheet as applicable)     Details if applicable:       8  16342 Manual Therapy (timed):  decrease pain, increase ROM, increase tissue extensibility, and decrease trigger points to improve patient's ability to progress to PLOF and address remaining functional goals. The manual therapy interventions were performed at a separate and distinct time from the therapeutic activities interventions . (see flow sheet as applicable)     Details if applicable:    -right S/L: scapular clocks, STM/TPR to left UT/LS  -supine: PROM into flexion and abduction with gentle GH 1-2 posterior and inferior GHJ mobs          Details if applicable:            Details if applicable:            Details if applicable:     32  [facility] Totals Reminder: bill using total billable min of TIMED therapeutic procedures (example: do not include dry needle or estim unattended, both untimed codes, in totals to left)  8-22 min = 1 unit; 23-37 min = 2 units; 38-52 min = 3 units; 53-67 min = 4 units; 68-82 min = 5 units   Total Total     [x]  Patient Education billed concurrently with other procedures   [x] Review HEP    [] Progressed/Changed HEP, detail:    [] Other detail:       Objective Information/Functional Measures/Assessment  -increased reps during finger ladder, bicep curls      Patient with increased challenge moving into scapular plane during wanded exercises but able to complete full repetitions. Patient tolerated treatment well, reporting no adverse responses throughout activities, despite progressions in repetitions.      Patient will continue to benefit from skilled PT / OT services to modify and progress therapeutic interventions, analyze and address functional mobility deficits, analyze and address ROM deficits, analyze and address strength deficits, analyze and address soft tissue restrictions, analyze and cue for proper movement patterns, analyze and modify for postural abnormalities, and instruct in home and community integration to address functional deficits and attain remaining goals. Progress toward goals / Updated goals:  []  See Progress Note/Recertification    Short Term Goals: To be accomplished in 2 weeks  The patient will demonstrate independence and compliance with HEP to maximize therapeutic benefit. IE: issued HEP  Current: MET, patient reports compliance 2/28/23  2. The patient will improve PROM of left shoulder to 125 degrees to improve ease of ADLs. IE: 70 degrees  Long Term Goals: To be accomplished in 4 weeks  The patient will improve FOTO score to 56 to improve quality of life. IE: 45  The patient will improve functional ER to occiput to improve ease of grooming. IE: Temporal lobe  The patient will improve AROM of left shoulder to 100 degrees to improve ease of chore production. IE: 55 degrees  The patient will improve functional IR to sacrum to improve ease of dressing.                IE: glute    PLAN  Yes  Continue plan of care  []  Upgrade activities as tolerated  []  Discharge due to :  []  Other:    Meghana Sutton PTA    2/28/2023    8:28 AM    Future Appointments   Date Time Provider Romelia Jimenez   2/28/2023  2:30 PM Meghana Sutton PTA [facility]   3/2/2023 12:30 PM Gissell Salcedo, PT [facility]   3/7/2023 12:30 PM Cecilia Rich, PTA [facility]   3/9/2023 12:30 PM Kings Baeza, PTA [facility]   3/14/2023 12:30 PM Cecilia Rich, PTA [facility]   3/16/2023  1:00 PM Carlene Henry, PT [facility]   5/3/2023 11:30 AM SHARLEEN Lu - NP [facility] BS AMB   5/15/2023 10:55 AM IO LAB VISIT [facility] BS AMB   5/22/2023 11:20 AM Shannen Wright MD [facility] BS AMB   10/30/2023 11:30 AM Simon Charly Venegas MD [facility] BS AMB

## 2023-04-07 ENCOUNTER — TELEPHONE (OUTPATIENT)
Age: 78
End: 2023-04-07

## 2023-04-07 NOTE — TELEPHONE ENCOUNTER
Pt calling to request an antibiotic. Stated has cold in her chest, symptoms since yesterday of cough, stuffy nose, burning in throat, clear phlegm, no body aches, fatigue or fever she is aware of. Stated did get hot last night but she thinks that was weather related. She has a covid test at home and will have the results when we call her back. Pharmacy is [address] [place] [address]

## 2023-04-07 NOTE — TELEPHONE ENCOUNTER
Patient reached and she states covid was negative. Patient advised to go to Urgent Care. Patient states that she is feeling better now.

## 2023-04-25 ENCOUNTER — OFFICE VISIT (OUTPATIENT)
Age: 78
End: 2023-04-25
Payer: COMMERCIAL

## 2023-04-25 VITALS
HEART RATE: 75 BPM | SYSTOLIC BLOOD PRESSURE: 126 MMHG | OXYGEN SATURATION: 96 % | HEIGHT: 63 IN | DIASTOLIC BLOOD PRESSURE: 80 MMHG | BODY MASS INDEX: 42.52 KG/M2 | WEIGHT: 240 LBS

## 2023-04-25 DIAGNOSIS — E78.00 PURE HYPERCHOLESTEROLEMIA, UNSPECIFIED: Primary | ICD-10-CM

## 2023-04-25 DIAGNOSIS — R07.9 CHEST PAIN, UNSPECIFIED TYPE: ICD-10-CM

## 2023-04-25 PROCEDURE — 1123F ACP DISCUSS/DSCN MKR DOCD: CPT | Performed by: INTERNAL MEDICINE

## 2023-04-25 PROCEDURE — 99214 OFFICE O/P EST MOD 30 MIN: CPT | Performed by: INTERNAL MEDICINE

## 2023-04-25 NOTE — PATIENT INSTRUCTIONS
Testing   Nuclear [facility] [address] 44 will contact you for date and time of appointment    Nuclear Stress Instructions-Nothing to eat or drink past midnight and no medicaitons the morning of cardiac testing. **call office 3-5 days after testing is completed for results** Please ensure testing is completed prior to scheduled follow up appointment.  If it is not completed your appointment may be rescheduled**      Labs  Fasting lipids    *[facility] [address] , [facility] at [facility], [address], [address], [address] [facility], [address] [place] [address], [address] [address], [employer]- projected for the month of May 2023    [address]  [address] [id number] [facility] [place] [address]

## 2023-04-25 NOTE — PROGRESS NOTES
[facility]    Estephania Guevara is [de-identified] y.o. female with a history of pulmonary hypertension, diabetes, hypertension, hyperlipidemia, sleep apnea      Patient is here today for follow-up appointment   She denies any prior history of MI or CHF  Patient has a significant left shoulder joint pain and scheduled to have shoulder replacement surgery in 06/2023  Patient tells me that since 01/23, she has been expressing some left chest discomfort with sometimes pressure sometimes between sensation lasting for about 2 and 3 minutes. She does have jaw pain however jaw pain has been there on and off sometimes with pain sometimes without any pain. No nausea vomiting diaphoresis. No exertional symptoms. Denies any nausea, vomiting, abdominal pain, fever, chills, sputum production. No hematuria or other bleeding complaints       Past Medical History:   Diagnosis Date    Atrophic vaginitis     Diabetes mellitus (HCC)     diet controlled, hypoglycemia from metformin previously     Dyslipidemia     Fatty liver     Fibrocystic breast disease     GERD (gastroesophageal reflux disease)     Gout     H/O colonoscopy 4/12/13    polyp    Hypertension     Macular degeneration     Morbid obesity ([facility] [address]     Obstructive sleep apnea     Peripheral neuropathy     Rectocele     S/P cardiac cath 05/30/2012    Patent coronary arteries. LVEDP 20.  RA 11.  RV 42/10. PA 42/21. W 18.  CO 6.4 (TD), 6.4 (Buzzy Brisk). PVR 1.7 Wood units. False-positive nuclear study. Thyroid cyst     bilat       Review of Systems:  Cardiac symptoms as noted above in HPI. All others negative. Denies fatigue, malaise, skin rash, joint pain, blurring vision, photophobia, neck pain, hemoptysis, chronic cough, nausea, vomiting, hematuria, burning micturition, BRBPR, chronic headaches.     Current Outpatient Medications   Medication Sig    CALCIUM PO Take by mouth    vitamin D (CHOLECALCIFEROL) 25 MCG

## 2023-04-26 ENCOUNTER — TELEPHONE (OUTPATIENT)
Age: 78
End: 2023-04-26

## 2023-04-26 DIAGNOSIS — I10 ESSENTIAL (PRIMARY) HYPERTENSION: ICD-10-CM

## 2023-04-26 DIAGNOSIS — M1A.0790 CHRONIC GOUT OF FOOT, UNSPECIFIED CAUSE, UNSPECIFIED LATERALITY: ICD-10-CM

## 2023-04-26 DIAGNOSIS — M25.512 LEFT SHOULDER PAIN, UNSPECIFIED CHRONICITY: ICD-10-CM

## 2023-04-26 DIAGNOSIS — E04.1 THYROID NODULE: ICD-10-CM

## 2023-04-26 DIAGNOSIS — R73.9 HYPERGLYCEMIA, UNSPECIFIED: Primary | ICD-10-CM

## 2023-04-26 NOTE — TELEPHONE ENCOUNTER
Pt is scheduled is having surgery on 06/06- she already had labs done for it on 03/02-   She has a pre op with DR Husain on 05/08 and another appt for a 6 mo f/up on 05/22   Pt is asking if Dr Husain can use the results from her labs her surgeon ordered for the appt 05/22

## 2023-04-27 NOTE — TELEPHONE ENCOUNTER
Please have her get labs prior to her preoperative clearance appointment with me on May 8. I am checking labs that were not ordered by her orthopedist in March. She will need to get labs prior to her appointment in May.      Dr. Tre Tam  [facility], [facility], [address] [place] [address] Str.  Phone: [phone]  Fax: [phone]

## 2023-05-03 ENCOUNTER — HOSPITAL ENCOUNTER (OUTPATIENT)
Facility: HOSPITAL | Age: 78
Discharge: HOME | End: 2023-05-05
Payer: MEDICARE

## 2023-05-03 ENCOUNTER — HOSPITAL ENCOUNTER (OUTPATIENT)
Facility: HOSPITAL | Age: 78
Discharge: HOME | End: 2023-05-06
Payer: MEDICARE

## 2023-05-03 VITALS
HEART RATE: 57 BPM | HEIGHT: 63 IN | BODY MASS INDEX: 42.52 KG/M2 | DIASTOLIC BLOOD PRESSURE: 85 MMHG | SYSTOLIC BLOOD PRESSURE: 151 MMHG | WEIGHT: 240 LBS

## 2023-05-03 DIAGNOSIS — R07.9 CHEST PAIN, UNSPECIFIED TYPE: ICD-10-CM

## 2023-05-03 LAB
ALBUMIN/CREAT UR: 9 MG/G CREAT (ref 0–29)
CHOLEST SERPL-MCNC: 238 MG/DL (ref 100–199)
CREAT UR-MCNC: 31.8 MG/DL
CRP SERPL-MCNC: 1 MG/L (ref 0–10)
ECHO BSA: 2.2 M2
HBA1C MFR BLD: 5.8 % (ref 4.8–5.6)
HDLC SERPL-MCNC: 70 MG/DL
LDLC SERPL CALC-MCNC: 149 MG/DL (ref 0–99)
MICROALBUMIN UR-MCNC: 3 UG/ML
NUC STRESS EJECTION FRACTION: 73 %
SPECIMEN STATUS REPORT: NORMAL
STRESS BASELINE DIAS BP: 85 MMHG
STRESS BASELINE HR: 57 BPM
STRESS BASELINE ST DEPRESSION: 0 MM
STRESS BASELINE SYS BP: 151 MMHG
STRESS ESTIMATED WORKLOAD: 1 METS
STRESS EXERCISE DUR MIN: 4 MIN
STRESS EXERCISE DUR SEC: 0 SEC
STRESS PEAK DIAS BP: 85 MMHG
STRESS PEAK SYS BP: 151 MMHG
STRESS PERCENT HR ACHIEVED: 56 %
STRESS POST PEAK HR: 78 BPM
STRESS RATE PRESSURE PRODUCT: NORMAL BPM*MMHG
STRESS TARGET HR: 140 BPM
TID: 1.02
TRIGL SERPL-MCNC: 108 MG/DL (ref 0–149)
TSH SERPL DL<=0.005 MIU/L-ACNC: 2.06 UIU/ML (ref 0.45–4.5)
URATE SERPL-MCNC: 5.1 MG/DL (ref 3.1–7.9)
VLDLC SERPL CALC-MCNC: 19 MG/DL (ref 5–40)

## 2023-05-03 PROCEDURE — A9502 TC99M TETROFOSMIN: HCPCS | Performed by: INTERNAL MEDICINE

## 2023-05-03 PROCEDURE — 6360000002 HC RX W HCPCS: Performed by: INTERNAL MEDICINE

## 2023-05-03 PROCEDURE — 3430000000 HC RX DIAGNOSTIC RADIOPHARMACEUTICAL: Performed by: INTERNAL MEDICINE

## 2023-05-03 PROCEDURE — 93017 CV STRESS TEST TRACING ONLY: CPT

## 2023-05-03 PROCEDURE — 2580000003 HC RX 258: Performed by: INTERNAL MEDICINE

## 2023-05-03 PROCEDURE — 78452 HT MUSCLE IMAGE SPECT MULT: CPT

## 2023-05-03 RX ORDER — SODIUM CHLORIDE 9 MG/ML
INJECTION, SOLUTION INTRAVENOUS ONCE
Status: CANCELLED | OUTPATIENT
Start: 2023-05-03

## 2023-05-03 RX ORDER — SODIUM CHLORIDE 9 MG/ML
INJECTION, SOLUTION INTRAVENOUS ONCE
Status: COMPLETED | OUTPATIENT
Start: 2023-05-03 | End: 2023-05-03

## 2023-05-03 RX ADMIN — REGADENOSON 0.4 MG: 0.08 INJECTION, SOLUTION INTRAVENOUS at 10:30

## 2023-05-03 RX ADMIN — TETROFOSMIN 11 MILLICURIE: 1.38 INJECTION, POWDER, LYOPHILIZED, FOR SOLUTION INTRAVENOUS at 08:20

## 2023-05-03 RX ADMIN — SODIUM CHLORIDE: 900 INJECTION, SOLUTION INTRAVENOUS at 10:30

## 2023-05-03 RX ADMIN — TETROFOSMIN 33 MILLICURIE: 1.38 INJECTION, POWDER, LYOPHILIZED, FOR SOLUTION INTRAVENOUS at 10:30

## 2023-05-04 RX ORDER — POTASSIUM CHLORIDE 20 MEQ/1
TABLET, EXTENDED RELEASE ORAL
Qty: 180 TABLET | Refills: 1 | Status: SHIPPED | OUTPATIENT
Start: 2023-05-04

## 2023-05-04 RX ORDER — LOSARTAN POTASSIUM 25 MG/1
TABLET ORAL
Qty: 180 TABLET | Refills: 1 | Status: SHIPPED | OUTPATIENT
Start: 2023-05-04

## 2023-05-08 ENCOUNTER — OFFICE VISIT (OUTPATIENT)
Age: 78
End: 2023-05-08
Payer: MEDICARE

## 2023-05-08 ENCOUNTER — TELEPHONE (OUTPATIENT)
Age: 78
End: 2023-05-08

## 2023-05-08 VITALS
OXYGEN SATURATION: 97 % | RESPIRATION RATE: 16 BRPM | WEIGHT: 241 LBS | HEIGHT: 63 IN | TEMPERATURE: 97 F | BODY MASS INDEX: 42.7 KG/M2 | HEART RATE: 70 BPM | DIASTOLIC BLOOD PRESSURE: 86 MMHG | SYSTOLIC BLOOD PRESSURE: 159 MMHG

## 2023-05-08 DIAGNOSIS — R91.1 PULMONARY NODULE: ICD-10-CM

## 2023-05-08 DIAGNOSIS — E78.5 HYPERLIPIDEMIA, UNSPECIFIED HYPERLIPIDEMIA TYPE: Primary | ICD-10-CM

## 2023-05-08 DIAGNOSIS — M25.512 LEFT SHOULDER PAIN, UNSPECIFIED CHRONICITY: ICD-10-CM

## 2023-05-08 DIAGNOSIS — Z71.89 ADVANCE CARE PLANNING: ICD-10-CM

## 2023-05-08 DIAGNOSIS — Z23 IMMUNIZATION DUE: ICD-10-CM

## 2023-05-08 DIAGNOSIS — E04.1 THYROID NODULE: ICD-10-CM

## 2023-05-08 DIAGNOSIS — I10 ESSENTIAL (PRIMARY) HYPERTENSION: ICD-10-CM

## 2023-05-08 DIAGNOSIS — E66.01 OBESITY, CLASS III, BMI 40-49.9 (MORBID OBESITY) (HCC): ICD-10-CM

## 2023-05-08 DIAGNOSIS — Z12.11 ENCOUNTER FOR SCREENING FOR MALIGNANT NEOPLASM OF COLON: ICD-10-CM

## 2023-05-08 DIAGNOSIS — R73.9 HYPERGLYCEMIA, UNSPECIFIED: ICD-10-CM

## 2023-05-08 DIAGNOSIS — Z00.00 MEDICARE ANNUAL WELLNESS VISIT, SUBSEQUENT: ICD-10-CM

## 2023-05-08 DIAGNOSIS — Z23 ENCOUNTER FOR IMMUNIZATION: ICD-10-CM

## 2023-05-08 PROCEDURE — 90732 PPSV23 VACC 2 YRS+ SUBQ/IM: CPT | Performed by: INTERNAL MEDICINE

## 2023-05-08 PROCEDURE — 99211 OFF/OP EST MAY X REQ PHY/QHP: CPT | Performed by: INTERNAL MEDICINE

## 2023-05-08 RX ORDER — EVOLOCUMAB 140 MG/ML
140 INJECTION, SOLUTION SUBCUTANEOUS
Qty: 2.1 ML | Refills: 5 | Status: SHIPPED | OUTPATIENT
Start: 2023-05-08

## 2023-05-08 SDOH — ECONOMIC STABILITY: INCOME INSECURITY: HOW HARD IS IT FOR YOU TO PAY FOR THE VERY BASICS LIKE FOOD, HOUSING, MEDICAL CARE, AND HEATING?: NOT HARD AT ALL

## 2023-05-08 SDOH — ECONOMIC STABILITY: FOOD INSECURITY: WITHIN THE PAST 12 MONTHS, YOU WORRIED THAT YOUR FOOD WOULD RUN OUT BEFORE YOU GOT MONEY TO BUY MORE.: NEVER TRUE

## 2023-05-08 SDOH — ECONOMIC STABILITY: FOOD INSECURITY: WITHIN THE PAST 12 MONTHS, THE FOOD YOU BOUGHT JUST DIDN'T LAST AND YOU DIDN'T HAVE MONEY TO GET MORE.: NEVER TRUE

## 2023-05-08 SDOH — ECONOMIC STABILITY: HOUSING INSECURITY
IN THE LAST 12 MONTHS, WAS THERE A TIME WHEN YOU DID NOT HAVE A STEADY PLACE TO SLEEP OR SLEPT IN A SHELTER (INCLUDING NOW)?: NO

## 2023-05-08 ASSESSMENT — PATIENT HEALTH QUESTIONNAIRE - PHQ9
2. FEELING DOWN, DEPRESSED OR HOPELESS: 0
SUM OF ALL RESPONSES TO PHQ QUESTIONS 1-9: 0
SUM OF ALL RESPONSES TO PHQ9 QUESTIONS 1 & 2: 0
SUM OF ALL RESPONSES TO PHQ QUESTIONS 1-9: 0
1. LITTLE INTEREST OR PLEASURE IN DOING THINGS: 0

## 2023-05-08 ASSESSMENT — LIFESTYLE VARIABLES
HOW MANY STANDARD DRINKS CONTAINING ALCOHOL DO YOU HAVE ON A TYPICAL DAY: PATIENT DOES NOT DRINK
HOW OFTEN DO YOU HAVE A DRINK CONTAINING ALCOHOL: NEVER

## 2023-05-08 NOTE — TELEPHONE ENCOUNTER
----- Message from Diane Donnelly MD sent at 5/3/2023  7:50 PM EDT -----  Please inform patient regarding test finding  Appears normal.    Thanks  SP

## 2023-05-09 ENCOUNTER — HOSPITAL ENCOUNTER (OUTPATIENT)
Facility: HOSPITAL | Age: 78
Discharge: HOME | End: 2023-05-12
Payer: MEDICARE

## 2023-05-09 DIAGNOSIS — Z12.31 VISIT FOR SCREENING MAMMOGRAM: ICD-10-CM

## 2023-05-09 PROCEDURE — 77063 BREAST TOMOSYNTHESIS BI: CPT

## 2023-06-08 ENCOUNTER — CARE COORDINATION (OUTPATIENT)
Facility: CLINIC | Age: 78
End: 2023-06-08

## 2023-06-08 RX ORDER — SENNA AND DOCUSATE SODIUM 50; 8.6 MG/1; MG/1
1 TABLET, FILM COATED ORAL 2 TIMES DAILY
COMMUNITY

## 2023-06-08 RX ORDER — OXYCODONE HYDROCHLORIDE AND ACETAMINOPHEN 5; 325 MG/1; MG/1
1 TABLET ORAL EVERY 4 HOURS PRN
COMMUNITY

## 2023-06-08 RX ORDER — ASPIRIN 81 MG/1
81 TABLET ORAL DAILY
COMMUNITY

## 2023-06-08 NOTE — CARE COORDINATION
[facility] Care Transitions Initial Follow Up Call    Call within 2 business days of discharge: Yes    Patient Current Location:  [place] [address] Transition Nurse contacted the patient by telephone to perform post hospital discharge assessment. Verified name and  with patient as identifiers. Provided introduction to self, and explanation of the Care Transition Nurse role. Patient: Karime Rolle Patient : 1945   MRN: [id number]  Reason for Admission: S/p left reverse total shoulder arthroplasty  Discharge Date: 21 RARS: No data recorded    Last Discharge  [facility]       None            Was this an external facility discharge? Yes, 23-  Discharge Facility: [address] [place] [address] to be reviewed by the provider   Additional needs identified to be addressed with provider: No  none               Method of communication with provider: none. Patient reported:  0/10 at rest; only pain when trying to sleep  Sleeping in recliner  Dressing intact; unable to view dressing  Performed home exercise this morning    Care Transition Nurse reviewed discharge instructions and red flags with patient who verbalized understanding. The patient was given an opportunity to ask questions and does not have any further questions or concerns at this time. Were discharge instructions available to patient? Yes. Reviewed appropriate site of care based on symptoms and resources available to patient including: PCP  Specialist  Urgent care clinics  CTN . The patient agrees to contact the PCP office for questions related to their healthcare. Advance Care Planning:   Does patient have an Advance Directive: not on file. Medication reconciliation was performed with patient, who verbalizes understanding of administration of home medications.  Medications reviewed,: yes    Was patient discharged with a pulse oximeter? no    Non-face-to-face services provided:  Obtained and reviewed discharge summary

## 2023-06-15 ENCOUNTER — HOSPITAL ENCOUNTER (OUTPATIENT)
Facility: HOSPITAL | Age: 78
Setting detail: RECURRING SERIES
Discharge: HOME | End: 2023-06-18
Payer: MEDICARE

## 2023-06-15 PROCEDURE — 97110 THERAPEUTIC EXERCISES: CPT

## 2023-06-15 PROCEDURE — 97140 MANUAL THERAPY 1/> REGIONS: CPT

## 2023-06-19 ENCOUNTER — TELEPHONE (OUTPATIENT)
Age: 78
End: 2023-06-19

## 2023-06-19 NOTE — TELEPHONE ENCOUNTER
Lvm -   Dr Christina Sheffield would like to see the pt on Thursday 06/22 at 1:20pm ok to add per JM   Vv or in office ok to sched .

## 2023-06-21 ENCOUNTER — HOSPITAL ENCOUNTER (OUTPATIENT)
Facility: HOSPITAL | Age: 78
Setting detail: RECURRING SERIES
Discharge: HOME | End: 2023-06-24
Payer: MEDICARE

## 2023-06-21 PROCEDURE — 97140 MANUAL THERAPY 1/> REGIONS: CPT

## 2023-06-21 PROCEDURE — 97110 THERAPEUTIC EXERCISES: CPT

## 2023-06-21 NOTE — PROGRESS NOTES
PHYSICAL / OCCUPATIONAL THERAPY - DAILY TREATMENT NOTE (updated )    Patient Name: Jairo Healy    Date: 2023    : 1945  Insurance: Payor: Sumit Stallworth / Plan: [employer] MED[employer] HMO / Product Type: *No Product type* /      Patient  verified Yes     Visit #   Current / Total 3 12   Time   In / Out 1:56 2:38   Pain   In / Out 0 0-1   Subjective Functional Status/Changes: Pt reports she is feeling pretty good today. Changes to: Allergies, Med Hx, Sx Hx?   no       TREATMENT AREA =  Pain in left shoulder [M25.512]    OBJECTIVE    Modalities Rationale:     decrease pain to improve patient's ability to progress to PLOF and address remaining functional goals. min [] Estim Unattended, type/location:                                      []  w/ice    []  w/heat    min [] Estim Attended, type/location:                                     []  w/US     []  w/ice    []  w/heat    []  TENS insruct      min []  Mechanical Traction: type/lbs                   []  pro   []  sup   []  int   []  cont    []  before manual    []  after manual    min []  Ultrasound, settings/location:      min []  Iontophoresis w/ dexamethasone, location:                                               []  take home patch       []  in clinic        min  unbilled []  Ice     []  Heat    location/position:     min []  Paraffin,  details:    10 min []  Vasopneumatic Device, press/temp: Low low    min []  Matias Back / Bree Conradi: If using vaso (only need to measure limb vaso being performed on)      pre-treatment girth :       post-treatment girth :       measured at (landmark location) :      min []  Other:    Skin assessment post-treatment (if applicable):    []  intact    []  redness- no adverse reaction                 []redness - adverse reaction:         Therapeutic Procedures:   Tx Min Billable or 1:1 Min (if diff from Tx Min) Procedure, Rationale, Specifics   20  [id number] Therapeutic Exercise (timed):  increase ROM,

## 2023-06-22 ENCOUNTER — PHARMACY VISIT (OUTPATIENT)
Age: 78
End: 2023-06-22
Payer: MEDICARE

## 2023-06-22 DIAGNOSIS — R60.0 BILATERAL LEG EDEMA: Primary | ICD-10-CM

## 2023-06-22 DIAGNOSIS — M25.512 LEFT SHOULDER PAIN, UNSPECIFIED CHRONICITY: ICD-10-CM

## 2023-06-22 PROCEDURE — 99211 OFF/OP EST MAY X REQ PHY/QHP: CPT | Performed by: INTERNAL MEDICINE

## 2023-06-22 PROCEDURE — 1123F ACP DISCUSS/DSCN MKR DOCD: CPT | Performed by: INTERNAL MEDICINE

## 2023-06-22 PROCEDURE — 99213 OFFICE O/P EST LOW 20 MIN: CPT | Performed by: INTERNAL MEDICINE

## 2023-06-23 ENCOUNTER — CARE COORDINATION (OUTPATIENT)
Facility: CLINIC | Age: 78
End: 2023-06-23

## 2023-06-23 ENCOUNTER — TELEPHONE (OUTPATIENT)
Facility: HOSPITAL | Age: 78
End: 2023-06-23

## 2023-06-23 ENCOUNTER — APPOINTMENT (OUTPATIENT)
Facility: HOSPITAL | Age: 78
End: 2023-06-23
Payer: MEDICARE

## 2023-06-23 NOTE — CARE COORDINATION
flags with patient and discussed any barriers to care and/or understanding of plan of care after discharge. Discussed appropriate site of care based on symptoms and resources available to patient including: Specialist  CTN . The patient agrees to contact the PCP office for questions related to their healthcare. Advance Care Planning:   Previously addressed . Patients top risk factors for readmission: medical condition-left shoulder arthroplasty  Interventions to address risk factors:  CTN contacted [address] Specialists. Left message for Dr. Norma Zuleta . Patient advised to report to ED with severe pain or worsening of swelling. Offered patient enrollment in the Remote Patient Monitoring (RPM) program for in-home monitoring: NA.     Care Transitions Subsequent and Final Call    Subsequent and Final Calls  Do you have any ongoing symptoms?: Yes  Patient-reported symptoms: Pain  Interventions for patient-reported symptoms: Notified PCP/Physician  Have your medications changed?: No  Do you have any questions related to your medications?: No  Do you currently have any active services?: No  Do you have any needs or concerns that I can assist you with?: No  Identified Barriers: None  Care Transitions Interventions  Other Interventions:             Care Transition Nurse provided contact information for future needs. Plan for follow-up call in 3-5 days based on severity of symptoms and risk factors.   Plan for next call: symptom management-reassess pain and infection red flags    Abdelrahman Gonzalez RN

## 2023-06-26 ENCOUNTER — HOSPITAL ENCOUNTER (OUTPATIENT)
Facility: HOSPITAL | Age: 78
Setting detail: RECURRING SERIES
End: 2023-06-26
Payer: MEDICARE

## 2023-06-26 ENCOUNTER — TELEPHONE (OUTPATIENT)
Facility: HOSPITAL | Age: 78
End: 2023-06-26

## 2023-06-27 ENCOUNTER — CARE COORDINATION (OUTPATIENT)
Facility: CLINIC | Age: 78
End: 2023-06-27

## 2023-06-29 ENCOUNTER — CARE COORDINATION (OUTPATIENT)
Facility: CLINIC | Age: 78
End: 2023-06-29

## 2023-06-29 ENCOUNTER — HOSPITAL ENCOUNTER (OUTPATIENT)
Facility: HOSPITAL | Age: 78
Setting detail: RECURRING SERIES
End: 2023-06-29
Payer: MEDICARE

## 2023-06-29 PROCEDURE — 97110 THERAPEUTIC EXERCISES: CPT

## 2023-06-29 PROCEDURE — 97112 NEUROMUSCULAR REEDUCATION: CPT

## 2023-07-05 ENCOUNTER — HOSPITAL ENCOUNTER (OUTPATIENT)
Facility: HOSPITAL | Age: 78
Discharge: HOME | End: 2023-07-08
Payer: MEDICARE

## 2023-07-05 ENCOUNTER — HOSPITAL ENCOUNTER (OUTPATIENT)
Facility: HOSPITAL | Age: 78
Setting detail: RECURRING SERIES
Discharge: HOME | End: 2023-07-08
Payer: MEDICARE

## 2023-07-05 DIAGNOSIS — R60.0 BILATERAL LEG EDEMA: ICD-10-CM

## 2023-07-05 LAB
ALBUMIN SERPL-MCNC: 3.5 G/DL (ref 3.4–5)
ALBUMIN/GLOB SERPL: 1 (ref 0.8–1.7)
ALP SERPL-CCNC: 90 U/L (ref 45–117)
ALT SERPL-CCNC: 15 U/L (ref 13–56)
ANION GAP SERPL CALC-SCNC: 6 MMOL/L (ref 3–18)
AST SERPL-CCNC: 16 U/L (ref 10–38)
BILIRUB SERPL-MCNC: 1.9 MG/DL (ref 0.2–1)
BUN SERPL-MCNC: 15 MG/DL (ref 7–18)
BUN/CREAT SERPL: 17 (ref 12–20)
CALCIUM SERPL-MCNC: 8.8 MG/DL (ref 8.5–10.1)
CHLORIDE SERPL-SCNC: 104 MMOL/L (ref 100–111)
CO2 SERPL-SCNC: 30 MMOL/L (ref 21–32)
CREAT SERPL-MCNC: 0.87 MG/DL (ref 0.6–1.3)
D DIMER PPP FEU-MCNC: 1.74 UG/ML(FEU)
GLOBULIN SER CALC-MCNC: 3.6 G/DL (ref 2–4)
GLUCOSE SERPL-MCNC: 92 MG/DL (ref 74–99)
NT PRO BNP: 56 PG/ML (ref 0–1800)
POTASSIUM SERPL-SCNC: 3.7 MMOL/L (ref 3.5–5.5)
PROT SERPL-MCNC: 7.1 G/DL (ref 6.4–8.2)
SODIUM SERPL-SCNC: 140 MMOL/L (ref 136–145)

## 2023-07-05 PROCEDURE — 97016 VASOPNEUMATIC DEVICE THERAPY: CPT

## 2023-07-05 PROCEDURE — 97140 MANUAL THERAPY 1/> REGIONS: CPT

## 2023-07-05 PROCEDURE — 83880 ASSAY OF NATRIURETIC PEPTIDE: CPT

## 2023-07-05 PROCEDURE — 80053 COMPREHEN METABOLIC PANEL: CPT

## 2023-07-05 PROCEDURE — 36415 COLL VENOUS BLD VENIPUNCTURE: CPT

## 2023-07-05 PROCEDURE — 97110 THERAPEUTIC EXERCISES: CPT

## 2023-07-05 PROCEDURE — 85379 FIBRIN DEGRADATION QUANT: CPT

## 2023-07-05 NOTE — PROGRESS NOTES
PHYSICAL / OCCUPATIONAL THERAPY - DAILY TREATMENT NOTE (updated )    Patient Name: Taras Farris    Date: 2023    : 1945  Insurance: Payor: Sabina Franklewis / Plan: [address] [place] [address] HMO / Product Type: *No Product type* /      Patient  verified Yes     Visit #   Current / Total 5 12   Time   In / Out 12:28 1:15   Pain   In / Out 1-2/10 0/10   Subjective Functional Status/Changes: The patient states that she attempted to raise her arm up to wash underneath it and it caused her more pain. (PT demonstrated pendulum positioning for washing). Changes to: Allergies, Med Hx, Sx Hx?   no       TREATMENT AREA =  Pain in left shoulder [M25.512]    OBJECTIVE  Modalities Rationale:     decrease edema, decrease inflammation, and decrease pain to improve patient's ability to progress to PLOF and address remaining functional goals. 10 min [x]  Vasopneumatic Device, press/temp:    If using vaso (only need to measure limb vaso being performed on)      pre-treatment girth :       post-treatment girth :       measured at (landmark location) :     Skin assessment post-treatment (if applicable):    []  intact    []  redness- no adverse reaction                 []redness - adverse reaction:         Therapeutic Procedures: Tx Min Billable or 1:1 Min (if diff from Tx Min) Procedure, Rationale, Specifics   29 [phone] [id number] Therapeutic Exercise (timed):  increase ROM, strength, coordination, balance, and proprioception to improve patient's ability to progress to PLOF and address remaining functional goals. (see flow sheet as applicable)     Details if applicable:       10 10 32507 Manual Therapy (timed):  decrease pain, increase ROM, and increase tissue extensibility to improve patient's ability to progress to PLOF and address remaining functional goals. The manual therapy interventions were performed at a separate and distinct time from the therapeutic activities interventions .  (see flow sheet as applicable)

## 2023-07-07 ENCOUNTER — HOSPITAL ENCOUNTER (OUTPATIENT)
Facility: HOSPITAL | Age: 78
Setting detail: RECURRING SERIES
Discharge: HOME | End: 2023-07-10
Payer: MEDICARE

## 2023-07-07 PROCEDURE — 97140 MANUAL THERAPY 1/> REGIONS: CPT

## 2023-07-07 PROCEDURE — 97110 THERAPEUTIC EXERCISES: CPT

## 2023-07-07 PROCEDURE — 97016 VASOPNEUMATIC DEVICE THERAPY: CPT

## 2023-07-11 RX ORDER — FUROSEMIDE 20 MG/1
TABLET ORAL
Qty: 90 TABLET | Refills: 1 | Status: SHIPPED | OUTPATIENT
Start: 2023-07-11

## 2023-07-11 NOTE — TELEPHONE ENCOUNTER
Please refill if appropriate or refuse medication if not appropriate.     PCP: Iqra Atwood MD     Last appt: 5/8/23    Last refill: 9/29/22      Future Appointments   Date Time Provider [address]  [facility]   7/13/2023  1:50 PM Jaciel Medrano, PT [facility]   7/18/2023  1:50 PM [facility]   7/19/2023  2:00 PM Ema Gipson, [facility] BS AMB   7/21/2023  2:30 PM [facility]   7/25/2023  1:10 PM Rodney Felder[facility]   7/27/2023  1:50 PM [facility]   8/1/2023  1:50 PM Violet Case[facility]   8/3/2023  1:50 PM [facility], [facility]   8/8/2023  1:50 PM Nash Elizabeth[facility]   8/9/2023 10:50 AM [facility] LAB VISIT [facility] BS AMB   8/16/2023 10:40 AM Iqra Atwood MD [facility] BS AMB   1/8/2024  9:00 AM Simon Bell MD [facility] BS AMB         Requested Prescriptions     Pending Prescriptions Disp Refills    furosemide (LASIX) 20 MG tablet [Pharmacy Med Name: FUROSEMIDE 20 MG Tablet] 90 tablet      Sig: TAKE 1 TABLET EVERY DAY

## 2023-07-13 ENCOUNTER — HOSPITAL ENCOUNTER (OUTPATIENT)
Facility: HOSPITAL | Age: 78
Setting detail: RECURRING SERIES
Discharge: HOME | End: 2023-07-16
Payer: MEDICARE

## 2023-07-13 PROCEDURE — 97110 THERAPEUTIC EXERCISES: CPT

## 2023-07-13 PROCEDURE — 97140 MANUAL THERAPY 1/> REGIONS: CPT

## 2023-07-18 ENCOUNTER — HOSPITAL ENCOUNTER (OUTPATIENT)
Facility: HOSPITAL | Age: 78
Setting detail: RECURRING SERIES
Discharge: HOME | End: 2023-07-21
Payer: MEDICARE

## 2023-07-18 PROCEDURE — 97110 THERAPEUTIC EXERCISES: CPT

## 2023-07-18 PROCEDURE — 97140 MANUAL THERAPY 1/> REGIONS: CPT

## 2023-07-18 PROCEDURE — 97016 VASOPNEUMATIC DEVICE THERAPY: CPT

## 2023-07-18 NOTE — PROGRESS NOTES
PHYSICAL / OCCUPATIONAL THERAPY - DAILY TREATMENT NOTE (updated )    Patient Name: Manpreet Judd    Date: 2023    : 1945  Insurance: Payor: Tish Harrington / Plan: [address] [place] [address] HMO / Product Type: *No Product type* /      Patient  verified Yes     Visit #   Current / Total 8 12   Time   In / Out 1:50 2:34   Pain   In / Out 0/10 1/10   Subjective Functional Status/Changes: The patient reports she follows-up with her doctor on Thursday. Changes to: Allergies, Med Hx, Sx Hx? yes       TREATMENT AREA =  Pain in left shoulder [M25.512]    OBJECTIVE    Modalities Rationale:     decrease edema, decrease inflammation, and decrease pain to improve patient's ability to progress to PLOF and address remaining functional goals. 10 min [x]  Vasopneumatic Device, press/temp:  Left shoulder GHJ LP/LT   If using vaso (only need to measure limb vaso being performed on)      pre-treatment girth :       post-treatment girth :       measured at (landmark location) :     Skin assessment post-treatment (if applicable):    []  intact    []  redness- no adverse reaction                 []redness - adverse reaction:         Therapeutic Procedures: Tx Min Billable or 1:1 Min (if diff from Tx Min) Procedure, Rationale, Specifics   24 24 [id number] Therapeutic Exercise (timed):  increase ROM, strength, coordination, balance, and proprioception to improve patient's ability to progress to PLOF and address remaining functional goals. (see flow sheet as applicable)     Details if applicable:       10 10 36237 Manual Therapy (timed):  decrease pain, increase ROM, and increase tissue extensibility to improve patient's ability to progress to PLOF and address remaining functional goals. The manual therapy interventions were performed at a separate and distinct time from the therapeutic activities interventions .  (see flow sheet as applicable)     Details if applicable:   left scapulothoracic joint circles/clocks, GHJ

## 2023-07-19 ENCOUNTER — PHARMACY VISIT (OUTPATIENT)
Age: 78
End: 2023-07-19

## 2023-07-19 DIAGNOSIS — E78.5 HYPERLIPIDEMIA, UNSPECIFIED HYPERLIPIDEMIA TYPE: Primary | ICD-10-CM

## 2023-07-21 ENCOUNTER — HOSPITAL ENCOUNTER (OUTPATIENT)
Facility: HOSPITAL | Age: 78
Setting detail: RECURRING SERIES
Discharge: HOME | End: 2023-07-24
Payer: MEDICARE

## 2023-07-21 PROCEDURE — 97110 THERAPEUTIC EXERCISES: CPT

## 2023-07-21 PROCEDURE — 97140 MANUAL THERAPY 1/> REGIONS: CPT

## 2023-07-21 NOTE — PROGRESS NOTES
PHYSICAL / OCCUPATIONAL THERAPY - DAILY TREATMENT NOTE (updated )    Patient Name: Meli Pedro    Date: 2023    : 1945  Insurance: Payor: Hanford Merlin / Plan: [employer] MEDINA HMO / Product Type: *No Product type* /      Patient  verified Yes     Visit #   Current / Total 9 12   Time   In / Out 230 323   Pain   In / Out 4 3   Subjective Functional Status/Changes: Pt reports she saw her MD yesterday and he pressed down on her shoulder and it is sore today. Changes to: Allergies, Med Hx, Sx Hx?   no       TREATMENT AREA =  Pain in left shoulder [M25.512]    OBJECTIVE    Modalities Rationale:     decrease inflammation and decrease pain to improve patient's ability to progress to PLOF and address remaining functional goals. min [] Estim Unattended, type/location:                                      []  w/ice    []  w/heat    min [] Estim Attended, type/location:                                     []  w/US     []  w/ice    []  w/heat    []  TENS insruct      min []  Mechanical Traction: type/lbs                   []  pro   []  sup   []  int   []  cont    []  before manual    []  after manual    min []  Ultrasound, settings/location:      min []  Iontophoresis w/ dexamethasone, location:                                               []  take home patch       []  in clinic        min  unbilled []  Ice     []  Heat    location/position:     min []  Paraffin,  details:    10 min [x]  Vasopneumatic Device, press/temp: Left shoulder GHJ LP/LT    min []  Zohreh Raygoza / Ani Nicely: If using vaso (only need to measure limb vaso being performed on)      pre-treatment girth :       post-treatment girth :       measured at (landmark location) :      min []  Other:    Skin assessment post-treatment (if applicable):    [x]  intact    [x]  redness- no adverse reaction                 []redness - adverse reaction:         Therapeutic Procedures:   Tx Min Billable or 1:1 Min (if diff from [employer])

## 2023-07-25 ENCOUNTER — HOSPITAL ENCOUNTER (OUTPATIENT)
Facility: HOSPITAL | Age: 78
Setting detail: RECURRING SERIES
Discharge: HOME | End: 2023-07-28
Payer: MEDICARE

## 2023-07-25 PROCEDURE — 97110 THERAPEUTIC EXERCISES: CPT

## 2023-07-25 PROCEDURE — 97140 MANUAL THERAPY 1/> REGIONS: CPT

## 2023-07-25 NOTE — PROGRESS NOTES
PHYSICAL / OCCUPATIONAL THERAPY - DAILY TREATMENT NOTE (updated )    Patient Name: Silvia Leblanc    Date: 2023    : 1945  Insurance: Payor: Tyronne Hodgkins / Plan: Jennifer Alberta PLUS HMO / Product Type: *No Product type* /      Patient  verified Yes     Visit #   Current / Total 10 24   Time   In / Out 1:13 2:01   Pain   In / Out 1/10 1/10   Subjective Functional Status/Changes: No new complaints. Changes to: Allergies, Med Hx, Sx Hx?   no       TREATMENT AREA =  Pain in left shoulder [M25.512]    OBJECTIVE    Modalities Rationale:     decrease inflammation and decrease pain to improve patient's ability to progress to PLOF and address remaining functional goals. min [] Estim Unattended, type/location:                                      []  w/ice    []  w/heat    min [] Estim Attended, type/location:                                     []  w/US     []  w/ice    []  w/heat    []  TENS insruct      min []  Mechanical Traction: type/lbs                   []  pro   []  sup   []  int   []  cont    []  before manual    []  after manual    min []  Ultrasound, settings/location:      min []  Iontophoresis w/ dexamethasone, location:                                               []  take home patch       []  in clinic        min  unbilled []  Ice     []  Heat    location/position:     min []  Paraffin,  details:    10 min [x]  Vasopneumatic Device, press/temp:  LP/LT - Pt seated    min []  Vivien Valentin / Padmini Ramirez: If using vaso (only need to measure limb vaso being performed on)      pre-treatment girth :       post-treatment girth :       measured at (landmark location) :      min []  Other:    Skin assessment post-treatment (if applicable):    []  intact    []  redness- no adverse reaction                 []redness - adverse reaction:         Therapeutic Procedures:   Tx Min Billable or 1:1 Min (if diff from Tx Min) Procedure, Rationale, Specifics   28  40961 Therapeutic Exercise (timed):

## 2023-07-27 ENCOUNTER — HOSPITAL ENCOUNTER (OUTPATIENT)
Facility: HOSPITAL | Age: 78
Setting detail: RECURRING SERIES
Discharge: HOME | End: 2023-07-30
Payer: MEDICARE

## 2023-07-27 PROCEDURE — 97140 MANUAL THERAPY 1/> REGIONS: CPT

## 2023-07-27 PROCEDURE — 97110 THERAPEUTIC EXERCISES: CPT

## 2023-07-27 NOTE — PROGRESS NOTES
PHYSICAL / OCCUPATIONAL THERAPY - DAILY TREATMENT NOTE (updated )    Patient Name: Toribio Griffin    Date: 2023    : 1945  Insurance: Payor: Dover Mon / Plan: Marine Alex CAROL HMO / Product Type: *No Product type* /      Patient  verified Yes     Visit #   Current / Total 11 24   Time   In / Out 1:51 2:58   Pain   In / Out 0 0   Subjective Functional Status/Changes: Left shoulder is doing better. Changes to: Allergies, Med Hx, Sx Hx?   no       TREATMENT AREA =  Pain in left shoulder [M25.512]    OBJECTIVE    Modalities Rationale:     decrease pain to improve patient's ability to progress to PLOF and address remaining functional goals. min [] Estim Unattended, type/location:                                      []  w/ice    []  w/heat    min [] Estim Attended, type/location:                                     []  w/US     []  w/ice    []  w/heat    []  TENS insruct      min []  Mechanical Traction: type/lbs                   []  pro   []  sup   []  int   []  cont    []  before manual    []  after manual    min []  Ultrasound, settings/location:      min []  Iontophoresis w/ dexamethasone, location:                                               []  take home patch       []  in clinic        min  unbilled []  Ice     []  Heat    location/position:     min []  Paraffin,  details:    10 min []  Vasopneumatic Device, press/temp: LP/LT    min []  Starleen Masood / Verlon Brightly: If using vaso (only need to measure limb vaso being performed on)      pre-treatment girth :       post-treatment girth :       measured at (landmark location) :      min []  Other:    Skin assessment post-treatment (if applicable):    []  intact    []  redness- no adverse reaction                 []redness - adverse reaction:         Therapeutic Procedures:   Tx Min Billable or 1:1 Min (if diff from Tx Min) Procedure, Rationale, Specifics   50 00 [id number] Therapeutic Exercise (timed):  increase ROM, strength,

## 2023-08-01 ENCOUNTER — HOSPITAL ENCOUNTER (OUTPATIENT)
Facility: HOSPITAL | Age: 78
Setting detail: RECURRING SERIES
Discharge: HOME | End: 2023-08-04
Payer: MEDICARE

## 2023-08-01 PROCEDURE — 97140 MANUAL THERAPY 1/> REGIONS: CPT

## 2023-08-01 PROCEDURE — 97110 THERAPEUTIC EXERCISES: CPT

## 2023-08-01 NOTE — PROGRESS NOTES
PHYSICAL / OCCUPATIONAL THERAPY - DAILY TREATMENT NOTE (updated )    Patient Name: Taras Farris    Date: 2023    : 1945  Insurance: Payor: Sabina Larson / Plan: Alejandro MEDINA HMO / Product Type: *No Product type* /      Patient  verified Yes     Visit #   Current / Total 12 24   Time   In / Out 154 150   Pain   In / Out 0 0   Subjective Functional Status/Changes: Pt reports no pain upon arrival.   Changes to: Allergies, Med Hx, Sx Hx?   no       TREATMENT AREA =  Pain in left shoulder [M25.512]    OBJECTIVE    Modalities Rationale:     decrease inflammation and decrease pain to improve patient's ability to progress to PLOF and address remaining functional goals. min [] Estim Unattended, type/location:                                      []  w/ice    []  w/heat    min [] Estim Attended, type/location:                                     []  w/US     []  w/ice    []  w/heat    []  TENS insruct      min []  Mechanical Traction: type/lbs                   []  pro   []  sup   []  int   []  cont    []  before manual    []  after manual    min []  Ultrasound, settings/location:      min []  Iontophoresis w/ dexamethasone, location:                                               []  take home patch       []  in clinic        min  unbilled []  Ice     []  Heat    location/position:     min []  Paraffin,  details: LP/LT, left shoulder, seated   10 min [x]  Vasopneumatic Device, press/temp:     min []  Velvet Cross / Donlisaae Berks: If using vaso (only need to measure limb vaso being performed on)      pre-treatment girth :       post-treatment girth :       measured at (landmark location) :      min []  Other:    Skin assessment post-treatment (if applicable):    [x]  intact    [x]  redness- no adverse reaction                 []redness - adverse reaction:         Therapeutic Procedures:   Tx Min Billable or 1:1 Min (if diff from Tx Min) Procedure, Rationale, Specifics   38  [id number] Therapeutic

## 2023-08-03 ENCOUNTER — HOSPITAL ENCOUNTER (OUTPATIENT)
Facility: HOSPITAL | Age: 78
Setting detail: RECURRING SERIES
Discharge: HOME | End: 2023-08-06
Payer: MEDICARE

## 2023-08-03 PROCEDURE — 97530 THERAPEUTIC ACTIVITIES: CPT

## 2023-08-03 PROCEDURE — 97110 THERAPEUTIC EXERCISES: CPT

## 2023-08-03 PROCEDURE — 97140 MANUAL THERAPY 1/> REGIONS: CPT

## 2023-08-03 NOTE — PROGRESS NOTES
benefit from skilled PT / OT services to modify and progress therapeutic interventions, analyze and address functional mobility deficits, analyze and address ROM deficits, analyze and address strength deficits, analyze and address soft tissue restrictions, analyze and cue for proper movement patterns, and analyze and modify for postural abnormalities to address functional deficits and attain remaining goals. Progress toward goals / Updated goals:  [x]  See Progress Note/Recertification  The patient will improve PROM of left shoulder flexion to 100 degrees to improve ease of hygiene. IE: 70 degrees PROM              PN: progressing: approximately 90               Current: Met 8/1/23 PROM left shoulder flexion 115 deg  Long Term Goals: To be accomplished in 12 weeks  The patient will improve FOTO score to 62 to improve quality of life. IE: 32              PN: Progressing: FOTO: 32   The patient will improve PROM to 120 degrees of scaption to improve ease of grooming. IE: 70 degrees             PN: nearing 90              Current: Progressing 8/1/23 PROM left shoulder ABD 90 deg  The patient will improve AROM to 115 degrees of flexion to improve ease of reaching overhead               IE: NT              PN: Not tested              Current: progressing: Left shoulder AAROM flexion: 90deg (with finger ladder) (7/27/23)   The patient will improve functional ER to C7 to improve ease of grooming. IE: NT              PN Not tested   Current: NT, not cleared yet. The patient will improve flexion strength to 4-/5 MMT to improve ease of reaching into overhead cabinets.               IE: NT              PN: Not tested      PLAN  Yes  Continue plan of care  []  Upgrade activities as tolerated  []  Discharge due to :  []  Other:    Beto Pereyra PTA    8/3/2023    1:44 PM    Future Appointments   Date Time Provider [address] [place] [address]   8/3/2023  1:50 PM Beto Pereyra PTA

## 2023-08-08 ENCOUNTER — HOSPITAL ENCOUNTER (OUTPATIENT)
Facility: HOSPITAL | Age: 78
Setting detail: RECURRING SERIES
Discharge: HOME | End: 2023-08-11
Payer: MEDICARE

## 2023-08-08 PROCEDURE — 97140 MANUAL THERAPY 1/> REGIONS: CPT

## 2023-08-08 PROCEDURE — 97112 NEUROMUSCULAR REEDUCATION: CPT

## 2023-08-08 PROCEDURE — 97110 THERAPEUTIC EXERCISES: CPT

## 2023-08-08 PROCEDURE — 97016 VASOPNEUMATIC DEVICE THERAPY: CPT

## 2023-08-08 NOTE — PROGRESS NOTES
to 4-/5 MMT to improve ease of reaching into overhead cabinets. IE: NT              PN:  3-/5 MMT      Summary of Care/ Key Functional Changes: The patient is making gradual progress concerning shoulder flexion, functional ER. Her FOTO score has improved to 49 indicating improved quality of life and improvement in voerall function. She will continue to benefit from PT in order to progress her AROM, strength, and improved functional activity. ASSESSMENT/RECOMMENDATIONS:    Continue per plan of care.      Thank you for this referral.   Socorro Allen, PT 8/8/2023 3:24 PM
will improve PROM of left shoulder flexion to 100 degrees to improve ease of hygiene. IE: 70 degrees PROM              PN: progressing: approximately 90               Current: Met 8/1/23 PROM left shoulder flexion 115 deg  Long Term Goals: To be accomplished in 12 weeks  The patient will improve FOTO score to 62 to improve quality of life. IE: 26              PN: Progressing: FOTO: 32    Current: progressed to 49 8/08/2023  The patient will improve PROM to 120 degrees of scaption to improve ease of grooming. IE: 70 degrees             PN: nearing 90              Current: Progressed to 90 degrees AAROM   The patient will improve AROM to 115 degrees of flexion to improve ease of reaching overhead               IE: NT              PN: Not tested              Current: progressing: Left shoulder AAROM flexion: 90deg (with finger ladder) (7/27/23)   The patient will improve functional ER to C7 to improve ease of grooming. IE: NT              PN Not tested              Current: NT, not cleared yet. The patient will improve flexion strength to 4-/5 MMT to improve ease of reaching into overhead cabinets.               IE: NT              PN: Not tested   Current: 3-/5 MMT   PLAN  Yes  Continue plan of care  []  Upgrade activities as tolerated  []  Discharge due to :  []  Other:    Najma Pike PT    8/8/2023    1:35 PM    Future Appointments   Date Time Provider [facility] [address]   8/8/2023  1:50 PM Najma Piek PT [facility]   8/9/2023 10:50 AM [facility] LAB VISIT [facility] BS AMB   8/16/2023 10:40 AM Evette Xie MD [facility] BS AMB   1/8/2024  9:00 AM Simon Grant MD [facility] AMB

## 2023-08-09 ENCOUNTER — HOSPITAL ENCOUNTER (OUTPATIENT)
Facility: HOSPITAL | Age: 78
Setting detail: SPECIMEN
Discharge: HOME | End: 2023-08-12
Payer: MEDICARE

## 2023-08-09 DIAGNOSIS — E78.5 HYPERLIPIDEMIA, UNSPECIFIED HYPERLIPIDEMIA TYPE: ICD-10-CM

## 2023-08-09 LAB
ALBUMIN SERPL-MCNC: 3.5 G/DL (ref 3.4–5)
ALBUMIN/GLOB SERPL: 1 (ref 0.8–1.7)
ALP SERPL-CCNC: 106 U/L (ref 45–117)
ALT SERPL-CCNC: 20 U/L (ref 13–56)
ANION GAP SERPL CALC-SCNC: 5 MMOL/L (ref 3–18)
AST SERPL-CCNC: 18 U/L (ref 10–38)
BILIRUB SERPL-MCNC: 0.5 MG/DL (ref 0.2–1)
BUN SERPL-MCNC: 12 MG/DL (ref 7–18)
BUN/CREAT SERPL: 13 (ref 12–20)
CALCIUM SERPL-MCNC: 9 MG/DL (ref 8.5–10.1)
CHLORIDE SERPL-SCNC: 105 MMOL/L (ref 100–111)
CHOLEST SERPL-MCNC: 185 MG/DL
CO2 SERPL-SCNC: 31 MMOL/L (ref 21–32)
CREAT SERPL-MCNC: 0.89 MG/DL (ref 0.6–1.3)
GLOBULIN SER CALC-MCNC: 3.6 G/DL (ref 2–4)
GLUCOSE SERPL-MCNC: 116 MG/DL (ref 74–99)
HDLC SERPL-MCNC: 75 MG/DL (ref 40–60)
HDLC SERPL: 2.5 (ref 0–5)
LDLC SERPL CALC-MCNC: 94.2 MG/DL (ref 0–100)
LIPID PANEL: ABNORMAL
POTASSIUM SERPL-SCNC: 4.1 MMOL/L (ref 3.5–5.5)
PROT SERPL-MCNC: 7.1 G/DL (ref 6.4–8.2)
SODIUM SERPL-SCNC: 141 MMOL/L (ref 136–145)
TRIGL SERPL-MCNC: 79 MG/DL
VLDLC SERPL CALC-MCNC: 15.8 MG/DL

## 2023-08-09 PROCEDURE — 36415 COLL VENOUS BLD VENIPUNCTURE: CPT

## 2023-08-09 PROCEDURE — 80061 LIPID PANEL: CPT

## 2023-08-09 PROCEDURE — 80053 COMPREHEN METABOLIC PANEL: CPT

## 2023-08-10 ENCOUNTER — HOSPITAL ENCOUNTER (OUTPATIENT)
Facility: HOSPITAL | Age: 78
Setting detail: RECURRING SERIES
Discharge: HOME | End: 2023-08-13
Payer: MEDICARE

## 2023-08-10 PROCEDURE — 97140 MANUAL THERAPY 1/> REGIONS: CPT

## 2023-08-10 PROCEDURE — 97112 NEUROMUSCULAR REEDUCATION: CPT

## 2023-08-10 PROCEDURE — 97110 THERAPEUTIC EXERCISES: CPT

## 2023-08-10 NOTE — PROGRESS NOTES
PHYSICAL / OCCUPATIONAL THERAPY - DAILY TREATMENT NOTE (updated )    Patient Name: Mani Hartley    Date: 8/10/2023    : 1945  Insurance: Payor: Freddie Orellana / Plan: Yudy Hensley PLUS HMO / Product Type: *No Product type* /      Patient  verified Yes     Visit #   Current / Total  15 24   Time   In / Out 231 321   Pain   In / Out 0 0   Subjective Functional Status/Changes: No new complaints. Changes to:  Meds, Allergies, Med Hx, Sx Hx? If yes, update Summary List no       TREATMENT AREA =  Pain in left shoulder [M25.512]    OBJECTIVE    Modalities Rationale:     decrease pain to improve patient's ability to progress to PLOF and address remaining functional goals. min [] Estim Unattended, type/location:                                      []  w/ice    []  w/heat    min [] Estim Attended, type/location:                                     []  w/US     []  w/ice    []  w/heat    []  TENS insruct      min []  Mechanical Traction: type/lbs                   []  pro   []  sup   []  int   []  cont    []  before manual    []  after manual    min []  Ultrasound, settings/location:      min []  Iontophoresis w/ dexamethasone, location:                                               []  take home patch       []  in clinic        min  unbilled []  Ice     []  Heat    location/position:     min []  Paraffin,  details:    10 min [x]  Vasopneumatic Device, press/temp:     min []  Yoanna Belt / Mendez Lefort: If using vaso (only need to measure limb vaso being performed on)      pre-treatment girth :       post-treatment girth :       measured at (landmark location) :      min []  Other:    Skin assessment post-treatment (if applicable):    []  intact    []  redness- no adverse reaction                 []redness - adverse reaction:         Therapeutic Procedures:   Tx Min Billable or 1:1 Min (if diff from Tx Min) Procedure, Rationale, Specifics   22  [id number] Therapeutic Exercise (timed):  increase ROM,

## 2023-08-16 ENCOUNTER — OFFICE VISIT (OUTPATIENT)
Age: 78
End: 2023-08-16
Payer: MEDICARE

## 2023-08-16 VITALS
DIASTOLIC BLOOD PRESSURE: 79 MMHG | OXYGEN SATURATION: 96 % | HEIGHT: 63 IN | SYSTOLIC BLOOD PRESSURE: 133 MMHG | TEMPERATURE: 97.3 F | WEIGHT: 247.13 LBS | HEART RATE: 68 BPM | BODY MASS INDEX: 43.79 KG/M2 | RESPIRATION RATE: 20 BRPM

## 2023-08-16 DIAGNOSIS — E04.1 THYROID NODULE: ICD-10-CM

## 2023-08-16 DIAGNOSIS — R60.0 BILATERAL LEG EDEMA: Primary | ICD-10-CM

## 2023-08-16 DIAGNOSIS — R79.89 POSITIVE D DIMER: ICD-10-CM

## 2023-08-16 DIAGNOSIS — M89.8X6 PAIN IN LEFT TIBIA: ICD-10-CM

## 2023-08-16 DIAGNOSIS — R73.9 HYPERGLYCEMIA, UNSPECIFIED: ICD-10-CM

## 2023-08-16 DIAGNOSIS — M25.512 LEFT SHOULDER PAIN, UNSPECIFIED CHRONICITY: ICD-10-CM

## 2023-08-16 DIAGNOSIS — M79.662 PAIN OF LEFT CALF: ICD-10-CM

## 2023-08-16 DIAGNOSIS — R60.0 LEG EDEMA, LEFT: ICD-10-CM

## 2023-08-16 PROCEDURE — G8399 PT W/DXA RESULTS DOCUMENT: HCPCS | Performed by: INTERNAL MEDICINE

## 2023-08-16 PROCEDURE — 99211 OFF/OP EST MAY X REQ PHY/QHP: CPT | Performed by: INTERNAL MEDICINE

## 2023-08-16 PROCEDURE — G8417 CALC BMI ABV UP PARAM F/U: HCPCS | Performed by: INTERNAL MEDICINE

## 2023-08-16 PROCEDURE — 1090F PRES/ABSN URINE INCON ASSESS: CPT | Performed by: INTERNAL MEDICINE

## 2023-08-16 PROCEDURE — 1036F TOBACCO NON-USER: CPT | Performed by: INTERNAL MEDICINE

## 2023-08-16 PROCEDURE — 1123F ACP DISCUSS/DSCN MKR DOCD: CPT | Performed by: INTERNAL MEDICINE

## 2023-08-16 PROCEDURE — 99214 OFFICE O/P EST MOD 30 MIN: CPT | Performed by: INTERNAL MEDICINE

## 2023-08-16 PROCEDURE — G8427 DOCREV CUR MEDS BY ELIG CLIN: HCPCS | Performed by: INTERNAL MEDICINE

## 2023-08-17 ENCOUNTER — HOSPITAL ENCOUNTER (OUTPATIENT)
Facility: HOSPITAL | Age: 78
Discharge: HOME | End: 2023-08-17
Payer: MEDICARE

## 2023-08-17 DIAGNOSIS — M89.8X6 PAIN IN LEFT TIBIA: ICD-10-CM

## 2023-08-17 PROCEDURE — 73590 X-RAY EXAM OF LOWER LEG: CPT

## 2023-08-18 ENCOUNTER — HOSPITAL ENCOUNTER (OUTPATIENT)
Facility: HOSPITAL | Age: 78
End: 2023-08-18
Attending: INTERNAL MEDICINE
Payer: MEDICARE

## 2023-08-18 ENCOUNTER — HOSPITAL ENCOUNTER (OUTPATIENT)
Facility: HOSPITAL | Age: 78
Setting detail: RECURRING SERIES
Discharge: HOME | End: 2023-08-21
Payer: MEDICARE

## 2023-08-18 DIAGNOSIS — R60.0 BILATERAL LEG EDEMA: ICD-10-CM

## 2023-08-18 DIAGNOSIS — R79.89 POSITIVE D DIMER: ICD-10-CM

## 2023-08-18 DIAGNOSIS — M79.662 PAIN OF LEFT CALF: ICD-10-CM

## 2023-08-18 DIAGNOSIS — R60.0 LEG EDEMA, LEFT: ICD-10-CM

## 2023-08-18 PROCEDURE — 97530 THERAPEUTIC ACTIVITIES: CPT

## 2023-08-18 PROCEDURE — 97140 MANUAL THERAPY 1/> REGIONS: CPT

## 2023-08-18 PROCEDURE — 93970 EXTREMITY STUDY: CPT | Performed by: INTERNAL MEDICINE

## 2023-08-18 PROCEDURE — 97016 VASOPNEUMATIC DEVICE THERAPY: CPT

## 2023-08-18 PROCEDURE — 97110 THERAPEUTIC EXERCISES: CPT

## 2023-08-18 PROCEDURE — 93970 EXTREMITY STUDY: CPT

## 2023-08-18 NOTE — PROGRESS NOTES
PHYSICAL / OCCUPATIONAL THERAPY - DAILY TREATMENT NOTE (updated )    Patient Name: Kale Rolle    Date: 2023    : 1945  Insurance: Payor: Emmett Anabel / Plan: [address] [place] [address] HMO / Product Type: *No Product type* /      Patient  verified Yes     Visit #   Current / Total 16 24   Time   In / Out 110 203   Pain   In / Out 0 0   Subjective Functional Status/Changes: Patient reports that she felt \"good\" after her last visit and denies soreness. Over the last week, she attempted to kill a spider using a broomstick for assisted reaching; she reports increased pain afterward. She took Tylenol and the pain abolished since then. Changes to:  Meds, Allergies, Med Hx, Sx Hx? If yes, update Summary List no       TREATMENT AREA =  Pain in left shoulder [M25.512]    OBJECTIVE    Modalities Rationale:     decrease pain to improve patient's ability to progress to PLOF and address remaining functional goals. min [] Estim Unattended, type/location:                                      []  w/ice    []  w/heat    min [] Estim Attended, type/location:                                     []  w/US     []  w/ice    []  w/heat    []  TENS insruct      min []  Mechanical Traction: type/lbs                   []  pro   []  sup   []  int   []  cont    []  before manual    []  after manual    min []  Ultrasound, settings/location:      min []  Iontophoresis w/ dexamethasone, location:                                               []  take home patch       []  in clinic        min  unbilled []  Ice     []  Heat    location/position:     min []  Paraffin,  details:    10 min [x]  Vasopneumatic Device, press/temp:     min []  Alisia Hey / Annamary Hunter:     If using vaso (only need to measure limb vaso being performed on)      pre-treatment girth : 52.5 cm       post-treatment girth :       measured at (landmark location) :  axilla    min []  Other:    Skin assessment post-treatment (if applicable):    [x]  intact

## 2023-08-22 ENCOUNTER — HOSPITAL ENCOUNTER (OUTPATIENT)
Facility: HOSPITAL | Age: 78
Setting detail: RECURRING SERIES
Discharge: HOME | End: 2023-08-25
Payer: MEDICARE

## 2023-08-22 PROCEDURE — 97530 THERAPEUTIC ACTIVITIES: CPT

## 2023-08-22 PROCEDURE — 97110 THERAPEUTIC EXERCISES: CPT

## 2023-08-22 PROCEDURE — 97140 MANUAL THERAPY 1/> REGIONS: CPT

## 2023-08-22 ASSESSMENT — ENCOUNTER SYMPTOMS
ABDOMINAL PAIN: 0
BACK PAIN: 1
SHORTNESS OF BREATH: 0
ANAL BLEEDING: 0
COUGH: 0
BLOOD IN STOOL: 0
EYE PAIN: 0
SORE THROAT: 0

## 2023-08-22 NOTE — PROGRESS NOTES
PHYSICAL / OCCUPATIONAL THERAPY - DAILY TREATMENT NOTE (updated )    Patient Name: Stephany Chong    Date: 2023    : 1945  Insurance: Payor: Drea Pacer / Plan: Fox MEDINA HMO / Product Type: *No Product type* /      Patient  verified Yes     Visit #   Current / Total 17 24   Time   In / Out 1234 127   Pain   In / Out 0 0 (frozen)   Subjective Functional Status/Changes: Pt reports she has been using her left shoulder more at home and she can feel the increase in discomfort. Changes to: Allergies, Med Hx, Sx Hx?   no       TREATMENT AREA =  Pain in left shoulder [M25.512]    OBJECTIVE    Modalities Rationale:     decrease pain to improve patient's ability to progress to PLOF and address remaining functional goals. min [] Estim Unattended, type/location:                                      []  w/ice    []  w/heat    min [] Estim Attended, type/location:                                     []  w/US     []  w/ice    []  w/heat    []  TENS insruct      min []  Mechanical Traction: type/lbs                   []  pro   []  sup   []  int   []  cont    []  before manual    []  after manual    min []  Ultrasound, settings/location:      min []  Iontophoresis w/ dexamethasone, location:                                               []  take home patch       []  in clinic        min  unbilled []  Ice     []  Heat    location/position:     min []  Paraffin,  details:    10 min [x]  Vasopneumatic Device, press/temp: Seated, left shoulder, LP/LT    min []  Kavin Cason / Angela Samia: If using vaso (only need to measure limb vaso being performed on)      pre-treatment girth :       post-treatment girth :       measured at (landmark location) :      min []  Other:    Skin assessment post-treatment (if applicable):    [x]  intact    [x]  redness- no adverse reaction                 []redness - adverse reaction:         Therapeutic Procedures:   Tx Min Billable or 1:1 Min (if diff from [place])

## 2023-08-24 ENCOUNTER — HOSPITAL ENCOUNTER (OUTPATIENT)
Facility: HOSPITAL | Age: 78
Setting detail: RECURRING SERIES
Discharge: HOME | End: 2023-08-27
Payer: MEDICARE

## 2023-08-24 PROCEDURE — 97110 THERAPEUTIC EXERCISES: CPT

## 2023-08-24 PROCEDURE — 97530 THERAPEUTIC ACTIVITIES: CPT

## 2023-08-24 PROCEDURE — 97140 MANUAL THERAPY 1/> REGIONS: CPT

## 2023-08-24 NOTE — PROGRESS NOTES
PM    Future Appointments   Date Time Provider [address]   8/29/2023  1:10 PM Lei Kang, PT [facility]   9/1/2023  1:10 PM Lei Kapadia, PT [facility]   9/5/2023  1:50 PM Abena Venegas, PTA [facility]   9/7/2023  1:10 PM Abena Venegas, PTA [facility]   11/14/2023  1:00 PM [facility] LAB VISIT [facility] BS AMB   11/21/2023  1:40 PM Moi Rudolph MD [facility] BS AMB   1/8/2024  9:00 AM Simon Ballard MD [facility] BS AMB

## 2023-08-29 ENCOUNTER — HOSPITAL ENCOUNTER (OUTPATIENT)
Facility: HOSPITAL | Age: 78
Setting detail: RECURRING SERIES
Discharge: HOME | End: 2023-09-01
Payer: MEDICARE

## 2023-08-29 PROCEDURE — 97140 MANUAL THERAPY 1/> REGIONS: CPT

## 2023-08-29 PROCEDURE — 97016 VASOPNEUMATIC DEVICE THERAPY: CPT

## 2023-08-29 PROCEDURE — 97112 NEUROMUSCULAR REEDUCATION: CPT

## 2023-08-29 PROCEDURE — 97110 THERAPEUTIC EXERCISES: CPT

## 2023-08-29 NOTE — PROGRESS NOTES
PHYSICAL / OCCUPATIONAL THERAPY - DAILY TREATMENT NOTE (updated )    Patient Name: Madina Parks    Date: 2023    : 1945  Insurance: Payor: Annamarie Heller / Plan: Hien MEDINA HMO / Product Type: *No Product type* /      Patient  verified Yes     Visit #   Current / Total 19 24   Time   In / Out 1:10 2:15   Pain   In / Out 0/10 0/10   Subjective Functional Status/Changes: Pt declines changes. State she still is having difficulty reaching overhead. Changes to: Allergies, Med Hx, Sx Hx?   no       TREATMENT AREA =  Pain in left shoulder [M25.512]    OBJECTIVE  Modalities Rationale:     decrease edema, decrease inflammation, and decrease pain to improve patient's ability to progress to PLOF and address remaining functional goals. 10 min [x]  Vasopneumatic Device, press/temp:    If using vaso (only need to measure limb vaso being performed on)      pre-treatment girth : 42cm left, 40 cm right      post-treatment girth : 42 cm left, 40 cm right       measured at (landmark location) :  proximal humerus   Skin assessment post-treatment (if applicable):    []  intact    []  redness- no adverse reaction                 []redness - adverse reaction:         Therapeutic Procedures: Tx Min Billable or 1:1 Min (if diff from Tx Min) Procedure, Rationale, Specifics   35  22599 Therapeutic Exercise (timed):  increase ROM, strength, coordination, balance, and proprioception to improve patient's ability to progress to PLOF and address remaining functional goals. (see flow sheet as applicable)     Details if applicable:       12  30115 Neuromuscular Re-Education (timed):  improve balance, coordination, kinesthetic sense, posture, core stability and proprioception to improve patient's ability to develop conscious control of individual muscles and awareness of position of extremities in order to progress to PLOF and address remaining functional goals.  (see flow sheet as applicable)     Details if

## 2023-08-31 ENCOUNTER — APPOINTMENT (OUTPATIENT)
Facility: HOSPITAL | Age: 78
End: 2023-08-31
Payer: MEDICARE

## 2023-09-01 ENCOUNTER — HOSPITAL ENCOUNTER (OUTPATIENT)
Facility: HOSPITAL | Age: 78
Setting detail: RECURRING SERIES
Discharge: HOME | End: 2023-09-04
Payer: MEDICARE

## 2023-09-01 PROCEDURE — 97016 VASOPNEUMATIC DEVICE THERAPY: CPT

## 2023-09-01 PROCEDURE — 97112 NEUROMUSCULAR REEDUCATION: CPT

## 2023-09-01 PROCEDURE — 97110 THERAPEUTIC EXERCISES: CPT

## 2023-09-01 PROCEDURE — 97140 MANUAL THERAPY 1/> REGIONS: CPT

## 2023-09-01 NOTE — PROGRESS NOTES
PHYSICAL / OCCUPATIONAL THERAPY - DAILY TREATMENT NOTE (updated )    Patient Name: Gloria [facility]    Date: 2023    : 1945  Insurance: Payor: Leonor Denis / Plan: Sulaiman ACOSTA PLUS HMO / Product Type: *No Product type* /      Patient  verified Yes     Visit #   Current / Total 20 24   Time   In / Out 1:10 2:05   Pain   In / Out 1/10 0/10   Subjective Functional Status/Changes: Pt followed up with MD and he stated he felt like it was a little better. Changes to: Allergies, Med Hx, Sx Hx?   no       TREATMENT AREA =  Pain in left shoulder [M25.512]    OBJECTIVE  Modalities Rationale:     decrease edema, decrease inflammation, and decrease pain to improve patient's ability to progress to PLOF and address remaining functional goals. 10 min [x]  Vasopneumatic Device, press/temp:  LP/LT left shoulder    min []  Van Palencia / Courtney : If using vaso (only need to measure limb vaso being performed on)      pre-treatment girth : 42 cm      post-treatment girth :  42 cm      measured at (landmark location) :  prox humerus left    min []  Other:    Skin assessment post-treatment (if applicable):    []  intact    []  redness- no adverse reaction                 []redness - adverse reaction:         Therapeutic Procedures: Tx Min Billable or 1:1 Min (if diff from Tx Min) Procedure, Rationale, Specifics   25  98776 Therapeutic Exercise (timed):  increase ROM, strength, coordination, balance, and proprioception to improve patient's ability to progress to PLOF and address remaining functional goals. (see flow sheet as applicable)     Details if applicable:       12  71490 Neuromuscular Re-Education (timed):  improve balance, coordination, kinesthetic sense, posture, core stability and proprioception to improve patient's ability to develop conscious control of individual muscles and awareness of position of extremities in order to progress to PLOF and address remaining functional goals.  (see flow

## 2023-09-05 ENCOUNTER — HOSPITAL ENCOUNTER (OUTPATIENT)
Facility: HOSPITAL | Age: 78
Setting detail: RECURRING SERIES
Discharge: HOME | End: 2023-09-08
Payer: MEDICARE

## 2023-09-05 PROCEDURE — 97112 NEUROMUSCULAR REEDUCATION: CPT

## 2023-09-05 PROCEDURE — 97530 THERAPEUTIC ACTIVITIES: CPT

## 2023-09-05 PROCEDURE — 97140 MANUAL THERAPY 1/> REGIONS: CPT

## 2023-09-05 NOTE — PROGRESS NOTES
PHYSICAL / OCCUPATIONAL THERAPY - DAILY TREATMENT NOTE (updated )    Patient Name: Walter Jacques    Date: 2023    : 1945  Insurance: Payor: Marcos Bentley / Plan: Ana MEDINA HMO / Product Type: *No Product type* /      Patient  verified Yes     Visit #   Current / Total 21 24   Time   In / Out 202 252   Pain   In / Out 0 0   Subjective Functional Status/Changes: Pt reports no pain in the left shoulder but a continued difficulty with mobility of the left shoulder. Changes to: Allergies, Med Hx, Sx Hx?   no       TREATMENT AREA =  Pain in left shoulder [M25.512]    OBJECTIVE    Modalities Rationale:     decrease pain to improve patient's ability to progress to PLOF and address remaining functional goals. min [] Estim Unattended, type/location:                                      []  w/ice    []  w/heat    min [] Estim Attended, type/location:                                     []  w/US     []  w/ice    []  w/heat    []  TENS insruct      min []  Mechanical Traction: type/lbs                   []  pro   []  sup   []  int   []  cont    []  before manual    []  after manual    min []  Ultrasound, settings/location:      min []  Iontophoresis w/ dexamethasone, location:                                               []  take home patch       []  in clinic        min  unbilled []  Ice     []  Heat    location/position:     min []  Paraffin,  details:    10 min [x]  Vasopneumatic Device, press/temp: LP/LT, seated, left shoulder    min []  Amina Tucker / Julio Hay: If using vaso (only need to measure limb vaso being performed on)      pre-treatment girth :       post-treatment girth :       measured at (landmark location) :      min []  Other:    Skin assessment post-treatment (if applicable):    [x]  intact    [x]  redness- no adverse reaction                 []redness - adverse reaction:         Therapeutic Procedures:   Tx Min Billable or 1:1 Min (if diff from [employer]) Procedure,

## 2023-09-06 ENCOUNTER — TELEPHONE (OUTPATIENT)
Age: 78
End: 2023-09-06

## 2023-09-06 NOTE — TELEPHONE ENCOUNTER
Pt called for 2 reasons   Refill  for FUROSEMIDE 20 mg         Pt said she has been taking 2 pills a day , she did not realize she was only suppose to take 1 pill a day , she also wants to know when the prescription was changed from 2 pills to 1 pill .   2.   Pt also states she stopped taking the Evolocumab on 08/30- pt states it causes her body to ache and she has green bowel movements   Please advise   [phone]

## 2023-09-07 ENCOUNTER — HOSPITAL ENCOUNTER (OUTPATIENT)
Facility: HOSPITAL | Age: 78
Setting detail: RECURRING SERIES
Discharge: HOME | End: 2023-09-10
Payer: MEDICARE

## 2023-09-07 PROCEDURE — 97140 MANUAL THERAPY 1/> REGIONS: CPT

## 2023-09-07 PROCEDURE — 97112 NEUROMUSCULAR REEDUCATION: CPT

## 2023-09-07 PROCEDURE — 97110 THERAPEUTIC EXERCISES: CPT

## 2023-09-07 NOTE — PROGRESS NOTES
PHYSICAL / OCCUPATIONAL THERAPY - DAILY TREATMENT NOTE (updated )    Patient Name: Brian Horan    Date: 2023    : 1945  Insurance: Payor: Chano Barroso / Plan: Amanda Jeanary PLUS HMO / Product Type: *No Product type* /      Patient  verified Yes     Visit #   Current / Total 22 24   Time   In / Out 110 205   Pain   In / Out 0 0   Subjective Functional Status/Changes: Pt reports her sciatica is acting up today. Changes to: Allergies, Med Hx, Sx Hx?   no       TREATMENT AREA =  Pain in left shoulder [M25.512]    OBJECTIVE    Modalities Rationale:     decrease pain to improve patient's ability to progress to PLOF and address remaining functional goals. min [] Estim Unattended, type/location:                                      []  w/ice    []  w/heat    min [] Estim Attended, type/location:                                     []  w/US     []  w/ice    []  w/heat    []  TENS insruct      min []  Mechanical Traction: type/lbs                   []  pro   []  sup   []  int   []  cont    []  before manual    []  after manual    min []  Ultrasound, settings/location:      min []  Iontophoresis w/ dexamethasone, location:                                               []  take home patch       []  in clinic        min  unbilled []  Ice     []  Heat    location/position:     min []  Paraffin,  details:    10 min [x]  Vasopneumatic Device, press/temp: LP/LT, seated, left shoulder    min []  Wilfredo Pouch / Amanda Gobble: If using vaso (only need to measure limb vaso being performed on)      pre-treatment girth :       post-treatment girth :       measured at (landmark location) :      min []  Other:    Skin assessment post-treatment (if applicable):    [x]  intact    [x]  redness- no adverse reaction                 []redness - adverse reaction:         Therapeutic Procedures:   Tx Min Billable or 1:1 Min (if diff from Tx Min) Procedure, Rationale, Specifics   17  08191 Therapeutic Exercise (timed):

## 2023-09-08 ENCOUNTER — TELEPHONE (OUTPATIENT)
Age: 78
End: 2023-09-08

## 2023-09-08 NOTE — TELEPHONE ENCOUNTER
Called pt, no answer. LVM for pt to call the office back. The call was regarding to the following:    Please let her know that her left tibia x-ray does not show any evidence of a tumor. She has a heel spur that is present. There is some swelling along the affected area and findings consistent with osteoarthritis along the knee. Please have her follow-up with orthopedics if she is having persistent pain.

## 2023-09-08 NOTE — PROGRESS NOTES
[facility]  [address] [address] [address]  [facility]  [phone] fax    Physical Therapy Discharge Summary  Patient name: Maureen Oglesby Start of Care: 2023   Referral source: Dorothea Clancy : 1945   Medical/Treatment Diagnosis: Pain in left shoulder [M25.512]  Payor: [employer] MEDICARE / Plan: [facility] / Product Type: *No Product type* /  Onset Date:2023           Prior Hospitalization: see medical history Provider#: [id number]   Medications: Verified on Patient Summary List    Comorbidities: Diabetes, Thyroid problems, HTN, Latex Allergy, hx left RTC repair, BMI > 30. Prior Level of Function: The patient reports difficulty sleeping through the night as well as reaching shoulder height due to pain. Visits from Start of Care: 22    Missed Visits: 3    Reporting Period : 2023 to 23    Goals/Measure of Progress: 1. The patient will improve PROM of left shoulder flexion to 100 degrees to improve ease of hygiene. IE: 70 degrees PROM              DC: Met PROM left shoulder flexion 115 deg     2. The patient will improve FOTO score to 62 to improve quality of life. IE: 26              PN:  progressed to 52              DC: progressing FOTO score 57%  3. The patient will improve PROM to 120 degrees of scaption to improve ease of grooming. IE: 70 degrees             PN:  Progressed to 90 degrees AAROM               DC: progressing PROM left shoulder scaption to 100 deg  4. The patient will improve AROM to 115 degrees of flexion to improve ease of reaching overhead               IE: NT              PN:  progressing: Left shoulder AAROM flexion: 90deg (with finger ladder)               DC: slow progress left shoulder AROM to 70 deg (shoulder hiking compensations noted)  5. The patient will improve functional ER to C7 to improve ease of grooming.               IE: NT

## 2023-09-11 ENCOUNTER — TELEPHONE (OUTPATIENT)
Age: 78
End: 2023-09-11

## 2023-09-11 NOTE — TELEPHONE ENCOUNTER
Attempted to contact patient to provide the following lab result. There was no response. Message left via voicemail requesting callback. \"Her left tibia x-ray does not show any evidence of a tumor. She has a heel spur that is present. There is some swelling along the affected area and findings consistent with osteoarthritis along the knee. Please have her follow-up with orthopedics if she is having persistent pain. \"

## 2023-09-12 ENCOUNTER — OFFICE VISIT (OUTPATIENT)
Age: 78
End: 2023-09-12
Payer: MEDICARE

## 2023-09-12 VITALS
WEIGHT: 249.13 LBS | RESPIRATION RATE: 18 BRPM | DIASTOLIC BLOOD PRESSURE: 76 MMHG | BODY MASS INDEX: 44.14 KG/M2 | HEIGHT: 63 IN | TEMPERATURE: 98 F | SYSTOLIC BLOOD PRESSURE: 136 MMHG | HEART RATE: 66 BPM | OXYGEN SATURATION: 99 %

## 2023-09-12 DIAGNOSIS — I10 ESSENTIAL (PRIMARY) HYPERTENSION: ICD-10-CM

## 2023-09-12 DIAGNOSIS — R73.9 HYPERGLYCEMIA, UNSPECIFIED: ICD-10-CM

## 2023-09-12 DIAGNOSIS — E78.5 HYPERLIPIDEMIA, UNSPECIFIED HYPERLIPIDEMIA TYPE: ICD-10-CM

## 2023-09-12 DIAGNOSIS — R60.0 BILATERAL LEG EDEMA: Primary | ICD-10-CM

## 2023-09-12 PROCEDURE — G8399 PT W/DXA RESULTS DOCUMENT: HCPCS | Performed by: INTERNAL MEDICINE

## 2023-09-12 PROCEDURE — 1090F PRES/ABSN URINE INCON ASSESS: CPT | Performed by: INTERNAL MEDICINE

## 2023-09-12 PROCEDURE — 3074F SYST BP LT 130 MM HG: CPT | Performed by: INTERNAL MEDICINE

## 2023-09-12 PROCEDURE — G8427 DOCREV CUR MEDS BY ELIG CLIN: HCPCS | Performed by: INTERNAL MEDICINE

## 2023-09-12 PROCEDURE — 3078F DIAST BP <80 MM HG: CPT | Performed by: INTERNAL MEDICINE

## 2023-09-12 PROCEDURE — 1036F TOBACCO NON-USER: CPT | Performed by: INTERNAL MEDICINE

## 2023-09-12 PROCEDURE — 99214 OFFICE O/P EST MOD 30 MIN: CPT | Performed by: INTERNAL MEDICINE

## 2023-09-12 PROCEDURE — G8417 CALC BMI ABV UP PARAM F/U: HCPCS | Performed by: INTERNAL MEDICINE

## 2023-09-12 PROCEDURE — 1123F ACP DISCUSS/DSCN MKR DOCD: CPT | Performed by: INTERNAL MEDICINE

## 2023-09-12 PROCEDURE — 99211 OFF/OP EST MAY X REQ PHY/QHP: CPT | Performed by: INTERNAL MEDICINE

## 2023-09-12 RX ORDER — FUROSEMIDE 20 MG/1
20 TABLET ORAL DAILY
Qty: 90 TABLET | Refills: 1 | Status: CANCELLED | OUTPATIENT
Start: 2023-09-12

## 2023-09-12 RX ORDER — FUROSEMIDE 20 MG/1
20 TABLET ORAL 2 TIMES DAILY
Qty: 180 TABLET | Refills: 1 | Status: SHIPPED | OUTPATIENT
Start: 2023-09-12

## 2023-09-12 ASSESSMENT — ENCOUNTER SYMPTOMS
SHORTNESS OF BREATH: 0
SORE THROAT: 0
BLOOD IN STOOL: 0
EYE PAIN: 0
ABDOMINAL PAIN: 0
COUGH: 0
ANAL BLEEDING: 0

## 2023-09-12 NOTE — PROGRESS NOTES
[facility] OF [facility]:  9/18/2023, MRN: [id number]      Liliana Miller is a 78 y.o. female and presents to clinic for Hypertension, Thyroid Problem, Shoulder Pain, Cholesterol Problem, and Other (PreDiabetes)      Subjective: The patient is a 40-year-old female with history of obesity, thyroid nodules (followed by ), GERD, lumbar disc disease, lumbar facet arthropathy, right heel spur, colon polyps,, gout, prediabetes, hiatal hernia, pseudogout per EHR, renal cyst, positive rheumatoid arthritis serology, HTN, alopecia,pulmonary nodule, and HLD. 1. HLD: She was unable to tolerate repatha. +H/o statin intolerance and intolerance to zetia. She is reporting arthralgias and worsening sciatica related pain and diarrhea with taking repatha. She stopped taking it as a result. She plans to try aggressive lifestyle modification measures in order to reduce her cholesterol. When she took Repatha, her total cholesterol was 185. Her HDL was 75. Her LDL was 94. Her triglycerides are 79. Her labs in May when she was not on this medication showed that her total cholesterol was 238. HDL was 73 at 140 with her LDL. And her triglycerides are 108. 2. HTN and BLE Edema: Taking losartan 25mg daily. She is on lasix 20mg daily prn edema. BP is 136/76 today. Her edema has improved since her last appointment. +H/o prediabetes with her A1C measuring 5.8 when checked earlier this yr.          Patient Active Problem List    Diagnosis Date Noted    Thyroid nodule 04/11/2022    GERD (gastroesophageal reflux disease) 02/23/2022    Morbid obesity with BMI of 40.0-44.9, adult ([address]) 08/28/2018    Polyp of sigmoid colon 05/01/2018    Prediabetes 02/20/2018    Incidental pulmonary nodule, > 3mm and < 8mm 02/20/2018    Chronic gout of foot 10/04/2017    Primary osteoarthritis involving multiple joints 10/04/2017    Sliding hiatal hernia 10/04/2017    Pseudogout of right shoulder 10/04/2017    Renal cyst 08/29/2014    Sleep

## 2023-09-22 ENCOUNTER — TELEPHONE (OUTPATIENT)
Age: 78
End: 2023-09-22

## 2023-09-22 RX ORDER — FLUCONAZOLE 150 MG/1
TABLET ORAL
Qty: 2 TABLET | Refills: 0 | Status: SHIPPED | OUTPATIENT
Start: 2023-09-22

## 2023-09-22 NOTE — TELEPHONE ENCOUNTER
Patient called and states she has a yeast infection. She has been taking monistat but it isn't helping. She is calling to see if Dr Suggs can prescribe 2 prescriptions of diflucan? Pharmacy: [facility] on [address] [facility] Patient can be reached at [phone]. Please advise, thank you.

## 2023-09-22 NOTE — TELEPHONE ENCOUNTER
Please let her know that I ordered Diflucan per her message.     Dr. Jono Tate  [facility], [address] [place] [address]  Phone: [phone]  Fax: [phone]

## 2023-10-16 ENCOUNTER — TELEPHONE (OUTPATIENT)
Age: 78
End: 2023-10-16

## 2023-10-16 NOTE — TELEPHONE ENCOUNTER
Patient called into nurse triage and states she has felt dizzy and her head and eyes have been hurting. Patient states she started using these ye drops from her eye doctor recently and states her symptoms started after using them and doesn't know if this has anything to do with the drops. Patient states she noticed  yony on her head and felt like she was going to pass out yesterday. She has an appt with  her eye doctor today at 200 but wanted to let  [facility] know whats going on and if possible would like to see  [facility] possibly after or before her eye doctor appt. Please advise.

## 2023-10-17 ENCOUNTER — OFFICE VISIT (OUTPATIENT)
Age: 78
End: 2023-10-17
Payer: MEDICARE

## 2023-10-17 DIAGNOSIS — R22.9 SKIN NODULE: Primary | ICD-10-CM

## 2023-10-17 PROCEDURE — 1123F ACP DISCUSS/DSCN MKR DOCD: CPT | Performed by: INTERNAL MEDICINE

## 2023-10-17 PROCEDURE — G8427 DOCREV CUR MEDS BY ELIG CLIN: HCPCS | Performed by: INTERNAL MEDICINE

## 2023-10-17 PROCEDURE — 99212 OFFICE O/P EST SF 10 MIN: CPT | Performed by: INTERNAL MEDICINE

## 2023-10-17 PROCEDURE — 1036F TOBACCO NON-USER: CPT | Performed by: INTERNAL MEDICINE

## 2023-10-17 PROCEDURE — G8399 PT W/DXA RESULTS DOCUMENT: HCPCS | Performed by: INTERNAL MEDICINE

## 2023-10-17 PROCEDURE — 1090F PRES/ABSN URINE INCON ASSESS: CPT | Performed by: INTERNAL MEDICINE

## 2023-10-17 PROCEDURE — G8417 CALC BMI ABV UP PARAM F/U: HCPCS | Performed by: INTERNAL MEDICINE

## 2023-10-17 PROCEDURE — G8484 FLU IMMUNIZE NO ADMIN: HCPCS | Performed by: INTERNAL MEDICINE

## 2023-10-17 NOTE — PROGRESS NOTES
INTERNISTS OF [facility]:  10/23/2023, MRN: [id number]      Isamar Light is a 78 y.o. female and presents to clinic for Mass (/)      Subjective: The patient is a 78-year-old female with history of obesity, thyroid nodules (followed by ), GERD, lumbar disc disease, lumbar facet arthropathy, right heel spur, colon polyps,, gout, prediabetes, hiatal hernia, pseudogout per EHR, renal cyst, positive rheumatoid arthritis serology, HTN, alopecia,pulmonary nodule, and HLD. Bump/Skin Nodule: She has a lump on her head x 3 wks. It hurts off/on. No pain today. She had pain along her right orbit and forehead last wk. She was using artificial tears per her hx. She met with her eye doctor yesterday. She has no vision changes. At one point, she had red eyes. Symptoms resolved with changing her eyedrops. She was thought to be having a systemic reaction to previously prescribed eyedrops.       Patient Active Problem List    Diagnosis Date Noted    Thyroid nodule 04/11/2022    GERD (gastroesophageal reflux disease) 02/23/2022    Morbid obesity with BMI of 40.0-44.9, adult ([address]) 08/28/2018    Polyp of sigmoid colon 05/01/2018    Prediabetes 02/20/2018    Incidental pulmonary nodule, > 3mm and < 8mm 02/20/2018    Chronic gout of foot 10/04/2017    Primary osteoarthritis involving multiple joints 10/04/2017    Sliding hiatal hernia 10/04/2017    Pseudogout of right shoulder 10/04/2017    Renal cyst 08/29/2014    Sleep apnea 10/30/2013    Benign hypertensive heart disease without heart failure     Dyslipidemia        Current Outpatient Medications   Medication Sig Dispense Refill    fluconazole (DIFLUCAN) 150 MG tablet Take 150mg po every 7 days prn vaginal yeast infection 2 tablet 0    furosemide (LASIX) 20 MG tablet Take 1 tablet by mouth 2 times daily 180 tablet 1    aspirin 81 MG EC tablet Take 1 tablet by mouth daily      potassium chloride (KLOR-CON M) 20 MEQ extended release tablet TAKE 1 TABLET TWICE DAILY

## 2023-10-18 LAB — NONINV COLON CA DNA+OCC BLD SCRN STL QL: NEGATIVE

## 2023-10-23 VITALS
TEMPERATURE: 97.8 F | RESPIRATION RATE: 18 BRPM | HEART RATE: 67 BPM | OXYGEN SATURATION: 98 % | DIASTOLIC BLOOD PRESSURE: 77 MMHG | SYSTOLIC BLOOD PRESSURE: 120 MMHG

## 2023-10-23 ASSESSMENT — ENCOUNTER SYMPTOMS
ABDOMINAL PAIN: 0
EYE PAIN: 0
COUGH: 0
SHORTNESS OF BREATH: 0
SORE THROAT: 0
ANAL BLEEDING: 0
BACK PAIN: 1
BLOOD IN STOOL: 0

## 2023-11-13 ENCOUNTER — TELEPHONE (OUTPATIENT)
Age: 78
End: 2023-11-13

## 2023-11-13 NOTE — TELEPHONE ENCOUNTER
----- Message from Juannorah Grayson sent at 11/13/2023  9:37 AM EST -----  Subject: Message to Provider    QUESTIONS  Information for Provider? Pt needs to change her lab work appt for Nov 14, 203. She wants to see if she can copme today before 3. Please contact her.  ---------------------------------------------------------------------------  --------------  Giuliana PATEL  [phone]; OK to leave message on voicemail  ---------------------------------------------------------------------------  --------------  SCRIPT ANSWERS  Relationship to Patient?  Self

## 2023-11-16 ENCOUNTER — NURSE ONLY (OUTPATIENT)
Age: 78
End: 2023-11-16

## 2023-11-16 DIAGNOSIS — R73.9 HYPERGLYCEMIA, UNSPECIFIED: ICD-10-CM

## 2023-11-16 DIAGNOSIS — E78.5 HYPERLIPIDEMIA, UNSPECIFIED HYPERLIPIDEMIA TYPE: ICD-10-CM

## 2023-11-16 DIAGNOSIS — I10 ESSENTIAL (PRIMARY) HYPERTENSION: ICD-10-CM

## 2023-11-17 LAB
CHOLEST SERPL-MCNC: 214 MG/DL (ref 100–199)
HBA1C MFR BLD: 6 % (ref 4.8–5.6)
HDLC SERPL-MCNC: 66 MG/DL
LDLC SERPL CALC-MCNC: 136 MG/DL (ref 0–99)
SPECIMEN STATUS REPORT: NORMAL
TRIGL SERPL-MCNC: 66 MG/DL (ref 0–149)
VLDLC SERPL CALC-MCNC: 12 MG/DL (ref 5–40)

## 2023-11-21 ENCOUNTER — OFFICE VISIT (OUTPATIENT)
Age: 78
End: 2023-11-21
Payer: MEDICARE

## 2023-11-21 VITALS — RESPIRATION RATE: 16 BRPM | HEIGHT: 63 IN | BODY MASS INDEX: 44.13 KG/M2

## 2023-11-21 DIAGNOSIS — M54.32 SCIATICA OF LEFT SIDE: ICD-10-CM

## 2023-11-21 DIAGNOSIS — R73.9 HYPERGLYCEMIA, UNSPECIFIED: ICD-10-CM

## 2023-11-21 DIAGNOSIS — Z23 ENCOUNTER FOR IMMUNIZATION: ICD-10-CM

## 2023-11-21 DIAGNOSIS — I10 ESSENTIAL (PRIMARY) HYPERTENSION: ICD-10-CM

## 2023-11-21 DIAGNOSIS — R27.0 ATAXIA: Primary | ICD-10-CM

## 2023-11-21 PROCEDURE — G8427 DOCREV CUR MEDS BY ELIG CLIN: HCPCS | Performed by: INTERNAL MEDICINE

## 2023-11-21 PROCEDURE — G8399 PT W/DXA RESULTS DOCUMENT: HCPCS | Performed by: INTERNAL MEDICINE

## 2023-11-21 PROCEDURE — 90674 CCIIV4 VAC NO PRSV 0.5 ML IM: CPT | Performed by: INTERNAL MEDICINE

## 2023-11-21 PROCEDURE — G8482 FLU IMMUNIZE ORDER/ADMIN: HCPCS | Performed by: INTERNAL MEDICINE

## 2023-11-21 PROCEDURE — 1036F TOBACCO NON-USER: CPT | Performed by: INTERNAL MEDICINE

## 2023-11-21 PROCEDURE — 1090F PRES/ABSN URINE INCON ASSESS: CPT | Performed by: INTERNAL MEDICINE

## 2023-11-21 PROCEDURE — PBSHW INFLUENZA, FLUCELVAX, (AGE 6 MO+), IM, PF, 0.5 ML: Performed by: INTERNAL MEDICINE

## 2023-11-21 PROCEDURE — 1123F ACP DISCUSS/DSCN MKR DOCD: CPT | Performed by: INTERNAL MEDICINE

## 2023-11-21 PROCEDURE — 99214 OFFICE O/P EST MOD 30 MIN: CPT | Performed by: INTERNAL MEDICINE

## 2023-11-21 PROCEDURE — G8417 CALC BMI ABV UP PARAM F/U: HCPCS | Performed by: INTERNAL MEDICINE

## 2023-11-21 SDOH — ECONOMIC STABILITY: FOOD INSECURITY: WITHIN THE PAST 12 MONTHS, THE FOOD YOU BOUGHT JUST DIDN'T LAST AND YOU DIDN'T HAVE MONEY TO GET MORE.: NEVER TRUE

## 2023-11-21 SDOH — ECONOMIC STABILITY: FOOD INSECURITY: WITHIN THE PAST 12 MONTHS, YOU WORRIED THAT YOUR FOOD WOULD RUN OUT BEFORE YOU GOT MONEY TO BUY MORE.: NEVER TRUE

## 2023-11-21 SDOH — ECONOMIC STABILITY: INCOME INSECURITY: HOW HARD IS IT FOR YOU TO PAY FOR THE VERY BASICS LIKE FOOD, HOUSING, MEDICAL CARE, AND HEATING?: NOT HARD AT ALL

## 2023-11-21 NOTE — PROGRESS NOTES
INTERNISTS OF [facility]:  11/27/2023, MRN: [id number]      Josh Madrid is a 78 y.o. female and presents to clinic for Follow-up (Pt reports that she has been having some balance problems and concerned about falling )      Subjective: The patient is a 49-year-old female with history of obesity, thyroid nodules (followed by ), GERD, lumbar disc disease, lumbar facet arthropathy, right heel spur, colon polyps,, gout, prediabetes, hiatal hernia, pseudogout per EHR, renal cyst, positive rheumatoid arthritis serology, HTN, alopecia,pulmonary nodule, and HLD. 1.  Hypertension: Blood pressure today was 120/77 last month. She is taking losartan 25 mg daily and Lasix 20 mg twice daily. She is on potassium supplementation. 2.  Hyperlipidemia: November labs show that her total cholesterol is 214. Her HDL is 66. Her LDL was 136. And her triglycerides are 66. She has intolerance to Repatha, statins, and Zetia. 3.  Prediabetes/Hyperglycemia: Her A1c per her November labs is 6. It was 5.8 in May. 4. Ataxia and Sciatica: Her balance is \"off. \" She is having lower back pain that radiates down her LLE. She has weakness along her LLE. +H/o lumbar facet arthropathy and disc disease. She declines rx for neuropathic pain. She uses a cane. 11/16/19 Head MRI: No acute ischemic change or hemorrhage. Mild-to-moderate burden nonspecific deep cerebral white matter lesions, most likely chronic microvascular ischemic change. Minimal age-related atrophy. Mild-to-moderate mucosal thickening in multiple anterior left ethmoid air cells and in the left frontal and maxillary sinuses. Other paranasal sinuses are clear.       Patient Active Problem List    Diagnosis Date Noted    Thyroid nodule 04/11/2022    GERD (gastroesophageal reflux disease) 02/23/2022    Morbid obesity with BMI of 40.0-44.9, adult ([address]) 08/28/2018    Polyp of sigmoid colon 05/01/2018    Prediabetes 02/20/2018    Incidental pulmonary

## 2023-11-27 ASSESSMENT — ENCOUNTER SYMPTOMS
ANAL BLEEDING: 0
BACK PAIN: 1
EYE PAIN: 0
COUGH: 0
ABDOMINAL PAIN: 0
SORE THROAT: 0
BLOOD IN STOOL: 0
SHORTNESS OF BREATH: 0

## 2023-12-04 ENCOUNTER — HOSPITAL ENCOUNTER (OUTPATIENT)
Facility: HOSPITAL | Age: 78
Discharge: HOME | End: 2023-12-07
Attending: INTERNAL MEDICINE
Payer: MEDICARE

## 2023-12-04 ENCOUNTER — HOSPITAL ENCOUNTER (OUTPATIENT)
Facility: HOSPITAL | Age: 78
Discharge: HOME | End: 2023-12-07
Payer: MEDICARE

## 2023-12-04 DIAGNOSIS — R27.0 ATAXIA: ICD-10-CM

## 2023-12-04 DIAGNOSIS — M54.32 SCIATICA OF LEFT SIDE: ICD-10-CM

## 2023-12-04 PROCEDURE — 70450 CT HEAD/BRAIN W/O DYE: CPT

## 2023-12-04 PROCEDURE — 72110 X-RAY EXAM L-2 SPINE 4/>VWS: CPT

## 2023-12-07 RX ORDER — POTASSIUM CHLORIDE 20 MEQ/1
TABLET, EXTENDED RELEASE ORAL
Qty: 180 TABLET | Refills: 3 | Status: SHIPPED | OUTPATIENT
Start: 2023-12-07

## 2023-12-07 RX ORDER — LOSARTAN POTASSIUM 25 MG/1
TABLET ORAL
Qty: 180 TABLET | Refills: 1 | Status: SHIPPED | OUTPATIENT
Start: 2023-12-07

## 2024-01-08 ENCOUNTER — OFFICE VISIT (OUTPATIENT)
Age: 79
End: 2024-01-08
Payer: MEDICARE

## 2024-01-08 VITALS
BODY MASS INDEX: 42.87 KG/M2 | HEART RATE: 71 BPM | SYSTOLIC BLOOD PRESSURE: 130 MMHG | DIASTOLIC BLOOD PRESSURE: 90 MMHG | WEIGHT: 242 LBS | OXYGEN SATURATION: 95 %

## 2024-01-08 DIAGNOSIS — R00.2 PALPITATIONS: Primary | ICD-10-CM

## 2024-01-08 DIAGNOSIS — R07.9 CHEST PAIN, UNSPECIFIED TYPE: ICD-10-CM

## 2024-01-08 PROCEDURE — 1036F TOBACCO NON-USER: CPT | Performed by: INTERNAL MEDICINE

## 2024-01-08 PROCEDURE — G8482 FLU IMMUNIZE ORDER/ADMIN: HCPCS | Performed by: INTERNAL MEDICINE

## 2024-01-08 PROCEDURE — G8399 PT W/DXA RESULTS DOCUMENT: HCPCS | Performed by: INTERNAL MEDICINE

## 2024-01-08 PROCEDURE — G8428 CUR MEDS NOT DOCUMENT: HCPCS | Performed by: INTERNAL MEDICINE

## 2024-01-08 PROCEDURE — 1123F ACP DISCUSS/DSCN MKR DOCD: CPT | Performed by: INTERNAL MEDICINE

## 2024-01-08 PROCEDURE — 1090F PRES/ABSN URINE INCON ASSESS: CPT | Performed by: INTERNAL MEDICINE

## 2024-01-08 PROCEDURE — 93000 ELECTROCARDIOGRAM COMPLETE: CPT | Performed by: INTERNAL MEDICINE

## 2024-01-08 PROCEDURE — G8417 CALC BMI ABV UP PARAM F/U: HCPCS | Performed by: INTERNAL MEDICINE

## 2024-01-08 PROCEDURE — 99214 OFFICE O/P EST MOD 30 MIN: CPT | Performed by: INTERNAL MEDICINE

## 2024-01-08 NOTE — PROGRESS NOTES
[facility]    Nataly Moore is 78 y.o. female with a history of pulmonary hypertension, diabetes, hypertension, hyperlipidemia, sleep apnea      Patient is here today for follow-up appointment   She denies any prior history of MI or CHF  Denies any chest pain or chest tightness.  She feels like she may be having some nasal congestion however no fever or chills.  Slight coughing  She does have some dyspnea on moderate exertion but no edema.  No orthopnea or PND.   Denies any nausea, vomiting, abdominal pain, fever, chills, sputum production. No hematuria or other bleeding complaints       Past Medical History:   Diagnosis Date    Atrophic vaginitis     Diabetes mellitus (HCC)     diet controlled, hypoglycemia from metformin previously     Dyslipidemia     Fatty liver     Fibrocystic breast disease     GERD (gastroesophageal reflux disease)     Gout     H/O colonoscopy 4/12/13    polyp    Hypertension     Macular degeneration     Morbid obesity (HCC)     Obstructive sleep apnea     Peripheral neuropathy     Rectocele     S/P cardiac cath 05/30/2012    Patent coronary arteries.  LVEDP 20.  RA 11.  RV 42/10.  PA 42/21.  W 18.  CO 6.4 (TD), 6.4 (Clementine).  PVR 1.7 Wood units.  False-positive nuclear study.    Thyroid cyst     bilat       Review of Systems:  Cardiac symptoms as noted above in HPI. All others negative.  Denies fatigue, malaise, skin rash, joint pain, blurring vision, photophobia, neck pain, hemoptysis, chronic cough, nausea, vomiting, hematuria, burning micturition, BRBPR, chronic headaches.    Current Outpatient Medications   Medication Sig    potassium chloride (KLOR-CON M) 20 MEQ extended release tablet TAKE 1 TABLET TWICE DAILY    losartan (COZAAR) 25 MG tablet TAKE 1 TABLET TWICE DAILY    fluconazole (DIFLUCAN) 150 MG tablet Take 150mg po every 7 days prn vaginal yeast infection    furosemide (LASIX) 20 MG tablet Take 1 tablet by

## 2024-01-11 ENCOUNTER — TELEMEDICINE (OUTPATIENT)
Age: 79
End: 2024-01-11
Payer: MEDICARE

## 2024-01-11 DIAGNOSIS — J06.9 UPPER RESPIRATORY TRACT INFECTION, UNSPECIFIED TYPE: Primary | ICD-10-CM

## 2024-01-11 PROCEDURE — G8399 PT W/DXA RESULTS DOCUMENT: HCPCS | Performed by: INTERNAL MEDICINE

## 2024-01-11 PROCEDURE — G8428 CUR MEDS NOT DOCUMENT: HCPCS | Performed by: INTERNAL MEDICINE

## 2024-01-11 PROCEDURE — 99213 OFFICE O/P EST LOW 20 MIN: CPT | Performed by: INTERNAL MEDICINE

## 2024-01-11 PROCEDURE — 1090F PRES/ABSN URINE INCON ASSESS: CPT | Performed by: INTERNAL MEDICINE

## 2024-01-11 PROCEDURE — 1123F ACP DISCUSS/DSCN MKR DOCD: CPT | Performed by: INTERNAL MEDICINE

## 2024-01-11 RX ORDER — AZITHROMYCIN 250 MG/1
250 TABLET, FILM COATED ORAL SEE ADMIN INSTRUCTIONS
Qty: 6 TABLET | Refills: 0 | Status: SHIPPED | OUTPATIENT
Start: 2024-01-11 | End: 2024-01-16

## 2024-01-11 RX ORDER — PREDNISONE 20 MG/1
40 TABLET ORAL DAILY
Qty: 10 TABLET | Refills: 0 | Status: SHIPPED | OUTPATIENT
Start: 2024-01-11 | End: 2024-01-16

## 2024-01-11 NOTE — PROGRESS NOTES
Nataly Moore presents today for   Chief Complaint   Patient presents with    Cough    Chest Congestion                 1. \"Have you been to the ER, urgent care clinic since your last visit?  Hospitalized since your last visit?\" no    2. \"Have you seen or consulted any other health care providers outside of the [facility] since your last visit?\" Yes-Cardiology     3. For patients aged 45-75: Has the patient had a colonoscopy / FIT/ Cologuard? NA - based on age      If the patient is female:    4. For patients aged 40-74: Has the patient had a mammogram within the past 2 years? NA - based on age or sex      5. For patients aged 21-65: Has the patient had a pap smear? NA - based on age or sex    
Coronary Art Dis Sister      Social History     Socioeconomic History    Marital status:    Tobacco Use    Smoking status: Former     Current packs/day: 0.00     Average packs/day: 0.5 packs/day for 8.5 years (4.3 ttl pk-yrs)     Types: Cigarettes     Start date:      Quit date: 1974     Years since quittin.5    Smokeless tobacco: Never   Substance and Sexual Activity    Alcohol use: Yes    Drug use: No    Sexual activity: Not Currently     Social Determinants of Health     Financial Resource Strain: Low Risk  (2023)    Overall Financial Resource Strain (CARDIA)     Difficulty of Paying Living Expenses: Not hard at all   Transportation Needs: Unknown (2023)    PRAPARE - Transportation     Lack of Transportation (Non-Medical): No   Physical Activity: Inactive (2023)    Exercise Vital Sign     Days of Exercise per Week: 0 days     Minutes of Exercise per Session: 0 min   Housing Stability: Unknown (2023)    Housing Stability Vital Sign     Unstable Housing in the Last Year: No       ROS:  Gen: No fever/chills  HEENT: see HPI  CV: No CP  Resp: see HPI  GI: No abdominal pain  : No hematuria/dysuria  Derm: No rash  Neuro: No new paresthesias/weakness  Musc: No new myalgias/jt pain  Psych: No depression sx      Objective:     General: alert, cooperative, and no distress   Mental  status: mental status: alert, oriented to person, place, and time, normal mood, behavior, speech, dress, motor activity, and thought processes   Resp: Nonlabored breathing   Neuro: No focal neurologic deficits (new)   Skin: skin: no discoloration or lesions of concern on visible areas     LABS:  Lab Results   Component Value Date/Time     2023 10:10 AM    K 4.1 2023 10:10 AM     2023 10:10 AM    CO2 31 2023 10:10 AM    BUN 12 2023 10:10 AM    GFRAA >60 2022 01:06 PM       Lab Results   Component Value Date/Time    CHOL 214 2023 12:00 AM    CHOL 185

## 2024-01-16 ENCOUNTER — HOSPITAL ENCOUNTER (OUTPATIENT)
Facility: HOSPITAL | Age: 79
Discharge: HOME | End: 2024-01-19
Attending: OTOLARYNGOLOGY
Payer: MEDICARE

## 2024-01-16 DIAGNOSIS — E04.1 NONTOXIC SINGLE THYROID NODULE: ICD-10-CM

## 2024-01-16 PROCEDURE — 76536 US EXAM OF HEAD AND NECK: CPT

## 2024-01-23 ENCOUNTER — OFFICE VISIT (OUTPATIENT)
Age: 79
End: 2024-01-23
Payer: MEDICARE

## 2024-01-23 VITALS
SYSTOLIC BLOOD PRESSURE: 132 MMHG | WEIGHT: 242.8 LBS | HEART RATE: 66 BPM | OXYGEN SATURATION: 96 % | HEIGHT: 63 IN | DIASTOLIC BLOOD PRESSURE: 86 MMHG | BODY MASS INDEX: 43.02 KG/M2 | TEMPERATURE: 98.2 F | RESPIRATION RATE: 18 BRPM

## 2024-01-23 DIAGNOSIS — R73.9 HYPERGLYCEMIA, UNSPECIFIED: ICD-10-CM

## 2024-01-23 DIAGNOSIS — E78.5 HYPERLIPIDEMIA, UNSPECIFIED HYPERLIPIDEMIA TYPE: ICD-10-CM

## 2024-01-23 DIAGNOSIS — R27.0 ATAXIA: ICD-10-CM

## 2024-01-23 DIAGNOSIS — M54.32 SCIATICA OF LEFT SIDE: ICD-10-CM

## 2024-01-23 DIAGNOSIS — E66.01 OBESITY, CLASS III, BMI 40-49.9 (MORBID OBESITY) (HCC): ICD-10-CM

## 2024-01-23 DIAGNOSIS — Z00.00 MEDICARE ANNUAL WELLNESS VISIT, SUBSEQUENT: Primary | ICD-10-CM

## 2024-01-23 DIAGNOSIS — Z71.89 ADVANCE CARE PLANNING: ICD-10-CM

## 2024-01-23 DIAGNOSIS — Z86.39 HISTORY OF THYROID NODULE: ICD-10-CM

## 2024-01-23 DIAGNOSIS — I10 ESSENTIAL (PRIMARY) HYPERTENSION: ICD-10-CM

## 2024-01-23 PROCEDURE — 3079F DIAST BP 80-89 MM HG: CPT | Performed by: INTERNAL MEDICINE

## 2024-01-23 PROCEDURE — 1123F ACP DISCUSS/DSCN MKR DOCD: CPT | Performed by: INTERNAL MEDICINE

## 2024-01-23 PROCEDURE — G8482 FLU IMMUNIZE ORDER/ADMIN: HCPCS | Performed by: INTERNAL MEDICINE

## 2024-01-23 PROCEDURE — 3075F SYST BP GE 130 - 139MM HG: CPT | Performed by: INTERNAL MEDICINE

## 2024-01-23 PROCEDURE — G0439 PPPS, SUBSEQ VISIT: HCPCS | Performed by: INTERNAL MEDICINE

## 2024-01-23 ASSESSMENT — PATIENT HEALTH QUESTIONNAIRE - PHQ9
SUM OF ALL RESPONSES TO PHQ9 QUESTIONS 1 & 2: 0
SUM OF ALL RESPONSES TO PHQ QUESTIONS 1-9: 0
1. LITTLE INTEREST OR PLEASURE IN DOING THINGS: 0
2. FEELING DOWN, DEPRESSED OR HOPELESS: 0

## 2024-01-23 ASSESSMENT — LIFESTYLE VARIABLES
HOW OFTEN DO YOU HAVE A DRINK CONTAINING ALCOHOL: NEVER
HOW MANY STANDARD DRINKS CONTAINING ALCOHOL DO YOU HAVE ON A TYPICAL DAY: PATIENT DOES NOT DRINK

## 2024-01-23 NOTE — PROGRESS NOTES
Nataly Moore presents today for   Chief Complaint   Patient presents with    Follow-up     2 months follow up    Medicare AWV                 1. \"Have you been to the ER, urgent care clinic since your last visit?  Hospitalized since your last visit?\" no    2. \"Have you seen or consulted any other health care providers outside of the [facility] since your last visit?\" Yes-Ortho     3. For patients aged 45-75: Has the patient had a colonoscopy / FIT/ Cologuard? NA - based on age      If the patient is female:    4. For patients aged 40-74: Has the patient had a mammogram within the past 2 years? NA - based on age or sex      5. For patients aged 21-65: Has the patient had a pap smear? NA - based on age or sex    
Penicillins Hives and Other (See Comments)    Repatha [Evolocumab] Diarrhea and Myalgia    Rosuvastatin Other (See Comments)     Upper respiratory illness    Simvastatin Myalgia and Other (See Comments)     Per patient chart \"(? Rash)\"    Sulfur Other (See Comments)    Thiazide-Type Diuretics Other (See Comments)    Zetia [Ezetimibe]      Hearturn    Sulfa Antibiotics Rash     Prior to Visit Medications    Medication Sig Taking? Authorizing Provider   potassium chloride (KLOR-CON M) 20 MEQ extended release tablet TAKE 1 TABLET TWICE DAILY Yes Ngoc Michaels MD   losartan (COZAAR) 25 MG tablet TAKE 1 TABLET TWICE DAILY Yes Ngoc Michaels MD   fluconazole (DIFLUCAN) 150 MG tablet Take 150mg po every 7 days prn vaginal yeast infection Yes Ngoc Michaels MD   furosemide (LASIX) 20 MG tablet Take 1 tablet by mouth 2 times daily Yes Ngoc Michaels MD   aspirin 81 MG EC tablet Take 1 tablet by mouth daily Yes Provider, MD Criselda   vitamin D (CHOLECALCIFEROL) 25 MCG (1000 UT) TABS tablet Take 2 tablets by mouth daily Yes Automatic Reconciliation, Ar   fluticasone (FLONASE) 50 MCG/ACT nasal spray Si spray in both nostril twice daily Yes Automatic Reconciliation, Ar   pantoprazole (PROTONIX) 40 MG tablet Take 1 tablet by mouth 2 times daily Yes Automatic Reconciliation, Ar   cyclobenzaprine (FLEXERIL) 5 MG tablet Take 1 tablet by mouth 3 times daily as needed  Patient not taking: Reported on 2024  Automatic Reconciliation, Ar   diclofenac sodium (VOLTAREN) 1 % GEL   Automatic Reconciliation, Ar       CareTeam (Including outside providers/suppliers regularly involved in providing care):   Patient Care Team:  Ngoc Michaels MD as PCP - General  Ngoc Michaels MD as PCP - Empaneled Provider  Tre Cardenas MD as Consulting Physician  Jairo West RN as Ambulatory Care Manager     Reviewed and updated this visit:  Allergies  Meds  Problems

## 2024-01-23 NOTE — PATIENT INSTRUCTIONS
Home Blood Pressure Test: About This Test  What is it?     A home blood pressure test allows you to keep track of your blood pressure at home. Blood pressure is a measure of the force of blood against the walls of your arteries. Blood pressure readings include two numbers, such as 130/80 (say \"130 over 80\"). The first number is the systolic pressure. The second number is the diastolic pressure.  Why is this test done?  You may do this test at home to:  Find out if you have high blood pressure.  Track your blood pressure if you have high blood pressure.  Track how well medicine is working to reduce high blood pressure.  Check how lifestyle changes, such as weight loss and exercise, are affecting blood pressure.  How do you prepare for the test?  For at least 30 minutes before you take your blood pressure, don't exercise, drink caffeine, or smoke. Empty your bladder before the test. Sit quietly with your back straight and both feet on the floor for at least 5 minutes. This helps you take your blood pressure while you feel comfortable and relaxed.  How is the test done?  If your doctor recommends it, take your blood pressure twice a day. Take it in the morning and evening.  Sit with your arm slightly bent and resting on a table so that your upper arm is at the same level as your heart.  Use the same arm each time you take your blood pressure.  Place the blood pressure cuff on the bare skin of your upper arm. You may have to roll up your sleeve, remove your arm from the sleeve, or take your shirt off.  Wrap the blood pressure cuff around your upper arm so that the lower edge of the cuff is about 1 inch above the bend of your elbow.  Do not move, talk, or text while you take your blood pressure.  Follow the instructions that came with your blood pressure monitor. They might be different from the following.  Press the on/off button on the automatic monitor. Then you may need to wait until the screen says the monitor

## 2024-01-24 ASSESSMENT — ENCOUNTER SYMPTOMS
BLOOD IN STOOL: 0
COUGH: 0
BACK PAIN: 1
ABDOMINAL PAIN: 0
EYE PAIN: 0
ANAL BLEEDING: 0
SHORTNESS OF BREATH: 0
SORE THROAT: 0

## 2024-01-24 NOTE — ACP (ADVANCE CARE PLANNING)
Advance Care Planning     General Advance Care Planning (ACP) Conversation    Date of Conversation: 1/23/24    Conducted with: Patient with Decision Making Capacity    Healthcare Decision Maker:  Today we documented Decision Maker(s) consistent with Legal Next of Kin hierarchy.    Content/Action Overview:  Has NO ACP documents/care preferences - requested patient complete ACP documents  I encouraged her to complete her advance directive.  She was given a blank copy today to complete.    Length of Voluntary ACP Conversation in minutes:  <16 minutes (Non-Billable)    Ngoc Michaels MD        immune

## 2024-03-04 ENCOUNTER — TELEPHONE (OUTPATIENT)
Age: 79
End: 2024-03-04

## 2024-03-25 DIAGNOSIS — R60.0 BILATERAL LEG EDEMA: ICD-10-CM

## 2024-03-25 DIAGNOSIS — I10 ESSENTIAL (PRIMARY) HYPERTENSION: ICD-10-CM

## 2024-03-25 RX ORDER — FUROSEMIDE 20 MG/1
20 TABLET ORAL 2 TIMES DAILY
Qty: 180 TABLET | Refills: 1 | Status: SHIPPED | OUTPATIENT
Start: 2024-03-25

## 2024-04-18 ENCOUNTER — TELEPHONE (OUTPATIENT)
Age: 79
End: 2024-04-18

## 2024-04-18 RX ORDER — SUCRALFATE 1 G/1
1 TABLET ORAL 4 TIMES DAILY
COMMUNITY

## 2024-04-18 NOTE — TELEPHONE ENCOUNTER
Called pt to clarify reason for call. She stated that she wants her PCP to give the \"ok\" to take medication that GI provider prescribed. She read the instructions and I clarified what it meant. Advised that GI is requesting that she notifies all of her providers that she has started a new medication. And that if she has any concerns, to consult her GI provider for further guidance. She stated that she just wants her PCP \"ok\" because she knows about her overall health. Added new medication and will submit concern to Dr. Michaels.    HERMINIA Montoya LPN

## 2024-04-18 NOTE — TELEPHONE ENCOUNTER
Please let her know that is received to take Carafate with her present medications but she should not take Carafate within 2 hours of her other medications, to  ensure absorption of her meds.     Dr. Ngoc Michaels  [facility] of [address], [address]  [place] [address]  Phone: [phone]  Fax: [phone]

## 2024-04-18 NOTE — TELEPHONE ENCOUNTER
----- Message from Glenys Dee sent at 4/18/2024  2:28 PM EDT -----  Subject: Message to Provider    QUESTIONS  Information for Provider? Patient was returning a missed call from   Genevieve, please have her call her back. Please leave a message letting her   know what the call is about.  ---------------------------------------------------------------------------  --------------  CALL BACK INFO  [phone]; OK to leave message on voicemail  ---------------------------------------------------------------------------  --------------  SCRIPT ANSWERS  undefined

## 2024-04-18 NOTE — TELEPHONE ENCOUNTER
Pt said , Gi  put her on SUCRALFATE 1 mg tab  for an ulcer , pt is contacting her PCP to check if it is safe to take with her other medications

## 2024-04-24 ENCOUNTER — HOSPITAL ENCOUNTER (OUTPATIENT)
Facility: HOSPITAL | Age: 79
Discharge: HOME | End: 2024-04-27
Attending: INTERNAL MEDICINE
Payer: MEDICARE

## 2024-04-24 DIAGNOSIS — R10.13 EPIGASTRIC PAIN: ICD-10-CM

## 2024-04-24 PROCEDURE — 76705 ECHO EXAM OF ABDOMEN: CPT

## 2024-04-26 ENCOUNTER — HOSPITAL ENCOUNTER (OUTPATIENT)
Facility: HOSPITAL | Age: 79
End: 2024-04-26
Attending: INTERNAL MEDICINE
Payer: MEDICARE

## 2024-04-26 DIAGNOSIS — R10.13 EPIGASTRIC PAIN: ICD-10-CM

## 2024-04-26 LAB — CREAT UR-MCNC: 0.9 MG/DL (ref 0.6–1.3)

## 2024-04-26 PROCEDURE — 82565 ASSAY OF CREATININE: CPT

## 2024-04-26 PROCEDURE — 6360000004 HC RX CONTRAST MEDICATION: Performed by: INTERNAL MEDICINE

## 2024-04-26 PROCEDURE — 74177 CT ABD & PELVIS W/CONTRAST: CPT

## 2024-04-26 RX ADMIN — IOPAMIDOL 100 ML: 612 INJECTION, SOLUTION INTRAVENOUS at 11:52

## 2024-05-01 RX ORDER — LOSARTAN POTASSIUM 25 MG/1
TABLET ORAL
Qty: 180 TABLET | Refills: 1 | Status: SHIPPED | OUTPATIENT
Start: 2024-05-01

## 2024-05-02 ENCOUNTER — TELEPHONE (OUTPATIENT)
Age: 79
End: 2024-05-02

## 2024-05-02 DIAGNOSIS — N28.1 RENAL CYST: Primary | ICD-10-CM

## 2024-05-02 NOTE — TELEPHONE ENCOUNTER
Pt called stating that her gastro doctor found something on her kidneys and request that pt has a renal ultrasound done as soon as possible pt stated that she had kidney stones and that is why she was having pain      Please advise

## 2024-05-07 ENCOUNTER — HOSPITAL ENCOUNTER (OUTPATIENT)
Facility: HOSPITAL | Age: 79
Discharge: HOME | End: 2024-05-10
Attending: INTERNAL MEDICINE
Payer: MEDICARE

## 2024-05-07 DIAGNOSIS — N28.1 RENAL CYST: ICD-10-CM

## 2024-05-07 PROCEDURE — 76770 US EXAM ABDO BACK WALL COMP: CPT

## 2024-05-14 ENCOUNTER — TELEPHONE (OUTPATIENT)
Age: 79
End: 2024-05-14

## 2024-05-14 NOTE — TELEPHONE ENCOUNTER
Let her know that her ultrasound results do not show anything suspicious for cancer.  She has nonobstructing kidney stones bilaterally and a 2.7 cm cyst along her left kidney.  We will discuss this more in depth at her upcoming visit.  No additional studies are warranted at this time.      Dr. Ngoc Michaels  [facility] [address], [address]  [place] [address]  Phone: [phone]  Fax: [phone]

## 2024-05-14 NOTE — TELEPHONE ENCOUNTER
Pt called asking for the results from her CT and Ultrasound of her kidney   Pt unable to work her Multigigt

## 2024-05-15 ENCOUNTER — HOSPITAL ENCOUNTER (OUTPATIENT)
Facility: HOSPITAL | Age: 79
Discharge: HOME | End: 2024-05-18
Attending: INTERNAL MEDICINE
Payer: MEDICARE

## 2024-05-15 VITALS — BODY MASS INDEX: 42.88 KG/M2 | WEIGHT: 242 LBS | HEIGHT: 63 IN

## 2024-05-15 DIAGNOSIS — Z12.31 VISIT FOR SCREENING MAMMOGRAM: ICD-10-CM

## 2024-05-15 PROCEDURE — 77063 BREAST TOMOSYNTHESIS BI: CPT

## 2024-05-16 DIAGNOSIS — R60.0 BILATERAL LEG EDEMA: ICD-10-CM

## 2024-05-16 DIAGNOSIS — I10 ESSENTIAL (PRIMARY) HYPERTENSION: ICD-10-CM

## 2024-05-16 RX ORDER — FUROSEMIDE 20 MG/1
20 TABLET ORAL DAILY
Qty: 90 TABLET | Refills: 1 | Status: SHIPPED | OUTPATIENT
Start: 2024-05-16

## 2024-05-20 ENCOUNTER — OFFICE VISIT (OUTPATIENT)
Age: 79
End: 2024-05-20
Payer: MEDICARE

## 2024-05-20 VITALS
RESPIRATION RATE: 17 BRPM | SYSTOLIC BLOOD PRESSURE: 155 MMHG | WEIGHT: 249.2 LBS | TEMPERATURE: 98.2 F | OXYGEN SATURATION: 99 % | HEIGHT: 63 IN | HEART RATE: 68 BPM | BODY MASS INDEX: 44.16 KG/M2 | DIASTOLIC BLOOD PRESSURE: 82 MMHG

## 2024-05-20 DIAGNOSIS — R60.0 BILATERAL LEG EDEMA: ICD-10-CM

## 2024-05-20 DIAGNOSIS — N28.1 RENAL CYST: ICD-10-CM

## 2024-05-20 DIAGNOSIS — I10 ESSENTIAL (PRIMARY) HYPERTENSION: ICD-10-CM

## 2024-05-20 DIAGNOSIS — N20.0 NEPHROLITHIASIS: Primary | ICD-10-CM

## 2024-05-20 DIAGNOSIS — R73.9 HYPERGLYCEMIA, UNSPECIFIED: ICD-10-CM

## 2024-05-20 PROCEDURE — 1123F ACP DISCUSS/DSCN MKR DOCD: CPT | Performed by: INTERNAL MEDICINE

## 2024-05-20 PROCEDURE — G8417 CALC BMI ABV UP PARAM F/U: HCPCS | Performed by: INTERNAL MEDICINE

## 2024-05-20 PROCEDURE — 1090F PRES/ABSN URINE INCON ASSESS: CPT | Performed by: INTERNAL MEDICINE

## 2024-05-20 PROCEDURE — 3079F DIAST BP 80-89 MM HG: CPT | Performed by: INTERNAL MEDICINE

## 2024-05-20 PROCEDURE — G8399 PT W/DXA RESULTS DOCUMENT: HCPCS | Performed by: INTERNAL MEDICINE

## 2024-05-20 PROCEDURE — 1036F TOBACCO NON-USER: CPT | Performed by: INTERNAL MEDICINE

## 2024-05-20 PROCEDURE — G8427 DOCREV CUR MEDS BY ELIG CLIN: HCPCS | Performed by: INTERNAL MEDICINE

## 2024-05-20 PROCEDURE — 3077F SYST BP >= 140 MM HG: CPT | Performed by: INTERNAL MEDICINE

## 2024-05-20 PROCEDURE — 99214 OFFICE O/P EST MOD 30 MIN: CPT | Performed by: INTERNAL MEDICINE

## 2024-05-20 RX ORDER — SPIRONOLACTONE 25 MG/1
25 TABLET ORAL DAILY
Qty: 30 TABLET | Refills: 5 | Status: SHIPPED | OUTPATIENT
Start: 2024-05-20

## 2024-05-20 NOTE — PROGRESS NOTES
Nataly Moore presents today for   Chief Complaint   Patient presents with    Results       BP retake: 155/82     \"Have you been to the ER, urgent care clinic since your last visit?  Hospitalized since your last visit?\"    NO    “Have you seen or consulted any other health care providers outside of [facility] since your last visit?”    NO             
      Mood and Affect: Mood normal.           LABS   Data Review:   Lab Results   Component Value Date/Time    WBC 8.1 02/22/2022 01:06 PM    HGB 13.1 02/22/2022 01:06 PM    HCT 42.0 02/22/2022 01:06 PM     02/22/2022 01:06 PM    MCV 92.1 02/22/2022 01:06 PM       Lab Results   Component Value Date/Time     08/09/2023 10:10 AM    K 4.1 08/09/2023 10:10 AM     08/09/2023 10:10 AM    CO2 31 08/09/2023 10:10 AM    BUN 12 08/09/2023 10:10 AM    GFRAA >60 02/22/2022 01:06 PM       Lab Results   Component Value Date/Time    CHOL 214 11/16/2023 12:00 AM    CHOL 185 08/09/2023 10:10 AM    HDL 66 11/16/2023 12:00 AM     11/16/2023 12:00 AM    VLDL 12 11/16/2023 12:00 AM    VLDL 13 05/13/2022 12:00 AM       No results found for: \"HBA1C\", \"[id number]\"    Assessment/Plan:   1. Nephrolithiasis and HTN: Presently on lasix which can increase kidney stone formation.  Her blood pressure is elevated but she has yet to take her medication.  She has had a difficult time taking her medications around Carafate as she recovers from peptic ulcer disease. +BLE edema - chronic/unchanged  -I am discontinuing her Lasix 20 mg daily, potassium, and losartan 25 mg twice daily.  - Replacing it with spironolactone 25 mg daily.  - Return to clinic for BP check.  -Lab orders updated.  She will have labs next month versus July.  I will see her next month after her lab appointment versus July.  Her labs will include an A1c given her history of prediabetes/hyperglycemia.  -She will continue taking Carafate per her gastroenterologist and follow-up with them within the next month.    2. Renal cyst: Per present recommendations, no additional imaging is warranted at this time.  -We will proceed with observation          ICD-10-CM    1. Nephrolithiasis  N20.0 spironolactone (ALDACTONE) 25 MG tablet     Comprehensive Metabolic Panel      2. Bilateral leg edema  R60.0 CBC with Auto Differential     Comprehensive Metabolic Panel      3.

## 2024-05-21 ASSESSMENT — ENCOUNTER SYMPTOMS
COUGH: 0
SORE THROAT: 0
BACK PAIN: 1
ABDOMINAL PAIN: 0
SHORTNESS OF BREATH: 0
ANAL BLEEDING: 0
EYE PAIN: 0
BLOOD IN STOOL: 0

## 2024-05-28 ENCOUNTER — TELEPHONE (OUTPATIENT)
Age: 79
End: 2024-05-28

## 2024-05-28 ENCOUNTER — HOSPITAL ENCOUNTER (EMERGENCY)
Facility: HOSPITAL | Age: 79
Discharge: HOME | End: 2024-05-28
Payer: MEDICARE

## 2024-05-28 VITALS
TEMPERATURE: 97.7 F | OXYGEN SATURATION: 99 % | DIASTOLIC BLOOD PRESSURE: 70 MMHG | RESPIRATION RATE: 21 BRPM | BODY MASS INDEX: 43.05 KG/M2 | WEIGHT: 243 LBS | HEIGHT: 63 IN | SYSTOLIC BLOOD PRESSURE: 146 MMHG | HEART RATE: 69 BPM

## 2024-05-28 DIAGNOSIS — I10 UNCONTROLLED HYPERTENSION: ICD-10-CM

## 2024-05-28 DIAGNOSIS — I10 ELEVATED BLOOD PRESSURE READING WITH DIAGNOSIS OF HYPERTENSION: ICD-10-CM

## 2024-05-28 DIAGNOSIS — N20.0 NEPHROLITHIASIS: Primary | ICD-10-CM

## 2024-05-28 LAB
ALBUMIN SERPL-MCNC: 3.3 G/DL (ref 3.4–5)
ALBUMIN/GLOB SERPL: 0.8 (ref 0.8–1.7)
ALP SERPL-CCNC: 86 U/L (ref 45–117)
ALT SERPL-CCNC: 15 U/L (ref 13–56)
ANION GAP SERPL CALC-SCNC: 0 MMOL/L (ref 3–18)
AST SERPL-CCNC: 18 U/L (ref 10–38)
BASOPHILS # BLD: 0 K/UL (ref 0–0.1)
BASOPHILS NFR BLD: 0 % (ref 0–2)
BILIRUB SERPL-MCNC: 0.5 MG/DL (ref 0.2–1)
BUN SERPL-MCNC: 10 MG/DL (ref 7–18)
BUN/CREAT SERPL: 12 (ref 12–20)
CALCIUM SERPL-MCNC: 9 MG/DL (ref 8.5–10.1)
CHLORIDE SERPL-SCNC: 109 MMOL/L (ref 100–111)
CO2 SERPL-SCNC: 32 MMOL/L (ref 21–32)
CREAT SERPL-MCNC: 0.86 MG/DL (ref 0.6–1.3)
DIFFERENTIAL METHOD BLD: NORMAL
EOSINOPHIL # BLD: 0.3 K/UL (ref 0–0.4)
EOSINOPHIL NFR BLD: 4 % (ref 0–5)
ERYTHROCYTE [DISTWIDTH] IN BLOOD BY AUTOMATED COUNT: 13.7 % (ref 11.6–14.5)
GLOBULIN SER CALC-MCNC: 4 G/DL (ref 2–4)
GLUCOSE SERPL-MCNC: 96 MG/DL (ref 74–99)
HCT VFR BLD AUTO: 37.3 % (ref 35–45)
HGB BLD-MCNC: 12.2 G/DL (ref 12–16)
IMM GRANULOCYTES # BLD AUTO: 0 K/UL (ref 0–0.04)
IMM GRANULOCYTES NFR BLD AUTO: 0 % (ref 0–0.5)
LYMPHOCYTES # BLD: 2.1 K/UL (ref 0.9–3.6)
LYMPHOCYTES NFR BLD: 32 % (ref 21–52)
MCH RBC QN AUTO: 29 PG (ref 24–34)
MCHC RBC AUTO-ENTMCNC: 32.7 G/DL (ref 31–37)
MCV RBC AUTO: 88.8 FL (ref 78–100)
MONOCYTES # BLD: 0.5 K/UL (ref 0.05–1.2)
MONOCYTES NFR BLD: 8 % (ref 3–10)
NEUTS SEG # BLD: 3.7 K/UL (ref 1.8–8)
NEUTS SEG NFR BLD: 56 % (ref 40–73)
NRBC # BLD: 0 K/UL (ref 0–0.01)
NRBC BLD-RTO: 0 PER 100 WBC
NT PRO BNP: 52 PG/ML (ref 0–1800)
PLATELET # BLD AUTO: 196 K/UL (ref 135–420)
PMV BLD AUTO: 10.4 FL (ref 9.2–11.8)
POTASSIUM SERPL-SCNC: 3.9 MMOL/L (ref 3.5–5.5)
PROT SERPL-MCNC: 7.3 G/DL (ref 6.4–8.2)
RBC # BLD AUTO: 4.2 M/UL (ref 4.2–5.3)
SODIUM SERPL-SCNC: 141 MMOL/L (ref 136–145)
WBC # BLD AUTO: 6.6 K/UL (ref 4.6–13.2)

## 2024-05-28 PROCEDURE — 80053 COMPREHEN METABOLIC PANEL: CPT

## 2024-05-28 PROCEDURE — 6360000002 HC RX W HCPCS: Performed by: NURSE PRACTITIONER

## 2024-05-28 PROCEDURE — 2580000003 HC RX 258: Performed by: NURSE PRACTITIONER

## 2024-05-28 PROCEDURE — 6370000000 HC RX 637 (ALT 250 FOR IP): Performed by: NURSE PRACTITIONER

## 2024-05-28 PROCEDURE — 96374 THER/PROPH/DIAG INJ IV PUSH: CPT

## 2024-05-28 PROCEDURE — 85025 COMPLETE CBC W/AUTO DIFF WBC: CPT

## 2024-05-28 PROCEDURE — 83880 ASSAY OF NATRIURETIC PEPTIDE: CPT

## 2024-05-28 PROCEDURE — 99284 EMERGENCY DEPT VISIT MOD MDM: CPT

## 2024-05-28 RX ORDER — SPIRONOLACTONE 25 MG/1
25 TABLET ORAL 2 TIMES DAILY
Qty: 30 TABLET | Refills: 0 | Status: SHIPPED | OUTPATIENT
Start: 2024-05-28 | End: 2024-05-31

## 2024-05-28 RX ORDER — 0.9 % SODIUM CHLORIDE 0.9 %
500 INTRAVENOUS SOLUTION INTRAVENOUS ONCE
Status: COMPLETED | OUTPATIENT
Start: 2024-05-28 | End: 2024-05-28

## 2024-05-28 RX ORDER — HYDRALAZINE HYDROCHLORIDE 20 MG/ML
10 INJECTION INTRAMUSCULAR; INTRAVENOUS ONCE
Status: COMPLETED | OUTPATIENT
Start: 2024-05-28 | End: 2024-05-28

## 2024-05-28 RX ORDER — LOSARTAN POTASSIUM 25 MG/1
25 TABLET ORAL DAILY
Status: DISCONTINUED | OUTPATIENT
Start: 2024-05-28 | End: 2024-05-28 | Stop reason: HOSPADM

## 2024-05-28 RX ORDER — SPIRONOLACTONE 25 MG/1
25 TABLET ORAL
Status: DISCONTINUED | OUTPATIENT
Start: 2024-05-28 | End: 2024-05-28

## 2024-05-28 RX ADMIN — LOSARTAN POTASSIUM 25 MG: 25 TABLET, FILM COATED ORAL at 14:38

## 2024-05-28 RX ADMIN — SODIUM CHLORIDE 500 ML: 9 INJECTION, SOLUTION INTRAVENOUS at 18:09

## 2024-05-28 RX ADMIN — HYDRALAZINE HYDROCHLORIDE 10 MG: 20 INJECTION, SOLUTION INTRAMUSCULAR; INTRAVENOUS at 15:56

## 2024-05-28 ASSESSMENT — PAIN SCALES - GENERAL: PAINLEVEL_OUTOF10: 0

## 2024-05-28 ASSESSMENT — ENCOUNTER SYMPTOMS
SHORTNESS OF BREATH: 0
PHOTOPHOBIA: 0

## 2024-05-28 ASSESSMENT — PAIN - FUNCTIONAL ASSESSMENT
PAIN_FUNCTIONAL_ASSESSMENT: NONE - DENIES PAIN
PAIN_FUNCTIONAL_ASSESSMENT: 0-10

## 2024-05-28 NOTE — DISCHARGE INSTRUCTIONS
Take medication as prescribed.   Follow-up with your primary care physician within 2 days for reassessment. Bring the results from this visit with you for their review. Return to the ED immediately for any new, worsening, or persistent symptoms, including fever, vomiting, chest pain, shortness of breath, or any other medical concerns.

## 2024-05-28 NOTE — ED TRIAGE NOTES
Pt was taken off Lasix 20mg, Potassium, and Losartan 25mg on 520. Was replaced with Spirinolactone 25mg. Pt states her BP was high earlier and now she feels woozy. States her fluid is increasing

## 2024-05-28 NOTE — ED PROVIDER NOTES
EMERGENCY DEPARTMENT HISTORY AND PHYSICAL EXAM    6:45 PM      Date: 5/28/2024  Patient Name: Nataly Moore    History of Presenting Illness     Chief Complaint   Patient presents with    Hypertension         History Provided By: Patient and medical chart review.    Additional History (Context): Nataly Moore is a 79 y.o. female with   Past Medical History:   Diagnosis Date    Atrophic vaginitis     Diabetes mellitus (HCC)     diet controlled, hypoglycemia from metformin previously     Dyslipidemia     Fatty liver     Fibrocystic breast disease     GERD (gastroesophageal reflux disease)     Gout     H/O colonoscopy 4/12/13    polyp    Hypertension     Macular degeneration     Morbid obesity (HCC)     Obstructive sleep apnea     Peripheral neuropathy     Rectocele     S/P cardiac cath 05/30/2012    Patent coronary arteries.  LVEDP 20.  RA 11.  RV 42/10.  PA 42/21.  W 18.  CO 6.4 (TD), 6.4 ([place]).  PVR 1.7 Wood units.  False-positive nuclear study.    Thyroid cyst     bilat   who presents with accompanied by daughter with complaints of high blood pressure and leg swelling.  States on 5/20 she saw her PCP and they stopped her losartan Lasix and other medication and switched her to spironolactone and she is only taking 25 mg once a day.  States her blood pressures have been high at home.  Reports blood pressure at home today was 179/107 so presented for evaluation.  Patient denies any chest pain or shortness of breath.  Denies any headaches or visual changes.  States she did have a headache yesterday but not today.  On arrival blood pressure noted to be 163/92    PCP: Ngoc Michaels MD    Current Facility-Administered Medications   Medication Dose Route Frequency Provider Last Rate Last Admin    losartan (COZAAR) tablet 25 mg  25 mg Oral Daily Coby Courtney APRN - NP   25 mg at 05/28/24 1438     Current Outpatient Medications   Medication Sig Dispense Refill    spironolactone (ALDACTONE) 25 MG

## 2024-05-28 NOTE — TELEPHONE ENCOUNTER
Pt called states on 05/20 pt was seen for elevated bp  pt states since her meds have been changed she has a lot of fluid retention and her bp is 170/109 - pt is asking for instructions on what she needs to do please advise                       Note from 05/20/2024  -I am discontinuing her Lasix 20 mg daily, potassium, and losartan 25 mg twice daily.  - Replacing it with spironolactone 25 mger medication was changed

## 2024-05-29 NOTE — TELEPHONE ENCOUNTER
Called pt to obtain more information. No answer. Left VM to return call.      HERMINIA Montoya LPN

## 2024-05-30 ENCOUNTER — TELEPHONE (OUTPATIENT)
Age: 79
End: 2024-05-30

## 2024-05-30 NOTE — TELEPHONE ENCOUNTER
Ms. Moore returned call for Cheyenne Duque currently busy advised pt she would cb asap.    CB#: [phone]

## 2024-05-31 NOTE — TELEPHONE ENCOUNTER
Called pt to follow up on BP and pt stated that BP is still extremely high. She visited the ED Tues to [facility] BS and was given spironolactone 25 mg and BP was still rising so something  was given in IV so it dropped it too fast and she became sick. This morning her BP is up again 179/109, but after taking meds.  it's 171/100. Pt reports headache in hospital on Tues and headache on Wed but no other sx present. Will f/u with provider and follow up with patient at soonest. Advised if any other worsening sx to visit ED/call 911 immediately. Pt verbalized understanding.    Please advise.    HERMINIA Montoya LPN

## 2024-06-10 ENCOUNTER — TELEPHONE (OUTPATIENT)
Facility: CLINIC | Age: 79
End: 2024-06-10

## 2024-06-10 NOTE — TELEPHONE ENCOUNTER
Have her continue to take her meds. I will reassess her at her upcoming apt. If her blurry vision worsens, please have her see her eye doctor for a check up. She should monitor her BP but only if relaxed and sitting for at least 30 min. She can check her BP up to once a day.  She should notify me if the SBP is <110.    Dr. Ngoc Michaels  [facility] [address], [address]  [place] [address]  Phone: [phone]  Fax: [phone]

## 2024-06-10 NOTE — TELEPHONE ENCOUNTER
Granddaughter Gaby called and stated since that spironolactone she has been bruising/puffy eyes w/blurry vision. She went last Sunday for BP concerns. Wanted to make sure this is not too concerning due to new medication and not any type of allergic reaction.    Please advise      HERMINIA Montoya LPN

## 2024-06-11 NOTE — TELEPHONE ENCOUNTER
Notified granddaughter Gaby of instructions per Dr. Michaels. Gaby verbalized understanding.       KARIS Montoya LPN

## 2024-06-12 ENCOUNTER — NURSE ONLY (OUTPATIENT)
Facility: CLINIC | Age: 79
End: 2024-06-12

## 2024-06-12 DIAGNOSIS — N20.0 NEPHROLITHIASIS: ICD-10-CM

## 2024-06-12 DIAGNOSIS — R60.0 BILATERAL LEG EDEMA: ICD-10-CM

## 2024-06-12 DIAGNOSIS — N28.1 RENAL CYST: ICD-10-CM

## 2024-06-12 DIAGNOSIS — I10 ESSENTIAL (PRIMARY) HYPERTENSION: ICD-10-CM

## 2024-06-12 DIAGNOSIS — R73.9 HYPERGLYCEMIA, UNSPECIFIED: ICD-10-CM

## 2024-06-18 ENCOUNTER — HOSPITAL ENCOUNTER (OUTPATIENT)
Facility: HOSPITAL | Age: 79
Discharge: HOME | End: 2024-06-21
Attending: INTERNAL MEDICINE
Payer: MEDICARE

## 2024-06-18 DIAGNOSIS — I10 ESSENTIAL (PRIMARY) HYPERTENSION: ICD-10-CM

## 2024-06-18 DIAGNOSIS — R42 DIZZINESS: ICD-10-CM

## 2024-06-18 DIAGNOSIS — H53.8 BLURRY VISION, BILATERAL: ICD-10-CM

## 2024-06-18 LAB — CREAT UR-MCNC: 0.9 MG/DL (ref 0.6–1.3)

## 2024-06-18 PROCEDURE — 6360000004 HC RX CONTRAST MEDICATION: Performed by: INTERNAL MEDICINE

## 2024-06-18 PROCEDURE — 82565 ASSAY OF CREATININE: CPT

## 2024-06-18 PROCEDURE — 70496 CT ANGIOGRAPHY HEAD: CPT

## 2024-06-18 RX ADMIN — IOPAMIDOL 80 ML: 755 INJECTION, SOLUTION INTRAVENOUS at 08:57

## 2024-06-20 ENCOUNTER — TELEPHONE (OUTPATIENT)
Facility: CLINIC | Age: 79
End: 2024-06-20

## 2024-06-20 ENCOUNTER — OFFICE VISIT (OUTPATIENT)
Facility: CLINIC | Age: 79
End: 2024-06-20
Payer: MEDICARE

## 2024-06-20 VITALS
TEMPERATURE: 98.4 F | WEIGHT: 245 LBS | SYSTOLIC BLOOD PRESSURE: 119 MMHG | OXYGEN SATURATION: 98 % | BODY MASS INDEX: 43.41 KG/M2 | HEART RATE: 61 BPM | RESPIRATION RATE: 18 BRPM | DIASTOLIC BLOOD PRESSURE: 75 MMHG | HEIGHT: 63 IN

## 2024-06-20 DIAGNOSIS — I10 HYPERTENSION, UNSPECIFIED TYPE: Primary | ICD-10-CM

## 2024-06-20 DIAGNOSIS — R10.13 EPIGASTRIC PAIN: ICD-10-CM

## 2024-06-20 PROCEDURE — 1036F TOBACCO NON-USER: CPT | Performed by: INTERNAL MEDICINE

## 2024-06-20 PROCEDURE — 99214 OFFICE O/P EST MOD 30 MIN: CPT | Performed by: INTERNAL MEDICINE

## 2024-06-20 PROCEDURE — 3074F SYST BP LT 130 MM HG: CPT | Performed by: INTERNAL MEDICINE

## 2024-06-20 PROCEDURE — 3078F DIAST BP <80 MM HG: CPT | Performed by: INTERNAL MEDICINE

## 2024-06-20 PROCEDURE — G8399 PT W/DXA RESULTS DOCUMENT: HCPCS | Performed by: INTERNAL MEDICINE

## 2024-06-20 PROCEDURE — 1090F PRES/ABSN URINE INCON ASSESS: CPT | Performed by: INTERNAL MEDICINE

## 2024-06-20 PROCEDURE — G8427 DOCREV CUR MEDS BY ELIG CLIN: HCPCS | Performed by: INTERNAL MEDICINE

## 2024-06-20 PROCEDURE — G8417 CALC BMI ABV UP PARAM F/U: HCPCS | Performed by: INTERNAL MEDICINE

## 2024-06-20 PROCEDURE — 1123F ACP DISCUSS/DSCN MKR DOCD: CPT | Performed by: INTERNAL MEDICINE

## 2024-06-20 NOTE — TELEPHONE ENCOUNTER
Granddaughter questioned mini mental due to grandmother is capable of taking care of herself. Addressed concerns and advised that grandmother refused.      HERMINIA Montoya LPN

## 2024-06-20 NOTE — TELEPHONE ENCOUNTER
Pt granddaughter  called in stating that she wants to speak to yinkae regarding Pts test results.   Please advise.   Did not see this contact on [employer]

## 2024-06-20 NOTE — PROGRESS NOTES
Nataly Isidro Teresa presents today for   Chief Complaint   Patient presents with    Follow-up    Hypertension     F/u           \"Have you been to the ER, urgent care clinic since your last visit?  Hospitalized since your last visit?\"    YES - When: approximately 1 days ago.  Where and Why: [facility]-procedure.    “Have you seen or consulted any other health care providers outside of [facility] since your last visit?”    YES - When: approximately 1 days ago.  Where and Why: Gastro-procedure.             
General: Abdomen is flat. Bowel sounds are normal. There is no distension.      Palpations: Abdomen is soft.      Tenderness: There is no abdominal tenderness.   Musculoskeletal:         General: No swelling (BUE) or tenderness (BUE).      Cervical back: Neck supple.   Lymphadenopathy:      Cervical: No cervical adenopathy.   Skin:     General: Skin is warm and dry.      Findings: No erythema.   Neurological:      Mental Status: She is alert. Mental status is at baseline.      Gait: Gait normal.   Psychiatric:      Comments: Pt repeats a lot of her questions/statements.            LABS   Data Review:   Lab Results   Component Value Date/Time    WBC 6.3 06/12/2024 12:00 AM    HGB 13.1 06/12/2024 12:00 AM    HCT 40.2 06/12/2024 12:00 AM     06/12/2024 12:00 AM    MCV 88 06/12/2024 12:00 AM       Lab Results   Component Value Date/Time     06/12/2024 12:00 AM    K 4.2 06/12/2024 12:00 AM     06/12/2024 12:00 AM    CO2 27 06/12/2024 12:00 AM    BUN 14 06/12/2024 12:00 AM    GFRAA >60 02/22/2022 01:06 PM       Lab Results   Component Value Date/Time    CHOL 230 06/12/2024 12:00 AM    HDL 63 06/12/2024 12:00 AM     06/12/2024 12:00 AM     11/16/2023 12:00 AM    VLDL 13 06/12/2024 12:00 AM    VLDL 13 05/13/2022 12:00 AM       No results found for: \"HBA1C\", \"[id number]\"    Assessment/Plan:   Epigastric pain and hypertension: Blood pressure is stable.  PUD is stable.  She is very concerned about the medication she is actually taking.  For now I will have her continue taking spironolactone 25 mg daily and 6.25 mg twice daily of carvedilol.  I discussed how swelling of the orbits is not typically something associated with these medications and not listed on the drug labels as a potential side effects.  I encouraged her to follow-up with her GI team.  I encouraged her to continue monitoring her blood pressure.  In our office, her reading of 119/75 is very reassuring.  She states at home readings

## 2024-06-25 DIAGNOSIS — I67.1 CEREBRAL ANEURYSM: ICD-10-CM

## 2024-06-25 DIAGNOSIS — I10 ESSENTIAL (PRIMARY) HYPERTENSION: Primary | ICD-10-CM

## 2024-06-25 DIAGNOSIS — R93.0 ABNORMAL HEAD MRI: ICD-10-CM

## 2024-06-25 RX ORDER — FUROSEMIDE 20 MG/1
20 TABLET ORAL DAILY
Qty: 60 TABLET | Refills: 5 | Status: SHIPPED | OUTPATIENT
Start: 2024-06-25

## 2024-06-25 RX ORDER — LOSARTAN POTASSIUM 25 MG/1
25 TABLET ORAL 2 TIMES DAILY
Qty: 60 TABLET | Refills: 5 | Status: SHIPPED | OUTPATIENT
Start: 2024-06-25

## 2024-06-25 RX ORDER — POTASSIUM CHLORIDE 20 MEQ/1
20 TABLET, EXTENDED RELEASE ORAL 2 TIMES DAILY
Qty: 60 TABLET | Refills: 5 | Status: SHIPPED | OUTPATIENT
Start: 2024-06-25

## 2024-06-26 ASSESSMENT — ENCOUNTER SYMPTOMS
SORE THROAT: 0
ANAL BLEEDING: 0
EYE PAIN: 0
SHORTNESS OF BREATH: 0
BACK PAIN: 1
ABDOMINAL PAIN: 0
COUGH: 0
BLOOD IN STOOL: 0

## 2024-07-16 ENCOUNTER — TELEPHONE (OUTPATIENT)
Facility: CLINIC | Age: 79
End: 2024-07-16

## 2024-07-16 DIAGNOSIS — R10.11 RUQ PAIN: Primary | ICD-10-CM

## 2024-07-16 NOTE — TELEPHONE ENCOUNTER
Please let patient know that I reviewed her ED records and I am placing a referral to general surgery for an outpatient checkup.  Her right upper quadrant ultrasound showed gallbladder sludge (thickened liquid)  but the follow-up HIDA scan did not show any evidence of cholecystitis/infection of the gallbladder.  Her liver tests, blood counts, electrolytes, renal function labs, and pancreatic function labs are normal.    Dr. Ngoc Michaels  [facility] [address], [address]  [place] [address]  Phone: [phone]  Fax: [phone]

## 2024-07-16 NOTE — TELEPHONE ENCOUNTER
Returned call to pt. She stated that she didn't have chart questions. She only had questions about her MyChart access due to having difficulty getting into her account. She is interested into seeing her medical information from her most recent visit @ the ED but neither her nor her granddaughter could get in and it was frustrating.      HERMINIA Montoya LPN

## 2024-07-16 NOTE — TELEPHONE ENCOUNTER
Pt called in and requested to speak with Neto regarding questions she had about her recent ER visit and chart questions.     Pt req a callback from Neto

## 2024-07-16 NOTE — TELEPHONE ENCOUNTER
Assisted w/ helping My Chart. Advised if any other help is needed to advise. Pt verbalized understanding.      HERMINIA Montoya LPN

## 2024-09-25 DIAGNOSIS — I10 ESSENTIAL (PRIMARY) HYPERTENSION: ICD-10-CM

## 2024-09-25 RX ORDER — POTASSIUM CHLORIDE 1500 MG/1
20 TABLET, EXTENDED RELEASE ORAL 2 TIMES DAILY
Qty: 180 TABLET | Refills: 3 | Status: SHIPPED | OUTPATIENT
Start: 2024-09-25

## 2024-09-25 RX ORDER — LOSARTAN POTASSIUM 25 MG/1
25 TABLET ORAL 2 TIMES DAILY
Qty: 180 TABLET | Refills: 3 | Status: SHIPPED | OUTPATIENT
Start: 2024-09-25

## 2025-05-19 ENCOUNTER — OFFICE VISIT (OUTPATIENT)
Age: 80
End: 2025-05-19
Payer: MEDICARE

## 2025-05-19 VITALS
SYSTOLIC BLOOD PRESSURE: 138 MMHG | BODY MASS INDEX: 42.02 KG/M2 | WEIGHT: 237.2 LBS | HEART RATE: 67 BPM | OXYGEN SATURATION: 96 % | DIASTOLIC BLOOD PRESSURE: 89 MMHG

## 2025-05-19 DIAGNOSIS — I10 ESSENTIAL HYPERTENSION WITH GOAL BLOOD PRESSURE LESS THAN 140/90: Primary | ICD-10-CM

## 2025-05-19 DIAGNOSIS — E78.00 PURE HYPERCHOLESTEROLEMIA: ICD-10-CM

## 2025-05-19 PROCEDURE — 3075F SYST BP GE 130 - 139MM HG: CPT | Performed by: INTERNAL MEDICINE

## 2025-05-19 PROCEDURE — 3079F DIAST BP 80-89 MM HG: CPT | Performed by: INTERNAL MEDICINE

## 2025-05-19 PROCEDURE — 1123F ACP DISCUSS/DSCN MKR DOCD: CPT | Performed by: INTERNAL MEDICINE

## 2025-05-19 PROCEDURE — 99214 OFFICE O/P EST MOD 30 MIN: CPT | Performed by: INTERNAL MEDICINE

## 2025-05-19 NOTE — PROGRESS NOTES
arteries.   3. Catheter-induced atrial fibrillation.   4. False-positive nuclear stress test.    NM STRESS TEST WITH MYOCARDIAL PERFUSION 05/03/2023  4:29 PM (Final)  Interpretation Summary    Perfusion Conclusion: TID ratio is 1.02.    ECG: Resting ECG demonstrates normal sinus rhythm and first-degree AV block.    ECG: The ECG was negative for ischemia.    Stress Test: A pharmacological stress test was performed using lexiscan.    Low Risk Stress Test  No significant resting or reversible defect present  TID within normal limits 0.99, ejection fraction 76% with normal wall motion     IMPRESSION & PLAN:   Ms. Moore is 80 y.o. female with multiple medical problems      Palpitation:   No palpitation since last visit   Event monitor in 07/2021, low risk.  Sinus rhythm, no A. fib or pause noted.  PVC burden less than 1%   Echocardiogram in 05/2021, low risk finding as mentioned above.      Hypertension: /89.  Currently on losartan 25 daily.  Salt restriction and weight loss advised again during this visit     Hyperlipidemia:   Unfortunately patient has not been able to tolerate statin medication in the past.  She does not remember the names but she has tried 2/3 different medications.   Unable to tolerate Zetia with significant myalgia  Livalo was ordered however patient told that it was not approved by insurance company.     PCSK9 inhibitor Repatha was prescribed however she was not able to tolerate that.  She is willing to try Leqvio.  Will prescribe     This plan was discussed with patient who is in agreement.    Thank you for allowing me to participate in patient care. Please feel free to call me if you have any question or concern.     Simon Sahu MD  Please note: This document has been produced using voice recognition software. Unrecognized errors in transcription may be present.

## 2025-05-27 NOTE — PROGRESS NOTES
HPI:    Ruben He  is a 74 y.o.  female  patient who comes in today for follow up of peripheral edema and lower extremity pain. We decreased losartan from 100mg to 50mg last visit and started back on spironolactone. She's lost seven pounds on home scale and the pain is relieved. We are in process of getting new compression stockings. BP looks great, no side effects to medication. Patient is complaining of \"pinch\" in upper left abdomen that lasts for a few seconds. Patient denies SOB, CP, dizziness, headache. Current Outpatient Prescriptions   Medication Sig Dispense Refill    losartan (COZAAR) 50 mg tablet TAKE 2 TABLETS ONE TIME DAILY FOR HYPERTENSION 180 Tab 1    atorvastatin (LIPITOR) 20 mg tablet TAKE 1 TABLET EVERY DAY 90 Tab 1    potassium chloride (KLOR-CON) 10 mEq tablet TAKE 2 TABLETS TWICE DAILY 360 Tab 1    furosemide (LASIX) 20 mg tablet TAKE 1 TABLET EVERY DAY FOR EDEMA 90 Tab 1    lidocaine (LIDODERM) 5 % Cut to fit the affected area. Apply patch for 12 hours, then remove for another 12 hours. 1 Each 0    diclofenac (VOLTAREN) 1 % gel Apply 2 g to affected area two (2) times a day. 100 g 1    chlorhexidine (PERIDEX) 0.12 % solution USE AS A MOUTHWASH TWO TIMES A DAY  0    albuterol (PROVENTIL HFA, VENTOLIN HFA, PROAIR HFA) 90 mcg/actuation inhaler Take 2 Puffs by inhalation every four (4) hours as needed for Wheezing. 1 Inhaler 0    olopatadine (PATANOL) 0.1 % ophthalmic solution Administer 2 Drops to both eyes two (2) times a day. Indications: Allergic Conjunctivitis 6 mL 0    spironolactone (ALDACTONE) 25 mg tablet Take 1 Tab by mouth daily. Pt states she takes the OOD. (Patient taking differently: Take 25 mg by mouth daily. Indications: hypertension) 90 Tab 1    cholecalciferol (VITAMIN D3) 1,000 unit tablet Take 2,000 Units by mouth daily.  Dexlansoprazole (DEXILANT) 60 mg CpDM Take 60 mg by mouth daily as needed.       cpap machine kit Take  by inhalation every evening. Used when sleeping      aspirin 81 mg Tab Take 81 mg by mouth daily.  MULTIVITAMINS (MULTI-VITAMIN PO) Take 1 Tab by mouth daily. Allergies   Allergen Reactions    Latex Rash    Nexium [Esomeprazole Magnesium] Swelling and Other (comments)     Lips Swollen, and facial rash and swelling    Crestor [Rosuvastatin] Other (comments)     Upper respiratory illness    Lisinopril Unknown (comments)     Patient can't recall    Niaspan [Niacin] Other (comments)     Scratchy throat, \"asthma\" like symptoms    Pcn [Penicillins] Hives    Pravachol [Pravastatin] Myalgia    Sulfa (Sulfonamide Antibiotics) Rash    Zocor [Simvastatin] Myalgia and Other (comments)     Per patient chart \"(? Rash)\"      Past Medical History:   Diagnosis Date    Acute idiopathic gout of right wrist 10/4/2017    Atrophic vaginitis     Cardiac cath 05/30/2012    Patent coronary arteries. LVEDP 20.  RA 11.  RV 42/10. PA 42/21. W 18.  CO 6.4 (TD), 6.4 ([place]). PVR 1.7 Wood units. False-positive nuclear study.  Cardiac echocardiogram 05/11/2011    EF 65%. RVSP at least 35 mmHg. Mild IVCE. No significant valvular heart disease.  Cardiac nuclear imaging test 05/18/2012    Tech difficult. Apical ischemia. No infarction. EF 76%. No reg'l WMA. No TID.  Cardiovascular LLE venous duplex 06/17/2013    Left leg:  No DVT.     Diabetes     diet controlled, hypoglycemia from metformin previously     Dyslipidemia     Fatty liver     Fibrocystic breast disease     Fluid retention     GERD     H/O colonoscopy 4/12/13    polyp    Hypertension     Ill-defined condition     gout    Macular degeneration     Morbid obesity ([address]     Obstructive sleep apnea     Peripheral neuropathy     Rectocele       Family History   Problem Relation Age of Onset    Cancer Mother      colon cancer    Colon Cancer Mother     Hypertension Mother     Diabetes Mother     Heart Disease Mother     Coronary Artery Disease Mother     Hypertension Father     Diabetes Father     Heart Disease Father     Coronary Artery Disease Father     Hypertension Sister     Diabetes Sister     Heart Disease Sister     Coronary Artery Disease Sister     Hypertension Brother     Diabetes Brother     Heart Disease Brother     Coronary Artery Disease Brother       Patient Active Problem List   Diagnosis Code    Diabetes E11.9    Hypertension I11.9    Dyslipidemia E78.5    Sleep apnea/ on c-pap G47.30    Renal cyst N28.1    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 pylori infection A04.8    Acute idiopathic gout of right wrist M10.031    Sliding hiatal hernia K44.9    Pseudogout of right shoulder M11.211    Primary osteoarthritis involving multiple joints M15.0    Obesity, morbid ([address] E66.01    Mild peripheral edema R60.9            Review of Systems   Respiratory: Negative for cough and shortness of breath. Cardiovascular: Positive for leg swelling (mild peripheral edema). Negative for chest pain and palpitations. Gastrointestinal: Negative for abdominal pain, constipation, diarrhea, heartburn, nausea and vomiting. Neurological: Negative for dizziness, tingling and headaches. Visit Vitals    /74 (BP 1 Location: Left arm, BP Patient Position: Sitting)    Pulse 70    Temp 98.3 °F (36.8 °C) (Oral)    Resp 18    Ht 5' 3\" (1.6 m)    Wt 247 lb 3.2 oz (112.1 kg)    SpO2 97%    BMI 43.79 kg/m2        Physical Exam   Constitutional: She is oriented to person, place, and time and well-developed, well-nourished, and in no distress. Obese habitus   HENT:   Head: Normocephalic and atraumatic. Neck: Normal range of motion. Neck supple. Cardiovascular: Normal rate, regular rhythm, normal heart sounds and intact distal pulses. Pulmonary/Chest: Effort normal and breath sounds normal.   Abdominal: Soft.  Bowel sounds are normal. She exhibits no distension and no mass. There is no tenderness. Musculoskeletal: She exhibits edema (mild peripheral edema bilaterally). Neurological: She is alert and oriented to person, place, and time. Vitals reviewed. Assessment/Plan:    Diagnoses and all orders for this visit:    1. Mild peripheral edema - controlled, current regimen on spironolactone and lasix     2. Sliding hiatal hernia - EGD scheduled    3. Hypertension - stable, cont current regimen cozaar 50 mg, reduce potassium to one pill twice daily for lasix 20mg    4. Obesity, morbid ([address] - cont current regimen, cutting out saturated fats, increasing fiber, mediterranean diet and treadmill. Goal weight loss is 4 pounds. 5.  abdominal pain - upper left and epigastric, relieved with belching. Will follow up with EGD. Follow-up Disposition:  Return if symptoms worsen or fail to improve, for as scheduled. Additional Notes: Discussed today's diagnosis, treatment plans. Discussed medication indications and side effects. Preventive Medicine:   Weight Management:  Mediterranean diet recommended as well as exercise for 30 minutes daily most days of the week. DASH diet info given. After Visit Summary: Provided and discussed printed patient instructions. Answered all questions and patient acknowledged understanding. Nadira Bernardo PA-C     I reviewed the patient's medical history, the physician assistant's findings on physical examination, the patient's diagnoses, and treatment plan as documented in the progress note. I concur with the treatment plan as documented. There are no additional recommendations at this time.     Junie Estrella MD English

## 2025-07-09 ENCOUNTER — HOSPITAL ENCOUNTER (OUTPATIENT)
Facility: HOSPITAL | Age: 80
Discharge: HOME | End: 2025-07-12
Payer: MEDICARE

## 2025-07-09 VITALS — WEIGHT: 234 LBS | HEIGHT: 63 IN | BODY MASS INDEX: 41.46 KG/M2

## 2025-07-09 DIAGNOSIS — Z12.31 OTHER SCREENING MAMMOGRAM: ICD-10-CM

## 2025-07-09 PROCEDURE — 77063 BREAST TOMOSYNTHESIS BI: CPT
